# Patient Record
Sex: FEMALE | Race: BLACK OR AFRICAN AMERICAN | NOT HISPANIC OR LATINO | Employment: OTHER | URBAN - METROPOLITAN AREA
[De-identification: names, ages, dates, MRNs, and addresses within clinical notes are randomized per-mention and may not be internally consistent; named-entity substitution may affect disease eponyms.]

---

## 2017-05-18 ENCOUNTER — HOSPITAL ENCOUNTER (OUTPATIENT)
Dept: RADIOLOGY | Facility: HOSPITAL | Age: 67
Discharge: HOME | End: 2017-05-18
Attending: INTERNAL MEDICINE
Payer: MEDICARE

## 2017-05-18 DIAGNOSIS — M25.551 RIGHT HIP PAIN: Primary | ICD-10-CM

## 2017-05-18 DIAGNOSIS — M54.16 LUMBAR RADICULOPATHY: ICD-10-CM

## 2017-05-18 DIAGNOSIS — M25.551 RIGHT HIP PAIN: ICD-10-CM

## 2017-05-18 PROCEDURE — 73521 X-RAY EXAM HIPS BI 2 VIEWS: CPT | Mod: TC,PO

## 2017-05-18 PROCEDURE — 72100 X-RAY EXAM L-S SPINE 2/3 VWS: CPT | Mod: TC,PO

## 2017-05-18 PROCEDURE — 72220 X-RAY EXAM SACRUM TAILBONE: CPT | Mod: TC,PO

## 2017-06-28 ENCOUNTER — HOSPITAL ENCOUNTER (EMERGENCY)
Facility: HOSPITAL | Age: 67
Discharge: HOME | End: 2017-06-28
Attending: FAMILY MEDICINE
Payer: MEDICARE

## 2017-06-28 VITALS
WEIGHT: 240 LBS | BODY MASS INDEX: 37.67 KG/M2 | SYSTOLIC BLOOD PRESSURE: 172 MMHG | OXYGEN SATURATION: 96 % | HEIGHT: 67 IN | TEMPERATURE: 99 F | RESPIRATION RATE: 20 BRPM | HEART RATE: 86 BPM | DIASTOLIC BLOOD PRESSURE: 71 MMHG

## 2017-06-28 DIAGNOSIS — M79.606 LEG PAIN: ICD-10-CM

## 2017-06-28 DIAGNOSIS — M79.605 PAIN OF LEFT LOWER EXTREMITY: Primary | ICD-10-CM

## 2017-06-28 PROCEDURE — 99283 EMERGENCY DEPT VISIT LOW MDM: CPT

## 2017-06-28 PROCEDURE — 25000003 PHARM REV CODE 250: Performed by: FAMILY MEDICINE

## 2017-06-28 RX ORDER — HYDROCODONE BITARTRATE AND ACETAMINOPHEN 5; 325 MG/1; MG/1
1 TABLET ORAL
Status: COMPLETED | OUTPATIENT
Start: 2017-06-28 | End: 2017-06-28

## 2017-06-28 RX ORDER — HYDROCODONE BITARTRATE AND ACETAMINOPHEN 5; 325 MG/1; MG/1
1 TABLET ORAL EVERY 4 HOURS PRN
Qty: 18 TABLET | Refills: 0 | Status: ON HOLD | OUTPATIENT
Start: 2017-06-28 | End: 2018-03-03 | Stop reason: HOSPADM

## 2017-06-28 RX ORDER — CALCIUM ACETATE 667 MG/1
667 CAPSULE ORAL
COMMUNITY
End: 2019-11-05 | Stop reason: SDUPTHER

## 2017-06-28 RX ADMIN — HYDROCODONE BITARTRATE AND ACETAMINOPHEN 1 TABLET: 5; 325 TABLET ORAL at 04:06

## 2017-07-12 NOTE — ED PROVIDER NOTES
Encounter Date: 6/28/2017       History     Chief Complaint   Patient presents with    Leg Pain     left leg pain and low back pain x 2 days      66-year-old female presents with chief complaint of left leg and lower back pain for last 2 days.  Patient denies any urinary frequency denies any numbness or tingling.  Patient denies any constipation.  Denies any weakness.  Patient does have a history of peripheral vascular disease and ESRD.          Review of patient's allergies indicates:  No Known Allergies  Past Medical History:   Diagnosis Date    Arthritis     Cancer     breast left    CHF (congestive heart failure)     Coronary artery disease     Diabetes mellitus     DMII    End stage kidney disease     Hemodialysis access site with arteriovenous graft     mon-wed -[id number]    Hyperlipidemia     Hypertension     Renal disorder     CKD; on diaylsis    Sarcoidosis      Past Surgical History:   Procedure Laterality Date    av graft      left arm    BRACHIAL ARTERY GRAFT Left 05/04/2016    brachiocephalic, Dr. Anson Crocker    btl      ELBOW SURGERY      left    LIPOMA RESECTION      back of neck    MASTECTOMY Left     left with lymph node dissection    pilondial cyst      TOTAL KNEE ARTHROPLASTY Right 02/23/2016     Family History   Problem Relation Age of Onset    Diabetes Mother     Kidney disease Mother     Hypertension Mother     Diabetes Sister     Heart disease Sister      Social History   Substance Use Topics    Smoking status: Never Smoker    Smokeless tobacco: Not on file    Alcohol use No     Review of Systems   Constitutional: Negative for chills and fever.   Musculoskeletal: Positive for back pain. Negative for gait problem and neck pain.   All other systems reviewed and are negative.      Physical Exam     Initial Vitals [06/28/17 1537]   BP Pulse Resp Temp SpO2   (!) 160/54 76 20 98.1 °F (36.7 °C) (!) 94 %      MAP       89.33         Physical Exam    Nursing note and vitals  reviewed.  Constitutional: She appears well-developed and well-nourished.   HENT:   Head: Normocephalic and atraumatic.   Eyes: Conjunctivae and EOM are normal. Pupils are equal, round, and reactive to light.   Neck: Normal range of motion. Neck supple.   Cardiovascular: Normal rate, regular rhythm, normal heart sounds and intact distal pulses.   Pulmonary/Chest: Breath sounds normal.   Abdominal: Soft. Bowel sounds are normal.   Musculoskeletal: Normal range of motion. She exhibits tenderness.   Neurological: She is alert and oriented to person, place, and time. She has normal strength.   Skin: Skin is warm. Capillary refill takes less than 2 seconds.   Psychiatric: She has a normal mood and affect. Her behavior is normal.         ED Course   Procedures  Labs Reviewed - No data to display                            ED Course     Clinical Impression:   The primary encounter diagnosis was Pain of left lower extremity. A diagnosis of Leg pain was also pertinent to this visit.                           Rajesh Sung MD  07/12/17 0640       Rajesh Sung MD  07/12/17 0647

## 2017-08-01 ENCOUNTER — CLINICAL SUPPORT (OUTPATIENT)
Dept: REHABILITATION | Facility: HOSPITAL | Age: 67
End: 2017-08-01
Attending: INTERNAL MEDICINE
Payer: MEDICARE

## 2017-08-01 DIAGNOSIS — R29.898 WEAKNESS OF BOTH LEGS: Primary | ICD-10-CM

## 2017-08-01 DIAGNOSIS — R26.2 DIFFICULTY WALKING: ICD-10-CM

## 2017-08-01 DIAGNOSIS — G89.29 CHRONIC LOW BACK PAIN WITH SCIATICA, SCIATICA LATERALITY UNSPECIFIED, UNSPECIFIED BACK PAIN LATERALITY: ICD-10-CM

## 2017-08-01 DIAGNOSIS — M54.40 CHRONIC LOW BACK PAIN WITH SCIATICA, SCIATICA LATERALITY UNSPECIFIED, UNSPECIFIED BACK PAIN LATERALITY: ICD-10-CM

## 2017-08-01 DIAGNOSIS — R26.81 GAIT INSTABILITY: ICD-10-CM

## 2017-08-01 PROCEDURE — G8978 MOBILITY CURRENT STATUS: HCPCS | Mod: CL | Performed by: PHYSICAL MEDICINE & REHABILITATION

## 2017-08-01 PROCEDURE — 97163 PT EVAL HIGH COMPLEX 45 MIN: CPT | Performed by: PHYSICAL MEDICINE & REHABILITATION

## 2017-08-01 PROCEDURE — G8979 MOBILITY GOAL STATUS: HCPCS | Mod: CK | Performed by: PHYSICAL MEDICINE & REHABILITATION

## 2017-08-01 PROCEDURE — 97110 THERAPEUTIC EXERCISES: CPT | Performed by: PHYSICAL MEDICINE & REHABILITATION

## 2017-08-01 NOTE — PROGRESS NOTES
Name:Angelina Beard  Physician:Eva Jara MD  Date of eval:8/1/2017  Orders:  Physical Therapy evaluate and treat  Clinic: [id number]  Diagnosis:  1. Weakness of both legs     2. Difficulty walking     3. Gait instability     4. Chronic low back pain with sciatica, sciatica laterality unspecified, unspecified back pain laterality         Precautions: Grade I anteriolisthesis L4 on L5  Evaluation date: 8/1/2017  Visit # authorized: 1/12  Authorization period: 12/31/17  Plan of care expiration: 9/12/17      Start time: 11:05 AM  Stop Time: 12:00 PM    Timed     Procedure  Min     Units   TE x 2 25 2                    Untimed     Procedure  Min  Units   IE 30 1                 Subjective     Chief complaint: Patient states has had pain in low back for several years. Patient states about 2 monhts ago, began having radiating  pain in left posterior thigh to her left knee. Patient states her low back and left LE pain is constant.  Onset: several years   Mechanism of onset :  gradual    Aggravating factors: walking, sitting, standing  Easing factors: Pain medication--Hydrocodone, Gabapentin  Sleep is   disturbed. Sleeping position: sides  Loss of Bowel/Bladder Function: (-) --is on dialysis  PLOF  Includes:Performs ADL's with severe pain  Prior Physical Therapy: For right TKA  Current functional limitations: walking, sitting, standing, assistance with bathing, house work    Home/Living Environment: Lives alone and has aide comes daily with 12 stairs inside house with rails.    Pertinent PMH/Comorbidities:  Right TKA 2 years ago, HTN, Chest pains, Diabetes and has neuropathies bilateral hands and feet; Renal Failure--on dialysis 3 x /week; cold feet and swollen ankles    Patients structured exercise routine: None  Exercise routine prior to onset: none    Occupation: Pt is retired            Xray:    Pain level with 0 being the lowest and 10 being the highest presently: 6/10  Pain level with 0 being the lowest and 10  "being the highest at worst: 10/10  Pain level with 0 being the lowest and 10 being the highest at best: 3/10     Patient Goals: "I want to reduce the pain and walk better with walker"    Objective     Postural examination in standing and sitting:  -(+)  forward head  - (+)forward shoulders  - (-)  hip high  -(-) shoulder high  - (+) decreased lumbar lordosis  - (-)Thoracic kyphosis  - trunk flexed forward at hips        Functional assessment:   - walking/gait:Ambulates using RW and with patient's trunk flexed forward and limping on LLE  - sit to stand: Independent  - sit to supine: Independent       - supine to sit: Independent  - supine to prone: Did not get into this position     Flexibility testing:  - hamstrings:     90/90 test R -30 L -40           - gastrocnemius:   DF ankle R 10 degrees L 10 degrees  - piriformis: (+) (B)                 - quadriceps: (+) (B)                - hip flexors: (+) (B)  - hip adductors: (-)  - IT Bands: (-)    Lumbar ROM: (measured in degrees)    Degrees Quality   Flexion 30    With c/o pain thru rOM   Extension 40 degrees back flexion to 20 degrees With c/o pain thru ROM   Left Side Bending 10  With c/o pain thru ROM   Right Side Bending 10  With c/o pain thru ROM     AROM of bilateral hips, knees and ankles WFL    Dermatomes: (impaired/normal)     RLE LLE   L2 Intact Intact   L3 Intact Intact   L4 Intact Intact   L5 Intact Intact   S1 Intact Intact     Muscle Strength  MMT R L   Hip flexion 4/5 4/5   Hip abduction 4/5 4/5   Hip extension 4/5 4/5   Hip ER 4/5 4/5   Hip IR 4/5 4/5   Knee extension 5/5 5/5   Knee flexion 5/5 5/5   Ankle dorsiflexion 5/5 5/5   Ankle plantar flexion 5/5 5/5   Ankle inversion 5/5 5/5   Ankle eversion 5/5 5/5     Reflexes: 1+ BLE DTR    Special Tests: ((+): pos.; (-): neg.)   · SLR Test: (+) LLE for low back pain        SKC Test: (-) BLE for low back pain  · Hip Screen:(+) NELSON BLE low back pain  · Repetitive Motion: (-) for pain wit flexion   · Prone " "Instability: NT  · Saddle Sensation: (-)    Palpation for condition: Patient c/o pain over all lumbar vertebra and bilateral lumbar paraspinal muscles      Joint Mobility:  Hypomobility     Endurance is Good    PT Evaluation Completed? Yes  Discussed Plan of Care with patient: Yes    Outcome measures:    Impairment function:  Mobility  FOTO score: 37/100  G-code current: CL at least 60%, but < 80% impaired, limited or restricted  G-code goal: CK at least 40% but < 60% impaired, limited or restricted  Severity modifier selections based on the FOTO scoring, objective findings, and co-morbidity assessment    TREATMENT:  Therapeutic exercise: Angelina received therapeutic exercises to develop strength, stabilization and endurance; flexibility and range of motion for 25 minutes including:see HEP sheet.       Date 8/1/17   Visit 1/12   Gcode Visits  1/10   POC Exp Date 9/12/17   FOTO 1/5   Medicare Charge $140.00   Medicare Total $140.00   Face to Face    LTR 1 x 10   SKTC 1 x10   Supine HSS (B) 5 x 10"   Supine Piriformis (S) (B) 5 x 10"   Supine Hip Flexion (S) (B) 5 x 10"                       Initials VK         Pt. Education: Instructed pt. regarding:proper technique with all exercises. Pt. to demonstrate good understanding of the education provided. Angelina demonstrated good return demonstration of activities. No cultural, environmental, or spiritual barriers identified to treatment or learning.    Assessment   This is a 66 y.o. female referred to outpatient physical therapy and presents with a medical diagnosis of low back and LLE pain and PT diagnosis/findings of decreased AROM of lumbar spine, decreased strength bilateral hips, decreased flexibility, poor posture, antalgic gait demonstrating limitations as described in the problem list. Patient was in agreement with set goals and plan of care. Pt was given a written HEP along with posture education, instruction on body mechanics, activity modification/avoidance, " and low back/LE stretching regimen. Pt. verbally understood instructions and demonstrated proper form/technique. Pt was advised to perform these exercises free of pain, and discontinue use if symptoms persist/worsen. Pt will benefit from physical therapy services in order to maximize pain free functional independence. Rehab potential is good    History  Co-morbidities and personal factors that may impact the plan of care Examination  Body Structures and Functions, activity limitations and participation restrictions that may impact the plan of care Clinical Presentation   Decision Making/ Complexity Score   Co-morbidities:   Diabetes with neuropathies, Renal failure, chest pains      Personal Factors:   none Body Regions: lumbar spine, LLE    Body Systems: Musculoskeletal: decreased AROM lumbar spine; decreased strength bilateral hips and core; poor posture, decreased flexibility; Neuromuscular: antalgic gait          Activity limitations: Mobility      Participation Restrictions: General tasks and demands         Evolving clinical presentation with unstable and unpredictable characteristics   High      FOTO Score: 37/100         Medical necessity is demonstrated by the following IMPAIRMENTS/PROBLEM LIST:  Decreased range of motion lumbar spine  Decreased strength bilateral hips and core  Increased pain with walking  Antalgic gait  Increased pain with prolonged standing  Increased pain with sit to stand transfer  Increased pain and limited with climbing stairs  Disturbed sleep  ADL and household activities lead to increased pain and are limited  Functional impairment rating of: 60%    GOALS:   Short Term Goals:  3 weeks  Increase range of motion 25%  Increase strength 1/2 muscle grade  Patient will demonstrate improved posture standing and ambulation  Be able to perform HEP with minimal cueing required  Improve functional impairment of mobility to 50%    Long Term Goals: 6 weeks  Increase range of motion to 75% to  100% full   Improve muscle strength 1 muscle grade  Improve muscle strength with MMT to 4+/5 to 5/5  Restore ability to ambulate with normal pain free gait  Walking for ADL  will be restored with decreased pain  Restore ability to stand for ADL without increased pain  Restore ability to perform sit to stand transfer without increased pain  Restore ability to climb stairs in a reciprocal manner with min to 0 increased pain and/or difficulty  Restore normal sleep habits without disturbances due to pain  Restore ability to perform ADL's and household activities independently and without increased pain  Improve functional impairment of mobility to 40 %    Plan     Pt will be treated by physical therapy 2 times a week for 6 weeks to include: Therapeutic exercises to increase ROM, strength and stabilization; joint and soft tissue mobilization with manual therapy techniques to decrease muscle tightness, pain and improve joint mobility; neuromuscular re-education to improve balance, coordination, kinesthetic sense and proprioception, therapeutic activities to improve coordination, strength and function, therapeutic taping to decrease pain, provide support and improve function of the LE(s); modalities such as moist heat, ice, ultrasound and electrical stimulation to increase circulation, decrease pain and inflammation; dry needling with manual therapy techniques to decrease pain, inflammation and swelling, increase circulation and promote healing process will be considered and utilized as needed; aquatic physical therapy will be utilized as needed.  Pt may be seen by PTA to carry out plan of care as part of the Rehab team.    I certify the need for these services furnished under this plan of treatment and while under my care.    ____________________________________ Physician/Referring Practitioner                                  Date of Signature          Crystal Crouch PT

## 2017-08-01 NOTE — PLAN OF CARE
Create Note        My Note 11:10 AM   Edit              Name:Angelina Beard  Physician:Eva Jara MD  Date of eval:8/1/2017  Orders:  Physical Therapy evaluate and treat  Clinic: [id number]  Diagnosis:  1. Weakness of both legs      2. Difficulty walking      3. Gait instability      4. Chronic low back pain with sciatica, sciatica laterality unspecified, unspecified back pain laterality            Precautions: Grade I anteriolisthesis L4 on L5  Evaluation date: 8/1/2017  Visit # authorized: 1/12  Authorization period: 12/31/17  Plan of care expiration: 9/12/17        Start time: 11:05 AM  Stop Time: 12:00 PM     Timed       Procedure  Min     Units   TE x 2 25 2                              Untimed       Procedure  Min  Units   IE 30 1                         Subjective      Chief complaint: Patient states has had pain in low back for several years. Patient states about 2 monhts ago, began having radiating  pain in left posterior thigh to her left knee. Patient states her low back and left LE pain is constant.  Onset: several years   Mechanism of onset :  gradual     Aggravating factors: walking, sitting, standing  Easing factors: Pain medication--Hydrocodone, Gabapentin  Sleep is   disturbed. Sleeping position: sides  Loss of Bowel/Bladder Function: (-) --is on dialysis  PLOF  Includes:Performs ADL's with severe pain  Prior Physical Therapy: For right TKA  Current functional limitations: walking, sitting, standing, assistance with bathing, house work     Home/Living Environment: Lives alone and has aide comes daily with 12 stairs inside house with rails.     Pertinent PMH/Comorbidities:  Right TKA 2 years ago, HTN, Chest pains, Diabetes and has neuropathies bilateral hands and feet; Renal Failure--on dialysis 3 x /week; cold feet and swollen ankles     Patients structured exercise routine: None  Exercise routine prior to onset: none     Occupation: Pt is retired             Xray:     Pain level  "with 0 being the lowest and 10 being the highest presently: 6/10  Pain level with 0 being the lowest and 10 being the highest at worst: 10/10  Pain level with 0 being the lowest and 10 being the highest at best: 3/10      Patient Goals: "I want to reduce the pain and walk better with walker"     Objective      Postural examination in standing and sitting:  -(+)  forward head  - (+)forward shoulders  - (-)  hip high  -(-) shoulder high  - (+) decreased lumbar lordosis  - (-)Thoracic kyphosis  - trunk flexed forward at hips         Functional assessment:   - walking/gait:Ambulates using RW and with patient's trunk flexed forward and limping on LLE  - sit to stand: Independent  - sit to supine: Independent       - supine to sit: Independent  - supine to prone: Did not get into this position      Flexibility testing:  - hamstrings:     90/90 test R -30 L -40           - gastrocnemius:   DF ankle R 10 degrees L 10 degrees  - piriformis: (+) (B)                 - quadriceps: (+) (B)                - hip flexors: (+) (B)  - hip adductors: (-)  - IT Bands: (-)     Lumbar ROM: (measured in degrees)     Degrees Quality   Flexion 30    With c/o pain thru rOM   Extension 40 degrees back flexion to 20 degrees With c/o pain thru ROM   Left Side Bending 10  With c/o pain thru ROM   Right Side Bending 10  With c/o pain thru ROM      AROM of bilateral hips, knees and ankles WFL     Dermatomes: (impaired/normal)      RLE LLE   L2 Intact Intact   L3 Intact Intact   L4 Intact Intact   L5 Intact Intact   S1 Intact Intact      Muscle Strength  MMT R L   Hip flexion 4/5 4/5   Hip abduction 4/5 4/5   Hip extension 4/5 4/5   Hip ER 4/5 4/5   Hip IR 4/5 4/5   Knee extension 5/5 5/5   Knee flexion 5/5 5/5   Ankle dorsiflexion 5/5 5/5   Ankle plantar flexion 5/5 5/5   Ankle inversion 5/5 5/5   Ankle eversion 5/5 5/5      Reflexes: 1+ BLE DTR     Special Tests: ((+): pos.; (-): neg.)   · SLR Test: (+) LLE for low back pain        SKC Test: (-) " "BLE for low back pain  · Hip Screen:(+) NELSON BLE low back pain  · Repetitive Motion: (-) for pain wit flexion   · Prone Instability: NT  · Saddle Sensation: (-)     Palpation for condition: Patient c/o pain over all lumbar vertebra and bilateral lumbar paraspinal muscles        Joint Mobility:  Hypomobility      Endurance is Good     PT Evaluation Completed? Yes  Discussed Plan of Care with patient: Yes     Outcome measures:    Impairment function:  Mobility  FOTO score: 37/100  · G-code current: CL at least 60%, but < 80% impaired, limited or restricted  · G-code goal: CK at least 40% but < 60% impaired, limited or restricted  · Severity modifier selections based on the FOTO scoring, objective findings, and co-morbidity assessment     TREATMENT:  Therapeutic exercise: Angelina received therapeutic exercises to develop strength, stabilization and endurance; flexibility and range of motion for 25 minutes including:see HEP sheet.         Date 8/1/17   Visit 1/12   Gcode Visits  1/10   POC Exp Date 9/12/17   FOTO 1/5   Medicare Charge $140.00   Medicare Total $140.00   Face to Face     LTR 1 x 10   SKTC 1 x10   Supine HSS (B) 5 x 10"   Supine Piriformis (S) (B) 5 x 10"   Supine Hip Flexion (S) (B) 5 x 10"                                 Initials VK            Pt. Education: Instructed pt. regarding:proper technique with all exercises. Pt. to demonstrate good understanding of the education provided. Angelina demonstrated good return demonstration of activities. No cultural, environmental, or spiritual barriers identified to treatment or learning.     Assessment   This is a 66 y.o. female referred to outpatient physical therapy and presents with a medical diagnosis of low back and LLE pain and PT diagnosis/findings of decreased AROM of lumbar spine, decreased strength bilateral hips, decreased flexibility, poor posture, antalgic gait demonstrating limitations as described in the problem list. Patient was in agreement with " set goals and plan of care. Pt was given a written HEP along with posture education, instruction on body mechanics, activity modification/avoidance, and low back/LE stretching regimen. Pt. verbally understood instructions and demonstrated proper form/technique. Pt was advised to perform these exercises free of pain, and discontinue use if symptoms persist/worsen. Pt will benefit from physical therapy services in order to maximize pain free functional independence. Rehab potential is good     History  Co-morbidities and personal factors that may impact the plan of care Examination  Body Structures and Functions, activity limitations and participation restrictions that may impact the plan of care Clinical Presentation   Decision Making/ Complexity Score   Co-morbidities:   Diabetes with neuropathies, Renal failure, chest pains        Personal Factors:   none Body Regions: lumbar spine, LLE     Body Systems: Musculoskeletal: decreased AROM lumbar spine; decreased strength bilateral hips and core; poor posture, decreased flexibility; Neuromuscular: antalgic gait              Activity limitations: Mobility        Participation Restrictions: General tasks and demands            Evolving clinical presentation with unstable and unpredictable characteristics    High        FOTO Score: 37/100            Medical necessity is demonstrated by the following IMPAIRMENTS/PROBLEM LIST:  Decreased range of motion lumbar spine  Decreased strength bilateral hips and core  Increased pain with walking  Antalgic gait  Increased pain with prolonged standing  Increased pain with sit to stand transfer  Increased pain and limited with climbing stairs  Disturbed sleep  ADL and household activities lead to increased pain and are limited  Functional impairment rating of: 60%     GOALS:   Short Term Goals:  3 weeks  Increase range of motion 25%  Increase strength 1/2 muscle grade  Patient will demonstrate improved posture standing and  ambulation  Be able to perform HEP with minimal cueing required  Improve functional impairment of mobility to 50%     Long Term Goals: 6 weeks  Increase range of motion to 75% to 100% full   Improve muscle strength 1 muscle grade  Improve muscle strength with MMT to 4+/5 to 5/5  Restore ability to ambulate with normal pain free gait  Walking for ADL  will be restored with decreased pain  Restore ability to stand for ADL without increased pain  Restore ability to perform sit to stand transfer without increased pain  Restore ability to climb stairs in a reciprocal manner with min to 0 increased pain and/or difficulty  Restore normal sleep habits without disturbances due to pain  Restore ability to perform ADL's and household activities independently and without increased pain  Improve functional impairment of mobility to 40 %     Plan      Pt will be treated by physical therapy 2 times a week for 6 weeks to include: Therapeutic exercises to increase ROM, strength and stabilization; joint and soft tissue mobilization with manual therapy techniques to decrease muscle tightness, pain and improve joint mobility; neuromuscular re-education to improve balance, coordination, kinesthetic sense and proprioception, therapeutic activities to improve coordination, strength and function, therapeutic taping to decrease pain, provide support and improve function of the LE(s); modalities such as moist heat, ice, ultrasound and electrical stimulation to increase circulation, decrease pain and inflammation; dry needling with manual therapy techniques to decrease pain, inflammation and swelling, increase circulation and promote healing process will be considered and utilized as needed; aquatic physical therapy will be utilized as needed.  Pt may be seen by PTA to carry out plan of care as part of the Rehab team.     I certify the need for these services furnished under this plan of treatment and while under my  care.     ____________________________________ Physician/Referring Practitioner                                       Date of Carla Crouch PT

## 2017-08-03 ENCOUNTER — CLINICAL SUPPORT (OUTPATIENT)
Dept: REHABILITATION | Facility: HOSPITAL | Age: 67
End: 2017-08-03
Attending: INTERNAL MEDICINE
Payer: MEDICARE

## 2017-08-03 DIAGNOSIS — R26.2 DIFFICULTY WALKING: ICD-10-CM

## 2017-08-03 DIAGNOSIS — M54.42 CHRONIC RIGHT-SIDED LOW BACK PAIN WITH BILATERAL SCIATICA: ICD-10-CM

## 2017-08-03 DIAGNOSIS — R26.81 GAIT INSTABILITY: ICD-10-CM

## 2017-08-03 DIAGNOSIS — M54.41 CHRONIC RIGHT-SIDED LOW BACK PAIN WITH BILATERAL SCIATICA: ICD-10-CM

## 2017-08-03 DIAGNOSIS — R29.898 WEAKNESS OF BOTH LEGS: ICD-10-CM

## 2017-08-03 DIAGNOSIS — R53.1 GENERAL WEAKNESS: ICD-10-CM

## 2017-08-03 DIAGNOSIS — G89.29 CHRONIC RIGHT-SIDED LOW BACK PAIN WITH BILATERAL SCIATICA: ICD-10-CM

## 2017-08-03 PROCEDURE — 97110 THERAPEUTIC EXERCISES: CPT | Performed by: PHYSICAL MEDICINE & REHABILITATION

## 2017-08-03 NOTE — PROGRESS NOTES
"Name: Angelina eBard  Clinic Number: [id number]  Diagnosis:   Encounter Diagnoses   Name Primary?    Weakness of both legs     Gait instability     Difficulty walking     Chronic right-sided low back pain with bilateral sciatica     General weakness      Physician: Eva Jara MD  Treatment Orders: PT Eval and Treat  Past Medical History:   Diagnosis Date    Arthritis     Cancer     breast left    CHF (congestive heart failure)     Coronary artery disease     Diabetes mellitus     DMII    End stage kidney disease     Hemodialysis access site with arteriovenous graft     mon-    Hyperlipidemia     Hypertension     Renal disorder     CKD; on diaylsis    Sarcoidosis        Precautions: Grade I anteriolisthesis L4 on L5  Visit # authorized:  on 8/3/2017  Authorization period: 17  Plan of care expiration: 17    Start time: 10:30 AM  Stop Time: 11:20    Timed     Procedure  Min     Units   TE x 2  35 2   TE x 1 (sup) 15 0               Untimed     Procedure  Min  Units                      Subjective     Pt reports: having pain across her low back and into both LE's L>R    Pain Scale: before treatment: 10 currently; after treatment: 7/10    Objective       TREATMENT  Therapeutic exercise: Angelina received therapeutic exercises to develop ROM, strength and flexibility for 45 minutes includin:1 with PT  x 35 min/15 mins supervised    Date 8/3/17 8/1/17   Visit    Gcode Visits  2/10 1/10   POC Exp Date 17   FOTO 2   Medicare Charge $64.00 $140.00   Medicare Total $204.00 $140.00   Face to Face     LTR 1 x 10 1 x 10   SKTC 1 x 10 1 x10   Supine HSS (B) 5 x 10" 5 x 10"   Supine Piriformis (S) (B) 5 x 10" 5 x 10"   Supine Hip Flexion (S) (B) 5 x 10" 5 x 10"   TA contractions 10 x 5"    Glut Sets 10 x 5"    SLR 2 x 5    SEATED     Hip Abd  1 x 10 RTB    Hip Add 10 x 3" pillow    LAQ 1 x 10         Initials VK VK         Written Home Exercises Provided: " core and hip strengthening exercises  Pt demo good understanding of the education provided. Angelina demonstrated good return demonstration of activities.     Pt. education:  · Posture reeducation, body mechanics, HEP,   · No spiritual or educational barriers to learning provided  · Pt has no cultural, educational or language barriers to learning provided.    Assessment     Patient performed above exercise program with verbal cueing for proper technique and tolerated addition of core and hip strengthening exercises. Patient's pain decreased post treatment. Patient instructed to continue with HEP and to work on standing up straight when walking.  Pt will continue to benefit from skilled outpatient physical therapy to address the remaining functional deficits, provide pt/family education, and to maximize pt's level of independence in the home and community environment. .     Goals:   Short Term Goals:  3 weeks  Increase range of motion 25%  Increase strength 1/2 muscle grade  Patient will demonstrate improved posture standing and ambulation  Be able to perform HEP with minimal cueing required  Improve functional impairment of mobility to 50%    Long Term Goals: 6 weeks  Increase range of motion to 75% to 100% full   Improve muscle strength 1 muscle grade  Improve muscle strength with MMT to 4+/5 to 5/5  Restore ability to ambulate with normal pain free gait  Walking for ADL  will be restored with decreased pain  Restore ability to stand for ADL without increased pain  Restore ability to perform sit to stand transfer without increased pain  Restore ability to climb stairs in a reciprocal manner with min to 0 increased pain and/or difficulty  Restore normal sleep habits without disturbances due to pain  Restore ability to perform ADL's and household activities independently and without increased pain  Improve functional impairment of mobility to 40 %      Anticipated barriers to physical therapy: none  Pt's spiritual,  cultural and educational needs considered and pt agreeable to plan of care and goals        Plan   Continue with established Plan of Care towards PT goals.              Crystal Crouch, PT

## 2017-09-01 DIAGNOSIS — M47.26 OSTEOARTHRITIS OF SPINE WITH RADICULOPATHY, LUMBAR REGION: Primary | ICD-10-CM

## 2017-09-07 ENCOUNTER — HOSPITAL ENCOUNTER (OUTPATIENT)
Dept: RADIOLOGY | Facility: HOSPITAL | Age: 67
Discharge: HOME | End: 2017-09-07
Attending: ANESTHESIOLOGY
Payer: MEDICARE

## 2017-09-07 DIAGNOSIS — M47.26 OSTEOARTHRITIS OF SPINE WITH RADICULOPATHY, LUMBAR REGION: ICD-10-CM

## 2017-09-07 PROCEDURE — 72148 MRI LUMBAR SPINE W/O DYE: CPT | Mod: TC,PO

## 2017-09-12 ENCOUNTER — DOCUMENTATION ONLY (OUTPATIENT)
Dept: REHABILITATION | Facility: HOSPITAL | Age: 67
End: 2017-09-12

## 2017-09-12 DIAGNOSIS — R26.2 DIFFICULTY WALKING: ICD-10-CM

## 2017-09-12 DIAGNOSIS — R29.898 WEAKNESS OF BOTH LEGS: ICD-10-CM

## 2017-09-12 DIAGNOSIS — R53.1 GENERAL WEAKNESS: ICD-10-CM

## 2017-09-12 DIAGNOSIS — M54.40 CHRONIC LEFT-SIDED LOW BACK PAIN WITH SCIATICA, SCIATICA LATERALITY UNSPECIFIED: ICD-10-CM

## 2017-09-12 DIAGNOSIS — R26.81 GAIT INSTABILITY: ICD-10-CM

## 2017-09-12 DIAGNOSIS — G89.29 CHRONIC LEFT-SIDED LOW BACK PAIN WITH SCIATICA, SCIATICA LATERALITY UNSPECIFIED: ICD-10-CM

## 2017-09-12 NOTE — PROGRESS NOTES
Ms. Beard was seen in outpatient physical therapy for an initial evaluation on 8/1/17 for weakness in both legs. Ms. Beard attended one follow up physical therapy session and has self discharged as she has not returned for further physical therapy. POC has ended and patient is discharged from physical therapy .          · G-code current: CL at least 60%, but < 80% impaired, limited or restricted    · G-code goal: CK at least 40% but < 60% impaired, limited or restricted          G-code discharge: CL at least 60%, but < 80% impaired, limited or restricted        Crystal Crouch. PT

## 2017-10-31 ENCOUNTER — HOSPITAL ENCOUNTER (EMERGENCY)
Facility: HOSPITAL | Age: 67
Discharge: HOME | End: 2017-10-31
Attending: EMERGENCY MEDICINE
Payer: MEDICARE

## 2017-10-31 VITALS
HEIGHT: 67 IN | TEMPERATURE: 98 F | BODY MASS INDEX: 36.1 KG/M2 | SYSTOLIC BLOOD PRESSURE: 189 MMHG | OXYGEN SATURATION: 98 % | RESPIRATION RATE: 18 BRPM | WEIGHT: 230 LBS | DIASTOLIC BLOOD PRESSURE: 76 MMHG | HEART RATE: 74 BPM

## 2017-10-31 DIAGNOSIS — M54.41 CHRONIC BILATERAL LOW BACK PAIN WITH BILATERAL SCIATICA: ICD-10-CM

## 2017-10-31 DIAGNOSIS — M19.90 CHRONIC ARTHRITIS: Primary | ICD-10-CM

## 2017-10-31 DIAGNOSIS — G89.29 CHRONIC BILATERAL LOW BACK PAIN WITH BILATERAL SCIATICA: ICD-10-CM

## 2017-10-31 DIAGNOSIS — M54.42 CHRONIC BILATERAL LOW BACK PAIN WITH BILATERAL SCIATICA: ICD-10-CM

## 2017-10-31 LAB
ALBUMIN SERPL BCP-MCNC: 3.4 G/DL
ALP SERPL-CCNC: 86 U/L
ALT SERPL W/O P-5'-P-CCNC: 10 U/L
ANION GAP SERPL CALC-SCNC: 10 MMOL/L
AST SERPL-CCNC: 11 U/L
BASOPHILS # BLD AUTO: 0.01 K/UL
BASOPHILS NFR BLD: 0.2 %
BILIRUB SERPL-MCNC: 0.4 MG/DL
BUN SERPL-MCNC: 37 MG/DL
CALCIUM SERPL-MCNC: 9.3 MG/DL
CHLORIDE SERPL-SCNC: 100 MMOL/L
CO2 SERPL-SCNC: 27 MMOL/L
CREAT SERPL-MCNC: 7.1 MG/DL
DIFFERENTIAL METHOD: ABNORMAL
EOSINOPHIL # BLD AUTO: 0.1 K/UL
EOSINOPHIL NFR BLD: 1.5 %
ERYTHROCYTE [DISTWIDTH] IN BLOOD BY AUTOMATED COUNT: 13.9 %
EST. GFR  (AFRICAN AMERICAN): 6 ML/MIN/1.73 M^2
EST. GFR  (NON AFRICAN AMERICAN): 6 ML/MIN/1.73 M^2
GLUCOSE SERPL-MCNC: 172 MG/DL
HCT VFR BLD AUTO: 33.8 %
HGB BLD-MCNC: 10.8 G/DL
LIPASE SERPL-CCNC: 70 U/L
LYMPHOCYTES # BLD AUTO: 1.1 K/UL
LYMPHOCYTES NFR BLD: 18.4 %
MCH RBC QN AUTO: 29.3 PG
MCHC RBC AUTO-ENTMCNC: 32 G/DL
MCV RBC AUTO: 92 FL
MONOCYTES # BLD AUTO: 0.3 K/UL
MONOCYTES NFR BLD: 5.5 %
NEUTROPHILS # BLD AUTO: 4.5 K/UL
NEUTROPHILS NFR BLD: 74.1 %
PLATELET # BLD AUTO: 220 K/UL
PMV BLD AUTO: 10 FL
POTASSIUM SERPL-SCNC: 5.3 MMOL/L
PROT SERPL-MCNC: 7.5 G/DL
RBC # BLD AUTO: 3.69 M/UL
SODIUM SERPL-SCNC: 137 MMOL/L
WBC # BLD AUTO: 6.03 K/UL

## 2017-10-31 PROCEDURE — 80053 COMPREHEN METABOLIC PANEL: CPT

## 2017-10-31 PROCEDURE — 25000003 PHARM REV CODE 250: Performed by: EMERGENCY MEDICINE

## 2017-10-31 PROCEDURE — 63600175 PHARM REV CODE 636 W HCPCS: Performed by: EMERGENCY MEDICINE

## 2017-10-31 PROCEDURE — 96375 TX/PRO/DX INJ NEW DRUG ADDON: CPT

## 2017-10-31 PROCEDURE — 83690 ASSAY OF LIPASE: CPT

## 2017-10-31 PROCEDURE — 96374 THER/PROPH/DIAG INJ IV PUSH: CPT

## 2017-10-31 PROCEDURE — 85025 COMPLETE CBC W/AUTO DIFF WBC: CPT

## 2017-10-31 PROCEDURE — 99284 EMERGENCY DEPT VISIT MOD MDM: CPT | Mod: 25

## 2017-10-31 RX ORDER — OXYCODONE AND ACETAMINOPHEN 5; 325 MG/1; MG/1
1 TABLET ORAL
Status: COMPLETED | OUTPATIENT
Start: 2017-10-31 | End: 2017-10-31

## 2017-10-31 RX ORDER — KETOROLAC TROMETHAMINE 30 MG/ML
10 INJECTION, SOLUTION INTRAMUSCULAR; INTRAVENOUS
Status: COMPLETED | OUTPATIENT
Start: 2017-10-31 | End: 2017-10-31

## 2017-10-31 RX ORDER — ONDANSETRON 2 MG/ML
4 INJECTION INTRAMUSCULAR; INTRAVENOUS
Status: COMPLETED | OUTPATIENT
Start: 2017-10-31 | End: 2017-10-31

## 2017-10-31 RX ADMIN — ONDANSETRON 4 MG: 2 INJECTION INTRAMUSCULAR; INTRAVENOUS at 03:10

## 2017-10-31 RX ADMIN — OXYCODONE AND ACETAMINOPHEN 1 TABLET: 5; 325 TABLET ORAL at 04:10

## 2017-10-31 RX ADMIN — KETOROLAC TROMETHAMINE 10 MG: 30 INJECTION, SOLUTION INTRAMUSCULAR at 04:10

## 2017-10-31 NOTE — ED NOTES
Pt presents to ED c/o generalized body pain. Pt reports pain has been present X 3 months, but feels worse tonight and was keeping her awake. Pt states that had dialysis Monday, states she was able to complete dialysis. Pt states that she took norco at home for the pain, but it was not relieving her pain. Pt also requesting evaluation of blister to right foot. Pt states that she had it evaluated by PCP already.

## 2017-10-31 NOTE — ED PROVIDER NOTES
Encounter Date: 10/31/2017    SCRIBE #1 NOTE: I, Jayson Castillo, am scribing for, and in the presence of,  Jayson Landa MD. I have scribed the entire note.       History     Chief Complaint   Patient presents with    Generalized Body Aches     reports pain X 3 months that was keeping her awake tonight. Reports going to dialysis Monday and completing dialysis. Pt reports hx of arthritis and took Norco at home with no relief.      Time patient was seen by the provider: 3:18 AM      The patient is a 67 y.o. female with hx of: ESRD -HD MWF that presents to the ED with a complaint of pain in her back and joints chronic but worse just PTA. The patient reports she had dialysis early this morning and then came home and took her daily norco. This time it did not help and she was unable to sleep. She presents seeking help with pain control so she can rest. The patient explains this pain is the same pain that she feels with her arthritis and with the same quality, but worse.    Pt also with burst blister over dosrum right foot that occurred 4 days ago nad has had persistent drainage of serosanguinous fluid.             Review of patient's allergies indicates:  No Known Allergies  Past Medical History:   Diagnosis Date    Arthritis     Cancer     breast left    CHF (congestive heart failure)     Coronary artery disease     Diabetes mellitus     DMII    End stage kidney disease     Hemodialysis access site with arteriovenous graft     mon-wed -fri    Hyperlipidemia     Hypertension     Renal disorder     CKD; on diaylsis    Sarcoidosis      Past Surgical History:   Procedure Laterality Date    av graft      left arm    BRACHIAL ARTERY GRAFT Left 05/04/2016    brachiocephalic, Dr. Anson Crocker    btl      ELBOW SURGERY      left    LIPOMA RESECTION      back of neck    MASTECTOMY Left     left with lymph node dissection    pilondial cyst      TOTAL KNEE ARTHROPLASTY Right 02/23/2016     Family History    Problem Relation Age of Onset    Diabetes Mother     Kidney disease Mother     Hypertension Mother     Diabetes Sister     Heart disease Sister      Social History   Substance Use Topics    Smoking status: Never Smoker    Smokeless tobacco: Not on file    Alcohol use No     Review of Systems   Constitutional: Negative for fever.   HENT: Negative for sore throat.    Respiratory: Negative for shortness of breath.    Cardiovascular: Negative for chest pain.   Gastrointestinal: Negative for nausea.   Genitourinary: Negative for dysuria.   Musculoskeletal: Positive for arthralgias, back pain and myalgias.   Skin: Positive for wound. Negative for rash.   Neurological: Negative for weakness.   Hematological: Does not bruise/bleed easily.       Physical Exam     Initial Vitals [10/31/17 0253]   BP Pulse Resp Temp SpO2   (!) 193/94 78 14 97.8 °F (36.6 °C) 97 %      MAP       127         Physical Exam    Nursing note and vitals reviewed.  Constitutional: She appears well-developed.   HENT:   Head: Normocephalic and atraumatic.   Eyes: EOM are normal. Pupils are equal, round, and reactive to light.   Neck: Neck supple.   Cardiovascular: Normal rate, regular rhythm, normal heart sounds and intact distal pulses.   HD fistula in bilateral arms with bandage on the right side, good thrill   Pulmonary/Chest: Breath sounds normal. No respiratory distress. She has no decreased breath sounds.   Abdominal: Soft. Bowel sounds are normal. She exhibits no distension. There is no tenderness.   Musculoskeletal: Normal range of motion.   Lumbosacral paraspinal tenderness into the buttocks, old scar over right anterior knee    Tenderness over left hip as well         Neurological: She is alert and oriented to person, place, and time.   Skin: Skin is warm and dry.   Blister over the dorsum right foot that is unroofed and leaking serous fluid   Psychiatric: She has a normal mood and affect.         ED Course   Procedures  Labs Reviewed    CBC W/ AUTO DIFFERENTIAL - Abnormal; Notable for the following:        Result Value    RBC 3.69 (*)     Hemoglobin 10.8 (*)     Hematocrit 33.8 (*)     Gran% 74.1 (*)     All other components within normal limits   COMPREHENSIVE METABOLIC PANEL - Abnormal; Notable for the following:     Potassium 5.3 (*)     Glucose 172 (*)     BUN, Bld 37 (*)     Creatinine 7.1 (*)     Albumin 3.4 (*)     eGFR if  6 (*)     eGFR if non  6 (*)     All other components within normal limits   LIPASE - Abnormal; Notable for the following:     Lipase 70 (*)     All other components within normal limits             Medical Decision Making:   Initial Assessment:   66 Y/O female presents with acute exacerbation of chronic back pain. She also has a blister over the dorsum of her right foot which has burst, is draining serosanguinous fluid. She has no wound care planned for this. Will order labs and give wound care  Clinical Tests:   Lab Tests: Ordered and Reviewed       <> Summary of Lab: Lipase, mildly elevated at 70  ED Management:  Lipase came back slightly elevated but her symptoms are not consistent with pancreatitis. They are more consistent with musculoskeletal lumbosacral tenderness.  She has no real tenderness in the epigastrium.  Her lipase is 70, but I doubt this is pancreatitis.  She also has constipation which may be the cause of her bilateral lower abdominal discomfort.  She is nontender on exam.  I gave her Percocet here and Toradol and she needs to follow-up with her primary care physician for further workup.  She doesn't need emergent dialysis right now and can get later today.                   ED Course      Clinical Impression:     1. Chronic arthritis    2. Chronic bilateral low back pain with bilateral sciatica                                 Jayson Landa MD  10/31/17 2516

## 2017-11-02 ENCOUNTER — HOSPITAL ENCOUNTER (EMERGENCY)
Facility: HOSPITAL | Age: 67
Discharge: HOME | End: 2017-11-02
Attending: FAMILY MEDICINE
Payer: MEDICARE

## 2017-11-02 VITALS
RESPIRATION RATE: 20 BRPM | TEMPERATURE: 98 F | WEIGHT: 244.69 LBS | BODY MASS INDEX: 38.33 KG/M2 | DIASTOLIC BLOOD PRESSURE: 69 MMHG | OXYGEN SATURATION: 98 % | HEART RATE: 67 BPM | SYSTOLIC BLOOD PRESSURE: 162 MMHG

## 2017-11-02 DIAGNOSIS — R10.9 ABDOMINAL CRAMPING: Primary | ICD-10-CM

## 2017-11-02 LAB
ALBUMIN SERPL BCP-MCNC: 4 G/DL
ALP SERPL-CCNC: 74 U/L
ALT SERPL W/O P-5'-P-CCNC: 28 U/L
ANION GAP SERPL CALC-SCNC: 14 MMOL/L
AST SERPL-CCNC: 28 U/L
BACTERIA #/AREA URNS AUTO: ABNORMAL /HPF
BASOPHILS # BLD AUTO: 0.02 K/UL
BASOPHILS NFR BLD: 0.4 %
BILIRUB SERPL-MCNC: 0.6 MG/DL
BILIRUB UR QL STRIP: NEGATIVE
BUN SERPL-MCNC: 32 MG/DL
CALCIUM SERPL-MCNC: 8.7 MG/DL
CHLORIDE SERPL-SCNC: 96 MMOL/L
CLARITY UR REFRACT.AUTO: ABNORMAL
CO2 SERPL-SCNC: 27 MMOL/L
COLOR UR AUTO: ABNORMAL
CREAT SERPL-MCNC: 6.28 MG/DL
DIFFERENTIAL METHOD: ABNORMAL
EOSINOPHIL # BLD AUTO: 0.1 K/UL
EOSINOPHIL NFR BLD: 1.5 %
ERYTHROCYTE [DISTWIDTH] IN BLOOD BY AUTOMATED COUNT: 14 %
EST. GFR  (AFRICAN AMERICAN): 7.4 ML/MIN/1.73 M^2
EST. GFR  (NON AFRICAN AMERICAN): 6.4 ML/MIN/1.73 M^2
GLUCOSE SERPL-MCNC: 124 MG/DL
GLUCOSE UR QL STRIP: ABNORMAL
HCT VFR BLD AUTO: 33.9 %
HGB BLD-MCNC: 10.7 G/DL
HGB UR QL STRIP: ABNORMAL
HYALINE CASTS UR QL AUTO: 0 /LPF
KETONES UR QL STRIP: NEGATIVE
LEUKOCYTE ESTERASE UR QL STRIP: ABNORMAL
LIPASE SERPL-CCNC: 98 U/L
LYMPHOCYTES # BLD AUTO: 1.1 K/UL
LYMPHOCYTES NFR BLD: 20.7 %
MCH RBC QN AUTO: 29 PG
MCHC RBC AUTO-ENTMCNC: 31.6 G/DL
MCV RBC AUTO: 92 FL
MICROSCOPIC COMMENT: ABNORMAL
MONOCYTES # BLD AUTO: 0.5 K/UL
MONOCYTES NFR BLD: 9.9 %
NEUTROPHILS # BLD AUTO: 3.7 K/UL
NEUTROPHILS NFR BLD: 67.3 %
NITRITE UR QL STRIP: NEGATIVE
PH UR STRIP: 8 [PH] (ref 5–8)
PLATELET # BLD AUTO: 228 K/UL
PMV BLD AUTO: 10 FL
POTASSIUM SERPL-SCNC: 5 MMOL/L
PROT SERPL-MCNC: 7.4 G/DL
PROT UR QL STRIP: ABNORMAL
RBC # BLD AUTO: 3.69 M/UL
RBC #/AREA URNS AUTO: 2 /HPF (ref 0–4)
SODIUM SERPL-SCNC: 137 MMOL/L
SP GR UR STRIP: 1.01 (ref 1–1.03)
URN SPEC COLLECT METH UR: ABNORMAL
UROBILINOGEN UR STRIP-ACNC: NEGATIVE EU/DL
WBC # BLD AUTO: 5.47 K/UL
WBC #/AREA URNS AUTO: 10 /HPF (ref 0–5)

## 2017-11-02 PROCEDURE — 81000 URINALYSIS NONAUTO W/SCOPE: CPT

## 2017-11-02 PROCEDURE — 83690 ASSAY OF LIPASE: CPT

## 2017-11-02 PROCEDURE — 99284 EMERGENCY DEPT VISIT MOD MDM: CPT | Mod: 25

## 2017-11-02 PROCEDURE — 80053 COMPREHEN METABOLIC PANEL: CPT

## 2017-11-02 PROCEDURE — 63600175 PHARM REV CODE 636 W HCPCS: Performed by: FAMILY MEDICINE

## 2017-11-02 PROCEDURE — 96375 TX/PRO/DX INJ NEW DRUG ADDON: CPT

## 2017-11-02 PROCEDURE — 85025 COMPLETE CBC W/AUTO DIFF WBC: CPT

## 2017-11-02 PROCEDURE — 96374 THER/PROPH/DIAG INJ IV PUSH: CPT

## 2017-11-02 RX ORDER — KETOROLAC TROMETHAMINE 30 MG/ML
15 INJECTION, SOLUTION INTRAMUSCULAR; INTRAVENOUS
Status: COMPLETED | OUTPATIENT
Start: 2017-11-02 | End: 2017-11-02

## 2017-11-02 RX ORDER — MORPHINE SULFATE 2 MG/ML
2 INJECTION, SOLUTION INTRAMUSCULAR; INTRAVENOUS
Status: COMPLETED | OUTPATIENT
Start: 2017-11-02 | End: 2017-11-02

## 2017-11-02 RX ADMIN — KETOROLAC TROMETHAMINE 15 MG: 30 INJECTION, SOLUTION INTRAMUSCULAR at 08:11

## 2017-11-02 RX ADMIN — MORPHINE SULFATE 2 MG: 2 INJECTION, SOLUTION INTRAMUSCULAR; INTRAVENOUS at 09:11

## 2017-11-02 NOTE — ED PROVIDER NOTES
Encounter Date: 11/2/2017       History     Chief Complaint   Patient presents with    Abdominal Pain     Lower abdominal pain on the left side and goes to the right. I always have stomach cramps but the last 3 days have been worse. Denies vomiting/diarrhea. Reports nausea.      66 yo female with chronic abdominal cramping experiencing cramping today. No n/v or diarrhea. ESRD        Abdominal Cramping   The primary symptoms of the illness include abdominal pain. The primary symptoms of the illness do not include fever, shortness of breath, nausea, vomiting, diarrhea or dysuria. The current episode started several hours ago.   The abdominal pain began 3 to 5 hours ago. The pain came on suddenly. The abdominal pain is located in the LLQ and RLQ. The abdominal pain does not radiate. The abdominal pain is relieved by nothing. The abdominal pain is exacerbated by vomiting.   The patient states that she believes she is currently not pregnant. The patient has not had a change in bowel habit. Risk factors for an acute abdominal problem include being elderly. Symptoms associated with the illness do not include chills, constipation or back pain.     Review of patient's allergies indicates:  No Known Allergies  Past Medical History:   Diagnosis Date    Arthritis     Cancer     breast left    CHF (congestive heart failure)     Coronary artery disease     Diabetes mellitus     DMII    End stage kidney disease     Hemodialysis access site with arteriovenous graft     mon-wed -fri    Hyperlipidemia     Hypertension     Renal disorder     CKD; on diaylsis    Sarcoidosis      Past Surgical History:   Procedure Laterality Date    av graft      left arm    BRACHIAL ARTERY GRAFT Left 05/04/2016    brachiocephalic, Dr. Anson Crocker    btl      ELBOW SURGERY      left    LIPOMA RESECTION      back of neck    MASTECTOMY Left     left with lymph node dissection    pilondial cyst      TOTAL KNEE ARTHROPLASTY Right  02/23/2016     Family History   Problem Relation Age of Onset    Diabetes Mother     Kidney disease Mother     Hypertension Mother     Diabetes Sister     Heart disease Sister      Social History   Substance Use Topics    Smoking status: Never Smoker    Smokeless tobacco: Never Used    Alcohol use No     Review of Systems   Constitutional: Negative for chills and fever.   HENT: Negative for sore throat.    Respiratory: Negative for shortness of breath.    Cardiovascular: Negative for chest pain.   Gastrointestinal: Positive for abdominal pain. Negative for constipation, diarrhea, nausea and vomiting.   Genitourinary: Negative for dysuria.   Musculoskeletal: Negative for back pain.   Skin: Negative for rash.   Neurological: Negative for weakness.   Hematological: Does not bruise/bleed easily.   All other systems reviewed and are negative.      Physical Exam     Initial Vitals [11/02/17 0736]   BP Pulse Resp Temp SpO2   (!) 178/77 76 18 98.2 °F (36.8 °C) (!) 93 %      MAP       110.67         Physical Exam    Nursing note and vitals reviewed.  Constitutional: She appears well-developed.   HENT:   Head: Normocephalic and atraumatic.   Right Ear: External ear normal.   Left Ear: External ear normal.   Nose: Nose normal.   Mouth/Throat: Oropharynx is clear and moist.   Eyes: Conjunctivae and EOM are normal. Pupils are equal, round, and reactive to light. Right eye exhibits no discharge. Left eye exhibits no discharge.   Neck: Normal range of motion. Neck supple. No tracheal deviation present.   Cardiovascular: Normal rate, regular rhythm and normal heart sounds.   No murmur heard.  Pulmonary/Chest: Breath sounds normal. No respiratory distress. She has no wheezes.   Abdominal: Soft. Bowel sounds are normal. She exhibits no distension and no mass. There is tenderness. There is no rebound and no guarding.   Neurological: She is alert and oriented to person, place, and time.   Skin: Skin is warm and dry.         ED  Course   Procedures  Labs Reviewed   CBC W/ AUTO DIFFERENTIAL - Abnormal; Notable for the following:        Result Value    RBC 3.69 (*)     Hemoglobin 10.7 (*)     Hematocrit 33.9 (*)     MCHC 31.6 (*)     All other components within normal limits   COMPREHENSIVE METABOLIC PANEL - Abnormal; Notable for the following:     Glucose 124 (*)     BUN, Bld 32 (*)     Creatinine 6.28 (*)     eGFR if  7.4 (*)     eGFR if non  6.4 (*)     All other components within normal limits   LIPASE   URINALYSIS             Medical Decision Making:   Initial Assessment:   Patient in mild distress with pain at site and no other complains    Differential Diagnosis:   Appendicitis , constipation, diverticulitis, colitis, gastroenteritis                     ED Course      Clinical Impression:   The encounter diagnosis was Abdominal cramping.                           Hubert Kuo MD  11/02/17 0977

## 2017-12-11 DIAGNOSIS — Z12.31 SCREENING MAMMOGRAM, ENCOUNTER FOR: Primary | ICD-10-CM

## 2017-12-11 DIAGNOSIS — M81.0 OSTEOPOROSIS: Primary | ICD-10-CM

## 2017-12-11 DIAGNOSIS — R10.9 UNSPECIFIED ABDOMINAL PAIN: Primary | ICD-10-CM

## 2018-01-09 ENCOUNTER — HOSPITAL ENCOUNTER (OUTPATIENT)
Dept: RADIOLOGY | Facility: HOSPITAL | Age: 68
Discharge: HOME | End: 2018-01-09
Attending: INTERNAL MEDICINE
Payer: MEDICARE

## 2018-01-09 DIAGNOSIS — R10.9 UNSPECIFIED ABDOMINAL PAIN: ICD-10-CM

## 2018-01-09 DIAGNOSIS — Z12.31 SCREENING MAMMOGRAM, ENCOUNTER FOR: ICD-10-CM

## 2018-01-09 DIAGNOSIS — M81.0 OSTEOPOROSIS: ICD-10-CM

## 2018-01-09 PROCEDURE — 77080 DXA BONE DENSITY AXIAL: CPT | Mod: TC,PO

## 2018-01-09 PROCEDURE — 74018 RADEX ABDOMEN 1 VIEW: CPT | Mod: TC,FY,PO

## 2018-01-09 PROCEDURE — 77067 SCR MAMMO BI INCL CAD: CPT | Mod: TC,PO

## 2018-01-20 ENCOUNTER — HOSPITAL ENCOUNTER (EMERGENCY)
Facility: HOSPITAL | Age: 68
Discharge: HOME | End: 2018-01-20
Attending: FAMILY MEDICINE
Payer: MEDICARE

## 2018-01-20 VITALS
OXYGEN SATURATION: 97 % | BODY MASS INDEX: 37.67 KG/M2 | SYSTOLIC BLOOD PRESSURE: 176 MMHG | DIASTOLIC BLOOD PRESSURE: 81 MMHG | TEMPERATURE: 98 F | WEIGHT: 240 LBS | HEART RATE: 69 BPM | RESPIRATION RATE: 20 BRPM | HEIGHT: 67 IN

## 2018-01-20 DIAGNOSIS — K59.00 CONSTIPATION, UNSPECIFIED CONSTIPATION TYPE: Primary | ICD-10-CM

## 2018-01-20 DIAGNOSIS — M79.651 RIGHT THIGH PAIN: ICD-10-CM

## 2018-01-20 DIAGNOSIS — R10.9 ABDOMINAL PAIN: ICD-10-CM

## 2018-01-20 DIAGNOSIS — I15.1 HYPERTENSION SECONDARY TO OTHER RENAL DISORDERS: ICD-10-CM

## 2018-01-20 LAB — POCT GLUCOSE: 148 MG/DL (ref 70–110)

## 2018-01-20 PROCEDURE — 93005 ELECTROCARDIOGRAM TRACING: CPT

## 2018-01-20 PROCEDURE — 93010 ELECTROCARDIOGRAM REPORT: CPT | Mod: ,,, | Performed by: INTERNAL MEDICINE

## 2018-01-20 PROCEDURE — 96374 THER/PROPH/DIAG INJ IV PUSH: CPT

## 2018-01-20 PROCEDURE — 99284 EMERGENCY DEPT VISIT MOD MDM: CPT | Mod: 25

## 2018-01-20 PROCEDURE — 63600175 PHARM REV CODE 636 W HCPCS: Performed by: FAMILY MEDICINE

## 2018-01-20 PROCEDURE — 25000003 PHARM REV CODE 250: Performed by: FAMILY MEDICINE

## 2018-01-20 PROCEDURE — 27000221 HC OXYGEN, UP TO 24 HOURS

## 2018-01-20 PROCEDURE — 94760 N-INVAS EAR/PLS OXIMETRY 1: CPT

## 2018-01-20 PROCEDURE — 82962 GLUCOSE BLOOD TEST: CPT

## 2018-01-20 RX ORDER — DICYCLOMINE HYDROCHLORIDE 20 MG/1
20 TABLET ORAL 2 TIMES DAILY
Qty: 20 TABLET | Refills: 0 | Status: SHIPPED | OUTPATIENT
Start: 2018-01-20 | End: 2018-02-19

## 2018-01-20 RX ORDER — MORPHINE SULFATE 10 MG/ML
4 INJECTION INTRAMUSCULAR; INTRAVENOUS; SUBCUTANEOUS
Status: DISCONTINUED | OUTPATIENT
Start: 2018-01-20 | End: 2018-01-20 | Stop reason: HOSPADM

## 2018-01-20 RX ORDER — LACTULOSE 10 G/15ML
20 SOLUTION ORAL DAILY
Qty: 300 ML | Refills: 0 | Status: SHIPPED | OUTPATIENT
Start: 2018-01-20 | End: 2018-01-30

## 2018-01-20 RX ORDER — LACTULOSE 10 G/15ML
10 SOLUTION ORAL
Status: COMPLETED | OUTPATIENT
Start: 2018-01-20 | End: 2018-01-20

## 2018-01-20 RX ORDER — HYDROMORPHONE HYDROCHLORIDE 2 MG/ML
1 INJECTION, SOLUTION INTRAMUSCULAR; INTRAVENOUS; SUBCUTANEOUS
Status: COMPLETED | OUTPATIENT
Start: 2018-01-20 | End: 2018-01-20

## 2018-01-20 RX ADMIN — CLONIDINE HYDROCHLORIDE 0.3 MG: 0.2 TABLET ORAL at 06:01

## 2018-01-20 RX ADMIN — LACTULOSE 10 G: 20 SOLUTION ORAL at 06:01

## 2018-01-20 RX ADMIN — HYDROMORPHONE HYDROCHLORIDE 1 MG: 2 INJECTION, SOLUTION INTRAMUSCULAR; INTRAVENOUS; SUBCUTANEOUS at 04:01

## 2018-01-20 NOTE — ED PROVIDER NOTES
"Encounter Date: 1/20/2018       History     Chief Complaint   Patient presents with    Fatigue     feeling weak after HD today - pain in "backside" Pt reports pain is not new      Patient is an Adrianna renal disease and they just completed her dialysis.  Patient complains of right abdominal pain which is diffuse from upper to lower since yesterday.  No nausea or vomiting.  Mild shortness of breath.  No cough or cold.  Denies any fever.  Patient also complains of generalized weakness after dialysis.  Patient has not taken anything for pain at home.  Patient also complains of right lower leg pain since last few days.  Patient has been getting physical therapy at home.      The history is provided by the patient.     Review of patient's allergies indicates:  No Known Allergies  Past Medical History:   Diagnosis Date    Arthritis     Breast cancer     Cancer     breast left    CHF (congestive heart failure)     Coronary artery disease     Diabetes mellitus     DMII    End stage kidney disease     Hemodialysis access site with arteriovenous graft     mon-wed -fri    Hyperlipidemia     Hypertension     Renal disorder     CKD; on diaylsis    Sarcoidosis      Past Surgical History:   Procedure Laterality Date    av graft      left arm    BRACHIAL ARTERY GRAFT Left 05/04/2016    brachiocephalic, Dr. Anson Crocker    BREAST BIOPSY Left     breast ca    BREAST LUMPECTOMY Left     radiation only    btl      ELBOW SURGERY      left    LIPOMA RESECTION      back of neck    pilondial cyst      TOTAL KNEE ARTHROPLASTY Right 02/23/2016     Family History   Problem Relation Age of Onset    Diabetes Mother     Kidney disease Mother     Hypertension Mother     Diabetes Sister     Heart disease Sister      Social History   Substance Use Topics    Smoking status: Never Smoker    Smokeless tobacco: Never Used    Alcohol use No     Review of Systems   Constitutional: Negative for activity change, appetite " change and fever.   HENT: Negative for congestion, ear discharge, ear pain, rhinorrhea, sinus pain, sinus pressure and sore throat.    Eyes: Negative for pain, discharge, redness and itching.   Respiratory: Negative for cough, choking, shortness of breath and wheezing.    Cardiovascular: Negative for chest pain and palpitations.   Gastrointestinal: Positive for abdominal pain. Negative for abdominal distention, diarrhea, nausea and vomiting.   Genitourinary: Positive for dysuria. Negative for flank pain.   Musculoskeletal: Negative for back pain, gait problem, neck pain and neck stiffness.   Skin: Negative for rash.   Neurological: Negative for dizziness, light-headedness, numbness and headaches.   Psychiatric/Behavioral: Negative for behavioral problems, confusion and hallucinations. The patient is not nervous/anxious.        Physical Exam     Initial Vitals   BP Pulse Resp Temp SpO2   01/20/18 1456 01/20/18 1458 01/20/18 1456 01/20/18 1456 01/20/18 1458   (!) 214/97 82 16 98.2 °F (36.8 °C) (!) 92 %      MAP       01/20/18 1456       136         Physical Exam    Nursing note and vitals reviewed.  Constitutional: She appears well-developed and well-nourished.   HENT:   Head: Normocephalic.   Right Ear: External ear normal.   Left Ear: External ear normal.   Nose: Nose normal.   Mouth/Throat: Oropharynx is clear and moist.   Eyes: Conjunctivae and EOM are normal. Pupils are equal, round, and reactive to light.   Neck: Normal range of motion. Neck supple.   Cardiovascular: Normal rate, regular rhythm, normal heart sounds and intact distal pulses.   Pulmonary/Chest: Breath sounds normal. No respiratory distress. She has no wheezes. She has no rhonchi. She has no rales. She exhibits no tenderness.   Abdominal: Soft. Bowel sounds are normal. She exhibits no distension. There is tenderness. There is guarding. There is no rebound.       Musculoskeletal: Normal range of motion.        Right hip: She exhibits tenderness.         Legs:  Normal range of movements in right thigh with mild tenderness in the anterior muscles.   Neurological: She is alert and oriented to person, place, and time. She has normal strength and normal reflexes.   Skin: Skin is warm. Capillary refill takes less than 2 seconds.         ED Course   Procedures  Labs Reviewed   POCT GLUCOSE - Abnormal; Notable for the following:        Result Value    POCT Glucose 148 (*)     All other components within normal limits   CBC W/ AUTO DIFFERENTIAL   COMPREHENSIVE METABOLIC PANEL             Medical Decision Making:   Initial Assessment:   Pt presents with right abdominal wall and right thigh pain since 2 days. Pt completed her dialysis and sent to ER for generalised weakness. Pt was shaking her right foot and family was concerned, Pt AAO x 3 - no slurring of speech or focal weakness.  Differential Diagnosis:   Weakness, muscle strain. Abdominal wall pain. Constipation.  Clinical Tests:   Lab Tests: Ordered and Reviewed  Radiological Study: Ordered and Reviewed  Medical Tests: Ordered and Reviewed  ED Management:  Pt pain is in abdominal wall and right anterior thigh- looks like muscle strain , Also has constipation. Pt has PT at home. Advised to continue. F/U ED or PCP if pain persists or worsens.                   ED Course      Clinical Impression:   The primary encounter diagnosis was Constipation, unspecified constipation type. Diagnoses of Abdominal pain, Right thigh pain, and Hypertension secondary to other renal disorders were also pertinent to this visit.    Disposition:   Disposition: Discharged  Condition: Ijeoma Prater MD  01/25/18 8006

## 2018-02-06 ENCOUNTER — HOSPITAL ENCOUNTER (OUTPATIENT)
Dept: RADIOLOGY | Facility: HOSPITAL | Age: 68
Discharge: HOME | End: 2018-02-06
Attending: INTERNAL MEDICINE
Payer: MEDICARE

## 2018-02-06 DIAGNOSIS — R10.9 UNSPECIFIED ABDOMINAL PAIN: ICD-10-CM

## 2018-02-06 PROCEDURE — 76700 US EXAM ABDOM COMPLETE: CPT | Mod: TC,PO

## 2018-02-06 PROCEDURE — 76856 US EXAM PELVIC COMPLETE: CPT | Mod: TC,PO

## 2018-03-03 ENCOUNTER — HOSPITAL ENCOUNTER (OUTPATIENT)
Facility: HOSPITAL | Age: 68
Discharge: HOME | End: 2018-03-03
Attending: EMERGENCY MEDICINE | Admitting: INTERNAL MEDICINE
Payer: MEDICARE

## 2018-03-03 VITALS
HEIGHT: 64 IN | SYSTOLIC BLOOD PRESSURE: 159 MMHG | TEMPERATURE: 98 F | OXYGEN SATURATION: 93 % | WEIGHT: 225.06 LBS | RESPIRATION RATE: 19 BRPM | HEART RATE: 74 BPM | BODY MASS INDEX: 38.42 KG/M2 | DIASTOLIC BLOOD PRESSURE: 80 MMHG

## 2018-03-03 DIAGNOSIS — R07.9 CHEST PAIN, UNSPECIFIED TYPE: ICD-10-CM

## 2018-03-03 DIAGNOSIS — Z99.2 TYPE 2 DIABETES MELLITUS WITH HYPERTENSION AND END STAGE RENAL DISEASE ON DIALYSIS: Chronic | ICD-10-CM

## 2018-03-03 DIAGNOSIS — I25.10 CORONARY ARTERY DISEASE INVOLVING NATIVE CORONARY ARTERY OF NATIVE HEART WITHOUT ANGINA PECTORIS: Chronic | ICD-10-CM

## 2018-03-03 DIAGNOSIS — E11.42 DIABETIC POLYNEUROPATHY ASSOCIATED WITH TYPE 2 DIABETES MELLITUS: Chronic | ICD-10-CM

## 2018-03-03 DIAGNOSIS — Z85.3 HISTORY OF LEFT BREAST CANCER: Chronic | ICD-10-CM

## 2018-03-03 DIAGNOSIS — K21.9 GASTROESOPHAGEAL REFLUX DISEASE WITHOUT ESOPHAGITIS: Chronic | ICD-10-CM

## 2018-03-03 DIAGNOSIS — N18.6 ANEMIA IN END-STAGE RENAL DISEASE: ICD-10-CM

## 2018-03-03 DIAGNOSIS — D63.1 ANEMIA IN END-STAGE RENAL DISEASE: ICD-10-CM

## 2018-03-03 DIAGNOSIS — N18.6 END-STAGE RENAL DISEASE ON HEMODIALYSIS: Chronic | ICD-10-CM

## 2018-03-03 DIAGNOSIS — E78.5 HYPERLIPIDEMIA, UNSPECIFIED HYPERLIPIDEMIA TYPE: ICD-10-CM

## 2018-03-03 DIAGNOSIS — R07.9 CHEST PAIN: ICD-10-CM

## 2018-03-03 DIAGNOSIS — I12.0 TYPE 2 DIABETES MELLITUS WITH HYPERTENSION AND END STAGE RENAL DISEASE ON DIALYSIS: Chronic | ICD-10-CM

## 2018-03-03 DIAGNOSIS — R79.89 ELEVATED TROPONIN: ICD-10-CM

## 2018-03-03 DIAGNOSIS — E66.01 MORBID OBESITY: ICD-10-CM

## 2018-03-03 DIAGNOSIS — I50.32 CHRONIC DIASTOLIC (CONGESTIVE) HEART FAILURE: ICD-10-CM

## 2018-03-03 DIAGNOSIS — N18.6 TYPE 2 DIABETES MELLITUS WITH HYPERTENSION AND END STAGE RENAL DISEASE ON DIALYSIS: Chronic | ICD-10-CM

## 2018-03-03 DIAGNOSIS — E11.22 TYPE 2 DIABETES MELLITUS WITH HYPERTENSION AND END STAGE RENAL DISEASE ON DIALYSIS: Chronic | ICD-10-CM

## 2018-03-03 DIAGNOSIS — Z99.2 END-STAGE RENAL DISEASE ON HEMODIALYSIS: Chronic | ICD-10-CM

## 2018-03-03 LAB
ALBUMIN SERPL BCP-MCNC: 3.6 G/DL
ALP SERPL-CCNC: 55 U/L
ALT SERPL W/O P-5'-P-CCNC: 10 U/L
ANION GAP SERPL CALC-SCNC: 14 MMOL/L
APTT BLDCRRT: 26.7 SEC
AST SERPL-CCNC: 18 U/L
BASOPHILS # BLD AUTO: 0.01 K/UL
BASOPHILS NFR BLD: 0.2 %
BILIRUB SERPL-MCNC: 0.4 MG/DL
BNP SERPL-MCNC: 554 PG/ML
BUN SERPL-MCNC: 23 MG/DL
CALCIUM SERPL-MCNC: 10.1 MG/DL
CHLORIDE SERPL-SCNC: 99 MMOL/L
CO2 SERPL-SCNC: 25 MMOL/L
CREAT SERPL-MCNC: 5.7 MG/DL
DIASTOLIC DYSFUNCTION: YES
DIFFERENTIAL METHOD: ABNORMAL
EOSINOPHIL # BLD AUTO: 0.1 K/UL
EOSINOPHIL NFR BLD: 1.5 %
ERYTHROCYTE [DISTWIDTH] IN BLOOD BY AUTOMATED COUNT: 15 %
EST. GFR  (AFRICAN AMERICAN): 8 ML/MIN/1.73 M^2
EST. GFR  (NON AFRICAN AMERICAN): 7 ML/MIN/1.73 M^2
ESTIMATED AVG GLUCOSE: 137 MG/DL
ESTIMATED PA SYSTOLIC PRESSURE: 30.69
FERRITIN SERPL-MCNC: 1196 NG/ML
FOLATE SERPL-MCNC: 4.5 NG/ML
GLUCOSE SERPL-MCNC: 164 MG/DL
HBA1C MFR BLD HPLC: 6.4 %
HCT VFR BLD AUTO: 37.6 %
HGB BLD-MCNC: 11.7 G/DL
INR PPP: 1
IRON SERPL-MCNC: 74 UG/DL
LYMPHOCYTES # BLD AUTO: 1.1 K/UL
LYMPHOCYTES NFR BLD: 25.2 %
MAGNESIUM SERPL-MCNC: 2 MG/DL
MCH RBC QN AUTO: 28.5 PG
MCHC RBC AUTO-ENTMCNC: 31.1 G/DL
MCV RBC AUTO: 92 FL
MONOCYTES # BLD AUTO: 0.4 K/UL
MONOCYTES NFR BLD: 8.2 %
NEUTROPHILS # BLD AUTO: 2.9 K/UL
NEUTROPHILS NFR BLD: 64.7 %
PHOSPHATE SERPL-MCNC: 4.1 MG/DL
PLATELET # BLD AUTO: 188 K/UL
PMV BLD AUTO: 9.8 FL
POCT GLUCOSE: 158 MG/DL (ref 70–110)
POCT GLUCOSE: 165 MG/DL (ref 70–110)
POTASSIUM SERPL-SCNC: 4.7 MMOL/L
PROT SERPL-MCNC: 7.1 G/DL
PROTHROMBIN TIME: 10.5 SEC
RBC # BLD AUTO: 4.1 M/UL
RETIRED EF AND QEF - SEE NOTES: 75 (ref 55–65)
SATURATED IRON: 30 %
SODIUM SERPL-SCNC: 138 MMOL/L
T4 FREE SERPL-MCNC: 1.01 NG/DL
TOTAL IRON BINDING CAPACITY: 247 UG/DL
TRANSFERRIN SERPL-MCNC: 167 MG/DL
TRICUSPID VALVE REGURGITATION: ABNORMAL
TROPONIN I SERPL DL<=0.01 NG/ML-MCNC: 0.04 NG/ML
TROPONIN I SERPL DL<=0.01 NG/ML-MCNC: 0.04 NG/ML
TROPONIN I SERPL DL<=0.01 NG/ML-MCNC: 0.08 NG/ML
TROPONIN I SERPL DL<=0.01 NG/ML-MCNC: 0.09 NG/ML
TSH SERPL DL<=0.005 MIU/L-ACNC: 0.18 UIU/ML
VIT B12 SERPL-MCNC: 389 PG/ML
WBC # BLD AUTO: 4.52 K/UL

## 2018-03-03 PROCEDURE — 93005 ELECTROCARDIOGRAM TRACING: CPT

## 2018-03-03 PROCEDURE — 63600175 PHARM REV CODE 636 W HCPCS: Performed by: STUDENT IN AN ORGANIZED HEALTH CARE EDUCATION/TRAINING PROGRAM

## 2018-03-03 PROCEDURE — 83540 ASSAY OF IRON: CPT

## 2018-03-03 PROCEDURE — 82746 ASSAY OF FOLIC ACID SERUM: CPT

## 2018-03-03 PROCEDURE — 84443 ASSAY THYROID STIM HORMONE: CPT

## 2018-03-03 PROCEDURE — 93306 TTE W/DOPPLER COMPLETE: CPT

## 2018-03-03 PROCEDURE — 85610 PROTHROMBIN TIME: CPT

## 2018-03-03 PROCEDURE — 84484 ASSAY OF TROPONIN QUANT: CPT | Mod: 91

## 2018-03-03 PROCEDURE — 83735 ASSAY OF MAGNESIUM: CPT

## 2018-03-03 PROCEDURE — 83036 HEMOGLOBIN GLYCOSYLATED A1C: CPT

## 2018-03-03 PROCEDURE — 84439 ASSAY OF FREE THYROXINE: CPT

## 2018-03-03 PROCEDURE — 85025 COMPLETE CBC W/AUTO DIFF WBC: CPT

## 2018-03-03 PROCEDURE — 84484 ASSAY OF TROPONIN QUANT: CPT

## 2018-03-03 PROCEDURE — 25000003 PHARM REV CODE 250: Performed by: STUDENT IN AN ORGANIZED HEALTH CARE EDUCATION/TRAINING PROGRAM

## 2018-03-03 PROCEDURE — 82728 ASSAY OF FERRITIN: CPT

## 2018-03-03 PROCEDURE — 82607 VITAMIN B-12: CPT

## 2018-03-03 PROCEDURE — 93010 ELECTROCARDIOGRAM REPORT: CPT | Mod: 76,,, | Performed by: INTERNAL MEDICINE

## 2018-03-03 PROCEDURE — 80053 COMPREHEN METABOLIC PANEL: CPT

## 2018-03-03 PROCEDURE — 27000221 HC OXYGEN, UP TO 24 HOURS

## 2018-03-03 PROCEDURE — 99285 EMERGENCY DEPT VISIT HI MDM: CPT | Mod: 25

## 2018-03-03 PROCEDURE — G0378 HOSPITAL OBSERVATION PER HR: HCPCS

## 2018-03-03 PROCEDURE — 94761 N-INVAS EAR/PLS OXIMETRY MLT: CPT

## 2018-03-03 PROCEDURE — 36415 COLL VENOUS BLD VENIPUNCTURE: CPT

## 2018-03-03 PROCEDURE — 84100 ASSAY OF PHOSPHORUS: CPT

## 2018-03-03 PROCEDURE — 83880 ASSAY OF NATRIURETIC PEPTIDE: CPT

## 2018-03-03 PROCEDURE — 85730 THROMBOPLASTIN TIME PARTIAL: CPT

## 2018-03-03 PROCEDURE — 93010 ELECTROCARDIOGRAM REPORT: CPT | Mod: ,,, | Performed by: INTERNAL MEDICINE

## 2018-03-03 RX ORDER — LACTULOSE 10 G/15ML
20 SOLUTION ORAL EVERY 6 HOURS PRN
Status: DISCONTINUED | OUTPATIENT
Start: 2018-03-03 | End: 2018-03-03 | Stop reason: HOSPADM

## 2018-03-03 RX ORDER — HEPARIN SODIUM 5000 [USP'U]/ML
5000 INJECTION, SOLUTION INTRAVENOUS; SUBCUTANEOUS EVERY 8 HOURS
Status: DISCONTINUED | OUTPATIENT
Start: 2018-03-03 | End: 2018-03-03 | Stop reason: HOSPADM

## 2018-03-03 RX ORDER — IBUPROFEN 200 MG
24 TABLET ORAL
Status: DISCONTINUED | OUTPATIENT
Start: 2018-03-03 | End: 2018-03-03 | Stop reason: HOSPADM

## 2018-03-03 RX ORDER — INSULIN ASPART 100 [IU]/ML
1-10 INJECTION, SOLUTION INTRAVENOUS; SUBCUTANEOUS
Status: DISCONTINUED | OUTPATIENT
Start: 2018-03-03 | End: 2018-03-03 | Stop reason: HOSPADM

## 2018-03-03 RX ORDER — SEVELAMER CARBONATE 800 MG/1
800 TABLET, FILM COATED ORAL
Status: DISCONTINUED | OUTPATIENT
Start: 2018-03-03 | End: 2018-03-03 | Stop reason: HOSPADM

## 2018-03-03 RX ORDER — DOCUSATE SODIUM 100 MG/1
100 CAPSULE, LIQUID FILLED ORAL 2 TIMES DAILY
Status: DISCONTINUED | OUTPATIENT
Start: 2018-03-03 | End: 2018-03-03 | Stop reason: HOSPADM

## 2018-03-03 RX ORDER — PRAVASTATIN SODIUM 40 MG/1
40 TABLET ORAL NIGHTLY
Status: DISCONTINUED | OUTPATIENT
Start: 2018-03-03 | End: 2018-03-03 | Stop reason: HOSPADM

## 2018-03-03 RX ORDER — ACETAMINOPHEN 325 MG/1
650 TABLET ORAL ONCE
Status: COMPLETED | OUTPATIENT
Start: 2018-03-03 | End: 2018-03-03

## 2018-03-03 RX ORDER — HYDRALAZINE HYDROCHLORIDE 25 MG/1
25 TABLET, FILM COATED ORAL EVERY 8 HOURS
Status: DISCONTINUED | OUTPATIENT
Start: 2018-03-03 | End: 2018-03-03 | Stop reason: HOSPADM

## 2018-03-03 RX ORDER — GABAPENTIN 100 MG/1
100 CAPSULE ORAL NIGHTLY
Status: DISCONTINUED | OUTPATIENT
Start: 2018-03-03 | End: 2018-03-03 | Stop reason: HOSPADM

## 2018-03-03 RX ORDER — GLUCAGON 1 MG
1 KIT INJECTION
Status: DISCONTINUED | OUTPATIENT
Start: 2018-03-03 | End: 2018-03-03 | Stop reason: HOSPADM

## 2018-03-03 RX ORDER — IBUPROFEN 200 MG
16 TABLET ORAL
Status: DISCONTINUED | OUTPATIENT
Start: 2018-03-03 | End: 2018-03-03 | Stop reason: HOSPADM

## 2018-03-03 RX ORDER — FUROSEMIDE 10 MG/ML
200 INJECTION INTRAMUSCULAR; INTRAVENOUS ONCE
Status: COMPLETED | OUTPATIENT
Start: 2018-03-03 | End: 2018-03-03

## 2018-03-03 RX ORDER — PANTOPRAZOLE SODIUM 40 MG/1
40 TABLET, DELAYED RELEASE ORAL DAILY
Status: DISCONTINUED | OUTPATIENT
Start: 2018-03-03 | End: 2018-03-03 | Stop reason: HOSPADM

## 2018-03-03 RX ORDER — SODIUM CHLORIDE 0.9 % (FLUSH) 0.9 %
5 SYRINGE (ML) INJECTION
Status: DISCONTINUED | OUTPATIENT
Start: 2018-03-03 | End: 2018-03-03 | Stop reason: HOSPADM

## 2018-03-03 RX ORDER — CALCIUM ACETATE 667 MG/1
667 CAPSULE ORAL
Status: DISCONTINUED | OUTPATIENT
Start: 2018-03-03 | End: 2018-03-03

## 2018-03-03 RX ADMIN — ACETAMINOPHEN 650 MG: 325 TABLET ORAL at 01:03

## 2018-03-03 RX ADMIN — SEVELAMER CARBONATE 800 MG: 800 TABLET, FILM COATED ORAL at 08:03

## 2018-03-03 RX ADMIN — FUROSEMIDE 200 MG: 10 INJECTION, SOLUTION INTRAMUSCULAR; INTRAVENOUS at 06:03

## 2018-03-03 RX ADMIN — HYDRALAZINE HYDROCHLORIDE 25 MG: 25 TABLET, FILM COATED ORAL at 01:03

## 2018-03-03 RX ADMIN — PANTOPRAZOLE SODIUM 40 MG: 40 TABLET, DELAYED RELEASE ORAL at 08:03

## 2018-03-03 RX ADMIN — HYDRALAZINE HYDROCHLORIDE 25 MG: 25 TABLET, FILM COATED ORAL at 06:03

## 2018-03-03 RX ADMIN — INSULIN ASPART 2 UNITS: 100 INJECTION, SOLUTION INTRAVENOUS; SUBCUTANEOUS at 08:03

## 2018-03-03 RX ADMIN — HEPARIN SODIUM 5000 UNITS: 5000 INJECTION, SOLUTION INTRAVENOUS; SUBCUTANEOUS at 01:03

## 2018-03-03 RX ADMIN — DOCUSATE SODIUM 100 MG: 100 CAPSULE, LIQUID FILLED ORAL at 08:03

## 2018-03-03 RX ADMIN — SEVELAMER CARBONATE 800 MG: 800 TABLET, FILM COATED ORAL at 11:03

## 2018-03-03 RX ADMIN — HEPARIN SODIUM 5000 UNITS: 5000 INJECTION, SOLUTION INTRAVENOUS; SUBCUTANEOUS at 06:03

## 2018-03-03 RX ADMIN — INSULIN ASPART 2 UNITS: 100 INJECTION, SOLUTION INTRAVENOUS; SUBCUTANEOUS at 11:03

## 2018-03-03 NOTE — PLAN OF CARE
Discharge orders noted, no HH or HME ordered.    Pt's nurse will go over medications/signs and symptoms prior to discharge       03/03/18 1205   Final Note   Assessment Type Final Discharge Note   Discharge Disposition Home   What phone number can be called within the next 1-3 days to see how you are doing after discharge? [phone]   Hospital Follow Up  Appt(s) scheduled? No  (offices closed on weekend, patient to make own follow up appointmnet)   Right Care Referral Info   Post Acute Recommendation No Care     Allyn Moore, RN Transitional Navigator  [phone]

## 2018-03-03 NOTE — H&P
\[facility] [place] [facility]\"" Internal Medicine History and Physical - Resident Note    Admitting Team: Medicine Team A  Attending Physician: Sascha  Resident: Kirk   Interns: Nicho    Date of Admit: 3/3/2018    Chief Complaint     Chest pain for one day    Subjective:      History of Present Illness:  Angelina Beard is a 67 y.o. female who  has a past medical history of Arthritis; Breast cancer; Cancer; CHF (congestive heart failure); Coronary artery disease; Diabetes mellitus; End stage kidney disease; Hemodialysis access site with arteriovenous graft; Hyperlipidemia; Hypertension; Renal disorder; and Sarcoidosis.. The patient presented to [facility] on 3/3/2018 with a primary complaint of Chest Pain (awoke with c/o chest pain/sob. last dialysis yesterday. pt. was given [id number] and ntg. sl x2 with decrease in pain from 8 to 3 after ntg.. also reports groin pain with dysuria for approx. 1 month and hematuria tdoay. accucheck-147 per ems. )    Patient presents via EMS after being woken up with acute onset of pressure like chest pain that is substernal, non-radiating and persistent.  She notes many episodes in the past with similar symptoms for which she has received numerous angiograms, but has never underwent PCI.  She asserts that she completed her last HD session, which was the day PTA.  She denies recent fevers or chills, cough, nausea or emesis, but does endorse some BREWSTER.    Past Medical History:  Past Medical History:   Diagnosis Date    Arthritis     Breast cancer     Cancer     breast left    CHF (congestive heart failure)     Coronary artery disease     Diabetes mellitus     DMII    End stage kidney disease     Hemodialysis access site with arteriovenous graft     mon-wed -fri    Hyperlipidemia     Hypertension     Renal disorder     CKD; on diaylsis    Sarcoidosis        Past Surgical History:  Past Surgical History:   Procedure Laterality Date    av graft      left arm    BRACHIAL ARTERY  GRAFT Left 05/04/2016    brachiocephalic, Dr. Asnon Crocker    BREAST BIOPSY Left     breast ca    BREAST LUMPECTOMY Left     radiation only    btl      ELBOW SURGERY      left    LIPOMA RESECTION      back of neck    pilondial cyst      TOTAL KNEE ARTHROPLASTY Right 02/23/2016       Allergies:  Review of patient's allergies indicates:  No Known Allergies    Home Medications:  Prior to Admission medications    Medication Sig Start Date End Date Taking? Authorizing Provider   calcium acetate (PHOSLO) 667 mg capsule Take 667 mg by mouth 3 (three) times daily with meals.    Historical Provider, MD   docusate sodium (COLACE) 100 MG capsule Take 1 capsule (100 mg total) by mouth 2 (two) times daily as needed for Constipation. 2/23/16   Enrrique Beal MD   gabapentin (NEURONTIN) 100 MG capsule Take 1 capsule (100 mg total) by mouth every evening. 8/10/16   Nikolai Humphreys MD   hydrALAZINE (APRESOLINE) 25 MG tablet Take 1 tablet (25 mg total) by mouth every 8 (eight) hours. 8/10/16 11/2/17  Nikolai Humphreys MD   hydrocodone-acetaminophen 5-325mg (NORCO) 5-325 mg per tablet Take 1 tablet by mouth every 8 (eight) hours. 8/20/16   Edna Granado PA-C   hydrocodone-acetaminophen 5-325mg (NORCO) 5-325 mg per tablet Take 1 tablet by mouth every 4 (four) hours as needed for Pain. 6/28/17   Rajesh Sung MD   insulin NPH-insulin regular, 70/30, (NOVOLIN 70/30) 100 unit/mL (70-30) injection Inject 17 Units into the skin 2 (two) times daily before meals. 3/17/16   Anson Camacho NP   lactulose (CHRONULAC) 20 gram/30 mL Soln Take 20 g by mouth every 6 (six) hours as needed (for constipation).    Historical Provider, MD   nitroGLYCERIN (NITROSTAT) 0.4 MG SL tablet Place 0.4 mg under the tongue every 5 (five) minutes as needed for Chest pain.    Historical Provider, MD   ondansetron (ZOFRAN-ODT) 4 MG TbDL Take 1 tablet (4 mg total) by mouth every 6 (six) hours as needed (nausea or vomiting). 4/23/16    "Yan Corbin MD   pantoprazole (PROTONIX) 40 MG tablet Take 1 tablet (40 mg total) by mouth once daily. 3/17/16 11/2/17  Anson Camacho NP   pravastatin (PRAVACHOL) 40 MG tablet Take 40 mg by mouth every evening.  16   Historical Provider, MD   sevelamer carbonate (RENVELA) 2.4 gram PwPk Take 2.4 g by mouth 3 (three) times daily with meals. 3/17/16 11/2/17  Anson Camacho NP       Family History:  Family History   Problem Relation Age of Onset    Diabetes Mother     Kidney disease Mother     Hypertension Mother     Diabetes Sister     Heart disease Sister        Social History:  Social History   Substance Use Topics    Smoking status: Never Smoker    Smokeless tobacco: Never Used    Alcohol use No       Review of Systems:  Pertinent positives and negatives are listed in HPI.  All other systems are reviewed and are negative.    Health Maintaince :   Primary Care Physician: Marek  Immunizations:   TDap is up to date.  Influenza is up to date.  Pneumovax is up to date.  Cancer Screening:  Mammogram: is up to date. 2017  Colonoscopy: is not up to date.     Objective:   Last 24 Hour Vital Signs:  BP  Min: 91/55  Max: 161/90  Temp  Av.5 °F (36.4 °C)  Min: 97.5 °F (36.4 °C)  Max: 97.5 °F (36.4 °C)  Pulse  Av.5  Min: 88  Max: 111  Resp  Av  Min: 18  Max: 20  SpO2  Av.5 %  Min: 95 %  Max: 100 %  Height  Av' 4" (162.6 cm)  Min: 5' 4" (162.6 cm)  Max: 5' 4" (162.6 cm)  Weight  Av.2 kg (104 lb)  Min: 47.2 kg (104 lb)  Max: 47.2 kg (104 lb)  Body mass index is 17.85 kg/m².  No intake/output data recorded.    Physical Examination:  General: Alert and awake in NAD.   obese  HENT:  NCAT; anicteric sclera; OP clear with MMM  Cardio:  Regular rate and rhythm with normal S1 and S2; no murmurs or rubs  Resp:  respirations unlabored; bibasilar crackles. no wheezes  Abdom: Soft, NTND with normoactive bowel sounds  Extrem: WWP with trace edema.  RUQ AVF with palpable thrill " and audible bruit  Pulses: 2+ and symmetric distally  Neuro:  AAOx3; cooperative and pleasant with no focal deficits      Laboratory:  Most Recent Data:  CBC: Lab Results   Component Value Date    WBC 4.52 03/03/2018    HGB 11.7 (L) 03/03/2018    HCT 37.6 03/03/2018     03/03/2018    MCV 92 03/03/2018    RDW 15.0 (H) 03/03/2018     BMP: Lab Results   Component Value Date     03/03/2018    K 4.7 03/03/2018    CL 99 03/03/2018    CO2 25 03/03/2018    BUN 23 03/03/2018    CREATININE 5.7 (H) 03/03/2018     (H) 03/03/2018    CALCIUM 10.1 03/03/2018    MG 2.0 03/03/2018    PHOS 4.1 03/03/2018     LFTs: Lab Results   Component Value Date    PROT 7.1 03/03/2018    ALBUMIN 3.6 03/03/2018    BILITOT 0.4 03/03/2018    AST 18 03/03/2018    ALKPHOS 55 03/03/2018    ALT 10 03/03/2018       Trended Cardiac Data:    Recent Labs  Lab 03/03/18  0210   TROPONINI 0.040*   *       Microbiology Data:  - NONE    Other Results:  EKG (my interpretation):   NSR with no acute ST changes    Radiology:  Imaging Results          X-Ray Chest AP Portable (Final result)  Result time 03/03/18 02:52:47    Final result by Melanie Butts MD (03/03/18 02:52:47)                 Impression:      Cardiomegaly with suspected mild pulmonary edema.      Electronically signed by: MELANIE BUTTS MD  Date:     03/03/18  Time:    02:52              Narrative:    Chest AP single view.  Comparison: 8/2016.    Heart is enlarged.  There is mild perihilar opacity with increased bilateral interstitial attenuation which may reflect mild pulmonary edema.  No evidence of focal consolidation, pneumothorax, or large effusion.  Left subclavian and axillary vascular stent is seen.  Left axillary clips are also noted.                                 Assessment:     Angelina Beard is a 67 y.o. female with:     Plan:     Chest Pain:  - reports ~4 angiograms in past 10 yrs without PCI.    - initial Trop 0.04 -- trending.  ECG NSR with no acute ST  changes  - continue home statin    ESRD on HD (MWF):  - last HD day PTA.  BNP elevated.  CXR with mild pulm edema  - [facility] consulted for ongoing HD.  Dosing with Lasix IV    Chronic Diastolic Heart Failure:  - 2016 ECHO with norm LVEF, +DD.  BNP and CXR as above  - repeat ECHO pending    Essential HTN;  -  in ED.  Continue home Hydralazine.  Add and titrate as needed    Type 2 Diabetes:  - a1c pending.  Continue SSI.  Add and titrate as needed    Normocytic Anemia:  - Hgb at baseline.  Iron studies pending    GERD:  - continue home PPI    VTE PPx:  Heparin  Diet:  Diabetic Renal Cardiac    Disposition:  Pending cardiac workup    Huy Bradley  [facility] Internal Medicine HO-III  [facility] Medicine Service    [facility] Medicine Hospitalist Pager numbers:   [facility] Hospitalist Medicine Team A (Sascha/Javier): [phone]  [facility] Hospitalist Medicine Team B (Jose/Angela):  [phone]

## 2018-03-03 NOTE — ED PROVIDER NOTES
"Encounter Date: 3/3/2018    SCRIBE #1 NOTE: I, Adina Jason, am scribing for, and in the presence of, Dr. Ingram.       History     Chief Complaint   Patient presents with    Chest Pain     awoke with c/o chest pain/sob. last dialysis yesterday. pt. was given [id number] and ntg. sl x2 with decrease in pain from 8 to 3 after ntg.. also reports groin pain with dysuria for approx. 1 month and hematuria tdoay. accucheck-147 per ems.      67 y.o. female with PMHx of CHF, CAD, diabetes, HTN, HLD, UTI, and ESRD on dialysis mon-wed-fri who presents to the ED with a complaint of CP.  Per EMS, pt was given 2 NTG and aspirin PTA.  Pt is now cp free. Pt states she awoke tonight with chest pain and states it felt like "someone was sitting on my chest".   Symptom is associated with SOB and nausea but no vomiting.  At this time, pt is asymptomatic.  She also endorses bilateral feet swelling yesterday, groin pain and dysuria for the past month.  She denies any fever or hematuria.  She does not have any coronary stents.      The history is provided by the EMS personnel and the patient.     Review of patient's allergies indicates:  No Known Allergies  Past Medical History:   Diagnosis Date    Arthritis     Breast cancer     Cancer     breast left    CHF (congestive heart failure)     Coronary artery disease     Diabetes mellitus     DMII    End stage kidney disease     Hemodialysis access site with arteriovenous graft     mon-wed -fri    Hyperlipidemia     Hypertension     Renal disorder     CKD; on diaylsis    Sarcoidosis      Past Surgical History:   Procedure Laterality Date    av graft      left arm    BRACHIAL ARTERY GRAFT Left 05/04/2016    brachiocephalic, Dr. Anson Crocker    BREAST BIOPSY Left     breast ca    BREAST LUMPECTOMY Left     radiation only    btl      ELBOW SURGERY      left    LIPOMA RESECTION      back of neck    pilondial cyst      TOTAL KNEE ARTHROPLASTY Right 02/23/2016     Family " History   Problem Relation Age of Onset    Diabetes Mother     Kidney disease Mother     Hypertension Mother     Diabetes Sister     Heart disease Sister      Social History   Substance Use Topics    Smoking status: Never Smoker    Smokeless tobacco: Never Used    Alcohol use No     Review of Systems   Constitutional: Negative for fever.   Respiratory: Positive for chest tightness and shortness of breath. Negative for cough.    Cardiovascular: Positive for chest pain and leg swelling (resolved). Negative for palpitations.   Gastrointestinal: Positive for nausea. Negative for abdominal pain, diarrhea and vomiting.   Endocrine: Negative for polydipsia and polyphagia.   Genitourinary: Positive for dysuria and vaginal pain. Negative for flank pain, frequency and hematuria.   Musculoskeletal: Negative for back pain.   Skin: Negative for pallor and rash.   Neurological: Negative for dizziness and headaches.   Psychiatric/Behavioral: Negative for confusion.   All other systems reviewed and are negative.      Physical Exam     Initial Vitals [03/03/18 0110]   BP Pulse Resp Temp SpO2   (!) 91/55 (!) 111 20 97.5 °F (36.4 °C) 95 %      MAP       67         Physical Exam    Nursing note and vitals reviewed.  Constitutional: She appears well-developed and well-nourished. She is not diaphoretic. No distress.   HENT:   Head: Normocephalic and atraumatic.   Mouth/Throat: Oropharynx is clear and moist.   Eyes: Conjunctivae and EOM are normal.   Neck: Normal range of motion. Neck supple.   Cardiovascular: Regular rhythm and normal heart sounds.   Mild tachycardia   Pulmonary/Chest: Breath sounds normal. No respiratory distress. She has no wheezes. She exhibits no tenderness.   Diminished breath sounds bilateral lung bases.     Abdominal: Soft. Bowel sounds are normal. She exhibits no distension. There is no tenderness.   Musculoskeletal: Normal range of motion. She exhibits edema.   No edema in legs but she does have trace  edema in feet.     Neurological: She is alert and oriented to person, place, and time.   Skin: Skin is warm and dry. No pallor.   Psychiatric: She has a normal mood and affect. Her behavior is normal. Judgment normal.         ED Course   Procedures  Labs Reviewed   CBC W/ AUTO DIFFERENTIAL - Abnormal; Notable for the following:        Result Value    Hemoglobin 11.7 (*)     MCHC 31.1 (*)     RDW 15.0 (*)     All other components within normal limits   COMPREHENSIVE METABOLIC PANEL - Abnormal; Notable for the following:     Glucose 164 (*)     Creatinine 5.7 (*)     eGFR if  8 (*)     eGFR if non  7 (*)     All other components within normal limits   TROPONIN I - Abnormal; Notable for the following:     Troponin I 0.040 (*)     All other components within normal limits   B-TYPE NATRIURETIC PEPTIDE - Abnormal; Notable for the following:      (*)     All other components within normal limits   TROPONIN I   URINALYSIS     EKG Readings: (Independently Interpreted)   Rhythm: Sinus Arrhythmia. Heart Rate: 78. Conduction: Normal. Clinical Impression: Atrial Fibrillation with RVR and Sinus Arrhythmia   Other EKG Interpretations: Initial EKG:  Atrial fib with rvr at 119 bpm.  No stemi.  Repeat EKG done:  NSR at 78 bpm with sinus arrhythmia     Imaging Results          X-Ray Chest AP Portable (Final result)  Result time 03/03/18 02:52:47    Final result by Melanie Butts MD (03/03/18 02:52:47)                 Impression:      Cardiomegaly with suspected mild pulmonary edema.      Electronically signed by: MELANIE BUTTS MD  Date:     03/03/18  Time:    02:52              Narrative:    Chest AP single view.  Comparison: 8/2016.    Heart is enlarged.  There is mild perihilar opacity with increased bilateral interstitial attenuation which may reflect mild pulmonary edema.  No evidence of focal consolidation, pneumothorax, or large effusion.  Left subclavian and axillary vascular stent is  seen.  Left axillary clips are also noted.                              X-Rays:   Independently Interpreted Readings:   Chest X-Ray: No acute abnormalities.  No infiltrates. Cardiomegaly present. Pulmonary edema     Medical Decision Making:   History:   Old Medical Records: I decided to obtain old medical records.  Initial Assessment:   67 y.o. female with PMHx of ESRD on dialysis, HLD, and HTN and diabetes who presents to the ED with acute onset chest pressure TPA.  Pain was relieved with 2 NTG and aspirin given by EMS.  Differential Diagnosis:   Pericarditis, ACS, arrhythmia, aortic abnormality, pneumothorax, pneumonia, pulmonary embolus, myocarditis, costochondritis, muscle strain, gastritis    Independently Interpreted Test(s):   I have ordered and independently interpreted X-rays - see prior notes.  I have ordered and independently interpreted EKG Reading(s) - see prior notes  Clinical Tests:   Lab Tests: Ordered and Reviewed  Radiological Study: Ordered and Reviewed  Medical Tests: Ordered and Reviewed  ED Management:   Pt presents for evaluation of chest pressure, sob, and nausea that began pta.  Pt pain is resolved after asa and nitro x 2.  Her initial trop is stable.  She continues to have zero pain.  Vitals stable. Repeat EKG: NSR with sinus arrhythmia at 78 bpm    I have consulted [facility] hospitalist for admission.    Other:   I have discussed this case with another health care provider.                      Clinical Impression:   Diagnoses of Chest pain, Chronic diastolic (congestive) heart failure, and Elevated troponin were pertinent to this visit.    Disposition:   Disposition: Admitted  Condition: Stable                        Theresa Ingram MD  03/03/18 0518       Theresa Ingram MD  03/03/18 0519

## 2018-03-03 NOTE — PLAN OF CARE
Problem: Patient Care Overview  Goal: Plan of Care Review  Outcome: Ongoing (interventions implemented as appropriate)  Patient on oxygen with documented flow of 2 lpm.  Will attempt to wean per O2 order protocol. Will continue to monitor.

## 2018-03-03 NOTE — ED NOTES
Patient identifiers for Angelina Beard checked and correct.  LOC: The patient is awake, alert and aware of environment with an appropriate affect, the patient is oriented x 3 and speaking appropriately.  APPEARANCE: Patient resting comfortably and in no acute distress, patient is clean and well groomed, patient's clothing are properly fastened.  SKIN: The skin is warm and dry, patient has normal skin turgor and moist mucus membranes. Dialysis shunt in right arm, + bruit, + thrill.  MUSKULOSKELETAL: Patient moving all extremities well, no obvious swelling or deformities noted.  RESPIRATORY: Airway is open and patent, respirations are spontaneous, patient has a normal effort and rate.  CARDIAC: Tachycardic.

## 2018-03-03 NOTE — NURSING
Pt arrived to unit. Plan of care reviewed with patient. Patient verbalized complete understanding. Fall/safety precautions maintained. Fall risk contract signed, fall& limb alert wrist band on, slip resistant socks on. Bed in lowest position, locked, call light within reach. Bed alarm on and side rails up x's 2. Nurse instructed patient to notify staff for any assistance and pt verbalized complete understanding. Pt on telemetry & cardiac monitor is on.

## 2018-03-07 NOTE — DISCHARGE SUMMARY
[facility] Discharge Summary    Primary Team: [facility] Team A  Attending Physician: Sascha  Resident: Kirk  Intern: Nicho    Date of Admit: 3/3/2018  Date of Discharge: 3/6/2018    Discharge to: HOME  Condition: STABLE    Discharge Diagnoses     Patient Active Problem List   Diagnosis    Cerebral microvascular disease    Type 2 diabetes mellitus with hypertension and end stage renal disease on dialysis    Hyperlipidemia    Chronic diastolic congestive heart failure    Morbid obesity    Osteoarthritis    GERD (gastroesophageal reflux disease)    DJD (degenerative joint disease) of lumbar spine    History of sarcoidosis    Coronary artery disease involving native coronary artery of native heart without angina pectoris    Diabetic neuropathy associated with type 2 diabetes mellitus    History of left breast cancer s/p mastectomy    Debility    Status post total right knee replacement 2/23/16    Renovascular hypertension    Anemia in end-stage renal disease    Chronic constipation    End-stage renal disease on hemodialysis    Acute encephalopathy    Lactic acidosis    Altered mental status    Drug-induced encephalopathy    Chest pain       Consultants and Procedures     Consultants:  Nephrology    Procedures:   3/3 ECHO:  See below for summary and procedure note for full desciption    Brief History of Present Illness      Angelina Beard is a 67 y.o. female who  has a past medical history of Arthritis; Breast cancer; Cancer; CHF (congestive heart failure); Coronary artery disease; Diabetes mellitus; End stage kidney disease; Hemodialysis access site with arteriovenous graft; Hyperlipidemia; Hypertension; Renal disorder; and Sarcoidosis.  The patient presented to [facility] on 3/3/2018 with a primary complaint of Chest Pain (awoke with c/o chest pain/sob. last dialysis yesterday. pt. was given [id number] and ntg. sl x2 with decrease in pain from 8 to 3 after  ntg.. also reports groin pain with dysuria for approx. 1 month and hematuria tdoay. accucheck-147 per ems. )    Patient presents via EMS after being woken up with acute onset of pressure like chest pain that is substernal, non-radiating and persistent.  She notes many episodes in the past with similar symptoms for which she has received numerous angiograms, but has never underwent PCI.  She asserts that she completed her last HD session, which was the day PTA.  She denies recent fevers or chills, cough, nausea or emesis, but does endorse some BREWSTER.    For the full HPI please refer to the History & Physical from this admission.    Hospital Course By Problem with Pertinent Findings     Chest Pain:  - reports ~4 angiograms in past 10 yrs without PCI.    - initial Trop 0.04 >> 0.088.  ECG NSR with no acute ST changes  - continued on home statin.  Elevated trop 2/2 decreased clearance in the setting of volume overload     ESRD on HD (MWF):  - last HD day PTA.  BNP elevated.  CXR with mild pulm edema  - Renal consulted for ongoing HD. S/p Lasix 200mg IV x 1     Chronic Diastolic Heart Failure:  - 2016 ECHO with norm LVEF, +DD.  BNP and CXR as above  - repeat ECHO essentially unchanged, hyperdynamic LVEF poss 2/2 AVF     Essential HTN;  -  in ED.  Continued on home Hydralazine.  Resume home regimen on discharge     Type 2 Diabetes:  - a1c 6.4.  Placed on SSI.  Resume home regimen on discharge     Normocytic Anemia:  - Hgb at baseline.  Iron studies c/w AOCI    Subclinical Hypothyroidism:  - TSH mildly elevated with norm fT4.  Currently asymptomatic.  - needs repeat as outpatient     GERD:  - continued on home PPI    Discharge Medications        Medication List      CHANGE how you take these medications    hydrocodone-acetaminophen 5-325mg 5-325 mg per tablet  Commonly known as:  NORCO  Take 1 tablet by mouth every 8 (eight) hours.  What changed:  Another medication with the same name was removed. Continue taking this  medication, and follow the directions you see here.        CONTINUE taking these medications    calcium acetate 667 mg capsule  Commonly known as:  PHOSLO     docusate sodium 100 MG capsule  Commonly known as:  COLACE  Take 1 capsule (100 mg total) by mouth 2 (two) times daily as needed for Constipation.     gabapentin 100 MG capsule  Commonly known as:  NEURONTIN  Take 1 capsule (100 mg total) by mouth every evening.     hydrALAZINE 25 MG tablet  Commonly known as:  APRESOLINE  Take 1 tablet (25 mg total) by mouth every 8 (eight) hours.     insulin NPH-insulin regular (70/30) 100 unit/mL (70-30) injection  Commonly known as:  NOVOLIN 70/30  Inject 17 Units into the skin 2 (two) times daily before meals.     lactulose 20 gram/30 mL Soln  Commonly known as:  CHRONULAC     nitroGLYCERIN 0.4 MG SL tablet  Commonly known as:  NITROSTAT     ondansetron 4 MG Tbdl  Commonly known as:  ZOFRAN-ODT  Take 1 tablet (4 mg total) by mouth every 6 (six) hours as needed (nausea or vomiting).     pantoprazole 40 MG tablet  Commonly known as:  PROTONIX  Take 1 tablet (40 mg total) by mouth once daily.     pravastatin 40 MG tablet  Commonly known as:  PRAVACHOL     sevelamer carbonate 2.4 gram Pwpk  Commonly known as:  RENVELA  Take 2.4 g by mouth 3 (three) times daily with meals.            Discharge Information:   Diet:  Diabetic Renal Cardiac    Physical Activity:  AS TOLERATED    Instructions:  1. Take all medications as prescribed  2. Keep all follow-up appointments  3. Return to the hospital or call your primary care physicians if you experience any worsening symptoms such as Temp >101F; persistent nausea and vomiting or diarrhea ; severe uncontrolled pain; redness, tenderness, or signs of infection (pain, swelling, redness, odor or green/yellow discharge around incision site); difficulty breathing or increased cough; severe persistent headache; worsening rash; persistent dizziness, light-headedness, or visual disturbances;  increased confusion or weakness        Follow-Up Appointments:  Follow-up Information     Eva Jara MD.    Specialty:  Internal Medicine  Why:  offices closed on weekend, patient to make own follow up appointmnet  Contact information:  [facility]  [address]  [facility] [place] [address] [phone]             Please follow up.    Why:  Ambulatory Referral to Cardiology was placed, their offices will contact the patient with an appointment                 Huy Bradley  [facility] Internal [facility], [facility]

## 2018-05-04 DIAGNOSIS — K82.9 GALL BLADDER DISEASE: ICD-10-CM

## 2018-05-04 DIAGNOSIS — R10.9 PAIN IN THE ABDOMEN: ICD-10-CM

## 2018-05-04 DIAGNOSIS — K80.20 CHOLECYSTOLITHIASIS: Primary | ICD-10-CM

## 2018-05-10 ENCOUNTER — HOSPITAL ENCOUNTER (OUTPATIENT)
Dept: RADIOLOGY | Facility: HOSPITAL | Age: 68
Discharge: HOME | End: 2018-05-10
Attending: INTERNAL MEDICINE
Payer: MEDICARE

## 2018-05-10 DIAGNOSIS — R10.9 PAIN IN THE ABDOMEN: ICD-10-CM

## 2018-05-10 DIAGNOSIS — K80.20 CHOLECYSTOLITHIASIS: ICD-10-CM

## 2018-05-10 DIAGNOSIS — K82.9 GALL BLADDER DISEASE: ICD-10-CM

## 2018-05-10 PROCEDURE — 76700 US EXAM ABDOM COMPLETE: CPT | Mod: TC,PO

## 2018-05-10 PROCEDURE — 76856 US EXAM PELVIC COMPLETE: CPT | Mod: TC,PO

## 2018-05-17 ENCOUNTER — HOSPITAL ENCOUNTER (OUTPATIENT)
Dept: RADIOLOGY | Facility: HOSPITAL | Age: 68
Discharge: HOME | End: 2018-05-17
Attending: INTERNAL MEDICINE
Payer: MEDICARE

## 2018-05-17 DIAGNOSIS — K82.9 GALL BLADDER DISEASE: ICD-10-CM

## 2018-05-17 DIAGNOSIS — R10.9 PAIN IN THE ABDOMEN: ICD-10-CM

## 2018-05-17 DIAGNOSIS — K80.20 CHOLECYSTOLITHIASIS: ICD-10-CM

## 2018-05-17 PROCEDURE — A9537 TC99M MEBROFENIN: HCPCS | Mod: PO

## 2018-06-13 ENCOUNTER — HOSPITAL ENCOUNTER (EMERGENCY)
Facility: HOSPITAL | Age: 68
Discharge: HOME | End: 2018-06-13
Attending: FAMILY MEDICINE
Payer: MEDICARE

## 2018-06-13 VITALS
OXYGEN SATURATION: 99 % | DIASTOLIC BLOOD PRESSURE: 84 MMHG | SYSTOLIC BLOOD PRESSURE: 180 MMHG | BODY MASS INDEX: 39.48 KG/M2 | WEIGHT: 230 LBS | TEMPERATURE: 98 F | RESPIRATION RATE: 16 BRPM | HEART RATE: 70 BPM

## 2018-06-13 DIAGNOSIS — N76.0 BACTERIAL VAGINOSIS: Primary | ICD-10-CM

## 2018-06-13 DIAGNOSIS — B96.89 BACTERIAL VAGINOSIS: Primary | ICD-10-CM

## 2018-06-13 DIAGNOSIS — N89.8 VAGINAL DISCHARGE: ICD-10-CM

## 2018-06-13 LAB
BACTERIA #/AREA URNS AUTO: ABNORMAL /HPF
BACTERIA GENITAL QL WET PREP: ABNORMAL
BILIRUB UR QL STRIP: NEGATIVE
CLARITY UR REFRACT.AUTO: ABNORMAL
CLUE CELLS VAG QL WET PREP: ABNORMAL
COLOR UR AUTO: YELLOW
FILAMENT FUNGI VAG WET PREP-#/AREA: ABNORMAL
GLUCOSE UR QL STRIP: ABNORMAL
HGB UR QL STRIP: ABNORMAL
HYALINE CASTS UR QL AUTO: 0 /LPF
KETONES UR QL STRIP: NEGATIVE
LEUKOCYTE ESTERASE UR QL STRIP: ABNORMAL
MICROSCOPIC COMMENT: ABNORMAL
NITRITE UR QL STRIP: NEGATIVE
PH UR STRIP: 7 [PH] (ref 5–8)
PROT UR QL STRIP: ABNORMAL
RBC #/AREA URNS AUTO: 6 /HPF (ref 0–4)
SP GR UR STRIP: 1 (ref 1–1.03)
SPECIMEN SOURCE: ABNORMAL
T VAGINALIS GENITAL QL WET PREP: ABNORMAL
URN SPEC COLLECT METH UR: ABNORMAL
UROBILINOGEN UR STRIP-ACNC: NEGATIVE EU/DL
WBC #/AREA URNS AUTO: 8 /HPF (ref 0–5)
WBC #/AREA VAG WET PREP: ABNORMAL
YEAST GENITAL QL WET PREP: ABNORMAL

## 2018-06-13 PROCEDURE — 25000003 PHARM REV CODE 250: Performed by: FAMILY MEDICINE

## 2018-06-13 PROCEDURE — 99284 EMERGENCY DEPT VISIT MOD MDM: CPT | Mod: 25

## 2018-06-13 PROCEDURE — 63600175 PHARM REV CODE 636 W HCPCS: Performed by: FAMILY MEDICINE

## 2018-06-13 PROCEDURE — 87210 SMEAR WET MOUNT SALINE/INK: CPT

## 2018-06-13 PROCEDURE — 96372 THER/PROPH/DIAG INJ SC/IM: CPT

## 2018-06-13 PROCEDURE — 87491 CHLMYD TRACH DNA AMP PROBE: CPT

## 2018-06-13 PROCEDURE — 81000 URINALYSIS NONAUTO W/SCOPE: CPT

## 2018-06-13 RX ORDER — METRONIDAZOLE 500 MG/1
500 TABLET ORAL
Status: COMPLETED | OUTPATIENT
Start: 2018-06-13 | End: 2018-06-13

## 2018-06-13 RX ORDER — METRONIDAZOLE 500 MG/1
500 TABLET ORAL 3 TIMES DAILY
Qty: 21 TABLET | Refills: 0 | Status: SHIPPED | OUTPATIENT
Start: 2018-06-13 | End: 2018-06-20

## 2018-06-13 RX ORDER — AZITHROMYCIN 250 MG/1
1000 TABLET, FILM COATED ORAL
Status: COMPLETED | OUTPATIENT
Start: 2018-06-13 | End: 2018-06-13

## 2018-06-13 RX ORDER — CEFTRIAXONE 250 MG/1
250 INJECTION, POWDER, FOR SOLUTION INTRAMUSCULAR; INTRAVENOUS
Status: COMPLETED | OUTPATIENT
Start: 2018-06-13 | End: 2018-06-13

## 2018-06-13 RX ORDER — FLUCONAZOLE 150 MG/1
150 TABLET ORAL
Status: COMPLETED | OUTPATIENT
Start: 2018-06-13 | End: 2018-06-13

## 2018-06-13 RX ADMIN — METRONIDAZOLE 500 MG: 500 TABLET ORAL at 10:06

## 2018-06-13 RX ADMIN — AZITHROMYCIN MONOHYDRATE 1000 MG: 250 TABLET ORAL at 10:06

## 2018-06-13 RX ADMIN — FLUCONAZOLE 150 MG: 150 TABLET ORAL at 10:06

## 2018-06-13 RX ADMIN — CEFTRIAXONE SODIUM 250 MG: 250 INJECTION, POWDER, FOR SOLUTION INTRAMUSCULAR; INTRAVENOUS at 10:06

## 2018-06-13 NOTE — ED PROVIDER NOTES
Encounter Date: 6/13/2018       History     Chief Complaint   Patient presents with    Vaginal Discharge     vaginal discharge grey color with pain started about 6 weeks ago.     Patient complains of cardia by discharge for last few months.  Patient has been sexually active.  No vaginal bleeding.  Mild to moderate vaginal pain. No nausea or vomiting. Patient has not used any medication.      The history is provided by the patient.     Review of patient's allergies indicates:  No Known Allergies  Past Medical History:   Diagnosis Date    Arthritis     Breast cancer     Cancer     breast left    CHF (congestive heart failure)     Coronary artery disease     Diabetes mellitus     DMII    End stage kidney disease     Hemodialysis access site with arteriovenous graft     mon-wed -fri    Hyperlipidemia     Hypertension     Renal disorder     CKD; on diaylsis    Sarcoidosis      Past Surgical History:   Procedure Laterality Date    av graft      left arm    BRACHIAL ARTERY GRAFT Left 05/04/2016    brachiocephalic, Dr. Anson Crocker    BREAST BIOPSY Left     breast ca    BREAST LUMPECTOMY Left     radiation only    btl      ELBOW SURGERY      left    LIPOMA RESECTION      back of neck    pilondial cyst      TOTAL KNEE ARTHROPLASTY Right 02/23/2016     Family History   Problem Relation Age of Onset    Diabetes Mother     Kidney disease Mother     Hypertension Mother     Diabetes Sister     Heart disease Sister      Social History   Substance Use Topics    Smoking status: Never Smoker    Smokeless tobacco: Never Used    Alcohol use No     Review of Systems   Constitutional: Negative for activity change and fever.   HENT: Negative for congestion, ear pain, sinus pain and sore throat.    Eyes: Negative for pain, discharge and visual disturbance.   Respiratory: Negative for cough, chest tightness, shortness of breath and wheezing.    Cardiovascular: Negative for chest pain and palpitations.    Gastrointestinal: Negative for abdominal distention, abdominal pain and nausea.   Genitourinary: Positive for vaginal discharge and vaginal pain. Negative for dysuria, enuresis, flank pain, frequency, pelvic pain and vaginal bleeding.   Musculoskeletal: Negative for back pain, gait problem, neck pain and neck stiffness.   Skin: Negative for rash.   Neurological: Negative for dizziness and numbness.   All other systems reviewed and are negative.      Physical Exam     Initial Vitals [06/13/18 0915]   BP Pulse Resp Temp SpO2   (!) 184/81 73 18 97.7 °F (36.5 °C) 99 %      MAP       --         Physical Exam    Nursing note and vitals reviewed.  Constitutional: She appears well-developed and well-nourished.   HENT:   Head: Normocephalic.   Nose: Nose normal.   Eyes: EOM are normal. Pupils are equal, round, and reactive to light.   Neck: Normal range of motion. Neck supple.   Cardiovascular: Normal rate, regular rhythm, normal heart sounds and intact distal pulses.   Pulmonary/Chest: Breath sounds normal. No respiratory distress. She has no wheezes. She exhibits no tenderness.   Abdominal: Soft. Bowel sounds are normal. She exhibits no distension. There is no guarding.   Genitourinary: Cervix exhibits no motion tenderness and no discharge. Right adnexum displays no tenderness. Left adnexum displays no tenderness. Vaginal discharge found.   Genitourinary Comments: Thick white discharge noted in vaginal vault.   Musculoskeletal: Normal range of motion.   Neurological: She is alert and oriented to person, place, and time. She displays normal reflexes. No cranial nerve deficit.   Skin: Skin is warm. Capillary refill takes less than 2 seconds. No rash noted. No erythema.         ED Course   Procedures  Labs Reviewed   URINALYSIS - Abnormal; Notable for the following:        Result Value    Appearance, UA Hazy (*)     Protein, UA 2+ (*)     Glucose, UA 1+ (*)     Occult Blood UA 1+ (*)     Leukocytes, UA 1+ (*)     All other  components within normal limits   VAGINAL SCREEN - Abnormal; Notable for the following:     Clue Cells, Wet Prep Rare (*)     WBC - Vaginal Screen Few (*)     Bacteria - Vaginal Screen Few (*)     All other components within normal limits   URINALYSIS MICROSCOPIC - Abnormal; Notable for the following:     RBC, UA 6 (*)     WBC, UA 8 (*)     Bacteria, UA Moderate (*)     All other components within normal limits   C. TRACHOMATIS/N. GONORRHOEAE BY AMP DNA   C. TRACHOMATIS/N. GONORRHOEAE BY AMP DNA          No orders to display        Medical Decision Making:   Initial Assessment:   Patient complains of vaginal discharge for last few days which is white and thick.  Along with irritation and pain. Patient is currently active.  Has not used any medication.  Differential Diagnosis:   STD, bacterial vaginosis, yeast infection, vaginitis,  Clinical Tests:   Lab Tests: Ordered and Reviewed  ED Management:  Patient had few clue cells no trich no yeast she is a chlamydia have been sent out.  Patient is treated prophylactically with STD coverage.  And a prescription is given for Flagyl for next 7 days.  Patient is advised to follow up with Dr. Jara to review further evaluation and treatment.  Follow up ER with any severe symptoms uncontrolled at home.      Results discussed with patient.  Patient verbalizes understanding results,Diagnosis, treatment, management and prognosis.  Patient feels safe to go home.                 Clinical Impression:   The primary encounter diagnosis was Bacterial vaginosis. A diagnosis of Vaginal discharge was also pertinent to this visit.      Disposition:   Disposition: Discharged  Condition: Ijeoma Prater MD  06/13/18 2784

## 2018-06-14 LAB
C TRACH DNA SPEC QL NAA+PROBE: NOT DETECTED
N GONORRHOEA DNA SPEC QL NAA+PROBE: NOT DETECTED

## 2018-06-21 ENCOUNTER — OFFICE VISIT (OUTPATIENT)
Dept: CARDIOLOGY | Facility: CLINIC | Age: 68
End: 2018-06-21
Payer: MEDICARE

## 2018-06-21 VITALS
HEART RATE: 72 BPM | WEIGHT: 236 LBS | HEIGHT: 65 IN | SYSTOLIC BLOOD PRESSURE: 120 MMHG | BODY MASS INDEX: 39.32 KG/M2 | DIASTOLIC BLOOD PRESSURE: 80 MMHG

## 2018-06-21 DIAGNOSIS — E78.5 HYPERLIPIDEMIA, UNSPECIFIED HYPERLIPIDEMIA TYPE: ICD-10-CM

## 2018-06-21 DIAGNOSIS — N18.6 ANEMIA IN END-STAGE RENAL DISEASE: ICD-10-CM

## 2018-06-21 DIAGNOSIS — R07.9 CHEST PAIN, UNSPECIFIED TYPE: ICD-10-CM

## 2018-06-21 DIAGNOSIS — I67.89 CEREBRAL MICROVASCULAR DISEASE: Chronic | ICD-10-CM

## 2018-06-21 DIAGNOSIS — N18.6 TYPE 2 DIABETES MELLITUS WITH HYPERTENSION AND END STAGE RENAL DISEASE ON DIALYSIS: Chronic | ICD-10-CM

## 2018-06-21 DIAGNOSIS — Z86.2 HISTORY OF SARCOIDOSIS: Chronic | ICD-10-CM

## 2018-06-21 DIAGNOSIS — Z99.2 TYPE 2 DIABETES MELLITUS WITH HYPERTENSION AND END STAGE RENAL DISEASE ON DIALYSIS: Chronic | ICD-10-CM

## 2018-06-21 DIAGNOSIS — I48.0 PAROXYSMAL ATRIAL FIBRILLATION: ICD-10-CM

## 2018-06-21 DIAGNOSIS — D63.1 ANEMIA IN END-STAGE RENAL DISEASE: ICD-10-CM

## 2018-06-21 DIAGNOSIS — N18.6 END-STAGE RENAL DISEASE ON HEMODIALYSIS: Chronic | ICD-10-CM

## 2018-06-21 DIAGNOSIS — E66.01 MORBID OBESITY: ICD-10-CM

## 2018-06-21 DIAGNOSIS — K21.9 GASTROESOPHAGEAL REFLUX DISEASE WITHOUT ESOPHAGITIS: Chronic | ICD-10-CM

## 2018-06-21 DIAGNOSIS — Z99.2 END-STAGE RENAL DISEASE ON HEMODIALYSIS: Chronic | ICD-10-CM

## 2018-06-21 DIAGNOSIS — Z85.3 HISTORY OF LEFT BREAST CANCER: Chronic | ICD-10-CM

## 2018-06-21 DIAGNOSIS — G92.8 DRUG-INDUCED ENCEPHALOPATHY: ICD-10-CM

## 2018-06-21 DIAGNOSIS — G93.40 ACUTE ENCEPHALOPATHY: ICD-10-CM

## 2018-06-21 DIAGNOSIS — I15.0 RENOVASCULAR HYPERTENSION: Chronic | ICD-10-CM

## 2018-06-21 DIAGNOSIS — I25.10 CORONARY ARTERY DISEASE INVOLVING NATIVE CORONARY ARTERY OF NATIVE HEART WITHOUT ANGINA PECTORIS: Primary | Chronic | ICD-10-CM

## 2018-06-21 DIAGNOSIS — E11.22 TYPE 2 DIABETES MELLITUS WITH HYPERTENSION AND END STAGE RENAL DISEASE ON DIALYSIS: Chronic | ICD-10-CM

## 2018-06-21 DIAGNOSIS — I50.32 CHRONIC DIASTOLIC CONGESTIVE HEART FAILURE: Chronic | ICD-10-CM

## 2018-06-21 DIAGNOSIS — I12.0 TYPE 2 DIABETES MELLITUS WITH HYPERTENSION AND END STAGE RENAL DISEASE ON DIALYSIS: Chronic | ICD-10-CM

## 2018-06-21 PROBLEM — I48.91 A-FIB: Status: ACTIVE | Noted: 2018-06-21

## 2018-06-21 PROCEDURE — 99213 OFFICE O/P EST LOW 20 MIN: CPT | Mod: PBBFAC,PO | Performed by: INTERNAL MEDICINE

## 2018-06-21 PROCEDURE — 99999 PR PBB SHADOW E&M-EST. PATIENT-LVL III: CPT | Mod: PBBFAC,,, | Performed by: INTERNAL MEDICINE

## 2018-06-21 PROCEDURE — 99204 OFFICE O/P NEW MOD 45 MIN: CPT | Mod: S$PBB,,, | Performed by: INTERNAL MEDICINE

## 2018-06-21 RX ORDER — ASPIRIN 81 MG/1
81 TABLET ORAL DAILY
COMMUNITY
End: 2019-01-10

## 2018-06-21 NOTE — PROGRESS NOTES
Subjective:    Patient ID:  Angelina Beard is a 67 y.o. female who presents for evaluation of Chest Pain and Atrial Fibrillation      HPI     68 y/o female former patient of Dr Murphy (has not seen him in at least 2 years). She has a hx of CHF (HFpEF), Pafib previously on anticoagulation with coumadin but not currently and she does not know why (CHADSVasc 5 - CHF, HTN, Age, DM, female), CAD with multiple LHC's in the past with no PCI, HTN, HLD, anemia, ESRD on HD, anemia, hx of sarcoid. She was hospitalized 3/2018 with presenting symptom of CP, elevated trop thought due to demand, had Afib with RVR with spontaneous conversion to SR, and discharged home. Since discharge has had intermittent, nonexertional, non radiating, substernal chest pressure that lasts seconds and resolves spontaneously. Intermittently compliant with meds and states has not been brought down to normal dry weight bc BP too low. Only current anti-hypertensive in hydralazine which is prescribed as TID, however, she states she was told to take once daily on non HD days. She has chronic, unchanged BREWSTER. She denies orthopnea, PND, palps, syncope, LE edema. Does not smoke, no EtOH.     Review of Systems   Constitution: Positive for weakness and malaise/fatigue.   HENT: Negative for congestion.    Eyes: Negative for blurred vision.   Cardiovascular: Positive for chest pain, dyspnea on exertion and palpitations. Negative for claudication, cyanosis, irregular heartbeat, leg swelling, near-syncope, orthopnea, paroxysmal nocturnal dyspnea and syncope.   Respiratory: Positive for shortness of breath.    Endocrine: Negative for polyuria.   Hematologic/Lymphatic: Negative for bleeding problem.   Skin: Negative for itching and rash.   Musculoskeletal: Negative for joint swelling, muscle cramps and muscle weakness.   Gastrointestinal: Negative for abdominal pain, hematemesis, hematochezia, melena, nausea and vomiting.   Genitourinary: Negative for dysuria and  hematuria.   Neurological: Negative for dizziness, focal weakness, headaches, light-headedness and loss of balance.   Psychiatric/Behavioral: Negative for depression. The patient is not nervous/anxious.         Objective:    Physical Exam   Constitutional: She is oriented to person, place, and time. She appears well-developed and well-nourished.   HENT:   Head: Normocephalic and atraumatic.   Neck: Neck supple. No JVD present.   Cardiovascular: Normal rate and regular rhythm.    Murmur heard.   Systolic murmur is present with a grade of 2/6   Pulses:       Carotid pulses are 2+ on the right side, and 2+ on the left side.       Radial pulses are 2+ on the right side, and 1+ on the left side.        Femoral pulses are 2+ on the right side, and 2+ on the left side.       Posterior tibial pulses are 1+ on the right side, and 1+ on the left side.   Pulmonary/Chest: Effort normal and breath sounds normal.   Abdominal: Soft. Bowel sounds are normal.   Musculoskeletal: She exhibits no edema.   Neurological: She is alert and oriented to person, place, and time.   Skin: Skin is warm and dry.   Psychiatric: She has a normal mood and affect. Her behavior is normal. Thought content normal.         Assessment:       1. Coronary artery disease involving native coronary artery of native heart without angina pectoris    2. Chronic diastolic congestive heart failure    3. Paroxysmal atrial fibrillation    4. Renovascular hypertension    5. Hyperlipidemia, unspecified hyperlipidemia type    6. End-stage renal disease on hemodialysis    7. History of sarcoidosis    8. Anemia in end-stage renal disease    9. History of left breast cancer s/p mastectomy    10. Morbid obesity    11. Type 2 diabetes mellitus with hypertension and end stage renal disease on dialysis    12. Gastroesophageal reflux disease without esophagitis    13. Chest pain, unspecified type    14. Drug-induced encephalopathy    15. Cerebral microvascular disease    16.  Acute encephalopathy      66 y/o female with hx and presentation as above. Will evaluate symptoms with non invasive cardiac stress imaging. Will evaluate Afib burden with 30 day event monitor to determine need for anti-coagulation.       Plan:       -Obtain records from Dr Murphy  -30 day event monitor  -Nuclear SPECT  -f/u in 2 months

## 2018-06-22 ENCOUNTER — CLINICAL SUPPORT (OUTPATIENT)
Dept: ELECTROPHYSIOLOGY | Facility: CLINIC | Age: 68
End: 2018-06-22
Attending: INTERNAL MEDICINE
Payer: MEDICARE

## 2018-06-22 DIAGNOSIS — I48.0 PAROXYSMAL ATRIAL FIBRILLATION: ICD-10-CM

## 2018-06-22 PROCEDURE — 93270 REMOTE 30 DAY ECG REV/REPORT: CPT | Mod: PBBFAC | Performed by: INTERNAL MEDICINE

## 2018-06-29 ENCOUNTER — TELEPHONE (OUTPATIENT)
Dept: CARDIOLOGY | Facility: CLINIC | Age: 68
End: 2018-06-29

## 2018-06-29 NOTE — TELEPHONE ENCOUNTER
----- Message from Imelda Stiles sent at 6/29/2018  8:49 AM CDT -----  Contact: Imelda Romo Pt saw Dr. Flores on 6-21-18. He requested records from Dr Jara's office, those records were faxed and they are scanned in her media

## 2018-07-11 ENCOUNTER — HOSPITAL ENCOUNTER (INPATIENT)
Facility: HOSPITAL | Age: 68
LOS: 2 days | Discharge: HOME HEALTH | DRG: 377 | End: 2018-07-13
Attending: EMERGENCY MEDICINE | Admitting: HOSPITALIST
Payer: MEDICARE

## 2018-07-11 DIAGNOSIS — R07.9 CHEST PAIN: ICD-10-CM

## 2018-07-11 DIAGNOSIS — D64.9 SYMPTOMATIC ANEMIA: Primary | ICD-10-CM

## 2018-07-11 DIAGNOSIS — D50.0 ANEMIA DUE TO GASTROINTESTINAL BLOOD LOSS: ICD-10-CM

## 2018-07-11 DIAGNOSIS — Z99.2 END-STAGE RENAL DISEASE ON HEMODIALYSIS: Chronic | ICD-10-CM

## 2018-07-11 DIAGNOSIS — N18.6 END-STAGE RENAL DISEASE ON HEMODIALYSIS: Chronic | ICD-10-CM

## 2018-07-11 DIAGNOSIS — E87.5 HYPERKALEMIA: ICD-10-CM

## 2018-07-11 DIAGNOSIS — D62 ACUTE BLOOD LOSS ANEMIA: ICD-10-CM

## 2018-07-11 PROBLEM — I48.91 A-FIB: Chronic | Status: ACTIVE | Noted: 2018-06-21

## 2018-07-11 PROBLEM — I48.0 PAROXYSMAL ATRIAL FIBRILLATION: Chronic | Status: ACTIVE | Noted: 2018-06-21

## 2018-07-11 LAB
ABO + RH BLD: NORMAL
ALBUMIN SERPL BCP-MCNC: 3.3 G/DL
ALP SERPL-CCNC: 32 U/L
ALT SERPL W/O P-5'-P-CCNC: 12 U/L
ANION GAP SERPL CALC-SCNC: 16 MMOL/L
AST SERPL-CCNC: 10 U/L
BASOPHILS # BLD AUTO: 0.04 K/UL
BASOPHILS NFR BLD: 0.4 %
BILIRUB SERPL-MCNC: 0.4 MG/DL
BLD GP AB SCN CELLS X3 SERPL QL: NORMAL
BLD PROD TYP BPU: NORMAL
BLD PROD TYP BPU: NORMAL
BLOOD GROUP ANTIBODIES SERPL: NORMAL
BLOOD UNIT EXPIRATION DATE: NORMAL
BLOOD UNIT EXPIRATION DATE: NORMAL
BLOOD UNIT TYPE CODE: 1700
BLOOD UNIT TYPE CODE: 9500
BLOOD UNIT TYPE: NORMAL
BLOOD UNIT TYPE: NORMAL
BNP SERPL-MCNC: 319 PG/ML
BUN SERPL-MCNC: 121 MG/DL
CALCIUM SERPL-MCNC: 8.7 MG/DL
CHLORIDE SERPL-SCNC: 101 MMOL/L
CO2 SERPL-SCNC: 20 MMOL/L
CODING SYSTEM: NORMAL
CODING SYSTEM: NORMAL
CREAT SERPL-MCNC: 8.2 MG/DL
DAT IGG-SP REAG RBC-IMP: NORMAL
DIFFERENTIAL METHOD: ABNORMAL
DISPENSE STATUS: NORMAL
DISPENSE STATUS: NORMAL
EOSINOPHIL # BLD AUTO: 0.1 K/UL
EOSINOPHIL NFR BLD: 0.4 %
ERYTHROCYTE [DISTWIDTH] IN BLOOD BY AUTOMATED COUNT: 17.5 %
EST. GFR  (AFRICAN AMERICAN): 5 ML/MIN/1.73 M^2
EST. GFR  (NON AFRICAN AMERICAN): 5 ML/MIN/1.73 M^2
GLUCOSE SERPL-MCNC: 219 MG/DL
HCT VFR BLD AUTO: 16.5 %
HCT VFR BLD AUTO: 19 %
HCT VFR BLD AUTO: 23.8 %
HGB BLD-MCNC: 5.1 G/DL
HGB BLD-MCNC: 6.1 G/DL
HGB BLD-MCNC: 7.8 G/DL
LYMPHOCYTES # BLD AUTO: 2.1 K/UL
LYMPHOCYTES NFR BLD: 18.6 %
MCH RBC QN AUTO: 29.1 PG
MCHC RBC AUTO-ENTMCNC: 30.9 G/DL
MCV RBC AUTO: 94 FL
MONOCYTES # BLD AUTO: 0.4 K/UL
MONOCYTES NFR BLD: 3.7 %
NEUTROPHILS # BLD AUTO: 8.5 K/UL
NEUTROPHILS NFR BLD: 75.7 %
NUM UNITS TRANS PACKED RBC: NORMAL
OB PNL STL: POSITIVE
PLATELET # BLD AUTO: 227 K/UL
PMV BLD AUTO: 9.7 FL
POCT GLUCOSE: 184 MG/DL (ref 70–110)
POCT GLUCOSE: 190 MG/DL (ref 70–110)
POCT GLUCOSE: 218 MG/DL (ref 70–110)
POTASSIUM SERPL-SCNC: 4.3 MMOL/L
POTASSIUM SERPL-SCNC: 6.4 MMOL/L
PROT SERPL-MCNC: 6 G/DL
RBC # BLD AUTO: 1.75 M/UL
SODIUM SERPL-SCNC: 137 MMOL/L
TRANS ERYTHROCYTES VOL PATIENT: NORMAL ML
TROPONIN I SERPL DL<=0.01 NG/ML-MCNC: 0.02 NG/ML
TROPONIN I SERPL DL<=0.01 NG/ML-MCNC: 0.02 NG/ML
WBC # BLD AUTO: 11.18 K/UL

## 2018-07-11 PROCEDURE — P9016 RBC LEUKOCYTES REDUCED: HCPCS

## 2018-07-11 PROCEDURE — 84132 ASSAY OF SERUM POTASSIUM: CPT

## 2018-07-11 PROCEDURE — 85025 COMPLETE CBC W/AUTO DIFF WBC: CPT

## 2018-07-11 PROCEDURE — 80100016 HC MAINTENANCE HEMODIALYSIS

## 2018-07-11 PROCEDURE — 80053 COMPREHEN METABOLIC PANEL: CPT

## 2018-07-11 PROCEDURE — 25000003 PHARM REV CODE 250: Performed by: HOSPITALIST

## 2018-07-11 PROCEDURE — 83880 ASSAY OF NATRIURETIC PEPTIDE: CPT

## 2018-07-11 PROCEDURE — 94761 N-INVAS EAR/PLS OXIMETRY MLT: CPT

## 2018-07-11 PROCEDURE — 86870 RBC ANTIBODY IDENTIFICATION: CPT

## 2018-07-11 PROCEDURE — 96375 TX/PRO/DX INJ NEW DRUG ADDON: CPT

## 2018-07-11 PROCEDURE — 86880 COOMBS TEST DIRECT: CPT

## 2018-07-11 PROCEDURE — 93005 ELECTROCARDIOGRAM TRACING: CPT

## 2018-07-11 PROCEDURE — 11000001 HC ACUTE MED/SURG PRIVATE ROOM

## 2018-07-11 PROCEDURE — 86922 COMPATIBILITY TEST ANTIGLOB: CPT

## 2018-07-11 PROCEDURE — P9021 RED BLOOD CELLS UNIT: HCPCS

## 2018-07-11 PROCEDURE — 86901 BLOOD TYPING SEROLOGIC RH(D): CPT

## 2018-07-11 PROCEDURE — 85018 HEMOGLOBIN: CPT | Mod: 91

## 2018-07-11 PROCEDURE — 96365 THER/PROPH/DIAG IV INF INIT: CPT

## 2018-07-11 PROCEDURE — 36415 COLL VENOUS BLD VENIPUNCTURE: CPT

## 2018-07-11 PROCEDURE — G0257 UNSCHED DIALYSIS ESRD PT HOS: HCPCS

## 2018-07-11 PROCEDURE — 99285 EMERGENCY DEPT VISIT HI MDM: CPT | Mod: 25

## 2018-07-11 PROCEDURE — 25000003 PHARM REV CODE 250: Performed by: EMERGENCY MEDICINE

## 2018-07-11 PROCEDURE — 84484 ASSAY OF TROPONIN QUANT: CPT | Mod: 91

## 2018-07-11 PROCEDURE — 85014 HEMATOCRIT: CPT | Mod: 91

## 2018-07-11 PROCEDURE — 82272 OCCULT BLD FECES 1-3 TESTS: CPT

## 2018-07-11 PROCEDURE — 63600175 PHARM REV CODE 636 W HCPCS: Performed by: EMERGENCY MEDICINE

## 2018-07-11 RX ORDER — ASPIRIN 325 MG
325 TABLET ORAL
Status: COMPLETED | OUTPATIENT
Start: 2018-07-11 | End: 2018-07-11

## 2018-07-11 RX ORDER — HYDRALAZINE HYDROCHLORIDE 25 MG/1
25 TABLET, FILM COATED ORAL EVERY 8 HOURS
Status: DISCONTINUED | OUTPATIENT
Start: 2018-07-11 | End: 2018-07-13 | Stop reason: HOSPADM

## 2018-07-11 RX ORDER — NITROGLYCERIN 0.4 MG/1
0.4 TABLET SUBLINGUAL EVERY 5 MIN PRN
Status: DISCONTINUED | OUTPATIENT
Start: 2018-07-11 | End: 2018-07-13 | Stop reason: HOSPADM

## 2018-07-11 RX ORDER — MAG HYDROX/ALUMINUM HYD/SIMETH 200-200-20
30 SUSPENSION, ORAL (FINAL DOSE FORM) ORAL EVERY 6 HOURS PRN
Status: DISCONTINUED | OUTPATIENT
Start: 2018-07-11 | End: 2018-07-13 | Stop reason: HOSPADM

## 2018-07-11 RX ORDER — ASPIRIN 81 MG/1
81 TABLET ORAL DAILY
Status: DISCONTINUED | OUTPATIENT
Start: 2018-07-11 | End: 2018-07-13 | Stop reason: HOSPADM

## 2018-07-11 RX ORDER — PANTOPRAZOLE SODIUM 40 MG/1
40 TABLET, DELAYED RELEASE ORAL DAILY
Status: DISCONTINUED | OUTPATIENT
Start: 2018-07-11 | End: 2018-07-13 | Stop reason: HOSPADM

## 2018-07-11 RX ORDER — LACTULOSE 10 G/15ML
20 SOLUTION ORAL EVERY 6 HOURS PRN
Status: DISCONTINUED | OUTPATIENT
Start: 2018-07-11 | End: 2018-07-13 | Stop reason: HOSPADM

## 2018-07-11 RX ORDER — SODIUM CHLORIDE 9 MG/ML
INJECTION, SOLUTION INTRAVENOUS
Status: DISCONTINUED | OUTPATIENT
Start: 2018-07-11 | End: 2018-07-13 | Stop reason: HOSPADM

## 2018-07-11 RX ORDER — SODIUM CHLORIDE 9 MG/ML
INJECTION, SOLUTION INTRAVENOUS ONCE
Status: DISCONTINUED | OUTPATIENT
Start: 2018-07-11 | End: 2018-07-13 | Stop reason: HOSPADM

## 2018-07-11 RX ORDER — SODIUM BICARBONATE 1 MEQ/ML
50 SYRINGE (ML) INTRAVENOUS
Status: COMPLETED | OUTPATIENT
Start: 2018-07-11 | End: 2018-07-11

## 2018-07-11 RX ORDER — PRAVASTATIN SODIUM 40 MG/1
40 TABLET ORAL NIGHTLY
Status: DISCONTINUED | OUTPATIENT
Start: 2018-07-11 | End: 2018-07-13 | Stop reason: HOSPADM

## 2018-07-11 RX ORDER — GABAPENTIN 100 MG/1
100 CAPSULE ORAL NIGHTLY
Status: DISCONTINUED | OUTPATIENT
Start: 2018-07-11 | End: 2018-07-13 | Stop reason: HOSPADM

## 2018-07-11 RX ORDER — DOCUSATE SODIUM 100 MG/1
100 CAPSULE, LIQUID FILLED ORAL 2 TIMES DAILY
Status: DISCONTINUED | OUTPATIENT
Start: 2018-07-11 | End: 2018-07-13 | Stop reason: HOSPADM

## 2018-07-11 RX ADMIN — PANTOPRAZOLE SODIUM 40 MG: 40 TABLET, DELAYED RELEASE ORAL at 03:07

## 2018-07-11 RX ADMIN — ALUMINUM HYDROXIDE, MAGNESIUM HYDROXIDE, AND SIMETHICONE 30 ML: 200; 200; 20 SUSPENSION ORAL at 12:07

## 2018-07-11 RX ADMIN — NITROGLYCERIN 0.4 MG: 0.4 TABLET SUBLINGUAL at 01:07

## 2018-07-11 RX ADMIN — DEXTROSE MONOHYDRATE 25 G: 25 INJECTION, SOLUTION INTRAVENOUS at 04:07

## 2018-07-11 RX ADMIN — NITROGLYCERIN 0.4 MG: 0.4 TABLET SUBLINGUAL at 02:07

## 2018-07-11 RX ADMIN — SODIUM POLYSTYRENE SULFONATE 30 G: 15 SUSPENSION ORAL; RECTAL at 03:07

## 2018-07-11 RX ADMIN — INSULIN HUMAN 10 UNITS: 100 INJECTION, SOLUTION PARENTERAL at 04:07

## 2018-07-11 RX ADMIN — ASPIRIN 81 MG: 81 TABLET, COATED ORAL at 03:07

## 2018-07-11 RX ADMIN — PRAVASTATIN SODIUM 40 MG: 40 TABLET ORAL at 08:07

## 2018-07-11 RX ADMIN — GABAPENTIN 100 MG: 100 CAPSULE ORAL at 08:07

## 2018-07-11 RX ADMIN — ASPIRIN 325 MG ORAL TABLET 325 MG: 325 PILL ORAL at 01:07

## 2018-07-11 RX ADMIN — HYDRALAZINE HYDROCHLORIDE 25 MG: 25 TABLET, FILM COATED ORAL at 09:07

## 2018-07-11 RX ADMIN — DOCUSATE SODIUM 100 MG: 100 CAPSULE, LIQUID FILLED ORAL at 08:07

## 2018-07-11 RX ADMIN — SODIUM BICARBONATE 50 MEQ: 84 INJECTION, SOLUTION INTRAVENOUS at 03:07

## 2018-07-11 RX ADMIN — CALCIUM GLUCONATE 1 G: 94 INJECTION, SOLUTION INTRAVENOUS at 04:07

## 2018-07-11 NOTE — ASSESSMENT & PLAN NOTE
Stool positive for occult blood and reports seeing blood on her clothes.  Transfuse pRBCs.  Consult Gastroenterology.

## 2018-07-11 NOTE — ED NOTES
To ER with continued complaints of burning to chest.  Pt states that she has not felt better since she left here yesterday.  Awake and alert, oriented x 4.  resp even and unlabored with clear lungs.  abd soft, + BS noted.  Denies nausea or SOB at this time.  IV access attempted x 2 at this time with no success.  EKG completed.  Pt placed on bedside monitor.

## 2018-07-11 NOTE — ASSESSMENT & PLAN NOTE
Hemoglobin A1c 6.4% on 3/3/18.  Takes insulin 70/30 17 units twice daily.  Insulin aspart sliding scale.  Monitor serum glucose.

## 2018-07-11 NOTE — NURSING
"Pt in dialysis. Nurse received call from Jessica in dialysis stating pt is complaining of her "chest burning"; states it is not CP, but just burning. Nurse notified MELISSA Ferguson. New orders pending.   "

## 2018-07-11 NOTE — H&P
[facility]  History & Physical    Patient Name: Angelina Beard  MRN: [id number]  Admission Date: 7/11/2018  Attending Physician: No att. providers found   Primary Care Provider: Eva Jara MD         Patient information was obtained from patient, past medical records and ER records.     Subjective:     Principal Problem:Symptomatic anemia    Chief Complaint:   Chief Complaint   Patient presents with    Chest Pain     states was seen at this facility for same c/o. Reports that chest pain did not resolve, so came back for re-evaluation. Pt is dialysis pt, goes M,W,F.         HPI: Angelina Beard is a 67 y.o. black woman with hypertension, chronic diastolic congestive heart failure, paroxysmal atrial fibrillation, diabetes mellitus type 2 (hemoglobin A1c 6.2% 4/17/16) with neuropathy and nephropathy, hyperlipidemia, nonobstructive coronary artery disease, cerebral microvascular disease, end stage renal disease on hemodialysis (Monday Wednesday Friday at [facility] in [facility]), anemia, osteoarthritis status post right total knee arthroplasty 2/23/16, chronic left leg pain, chronic constipation, cholelithiasis, history of right lung sarcoidosis, and history of left breast cancer s/p mastectomy.  Her primary care physician is Dr. Eva Jara.  Her nephrologist is Dr. Rc Santamaria.  Her cardiologist is Dr. Faraz Flores.  She lives alone in [place], [place].  She is mostly wheelchair bound but can use a walker with assistance.   She was seen at [facility] on 7/9/18 for acute nausea and vomiting.  She was found to have hyperkalemia with potassium of 6.4.  She underwent emergent dialysis without needing admission.     She returned to [facility] on 7/11/18 reporting that she did not feel better since she left the ED.  She complained of heartburn.  Her left groin pain is chronic  and worse when she goes to urinate in the morning.  Potassium was unchanged at 6.4.  Hemoglobin and hematocrit had decreased from 9.2 and 29.8 to 5.1 and 16.5.  Stool was positive for occult blood.  She reported seeing blood on her clothes intermittently.  Blood transfusion was started in the ED, and she was admitted to [facility].  She went for emergent dialysis again for hyperkalemia.    Past Medical History:   Diagnosis Date    Arthritis     Breast cancer, left     CHF (congestive heart failure)     Cholelithiasis     Coronary artery disease     Diabetes mellitus, type 2     End-stage renal disease on hemodialysis     Hemodialysis access site with arteriovenous graft     mon-wed -fri    Hyperlipidemia     Hypertension     Sarcoidosis        Past Surgical History:   Procedure Laterality Date    av graft      left arm    BRACHIAL ARTERY GRAFT Left 05/04/2016    brachiocephalic, Dr. Anson Crocker    BREAST BIOPSY Left     breast ca    BREAST LUMPECTOMY Left     radiation only    btl      ELBOW SURGERY      left    LIPOMA RESECTION      back of neck    pilondial cyst      TOTAL KNEE ARTHROPLASTY Right 02/23/2016       Review of patient's allergies indicates:  No Known Allergies    No current facility-administered medications on file prior to encounter.      Current Outpatient Prescriptions on File Prior to Encounter   Medication Sig    aspirin (ECOTRIN) 81 MG EC tablet Take 81 mg by mouth once daily.    calcium acetate (PHOSLO) 667 mg capsule Take 667 mg by mouth 3 (three) times daily with meals.    docusate sodium (COLACE) 100 MG capsule Take 1 capsule (100 mg total) by mouth 2 (two) times daily as needed for Constipation.    gabapentin (NEURONTIN) 100 MG capsule Take 1 capsule (100 mg total) by mouth every evening.    hydrALAZINE (APRESOLINE) 25 MG tablet Take 1 tablet (25 mg total) by mouth every 8 (eight) hours.    hydrocodone-acetaminophen 5-325mg (NORCO) 5-325 mg per  tablet Take 1 tablet by mouth every 8 (eight) hours.    insulin NPH-insulin regular, 70/30, (NOVOLIN 70/30) 100 unit/mL (70-30) injection Inject 17 Units into the skin 2 (two) times daily before meals.    lactulose (CHRONULAC) 20 gram/30 mL Soln Take 20 g by mouth every 6 (six) hours as needed (for constipation).    nitroGLYCERIN (NITROSTAT) 0.4 MG SL tablet Place 0.4 mg under the tongue every 5 (five) minutes as needed for Chest pain.    ondansetron (ZOFRAN-ODT) 4 MG TbDL Take 1 tablet (4 mg total) by mouth every 6 (six) hours as needed (nausea or vomiting).    pantoprazole (PROTONIX) 40 MG tablet Take 1 tablet (40 mg total) by mouth once daily.    pravastatin (PRAVACHOL) 40 MG tablet Take 40 mg by mouth every evening.     sevelamer carbonate (RENVELA) 2.4 gram PwPk Take 2.4 g by mouth 3 (three) times daily with meals.     Family History     Problem Relation (Age of Onset)    Diabetes Mother, Sister    Heart disease Sister    Hypertension Mother    Kidney disease Mother        Social History Main Topics    Smoking status: Never Smoker    Smokeless tobacco: Never Used    Alcohol use No    Drug use: No    Sexual activity: Not on file     Review of Systems   Constitutional: Negative for chills and fever.   HENT: Negative for trouble swallowing and voice change.    Eyes: Negative for pain and redness.   Respiratory: Negative for cough and shortness of breath.    Cardiovascular: Negative for chest pain and palpitations.   Gastrointestinal: Negative for abdominal pain and vomiting.        Heartburn   Genitourinary: Negative for difficulty urinating and dysuria.   Musculoskeletal: Negative for neck pain and neck stiffness.   Skin: Negative for rash and wound.   Neurological: Negative for seizures and syncope.     Objective:     Vital Signs (Most Recent):  Temp: 97.6 °F (36.4 °C) (07/11/18 1200)  Pulse: 80 (07/11/18 1230)  Resp: 20 (07/11/18 1230)  BP: 137/65 (07/11/18 1230)  SpO2: 100 % (07/11/18 0930) Vital  Signs (24h Range):  Temp:  [97.3 °F (36.3 °C)-97.9 °F (36.6 °C)] 97.6 °F (36.4 °C)  Pulse:  [76-97] 80  Resp:  [16-20] 20  SpO2:  [95 %-100 %] 100 %  BP: ()/(30-70) 137/65     Weight: 107 kg (236 lb)  Body mass index is 36.96 kg/m².    Physical Exam   Constitutional: She is oriented to person, place, and time. She appears well-developed and well-nourished. No distress.   HENT:   Head: Normocephalic and atraumatic.   Eyes: Conjunctivae are normal. No scleral icterus.   Neck: Neck supple. No tracheal deviation present.   Cardiovascular: Normal rate and regular rhythm.    Pulmonary/Chest: Effort normal and breath sounds normal. No respiratory distress.   Abdominal: Soft. She exhibits no distension. There is no tenderness. There is no guarding.   Musculoskeletal: She exhibits no edema or tenderness.   Neurological: She is alert and oriented to person, place, and time.   Skin: Skin is warm and dry.   Psychiatric: She has a normal mood and affect.   Nursing note and vitals reviewed.          Significant Labs: All pertinent labs within the past 24 hours have been reviewed.    Significant Imaging: I have reviewed all pertinent imaging results/findings within the past 24 hours.   X-Ray Chest 1 View 7/11/18: FINDINGS:  Mediastinal structures are midline.  There is stable prominence of the hilar contours.  Cardiac silhouette remains enlarged.  Lung volumes are symmetric.  There is slight redistribution of the pulmonary vessels.  Increased interstitial markings are overall unchanged.  Vascular pedicle is overall stable in size. Constellation of findings suggest sequela of pulmonary edema.  No focal airspace consolidation.  No pneumothorax or pleural effusions.  Vascular stent projects over the axillary region.  No free air beneath the diaphragm.  No acute osseous abnormalities.    Assessment/Plan:     * Symptomatic anemia    Stool positive for occult blood and reports seeing blood on her clothes.  Transfuse pRBCs.   Consult Gastroenterology.          Paroxysmal atrial fibrillation    Not on anticoagulation.          End-stage renal disease on hemodialysis    Appreciate Nephrology.          Hyperkalemia, diminished renal excretion    Dialysis.          Chronic constipation    Continue home docusate.          Renovascular hypertension    Chronic diastolic congestive heart failure  Continue home hydralazine 25 mg TID.          Type 2 diabetes mellitus with hypertension and end stage renal disease on dialysis    Hemoglobin A1c 6.4% on 3/3/18.  Takes insulin 70/30 17 units twice daily.  Insulin aspart sliding scale.  Monitor serum glucose.          Cerebral microvascular disease    Continue home aspirin 81 mg daily, pravastatin 40 mg HS.            VTE Risk Mitigation         Ordered     Place sequential compression device  Until discontinued      07/11/18 0323     IP VTE HIGH RISK PATIENT  Once      07/11/18 0321             Jeb Cobb MD  Department of Hospital Medicine   [facility]

## 2018-07-11 NOTE — CONSULTS
[facility] Consult    Consulting Physician: No att. providers found  Date of Admission: 7/11/2018  Date of Consult: 7/11/2018    Reason for Consult:     ESRD on HD. Hyperkalemia    Subjective:     History of Present Illness:  Angelina Beard is a 67 y.o. female with ESRD. Dialyzes MWF via RUE AVF at [facility] with Dr. Madden. Last dialysis was here yesterday. Has been above target weights lately. Goal weight of 107 kg pt states that recently she has been around 114 kg. Drinking too much water between sessions. Here with CP and left sided numbness. Denies SOB, F/C.    Past Medical History:  Past Medical History:   Diagnosis Date    Arthritis     Breast cancer     Cancer     breast left    CHF (congestive heart failure)     Coronary artery disease     Diabetes mellitus     DMII    End stage kidney disease     Hemodialysis access site with arteriovenous graft     mon-wed -fri    Hyperlipidemia     Hypertension     Renal disorder     CKD; on diaylsis    Sarcoidosis        Past Surgical History:  Past Surgical History:   Procedure Laterality Date    av graft      left arm    BRACHIAL ARTERY GRAFT Left 05/04/2016    brachiocephalic, Dr. Anson Crocker    BREAST BIOPSY Left     breast ca    BREAST LUMPECTOMY Left     radiation only    btl      ELBOW SURGERY      left    LIPOMA RESECTION      back of neck    pilondial cyst      TOTAL KNEE ARTHROPLASTY Right 02/23/2016       Allergies:  Review of patient's allergies indicates:  No Known Allergies    Medications:    In-Hospital Scheduled Medications:   sodium chloride 0.9%   Intravenous Once       In-Hospital PRN Medications:  sodium chloride 0.9%, nitroGLYCERIN    In-Hospital IV Infusion Medications:      Home Medications:  Prior to Admission medications    Medication Sig Start Date End Date Taking? Authorizing Provider   aspirin (ECOTRIN) 81 MG EC tablet Take 81 mg by mouth once daily.    Historical Provider, MD   calcium acetate  (PHOSLO) 667 mg capsule Take 667 mg by mouth 3 (three) times daily with meals.    Historical Provider, MD   docusate sodium (COLACE) 100 MG capsule Take 1 capsule (100 mg total) by mouth 2 (two) times daily as needed for Constipation. 2/23/16   Enrrique Beal MD   gabapentin (NEURONTIN) 100 MG capsule Take 1 capsule (100 mg total) by mouth every evening. 8/10/16   Nikolai Humphreys MD   hydrALAZINE (APRESOLINE) 25 MG tablet Take 1 tablet (25 mg total) by mouth every 8 (eight) hours. 8/10/16 11/2/17  Nikolai Humphreys MD   hydrocodone-acetaminophen 5-325mg (NORCO) 5-325 mg per tablet Take 1 tablet by mouth every 8 (eight) hours. 8/20/16   Edna Granado PA-C   insulin NPH-insulin regular, 70/30, (NOVOLIN 70/30) 100 unit/mL (70-30) injection Inject 17 Units into the skin 2 (two) times daily before meals. 3/17/16   Anson Camacho NP   lactulose (CHRONULAC) 20 gram/30 mL Soln Take 20 g by mouth every 6 (six) hours as needed (for constipation).    Historical Provider, MD   nitroGLYCERIN (NITROSTAT) 0.4 MG SL tablet Place 0.4 mg under the tongue every 5 (five) minutes as needed for Chest pain.    Historical Provider, MD   ondansetron (ZOFRAN-ODT) 4 MG TbDL Take 1 tablet (4 mg total) by mouth every 6 (six) hours as needed (nausea or vomiting). 4/23/16   Yan Corbin MD   pantoprazole (PROTONIX) 40 MG tablet Take 1 tablet (40 mg total) by mouth once daily. 3/17/16 11/2/17  Anson Camacho NP   pravastatin (PRAVACHOL) 40 MG tablet Take 40 mg by mouth every evening.  1/8/16   Historical Provider, MD   sevelamer carbonate (RENVELA) 2.4 gram PwPk Take 2.4 g by mouth 3 (three) times daily with meals. 3/17/16 11/2/17  Anson Camacho NP       Family History:  Family History   Problem Relation Age of Onset    Diabetes Mother     Kidney disease Mother     Hypertension Mother     Diabetes Sister     Heart disease Sister        Social History:  Social History   Substance Use Topics    Smoking  "status: Never Smoker    Smokeless tobacco: Never Used    Alcohol use No       Review of Systems:  General - Subjective fevers at night. No chills  Head - Denies headache. Dizziness with urination  Eyes- Denies new loss of vision. Chronic blurry vision  Ears - Denies decreased hearing and ringing in the ears  Throat - Denies dysphagia and sore throat  Pulmonary - Shortness of breath and cough productive of brown sputum  Cardiovascular- chest pain and palpitations as above  Gastrointestonal - nausea, vomiting today. Diarrhea last week  Genitourinary- Denies changes in urination. Dysuria  Skin - Denies rashes, skin changes, and itching  Muskuloskeletal - Denies myalgias and arthralgias  Neurological - Denies gait disturbance and focal weakness. Ambulates with walker  Psychiatric - Denies depression and anxiety    Health Maintaince :   Immunizations:   Currently on File with [facility]:   Most Recent Immunizations   Administered Date(s) Administered    Pneumococcal Conjugate - 13 Valent 08/10/2016       Objective:   Last 24 Hour Vital Signs:  BP  Min: 92/55  Max: 134/44  Temp  Av.8 °F (36.6 °C)  Min: 97.6 °F (36.4 °C)  Max: 97.9 °F (36.6 °C)  Pulse  Av.7  Min: 76  Max: 82  Resp  Av  Min: 16  Max: 20  SpO2  Av %  Min: 95 %  Max: 100 %  Height  Av' 7" (170.2 cm)  Min: 5' 7" (170.2 cm)  Max: 5' 7" (170.2 cm)  Weight  Av kg (236 lb)  Min: 107 kg (236 lb)  Max: 107 kg (236 lb)  No intake/output data recorded.    Physical Examination:  General - alert and cooperative  Head - Normocephalic and atraumatic  Eyes - Normal lids and lashes, anicteric sclera  Neck - No JVD, supple  Lungs - Bibasilar crackles  Heart - Regular rate and rhythm  Abd - Normoactive bowel sounds, nontender, non distended.  Ext - No clubbing, cyanosis. Trace BLE edema  Pulse - 2+ and symmetric  Skin - Warm and dry  Psych - Normal mood and affect  Access - right lower arm AVF with good thrill and bruit    Laboratory " Results:  Most Recent Data:    CBC:   Lab Results   Component Value Date    WBC 11.18 07/11/2018    HGB 6.1 (L) 07/11/2018    HCT 19.0 (LL) 07/11/2018     07/11/2018    MCV 94 07/11/2018    RDW 17.5 (H) 07/11/2018       BMP:   Lab Results   Component Value Date     07/11/2018    K 6.4 (HH) 07/11/2018     07/11/2018    CO2 20 (L) 07/11/2018     (H) 07/11/2018     (H) 07/11/2018    CALCIUM 8.7 07/11/2018    MG 2.0 03/03/2018    PHOS 4.1 03/03/2018       LFTs:   Lab Results   Component Value Date    PROT 6.0 07/11/2018    ALBUMIN 3.3 (L) 07/11/2018    BILITOT 0.4 07/11/2018    AST 10 07/11/2018    ALKPHOS 32 (L) 07/11/2018    ALT 12 07/11/2018       Coags:   Lab Results   Component Value Date    INR 1.0 03/03/2018       FLP:   Lab Results   Component Value Date    CHOL 177 08/19/2016    HDL 60 08/19/2016    LDLCALC 93.8 08/19/2016    TRIG 116 08/19/2016    CHOLHDL 33.9 08/19/2016       DM:   Lab Results   Component Value Date    HGBA1C 6.4 (H) 03/03/2018    HGBA1C 6.2 04/17/2016    HGBA1C 6.2 02/16/2016    LDLCALC 93.8 08/19/2016    CREATININE 8.2 (H) 07/11/2018       Thyroid:   Lab Results   Component Value Date    TSH 0.176 (L) 03/03/2018    FREET4 1.01 03/03/2018       Anemia:   Lab Results   Component Value Date    IRON 74 03/03/2018    TIBC 247 (L) 03/03/2018    FERRITIN 1,196 (H) 03/03/2018    [id number] 389 03/03/2018    FOLATE 4.5 03/03/2018       Cardiac:   Lab Results   Component Value Date    TROPONINI 0.017 07/11/2018     (H) 07/11/2018       Urinalysis:   Lab Results   Component Value Date    LABURIN  11/08/2016     Multiple organisms isolated. None in predominance.  Repeat if    LABURIN clinically necessary. 11/08/2016    COLORU Yellow 06/13/2018    SPECGRAV 1.005 06/13/2018    NITRITE Negative 06/13/2018    KETONESU Negative 06/13/2018    UROBILINOGEN Negative 06/13/2018       Trended Lab Data:    Recent Labs  Lab 07/09/18  0522 07/09/18  0622 07/11/18  0152  07/11/18  0828   WBC 7.58  --  11.18  --    HGB 9.2*  --  5.1* 6.1*   HCT 29.8*  --  16.5* 19.0*     --  227  --    MCV 95  --  94  --    RDW 17.3*  --  17.5*  --    NA  --  140 137  --    K  --  6.4* 6.4*  --    CL  --  96 101  --    CO2  --  30* 20*  --    BUN  --  99* 121*  --    GLU  --  224* 219*  --    PROT  --  6.8 6.0  --    ALBUMIN  --  3.1* 3.3*  --    BILITOT  --  0.3 0.4  --    AST  --  13 10  --    ALKPHOS  --  44* 32*  --    ALT  --  14 12  --        Trended Cardiac Data:    Recent Labs  Lab 07/09/18  0622 07/11/18  0152 07/11/18  0508   TROPONINI 0.018 0.021 0.017   BNP  --  319*  --        Other Results:  Radiology:  X-ray Abdomen Portable    Result Date: 7/9/2018  EXAMINATION: XR ABDOMEN PORTABLE CLINICAL HISTORY: abd pain;  Unspecified abdominal pain TECHNIQUE: Single portable AP view of the abdomen COMPARISON: Abdominal radiograph series 01/20/2018 FINDINGS: Scattered air is seen in nondilated loops of small and large bowel. No central small bowel air fluid levels are appreciated. Degenerative changes are noted of the lumbar spine.     Nonspecific bowel gas pattern. Electronically signed by: Edil Orlando MD Date:    07/09/2018 Time:    06:55    X-ray Chest Ap Portable    Result Date: 7/9/2018  EXAMINATION: XR CHEST AP PORTABLE CLINICAL HISTORY: epigastric pain; TECHNIQUE: Single frontal view of the chest was performed. COMPARISON: 03/03/2018 FINDINGS: Cardiac monitoring leads overlie the chest.  Cardiomediastinal silhouette is mildly prominent, similar to prior examination.  There is atherosclerotic calcification of the thoracic aorta.  The lungs demonstrate mild bilateral interstitial prominence, similar to prior examination.  No new large confluent airspace consolidation identified.  No significant pleural effusion or pneumothorax appreciated.  There is a left subclavian/axillary stent demonstrated.  There are surgical clips within the left axilla.     No significant detrimental  interval change compared to 03/03/2018. Electronically signed by: Edil Orlando MD Date:    07/09/2018 Time:    07:52      Assessment:   Angelina Beard is a 67 y.o. female with:   Patient Active Problem List    Diagnosis Date Noted    Symptomatic anemia 07/11/2018    Paroxysmal atrial fibrillation 06/21/2018    End-stage renal disease on hemodialysis 05/03/2016    Hyperkalemia, diminished renal excretion 04/17/2016    Anemia in end-stage renal disease 03/06/2016    Chronic constipation 03/06/2016    Renovascular hypertension 03/04/2016    Debility 02/25/2016    Status post total right knee replacement 2/23/16 02/23/2016    Diabetic neuropathy associated with type 2 diabetes mellitus 02/19/2016    History of left breast cancer s/p mastectomy 02/19/2016    History of sarcoidosis 02/18/2016    Coronary artery disease involving native coronary artery of native heart without angina pectoris 02/18/2016    DJD (degenerative joint disease) of lumbar spine 07/03/2013    Chronic diastolic congestive heart failure 07/02/2013    Morbid obesity 07/02/2013    Osteoarthritis 07/02/2013    GERD (gastroesophageal reflux disease) 07/02/2013    Cerebral microvascular disease 07/01/2013    Type 2 diabetes mellitus with hypertension and end stage renal disease on dialysis 07/01/2013    Hyperlipidemia 07/01/2013       Plan:     1. ESRD: dialysis [place] dialysis with Dr. Maryanne PACE. Last dialysis yesterday. HD today for 3.5 hours, via RUE AVF, 3.5 L removal, K+ 2, Ca++ 2.5, Bicarb 35, , , Na+ 135    2. HTN: continue home meds    3. ESRD with AOCD:   Lab Results   Component Value Date    HGB 6.1 (L) 07/11/2018    MCV 94 07/11/2018    RDW 17.5 (H) 07/11/2018    IRON 74 03/03/2018    TIBC 247 (L) 03/03/2018    FERRITIN 1,196 (H) 03/03/2018    [id number] 389 03/03/2018    FOLATE 4.5 03/03/2018   Epo with HD    4. ESRD with MBD:   Lab Results   Component Value Date    PHOS 4.1 03/03/2018   Continue  home binders.    5. Access: AVERY MOCTEZUMA    6. Nutrition: Nepro and pephrocaps    Thank you for allowing us to participate in the care of this patient. Please contact me if you have any questions regarding this consult.    Aroldo Pak MD 7/11/2018 8:33 AM  [facility] PGY V  Pager: [phone]  Cell: [phone]

## 2018-07-11 NOTE — ED PROVIDER NOTES
Encounter Date: 7/11/2018    SCRIBE #1 NOTE: I, Jimenez Tejada, am scribing for, and in the presence of,  Dr. Crespo. I have scribed the entire note.       History     Chief Complaint   Patient presents with    Chest Pain     states was seen at this facility for same c/o. Reports that chest pain did not resolve, so came back for re-evaluation. Pt is dialysis pt, goes M,W,F.      Time seen by provider: 2:00 AM    This is a 68 y/o female with a PMHx of CHF and ESRD on HD who presents with complaint of chest pain that occurred around 9 PM last night. She describes it as a constant burning pain to the left side of chest that is rated 8/10 in severity. Pt admits to SOB and diaphoresis but denies any nausea or vomiting. She was just seen here 2 days ago for same symptoms.  States she has a stress test appointment at the end of this month. She has chronic left leg pain. No history of MI in the past but notes history of chest pain. No history of blood clots. She did not take any medication for the pain today. Pt dialyzes on MWF, last dialyzed on Monday and has a dialysis appointment at 9 AM today.      The history is provided by the patient.     Review of patient's allergies indicates:  No Known Allergies  Past Medical History:   Diagnosis Date    Arthritis     Breast cancer     Cancer     breast left    CHF (congestive heart failure)     Coronary artery disease     Diabetes mellitus     DMII    End stage kidney disease     Hemodialysis access site with arteriovenous graft     mon-wed -fri    Hyperlipidemia     Hypertension     Renal disorder     CKD; on diaylsis    Sarcoidosis      Past Surgical History:   Procedure Laterality Date    av graft      left arm    BRACHIAL ARTERY GRAFT Left 05/04/2016    brachiocephalic, Dr. Anson Crocker    BREAST BIOPSY Left     breast ca    BREAST LUMPECTOMY Left     radiation only    btl      ELBOW SURGERY      left    LIPOMA RESECTION      back of neck    pilondial  cyst      TOTAL KNEE ARTHROPLASTY Right 02/23/2016     Family History   Problem Relation Age of Onset    Diabetes Mother     Kidney disease Mother     Hypertension Mother     Diabetes Sister     Heart disease Sister      Social History   Substance Use Topics    Smoking status: Never Smoker    Smokeless tobacco: Never Used    Alcohol use No     Review of Systems   Constitutional: Positive for diaphoresis. Negative for chills and fever.   HENT: Negative for congestion, rhinorrhea and sore throat.    Eyes: Negative for redness and visual disturbance.   Respiratory: Positive for shortness of breath. Negative for cough and wheezing.    Cardiovascular: Positive for chest pain. Negative for palpitations.   Gastrointestinal: Negative for abdominal pain, diarrhea, nausea and vomiting.   Genitourinary: Negative for dysuria and hematuria.   Musculoskeletal: Negative for back pain, myalgias and neck pain.        Chronic left leg pain.   Skin: Negative for rash.   Neurological: Negative for dizziness, weakness and light-headedness.   Psychiatric/Behavioral: Negative for confusion.   All other systems reviewed and are negative.      Physical Exam     Initial Vitals [07/11/18 0110]   BP Pulse Resp Temp SpO2   128/64 79 20 97.6 °F (36.4 °C) 95 %      MAP       --         Physical Exam    Nursing note and vitals reviewed.  Constitutional: She appears well-developed and well-nourished. She is not diaphoretic. No distress.   HENT:   Head: Normocephalic and atraumatic.   Right Ear: External ear normal.   Left Ear: External ear normal.   Nose: Nose normal.   Eyes: Conjunctivae and EOM are normal. Right eye exhibits no discharge. Left eye exhibits no discharge. No scleral icterus.   Cardiovascular: Normal rate, regular rhythm, normal heart sounds and intact distal pulses. Exam reveals no gallop and no friction rub.    No murmur heard.  Pulmonary/Chest: Breath sounds normal. No respiratory distress. She has no wheezes. She has no  rhonchi. She has no rales.   Mild anterior chest wall TTP.   Abdominal: Soft. Bowel sounds are normal. She exhibits no distension. There is no tenderness. There is no rebound and no guarding.   obese   Genitourinary:   Genitourinary Comments: Rectal exam: normal rectal tone, brown stool   Musculoskeletal: Normal range of motion. She exhibits no edema or tenderness.   Neurological: She is alert and oriented to person, place, and time. No cranial nerve deficit.   Skin: Skin is warm and dry. Capillary refill takes less than 2 seconds.   Psychiatric: She has a normal mood and affect.         ED Course   Critical Care  Date/Time: 7/11/2018 4:09 AM  Performed by: RAYO PALACIOS  Authorized by: RAYO PALACIOS   Total critical care time (exclusive of procedural time) : 0 minutes  Critical care was necessary to treat or prevent imminent or life-threatening deterioration of the following conditions: circulatory failure (hyperkalemia).  Critical care was time spent personally by me on the following activities: blood draw for specimens, development of treatment plan with patient or surrogate, discussions with consultants, examination of patient, obtaining history from patient or surrogate, ordering and performing treatments and interventions, ordering and review of laboratory studies, ordering and review of radiographic studies, pulse oximetry, re-evaluation of patient's condition and review of old charts.        Labs Reviewed   CBC W/ AUTO DIFFERENTIAL - Abnormal; Notable for the following:        Result Value    RBC 1.75 (*)     Hemoglobin 5.1 (*)     Hematocrit 16.5 (*)     MCHC 30.9 (*)     RDW 17.5 (*)     Gran # (ANC) 8.5 (*)     Gran% 75.7 (*)     Mono% 3.7 (*)     All other components within normal limits    Narrative:       HGB and HCT critical result(s) called and verbal readback obtained   from Manisha Roy RN, 07/11/2018 02:11   COMPREHENSIVE METABOLIC PANEL - Abnormal; Notable for the following:      Potassium 6.4 (*)     CO2 20 (*)     Glucose 219 (*)     BUN, Bld 121 (*)     Creatinine 8.2 (*)     Albumin 3.3 (*)     Alkaline Phosphatase 32 (*)     eGFR if  5 (*)     eGFR if non  5 (*)     All other components within normal limits    Narrative:      K critical result(s) called and verbal readback obtained from Darien Vera RN, 07/11/2018 02:35   B-TYPE NATRIURETIC PEPTIDE - Abnormal; Notable for the following:      (*)     All other components within normal limits   OCCULT BLOOD X 1, STOOL - Abnormal; Notable for the following:     Occult Blood Positive (*)     All other components within normal limits   TROPONIN I   TROPONIN I   TYPE & SCREEN   DIRECT ANTIGLOBULIN TEST   ANTIBODY IDENTIFICATION   PREPARE RBC SOFT     EKG Readings: (Independently Interpreted)   Initial Reading: No STEMI.   EKG: Rate: 79 bpm. Normal sinus rhythm. Normal ST segments. No STEMI. Normal T waves. Normal conduction. Normal intervals.       Imaging Results          X-Ray Chest 1 View (Final result)  Result time 07/11/18 02:51:03    Final result by Art Gaviria MD (07/11/18 02:51:03)                 Impression:      1. No interval detrimental change.      Electronically signed by: Art Gaviria MD  Date:    07/11/2018  Time:    02:51             Narrative:    EXAMINATION:  XR CHEST 1 VIEW    CLINICAL HISTORY:  Chest pain, unspecified    TECHNIQUE:  Single frontal view of the chest was performed.    COMPARISON:  Radiograph 07/09/2018.    FINDINGS:  Mediastinal structures are midline.  There is stable prominence of the hilar contours.  Cardiac silhouette remains enlarged.  Lung volumes are symmetric.  There is slight redistribution of the pulmonary vessels.  Increased interstitial markings are overall unchanged.  Vascular pedicle is overall stable in size. Constellation of findings suggest sequela of pulmonary edema.  No focal airspace consolidation.  No pneumothorax or pleural effusions.   Vascular stent projects over the axillary region.  No free air beneath the diaphragm.  No acute osseous abnormalities.                                 Medical Decision Making:   Initial Assessment:   This is a 68 y/o female with a PMHx of CHF and ESRD on HD who presents with complaint of chest pain that occurred around 9 PM last night. Plan to CXR, obtain labs including troponin, bnp, and reassess.  Differential Diagnosis:   Myocardial ischemia, pericarditis, pulmonary embolus, chest wall pain, pleural inflammation and pulmonary infectious causes, anemia    Independently Interpreted Test(s):   I have ordered and independently interpreted EKG Reading(s) - see prior notes  Clinical Tests:   Lab Tests: Ordered and Reviewed  Radiological Study: Ordered and Reviewed  Medical Tests: Ordered and Reviewed  ED Management:  Patient with significant drop in H&H.  Stool heme + but no gross blood.  Will admit patient for symptomatic anemia and tranfusion  3:07 AM Discussed case with [facility], Dr. Humphreys who will admit patient.                      Clinical Impression:     1. Symptomatic anemia    2. Chest pain    3. Acute blood loss anemia    4. Hyperkalemia            Disposition:   Disposition: Admitted  Condition: Stable       I, Edna Crespo,  personally performed the services described in this documentation. All medical record entries made by the scribe were at my direction and in my presence.  I have reviewed the chart and agree that the record reflects my personal performance and is accurate and complete. Edna Crespo M.D. 4:10 AM07/11/2018                   Edna Crespo MD  07/11/18 0411

## 2018-07-11 NOTE — PLAN OF CARE
Problem: Hemodialysis (Adult)  Goal: Signs and Symptoms of Listed Potential Problems Will be Absent, Minimized or Managed (Hemodialysis)  Signs and symptoms of listed potential problems will be absent, minimized or managed by discharge/transition of care (reference Hemodialysis (Adult) C[facility]).    07/11/18 1224   Hemodialysis   Problems Assessed (Hemodialysis) electrolyte imbalance;fluid imbalance   Problems Present (Hemodialysis) electrolyte imbalance;fluid imbalance     HD with UF. Transfusing 1 unit of PRBC's during dailysis.

## 2018-07-11 NOTE — ED NOTES
Blood transfusion is now complete. Pt is awake, alert. Denies pain or any complaints. NAD noted. Cup of decaf coffee given, at pt's request. Awaiting breakfast and dialysis.

## 2018-07-11 NOTE — NURSING
"Pt arrived to unit from dialysis via stretcher. AAOx3. VSS. Plan of care reviewed with patient. Call light within reach, fall precautions initiated, bed alarm set, telemetry monitor applied. Pt refuses mobility activity due to fatigue after dialysis and feeling "very cold". R AV fistula dressing CDI. Pt received 1 unit of blood in ED and 2nd unit in dialysis. Pt aware. Nurse instructed patient to call if needs assistance. Patient verbalized complete understanding. NAD noted. Will continue to monitor and continue plan of care.     "

## 2018-07-11 NOTE — HPI
Angelina Beard is a 67 y.o. black woman with hypertension, chronic diastolic congestive heart failure, paroxysmal atrial fibrillation, diabetes mellitus type 2 (hemoglobin A1c 6.2% 4/17/16) with neuropathy and nephropathy, hyperlipidemia, nonobstructive coronary artery disease, cerebral microvascular disease, end stage renal disease on hemodialysis (Monday Wednesday Friday at [facility] in [facility]), anemia, osteoarthritis status post right total knee arthroplasty 2/23/16, chronic left leg pain, chronic constipation, cholelithiasis, history of right lung sarcoidosis, and history of left breast cancer s/p mastectomy.  Her primary care physician is Dr. Eva Jara.  Her nephrologist is Dr. Rc Santamaria.  Her cardiologist is Dr. Faraz Flores.  She lives alone in [place], [place].  She is mostly wheelchair bound but can use a walker with assistance.   She was seen at [facility] on 7/9/18 for acute nausea and vomiting.  She was found to have hyperkalemia with potassium of 6.4.  She underwent emergent dialysis without needing admission.     She returned to [facility] on 7/11/18 reporting that she did not feel better since she left the ED.  She complained of heartburn.  Her left groin pain is chronic and worse when she goes to urinate in the morning.  Potassium was unchanged at 6.4.  Hemoglobin and hematocrit had decreased from 9.2 and 29.8 to 5.1 and 16.5.  Stool was positive for occult blood.  She reported seeing blood on her clothes intermittently.  Blood transfusion was started in the ED, and she was admitted to [facility].  She went for emergent dialysis again for hyperkalemia.

## 2018-07-11 NOTE — ED NOTES
Report received from CHRISTINA Dyer. Pt is resting comfortably. Respirations even, unlabored. Blood is transfusing via infusion pump. VSS. NAD noted. Will continue to monitor closely

## 2018-07-11 NOTE — SUBJECTIVE & OBJECTIVE
Past Medical History:   Diagnosis Date    Arthritis     Breast cancer, left     CHF (congestive heart failure)     Cholelithiasis     Coronary artery disease     Diabetes mellitus, type 2     End-stage renal disease on hemodialysis     Hemodialysis access site with arteriovenous graft     mon-wed -fri    Hyperlipidemia     Hypertension     Sarcoidosis        Past Surgical History:   Procedure Laterality Date    av graft      left arm    BRACHIAL ARTERY GRAFT Left 05/04/2016    brachiocephalic, Dr. Anson Crocker    BREAST BIOPSY Left     breast ca    BREAST LUMPECTOMY Left     radiation only    btl      ELBOW SURGERY      left    LIPOMA RESECTION      back of neck    pilondial cyst      TOTAL KNEE ARTHROPLASTY Right 02/23/2016       Review of patient's allergies indicates:  No Known Allergies    No current facility-administered medications on file prior to encounter.      Current Outpatient Prescriptions on File Prior to Encounter   Medication Sig    aspirin (ECOTRIN) 81 MG EC tablet Take 81 mg by mouth once daily.    calcium acetate (PHOSLO) 667 mg capsule Take 667 mg by mouth 3 (three) times daily with meals.    docusate sodium (COLACE) 100 MG capsule Take 1 capsule (100 mg total) by mouth 2 (two) times daily as needed for Constipation.    gabapentin (NEURONTIN) 100 MG capsule Take 1 capsule (100 mg total) by mouth every evening.    hydrALAZINE (APRESOLINE) 25 MG tablet Take 1 tablet (25 mg total) by mouth every 8 (eight) hours.    hydrocodone-acetaminophen 5-325mg (NORCO) 5-325 mg per tablet Take 1 tablet by mouth every 8 (eight) hours.    insulin NPH-insulin regular, 70/30, (NOVOLIN 70/30) 100 unit/mL (70-30) injection Inject 17 Units into the skin 2 (two) times daily before meals.    lactulose (CHRONULAC) 20 gram/30 mL Soln Take 20 g by mouth every 6 (six) hours as needed (for constipation).    nitroGLYCERIN (NITROSTAT) 0.4 MG SL tablet Place 0.4 mg under the tongue every 5  (five) minutes as needed for Chest pain.    ondansetron (ZOFRAN-ODT) 4 MG TbDL Take 1 tablet (4 mg total) by mouth every 6 (six) hours as needed (nausea or vomiting).    pantoprazole (PROTONIX) 40 MG tablet Take 1 tablet (40 mg total) by mouth once daily.    pravastatin (PRAVACHOL) 40 MG tablet Take 40 mg by mouth every evening.     sevelamer carbonate (RENVELA) 2.4 gram PwPk Take 2.4 g by mouth 3 (three) times daily with meals.     Family History     Problem Relation (Age of Onset)    Diabetes Mother, Sister    Heart disease Sister    Hypertension Mother    Kidney disease Mother        Social History Main Topics    Smoking status: Never Smoker    Smokeless tobacco: Never Used    Alcohol use No    Drug use: No    Sexual activity: Not on file     Review of Systems   Constitutional: Negative for chills and fever.   HENT: Negative for trouble swallowing and voice change.    Eyes: Negative for pain and redness.   Respiratory: Negative for cough and shortness of breath.    Cardiovascular: Negative for chest pain and palpitations.   Gastrointestinal: Negative for abdominal pain and vomiting.        Heartburn   Genitourinary: Negative for difficulty urinating and dysuria.   Musculoskeletal: Negative for neck pain and neck stiffness.   Skin: Negative for rash and wound.   Neurological: Negative for seizures and syncope.     Objective:     Vital Signs (Most Recent):  Temp: 97.6 °F (36.4 °C) (07/11/18 1200)  Pulse: 80 (07/11/18 1230)  Resp: 20 (07/11/18 1230)  BP: 137/65 (07/11/18 1230)  SpO2: 100 % (07/11/18 0930) Vital Signs (24h Range):  Temp:  [97.3 °F (36.3 °C)-97.9 °F (36.6 °C)] 97.6 °F (36.4 °C)  Pulse:  [76-97] 80  Resp:  [16-20] 20  SpO2:  [95 %-100 %] 100 %  BP: ()/(30-70) 137/65     Weight: 107 kg (236 lb)  Body mass index is 36.96 kg/m².    Physical Exam   Constitutional: She is oriented to person, place, and time. She appears well-developed and well-nourished. No distress.   HENT:   Head:  Normocephalic and atraumatic.   Eyes: Conjunctivae are normal. No scleral icterus.   Neck: Neck supple. No tracheal deviation present.   Cardiovascular: Normal rate and regular rhythm.    Pulmonary/Chest: Effort normal and breath sounds normal. No respiratory distress.   Abdominal: Soft. She exhibits no distension. There is no tenderness. There is no guarding.   Musculoskeletal: She exhibits no edema or tenderness.   Neurological: She is alert and oriented to person, place, and time.   Skin: Skin is warm and dry.   Psychiatric: She has a normal mood and affect.   Nursing note and vitals reviewed.          Significant Labs: All pertinent labs within the past 24 hours have been reviewed.    Significant Imaging: I have reviewed all pertinent imaging results/findings within the past 24 hours.   X-Ray Chest 1 View 7/11/18: FINDINGS:  Mediastinal structures are midline.  There is stable prominence of the hilar contours.  Cardiac silhouette remains enlarged.  Lung volumes are symmetric.  There is slight redistribution of the pulmonary vessels.  Increased interstitial markings are overall unchanged.  Vascular pedicle is overall stable in size. Constellation of findings suggest sequela of pulmonary edema.  No focal airspace consolidation.  No pneumothorax or pleural effusions.  Vascular stent projects over the axillary region.  No free air beneath the diaphragm.  No acute osseous abnormalities.

## 2018-07-12 LAB
ALBUMIN SERPL BCP-MCNC: 3.2 G/DL
ANION GAP SERPL CALC-SCNC: 14 MMOL/L
BUN SERPL-MCNC: 103 MG/DL
CALCIUM SERPL-MCNC: 8.7 MG/DL
CHLORIDE SERPL-SCNC: 96 MMOL/L
CO2 SERPL-SCNC: 26 MMOL/L
CREAT SERPL-MCNC: 7.8 MG/DL
EST. GFR  (AFRICAN AMERICAN): 6 ML/MIN/1.73 M^2
EST. GFR  (NON AFRICAN AMERICAN): 5 ML/MIN/1.73 M^2
ESTIMATED AVG GLUCOSE: 120 MG/DL
GLUCOSE SERPL-MCNC: 157 MG/DL
HBA1C MFR BLD HPLC: 5.8 %
HGB BLD-MCNC: 7 G/DL
HGB BLD-MCNC: 7.1 G/DL
PHOSPHATE SERPL-MCNC: 4.3 MG/DL
POCT GLUCOSE: 128 MG/DL (ref 70–110)
POCT GLUCOSE: 134 MG/DL (ref 70–110)
POCT GLUCOSE: 166 MG/DL (ref 70–110)
POCT GLUCOSE: 202 MG/DL (ref 70–110)
POTASSIUM SERPL-SCNC: 4.5 MMOL/L
SODIUM SERPL-SCNC: 136 MMOL/L

## 2018-07-12 PROCEDURE — 85018 HEMOGLOBIN: CPT

## 2018-07-12 PROCEDURE — 80069 RENAL FUNCTION PANEL: CPT

## 2018-07-12 PROCEDURE — 63600175 PHARM REV CODE 636 W HCPCS: Performed by: HOSPITALIST

## 2018-07-12 PROCEDURE — 25000003 PHARM REV CODE 250: Performed by: HOSPITALIST

## 2018-07-12 PROCEDURE — 94761 N-INVAS EAR/PLS OXIMETRY MLT: CPT

## 2018-07-12 PROCEDURE — 36415 COLL VENOUS BLD VENIPUNCTURE: CPT

## 2018-07-12 PROCEDURE — 25000003 PHARM REV CODE 250: Performed by: INTERNAL MEDICINE

## 2018-07-12 PROCEDURE — 11000001 HC ACUTE MED/SURG PRIVATE ROOM

## 2018-07-12 PROCEDURE — 83036 HEMOGLOBIN GLYCOSYLATED A1C: CPT

## 2018-07-12 RX ORDER — IBUPROFEN 200 MG
24 TABLET ORAL
Status: DISCONTINUED | OUTPATIENT
Start: 2018-07-12 | End: 2018-07-13 | Stop reason: HOSPADM

## 2018-07-12 RX ORDER — GLUCAGON 1 MG
1 KIT INJECTION
Status: DISCONTINUED | OUTPATIENT
Start: 2018-07-12 | End: 2018-07-13 | Stop reason: HOSPADM

## 2018-07-12 RX ORDER — IBUPROFEN 200 MG
16 TABLET ORAL
Status: DISCONTINUED | OUTPATIENT
Start: 2018-07-12 | End: 2018-07-13 | Stop reason: HOSPADM

## 2018-07-12 RX ORDER — POLYETHYLENE GLYCOL 3350, SODIUM SULFATE ANHYDROUS, SODIUM BICARBONATE, SODIUM CHLORIDE, POTASSIUM CHLORIDE 236; 22.74; 6.74; 5.86; 2.97 G/4L; G/4L; G/4L; G/4L; G/4L
4000 POWDER, FOR SOLUTION ORAL ONCE
Status: COMPLETED | OUTPATIENT
Start: 2018-07-12 | End: 2018-07-12

## 2018-07-12 RX ORDER — INSULIN ASPART 100 [IU]/ML
0-5 INJECTION, SOLUTION INTRAVENOUS; SUBCUTANEOUS
Status: DISCONTINUED | OUTPATIENT
Start: 2018-07-12 | End: 2018-07-13 | Stop reason: HOSPADM

## 2018-07-12 RX ADMIN — ASPIRIN 81 MG: 81 TABLET, COATED ORAL at 09:07

## 2018-07-12 RX ADMIN — INSULIN ASPART 2 UNITS: 100 INJECTION, SOLUTION INTRAVENOUS; SUBCUTANEOUS at 12:07

## 2018-07-12 RX ADMIN — GABAPENTIN 100 MG: 100 CAPSULE ORAL at 09:07

## 2018-07-12 RX ADMIN — DOCUSATE SODIUM 100 MG: 100 CAPSULE, LIQUID FILLED ORAL at 09:07

## 2018-07-12 RX ADMIN — POLYETHYLENE GLYCOL 3350, SODIUM SULFATE ANHYDROUS, SODIUM BICARBONATE, SODIUM CHLORIDE, POTASSIUM CHLORIDE 4000 ML: 236; 22.74; 6.74; 5.86; 2.97 POWDER, FOR SOLUTION ORAL at 04:07

## 2018-07-12 RX ADMIN — HYDRALAZINE HYDROCHLORIDE 25 MG: 25 TABLET, FILM COATED ORAL at 05:07

## 2018-07-12 RX ADMIN — PRAVASTATIN SODIUM 40 MG: 40 TABLET ORAL at 09:07

## 2018-07-12 RX ADMIN — PANTOPRAZOLE SODIUM 40 MG: 40 TABLET, DELAYED RELEASE ORAL at 09:07

## 2018-07-12 NOTE — ASSESSMENT & PLAN NOTE
Stool positive for occult blood and reports seeing blood on her clothes.  Transfused 2 units pRBCs.  Hemoglobin borderline for needing another unit of pRBCs.  Consulted Gastroenterology to see if inpatient endoscopy would be appropriate.  Monitor hemoglobin.

## 2018-07-12 NOTE — HOSPITAL COURSE
She was transfused a total of 2 units of pRBCs.  After dialysis, hyperkalemia resolved.  Gastroenterology was consulted and performed esophagogastroduodenoscopy and colonoscopy.  She was found to have esophagitis, gastritis, and gastric ulcers.  Gastroenterology recommended increasing her pantoprazole to twice daily, and following up for repeat endoscopy in 6 weeks.  She was advised to avoid NSAIDs.  Colonoscopy only showed stool due to unsatisfactory prep.  Hemoglobin and hematocrit were borderline and she needed another unit of pRBCs on 7/13/18.

## 2018-07-12 NOTE — PLAN OF CARE
Problem: Patient Care Overview  Goal: Plan of Care Review  Outcome: Ongoing (interventions implemented as appropriate)  Patient on RA with sats as documented.

## 2018-07-12 NOTE — PROGRESS NOTES
"[facility] Consult    Consulting Physician: Jeb Cobb MD  Date of Admission: 2018  Date of Consult: 2018    Reason for Consult:     ESRD on HD. Hyperkalemia    Subjective:     History of Present Illness:  Angelina Beard is a 67 y.o. female with ESRD. Dialyzes MWF via RUE AVF at [facility] dialysis with Dr. Madden. Last dialysis was here yesterday. Has been above target weights lately. Goal weight of 107 kg pt states that recently she has been around 114 kg. Drinking too much water between sessions. Here with CP and left sided numbness. Denies SOB, F/C.    Interim History:  Pt still complaining of left sided burning. Denies SOB, N/V, CP.    Review of Systems:  General - Subjective fevers at night. No chills  Head - Denies headache. Dizziness with urination  Eyes- Denies new loss of vision. Chronic blurry vision  Ears - Denies decreased hearing and ringing in the ears  Throat - Denies dysphagia and sore throat  Pulmonary - Shortness of breath and cough productive of brown sputum  Cardiovascular- chest pain and palpitations as above  Gastrointestonal - nausea, vomiting today. Diarrhea last week  Genitourinary- Denies changes in urination. Dysuria  Skin - Denies rashes, skin changes, and itching  Muskuloskeletal - Denies myalgias and arthralgias  Neurological - Denies gait disturbance and focal weakness. Ambulates with walker  Psychiatric - Denies depression and anxiety    Health Maintaince :   Immunizations:   Currently on File with [facility]:   Most Recent Immunizations   Administered Date(s) Administered    Pneumococcal Conjugate - 13 Valent 08/10/2016       Objective:   Last 24 Hour Vital Signs:  BP  Min: 101/50  Max: 162/70  Temp  Av.4 °F (36.3 °C)  Min: 96.7 °F (35.9 °C)  Max: 98.4 °F (36.9 °C)  Pulse  Av  Min: 72  Max: 90  Resp  Av.7  Min: 16  Max: 20  SpO2  Av.5 %  Min: 91 %  Max: 97 %  Height  Av' 7" (170.2 cm)  Min: 5' 7" (170.2 cm)  Max: 5' 7" (170.2 " cm)  Weight  Av.6 kg (230 lb 9.6 oz)  Min: 104.6 kg (230 lb 9.6 oz)  Max: 104.6 kg (230 lb 9.6 oz)  I/O last 3 completed shifts:  In: 1245 [P.O.:275; Blood:570; Other:400]  Out: 3100 [Urine:200; Other:2900]    Physical Examination:  General - alert and cooperative  Head - Normocephalic and atraumatic  Eyes - Normal lids and lashes, anicteric sclera  Neck - No JVD, supple  Lungs - Bibasilar crackles  Heart - Regular rate and rhythm  Abd - Normoactive bowel sounds, nontender, non distended.  Ext - No clubbing, cyanosis. Trace BLE edema  Pulse - 2+ and symmetric  Skin - Warm and dry  Psych - Normal mood and affect  Access - right lower arm AVF with good thrill and bruit    Laboratory Results:  Most Recent Data:    CBC:   Lab Results   Component Value Date    WBC 11.18 2018    HGB 7.0 (L) 2018    HCT 23.8 (L) 2018     2018    MCV 94 2018    RDW 17.5 (H) 2018       BMP:   Lab Results   Component Value Date     2018    K 4.5 2018    CL 96 2018    CO2 26 2018     (H) 2018     (H) 2018    CALCIUM 8.7 2018    MG 2.0 2018    PHOS 4.3 2018       LFTs:   Lab Results   Component Value Date    PROT 6.0 2018    ALBUMIN 3.2 (L) 2018    BILITOT 0.4 2018    AST 10 2018    ALKPHOS 32 (L) 2018    ALT 12 2018       Coags:   Lab Results   Component Value Date    INR 1.0 2018       FLP:   Lab Results   Component Value Date    CHOL 177 2016    HDL 60 2016    LDLCALC 93.8 2016    TRIG 116 2016    CHOLHDL 33.9 2016       DM:   Lab Results   Component Value Date    HGBA1C 5.8 (H) 2018    HGBA1C 6.4 (H) 2018    HGBA1C 6.2 2016    LDLCALC 93.8 2016    CREATININE 7.8 (H) 2018       Thyroid:   Lab Results   Component Value Date    TSH 0.176 (L) 2018    FREET4 1.01 2018       Anemia:   Lab Results   Component Value  Date    IRON 74 03/03/2018    TIBC 247 (L) 03/03/2018    FERRITIN 1,196 (H) 03/03/2018    PKTONFEY41 389 03/03/2018    FOLATE 4.5 03/03/2018       Cardiac:   Lab Results   Component Value Date    TROPONINI 0.017 07/11/2018     (H) 07/11/2018       Urinalysis:   Lab Results   Component Value Date    LABURIN  11/08/2016     Multiple organisms isolated. None in predominance.  Repeat if    LABURIN clinically necessary. 11/08/2016    COLORU Yellow 06/13/2018    SPECGRAV 1.005 06/13/2018    NITRITE Negative 06/13/2018    KETONESU Negative 06/13/2018    UROBILINOGEN Negative 06/13/2018       Trended Lab Data:    Recent Labs  Lab 07/09/18  0522  07/09/18  0622 07/11/18  0152 07/11/18  0828 07/11/18  1617 07/12/18  0422 07/12/18  1226   WBC 7.58  --   --  11.18  --   --   --   --    HGB 9.2*  --   --  5.1* 6.1* 7.8* 7.1* 7.0*   HCT 29.8*  --   --  16.5* 19.0* 23.8*  --   --      --   --  227  --   --   --   --    MCV 95  --   --  94  --   --   --   --    RDW 17.3*  --   --  17.5*  --   --   --   --    NA  --   --  140 137  --   --  136  --    K  --   < > 6.4* 6.4*  --  4.3 4.5  --    CL  --   --  96 101  --   --  96  --    CO2  --   --  30* 20*  --   --  26  --    BUN  --   --  99* 121*  --   --  103*  --    GLU  --   --  224* 219*  --   --  157*  --    PROT  --   --  6.8 6.0  --   --   --   --    ALBUMIN  --   --  3.1* 3.3*  --   --  3.2*  --    BILITOT  --   --  0.3 0.4  --   --   --   --    AST  --   --  13 10  --   --   --   --    ALKPHOS  --   --  44* 32*  --   --   --   --    ALT  --   --  14 12  --   --   --   --    < > = values in this interval not displayed.    Trended Cardiac Data:    Recent Labs  Lab 07/09/18  0622 07/11/18  0152 07/11/18  0508   TROPONINI 0.018 0.021 0.017   BNP  --  319*  --        Other Results:  Radiology:  X-ray Abdomen Portable    Result Date: 7/9/2018  EXAMINATION: XR ABDOMEN PORTABLE CLINICAL HISTORY: abd pain;  Unspecified abdominal pain TECHNIQUE: Single portable AP view of  the abdomen COMPARISON: Abdominal radiograph series 01/20/2018 FINDINGS: Scattered air is seen in nondilated loops of small and large bowel. No central small bowel air fluid levels are appreciated. Degenerative changes are noted of the lumbar spine.     Nonspecific bowel gas pattern. Electronically signed by: Edil Orlando MD Date:    07/09/2018 Time:    06:55    X-ray Chest Ap Portable    Result Date: 7/9/2018  EXAMINATION: XR CHEST AP PORTABLE CLINICAL HISTORY: epigastric pain; TECHNIQUE: Single frontal view of the chest was performed. COMPARISON: 03/03/2018 FINDINGS: Cardiac monitoring leads overlie the chest.  Cardiomediastinal silhouette is mildly prominent, similar to prior examination.  There is atherosclerotic calcification of the thoracic aorta.  The lungs demonstrate mild bilateral interstitial prominence, similar to prior examination.  No new large confluent airspace consolidation identified.  No significant pleural effusion or pneumothorax appreciated.  There is a left subclavian/axillary stent demonstrated.  There are surgical clips within the left axilla.     No significant detrimental interval change compared to 03/03/2018. Electronically signed by: Edil Orlando MD Date:    07/09/2018 Time:    07:52      Assessment:   Angelina Beard is a 67 y.o. female with:   Patient Active Problem List    Diagnosis Date Noted    Symptomatic anemia 07/11/2018    Paroxysmal atrial fibrillation 06/21/2018    End-stage renal disease on hemodialysis 05/03/2016    Anemia in end-stage renal disease 03/06/2016    Chronic constipation 03/06/2016    Renovascular hypertension 03/04/2016    Debility 02/25/2016    Status post total right knee replacement 2/23/16 02/23/2016    Diabetic neuropathy associated with type 2 diabetes mellitus 02/19/2016    History of left breast cancer s/p mastectomy 02/19/2016    History of sarcoidosis 02/18/2016    Coronary artery disease involving native coronary artery of native  heart without angina pectoris 02/18/2016    DJD (degenerative joint disease) of lumbar spine 07/03/2013    Chronic diastolic congestive heart failure 07/02/2013    Morbid obesity 07/02/2013    Osteoarthritis 07/02/2013    GERD (gastroesophageal reflux disease) 07/02/2013    Cerebral microvascular disease 07/01/2013    Type 2 diabetes mellitus with hypertension and end stage renal disease on dialysis 07/01/2013    Hyperlipidemia 07/01/2013       Plan:     1. ESRD: dialysis [place] dialysis with Dr. Madden MWF. Last dialysis yesterday.    2. HTN: continue home meds    3. ESRD with AOCD:   Lab Results   Component Value Date    HGB 7.0 (L) 07/12/2018    MCV 94 07/11/2018    RDW 17.5 (H) 07/11/2018    IRON 74 03/03/2018    TIBC 247 (L) 03/03/2018    FERRITIN 1,196 (H) 03/03/2018    [id number] 389 03/03/2018    FOLATE 4.5 03/03/2018   Epo with HD    4. ESRD with MBD:   Lab Results   Component Value Date    PHOS 4.3 07/12/2018   Continue home binders.    5. Access: RUE AVF    6. Nutrition: Nepro and pephrocaps    Thank you for allowing us to participate in the care of this patient. Please contact me if you have any questions regarding this consult.    Aroldo Pak MD 7/12/2018 8:33 AM  [facility] PGY V  Pager: [phone]  Cell: [phone]

## 2018-07-12 NOTE — SUBJECTIVE & OBJECTIVE
Interval History: Said that blood transfusion was previously discussed in dialysis but she cannot remember when.  Cannot remember if she had a bowel movement, but said that her nurse documented one.  Her memory seems poor.    Review of Systems   Constitutional: Negative for chills and fever.   Gastrointestinal: Negative for abdominal pain and blood in stool.     Objective:     Vital Signs (Most Recent):  Temp: 97 °F (36.1 °C) (07/12/18 0708)  Pulse: 72 (07/12/18 0708)  Resp: 16 (07/12/18 0708)  BP: (!) 101/50 (07/12/18 0708)  SpO2: 96 % (07/12/18 0756) Vital Signs (24h Range):  Temp:  [97 °F (36.1 °C)-98.4 °F (36.9 °C)] 97 °F (36.1 °C)  Pulse:  [72-97] 72  Resp:  [16-20] 16  SpO2:  [93 %-97 %] 96 %  BP: (101-162)/(50-72) 101/50     Weight: 104.6 kg (230 lb 9.6 oz)  Body mass index is 36.12 kg/m².    Intake/Output Summary (Last 24 hours) at 07/12/18 1134  Last data filed at 07/12/18 0830   Gross per 24 hour   Intake             1200 ml   Output             3200 ml   Net            -2000 ml      Physical Exam   Constitutional: She is oriented to person, place, and time. She appears well-developed. No distress.   Cardiovascular: Normal rate and regular rhythm.    Pulmonary/Chest: Effort normal. No respiratory distress.   Neurological: She is alert and oriented to person, place, and time.   Psychiatric: Cognition and memory are impaired.   Nursing note and vitals reviewed.      Significant Labs:   CBC:   Recent Labs  Lab 07/11/18  0152 07/11/18  0828 07/11/18  1617 07/12/18  0422   WBC 11.18  --   --   --    HGB 5.1* 6.1* 7.8* 7.1*   HCT 16.5* 19.0* 23.8*  --      --   --   --      All pertinent labs within the past 24 hours have been reviewed.    Significant Imaging: I have reviewed all pertinent imaging results/findings within the past 24 hours.

## 2018-07-12 NOTE — PROGRESS NOTES
[facility] Hospital Medicine  Progress Note    Patient Name: Angelina Beard  MRN: [id number]  Patient Class: IP- Inpatient   Admission Date: 7/11/2018  Length of Stay: 1 days  Attending Physician: Jeb Cobb MD  Primary Care Provider: Eva Jara MD        Subjective:     Principal Problem:Symptomatic anemia    HPI:  Angelina Beard is a 67 y.o. black woman with hypertension, chronic diastolic congestive heart failure, paroxysmal atrial fibrillation, diabetes mellitus type 2 (hemoglobin A1c 6.2% 4/17/16) with neuropathy and nephropathy, hyperlipidemia, nonobstructive coronary artery disease, cerebral microvascular disease, end stage renal disease on hemodialysis (Monday Wednesday Friday at [facility]), anemia, osteoarthritis status post right total knee arthroplasty 2/23/16, chronic left leg pain, chronic constipation, cholelithiasis, history of right lung sarcoidosis, and history of left breast cancer s/p mastectomy.  Her primary care physician is Dr. Eva Jara.  Her nephrologist is Dr. Rc Santamaria.  Her cardiologist is Dr. Faraz Flores.  She lives alone in [place], [place].  She is mostly wheelchair bound but can use a walker with assistance.   She was seen at [facility] on 7/9/18 for acute nausea and vomiting.  She was found to have hyperkalemia with potassium of 6.4.  She underwent emergent dialysis without needing admission.     She returned to [facility] on 7/11/18 reporting that she did not feel better since she left the ED.  She complained of heartburn.  Her left groin pain is chronic and worse when she goes to urinate in the morning.  Potassium was unchanged at 6.4.  Hemoglobin and hematocrit had decreased from 9.2 and 29.8 to 5.1 and 16.5.  Stool was positive for occult blood.  She reported seeing blood on her clothes intermittently.  Blood transfusion was  started in the ED, and she was admitted to [facility].  She went for emergent dialysis again for hyperkalemia.    Hospital Course:  She was transfused a total of 2 units of pRBCs.  After dialysis, hyperkalemia resolved.      Interval History: Said that blood transfusion was previously discussed in dialysis but she cannot remember when.  Cannot remember if she had a bowel movement, but said that her nurse documented one.  Her memory seems poor.    Review of Systems   Constitutional: Negative for chills and fever.   Gastrointestinal: Negative for abdominal pain and blood in stool.     Objective:     Vital Signs (Most Recent):  Temp: 97 °F (36.1 °C) (07/12/18 0708)  Pulse: 72 (07/12/18 0708)  Resp: 16 (07/12/18 0708)  BP: (!) 101/50 (07/12/18 0708)  SpO2: 96 % (07/12/18 0756) Vital Signs (24h Range):  Temp:  [97 °F (36.1 °C)-98.4 °F (36.9 °C)] 97 °F (36.1 °C)  Pulse:  [72-97] 72  Resp:  [16-20] 16  SpO2:  [93 %-97 %] 96 %  BP: (101-162)/(50-72) 101/50     Weight: 104.6 kg (230 lb 9.6 oz)  Body mass index is 36.12 kg/m².    Intake/Output Summary (Last 24 hours) at 07/12/18 1134  Last data filed at 07/12/18 0830   Gross per 24 hour   Intake             1200 ml   Output             3200 ml   Net            -2000 ml      Physical Exam   Constitutional: She is oriented to person, place, and time. She appears well-developed. No distress.   Cardiovascular: Normal rate and regular rhythm.    Pulmonary/Chest: Effort normal. No respiratory distress.   Neurological: She is alert and oriented to person, place, and time.   Psychiatric: Cognition and memory are impaired.   Nursing note and vitals reviewed.      Significant Labs:   CBC:   Recent Labs  Lab 07/11/18  0152 07/11/18  0828 07/11/18  1617 07/12/18  0422   WBC 11.18  --   --   --    HGB 5.1* 6.1* 7.8* 7.1*   HCT 16.5* 19.0* 23.8*  --      --   --   --      All pertinent labs within the past 24 hours have been reviewed.    Significant Imaging: I have  reviewed all pertinent imaging results/findings within the past 24 hours.    Assessment/Plan:      * Symptomatic anemia    Stool positive for occult blood and reports seeing blood on her clothes.  Transfused 2 units pRBCs.  Hemoglobin borderline for needing another unit of pRBCs.  Consulted Gastroenterology to see if inpatient endoscopy would be appropriate.  Monitor hemoglobin.          Paroxysmal atrial fibrillation    Not on anticoagulation.          End-stage renal disease on hemodialysis    Appreciate Nephrology.          Chronic constipation    Continue home docusate.          Renovascular hypertension    Chronic diastolic congestive heart failure  Continue home hydralazine 25 mg TID.          Type 2 diabetes mellitus with hypertension and end stage renal disease on dialysis    Hemoglobin A1c 6.4% on 3/3/18.  Takes insulin 70/30 17 units twice daily.  Insulin aspart sliding scale.  Monitor serum glucose.          Cerebral microvascular disease    Continue home aspirin 81 mg daily, pravastatin 40 mg HS.            VTE Risk Mitigation         Ordered     Place sequential compression device  Until discontinued      07/11/18 0323     IP VTE HIGH RISK PATIENT  Once      07/11/18 0321              Jeb Cobb MD  Department of Hospital Medicine   [facility]

## 2018-07-12 NOTE — PLAN OF CARE
Future Appointments  Date Time Provider Department Center   8/16/2018 10:00 AM Faraz Flores MD [facility]        07/12/18 1010   Discharge Assessment   Assessment Type Discharge Planning Assessment   Confirmed/corrected address and phone number on facesheet? Yes   Assessment information obtained from? Patient   Prior to hospitilization cognitive status: Alert/Oriented   Prior to hospitalization functional status: Assistive Equipment   Current cognitive status: Alert/Oriented   Current Functional Status: Assistive Equipment   Lives With alone   Able to Return to Prior Arrangements yes   Is patient able to care for self after discharge? Yes   Patient's perception of discharge disposition home or selfcare;home health   Readmission Within The Last 30 Days no previous admission in last 30 days   Patient currently being followed by outpatient case management? Yes   Patient currently receives any other outside agency services? Yes   Name and contact number of agency or person providing outside services has medicaid PCA 42 hours/week- uses [facility]- has nurse weekly   Equipment Currently Used at Home wheelchair;walker, rolling   Do you have any problems affording any of your prescribed medications? No   Is the patient taking medications as prescribed? yes   Does the patient have transportation home? Yes   Transportation Available family or friend will provide   Dialysis Name and Scheduled days [facility] 9am   Does the patient receive services at the [facility]? No   Discharge Plan A Home Health;Home with family;Home   Discharge Plan B Home;Home with family;Home Health   Patient/Family In Agreement With Plan yes     Lotus Wharton RN, CCM, CMSRN  RN Transition Navigator  [phone]

## 2018-07-13 ENCOUNTER — ANESTHESIA EVENT (OUTPATIENT)
Dept: ENDOSCOPY | Facility: HOSPITAL | Age: 68
DRG: 377 | End: 2018-07-13
Payer: MEDICARE

## 2018-07-13 ENCOUNTER — ANESTHESIA (OUTPATIENT)
Dept: ENDOSCOPY | Facility: HOSPITAL | Age: 68
DRG: 377 | End: 2018-07-13
Payer: MEDICARE

## 2018-07-13 VITALS
HEIGHT: 67 IN | OXYGEN SATURATION: 98 % | WEIGHT: 230.63 LBS | HEART RATE: 85 BPM | RESPIRATION RATE: 16 BRPM | BODY MASS INDEX: 36.2 KG/M2 | TEMPERATURE: 98 F | SYSTOLIC BLOOD PRESSURE: 135 MMHG | DIASTOLIC BLOOD PRESSURE: 63 MMHG

## 2018-07-13 PROBLEM — K29.70 GASTROESOPHAGITIS: Status: ACTIVE | Noted: 2018-07-13

## 2018-07-13 PROBLEM — K25.9 GASTRIC ULCER: Status: ACTIVE | Noted: 2018-07-13

## 2018-07-13 PROBLEM — D50.0 ANEMIA DUE TO GASTROINTESTINAL BLOOD LOSS: Status: ACTIVE | Noted: 2018-07-11

## 2018-07-13 PROBLEM — K20.90 GASTROESOPHAGITIS: Status: ACTIVE | Noted: 2018-07-13

## 2018-07-13 LAB
ALBUMIN SERPL BCP-MCNC: 3.3 G/DL
ANION GAP SERPL CALC-SCNC: 16 MMOL/L
BLD PROD TYP BPU: NORMAL
BLOOD UNIT EXPIRATION DATE: NORMAL
BLOOD UNIT TYPE CODE: 9500
BLOOD UNIT TYPE: NORMAL
BUN SERPL-MCNC: 104 MG/DL
CALCIUM SERPL-MCNC: 8.6 MG/DL
CHLORIDE SERPL-SCNC: 94 MMOL/L
CO2 SERPL-SCNC: 26 MMOL/L
CODING SYSTEM: NORMAL
CREAT SERPL-MCNC: 9.3 MG/DL
DISPENSE STATUS: NORMAL
EST. GFR  (AFRICAN AMERICAN): 5 ML/MIN/1.73 M^2
EST. GFR  (NON AFRICAN AMERICAN): 4 ML/MIN/1.73 M^2
GLUCOSE SERPL-MCNC: 105 MG/DL
HGB BLD-MCNC: 6.9 G/DL
PHOSPHATE SERPL-MCNC: 4.7 MG/DL
POCT GLUCOSE: 103 MG/DL (ref 70–110)
POCT GLUCOSE: 109 MG/DL (ref 70–110)
POCT GLUCOSE: 113 MG/DL (ref 70–110)
POCT GLUCOSE: 114 MG/DL (ref 70–110)
POTASSIUM SERPL-SCNC: 4.6 MMOL/L
SODIUM SERPL-SCNC: 136 MMOL/L
TRANS ERYTHROCYTES VOL PATIENT: NORMAL ML

## 2018-07-13 PROCEDURE — 36430 TRANSFUSION BLD/BLD COMPNT: CPT

## 2018-07-13 PROCEDURE — 45378 DIAGNOSTIC COLONOSCOPY: CPT | Mod: 53,,, | Performed by: INTERNAL MEDICINE

## 2018-07-13 PROCEDURE — 27201012 HC FORCEPS, HOT/COLD, DISP: Performed by: INTERNAL MEDICINE

## 2018-07-13 PROCEDURE — 63600175 PHARM REV CODE 636 W HCPCS: Mod: JG | Performed by: STUDENT IN AN ORGANIZED HEALTH CARE EDUCATION/TRAINING PROGRAM

## 2018-07-13 PROCEDURE — 94761 N-INVAS EAR/PLS OXIMETRY MLT: CPT

## 2018-07-13 PROCEDURE — 63600175 PHARM REV CODE 636 W HCPCS: Performed by: NURSE ANESTHETIST, CERTIFIED REGISTERED

## 2018-07-13 PROCEDURE — 80100016 HC MAINTENANCE HEMODIALYSIS

## 2018-07-13 PROCEDURE — 88305 TISSUE EXAM BY PATHOLOGIST: CPT | Performed by: PATHOLOGY

## 2018-07-13 PROCEDURE — 45378 DIAGNOSTIC COLONOSCOPY: CPT | Performed by: INTERNAL MEDICINE

## 2018-07-13 PROCEDURE — P9021 RED BLOOD CELLS UNIT: HCPCS

## 2018-07-13 PROCEDURE — 43239 EGD BIOPSY SINGLE/MULTIPLE: CPT | Performed by: INTERNAL MEDICINE

## 2018-07-13 PROCEDURE — 88305 TISSUE EXAM BY PATHOLOGIST: CPT | Mod: 26,,, | Performed by: PATHOLOGY

## 2018-07-13 PROCEDURE — 37000009 HC ANESTHESIA EA ADD 15 MINS: Performed by: INTERNAL MEDICINE

## 2018-07-13 PROCEDURE — 80069 RENAL FUNCTION PANEL: CPT

## 2018-07-13 PROCEDURE — 25000003 PHARM REV CODE 250: Performed by: HOSPITALIST

## 2018-07-13 PROCEDURE — 25000003 PHARM REV CODE 250: Performed by: NURSE ANESTHETIST, CERTIFIED REGISTERED

## 2018-07-13 PROCEDURE — 0DB68ZX EXCISION OF STOMACH, VIA NATURAL OR ARTIFICIAL OPENING ENDOSCOPIC, DIAGNOSTIC: ICD-10-PCS | Performed by: INTERNAL MEDICINE

## 2018-07-13 PROCEDURE — 85018 HEMOGLOBIN: CPT

## 2018-07-13 PROCEDURE — 43239 EGD BIOPSY SINGLE/MULTIPLE: CPT | Mod: 51,,, | Performed by: INTERNAL MEDICINE

## 2018-07-13 PROCEDURE — 80100014 HC HEMODIALYSIS 1:1

## 2018-07-13 PROCEDURE — 36415 COLL VENOUS BLD VENIPUNCTURE: CPT

## 2018-07-13 PROCEDURE — 99223 1ST HOSP IP/OBS HIGH 75: CPT | Mod: ,,, | Performed by: INTERNAL MEDICINE

## 2018-07-13 PROCEDURE — 25000003 PHARM REV CODE 250: Performed by: STUDENT IN AN ORGANIZED HEALTH CARE EDUCATION/TRAINING PROGRAM

## 2018-07-13 PROCEDURE — 0DJD8ZZ INSPECTION OF LOWER INTESTINAL TRACT, VIA NATURAL OR ARTIFICIAL OPENING ENDOSCOPIC: ICD-10-PCS | Performed by: INTERNAL MEDICINE

## 2018-07-13 PROCEDURE — 37000008 HC ANESTHESIA 1ST 15 MINUTES: Performed by: INTERNAL MEDICINE

## 2018-07-13 RX ORDER — SODIUM CHLORIDE 9 MG/ML
INJECTION, SOLUTION INTRAVENOUS ONCE
Status: DISCONTINUED | OUTPATIENT
Start: 2018-07-13 | End: 2018-07-13 | Stop reason: HOSPADM

## 2018-07-13 RX ORDER — PROPOFOL 10 MG/ML
VIAL (ML) INTRAVENOUS
Status: DISCONTINUED | OUTPATIENT
Start: 2018-07-13 | End: 2018-07-13

## 2018-07-13 RX ORDER — PROPOFOL 10 MG/ML
VIAL (ML) INTRAVENOUS CONTINUOUS PRN
Status: DISCONTINUED | OUTPATIENT
Start: 2018-07-13 | End: 2018-07-13

## 2018-07-13 RX ORDER — PANTOPRAZOLE SODIUM 40 MG/1
40 TABLET, DELAYED RELEASE ORAL 2 TIMES DAILY
Qty: 60 TABLET | Refills: 1 | Status: SHIPPED | OUTPATIENT
Start: 2018-07-13 | End: 2018-11-08 | Stop reason: SDUPTHER

## 2018-07-13 RX ORDER — KETAMINE HYDROCHLORIDE 50 MG/ML
INJECTION, SOLUTION INTRAMUSCULAR; INTRAVENOUS
Status: DISCONTINUED | OUTPATIENT
Start: 2018-07-13 | End: 2018-07-13

## 2018-07-13 RX ORDER — LIDOCAINE HCL/PF 100 MG/5ML
SYRINGE (ML) INTRAVENOUS
Status: DISCONTINUED | OUTPATIENT
Start: 2018-07-13 | End: 2018-07-13

## 2018-07-13 RX ORDER — SODIUM CHLORIDE 9 MG/ML
INJECTION, SOLUTION INTRAVENOUS
Status: DISCONTINUED | OUTPATIENT
Start: 2018-07-13 | End: 2018-07-13 | Stop reason: HOSPADM

## 2018-07-13 RX ADMIN — KETAMINE HYDROCHLORIDE 25 MG: 50 INJECTION, SOLUTION, CONCENTRATE INTRAMUSCULAR; INTRAVENOUS at 09:07

## 2018-07-13 RX ADMIN — SODIUM CHLORIDE: 0.9 INJECTION, SOLUTION INTRAVENOUS at 09:07

## 2018-07-13 RX ADMIN — ASPIRIN 81 MG: 81 TABLET, COATED ORAL at 04:07

## 2018-07-13 RX ADMIN — ERYTHROPOIETIN 10000 UNITS: 10000 INJECTION, SOLUTION INTRAVENOUS; SUBCUTANEOUS at 01:07

## 2018-07-13 RX ADMIN — PANTOPRAZOLE SODIUM 40 MG: 40 TABLET, DELAYED RELEASE ORAL at 04:07

## 2018-07-13 RX ADMIN — PROPOFOL 80 MCG/KG/MIN: 10 INJECTION, EMULSION INTRAVENOUS at 09:07

## 2018-07-13 RX ADMIN — LIDOCAINE HYDROCHLORIDE 80 MG: 20 INJECTION, SOLUTION INTRAVENOUS at 09:07

## 2018-07-13 RX ADMIN — HYDRALAZINE HYDROCHLORIDE 25 MG: 25 TABLET, FILM COATED ORAL at 05:07

## 2018-07-13 RX ADMIN — PROPOFOL 30 MG: 10 INJECTION, EMULSION INTRAVENOUS at 09:07

## 2018-07-13 NOTE — DISCHARGE INSTRUCTIONS
You were found to have esophagitis (inflammation of the esophagus lining), gastritis (inflammation of the stomach lining), and gastric ulcers (stomach ulcers).      Increase your pantoprazole from once a day to twice a day.    Avoid NSAIDs except for the aspirin that you take.    Non-steroidal anti-inflammatory drugs (NSAIDs) are a group of drugs that are prescribed to reduce the pain and inflammation of arthritis. Some of these drugs require a prescription, while others are available without one (over-the-counter or OTC). They include such drugs such as (generic names first, brand names in parentheses):     aspirin  diclofenac (Voltaren)   etodolac (Lodine)  fenoprofen (Nalfon)  flurbiprofen (Ansaid)  ibuprofen (Motrin, Advil, Rufen)   meclofenamate (Meclomen)  naproxen (Aleve, Naprosyn)  indomethacin (Indocin)  ketoprofen (Orudis, Oruvail)  oxaprozin (Daypro)  piroxicam (Feldene)   salsalate (Disalcid, Trilisate)  sulindac (Clinoril)  tolmetin (Tolectin)    NSAIDs do not include drugs that are purely pain relievers, such as acetaminophen (Tylenol) or codeine.    Follow up with Gastroenterology in 6 weeks to repeat endoscopy.

## 2018-07-13 NOTE — PLAN OF CARE
Problem: Patient Care Overview  Goal: Plan of Care Review  Outcome: Outcome(s) achieved Date Met: 07/13/18  .21 sat 98%

## 2018-07-13 NOTE — SUBJECTIVE & OBJECTIVE
Past Medical History:   Diagnosis Date    Arthritis     Breast cancer, left     CHF (congestive heart failure)     Cholelithiasis     Coronary artery disease     Diabetes mellitus, type 2     End-stage renal disease on hemodialysis     Hemodialysis access site with arteriovenous graft     mon-wed -fri    Hyperlipidemia     Hypertension     Sarcoidosis        Past Surgical History:   Procedure Laterality Date    av graft      left arm    BRACHIAL ARTERY GRAFT Left 05/04/2016    brachiocephalic, Dr. Anson Crocker    BREAST BIOPSY Left     breast ca    BREAST LUMPECTOMY Left     radiation only    btl      ELBOW SURGERY      left    LIPOMA RESECTION      back of neck    pilondial cyst      TOTAL KNEE ARTHROPLASTY Right 02/23/2016       Review of patient's allergies indicates:  No Known Allergies  Family History     Problem Relation (Age of Onset)    Diabetes Mother, Sister    Heart disease Sister    Hypertension Mother    Kidney disease Mother        Social History Main Topics    Smoking status: Never Smoker    Smokeless tobacco: Never Used    Alcohol use No    Drug use: No    Sexual activity: Not on file     Review of Systems   Constitutional: Positive for appetite change and fatigue. Negative for chills and fever.   HENT: Negative for postnasal drip and trouble swallowing.    Eyes: Negative for pain and redness.   Respiratory: Negative for chest tightness and shortness of breath.    Cardiovascular: Negative for chest pain and leg swelling.   Gastrointestinal: Positive for blood in stool. Negative for abdominal distention, abdominal pain, anal bleeding, constipation, diarrhea, nausea, rectal pain and vomiting.   Endocrine: Negative for cold intolerance and heat intolerance.   Genitourinary: Negative for difficulty urinating and hematuria.   Musculoskeletal: Negative for arthralgias and back pain.   Skin: Negative for color change and pallor.   Allergic/Immunologic: Negative for  environmental allergies and food allergies.   Neurological: Negative for dizziness and light-headedness.   Hematological: Negative for adenopathy. Does not bruise/bleed easily.   Psychiatric/Behavioral: Negative for agitation and behavioral problems.     Objective:     Vital Signs (Most Recent):  Temp: 97.5 °F (36.4 °C) (07/13/18 0947)  Pulse: 69 (07/13/18 0947)  Resp: 13 (07/13/18 0947)  BP: (!) 94/50 (07/13/18 0947)  SpO2: (!) 94 % (07/13/18 0947) Vital Signs (24h Range):  Temp:  [96.6 °F (35.9 °C)-97.8 °F (36.6 °C)] 97.5 °F (36.4 °C)  Pulse:  [67-84] 69  Resp:  [13-18] 13  SpO2:  [91 %-97 %] 94 %  BP: ()/(50-70) 94/50     Weight: 104.6 kg (230 lb 9.6 oz) (07/11/18 1527)  Body mass index is 36.12 kg/m².      Intake/Output Summary (Last 24 hours) at 07/13/18 0954  Last data filed at 07/13/18 0945   Gross per 24 hour   Intake               50 ml   Output              200 ml   Net             -150 ml       Lines/Drains/Airways     Central Venous Catheter Line                 Hemodialysis Catheter  Left Forearm -- days         Hemodialysis Catheter left subclavian -- days          Drain                 Hemodialysis AV Fistula Right forearm -- days         Hemodialysis AV Graft Right forearm -- days         Hemodialysis AV Graft Right forearm -- days         Hemodialysis AV Graft 05/04/16 0845 Right forearm 800 days          Airway                 Airway - Non-Surgical 07/13/18 0917 Nasal Cannula less than 1 day          Epidural Line                 Perineural Analgesia/Anesthesia Assessment (using dermatomes) Epidural 05/04/16 0735 800 days          Peripheral Intravenous Line                 Peripheral IV - Single Lumen 07/11/18 0151 Left Hand 2 days         Peripheral IV - Single Lumen 07/11/18 0403 Left Upper Arm 2 days                Physical Exam   Constitutional: She is oriented to person, place, and time. She appears well-developed and well-nourished. No distress.   HENT:   Head: Normocephalic and  atraumatic.   Eyes: Conjunctivae are normal. No scleral icterus.   Neck: Normal range of motion. Neck supple. No tracheal deviation present. No thyromegaly present.   Cardiovascular: Normal rate and regular rhythm.  Exam reveals no gallop and no friction rub.    Pulmonary/Chest: Effort normal and breath sounds normal. No respiratory distress. She has no wheezes.   Abdominal: Soft. Bowel sounds are normal. She exhibits no distension. There is no tenderness.   Musculoskeletal: Normal range of motion. She exhibits edema.        Right wrist: She exhibits normal range of motion and no tenderness.        Left wrist: She exhibits normal range of motion and no tenderness.   Lymphadenopathy:        Head (right side): No submental and no submandibular adenopathy present.        Head (left side): No submental and no submandibular adenopathy present.   Neurological: She is alert and oriented to person, place, and time.   Skin: Skin is warm and dry. No rash noted. She is not diaphoretic. No erythema.   Psychiatric: She has a normal mood and affect. Her behavior is normal.   Nursing note and vitals reviewed.      Significant Labs:  CBC:   Recent Labs  Lab 07/11/18  1617 07/12/18  0422 07/12/18  1226 07/13/18  0423   HGB 7.8* 7.1* 7.0* 6.9*   HCT 23.8*  --   --   --        Significant Imaging:  Imaging results within the past 24 hours have been reviewed.

## 2018-07-13 NOTE — ASSESSMENT & PLAN NOTE
- EGD/Colonoscopy  - good response to 2u  - high risk, numerous comorbidities  - PPI  - monitor hct

## 2018-07-13 NOTE — TRANSFER OF CARE
"Anesthesia Transfer of Care Note    Patient: Angelina Beard    Procedure(s) Performed: Procedure(s) (LRB):  ESOPHAGOGASTRODUODENOSCOPY (EGD) (N/A)  COLONOSCOPY (N/A)    Patient location: GI    Anesthesia Type: MAC    Transport from OR: Transported from OR on room air with adequate spontaneous ventilation    Post pain: adequate analgesia    Post assessment: no apparent anesthetic complications    Post vital signs: stable    Level of consciousness: sedated    Nausea/Vomiting: no nausea/vomiting    Complications: none    Transfer of care protocol was followed      Last vitals:   Visit Vitals  BP (!) 155/57 (BP Location: Left arm, Patient Position: Lying)   Pulse 84   Temp 36.5 °C (97.7 °F) (Temporal)   Resp 16   Ht 5' 7" (1.702 m)   Wt 104.6 kg (230 lb 9.6 oz)   LMP  (LMP Unknown)   SpO2 96%   Breastfeeding? No   BMI 36.12 kg/m²     "

## 2018-07-13 NOTE — PLAN OF CARE
Problem: Patient Care Overview  Goal: Plan of Care Review  Plan of care reviewed with patient. Patient verbalized understanding. Fall precautions maintained. Blood glucose monitored. Colonoscopy Go-lightly prep at bedside, pt NPO except for medicines. Bed in lowest position, call light within reach, 2x bed rails, pt wearing slip resistant socks. Patient notified to ask staff for assistance and pt verbalized complete understanding. Pt on telemetry with no ectopy noted. Will continue to monitor.

## 2018-07-13 NOTE — PLAN OF CARE
Problem: Patient Care Overview  Goal: Plan of Care Review  Outcome: Ongoing (interventions implemented as appropriate)  Plan of care reviewed with patient. Patient verbalized understanding. Blood glucose monitored. Pt transferred to endoscopy for EGD and hemodialysis. 1 unit of RBC transfused. Fall precautions maintained. Bed in lowest position, call light within reach, 2x bed rails, pt wearing slip resistant socks. Patient notified to ask staff for assistance and pt verbalized complete understanding. Pt on telemetry with no ectopy noted. Will continue to monitor. Pt anticipates discharge.

## 2018-07-13 NOTE — ANESTHESIA PREPROCEDURE EVALUATION
07/13/2018  Angelina Beard is a 67 y.o., female with GIB/anemia  for EGD and colonoscopy under MAC    Past Medical History:   Diagnosis Date    Arthritis     Breast cancer, left     CHF (congestive heart failure)     Cholelithiasis     Coronary artery disease     Diabetes mellitus, type 2     End-stage renal disease on hemodialysis     Hemodialysis access site with arteriovenous graft     mon-wed -fri    Hyperlipidemia     Hypertension     Sarcoidosis          Anesthesia Evaluation    I have reviewed the Patient Summary Reports.     I have reviewed the Medications.     Review of Systems  Anesthesia Hx:   Denies Personal Hx of Anesthesia complications.   Social:  Non-Smoker, No Alcohol Use    Hematology/Oncology:         -- Anemia:   Cardiovascular:   Hypertension CAD   CHF    Renal/:   Chronic Renal Disease, ESRD, Dialysis    Hepatic/GI:   GERD    Musculoskeletal:   Arthritis     Endocrine:   Diabetes        Physical Exam  General:  Obesity       Chest/Lungs:  Chest/Lungs Clear    Heart/Vascular:  Heart Findings: Normal       Mental Status:  Mental Status Findings:  Alert and Oriented       Lab Results   Component Value Date    WBC 11.18 07/11/2018    HGB 6.9 (L) 07/13/2018    HCT 23.8 (L) 07/11/2018     07/11/2018    CHOL 177 08/19/2016    TRIG 116 08/19/2016    HDL 60 08/19/2016    ALT 12 07/11/2018    AST 10 07/11/2018     07/13/2018    K 4.6 07/13/2018    CL 94 (L) 07/13/2018    CREATININE 9.3 (H) 07/13/2018     (H) 07/13/2018    CO2 26 07/13/2018    TSH 0.176 (L) 03/03/2018    INR 1.0 03/03/2018    HGBA1C 5.8 (H) 07/12/2018     CONCLUSIONS     1 - Hyperdynamic left ventricular systolic function (EF >70%).     2 - Impaired LV relaxation, normal LAP (grade 1 diastolic dysfunction).     3 - Concentric hypertrophy.     4 - No wall motion abnormalities.     5 - Biatrial  enlargement.     6 - The estimated PA systolic pressure is greater than 31 mmHg.     7 - Normal right ventricular systolic function .     8 - Mild tricuspid regurgitation.     9 - Increased cardiac output at rest (perhaps due to known AV fistula).             This document has been electronically    SIGNED BY: Orion Hutchinson MD On: 03/03/2018 14:21            Anesthesia Plan  Type of Anesthesia, risks & benefits discussed:  Anesthesia Type:  MAC  Patient's Preference:   Intra-op Monitoring Plan:   Intra-op Monitoring Plan Comments:   Post Op Pain Control Plan:   Post Op Pain Control Plan Comments:   Induction:    Beta Blocker:  Patient is not currently on a Beta-Blocker (No further documentation required).       Informed Consent: Patient understands risks and agrees with Anesthesia plan.  Questions answered. Anesthesia consent signed with patient.  ASA Score: 4     Day of Surgery Review of History & Physical:            Ready For Surgery From Anesthesia Perspective.

## 2018-07-13 NOTE — PLAN OF CARE
Spoke to kiran in law (Cali who works for [employer])- patient doesn't have home health and requesting home health. MD orders home health.    Future Appointments  Date Time Provider Department Center   8/16/2018 10:00 AM Faraz Flores MD [facility] CARDIO [facility]        07/13/18 1152   Final Note   Assessment Type Final Discharge Note   Discharge Disposition Home-Health   Hospital Follow Up  Appt(s) scheduled? Yes   Discharge plans and expectations educations in teach back method with documentation complete? Yes   Right Care Referral Info   Post Acute Recommendation Home-care   Referral Type home health   Facility Name [facility]     Lotus Wharton, RN, CCM, CMSRN  RN Transition Navigator  [phone]

## 2018-07-13 NOTE — PROGRESS NOTES
[facility] Consult    Consulting Physician: Jeb Cobb MD  Date of Admission: 2018  Date of Consult: 2018    Reason for Consult:     ESRD on HD. Hyperkalemia    Subjective:     History of Present Illness:  Angelina Beard is a 67 y.o. female with ESRD. Dialyzes MWF via RUE AVF at [facility] dialysis with Dr. Madden. Last dialysis was here yesterday. Has been above target weights lately. Goal weight of 107 kg pt states that recently she has been around 114 kg. Drinking too much water between sessions. Here with CP and left sided numbness. Denies SOB, F/C.    Interim History:  Left sided burning currently resolved. Denies SOB, N/V, CP.    Review of Systems:  General - Subjective fevers at night. No chills  Head - Denies headache. Dizziness with urination  Eyes- Denies new loss of vision. Chronic blurry vision  Ears - Denies decreased hearing and ringing in the ears  Throat - Denies dysphagia and sore throat  Pulmonary - Shortness of breath and cough productive of brown sputum  Cardiovascular- chest pain and palpitations as above  Gastrointestonal - nausea, vomiting today. Diarrhea last week  Genitourinary- Denies changes in urination. Dysuria  Skin - Denies rashes, skin changes, and itching  Muskuloskeletal - Denies myalgias and arthralgias  Neurological - Denies gait disturbance and focal weakness. Ambulates with walker  Psychiatric - Denies depression and anxiety    Health Maintaince :   Immunizations:   Currently on File with [facility]:   Most Recent Immunizations   Administered Date(s) Administered    Pneumococcal Conjugate - 13 Valent 08/10/2016       Objective:   Last 24 Hour Vital Signs:  BP  Min: 115/53  Max: 149/70  Temp  Av °F (36.1 °C)  Min: 96.6 °F (35.9 °C)  Max: 97.8 °F (36.6 °C)  Pulse  Av.6  Min: 67  Max: 80  Resp  Avg: 15.8  Min: 13  Max: 18  SpO2  Av.9 %  Min: 91 %  Max: 97 %  I/O last 3 completed shifts:  In: 580 [P.O.:580]  Out: 500 [Urine:500]    Physical  Examination:  General - alert and cooperative  Head - Normocephalic and atraumatic  Eyes - Normal lids and lashes, anicteric sclera  Neck - No JVD, supple  Lungs - Bibasilar crackles  Heart - Regular rate and rhythm  Abd - Normoactive bowel sounds, nontender, non distended.  Ext - No clubbing, cyanosis. Trace BLE edema  Pulse - 2+ and symmetric  Skin - Warm and dry  Psych - Normal mood and affect  Access - right lower arm AVF with good thrill and bruit    Laboratory Results:  Most Recent Data:    CBC:   Lab Results   Component Value Date    WBC 11.18 07/11/2018    HGB 6.9 (L) 07/13/2018    HCT 23.8 (L) 07/11/2018     07/11/2018    MCV 94 07/11/2018    RDW 17.5 (H) 07/11/2018       BMP:   Lab Results   Component Value Date     07/13/2018    K 4.6 07/13/2018    CL 94 (L) 07/13/2018    CO2 26 07/13/2018     (H) 07/13/2018     07/13/2018    CALCIUM 8.6 (L) 07/13/2018    MG 2.0 03/03/2018    PHOS 4.7 (H) 07/13/2018       LFTs:   Lab Results   Component Value Date    PROT 6.0 07/11/2018    ALBUMIN 3.3 (L) 07/13/2018    BILITOT 0.4 07/11/2018    AST 10 07/11/2018    ALKPHOS 32 (L) 07/11/2018    ALT 12 07/11/2018       Coags:   Lab Results   Component Value Date    INR 1.0 03/03/2018       FLP:   Lab Results   Component Value Date    CHOL 177 08/19/2016    HDL 60 08/19/2016    LDLCALC 93.8 08/19/2016    TRIG 116 08/19/2016    CHOLHDL 33.9 08/19/2016       DM:   Lab Results   Component Value Date    HGBA1C 5.8 (H) 07/12/2018    HGBA1C 6.4 (H) 03/03/2018    HGBA1C 6.2 04/17/2016    LDLCALC 93.8 08/19/2016    CREATININE 9.3 (H) 07/13/2018       Thyroid:   Lab Results   Component Value Date    TSH 0.176 (L) 03/03/2018    FREET4 1.01 03/03/2018       Anemia:   Lab Results   Component Value Date    IRON 74 03/03/2018    TIBC 247 (L) 03/03/2018    FERRITIN 1,196 (H) 03/03/2018    CAGTESZT20 389 03/03/2018    FOLATE 4.5 03/03/2018       Cardiac:   Lab Results   Component Value Date    TROPONINI 0.017  07/11/2018     (H) 07/11/2018       Urinalysis:   Lab Results   Component Value Date    LABURIN  11/08/2016     Multiple organisms isolated. None in predominance.  Repeat if    LABURIN clinically necessary. 11/08/2016    COLORU Yellow 06/13/2018    SPECGRAV 1.005 06/13/2018    NITRITE Negative 06/13/2018    KETONESU Negative 06/13/2018    UROBILINOGEN Negative 06/13/2018       Trended Lab Data:    Recent Labs  Lab 07/09/18  0522  07/09/18  0622 07/11/18  0152 07/11/18  0828 07/11/18  1617 07/12/18  0422 07/12/18  1226 07/13/18  0423   WBC 7.58  --   --  11.18  --   --   --   --   --    HGB 9.2*  --   --  5.1* 6.1* 7.8* 7.1* 7.0* 6.9*   HCT 29.8*  --   --  16.5* 19.0* 23.8*  --   --   --      --   --  227  --   --   --   --   --    MCV 95  --   --  94  --   --   --   --   --    RDW 17.3*  --   --  17.5*  --   --   --   --   --    NA  --   < > 140 137  --   --  136  --  136   K  --   < > 6.4* 6.4*  --  4.3 4.5  --  4.6   CL  --   < > 96 101  --   --  96  --  94*   CO2  --   < > 30* 20*  --   --  26  --  26   BUN  --   < > 99* 121*  --   --  103*  --  104*   GLU  --   < > 224* 219*  --   --  157*  --  105   PROT  --   --  6.8 6.0  --   --   --   --   --    ALBUMIN  --   < > 3.1* 3.3*  --   --  3.2*  --  3.3*   BILITOT  --   --  0.3 0.4  --   --   --   --   --    AST  --   --  13 10  --   --   --   --   --    ALKPHOS  --   --  44* 32*  --   --   --   --   --    ALT  --   --  14 12  --   --   --   --   --    < > = values in this interval not displayed.    Trended Cardiac Data:    Recent Labs  Lab 07/09/18  0622 07/11/18  0152 07/11/18  0508   TROPONINI 0.018 0.021 0.017   BNP  --  319*  --        Other Results:  Radiology:  X-ray Abdomen Portable    Result Date: 7/9/2018  EXAMINATION: XR ABDOMEN PORTABLE CLINICAL HISTORY: abd pain;  Unspecified abdominal pain TECHNIQUE: Single portable AP view of the abdomen COMPARISON: Abdominal radiograph series 01/20/2018 FINDINGS: Scattered air is seen in nondilated  loops of small and large bowel. No central small bowel air fluid levels are appreciated. Degenerative changes are noted of the lumbar spine.     Nonspecific bowel gas pattern. Electronically signed by: Edil Orlando MD Date:    07/09/2018 Time:    06:55    X-ray Chest Ap Portable    Result Date: 7/9/2018  EXAMINATION: XR CHEST AP PORTABLE CLINICAL HISTORY: epigastric pain; TECHNIQUE: Single frontal view of the chest was performed. COMPARISON: 03/03/2018 FINDINGS: Cardiac monitoring leads overlie the chest.  Cardiomediastinal silhouette is mildly prominent, similar to prior examination.  There is atherosclerotic calcification of the thoracic aorta.  The lungs demonstrate mild bilateral interstitial prominence, similar to prior examination.  No new large confluent airspace consolidation identified.  No significant pleural effusion or pneumothorax appreciated.  There is a left subclavian/axillary stent demonstrated.  There are surgical clips within the left axilla.     No significant detrimental interval change compared to 03/03/2018. Electronically signed by: Edil Orlando MD Date:    07/09/2018 Time:    07:52      Assessment:   Angelina Beard is a 67 y.o. female with:   Patient Active Problem List    Diagnosis Date Noted    Symptomatic anemia 07/11/2018    Paroxysmal atrial fibrillation 06/21/2018    End-stage renal disease on hemodialysis 05/03/2016    Anemia in end-stage renal disease 03/06/2016    Chronic constipation 03/06/2016    Renovascular hypertension 03/04/2016    Debility 02/25/2016    Status post total right knee replacement 2/23/16 02/23/2016    Diabetic neuropathy associated with type 2 diabetes mellitus 02/19/2016    History of left breast cancer s/p mastectomy 02/19/2016    History of sarcoidosis 02/18/2016    Coronary artery disease involving native coronary artery of native heart without angina pectoris 02/18/2016    DJD (degenerative joint disease) of lumbar spine 07/03/2013     Chronic diastolic congestive heart failure 07/02/2013    Morbid obesity 07/02/2013    Osteoarthritis 07/02/2013    GERD (gastroesophageal reflux disease) 07/02/2013    Cerebral microvascular disease 07/01/2013    Type 2 diabetes mellitus with hypertension and end stage renal disease on dialysis 07/01/2013    Hyperlipidemia 07/01/2013       Plan:     1. ESRD: dialysis [place] dialysis with Dr. Madden MWF. Last dialysis yesterday.    2. HTN: continue home meds    3. ESRD with AOCD:   Lab Results   Component Value Date    HGB 6.9 (L) 07/13/2018    MCV 94 07/11/2018    RDW 17.5 (H) 07/11/2018    IRON 74 03/03/2018    TIBC 247 (L) 03/03/2018    FERRITIN 1,196 (H) 03/03/2018    LUJRCBCY19 389 03/03/2018    FOLATE 4.5 03/03/2018   Epo with HD    4. ESRD with MBD:   Lab Results   Component Value Date    PHOS 4.7 (H) 07/13/2018   Continue home binders.    5. Access: RUE AVF    6. Nutrition: Nepro and nephrocaps    Thank you for allowing us to participate in the care of this patient. Please contact me if you have any questions regarding this consult.    Aroldo Pak MD 7/13/2018 8:33 AM  [employer] Nephrology PGY V  Pager: [phone]  Cell: [phone]

## 2018-07-13 NOTE — DISCHARGE SUMMARY
[facility]  Discharge Summary      Patient Name: Angelina Beard  MRN: [id number]  Admission Date: 7/11/2018  Hospital Length of Stay: 2 days  Discharge Date and Time: 7/13/2018  7:58 PM  Attending Physician: Jeb Cobb MD   Discharging Provider: Jeb Cobb MD  Primary Care Provider: Eva Jara MD      HPI:   Angelina Beard is a 67 y.o. black woman with hypertension, chronic diastolic congestive heart failure, paroxysmal atrial fibrillation, diabetes mellitus type 2 (hemoglobin A1c 6.2% 4/17/16) with neuropathy and nephropathy, hyperlipidemia, nonobstructive coronary artery disease, cerebral microvascular disease, end stage renal disease on hemodialysis (Monday Wednesday Friday at [facility] in [facility]), anemia, osteoarthritis status post right total knee arthroplasty 2/23/16, chronic left leg pain, chronic constipation, cholelithiasis, history of right lung sarcoidosis, and history of left breast cancer s/p mastectomy.  Her primary care physician is Dr. Eva Jara.  Her nephrologist is Dr. Rc Santamaria.  Her cardiologist is Dr. Faraz Flores.  She lives alone in [place], [place].  She is mostly wheelchair bound but can use a walker with assistance.   She was seen at [facility] on 7/9/18 for acute nausea and vomiting.  She was found to have hyperkalemia with potassium of 6.4.  She underwent emergent dialysis without needing admission.     She returned to [facility] on 7/11/18 reporting that she did not feel better since she left the ED.  She complained of heartburn.  Her left groin pain is chronic and worse when she goes to urinate in the morning.  Potassium was unchanged at 6.4.  Hemoglobin and hematocrit had decreased from 9.2 and 29.8 to 5.1 and 16.5.  Stool was positive for occult blood.  She reported seeing blood on her clothes intermittently.  Blood  transfusion was started in the ED, and she was admitted to [facility].  She went for emergent dialysis again for hyperkalemia.    Procedures:  ESOPHAGOGASTRODUODENOSCOPY (EGD) 7/13/18:  Findings:     LA Grade D (one or more mucosal breaks involving at least 75% of esophageal circumference) esophagitis with no bleeding was found in the distal esophagus. Biopsies were taken with a cold forceps for histology. Verification of patient identification for the specimen was done by the physician using the patient's name and birth date.        Estimated blood loss: none.       A small hiatal hernia was present.       Few non-bleeding cratered gastric ulcers with no stigmata of bleeding were found in the gastric antrum. The largest lesion was 10 mm in largest dimension. Biopsies were taken with a cold forceps for histology. Verification of patient identification for the specimen was done by the physician using the patient's name and birth date.        Estimated blood loss: none.       Localized mild inflammation characterized by erosions and erythema was found in the gastric body and in the gastric antrum.       The examined duodenum was normal.  Impression:           - LA Grade D reflux esophagitis. Rule out Bacon's esophagus. Biopsied.                        - Small hiatal hernia.                        - Non-bleeding gastric ulcers with no stigmata of bleeding. Biopsied.                        - Gastritis.                        - Normal examined duodenum.  Recommendation:       - Return patient to hospital [facility] for ongoing care.                        - Advance diet as tolerated.                        - Continue present medications.                        - Await pathology results.                        - PPI BID                        - Repeat EGD in 6 weeks                        - Treat h.pylori if positive                        - Avoid NSAIDs  COLONOSCOPY 7/13/18:  Findings:     Copious quantities  of stool was found in the rectum, in the recto-sigmoid colon and in the sigmoid colon, precluding visualization. Lavage of the area was performed using a large amount of sterile water, resulting in incomplete clearance with continued poor visualization.  Impression:           - Preparation of the colon was unsatisfactory.                        - Stool in the rectum, in the recto-sigmoid colon and in the sigmoid colon.                        - No specimens collected.    Hospital Course:   She was transfused a total of 2 units of pRBCs.  After dialysis, hyperkalemia resolved.  Gastroenterology was consulted and performed esophagogastroduodenoscopy and colonoscopy.  She was found to have esophagitis, gastritis, and gastric ulcers.  Gastroenterology recommended increasing her pantoprazole to twice daily, and following up for repeat endoscopy in 6 weeks.  She was advised to avoid NSAIDs.  Colonoscopy only showed stool due to unsatisfactory prep.  Hemoglobin and hematocrit were borderline and she needed another unit of pRBCs on 7/13/18.     Consults:   Consults         Status Ordering Provider     Inpatient consult to [facility]ology[facility]  Once     Provider:  (Not yet assigned)    Completed ANAMARIA KELLER     Inpatient consult to Nephrology-[facility]  Once     Provider:  (Not yet assigned)    Completed RAYO PALACIOS     Inpatient consult to Social Work/Case Management  Once     Provider:  (Not yet assigned)    Acknowledged ANAMARIA KELLER        Final Active Diagnoses:    Diagnosis Date Noted POA    PRINCIPAL PROBLEM:  Anemia due to gastrointestinal blood loss [D50.0] 07/11/2018 Yes    Gastric ulcer [K25.9] 07/13/2018 Yes    Gastroesophagitis [K20.8] 07/13/2018 Yes    Paroxysmal atrial fibrillation [I48.0] 06/21/2018 Yes     Chronic    End-stage renal disease on hemodialysis [N18.6, Z99.2] 05/03/2016 Not Applicable     Chronic    Chronic constipation [K59.09] 03/06/2016 Yes     Chronic    Renovascular  hypertension [I15.0] 03/04/2016 Yes     Chronic    Diabetic neuropathy associated with type 2 diabetes mellitus [E11.40] 02/19/2016 Yes     Chronic    Coronary artery disease involving native coronary artery of native heart without angina pectoris [I25.10] 02/18/2016 Yes     Chronic    Chronic diastolic congestive heart failure [I50.32] 07/02/2013 Yes     Chronic    Type 2 diabetes mellitus with hypertension and end stage renal disease on dialysis [E11.22, I12.0, Z99.2, N18.6] 07/01/2013 Not Applicable     Chronic    Cerebral microvascular disease [I67.9] 07/01/2013 Yes     Chronic      Problems Resolved During this Admission:    Diagnosis Date Noted Date Resolved POA    Hyperkalemia, diminished renal excretion [E87.5] 04/17/2016 07/11/2018 Yes       Discharged Condition: good    Disposition[place] [facility]    Follow Up:  Follow-up Information     Almita Bee MD In 6 weeks.    Specialty:  Gastroenterology  Why:  repeat endoscopy to look at ulcers  Contact information:  [address]  [address]  [facility] [id number] [phone]                 Patient Instructions:     Diet renal     Notify your health care provider if you experience any of the following:  persistent dizziness, light-headedness, or visual disturbances     Notify your health care provider if you experience any of the following:  persistent nausea and vomiting or diarrhea     Activity as tolerated         Significant Diagnostic Studies:   X-Ray Chest 1 View 7/11/18: FINDINGS:  Mediastinal structures are midline.  There is stable prominence of the hilar contours.  Cardiac silhouette remains enlarged.  Lung volumes are symmetric.  There is slight redistribution of the pulmonary vessels.  Increased interstitial markings are overall unchanged.  Vascular pedicle is overall stable in size. Constellation of findings suggest sequela of pulmonary edema.  No focal airspace consolidation.  No pneumothorax or pleural effusions.  Vascular stent  projects over the axillary region.  No free air beneath the diaphragm.  No acute osseous abnormalities.     Medications:  Reconciled Home Medications:      Medication List      CHANGE how you take these medications    pantoprazole 40 MG tablet  Commonly known as:  PROTONIX  Take 1 tablet (40 mg total) by mouth 2 (two) times daily.  What changed:  when to take this        CONTINUE taking these medications    aspirin 81 MG EC tablet  Commonly known as:  ECOTRIN  Take 81 mg by mouth once daily.     calcium acetate 667 mg capsule  Commonly known as:  PHOSLO  Take 667 mg by mouth 3 (three) times daily with meals.     docusate sodium 100 MG capsule  Commonly known as:  COLACE  Take 1 capsule (100 mg total) by mouth 2 (two) times daily as needed for Constipation.     gabapentin 100 MG capsule  Commonly known as:  NEURONTIN  Take 1 capsule (100 mg total) by mouth every evening.     hydrALAZINE 25 MG tablet  Commonly known as:  APRESOLINE  Take 1 tablet (25 mg total) by mouth every 8 (eight) hours.     HYDROcodone-acetaminophen 5-325 mg per tablet  Commonly known as:  NORCO  Take 1 tablet by mouth every 8 (eight) hours.     insulin NPH-insulin regular (70/30) 100 unit/mL (70-30) injection  Commonly known as:  NOVOLIN 70/30  Inject 17 Units into the skin 2 (two) times daily before meals.     lactulose 20 gram/30 mL Soln  Commonly known as:  CHRONULAC  Take 20 g by mouth every 6 (six) hours as needed (for constipation).     nitroGLYCERIN 0.4 MG SL tablet  Commonly known as:  NITROSTAT  Place 0.4 mg under the tongue every 5 (five) minutes as needed for Chest pain.     ondansetron 4 MG Tbdl  Commonly known as:  ZOFRAN-ODT  Take 1 tablet (4 mg total) by mouth every 6 (six) hours as needed (nausea or vomiting).     pravastatin 40 MG tablet  Commonly known as:  PRAVACHOL  Take 40 mg by mouth every evening.     sevelamer carbonate 2.4 gram Pwpk  Commonly known as:  RENVELA  Take 2.4 g by mouth 3 (three) times daily with meals.             Indwelling Lines/Drains at time of discharge:   Lines/Drains/Airways     Central Venous Catheter Line                 Hemodialysis Catheter  Left Forearm -- days         Hemodialysis Catheter left subclavian -- days          Drain                 Hemodialysis AV Fistula Right forearm -- days         Hemodialysis AV Graft Right forearm -- days         Hemodialysis AV Graft Right forearm -- days         Hemodialysis AV Graft 05/04/16 0845 Right forearm 800 days          Airway                 Airway - Non-Surgical 07/13/18 0917 Nasal Cannula less than 1 day          Epidural Line                 Perineural Analgesia/Anesthesia Assessment (using dermatomes) Epidural 05/04/16 0735 800 days                Time spent on the discharge of patient: 35 minutes  Patient was seen and examined on the date of discharge and determined to be suitable for discharge.         Jeb Cobb MD  Department of Hospital Medicine  [facility]

## 2018-07-13 NOTE — PLAN OF CARE
[facility] HEALTH ORDERS  FACE TO FACE ENCOUNTER    Patient Name: Angelina Beard  YOB: 1950    PCP: Eva Jara MD   PCP Address: [address] [place] [address] / [id number]  PCP Phone Number: [phone]  PCP Fax: [phone]    Encounter Date: 07/13/2018    Admit to Home Health    Diagnoses:  Active Hospital Problems    Diagnosis  POA    *Anemia due to gastrointestinal blood loss [D50.0]  Yes    Gastric ulcer [K25.9]  Yes    Gastroesophagitis [K20.8]  Yes    Paroxysmal atrial fibrillation [I48.0]  Yes     Chronic    End-stage renal disease on hemodialysis [N18.6, Z99.2]  Not Applicable     Chronic     Monday Wednesday Friday      Chronic constipation [K59.09]  Yes     Chronic    Renovascular hypertension [I15.0]  Yes     Chronic    Diabetic neuropathy associated with type 2 diabetes mellitus [E11.40]  Yes     Chronic    Coronary artery disease involving native coronary artery of native heart without angina pectoris [I25.10]  Yes     Chronic    Chronic diastolic congestive heart failure [I50.32]  Yes     Chronic    Type 2 diabetes mellitus with hypertension and end stage renal disease on dialysis [E11.22, I12.0, Z99.2, N18.6]  Not Applicable     Chronic    Cerebral microvascular disease [I67.9]  Yes     Chronic      Resolved Hospital Problems    Diagnosis Date Resolved POA    Hyperkalemia, diminished renal excretion [E87.5] 07/11/2018 Yes       Future Appointments  Date Time Provider Department Center   8/16/2018 10:00 AM Faraz Flores MD [facility] [place]     Follow-up Information     Almita Bee MD In 6 weeks.    Specialty:  Gastroenterology  Why:  repeat endoscopy to look at ulcers  Contact information:  [address]  [address]  [place] [id number] [phone]                     I have seen and examined this patient face to face today. My clinical findings that support the need for the home health skilled services and home bound status are  the following:  Weakness/numbness causing balance and gait disturbance due to Weakness/Debility and end stage kidney disease making it taxing to leave home.  Mental confusion making it unsafe for patient to leave home alone due to  memory problems.    Allergies:Review of patient's allergies indicates:  No Known Allergies    Diet: renal diet    Activities: activity as tolerated    Nursing:   SN to complete comprehensive assessment including routine vital signs. Instruct on disease process and s/s of complications to report to MD. Review/verify medication list sent home with the patient at time of discharge  and instruct patient/caregiver as needed. Frequency may be adjusted depending on start of care date.    Notify MD if SBP > 160 or < 90; DBP > 90 or < 50; HR > 120 or < 50; Temp > 101      CONSULTS:    Physical Therapy to evaluate and treat. Evaluate for home safety and equipment needs; Establish/upgrade home exercise program. Perform / instruct on therapeutic exercises, gait training, transfer training, and Range of Motion.  Occupational Therapy to evaluate and treat. Evaluate home environment for safety and equipment needs. Perform/Instruct on transfers, ADL training, ROM, and therapeutic exercises.  Aide to provide assistance with personal care, ADLs, and vital signs.    MISCELLANEOUS CARE:  N/A    WOUND CARE ORDERS  n/a      Medications: Review discharge medications with patient and family and provide education.      Current Discharge Medication List      CONTINUE these medications which have CHANGED    Details   pantoprazole (PROTONIX) 40 MG tablet Take 1 tablet (40 mg total) by mouth 2 (two) times daily.  Qty: 60 tablet, Refills: 1         CONTINUE these medications which have NOT CHANGED    Details   aspirin (ECOTRIN) 81 MG EC tablet Take 81 mg by mouth once daily.      calcium acetate (PHOSLO) 667 mg capsule Take 667 mg by mouth 3 (three) times daily with meals.      docusate sodium (COLACE) 100 MG capsule  Take 1 capsule (100 mg total) by mouth 2 (two) times daily as needed for Constipation.  Qty: 60 capsule, Refills: 0      gabapentin (NEURONTIN) 100 MG capsule Take 1 capsule (100 mg total) by mouth every evening.  Qty: 30 capsule, Refills: 1      hydrALAZINE (APRESOLINE) 25 MG tablet Take 1 tablet (25 mg total) by mouth every 8 (eight) hours.  Qty: 90 tablet, Refills: 1      hydrocodone-acetaminophen 5-325mg (NORCO) 5-325 mg per tablet Take 1 tablet by mouth every 8 (eight) hours.  Qty: 25 tablet, Refills: 0      insulin NPH-insulin regular, 70/30, (NOVOLIN 70/30) 100 unit/mL (70-30) injection Inject 17 Units into the skin 2 (two) times daily before meals.  Qty: 10 mL, Refills: 3      lactulose (CHRONULAC) 20 gram/30 mL Soln Take 20 g by mouth every 6 (six) hours as needed (for constipation).      nitroGLYCERIN (NITROSTAT) 0.4 MG SL tablet Place 0.4 mg under the tongue every 5 (five) minutes as needed for Chest pain.      ondansetron (ZOFRAN-ODT) 4 MG TbDL Take 1 tablet (4 mg total) by mouth every 6 (six) hours as needed (nausea or vomiting).  Qty: 15 tablet, Refills: 0      pravastatin (PRAVACHOL) 40 MG tablet Take 40 mg by mouth every evening.       sevelamer carbonate (RENVELA) 2.4 gram PwPk Take 2.4 g by mouth 3 (three) times daily with meals.  Qty: 216 g, Refills: 3             I certify that this patient is confined to her home and needs intermittent skilled nursing care, physical therapy and occupational therapy.      Jeb Cobb MD  [facility]  07/13/2018

## 2018-07-13 NOTE — HPI
This is a 68yo female with esrd, cad here with symptomatic anemia. It is a severe anemia, normocytic dropping from baseline hb of 10-11 4 months ago to <6. No overt bleeding but occult +. Associated fatigue. She is unaware of prior endoscopic history. No additional NSAIDs.    The following portions of the patient's history were reviewed and updated as appropriate: allergies, current medications, past family history, past medical history, past social history, past surgical history and problem list.

## 2018-07-13 NOTE — ANESTHESIA POSTPROCEDURE EVALUATION
"Anesthesia Post Evaluation    Patient: Angelina Beard    Procedure(s) Performed: Procedure(s) (LRB):  ESOPHAGOGASTRODUODENOSCOPY (EGD) (N/A)  COLONOSCOPY (N/A)    Final Anesthesia Type: MAC  Patient location during evaluation: GI PACU  Patient participation: Yes- Able to Participate  Level of consciousness: awake and alert and oriented  Post-procedure vital signs: reviewed and stable  Pain management: adequate  Airway patency: patent  PONV status at discharge: No PONV  Anesthetic complications: no      Cardiovascular status: stable, hemodynamically stable and blood pressure returned to baseline  Respiratory status: unassisted, room air and spontaneous ventilation  Hydration status: euvolemic  Follow-up not needed.        Visit Vitals  BP (!) 155/57 (BP Location: Left arm, Patient Position: Lying)   Pulse 84   Temp 36.5 °C (97.7 °F) (Temporal)   Resp 16   Ht 5' 7" (1.702 m)   Wt 104.6 kg (230 lb 9.6 oz)   LMP  (LMP Unknown)   SpO2 96%   Breastfeeding? No   BMI 36.12 kg/m²       Pain/Kristofer Score: Pain Assessment Performed: Yes (7/13/2018  5:10 AM)  Presence of Pain: denies (7/13/2018  5:10 AM)      "

## 2018-07-13 NOTE — PLAN OF CARE
Problem: Hemodialysis (Adult)  Goal: Signs and Symptoms of Listed Potential Problems Will be Absent, Minimized or Managed (Hemodialysis)  Signs and symptoms of listed potential problems will be absent, minimized or managed by discharge/transition of care (reference Hemodialysis (Adult) CPG).   Outcome: Ongoing (interventions implemented as appropriate)   07/13/18 1054   Hemodialysis   Problems Assessed (Hemodialysis) all   Problems Present (Hemodialysis) electrolyte imbalance;fluid imbalance   POC discussed   HD with UF today  Blood to be given with HD

## 2018-07-13 NOTE — [employer] PATIENT INSTRUCTIONS
Discharge Summary/Instructions after an Endoscopic Procedure  Patient Name: Angelina Beard  Patient MRN: [id number]  Patient YOB: 1950 Friday, July 13, 2018  Almita Bee MD  RESTRICTIONS:  During your procedure today, you received medications for sedation.  These   medications may affect your judgment, balance and coordination.  Therefore,   for 24 hours, you have the following restrictions:   - DO NOT drive a car, operate machinery, make legal/financial decisions,   sign important papers or drink alcohol.    ACTIVITY:  Today: no heavy lifting, straining or running due to procedural   sedation/anesthesia.  The following day: return to full activity including work.  DIET:  Eat and drink normally unless instructed otherwise.     TREATMENT FOR COMMON SIDE EFFECTS:  - Mild abdominal pain, nausea, belching, bloating or excessive gas:  rest,   eat lightly and use a heating pad.  - Sore Throat: treat with throat lozenges and/or gargle with warm salt   water.  - Because air was used during the procedure, expelling large amounts of air   from your rectum or belching is normal.  - If a bowel prep was taken, you may not have a bowel movement for 1-3 days.    This is normal.  SYMPTOMS TO WATCH FOR AND REPORT TO YOUR PHYSICIAN:  1. Abdominal pain or bloating, other than gas cramps.  2. Chest pain.  3. Back pain.  4. Signs of infection such as: chills or fever occurring within 24 hours   after the procedure.  5. Rectal bleeding, which would show as bright red, maroon, or black stools.   (A tablespoon of blood from the rectum is not serious, especially if   hemorrhoids are present.)  6. Vomiting.  7. Weakness or dizziness.  GO DIRECTLY TO THE NEAREST EMERGENCY ROOM IF YOU HAVE ANY OF THE FOLLOWING:      Difficulty breathing              Chills and/or fever over 101 F   Persistent vomiting and/or vomiting blood   Severe abdominal pain   Severe chest pain   Black, tarry stools   Bleeding- more than one  tablespoon   Any other symptom or condition that you feel may need urgent attention  Your doctor recommends these additional instructions:  If any biopsies were taken, your doctors clinic will contact you in 1 to 2   weeks with any results.  - Return patient to hospital [facility] for ongoing care.   - Advance diet as tolerated.   - Continue present medications.   - Needs repeat EGD in 6 weeks; would consider repeating colonoscopy with   extended prep at that time as well  - Repeat colonoscopy within 3 months because the bowel preparation was   suboptimal.  For questions, problems or results please call your physician - Almita Bee MD at Work:  [phone].  EMERGENCY PHONE NUMBER: [phone],  LAB RESULTS: [phone]  IF A COMPLICATION OR EMERGENCY SITUATION ARISES AND YOU ARE UNABLE TO REACH   YOUR PHYSICIAN - GO DIRECTLY TO THE EMERGENCY ROOM.  Almita Bee MD  7/13/2018 10:08:52 AM  This report has been verified and signed electronically.  [employer]

## 2018-07-13 NOTE — [employer]OVATION PATIENT INSTRUCTIONS
Discharge Summary/Instructions after an Endoscopic Procedure  Patient Name: Angelina Beard  Patient MRN: [id number]  Patient YOB: 1950 Friday, July 13, 2018  Almita Bee MD  RESTRICTIONS:  During your procedure today, you received medications for sedation.  These   medications may affect your judgment, balance and coordination.  Therefore,   for 24 hours, you have the following restrictions:   - DO NOT drive a car, operate machinery, make legal/financial decisions,   sign important papers or drink alcohol.    ACTIVITY:  Today: no heavy lifting, straining or running due to procedural   sedation/anesthesia.  The following day: return to full activity including work.  DIET:  Eat and drink normally unless instructed otherwise.     TREATMENT FOR COMMON SIDE EFFECTS:  - Mild abdominal pain, nausea, belching, bloating or excessive gas:  rest,   eat lightly and use a heating pad.  - Sore Throat: treat with throat lozenges and/or gargle with warm salt   water.  - Because air was used during the procedure, expelling large amounts of air   from your rectum or belching is normal.  - If a bowel prep was taken, you may not have a bowel movement for 1-3 days.    This is normal.  SYMPTOMS TO WATCH FOR AND REPORT TO YOUR PHYSICIAN:  1. Abdominal pain or bloating, other than gas cramps.  2. Chest pain.  3. Back pain.  4. Signs of infection such as: chills or fever occurring within 24 hours   after the procedure.  5. Rectal bleeding, which would show as bright red, maroon, or black stools.   (A tablespoon of blood from the rectum is not serious, especially if   hemorrhoids are present.)  6. Vomiting.  7. Weakness or dizziness.  GO DIRECTLY TO THE NEAREST EMERGENCY ROOM IF YOU HAVE ANY OF THE FOLLOWING:      Difficulty breathing              Chills and/or fever over 101 F   Persistent vomiting and/or vomiting blood   Severe abdominal pain   Severe chest pain   Black, tarry stools   Bleeding- more than one  tablespoon   Any other symptom or condition that you feel may need urgent attention  Your doctor recommends these additional instructions:  If any biopsies were taken, your doctors clinic will contact you in 1 to 2   weeks with any results.  - Return patient to hospital [facility] for ongoing care.   - Advance diet as tolerated.   - Continue present medications.   - Await pathology results.   - PPI BID  - Repeat EGD in 6 weeks  - Treat h.pylori if positive  - Avoid NSAIDs  For questions, problems or results please call your physician - Almita Bee MD at Work:  [phone].  EMERGENCY PHONE NUMBER: [phone],  LAB RESULTS: [phone]  IF A COMPLICATION OR EMERGENCY SITUATION ARISES AND YOU ARE UNABLE TO REACH   YOUR PHYSICIAN - GO DIRECTLY TO THE EMERGENCY ROOM.  Almita Bee MD  7/13/2018 9:33:44 AM  This report has been verified and signed electronically.  [employer]

## 2018-07-13 NOTE — NURSING
Plan of care reviewed with patient, understanding verbalized. Pt remains NSR on tele. Pt scheduled for colonoscopy and EGD this morning. Bed alarm on, call light in reach, fall precautions in place. No distress noted. Will continue to monitor

## 2018-07-13 NOTE — CONSULTS
[facility]  Gastroenterology  Consult Note    Patient Name: Angelina Beard  MRN: [id number]  Admission Date: 7/11/2018  Hospital Length of Stay: 2 days  Code Status: Full Code   Attending Provider: Anamaria Cobb MD   Consulting Provider: Almita Bee MD  Primary Care Physician: Eva Jara MD  Principal Problem:Symptomatic anemia    Inpatient consult to [facility]  Consult performed by: ALMITA BEE  Consult ordered by: ANAMARIA COBB  Reason for consult: GI bleed        Subjective:     HPI:  This is a 68yo female with esrd, cad here with symptomatic anemia. It is a severe anemia, normocytic dropping from baseline hb of 10-11 4 months ago to <6. No overt bleeding but occult +. Associated fatigue. She is unaware of prior endoscopic history. No additional NSAIDs.    The following portions of the patient's history were reviewed and updated as appropriate: allergies, current medications, past family history, past medical history, past social history, past surgical history and problem list.      Past Medical History:   Diagnosis Date    Arthritis     Breast cancer, left     CHF (congestive heart failure)     Cholelithiasis     Coronary artery disease     Diabetes mellitus, type 2     End-stage renal disease on hemodialysis     Hemodialysis access site with arteriovenous graft     mon-wed -fri    Hyperlipidemia     Hypertension     Sarcoidosis        Past Surgical History:   Procedure Laterality Date    av graft      left arm    BRACHIAL ARTERY GRAFT Left 05/04/2016    brachiocephalic, Dr. Anson Crocker    BREAST BIOPSY Left     breast ca    BREAST LUMPECTOMY Left     radiation only    btl      ELBOW SURGERY      left    LIPOMA RESECTION      back of neck    pilondial cyst      TOTAL KNEE ARTHROPLASTY Right 02/23/2016       Review of patient's allergies indicates:  No Known Allergies  Family History     Problem Relation (Age of Onset)    Diabetes  Mother, Sister    Heart disease Sister    Hypertension Mother    Kidney disease Mother        Social History Main Topics    Smoking status: Never Smoker    Smokeless tobacco: Never Used    Alcohol use No    Drug use: No    Sexual activity: Not on file     Review of Systems   Constitutional: Positive for appetite change and fatigue. Negative for chills and fever.   HENT: Negative for postnasal drip and trouble swallowing.    Eyes: Negative for pain and redness.   Respiratory: Negative for chest tightness and shortness of breath.    Cardiovascular: Negative for chest pain and leg swelling.   Gastrointestinal: Positive for blood in stool. Negative for abdominal distention, abdominal pain, anal bleeding, constipation, diarrhea, nausea, rectal pain and vomiting.   Endocrine: Negative for cold intolerance and heat intolerance.   Genitourinary: Negative for difficulty urinating and hematuria.   Musculoskeletal: Negative for arthralgias and back pain.   Skin: Negative for color change and pallor.   Allergic/Immunologic: Negative for environmental allergies and food allergies.   Neurological: Negative for dizziness and light-headedness.   Hematological: Negative for adenopathy. Does not bruise/bleed easily.   Psychiatric/Behavioral: Negative for agitation and behavioral problems.     Objective:     Vital Signs (Most Recent):  Temp: 97.5 °F (36.4 °C) (07/13/18 0947)  Pulse: 69 (07/13/18 0947)  Resp: 13 (07/13/18 0947)  BP: (!) 94/50 (07/13/18 0947)  SpO2: (!) 94 % (07/13/18 0947) Vital Signs (24h Range):  Temp:  [96.6 °F (35.9 °C)-97.8 °F (36.6 °C)] 97.5 °F (36.4 °C)  Pulse:  [67-84] 69  Resp:  [13-18] 13  SpO2:  [91 %-97 %] 94 %  BP: ()/(50-70) 94/50     Weight: 104.6 kg (230 lb 9.6 oz) (07/11/18 1527)  Body mass index is 36.12 kg/m².      Intake/Output Summary (Last 24 hours) at 07/13/18 0954  Last data filed at 07/13/18 0945   Gross per 24 hour   Intake               50 ml   Output              200 ml   Net              -150 ml       Lines/Drains/Airways     Central Venous Catheter Line                 Hemodialysis Catheter  Left Forearm -- days         Hemodialysis Catheter left subclavian -- days          Drain                 Hemodialysis AV Fistula Right forearm -- days         Hemodialysis AV Graft Right forearm -- days         Hemodialysis AV Graft Right forearm -- days         Hemodialysis AV Graft 05/04/16 0845 Right forearm 800 days          Airway                 Airway - Non-Surgical 07/13/18 0917 Nasal Cannula less than 1 day          Epidural Line                 Perineural Analgesia/Anesthesia Assessment (using dermatomes) Epidural 05/04/16 0735 800 days          Peripheral Intravenous Line                 Peripheral IV - Single Lumen 07/11/18 0151 Left Hand 2 days         Peripheral IV - Single Lumen 07/11/18 0403 Left Upper Arm 2 days                Physical Exam   Constitutional: She is oriented to person, place, and time. She appears well-developed and well-nourished. No distress.   HENT:   Head: Normocephalic and atraumatic.   Eyes: Conjunctivae are normal. No scleral icterus.   Neck: Normal range of motion. Neck supple. No tracheal deviation present. No thyromegaly present.   Cardiovascular: Normal rate and regular rhythm.  Exam reveals no gallop and no friction rub.    Pulmonary/Chest: Effort normal and breath sounds normal. No respiratory distress. She has no wheezes.   Abdominal: Soft. Bowel sounds are normal. She exhibits no distension. There is no tenderness.   Musculoskeletal: Normal range of motion. She exhibits edema.        Right wrist: She exhibits normal range of motion and no tenderness.        Left wrist: She exhibits normal range of motion and no tenderness.   Lymphadenopathy:        Head (right side): No submental and no submandibular adenopathy present.        Head (left side): No submental and no submandibular adenopathy present.   Neurological: She is alert and oriented to person, place,  and time.   Skin: Skin is warm and dry. No rash noted. She is not diaphoretic. No erythema.   Psychiatric: She has a normal mood and affect. Her behavior is normal.   Nursing note and vitals reviewed.      Significant Labs:  CBC:   Recent Labs  Lab 07/11/18  1617 07/12/18  0422 07/12/18  1226 07/13/18  0423   HGB 7.8* 7.1* 7.0* 6.9*   HCT 23.8*  --   --   --        Significant Imaging:  Imaging results within the past 24 hours have been reviewed.    Assessment/Plan:     * Symptomatic anemia    - EGD/Colonoscopy  - good response to 2u  - high risk, numerous comorbidities  - PPI  - monitor hct            Thank you for your consult. I will follow-up with patient. Please contact us if you have any additional questions.    Almita Bee MD  Gastroenterology  [facility]

## 2018-07-14 NOTE — NURSING
Discharge information and education provided, patient voices understanding. Cardiac monitoring and IV catheter discontinued. Discharged via wheelchair. No acute distress noted.

## 2018-07-21 NOTE — PHYSICIAN QUERY
PT Name: Angelina Beard  MR #: [id number]    Physician Query Form - Pathology Findings Clarification     CDS/: Cecile Boyer RN               Contact information:[email or web address]  This form is a permanent document in the medical record.     Query Date: July 21, 2018      By submitting this query, we are merely seeking further clarification of documentation.  Please utilize your independent clinical judgment when addressing the question(s) below.      The medical record contains the following:     Findings Supporting Clinical Information Location in Medical Record   Chronic gastritis FINAL PATHOLOGIC DIAGNOSIS  1. Gastric biopsy:  Mild chronic inactive gastritis.  No evidence of active acute inflammation.  Negative for Helicobacter like organisms on H&E.  No evidence of intestinal metaplasia, atypia, or malignancy.  2. Distal esophagus:  Minute fragments of cellular debris and tissue suggestive of skeletal muscle fibers which may represent food  particles.  No squamous mucosa or epithelium identified.  Correlate clinically. Pathology report collected 7/13     Please document the clinical significance of the Pathologists findings of _Chronic Gastritis____.          [x] I agree with the Pathology Findings        [  ] I do not agree with the Pathology Findings        [  ] Clinically Insignificant        [  ] Clinically Undetermined        [  ] Other/Clarification of Findings: ______________________________________________    Please document in your progress notes daily for the duration of treatment until resolved and include in your discharge summary.

## 2018-07-24 ENCOUNTER — TELEPHONE (OUTPATIENT)
Dept: GASTROENTEROLOGY | Facility: CLINIC | Age: 68
End: 2018-07-24

## 2018-07-24 NOTE — TELEPHONE ENCOUNTER
----- Message from Almita Bee MD sent at 7/23/2018  9:40 PM CDT -----  Gastric bx negative. Repeat EGD and colonoscopy with extended prep in 4-5 weeks. Continue PPI therapy

## 2018-08-06 PROCEDURE — 93272 ECG/REVIEW INTERPRET ONLY: CPT | Mod: ,,, | Performed by: INTERNAL MEDICINE

## 2018-08-08 ENCOUNTER — TELEPHONE (OUTPATIENT)
Dept: CARDIOLOGY | Facility: CLINIC | Age: 68
End: 2018-08-08

## 2018-08-08 NOTE — TELEPHONE ENCOUNTER
----- Message from Faraz Flores MD sent at 8/7/2018  9:17 AM CDT -----  Your heart monitor did not show any significant heart rhythm abnormalities

## 2018-08-08 NOTE — TELEPHONE ENCOUNTER
Left pt message in regards to event moniter results. Pt was advised to give a call back if she had any questions.

## 2018-09-21 ENCOUNTER — HOSPITAL ENCOUNTER (INPATIENT)
Facility: HOSPITAL | Age: 68
LOS: 2 days | Discharge: HOME HEALTH | DRG: 291 | End: 2018-09-25
Attending: EMERGENCY MEDICINE | Admitting: FAMILY MEDICINE
Payer: MEDICARE

## 2018-09-21 DIAGNOSIS — E11.22 TYPE 2 DIABETES MELLITUS WITH HYPERTENSION AND END STAGE RENAL DISEASE ON DIALYSIS: Chronic | ICD-10-CM

## 2018-09-21 DIAGNOSIS — E78.5 HYPERLIPIDEMIA ASSOCIATED WITH TYPE 2 DIABETES MELLITUS: ICD-10-CM

## 2018-09-21 DIAGNOSIS — Z99.2 ESRD ON DIALYSIS: ICD-10-CM

## 2018-09-21 DIAGNOSIS — I50.31 ACUTE DIASTOLIC CONGESTIVE HEART FAILURE: Primary | ICD-10-CM

## 2018-09-21 DIAGNOSIS — I48.0 PAROXYSMAL ATRIAL FIBRILLATION: Chronic | ICD-10-CM

## 2018-09-21 DIAGNOSIS — N18.6 ESRD ON DIALYSIS: ICD-10-CM

## 2018-09-21 DIAGNOSIS — Z86.2 HISTORY OF SARCOIDOSIS: Chronic | ICD-10-CM

## 2018-09-21 DIAGNOSIS — I10 ESSENTIAL HYPERTENSION: ICD-10-CM

## 2018-09-21 DIAGNOSIS — N18.6 TYPE 2 DIABETES MELLITUS WITH HYPERTENSION AND END STAGE RENAL DISEASE ON DIALYSIS: Chronic | ICD-10-CM

## 2018-09-21 DIAGNOSIS — N03.9 ANEMIA DUE TO PRE-ESRD TREATED WITH ERYTHROPOIETIN: ICD-10-CM

## 2018-09-21 DIAGNOSIS — R53.83 FATIGUE: ICD-10-CM

## 2018-09-21 DIAGNOSIS — I50.33 ACUTE ON CHRONIC DIASTOLIC CONGESTIVE HEART FAILURE: ICD-10-CM

## 2018-09-21 DIAGNOSIS — I67.89 CEREBRAL MICROVASCULAR DISEASE: Chronic | ICD-10-CM

## 2018-09-21 DIAGNOSIS — E11.59 HYPERTENSION ASSOCIATED WITH DIABETES: ICD-10-CM

## 2018-09-21 DIAGNOSIS — Z99.2 TYPE 2 DIABETES MELLITUS WITH HYPERTENSION AND END STAGE RENAL DISEASE ON DIALYSIS: Chronic | ICD-10-CM

## 2018-09-21 DIAGNOSIS — I12.0 TYPE 2 DIABETES MELLITUS WITH HYPERTENSION AND END STAGE RENAL DISEASE ON DIALYSIS: Chronic | ICD-10-CM

## 2018-09-21 DIAGNOSIS — Z86.73 H/O: CVA (CEREBROVASCULAR ACCIDENT): ICD-10-CM

## 2018-09-21 DIAGNOSIS — R53.1 GENERALIZED WEAKNESS: ICD-10-CM

## 2018-09-21 DIAGNOSIS — R53.81 PHYSICAL DECONDITIONING: ICD-10-CM

## 2018-09-21 DIAGNOSIS — I15.2 HYPERTENSION ASSOCIATED WITH DIABETES: ICD-10-CM

## 2018-09-21 DIAGNOSIS — E11.69 HYPERLIPIDEMIA ASSOCIATED WITH TYPE 2 DIABETES MELLITUS: ICD-10-CM

## 2018-09-21 DIAGNOSIS — D63.1 ANEMIA DUE TO PRE-ESRD TREATED WITH ERYTHROPOIETIN: ICD-10-CM

## 2018-09-21 LAB
ALBUMIN SERPL BCP-MCNC: 2.7 G/DL
ALP SERPL-CCNC: 61 U/L
ALT SERPL W/O P-5'-P-CCNC: 11 U/L
ANION GAP SERPL CALC-SCNC: 13 MMOL/L
AST SERPL-CCNC: 10 U/L
BACTERIA #/AREA URNS HPF: ABNORMAL /HPF
BASOPHILS # BLD AUTO: 0.01 K/UL
BASOPHILS NFR BLD: 0.1 %
BILIRUB SERPL-MCNC: 0.3 MG/DL
BILIRUB UR QL STRIP: NEGATIVE
BUN SERPL-MCNC: 15 MG/DL
CALCIUM SERPL-MCNC: 10.1 MG/DL
CHLORIDE SERPL-SCNC: 95 MMOL/L
CLARITY UR: CLEAR
CO2 SERPL-SCNC: 28 MMOL/L
COLOR UR: YELLOW
CREAT SERPL-MCNC: 5.2 MG/DL
DIFFERENTIAL METHOD: ABNORMAL
EOSINOPHIL # BLD AUTO: 0.1 K/UL
EOSINOPHIL NFR BLD: 0.8 %
ERYTHROCYTE [DISTWIDTH] IN BLOOD BY AUTOMATED COUNT: 15 %
EST. GFR  (AFRICAN AMERICAN): 9 ML/MIN/1.73 M^2
EST. GFR  (NON AFRICAN AMERICAN): 8 ML/MIN/1.73 M^2
GLUCOSE SERPL-MCNC: 238 MG/DL
GLUCOSE UR QL STRIP: ABNORMAL
GRAN CASTS #/AREA URNS LPF: 2 /LPF
HCT VFR BLD AUTO: 28.3 %
HGB BLD-MCNC: 8.6 G/DL
HGB UR QL STRIP: ABNORMAL
HYALINE CASTS #/AREA URNS LPF: 1 /LPF
KETONES UR QL STRIP: NEGATIVE
LACTATE SERPL-SCNC: 1 MMOL/L
LEUKOCYTE ESTERASE UR QL STRIP: NEGATIVE
LYMPHOCYTES # BLD AUTO: 0.9 K/UL
LYMPHOCYTES NFR BLD: 10.3 %
MCH RBC QN AUTO: 28.3 PG
MCHC RBC AUTO-ENTMCNC: 30.4 G/DL
MCV RBC AUTO: 93 FL
MICROSCOPIC COMMENT: ABNORMAL
MONOCYTES # BLD AUTO: 0.8 K/UL
MONOCYTES NFR BLD: 9.3 %
NEUTROPHILS # BLD AUTO: 7.1 K/UL
NEUTROPHILS NFR BLD: 79.2 %
NITRITE UR QL STRIP: NEGATIVE
PH UR STRIP: 8 [PH] (ref 5–8)
PLATELET # BLD AUTO: 300 K/UL
PMV BLD AUTO: 9.4 FL
POCT GLUCOSE: 204 MG/DL (ref 70–110)
POTASSIUM SERPL-SCNC: 3.9 MMOL/L
PROT SERPL-MCNC: 7.8 G/DL
PROT UR QL STRIP: ABNORMAL
RBC # BLD AUTO: 3.04 M/UL
RBC #/AREA URNS HPF: 4 /HPF (ref 0–4)
SODIUM SERPL-SCNC: 136 MMOL/L
SP GR UR STRIP: 1.02 (ref 1–1.03)
TROPONIN I SERPL DL<=0.01 NG/ML-MCNC: 0.03 NG/ML
TSH SERPL DL<=0.005 MIU/L-ACNC: 0.99 UIU/ML
URN SPEC COLLECT METH UR: ABNORMAL
UROBILINOGEN UR STRIP-ACNC: NEGATIVE EU/DL
WBC # BLD AUTO: 8.94 K/UL
WBC #/AREA URNS HPF: 2 /HPF (ref 0–5)

## 2018-09-21 PROCEDURE — 99285 EMERGENCY DEPT VISIT HI MDM: CPT | Mod: 25,NTX

## 2018-09-21 PROCEDURE — 80053 COMPREHEN METABOLIC PANEL: CPT | Mod: NTX

## 2018-09-21 PROCEDURE — 83605 ASSAY OF LACTIC ACID: CPT | Mod: NTX

## 2018-09-21 PROCEDURE — 81000 URINALYSIS NONAUTO W/SCOPE: CPT | Mod: NTX

## 2018-09-21 PROCEDURE — 96361 HYDRATE IV INFUSION ADD-ON: CPT | Mod: NTX

## 2018-09-21 PROCEDURE — 82962 GLUCOSE BLOOD TEST: CPT | Mod: NTX

## 2018-09-21 PROCEDURE — 84443 ASSAY THYROID STIM HORMONE: CPT | Mod: NTX

## 2018-09-21 PROCEDURE — 93005 ELECTROCARDIOGRAM TRACING: CPT | Mod: NTX

## 2018-09-21 PROCEDURE — 25000003 PHARM REV CODE 250: Mod: NTX | Performed by: EMERGENCY MEDICINE

## 2018-09-21 PROCEDURE — 85025 COMPLETE CBC W/AUTO DIFF WBC: CPT | Mod: NTX

## 2018-09-21 PROCEDURE — 84484 ASSAY OF TROPONIN QUANT: CPT | Mod: NTX

## 2018-09-21 PROCEDURE — 96374 THER/PROPH/DIAG INJ IV PUSH: CPT | Mod: NTX

## 2018-09-21 RX ORDER — AMLODIPINE BESYLATE 10 MG/1
10 TABLET ORAL
COMMUNITY
End: 2019-04-16 | Stop reason: SDUPTHER

## 2018-09-21 RX ORDER — INSULIN GLARGINE 100 [IU]/ML
20 INJECTION, SOLUTION SUBCUTANEOUS NIGHTLY
COMMUNITY
End: 2019-04-16 | Stop reason: SDUPTHER

## 2018-09-21 RX ORDER — ISOSORBIDE MONONITRATE 30 MG/1
30 TABLET, EXTENDED RELEASE ORAL DAILY
COMMUNITY
End: 2019-01-10 | Stop reason: SDUPTHER

## 2018-09-21 RX ORDER — FUROSEMIDE 40 MG/1
40 TABLET ORAL DAILY
COMMUNITY
End: 2018-12-13 | Stop reason: SDUPTHER

## 2018-09-21 RX ADMIN — SODIUM CHLORIDE 500 ML: 0.9 INJECTION, SOLUTION INTRAVENOUS at 09:09

## 2018-09-22 PROBLEM — R53.81 PHYSICAL DECONDITIONING: Status: ACTIVE | Noted: 2018-09-22

## 2018-09-22 PROBLEM — I50.31 ACUTE DIASTOLIC CONGESTIVE HEART FAILURE: Status: ACTIVE | Noted: 2018-09-22

## 2018-09-22 PROBLEM — I50.33 ACUTE ON CHRONIC DIASTOLIC CONGESTIVE HEART FAILURE: Status: ACTIVE | Noted: 2018-09-22

## 2018-09-22 LAB
ALBUMIN SERPL BCP-MCNC: 2.6 G/DL
ALP SERPL-CCNC: 67 U/L
ALT SERPL W/O P-5'-P-CCNC: 11 U/L
ANION GAP SERPL CALC-SCNC: 13 MMOL/L
AST SERPL-CCNC: 10 U/L
BASOPHILS # BLD AUTO: 0.01 K/UL
BASOPHILS NFR BLD: 0.1 %
BILIRUB SERPL-MCNC: 0.4 MG/DL
BNP SERPL-MCNC: 778 PG/ML
BUN SERPL-MCNC: 18 MG/DL
CALCIUM SERPL-MCNC: 9.9 MG/DL
CHLORIDE SERPL-SCNC: 96 MMOL/L
CO2 SERPL-SCNC: 28 MMOL/L
CORTIS SERPL-MCNC: 21.5 UG/DL
CREAT SERPL-MCNC: 6 MG/DL
DIFFERENTIAL METHOD: ABNORMAL
EOSINOPHIL # BLD AUTO: 0.1 K/UL
EOSINOPHIL NFR BLD: 0.8 %
ERYTHROCYTE [DISTWIDTH] IN BLOOD BY AUTOMATED COUNT: 15.2 %
EST. GFR  (AFRICAN AMERICAN): 8 ML/MIN/1.73 M^2
EST. GFR  (NON AFRICAN AMERICAN): 7 ML/MIN/1.73 M^2
FLUAV AG SPEC QL IA: NEGATIVE
FLUBV AG SPEC QL IA: NEGATIVE
GLUCOSE SERPL-MCNC: 230 MG/DL
HCT VFR BLD AUTO: 29.3 %
HGB BLD-MCNC: 9 G/DL
LYMPHOCYTES # BLD AUTO: 0.9 K/UL
LYMPHOCYTES NFR BLD: 9 %
MAGNESIUM SERPL-MCNC: 1.5 MG/DL
MCH RBC QN AUTO: 28.8 PG
MCHC RBC AUTO-ENTMCNC: 30.7 G/DL
MCV RBC AUTO: 94 FL
MONOCYTES # BLD AUTO: 0.9 K/UL
MONOCYTES NFR BLD: 9 %
NEUTROPHILS # BLD AUTO: 7.9 K/UL
NEUTROPHILS NFR BLD: 80.9 %
PHOSPHATE SERPL-MCNC: 2.9 MG/DL
PLATELET # BLD AUTO: 337 K/UL
PMV BLD AUTO: 9.7 FL
POCT GLUCOSE: 153 MG/DL (ref 70–110)
POCT GLUCOSE: 212 MG/DL (ref 70–110)
POCT GLUCOSE: 219 MG/DL (ref 70–110)
POCT GLUCOSE: 225 MG/DL (ref 70–110)
POCT GLUCOSE: 237 MG/DL (ref 70–110)
POTASSIUM SERPL-SCNC: 3.9 MMOL/L
PROCALCITONIN SERPL IA-MCNC: 1.14 NG/ML
PROT SERPL-MCNC: 7.5 G/DL
RBC # BLD AUTO: 3.13 M/UL
SODIUM SERPL-SCNC: 137 MMOL/L
SPECIMEN SOURCE: NORMAL
TROPONIN I SERPL DL<=0.01 NG/ML-MCNC: 0.03 NG/ML
WBC # BLD AUTO: 9.76 K/UL

## 2018-09-22 PROCEDURE — 96372 THER/PROPH/DIAG INJ SC/IM: CPT

## 2018-09-22 PROCEDURE — 96376 TX/PRO/DX INJ SAME DRUG ADON: CPT

## 2018-09-22 PROCEDURE — 83735 ASSAY OF MAGNESIUM: CPT | Mod: NTX

## 2018-09-22 PROCEDURE — 85025 COMPLETE CBC W/AUTO DIFF WBC: CPT | Mod: NTX

## 2018-09-22 PROCEDURE — 63600175 PHARM REV CODE 636 W HCPCS: Mod: NTX | Performed by: EMERGENCY MEDICINE

## 2018-09-22 PROCEDURE — G0378 HOSPITAL OBSERVATION PER HR: HCPCS | Mod: NTX

## 2018-09-22 PROCEDURE — 97161 PT EVAL LOW COMPLEX 20 MIN: CPT | Mod: NTX | Performed by: PHYSICAL THERAPIST

## 2018-09-22 PROCEDURE — G8978 MOBILITY CURRENT STATUS: HCPCS | Mod: CK,NTX | Performed by: PHYSICAL THERAPIST

## 2018-09-22 PROCEDURE — G8979 MOBILITY GOAL STATUS: HCPCS | Mod: CJ,NTX | Performed by: PHYSICAL THERAPIST

## 2018-09-22 PROCEDURE — 63600175 PHARM REV CODE 636 W HCPCS: Mod: NTX | Performed by: STUDENT IN AN ORGANIZED HEALTH CARE EDUCATION/TRAINING PROGRAM

## 2018-09-22 PROCEDURE — G8988 SELF CARE GOAL STATUS: HCPCS | Mod: CI,NTX

## 2018-09-22 PROCEDURE — G8987 SELF CARE CURRENT STATUS: HCPCS | Mod: CJ,NTX

## 2018-09-22 PROCEDURE — 80053 COMPREHEN METABOLIC PANEL: CPT | Mod: NTX

## 2018-09-22 PROCEDURE — 84484 ASSAY OF TROPONIN QUANT: CPT | Mod: NTX

## 2018-09-22 PROCEDURE — 36415 COLL VENOUS BLD VENIPUNCTURE: CPT | Mod: NTX

## 2018-09-22 PROCEDURE — 87400 INFLUENZA A/B EACH AG IA: CPT | Mod: NTX

## 2018-09-22 PROCEDURE — 25000003 PHARM REV CODE 250: Mod: NTX | Performed by: STUDENT IN AN ORGANIZED HEALTH CARE EDUCATION/TRAINING PROGRAM

## 2018-09-22 PROCEDURE — 84100 ASSAY OF PHOSPHORUS: CPT | Mod: NTX

## 2018-09-22 PROCEDURE — 82533 TOTAL CORTISOL: CPT | Mod: NTX

## 2018-09-22 PROCEDURE — 97165 OT EVAL LOW COMPLEX 30 MIN: CPT | Mod: NTX

## 2018-09-22 PROCEDURE — 27000221 HC OXYGEN, UP TO 24 HOURS: Mod: NTX

## 2018-09-22 PROCEDURE — 84145 PROCALCITONIN (PCT): CPT | Mod: NTX

## 2018-09-22 PROCEDURE — 94761 N-INVAS EAR/PLS OXIMETRY MLT: CPT | Mod: NTX

## 2018-09-22 PROCEDURE — 83880 ASSAY OF NATRIURETIC PEPTIDE: CPT | Mod: NTX

## 2018-09-22 RX ORDER — AMLODIPINE BESYLATE 5 MG/1
10 TABLET ORAL DAILY
Status: DISCONTINUED | OUTPATIENT
Start: 2018-09-22 | End: 2018-09-25 | Stop reason: HOSPADM

## 2018-09-22 RX ORDER — ACETAMINOPHEN 325 MG/1
650 TABLET ORAL EVERY 6 HOURS PRN
Status: DISCONTINUED | OUTPATIENT
Start: 2018-09-22 | End: 2018-09-25 | Stop reason: HOSPADM

## 2018-09-22 RX ORDER — ASPIRIN 81 MG/1
81 TABLET ORAL DAILY
Status: DISCONTINUED | OUTPATIENT
Start: 2018-09-22 | End: 2018-09-25 | Stop reason: HOSPADM

## 2018-09-22 RX ORDER — GLUCAGON 1 MG
1 KIT INJECTION
Status: DISCONTINUED | OUTPATIENT
Start: 2018-09-22 | End: 2018-09-25 | Stop reason: HOSPADM

## 2018-09-22 RX ORDER — INSULIN ASPART 100 [IU]/ML
1-10 INJECTION, SOLUTION INTRAVENOUS; SUBCUTANEOUS
Status: DISCONTINUED | OUTPATIENT
Start: 2018-09-22 | End: 2018-09-25 | Stop reason: HOSPADM

## 2018-09-22 RX ORDER — SODIUM CHLORIDE 0.9 % (FLUSH) 0.9 %
5 SYRINGE (ML) INJECTION
Status: DISCONTINUED | OUTPATIENT
Start: 2018-09-22 | End: 2018-09-25 | Stop reason: HOSPADM

## 2018-09-22 RX ORDER — GABAPENTIN 100 MG/1
100 CAPSULE ORAL NIGHTLY
Status: DISCONTINUED | OUTPATIENT
Start: 2018-09-22 | End: 2018-09-25 | Stop reason: HOSPADM

## 2018-09-22 RX ORDER — IBUPROFEN 200 MG
16 TABLET ORAL
Status: DISCONTINUED | OUTPATIENT
Start: 2018-09-22 | End: 2018-09-25 | Stop reason: HOSPADM

## 2018-09-22 RX ORDER — FUROSEMIDE 10 MG/ML
40 INJECTION INTRAMUSCULAR; INTRAVENOUS DAILY
Status: DISCONTINUED | OUTPATIENT
Start: 2018-09-22 | End: 2018-09-22

## 2018-09-22 RX ORDER — PRAVASTATIN SODIUM 40 MG/1
40 TABLET ORAL NIGHTLY
Status: DISCONTINUED | OUTPATIENT
Start: 2018-09-22 | End: 2018-09-25 | Stop reason: HOSPADM

## 2018-09-22 RX ORDER — ISOSORBIDE MONONITRATE 30 MG/1
30 TABLET, EXTENDED RELEASE ORAL DAILY
Status: DISCONTINUED | OUTPATIENT
Start: 2018-09-22 | End: 2018-09-25 | Stop reason: HOSPADM

## 2018-09-22 RX ORDER — CALCIUM ACETATE 667 MG/1
667 CAPSULE ORAL
Status: DISCONTINUED | OUTPATIENT
Start: 2018-09-22 | End: 2018-09-25 | Stop reason: HOSPADM

## 2018-09-22 RX ORDER — IBUPROFEN 200 MG
24 TABLET ORAL
Status: DISCONTINUED | OUTPATIENT
Start: 2018-09-22 | End: 2018-09-25 | Stop reason: HOSPADM

## 2018-09-22 RX ORDER — FUROSEMIDE 10 MG/ML
40 INJECTION INTRAMUSCULAR; INTRAVENOUS
Status: COMPLETED | OUTPATIENT
Start: 2018-09-22 | End: 2018-09-22

## 2018-09-22 RX ORDER — PANTOPRAZOLE SODIUM 40 MG/1
40 TABLET, DELAYED RELEASE ORAL DAILY
Status: DISCONTINUED | OUTPATIENT
Start: 2018-09-22 | End: 2018-09-25 | Stop reason: HOSPADM

## 2018-09-22 RX ORDER — FUROSEMIDE 10 MG/ML
40 INJECTION INTRAMUSCULAR; INTRAVENOUS DAILY
Status: DISCONTINUED | OUTPATIENT
Start: 2018-09-22 | End: 2018-09-25 | Stop reason: HOSPADM

## 2018-09-22 RX ADMIN — ASPIRIN 81 MG: 81 TABLET, COATED ORAL at 09:09

## 2018-09-22 RX ADMIN — CALCIUM ACETATE 667 MG: 667 CAPSULE ORAL at 09:09

## 2018-09-22 RX ADMIN — PRAVASTATIN SODIUM 40 MG: 40 TABLET ORAL at 04:09

## 2018-09-22 RX ADMIN — INSULIN DETEMIR 20 UNITS: 100 INJECTION, SOLUTION SUBCUTANEOUS at 04:09

## 2018-09-22 RX ADMIN — INSULIN ASPART 2 UNITS: 100 INJECTION, SOLUTION INTRAVENOUS; SUBCUTANEOUS at 06:09

## 2018-09-22 RX ADMIN — FUROSEMIDE 40 MG: 10 INJECTION, SOLUTION INTRAMUSCULAR; INTRAVENOUS at 10:09

## 2018-09-22 RX ADMIN — ISOSORBIDE MONONITRATE 30 MG: 30 TABLET, EXTENDED RELEASE ORAL at 09:09

## 2018-09-22 RX ADMIN — FUROSEMIDE 40 MG: 10 INJECTION, SOLUTION INTRAMUSCULAR; INTRAVENOUS at 01:09

## 2018-09-22 RX ADMIN — INSULIN ASPART 4 UNITS: 100 INJECTION, SOLUTION INTRAVENOUS; SUBCUTANEOUS at 01:09

## 2018-09-22 RX ADMIN — CALCIUM ACETATE 667 MG: 667 CAPSULE ORAL at 01:09

## 2018-09-22 RX ADMIN — AMLODIPINE BESYLATE 10 MG: 5 TABLET ORAL at 09:09

## 2018-09-22 RX ADMIN — CALCIUM ACETATE 667 MG: 667 CAPSULE ORAL at 06:09

## 2018-09-22 RX ADMIN — GABAPENTIN 100 MG: 100 CAPSULE ORAL at 04:09

## 2018-09-22 RX ADMIN — PANTOPRAZOLE SODIUM 40 MG: 40 TABLET, DELAYED RELEASE ORAL at 09:09

## 2018-09-22 RX ADMIN — INSULIN DETEMIR 20 UNITS: 100 INJECTION, SOLUTION SUBCUTANEOUS at 08:09

## 2018-09-22 RX ADMIN — PRAVASTATIN SODIUM 40 MG: 40 TABLET ORAL at 08:09

## 2018-09-22 RX ADMIN — GABAPENTIN 100 MG: 100 CAPSULE ORAL at 08:09

## 2018-09-22 RX ADMIN — ACETAMINOPHEN 650 MG: 325 TABLET ORAL at 06:09

## 2018-09-22 NOTE — PLAN OF CARE
09/22/18 1525   Discharge Assessment   Assessment Type Discharge Planning Assessment   Confirmed/corrected address and phone number on facesheet? Yes   Assessment information obtained from? Patient   Prior to hospitilization cognitive status: Alert/Oriented   Prior to hospitalization functional status: Assistive Equipment;Needs Assistance   Current cognitive status: Alert/Oriented   Current Functional Status: Assistive Equipment;Needs Assistance   Lives With alone   Able to Return to Prior Arrangements yes   Is patient able to care for self after discharge? Yes   Who are your caregiver(s) and their phone number(s)? Katelynn Guerrero(sister)[phone]   Readmission Within The Last 30 Days no previous admission in last 30 days   Patient currently being followed by outpatient case management? No   Patient currently receives any other outside agency services? Yes   How many hours a day does the patient receive services? 7   Name and contact number of agency or person providing outside services [facility]    Is it the patient/care giver preference to resume care with the current outside agency? Yes   Equipment Currently Used at Home cane, straight;walker, rolling;raised toilet;shower chair   Do you have any problems affording any of your prescribed medications? No   Is the patient taking medications as prescribed? yes   Does the patient have transportation home? Yes   Transportation Available family or friend will provide   Dialysis Name and Scheduled days [facility] MWF Dr. Madden   Does the patient receive services at the [facility]mad[facility] Clinic? No   Discharge Plan A Home with family   Discharge Plan B Home Health   Patient/Family In Agreement With Plan yes   The Sw met with the pt to complete the d/c assessment. The pt lives alone but receives assistance from her PCA([facility])paid for by Medicaid 7 days a week at 7 hours a day. The pt also receives a lot of support from family and friends  as needed. The pt states she's never left alone at home someone is always there. The pt has transportation home at d/c.The pt goes to [facility] under the care of Dr. Madden.The pt states she has a current Receptor company out of [place] but states she can't remember the name of the agency.

## 2018-09-22 NOTE — CONSULTS
[facility] Consult Note    Consult Requested by:  Dr. Sotomayor  Reason for Consult:  ESRD evaluation and management    SUBJECTIVE:     History of Present Illness:  Patient is a 68 y.o. female with a history of ESRD (MWF at [facility] with Dr. Madden), HTN, CHF, DM, CVA who presents overnight with weakness and fatigue, which had been ongoing for approximately one week, she is unsure what precipitated this event.  She had some mild associated shortness of breath.  She was noted to have sats in the high 80's on RA upon presentation.  She otherwise denies recent CP, heart palpitations, abd pain, n/v, developed diarrhea (loose stool) today.  Otherwise no recent LE swelling.  No recent fevers or chills.  She has been going to dialysis without missing lately, last went yesterday, during which they were able to remove volume, she does not feel like she has excess volume today.  Appetite has been good as of late.  She is oliguric.    Review of patient's allergies indicates:   Allergen Reactions    Lasix [furosemide]     Morphine     Zoloft [sertraline] Rash       Past Medical History:   Diagnosis Date    Anemia     Aspiration pneumonia     Bacterial endocarditis     Bloating     Cardiomyopathy     Constipation     Diabetes mellitus, type 2     DM (diabetes mellitus)     DVT (deep venous thrombosis)     Encounter for blood transfusion     Flat affect     Gastroparesis     Glaucoma     Hx: UTI (urinary tract infection) frequent    Hypertension     Hypoalbuminemia     Hypotension (arterial) 2/6/07    Hypothyroidism     Metabolic encephalopathy     Metabolic encephalopathy     Pancreas transplanted 6/10/2000    PONV (postoperative nausea and vomiting)     PVD (peripheral vascular disease)     Renal disorder     Retinopathy     Status post kidney transplant 6/10/2000    Stroke     TIA    Thyroid disease      Past Surgical History:   Procedure Laterality Date    COMBINED KIDNEY-PANCREAS  TRANSPLANT      DIALYSIS FISTULA CREATION      right groin access    EYE SURGERY      HERNIA REPAIR  4/09    HYSTERECTOMY      LEFT OOPHORECTOMY      REVISION OF SCAR      right ovary biopsy       Family History   Problem Relation Age of Onset    Diabetes Mother     Hypertension Mother     Hypertension Father     Hypertension Sister     Hypertension Brother     Aortic aneurysm Brother      Social History     Tobacco Use    Smoking status: Never Smoker    Smokeless tobacco: Never Used   Substance Use Topics    Alcohol use: No     Comment: occasional    Drug use: No       Review of Systems:  10 point ROS done and is neg besides what is listed above in HPI    OBJECTIVE:     Vital Signs:  Temp:  [94.8 °F (34.9 °C)]   Pulse:  [67-76]   Resp:  [2-29]   BP: (149-188)/()   SpO2:  [97 %-100 %]     BP Readings from Last 3 Encounters:   09/22/18 (!) 143/69   07/13/18 135/63   07/09/18 (!) 155/65       No intake/output data recorded.    Physical Exam:  GEN:  appears well, comfortably lying in bed, in no acute distress, nasal cannula in place  HEENT:  anicteric sclera, MMM  NECK:  no apparent JVD  CVS:  irregular rhythm with no murmurs or rubs  PULM:  CTAB  ABD:  soft, nontender, nondistended, normal bowel sounds  EXTR:  no edema  SKIN:  no rashes or lesions, warm and not diaphoretic  ACCESS:  RUE forearm loop AVG with good thrill, mild pulsatility    Laboratory:  No results found for this or any previous visit (from the past 336 hour(s)).  Lab Results   Component Value Date    LABPROT 10.5 03/03/2018    ALBUMIN 2.6 (L) 09/22/2018     Recent Labs   Lab  09/22/18   0725   BNP  778*       Recent Labs   Lab  09/22/18   0150   TROPONINI  0.026       Lab Results   Component Value Date    WBC 9.76 09/22/2018    RBC 3.13 (L) 09/22/2018    HGB 9.0 (L) 09/22/2018    HCT 29.3 (L) 09/22/2018    MCV 94 09/22/2018    MCH 28.8 09/22/2018    MCHC 30.7 (L) 09/22/2018    RDW 15.2 (H) 09/22/2018     09/22/2018    MPV  9.7 09/22/2018    GRAN 7.9 (H) 09/22/2018    GRAN 80.9 (H) 09/22/2018    LYMPH 0.9 (L) 09/22/2018    LYMPH 9.0 (L) 09/22/2018    MONO 0.9 09/22/2018    MONO 9.0 09/22/2018    EOS 0.1 09/22/2018    BASO 0.01 09/22/2018    EOSINOPHIL 0.8 09/22/2018    BASOPHIL 0.1 09/22/2018       Lab Results   Component Value Date    HGBA1C 5.8 (H) 07/12/2018     Results for FREDY VARGAS (MRN [id number]) as of 9/22/2018 15:25   Ref. Range 9/22/2018 06:11   Sodium Latest Ref Range: 136 - 145 mmol/L 137   Potassium Latest Ref Range: 3.5 - 5.1 mmol/L 3.9   Chloride Latest Ref Range: 95 - 110 mmol/L 96   CO2 Latest Ref Range: 23 - 29 mmol/L 28   Anion Gap Latest Ref Range: 8 - 16 mmol/L 13   BUN, Bld Latest Ref Range: 8 - 23 mg/dL 18   Creatinine Latest Ref Range: 0.5 - 1.4 mg/dL 6.0 (H)   eGFR if non African American Latest Ref Range: >60 mL/min/1.73 m^2 7 (A)   eGFR if African American Latest Ref Range: >60 mL/min/1.73 m^2 8 (A)   Glucose Latest Ref Range: 70 - 110 mg/dL 230 (H)   Calcium Latest Ref Range: 8.7 - 10.5 mg/dL 9.9   Phosphorus Latest Ref Range: 2.7 - 4.5 mg/dL 2.9   Magnesium Latest Ref Range: 1.6 - 2.6 mg/dL 1.5 (L)   Alkaline Phosphatase Latest Ref Range: 55 - 135 U/L 67   Total Protein Latest Ref Range: 6.0 - 8.4 g/dL 7.5   Albumin Latest Ref Range: 3.5 - 5.2 g/dL 2.6 (L)   Total Bilirubin Latest Ref Range: 0.1 - 1.0 mg/dL 0.4   AST Latest Ref Range: 10 - 40 U/L 10   ALT Latest Ref Range: 10 - 44 U/L 11         Diagnostic Results:  Labs: Reviewed  X-Ray: Reviewed    ASSESSMENT/PLAN:      68 y.o. female with a history of ESRD (MWF at [facility] with Dr. Madden), HTN, CHF, DM, CVA who presents overnight with weakness and fatigue, which had been ongoing for approximately one week, she is unsure what precipitated this event, but this could be due to excess volume, although she does not believe she has hypervolemia.    1. ESRD - as above  - dialysis thrice weekly unless more urgent indications arise, will dialyze next  on Monday, although we can urgently do it prior to that if needed  - strict I/O, daily weights  - please avoid gadolinium, fleets, phos-based laxatives, NSAIDs    2. HTN/hypervolemia  - has had some significant elevations in her BP  - can continue home meds  - low Na diet essential  - would fluid restrict    3. Anemia ESRD  - will need to contact dialysis unit to see if/what dose of EPO she is on    4. MBD of ESRD  - can continue binders with meals        Pepito Randle MD

## 2018-09-22 NOTE — PT/OT/SLP EVAL
Physical Therapy Evaluation    Patient Name:  Angelina Beard   MRN:  [id number]    Recommendations:     Discharge Recommendations:  home with home health, home health OT, home health PT   Discharge Equipment Recommendations: none   Barriers to discharge: None    Assessment:     Angelina Beard is a 68 y.o. female admitted with a medical diagnosis of Acute on chronic diastolic congestive heart failure.  She presents with the following impairments/functional limitations:  weakness, impaired functional mobilty, impaired balance, decreased upper extremity function, impaired cardiopulmonary response to activity, impaired endurance, impaired self care skills, gait instability, decreased lower extremity function.    Rehab Prognosis:  Good; patient would benefit from acute skilled PT services to address these deficits and reach maximum level of function.      Recent Surgery: * No surgery found *      Plan:     During this hospitalization, patient to be seen 5 x/week to address the above listed problems via gait training, therapeutic activities, therapeutic exercises, neuromuscular re-education  · Plan of Care Expires:      Plan of Care Reviewed with: patient    Subjective     Communicated with nurse prior to session.  Patient found supine upon PT entry to room, agreeable to evaluation.      Chief Complaint: none  Patient comments/goals: get clean  Pain/Comfort:  · Pain Rating 1: 0/10    Patients cultural, spiritual, Baptism conflicts given the current situation:      Living Environment:  Pt lives in apt with son (there sometimes) and sitter 7 hours/day.   Prior to admission, patients level of function was limited ambulation to/from car for dialysis only.  Patient has the following equipment: wheelchair, walker, rolling, hospital bed, 3-in-1 commode, shower chair.  DME owned (not currently used): none.  Upon discharge, patient will have assistance from sitter primarily.    Objective:     Patient found with: oxygen      General Precautions: Standard, fall   Orthopedic Precautions:    Braces:       Exams:  · Pt is oriented x 4.    · RLE ROM: WFL  · RLE Strength: 3 to 5/5  · LLE ROM: WFL  · LLE Strength: 3 to 5/5    Functional Mobility:  · Bed Mobility:     · Supine to Sit: contact guard assistance  · Sit to Supine: contact guard assistance  · Transfers:     · Sit to Stand:  contact guard assistance with rolling walker  · Gait: Pt declined/unable to ambulate with RW with assist of 2 post standing for cleaning  · Balance: F/F- dynamic standing balance    AM-PAC 6 CLICK MOBILITY  Total Score:14       Therapeutic Activities and Exercises:   Pt sit to stand x 4 for cleaning/changing sheets with RW with CG with patient flexed at waist 20-30 degrees, improved somewhat with verbal cueing.  Pt on 2 Lit O2 throughout eval.    Patient left HOB elevated with all lines intact, call button in reach, bed alarm on and nurse notified.    GOALS:   Multidisciplinary Problems     Physical Therapy Goals        Problem: Physical Therapy Goal    Goal Priority Disciplines Outcome Goal Variances Interventions   Physical Therapy Goal     PT, PT/OT Ongoing (interventions implemented as appropriate)     Description:  1.  Supervision bed mobility  2.  Ambulate 25' with RW with CG  3.  Sit in chair 1 1/2 hours                    History:     Past Medical History:   Diagnosis Date    Arthritis     Breast cancer, left     CHF (congestive heart failure)     Cholelithiasis     Coronary artery disease     Diabetes mellitus, type 2     Encounter for blood transfusion     End-stage renal disease on hemodialysis     Hemodialysis access site with arteriovenous graft     mon-wed -fri    Hyperlipidemia     Hypertension     Sarcoidosis        Past Surgical History:   Procedure Laterality Date    av graft      left arm    BRACHIAL ARTERY GRAFT Left 05/04/2016    brachiocephalic, Dr. Anson Crocker    BREAST BIOPSY Left     breast ca    BREAST  LUMPECTOMY Left     radiation only    btl      COLONOSCOPY N/A 7/13/2018    Procedure: COLONOSCOPY;  Surgeon: Almita Bee MD;  Location: [facility] ENDO;  Service: Endoscopy;  Laterality: N/A;    COLONOSCOPY N/A 7/13/2018    Performed by [facility]a Bee MD at [facility] ENDO    ELBOW SURGERY      left    ESOPHAGOGASTRODUODENOSCOPY N/A 7/13/2018    Procedure: ESOPHAGOGASTRODUODENOSCOPY (EGD);  Surgeon: Almita Bee MD;  Location: [facility] ENDO;  Service: Endoscopy;  Laterality: N/A;    ESOPHAGOGASTRODUODENOSCOPY (EGD) N/A 7/13/2018    Performed by [facility]a Bee MD at [facility] ENDO    [id number] Right 5/4/2016    Performed by Anson Crocker MD at [facility] OR    LIPOMA RESECTION      back of neck    pilondial cyst      REPLACEMENT-KNEE-TOTAL Right 2/23/2016    [facility] 2ND FLR    TOTAL KNEE ARTHROPLASTY Right 02/23/2016       Clinical Decision Making:     History  Co-morbidities and personal factors that may impact the plan of care Examination  Body Structures and Functions, activity limitations and participation restrictions that may impact the plan of care Clinical Presentation   Decision Making/ Complexity Score   Co-morbidities:   [] Time since onset of injury / illness / exacerbation  [] Status of current condition  []Patient's cognitive status and safety concerns    [] Multiple Medical Problems (see med hx)  Personal Factors:   [] Patient's age  [] Prior Level of function   [] Patient's home situation (environment and family support)  [] Patient's level of motivation  [] Expected progression of patient      HISTORY:(criteria)    [] 46035 - no personal factors/history    [] 74820 - has 1-2 personal factor/comorbidity     [] 04984 - has >3 personal factor/comorbidity     Body Regions:  [] Objective examination findings  [] Head     []  Neck  [] Trunk   [] Upper Extremity  [] Lower Extremity    Body Systems:  [] For communication ability, affect, cognition,  language, and learning style: the assessment of the ability to make needs known, consciousness, orientation (person, place, and time), expected emotional /behavioral responses, and learning preferences (eg, learning barriers, education  needs)  [] For the neuromuscular system: a general assessment of gross coordinated movement (eg, balance, gait, locomotion, transfers, and transitions) and motor function  (motor control and motor learning)  [] For the musculoskeletal system: the assessment of gross symmetry, gross range of motion, gross strength, height, and weight  [] For the integumentary system: the assessment of pliability(texture), presence of scar formation, skin color, and skin integrity  [] For cardiovascular/pulmonary system: the assessment of heart rate, respiratory rate, blood pressure, and edema     Activity limitations:    [] Patient's cognitive status and saf ety concerns          [] Status of current condition      [] Weight bearing restriction  [] Cardiopulmunary Restriction    Participation Restrictions:   [] Goals and goal agreement with the patient     [] Rehab potential (prognosis) and probable outcome      Examination of Body System: (criteria)    [] 98197 - addressing 1-2 elements    [] 24725 - addressing a total of 3 or more elements     [] 50030 -  Addressing a total of 4 or more elements         Clinical Presentation: (criteria)  Choose one     On examination of body system using standardized tests and measures patient presents with (CHOOSE ONE) elements from any of the following: body structures and functions, activity limitations, and/or participation restrictions.  Leading to a clinical presentation that is considered (CHOOSE ONE)                              Clinical Decision Making  (Eval Complexity):  Choose One     Time Tracking:     PT Received On: 09/22/18  PT Start Time: 0946     PT Stop Time: 1006  PT Total Time (min): 20 min     Billable Minutes: Evaluation 20      Nora E  Samina, PT  09/22/2018

## 2018-09-22 NOTE — PLAN OF CARE
Problem: Physical Therapy Goal  Goal: Physical Therapy Goal  1.  Supervision bed mobility  2.  Ambulate 25' with RW with CG  3.  Sit in chair 1 1/2 hours  Outcome: Ongoing (interventions implemented as appropriate)  Pt would benefit from PT to progress functional mobility

## 2018-09-22 NOTE — ASSESSMENT & PLAN NOTE
Per chart review, patient is mostly wheelchair bound  Ambulates at times with walker  Will consult PT/OT for further assistance

## 2018-09-22 NOTE — PLAN OF CARE
09/22/18 1640   CLARK Message   Medicare Outpatient and Observation Notification regarding financial responsibility Given to patient/caregiver;Explained to patient/caregiver;Signed/date by patient/caregiver   Date CLARK was signed 09/22/18   Time CLARK was signed [id number]

## 2018-09-22 NOTE — ED NOTES
[facility] ED TECH AT BEDSIDE PREPARING TO TRANSPORT PT TO FLOOR. RESPIRATIONS EVEN AND UNLABORED. PT TOLERATING ICE CHIPS WELL AT THIS TIME. NADN. PT UPDATED WITH POC. V/U.

## 2018-09-22 NOTE — ED NOTES
"PT REPORTS "YOU WON'T GET URINE RIGHT NOW. I CAN JUST TELL." WILL ALLOW MORE TIME WITH IVFs INFUSING.  PORES AWARE. NO NEW ORDERS NOTED AT THIS TIME.  "

## 2018-09-22 NOTE — PLAN OF CARE
Problem: Patient Care Overview  Goal: Plan of Care Review  Outcome: Ongoing (interventions implemented as appropriate)  Plan of care reviewed with patient. Voices understanding. NSR on monitor with no red alarms noted. BNP level checked. IV lasix administered today per orders. No acute distress noted at this time. Side rails x3, bed low, call bell within reach. Maintain bed alarm for patient safety. Patient will be monitored overnight.

## 2018-09-22 NOTE — SUBJECTIVE & OBJECTIVE
Past Medical History:   Diagnosis Date    Arthritis     Breast cancer, left     CHF (congestive heart failure)     Cholelithiasis     Coronary artery disease     Diabetes mellitus, type 2     End-stage renal disease on hemodialysis     Hemodialysis access site with arteriovenous graft     mon-wed -fri    Hyperlipidemia     Hypertension     Sarcoidosis        Past Surgical History:   Procedure Laterality Date    av graft      left arm    BRACHIAL ARTERY GRAFT Left 05/04/2016    brachiocephalic, Dr. Anson Crocker    BREAST BIOPSY Left     breast ca    BREAST LUMPECTOMY Left     radiation only    btl      COLONOSCOPY N/A 7/13/2018    Procedure: COLONOSCOPY;  Surgeon: Almita Bee MD;  Location: [facility] ENDO;  Service: Endoscopy;  Laterality: N/A;    COLONOSCOPY N/A 7/13/2018    Performed by Almita Bee MD at [facility] ENDO    ELBOW SURGERY      left    ESOPHAGOGASTRODUODENOSCOPY N/A 7/13/2018    Procedure: ESOPHAGOGASTRODUODENOSCOPY (EGD);  Surgeon: Almita Bee MD;  Location: [facility] ENDO;  Service: Endoscopy;  Laterality: N/A;    ESOPHAGOGASTRODUODENOSCOPY (EGD) N/A 7/13/2018    Performed by Almita Bee MD at [facility] ENDO    [id number] Right 5/4/2016    Performed by Anson Crocker MD at [facility] OR    LIPOMA RESECTION      back of neck    pilondial cyst      REPLACEMENT-KNEE-TOTAL Right 2/23/2016    [facility] 2ND FLR    TOTAL KNEE ARTHROPLASTY Right 02/23/2016       Review of patient's allergies indicates:  No Known Allergies    No current facility-administered medications on file prior to encounter.      Current Outpatient Medications on File Prior to Encounter   Medication Sig    amLODIPine (NORVASC) 10 MG tablet Take 10 mg by mouth. TAKE 1 TABLET BY MOUTH ON OFF DIALYSIS DAYS.    aspirin (ECOTRIN) 81 MG EC tablet Take 81 mg by mouth once daily.    calcium acetate (PHOSLO) 667 mg capsule Take 667 mg by mouth 3 (three) times  daily with meals.    furosemide (LASIX) 40 MG tablet Take 40 mg by mouth once daily.    gabapentin (NEURONTIN) 100 MG capsule Take 1 capsule (100 mg total) by mouth every evening. (Patient taking differently: Take 300 mg by mouth every 8 (eight) hours as needed. )    insulin glargine (LANTUS) 100 unit/mL injection Inject 20 Units into the skin every evening.    isosorbide mononitrate (IMDUR) 30 MG 24 hr tablet Take 30 mg by mouth once daily.    pantoprazole (PROTONIX) 40 MG tablet Take 1 tablet (40 mg total) by mouth 2 (two) times daily. (Patient taking differently: Take 40 mg by mouth once daily. )    pravastatin (PRAVACHOL) 40 MG tablet Take 40 mg by mouth every evening.     nitroGLYCERIN (NITROSTAT) 0.4 MG SL tablet Place 0.4 mg under the tongue every 5 (five) minutes as needed for Chest pain.     Family History     Problem Relation (Age of Onset)    Diabetes Mother, Sister    Heart disease Sister    Hypertension Mother    Kidney disease Mother        Tobacco Use    Smoking status: Never Smoker    Smokeless tobacco: Never Used   Substance and Sexual Activity    Alcohol use: No    Drug use: No    Sexual activity: Not on file     Review of Systems   Constitutional: Positive for activity change, chills, fatigue and fever. Negative for appetite change.   HENT: Negative for congestion and sore throat.    Respiratory: Positive for cough and shortness of breath. Negative for chest tightness.    Cardiovascular: Negative for chest pain and palpitations.   Gastrointestinal: Positive for constipation and nausea. Negative for abdominal pain and vomiting.   Genitourinary: Negative for dysuria and hematuria.   Musculoskeletal: Positive for myalgias. Negative for neck pain and neck stiffness.   Neurological: Positive for weakness. Negative for dizziness and headaches.   Psychiatric/Behavioral: Negative for agitation and behavioral problems.     Objective:     Vital Signs (Most Recent):  Temp: 99.2 °F (37.3 °C)  (09/21/18 2053)  Pulse: 91 (09/22/18 0233)  Resp: 20 (09/22/18 0233)  BP: (!) 158/64 (09/22/18 0233)  SpO2: 98 % (09/22/18 0233) Vital Signs (24h Range):  Temp:  [99.2 °F (37.3 °C)] 99.2 °F (37.3 °C)  Pulse:  [85-99] 91  Resp:  [18-25] 20  SpO2:  [87 %-99 %] 98 %  BP: (137-193)/(64-86) 158/64     Weight: 103.4 kg (228 lb)  Body mass index is 35.71 kg/m².    Physical Exam   Constitutional: She is oriented to person, place, and time. She appears well-developed and well-nourished. No distress.   HENT:   Head: Normocephalic and atraumatic.   Mouth/Throat: No oropharyngeal exudate.   Eyes: EOM are normal. Pupils are equal, round, and reactive to light. No scleral icterus.   Neck: Normal range of motion. Neck supple.   Cardiovascular: Normal rate and regular rhythm.   Murmur (systolic ) heard.  Pulmonary/Chest: Effort normal.   Decreased air movement to bilateral lung bases   Abdominal: Soft. She exhibits no distension.   Musculoskeletal: Normal range of motion. She exhibits no edema.   Fistula of right upper extremity, good thrill    Neurological: She is alert and oriented to person, place, and time. No cranial nerve deficit. She exhibits normal muscle tone. Coordination normal.   Skin: Skin is warm and dry. Capillary refill takes less than 2 seconds. She is not diaphoretic.   Psychiatric: She has a normal mood and affect. Her behavior is normal.   Nursing note and vitals reviewed.        CRANIAL NERVES     CN III, IV, VI   Pupils are equal, round, and reactive to light.  Extraocular motions are normal.        Significant Labs:   CBC:   Recent Labs   Lab  09/21/18 2122   WBC  8.94   HGB  8.6*   HCT  28.3*   PLT  300     CMP:   Recent Labs   Lab  09/21/18 2122   NA  136   K  3.9   CL  95   CO2  28   GLU  238*   BUN  15   CREATININE  5.2*   CALCIUM  10.1   PROT  7.8   ALBUMIN  2.7*   BILITOT  0.3   ALKPHOS  61   AST  10   ALT  11   ANIONGAP  13   EGFRNONAA  8*     Lactic Acid:   Recent Labs   Lab  09/21/18   5644    LACTATE  1.0     Pathology Results  (Last 10 years)    None        Troponin:   Recent Labs   Lab  09/21/18 2122 09/22/18   0150   TROPONINI  0.027*  0.026     TSH:   Recent Labs   Lab  09/21/18 2122   TSH  0.993     Significant Imaging: I have reviewed all pertinent imaging results/findings within the past 24 hours.

## 2018-09-22 NOTE — PT/OT/SLP EVAL
Occupational Therapy   Evaluation    Name: Angelina Beard  MRN: [id number]  Admitting Diagnosis:  Acute on chronic diastolic congestive heart failure      Recommendations:     Discharge Recommendations: home health PT, home health OT(medical fitness program)  Discharge Equipment Recommendations:  none  Barriers to discharge:  Decreased caregiver support    History:     Occupational Profile:  Living Environment: alone but occasionally son lives in the home (per pt report), in 1st floor apt, 0STE, tub/shower combo.   Previous level of function: Pt received aid with ADLs, IADLs, w/c bound  Roles and Routines: Pt able to t/f sit-stand with RW and ambulated from home to car with RW (dialysis appointments only per pt report); used w/c in home and community  Equipment Used at Home:  3-in-1 commode, bath bench, hospital bed, walker, rolling, wheelchair  Assistance upon Discharge: Sitter/limited family    Past Medical History:   Diagnosis Date    Arthritis     Breast cancer, left     CHF (congestive heart failure)     Cholelithiasis     Coronary artery disease     Diabetes mellitus, type 2     Encounter for blood transfusion     End-stage renal disease on hemodialysis     Hemodialysis access site with arteriovenous graft     mon-wed -fri    Hyperlipidemia     Hypertension     Sarcoidosis        Past Surgical History:   Procedure Laterality Date    av graft      left arm    BRACHIAL ARTERY GRAFT Left 05/04/2016    brachiocephalic, Dr. Anson Crocker    BREAST BIOPSY Left     breast ca    BREAST LUMPECTOMY Left     radiation only    btl      COLONOSCOPY N/A 7/13/2018    Procedure: COLONOSCOPY;  Surgeon: Almita Bee MD;  [facility];  Service: Endoscopy;  Laterality: N/A;    COLONOSCOPY N/A 7/13/2018    [facility] at [facility]    ELBOW SURGERY      left    ESOPHAGOGASTRODUODENOSCOPY N/A 7/13/2018    Procedure: ESOPHAGOGASTRODUODENOSCOPY (EGD);  Surgeon: Almita Bee,  MD;  Location: [facility] ENDO;  Service: Endoscopy;  Laterality: N/A;    ESOPHAGOGASTRODUODENOSCOPY (EGD) N/A 7/13/2018    Performed by [facility]a Bee MD at [facility] ENDO    [id number] Right 5/4/2016    Performed by Anson Crocker MD at [facility] OR    LIPOMA RESECTION      back of neck    pilondial cyst      REPLACEMENT-KNEE-TOTAL Right 2/23/2016    [facility] 2ND FLR    TOTAL KNEE ARTHROPLASTY Right 02/23/2016       Subjective     Chief Complaint: Pt with no complaints this date.   Patient/Family Comments/goals: To return to PLOF    Pain/Comfort:  · Pain Rating 1: 0/10    Patients cultural, spiritual, Mu-ism conflicts given the current situation:      Objective:     Communicated with: nsg prior to session.  Patient found all lines intact, call button in reach, bed alarm on and nsg notified upon OT entry to room.    General Precautions: Standard, fall   Orthopedic Precautions:N/A   Braces: N/A     Occupational Performance:    Bed Mobility:    · Patient completed Rolling/Turning to Left with  contact guard assistance  · Patient completed Scooting/Bridging with contact guard assistance  · Patient completed Supine to Sit with contact guard assistance  · Patient completed Sit to Supine with contact guard assistance    Functional Mobility/Transfers:  · Patient completed Sit <> Stand Transfer with contact guard assistance  with  rolling walker   · Functional Mobility: Pt with fair+ to good dynamic seated balance and fair/fair- dynamic standing balance    Activities of Daily Living:  · Upper Body Dressing: stand by assistance to don gown as robe seated EOB  · Lower Body Dressing: stand by assistance to don/doff B socks  · Toileting: total assistance for perineal hygeine; no undergarments worn this date. Pt states she is unable to control bowel and bladder continence.    Cognitive/Visual Perceptual:  Cognitive/Psychosocial Skills:     -       Oriented to: Person,  "Place, Time and Situation   -       Follows Commands/attention:Follows multistep  commands  -       Communication: clear/fluent  -       Memory: No Deficits noted  -       Safety awareness/insight to disability: intact   -       Mood/Affect/Coping skills/emotional control: Appropriate to situation    Physical Exam:  Postural examination/scapula alignment:    -       Rounded shoulders  -       Forward head  Sensation:    -       Impaired  per pt report; states B digits/thumbs are "numb" and B feet are "heavy"   Motor Planning:    -       WFL  Upper Extremity Range of Motion:    WFL except B shoulder flexion limited ~0-115*  Upper Extremity Strength:   Grossly 3+ to 4-/5   Strength:  Grossly 3+ to 4-/5  Fine Motor Coordination:    -       Intact  Gross motor coordination:   WFL    AMPAC 6 Click ADL:  AMPAC Total Score: 20    Treatment & Education:  Pt educated on role of OT and POC.   Pt performing skills as listed above.  Pt states she has difficulty after showering 2/2 increased fatigue at home. Would likely benefit on continued education to prevent exacerbations of CHF.  Pt educated on pursed lip breathing and verbalized understanding but did not demonstrate throughout session.  Pt required increased v/c to breath during physical exertion, including MMT. Pt on 2L O2 throughout session.      Education:    Patient left HOB elevated with all lines intact, call button in reach, nsg notified and PCT present    Assessment:     Angelina Beard is a 68 y.o. female with a medical diagnosis of Acute on chronic diastolic congestive heart failure.  She presents with the following performance deficits affecting function: weakness, impaired functional mobilty, impaired endurance, gait instability, impaired sensation, impaired balance, decreased upper extremity function, decreased ROM, decreased lower extremity function, impaired self care skills.      Rehab Prognosis: Good; patient would benefit from acute skilled OT " "services to address these deficits and reach maximum level of function.         Clinical Decision Makin.  OT Low:  "Pt evaluation falls under low complexity for evaluation coding due to performance deficits noted in 1-3 areas as stated above and 0 co-morbities affecting current functional status. Data obtained from problem focused assessments. No modifications or assistance was required for completion of evaluation. Only brief occupational profile and history review completed."     Plan:     Patient to be seen 5 x/week to address the above listed problems via self-care/home management, therapeutic activities, therapeutic exercises  · Plan of Care Expires: 10/22/18  · Plan of Care Reviewed with: patient    This Plan of care has been discussed with the patient who was involved in its development and understands and is in agreement with the identified goals and treatment plan    GOALS:   Multidisciplinary Problems     Occupational Therapy Goals        Problem: Occupational Therapy Goal    Goal Priority Disciplines Outcome Interventions   Occupational Therapy Goal     OT, PT/OT Ongoing (interventions implemented as appropriate)    Description:  Goals to be met by: 10/22/2018       Patient will increase functional independence with ADLs by performing:    UE Dressing with [place].  LE Dressing with Modified [place].  Grooming while standing with Set-up Assistance.  Toileting from bedside commode with Stand-by Assistance for hygiene and clothing management.   Sitting at edge of bed x15 minutes with Modified [place].  Rolling to Bilateral with Modified [place].   Supine to sit with [place].  Toilet transfer to bedside commode with Contact Guard Assistance.  Increased functional strength to WFL for self care skills.  Upper extremity exercise program x8 reps per handout, with supervision.                      Time Tracking:     OT Date of Treatment: 18  OT Start Time: 946  OT Stop Time: " 1006  OT Total Time (min): 20 min    Billable Minutes:Evaluation 10    Chio Godfrey OT  9/22/2018

## 2018-09-22 NOTE — PLAN OF CARE
Problem: Occupational Therapy Goal  Goal: Occupational Therapy Goal  Goals to be met by: 10/22/2018       Patient will increase functional independence with ADLs by performing:    UE Dressing with Prentiss.  LE Dressing with Modified Prentiss.  Grooming while standing with Set-up Assistance.  Toileting from bedside commode with Stand-by Assistance for hygiene and clothing management.   Sitting at edge of bed x15 minutes with Modified Prentiss.  Rolling to Bilateral with Modified Prentiss.   Supine to sit with Prentiss.  Toilet transfer to bedside commode with Contact Guard Assistance.  Increased functional strength to WFL for self care skills.  Upper extremity exercise program x8 reps per handout, with supervision.    Outcome: Ongoing (interventions implemented as appropriate)  Pt would benefit from continued OT to address deficits in self care and functional mobility. Recommending HHOT/PT and medical fitness program; DME needs none

## 2018-09-22 NOTE — HPI
Patient is a 69 yo female pmhx of T2DM, HTN, ESRD on HD MWF, pulmonary sarcoid, and HFpEF.  Patient presents to the ED with complaints of generalized weakness x 7 days.  She reports she has been feeling tired, weak, with generalized body aches.  She also endorses feeling nauseated and non-productive cough during this time.  She reports experiencing fevers (has not taken temp) and chills at home.  She reports she last attended dialysis Friday and completed session.  She denies recent travel, sick contacts, or changes to diet.      Angelina Beard is a 67 y.o. black woman with hypertension, chronic diastolic congestive heart failure, paroxysmal atrial fibrillation, diabetes mellitus type 2 (hemoglobin A1c 6.2% 4/17/16) with neuropathy and nephropathy, hyperlipidemia, nonobstructive coronary artery disease, cerebral microvascular disease, end stage renal disease on hemodialysis (Monday Wednesday Friday at [facility] in [facility]), anemia, osteoarthritis status post right total knee arthroplasty 2/23/16, chronic left leg pain, chronic constipation, cholelithiasis, history of right lung sarcoidosis, and history of left breast cancer s/p mastectomy.  Her primary care physician is Dr. Eva Jara.  Her nephrologist is Dr. Rc Santamaria.  Her cardiologist is Dr. Faraz Flores.  She lives alone in [place], [place].  She is mostly wheelchair bound but can use a walker with assistance.

## 2018-09-22 NOTE — ASSESSMENT & PLAN NOTE
CXR revealing cardiomegaly with new increased attenuation of the pulmonary parenchyma.  Findings represent decompensated CHF with evolving pulmonary edema.    Given 500cc IVF bolus in ED  Given IV lasix 40mg in ED  Patient reports minimal urine production   Have consulted nephro for evaluation of further HD

## 2018-09-22 NOTE — ED PROVIDER NOTES
Encounter Date: 9/21/2018    SCRIBE #1 NOTE: I, Wally Magaña, am scribing for, and in the presence of,  Dr. Smith. I have scribed the entire note.       History     Chief Complaint   Patient presents with    Fatigue     C/O GENERALIZED WEAKNESS AND GENERALIZED BODY ACHES X1 WEEK.     Angelina Beard is a 68 y.o. female who  has a past medical history of Arthritis, Breast cancer, left, CHF (congestive heart failure), Cholelithiasis, Coronary artery disease, Diabetes mellitus, type 2, End-stage renal disease on hemodialysis, Hemodialysis access site with arteriovenous graft, Hyperlipidemia, Hypertension, and Sarcoidosis.        Patient presents to the ER with generalized myalgia and fatigue  When the patient stands, she becomes lightheaded  Patient says she is dizzy and has a headache across her forehead and eyes  Patient has taken Tylenol with no relief  Patient has had similar headaches in the past  Patient reports SOB  Patient started vomiting last night. She said it was a dark color  Patient denies fever, congestion, runny nose, and diarrhea  Patient has also coughed constantly since the onset   Patient ate crackers and drank water today  Patient denies hx of COPD, pneumonia, and asthma.  Patient was dialyzed today and did not feel good afterwards.       The history is provided by the patient.     Review of patient's allergies indicates:  No Known Allergies  Past Medical History:   Diagnosis Date    Arthritis     Breast cancer, left     CHF (congestive heart failure)     Cholelithiasis     Coronary artery disease     Diabetes mellitus, type 2     Encounter for blood transfusion     End-stage renal disease on hemodialysis     Hemodialysis access site with arteriovenous graft     mon-wed -fri    Hyperlipidemia     Hypertension     Sarcoidosis      Past Surgical History:   Procedure Laterality Date    av graft      left arm    BRACHIAL ARTERY GRAFT Left 05/04/2016    Dr. Anson fraire  Obi    BREAST BIOPSY Left     breast ca    BREAST LUMPECTOMY Left     radiation only    btl      COLONOSCOPY N/A 7/13/2018    Procedure: COLONOSCOPY;  Surgeon: Almita Bee MD;  Location: [facility] ENDO;  Service: Endoscopy;  Laterality: N/A;    COLONOSCOPY N/A 7/13/2018    Performed by Almita Bee MD at [facility] ENDO    ELBOW SURGERY      left    ESOPHAGOGASTRODUODENOSCOPY N/A 7/13/2018    Procedure: ESOPHAGOGASTRODUODENOSCOPY (EGD);  Surgeon: Almita Bee MD;  Location: [facility] ENDO;  Service: Endoscopy;  Laterality: N/A;    ESOPHAGOGASTRODUODENOSCOPY (EGD) N/A 7/13/2018    Performed by Almita Bee MD at [facility] ENDO    KKJNHSIIW-ALIDE-NHDQMOASNUXAL Right 5/4/2016    Performed by Anson Crocker MD at [facility] OR    LIPOMA RESECTION      back of neck    pilondial cyst      REPLACEMENT-KNEE-TOTAL Right 2/23/2016    [facility] 2ND FLR    TOTAL KNEE ARTHROPLASTY Right 02/23/2016     Family History   Problem Relation Age of Onset    Diabetes Mother     Kidney disease Mother     Hypertension Mother     Diabetes Sister     Heart disease Sister      Social History     Tobacco Use    Smoking status: Never Smoker    Smokeless tobacco: Never Used   Substance Use Topics    Alcohol use: No    Drug use: No     Review of Systems   Constitutional: Positive for fatigue. Negative for diaphoresis and fever.   HENT: Negative for congestion and rhinorrhea.    Eyes: Negative for visual disturbance.   Respiratory: Positive for cough and shortness of breath.    Cardiovascular: Negative for chest pain.   Gastrointestinal: Positive for nausea and vomiting. Negative for diarrhea.   Genitourinary: Positive for decreased urine volume.   Musculoskeletal: Positive for myalgias.   Skin: Negative for color change.   Neurological: Positive for dizziness and headaches.   Psychiatric/Behavioral: Positive for sleep disturbance. The patient is nervous/anxious.        Physical Exam      Initial Vitals [09/21/18 2053]   BP Pulse Resp Temp SpO2   (!) 160/73 96 18 99.2 °F (37.3 °C) (!) 87 %      MAP       --         Physical Exam    Constitutional: She appears well-developed. She appears distressed.   Right arm fistula, positive thrill    HENT:   Head: Normocephalic and atraumatic.   Eyes: EOM are normal. Pupils are equal, round, and reactive to light. Right eye exhibits no discharge. Left eye exhibits no discharge.   Proptosis both eyes   Neck: Normal range of motion. Neck supple.   Cardiovascular: An irregular rhythm present.    Murmur (systolic Left) heard.  Pulmonary/Chest: She has no wheezes. She has no rhonchi. She has no rales.   Poor air flow   Abdominal: Bowel sounds are normal.   Musculoskeletal: Normal range of motion.   Neurological: She is alert. She has normal strength.         ED Course   Procedures  Labs Reviewed   CBC W/ AUTO DIFFERENTIAL - Abnormal; Notable for the following components:       Result Value    RBC 3.04 (*)     Hemoglobin 8.6 (*)     Hematocrit 28.3 (*)     MCHC 30.4 (*)     RDW 15.0 (*)     Lymph # 0.9 (*)     Gran% 79.2 (*)     Lymph% 10.3 (*)     All other components within normal limits   COMPREHENSIVE METABOLIC PANEL - Abnormal; Notable for the following components:    Glucose 238 (*)     Creatinine 5.2 (*)     Albumin 2.7 (*)     eGFR if  9 (*)     eGFR if non  8 (*)     All other components within normal limits   TROPONIN I - Abnormal; Notable for the following components:    Troponin I 0.027 (*)     All other components within normal limits   URINALYSIS, REFLEX TO URINE CULTURE - Abnormal; Notable for the following components:    Protein, UA 3+ (*)     Glucose, UA 1+ (*)     Occult Blood UA 1+ (*)     All other components within normal limits    Narrative:     Preferred Collection Type->Urine, Clean Catch   URINALYSIS MICROSCOPIC - Abnormal; Notable for the following components:    Granular Casts, UA 2 (*)     All other  components within normal limits    Narrative:     Preferred Collection Type->Urine, Clean Catch   POCT GLUCOSE - Abnormal; Notable for the following components:    POCT Glucose 204 (*)     All other components within normal limits   LACTIC ACID, PLASMA   TSH   TROPONIN I   INFLUENZA A AND B ANTIGEN        ECG Results          EKG 12-lead (Final result)  Result time 09/23/18 18:15:40    Final result by Interface, Lab In [facility] (09/23/18 18:15:40)                 Narrative:    Test Reason : R53.1,  Blood Pressure : / mmHG  Vent. Rate : 097 BPM     Atrial Rate : 086 BPM     P-R Int : 142 ms          QRS Dur : 084 ms      QT Int : 352 ms       P-R-T Axes : 049 005 026 degrees     QTc Int : 447 ms    Sinus rhythm Premature atrial complexes with normal and aberrant conduction    Nonspecific T wave abnormality  Abnormal ECG  When compared with ECG of 11-JUL-2018 01:27,  Premature atrial complexes are now Present  Nonspecific T wave abnormality, improved in Lateral leads  Confirmed by Parag Mariano MD ([id number]) on 9/23/2018 6:15:27 PM    Referred By: [email or web address]   SELF           Confirmed By:Parag Mariano MD                            Imaging Results          X-Ray Chest PA And Lateral (Final result)  Result time 09/21/18 22:34:11    Final result by Willie Singh MD (09/21/18 22:34:11)                 Impression:      Cardiomegaly with new increased attenuation of the pulmonary parenchyma.  The findings represent decompensated CHF with evolving pulmonary edema.      Electronically signed by: Willie Singh MD  Date:    09/21/2018  Time:    22:34             Narrative:    EXAMINATION:  XR CHEST PA AND LATERAL    CLINICAL HISTORY:  Cough.    TECHNIQUE:  PA and lateral views of the chest were performed.    COMPARISON:  07/11/2018    FINDINGS:  Monitoring EKG leads are present.  There are postoperative changes in the left axillary region.  There is a vascular stent within the left subclavian region.    The trachea is  unremarkable.  There is enlargement of the cardiomediastinal silhouette.  The hemidiaphragms are unremarkable.  There is no evidence of free air beneath the hemidiaphragms.  There are no pleural effusions.  There is no evidence of a pneumothorax.  There is no evidence of pneumomediastinum.  There has been interval development of increased attenuation of the pulmonary parenchyma.  The osseous structures are unremarkable.  The subcutaneous tissues are unremarkable.                                 Medical Decision Making:   Clinical Tests:   Lab Tests: Ordered and Reviewed  Radiological Study: Ordered and Reviewed  Medical Tests: Ordered and Reviewed                   ED Course as of Oct 02 0600   Fri Sep 21, 2018   2120 I, Dr. Gerry Smith, personally performed the services described in this documentation.   All medical record entries made by the scribe were at my direction and in my presence.   I have reviewed the chart and agree that the record is accurate and complete.   Gerry Smith MD.    [NP]   2120 This is an emergent evaluation of a 68 y.o.female patient with presentation of weakness, cough.   Initial differentials include but are not limited to:  Pneumonia, bronchitis, pleural effusion, pulmonary edema , CHF, UTI, electrolyte abnormality, anemia, viral illness  Plan:  Basic labs, TSH, troponin, lactate, UA    [NP]      ED Course User Index  [NP] Gerry Smith MD     Patient has pulmonary edema on chest x-ray.    1:53 AM  Patient still has mild tachypnea, she is on supplemental oxygen with sats > 95%.  Considering patient was hypoxic on arrival, she will likely need to continue oxygen.   Overall impression is acute diastolic heart failure.  Consulted with [facility]. Will admit patient to observation    Clinical Impression:     1. Acute diastolic congestive heart failure    2. Generalized weakness    3. Fatigue                 Gerry Smith MD  10/02/18 0604       Gerry Smith MD  10/15/18 [id number]

## 2018-09-22 NOTE — H&P
[facility] Hospital Medicine  History & Physical    Patient Name: Angelina Beard  MRN: [id number]  Admission Date: 9/21/2018  Attending Physician: Fuentes Garcia MD  Primary Care Provider: Eva Jara MD         Patient information was obtained from patient and ER records.     Subjective:     Principal Problem:Acute diastolic congestive heart failure    Chief Complaint:   Chief Complaint   Patient presents with    Fatigue     C/O GENERALIZED WEAKNESS AND GENERALIZED BODY ACHES X1 WEEK.        HPI: Patient is a 67 yo female pmhx of T2DM, HTN, ESRD on HD MWF, pulmonary sarcoid, and HFpEF.  Patient presents to the ED with complaints of generalized weakness x 7 days.  She reports she has been feeling tired, weak, with generalized body aches.  She also endorses feeling nauseated and non-productive cough during this time.  She reports experiencing fevers (has not taken temp) and chills at home.  She reports she last attended dialysis Friday and completed session.  She denies recent travel, sick contacts, or changes to diet.  Patient denies use of home oxygen.  Found to be saturating at 87% on RA during initial ED evaluation.      Past Medical History:   Diagnosis Date    Arthritis     Breast cancer, left     CHF (congestive heart failure)     Cholelithiasis     Coronary artery disease     Diabetes mellitus, type 2     End-stage renal disease on hemodialysis     Hemodialysis access site with arteriovenous graft     mon-wed -fri    Hyperlipidemia     Hypertension     Sarcoidosis        Past Surgical History:   Procedure Laterality Date    av graft      left arm    BRACHIAL ARTERY GRAFT Left 05/04/2016    brachiocephalic, Dr. Anson Crocker    BREAST BIOPSY Left     breast ca    BREAST LUMPECTOMY Left     radiation only    btl      COLONOSCOPY N/A 7/13/2018    Procedure: COLONOSCOPY;  Surgeon: Almita Bee MD;  Location: [facility];  Service: Endoscopy;  Laterality: N/A;     COLONOSCOPY N/A 7/13/2018    Performed by Amlita Bee MD at [facility] ENDO    ELBOW SURGERY      left    ESOPHAGOGASTRODUODENOSCOPY N/A 7/13/2018    Procedure: ESOPHAGOGASTRODUODENOSCOPY (EGD);  Surgeon: Almita Bee MD;  Location: [facility] ENDO;  Service: Endoscopy;  Laterality: N/A;    ESOPHAGOGASTRODUODENOSCOPY (EGD) N/A 7/13/2018    Performed by [facility]a Bee MD at [facility] ENDO    KNJHMINAX-OGOWR-ZLAQJZQLANZOY Right 5/4/2016    Performed by Anson Crocker MD at [facility] OR    LIPOMA RESECTION      back of neck    pilondial cyst      REPLACEMENT-KNEE-TOTAL Right 2/23/2016    [facility] at [facility] OR 2ND FLR    TOTAL KNEE ARTHROPLASTY Right 02/23/2016       Review of patient's allergies indicates:  No Known Allergies    No current facility-administered medications on file prior to encounter.      Current Outpatient Medications on File Prior to Encounter   Medication Sig    amLODIPine (NORVASC) 10 MG tablet Take 10 mg by mouth. TAKE 1 TABLET BY MOUTH ON OFF DIALYSIS DAYS.    aspirin (ECOTRIN) 81 MG EC tablet Take 81 mg by mouth once daily.    calcium acetate (PHOSLO) 667 mg capsule Take 667 mg by mouth 3 (three) times daily with meals.    furosemide (LASIX) 40 MG tablet Take 40 mg by mouth once daily.    gabapentin (NEURONTIN) 100 MG capsule Take 1 capsule (100 mg total) by mouth every evening. (Patient taking differently: Take 300 mg by mouth every 8 (eight) hours as needed. )    insulin glargine (LANTUS) 100 unit/mL injection Inject 20 Units into the skin every evening.    isosorbide mononitrate (IMDUR) 30 MG 24 hr tablet Take 30 mg by mouth once daily.    pantoprazole (PROTONIX) 40 MG tablet Take 1 tablet (40 mg total) by mouth 2 (two) times daily. (Patient taking differently: Take 40 mg by mouth once daily. )    pravastatin (PRAVACHOL) 40 MG tablet Take 40 mg by mouth every evening.     nitroGLYCERIN (NITROSTAT) 0.4 MG SL tablet Place 0.4 mg under the tongue every 5 (five)  minutes as needed for Chest pain.     Family History     Problem Relation (Age of Onset)    Diabetes Mother, Sister    Heart disease Sister    Hypertension Mother    Kidney disease Mother        Tobacco Use    Smoking status: Never Smoker    Smokeless tobacco: Never Used   Substance and Sexual Activity    Alcohol use: No    Drug use: No    Sexual activity: Not on file     Review of Systems   Constitutional: Positive for activity change, chills, fatigue and fever. Negative for appetite change.   HENT: Negative for congestion and sore throat.    Respiratory: Positive for cough and shortness of breath. Negative for chest tightness.    Cardiovascular: Negative for chest pain and palpitations.   Gastrointestinal: Positive for constipation and nausea. Negative for abdominal pain and vomiting.   Genitourinary: Negative for dysuria and hematuria.   Musculoskeletal: Positive for myalgias. Negative for neck pain and neck stiffness.   Neurological: Positive for weakness. Negative for dizziness and headaches.   Psychiatric/Behavioral: Negative for agitation and behavioral problems.     Objective:     Vital Signs (Most Recent):  Temp: 99.2 °F (37.3 °C) (09/21/18 2053)  Pulse: 91 (09/22/18 0233)  Resp: 20 (09/22/18 0233)  BP: (!) 158/64 (09/22/18 0233)  SpO2: 98 % (09/22/18 0233) Vital Signs (24h Range):  Temp:  [99.2 °F (37.3 °C)] 99.2 °F (37.3 °C)  Pulse:  [85-99] 91  Resp:  [18-25] 20  SpO2:  [87 %-99 %] 98 %  BP: (137-193)/(64-86) 158/64     Weight: 103.4 kg (228 lb)  Body mass index is 35.71 kg/m².    Physical Exam   Constitutional: She is oriented to person, place, and time. She appears well-developed and well-nourished. No distress.   HENT:   Head: Normocephalic and atraumatic.   Mouth/Throat: No oropharyngeal exudate.   Eyes: EOM are normal. Pupils are equal, round, and reactive to light. No scleral icterus.   Neck: Normal range of motion. Neck supple.   Cardiovascular: Normal rate and regular rhythm.   Murmur  (systolic ) heard.  Pulmonary/Chest: Effort normal.   Decreased air movement to bilateral lung bases   Abdominal: Soft. She exhibits no distension.   Musculoskeletal: Normal range of motion. She exhibits no edema.   Fistula of right upper extremity, good thrill    Neurological: She is alert and oriented to person, place, and time. No cranial nerve deficit. She exhibits normal muscle tone. Coordination normal.   Skin: Skin is warm and dry. Capillary refill takes less than 2 seconds. She is not diaphoretic.   Psychiatric: She has a normal mood and affect. Her behavior is normal.   Nursing note and vitals reviewed.        CRANIAL NERVES     CN III, IV, VI   Pupils are equal, round, and reactive to light.  Extraocular motions are normal.        Significant Labs:   CBC:   Recent Labs   Lab  09/21/18 2122   WBC  8.94   HGB  8.6*   HCT  28.3*   PLT  300     CMP:   Recent Labs   Lab  09/21/18 2122   NA  136   K  3.9   CL  95   CO2  28   GLU  238*   BUN  15   CREATININE  5.2*   CALCIUM  10.1   PROT  7.8   ALBUMIN  2.7*   BILITOT  0.3   ALKPHOS  61   AST  10   ALT  11   ANIONGAP  13   EGFRNONAA  8*     Lactic Acid:   Recent Labs   Lab  09/21/18 2122   LACTATE  1.0     Pathology Results  (Last 10 years)    None        Troponin:   Recent Labs   Lab  09/21/18 2122 09/22/18   0150   TROPONINI  0.027*  0.026     TSH:   Recent Labs   Lab  09/21/18 2122   TSH  0.993     Significant Imaging: I have reviewed all pertinent imaging results/findings within the past 24 hours.    Assessment/Plan:     * Acute diastolic congestive heart failure    CXR revealing cardiomegaly with new increased attenuation of the pulmonary parenchyma.  Findings represent decompensated CHF with evolving pulmonary edema.    Given 500cc IVF bolus in ED  Given IV lasix 40mg in ED  Patient reports minimal urine production   Have consulted nephro for evaluation of further HD           Physical deconditioning    Per chart review, patient is mostly  wheelchair bound  Ambulates at times with walker  Will consult PT/OT for further assistance           Paroxysmal atrial fibrillation    Not on anticoagulation or rate control medication           History of sarcoidosis    Patient reports pulmonary involvement  Denies evaluation by pulmonologist           GERD (gastroesophageal reflux disease)    Continue protonix 40mg daily           Hyperlipidemia    Pravastatin 40mg daily           Type 2 diabetes mellitus with hypertension and end stage renal disease on dialysis    Home regimen of 20 units lantus qhs.and novolin 17 units BID  Have continued 20 units of lantus and covered with SSI           Cerebral microvascular disease    Continue asa 81mg and prvastatin 40mg             VTE Risk Mitigation (From admission, onward)        Ordered     IP VTE HIGH RISK PATIENT  Once      09/22/18 0351     Place CAMELIA waltone  Until discontinued      09/22/18 0351             Silvana Sotomayor MD  Department of Hospital Medicine   [facility]

## 2018-09-22 NOTE — ASSESSMENT & PLAN NOTE
Home regimen of 20 units lantus qhs.and novolin 17 units BID  Have continued 20 units of lantus and covered with SSI

## 2018-09-22 NOTE — PLAN OF CARE
Problem: Patient Care Overview  Goal: Plan of Care Review  Outcome: Ongoing (interventions implemented as appropriate)  Patient came from ER due to weakness via stretcher.  AAOx4, VSS.  No complaint of pain or any other distress noted.  On continuous telemetry cardiac monitor, afib HR in 80s.  On 2L oxygen via NC.  Glucose monitoring.  Fall precaution initiated, signage received.  Bed locked, alarm set on, in the lowest position, non-skid socks applied, and call light within reach.  Instructed to call for any assistance.  Will continue to monitor.

## 2018-09-23 LAB
ALBUMIN SERPL BCP-MCNC: 2.4 G/DL
ALP SERPL-CCNC: 56 U/L
ALT SERPL W/O P-5'-P-CCNC: 11 U/L
ANION GAP SERPL CALC-SCNC: 13 MMOL/L
ANISOCYTOSIS BLD QL SMEAR: SLIGHT
AST SERPL-CCNC: 10 U/L
BASOPHILS # BLD AUTO: ABNORMAL K/UL
BASOPHILS NFR BLD: 0 %
BILIRUB SERPL-MCNC: 0.3 MG/DL
BUN SERPL-MCNC: 30 MG/DL
CALCIUM SERPL-MCNC: 9.7 MG/DL
CHLORIDE SERPL-SCNC: 95 MMOL/L
CO2 SERPL-SCNC: 26 MMOL/L
CREAT SERPL-MCNC: 7.8 MG/DL
DIFFERENTIAL METHOD: ABNORMAL
EOSINOPHIL # BLD AUTO: ABNORMAL K/UL
EOSINOPHIL NFR BLD: 0 %
ERYTHROCYTE [DISTWIDTH] IN BLOOD BY AUTOMATED COUNT: 15.1 %
EST. GFR  (AFRICAN AMERICAN): 6 ML/MIN/1.73 M^2
EST. GFR  (NON AFRICAN AMERICAN): 5 ML/MIN/1.73 M^2
GLUCOSE SERPL-MCNC: 178 MG/DL
HCT VFR BLD AUTO: 27.7 %
HGB BLD-MCNC: 8.5 G/DL
HYPOCHROMIA BLD QL SMEAR: ABNORMAL
LYMPHOCYTES # BLD AUTO: ABNORMAL K/UL
LYMPHOCYTES NFR BLD: 17 %
MAGNESIUM SERPL-MCNC: 1.6 MG/DL
MCH RBC QN AUTO: 28.5 PG
MCHC RBC AUTO-ENTMCNC: 30.7 G/DL
MCV RBC AUTO: 93 FL
MONOCYTES # BLD AUTO: ABNORMAL K/UL
MONOCYTES NFR BLD: 7 %
NEUTROPHILS NFR BLD: 74 %
NEUTS BAND NFR BLD MANUAL: 2 %
OVALOCYTES BLD QL SMEAR: ABNORMAL
PHOSPHATE SERPL-MCNC: 3.4 MG/DL
PLATELET # BLD AUTO: 310 K/UL
PLATELET BLD QL SMEAR: ABNORMAL
PMV BLD AUTO: 9.9 FL
POCT GLUCOSE: 161 MG/DL (ref 70–110)
POCT GLUCOSE: 190 MG/DL (ref 70–110)
POCT GLUCOSE: 212 MG/DL (ref 70–110)
POCT GLUCOSE: 242 MG/DL (ref 70–110)
POIKILOCYTOSIS BLD QL SMEAR: SLIGHT
POLYCHROMASIA BLD QL SMEAR: ABNORMAL
POTASSIUM SERPL-SCNC: 3.8 MMOL/L
PROT SERPL-MCNC: 6.9 G/DL
RBC # BLD AUTO: 2.98 M/UL
SODIUM SERPL-SCNC: 134 MMOL/L
WBC # BLD AUTO: 8.82 K/UL

## 2018-09-23 PROCEDURE — 83735 ASSAY OF MAGNESIUM: CPT | Mod: NTX

## 2018-09-23 PROCEDURE — 94761 N-INVAS EAR/PLS OXIMETRY MLT: CPT | Mod: NTX

## 2018-09-23 PROCEDURE — 84100 ASSAY OF PHOSPHORUS: CPT | Mod: NTX

## 2018-09-23 PROCEDURE — 96372 THER/PROPH/DIAG INJ SC/IM: CPT

## 2018-09-23 PROCEDURE — 25000003 PHARM REV CODE 250: Mod: NTX | Performed by: STUDENT IN AN ORGANIZED HEALTH CARE EDUCATION/TRAINING PROGRAM

## 2018-09-23 PROCEDURE — 85027 COMPLETE CBC AUTOMATED: CPT | Mod: NTX

## 2018-09-23 PROCEDURE — 11000001 HC ACUTE MED/SURG PRIVATE ROOM: Mod: NTX

## 2018-09-23 PROCEDURE — 85007 BL SMEAR W/DIFF WBC COUNT: CPT | Mod: NCS,NTX

## 2018-09-23 PROCEDURE — 96376 TX/PRO/DX INJ SAME DRUG ADON: CPT

## 2018-09-23 PROCEDURE — 27000221 HC OXYGEN, UP TO 24 HOURS: Mod: NTX

## 2018-09-23 PROCEDURE — 63600175 PHARM REV CODE 636 W HCPCS: Mod: NTX | Performed by: STUDENT IN AN ORGANIZED HEALTH CARE EDUCATION/TRAINING PROGRAM

## 2018-09-23 PROCEDURE — 80053 COMPREHEN METABOLIC PANEL: CPT | Mod: NTX

## 2018-09-23 PROCEDURE — 36415 COLL VENOUS BLD VENIPUNCTURE: CPT | Mod: NTX

## 2018-09-23 RX ADMIN — ACETAMINOPHEN 650 MG: 325 TABLET ORAL at 08:09

## 2018-09-23 RX ADMIN — INSULIN ASPART 4 UNITS: 100 INJECTION, SOLUTION INTRAVENOUS; SUBCUTANEOUS at 01:09

## 2018-09-23 RX ADMIN — AMLODIPINE BESYLATE 10 MG: 5 TABLET ORAL at 08:09

## 2018-09-23 RX ADMIN — ASPIRIN 81 MG: 81 TABLET, COATED ORAL at 08:09

## 2018-09-23 RX ADMIN — GABAPENTIN 100 MG: 100 CAPSULE ORAL at 08:09

## 2018-09-23 RX ADMIN — FUROSEMIDE 40 MG: 10 INJECTION, SOLUTION INTRAMUSCULAR; INTRAVENOUS at 08:09

## 2018-09-23 RX ADMIN — CALCIUM ACETATE 667 MG: 667 CAPSULE ORAL at 05:09

## 2018-09-23 RX ADMIN — CALCIUM ACETATE 667 MG: 667 CAPSULE ORAL at 11:09

## 2018-09-23 RX ADMIN — ISOSORBIDE MONONITRATE 30 MG: 30 TABLET, EXTENDED RELEASE ORAL at 08:09

## 2018-09-23 RX ADMIN — PRAVASTATIN SODIUM 40 MG: 40 TABLET ORAL at 08:09

## 2018-09-23 RX ADMIN — CALCIUM ACETATE 667 MG: 667 CAPSULE ORAL at 08:09

## 2018-09-23 RX ADMIN — INSULIN ASPART 4 UNITS: 100 INJECTION, SOLUTION INTRAVENOUS; SUBCUTANEOUS at 05:09

## 2018-09-23 RX ADMIN — INSULIN DETEMIR 20 UNITS: 100 INJECTION, SOLUTION SUBCUTANEOUS at 08:09

## 2018-09-23 RX ADMIN — PANTOPRAZOLE SODIUM 40 MG: 40 TABLET, DELAYED RELEASE ORAL at 08:09

## 2018-09-23 NOTE — PLAN OF CARE
Problem: Patient Care Overview  Goal: Plan of Care Review  Outcome: Revised  Pt stable. NO distress noted. POC reviewed with pt. Pt verbalized understanding. Pt remains SR on the monitor. NO true red alarms noted. Pt denied pain.Blood glucose monitored. SSI administered. Lasix given on previous shift. Fall precautions maintained. Bed in lowest position, call light in reach and bed alarm on.

## 2018-09-23 NOTE — PROGRESS NOTES
Progress Note  [facility]  Admit Date: 9/21/2018   LOS: 0 days   SUBJECTIVE:     Follow-up For: Management for CHF exacerbation     Interval History:  Patient has had only 150cc of UOP. Patient on HD MWF.  CXR to be completed in AM. Procalcitonin elevated.     Subjective:  No acute overnight events. Patient seen and examined this AM. Patient states that her breathing has improved since last night. Patient is tolerating PO intake, passing flatus and able to ambulate. Denies any: Fevers, chills, HA, n/v chest pain, palpitations, SOB, abdominal pain, changes in bowel movements and urinary habits, weakness, numbness and tingling.       Current Facility-Administered Medications:     acetaminophen tablet 650 mg, 650 mg, Oral, Q6H PRN, Obed Figueroa MD, 650 mg at 09/22/18 1829    amLODIPine tablet 10 mg, 10 mg, Oral, Daily, Silvana Sotomayor MD, 10 mg at 09/23/18 0845    aspirin EC tablet 81 mg, 81 mg, Oral, Daily, Silvana Sotomayor MD, 81 mg at 09/23/18 0845    calcium acetate capsule 667 mg, 667 mg, Oral, TID WM, Silvana Sotomayor MD, 667 mg at 09/23/18 0845    dextrose 50% injection 12.5 g, 12.5 g, Intravenous, PRN, Silvana Sotomayor MD    dextrose 50% injection 25 g, 25 g, Intravenous, PRN, Silvana Sotomayor MD    furosemide injection 40 mg, 40 mg, Intravenous, Daily, Silvana Sotomayor MD, 40 mg at 09/23/18 0845    gabapentin capsule 100 mg, 100 mg, Oral, QHS, Silvana Sotomayor MD, 100 mg at 09/22/18 2044    glucagon (human recombinant) injection 1 mg, 1 mg, Intramuscular, PRN, Silvana Sotomayor MD    glucose chewable tablet 16 g, 16 g, Oral, PRN, Silvana Sotomayor MD    glucose chewable tablet 24 g, 24 g, Oral, PRN, Silvana Sotomayor MD    influenza (FLUZONE HIGH-DOSE) vaccine 0.5 mL, 0.5 mL, Intramuscular, vaccine x 1 dose, Kameron Guerrero MD    insulin aspart U-100 pen 1-10 Units, 1-10 Units, Subcutaneous, QID (AC + HS) PRN, Silvana  Emily Sotomayor MD, 2 Units at 09/22/18 1808    insulin detemir U-100 pen 20 Units, 20 Units, Subcutaneous, QHS, Silvana Sotomayor MD, 20 Units at 09/22/18 2045    isosorbide mononitrate 24 hr tablet 30 mg, 30 mg, Oral, Daily, Silvana Sotomayor MD, 30 mg at 09/23/18 0845    pantoprazole EC tablet 40 mg, 40 mg, Oral, Daily, Silvana Sotomayor MD, 40 mg at 09/23/18 0845    pravastatin tablet 40 mg, 40 mg, Oral, QHS, Silvana Sotomayor MD, 40 mg at 09/22/18 2044    sodium chloride 0.9% flush 5 mL, 5 mL, Intravenous, PRN, Silvana Sotomayor MD      OBJECTIVE:   Vital Signs (Most Recent)  Temp: 99 °F (37.2 °C) (09/23/18 0713)  Pulse: 90 (09/23/18 0713)  Resp: 18 (09/23/18 0713)  BP: (!) 160/74 (09/23/18 0713)  SpO2: (!) 94 % (09/23/18 0713)    I & O (Last 24H):    Intake/Output Summary (Last 24 hours) at 9/23/2018 0739  Last data filed at 9/22/2018 1230  Gross per 24 hour   Intake 440 ml   Output 150 ml   Net 290 ml     Wt Readings from Last 3 Encounters:   09/23/18 102.1 kg (225 lb 1.4 oz)   07/11/18 104.6 kg (230 lb 9.6 oz)   06/21/18 107 kg (236 lb)       Current Diet Order   Procedures    Diet diabetic [facility]; 2000 Calorie; Renal, Cardiac (Low Na/Chol)     Order Specific Question:   Indicate patient location for additional diet options:     Answer:   [facility]     Order Specific Question:   Total calories:     Answer:   2000 Calorie     Order Specific Question:   Additional Diet Options:     Answer:   Renal     Order Specific Question:   Additional Diet Options:     Answer:   Cardiac (Low Na/Chol)        Physical Exam  Constitutional: She is oriented to person, place, and time. She appears well-developed and well-nourished. No distress.   HENT:   Head: Normocephalic and atraumatic.   Mouth/Throat: No oropharyngeal exudate.   Eyes: EOM are normal. Pupils are equal, round, and reactive to light. No scleral icterus.   Neck: Normal range of motion. Neck supple.    Cardiovascular s1 and s2  Normal rate and regular rhythm. NO JVD.   Pulmonary/Chest: Effort normal.   Decreased air movement to bilateral lung bases   Abdominal: Soft. She exhibits no distension.   Musculoskeletal: Normal range of motion. She exhibits no edema.   Neurological: She is alert and oriented to person, place, and time. No cranial nerve deficit. She exhibits normal muscle tone. Coordination normal.   Skin: Skin is warm and dry. Capillary refill takes less than 2 seconds. She is not diaphoretic.   Psychiatric: She has a normal mood and affect. Her behavior is normal.   Nursing note and vitals reviewed.       Laboratory Data:  CBC  Recent Labs   Lab  09/21/18 2122 09/22/18 0611  09/23/18   0429   WBC  8.94  9.76  8.82   RBC  3.04*  3.13*  2.98*   HGB  8.6*  9.0*  8.5*   HCT  28.3*  29.3*  27.7*   PLT  300  337  310   MCV  93  94  93   MCH  28.3  28.8  28.5   MCHC  30.4*  30.7*  30.7*     CMP  Recent Labs   Lab  09/21/18 2122 09/22/18 0611 09/23/18   0429   CALCIUM  10.1  9.9  9.7   PROT  7.8  7.5  6.9   NA  136  137  134*   K  3.9  3.9  3.8   CO2  28  28  26   CL  95  96  95   BUN  15  18  30*   CREATININE  5.2*  6.0*  7.8*   ALKPHOS  61  67  56   ALT  11  11  11   AST  10  10  10   BILITOT  0.3  0.4  0.3     POCT-Glucose  POCT Glucose   Date Value Ref Range Status   09/23/2018 161 (H) 70 - 110 mg/dL Final   09/22/2018 212 (H) 70 - 110 mg/dL Final   09/22/2018 153 (H) 70 - 110 mg/dL Final   09/22/2018 219 (H) 70 - 110 mg/dL Final   09/22/2018 225 (H) 70 - 110 mg/dL Final   09/22/2018 237 (H) 70 - 110 mg/dL Final   09/21/2018 204 (H) 70 - 110 mg/dL Final     MICRO  Microbiology Results (last 7 days)     ** No results found for the last 168 hours. **        LIPID PANEL  Lab Results   Component Value Date    CHOL 177 08/19/2016     Lab Results   Component Value Date    HDL 60 08/19/2016     Lab Results   Component Value Date    LDLCALC 93.8 08/19/2016     Lab Results   Component Value Date    TRIG  116 08/19/2016     Lab Results   Component Value Date    CHOLHDL 33.9 08/19/2016       Diagnostic Results:  Imaging Results          X-Ray Chest PA And Lateral (Final result)  Result time 09/21/18 22:34:11    Final result by Willie Singh MD (09/21/18 22:34:11)                 Impression:      Cardiomegaly with new increased attenuation of the pulmonary parenchyma.  The findings represent decompensated CHF with evolving pulmonary edema.      Electronically signed by: Willie Singh MD  Date:    09/21/2018  Time:    22:34             Narrative:    EXAMINATION:  XR CHEST PA AND LATERAL    CLINICAL HISTORY:  Cough.    TECHNIQUE:  PA and lateral views of the chest were performed.    COMPARISON:  07/11/2018    FINDINGS:  Monitoring EKG leads are present.  There are postoperative changes in the left axillary region.  There is a vascular stent within the left subclavian region.    The trachea is unremarkable.  There is enlargement of the cardiomediastinal silhouette.  The hemidiaphragms are unremarkable.  There is no evidence of free air beneath the hemidiaphragms.  There are no pleural effusions.  There is no evidence of a pneumothorax.  There is no evidence of pneumomediastinum.  There has been interval development of increased attenuation of the pulmonary parenchyma.  The osseous structures are unremarkable.  The subcutaneous tissues are unremarkable.                                ASSESSMENT/PLAN:   Angelina Beard is a 68 y.o. female    * Acute diastolic congestive heart failure     CXR revealing cardiomegaly with new increased attenuation of the pulmonary parenchyma.  Findings represent decompensated CHF with evolving pulmonary edema.    Given 500cc IVF bolus in ED  Given IV lasix 40mg in ED  Uop: 150CC  HD MWF  F/u CXR in AM          Physical deconditioning     Per chart review, patient is mostly wheelchair bound  Ambulates at times with walker  Will consult PT/OT for further assistance              Paroxysmal atrial  fibrillation     Not on anticoagulation or rate control medication              History of sarcoidosis     Patient reports pulmonary involvement  Denies evaluation by pulmonologist              GERD (gastroesophageal reflux disease)     Continue protonix 40mg daily              Hyperlipidemia     Pravastatin 40mg daily              Type 2 diabetes mellitus with hypertension and end stage renal disease on dialysis     Home regimen of 20 units lantus qhs.and novolin 17 units BID  Continue 20 units of lantus and cover with SSI              Cerebral microvascular disease        History of CVA with no residuals.      Continue asa 81mg and pravastatin 40mg for secondary stroke prevention .       Code Status: Full code   PPX: Pantoprazole 40mg IV  DVT: SCDs  Diet: cardiac     Dispo: ESRD requiring Hemodialysis and H/H monitoring     9/23/2018 Reagan Forte MD  Family Medicine, PGY-1

## 2018-09-23 NOTE — NURSING
PCT called me to room. Pt is complaining of pain around rectum. Visualized bumps around rectum. Barrier cream applied. Will continue to monitor

## 2018-09-23 NOTE — NURSING
Home Oxygen Evaluation    Date Performed: 2018    1) Patient's Home O2 Sat on room air, while at rest: 97        If O2 sats on room air at rest are 88% or below, patient qualifies. No additional testing needed. Document N/A in steps 2 and 3. If 89% or above, complete steps 2.      2) Patient's O2 Sat on room air while exercisin        If O2 sats on room air while exercising remain 89% or above patient does not qualify, no further testing needed Document N/A in step 3. If O2 sats on room air while exercising are 88% or below, continue to step 3.      3) Patient's O2 Sat while exercising on O2: 0 at 0 LPM         (Must show improvement from #2 for patients to qualify)    If O2 sats improve on oxygen, patient qualifies for portable oxygen. If not, the patient does not qualify.      Please note patient cannot walk. Could only stand on the side of the bed for 45 seconds.

## 2018-09-23 NOTE — PROGRESS NOTES
[facility] Progress Note    Admit Date: 9/21/2018   LOS: 0 days     SUBJECTIVE:     Follow-up For:  ESRD evaluation and management    Overnight events:  Doing well today, no new issues or complaints.  She states her breathing has improved when compared to yesterday, she only has pain in the hips but no other pain, no chest pain, palpitations, abd pain, n/v, LE swelling.  Diarrhea has resolved and her appetite is fine.  Denies fatigue.  She thinks she is ok to wait for dialysis until tomorrow.    Scheduled Meds:   amLODIPine  10 mg Oral Daily    aspirin  81 mg Oral Daily    calcium acetate  667 mg Oral TID WM    furosemide  40 mg Intravenous Daily    gabapentin  100 mg Oral QHS    insulin detemir U-100  20 Units Subcutaneous QHS    isosorbide mononitrate  30 mg Oral Daily    pantoprazole  40 mg Oral Daily    pravastatin  40 mg Oral QHS     Continuous Infusions:  PRN Meds:acetaminophen, dextrose 50%, dextrose 50%, glucagon (human recombinant), glucose, glucose, influenza, insulin aspart U-100, sodium chloride 0.9%    Review of patient's allergies indicates:  No Known Allergies    Review of Systems  10 point ROS done and is neg besides what is listed above in HPI    OBJECTIVE:     Vital Signs (Most Recent)  Temp: 98.1 °F (36.7 °C) (09/23/18 1154)  Pulse: 96 (09/23/18 1157)  Resp: 18 (09/23/18 1154)  BP: (!) 130/90 (09/23/18 1154)  SpO2: 97 % (09/23/18 1157)    Vital Signs Range (Last 24H):  Temp:  [98.1 °F (36.7 °C)-100.2 °F (37.9 °C)]   Pulse:  [73-96]   Resp:  [16-18]   BP: (106-160)/(50-90)   SpO2:  [94 %-99 %]     I & O (Last 24H):    Intake/Output Summary (Last 24 hours) at 9/23/2018 1340  Last data filed at 9/23/2018 0830  Gross per 24 hour   Intake 250 ml   Output --   Net 250 ml     Physical Exam:  GEN:  appears well, comfortably lying in bed, in no acute distress, no nasal cannula in place  HEENT:  anicteric sclera, MMM  NECK:  no apparent JVD  CVS:  irregular rhythm with no murmurs or rubs  PULM:   CTAB  ABD:  soft, nontender, nondistended, normal bowel sounds  EXTR:  no edema  SKIN:  no rashes or lesions, warm and not diaphoretic  ACCESS:  RUE forearm loop AVG with good thrill, mild pulsatility    Laboratory:  CBC:   Recent Labs   Lab  09/23/18 0429   WBC  8.82   RBC  2.98*   HGB  8.5*   HCT  27.7*   PLT  310   MCV  93   MCH  28.5   MCHC  30.7*     CMP:   Recent Labs   Lab  09/23/18 0429   GLU  178*   CALCIUM  9.7   ALBUMIN  2.4*   PROT  6.9   NA  134*   K  3.8   CO2  26   CL  95   BUN  30*   CREATININE  7.8*   ALKPHOS  56   ALT  11   AST  10   BILITOT  0.3     Cardiac markers:   Recent Labs   Lab  09/22/18   0150   TROPONINI  0.026     ABGs: No results for input(s): PH, PCO2, PO2, HCO3, POCSATURATED, BE in the last 168 hours.  Microbiology Results (last 7 days)     ** No results found for the last 168 hours. **          Diagnostic Results:  Labs: Reviewed    ASSESSMENT/PLAN:     68 y.o. female with a history of ESRD (MWF at [facility] with Dr. Madden), HTN, CHF, DM, CVA who presents with weakness and fatigue, which had been ongoing for approximately one week, she is unsure what precipitated this event, but this could be due to excess volume, although she does not believe she has hypervolemia.     1. ESRD - as above  - dialysis thrice weekly unless more urgent indications arise, will dialyze next on Monday, although we can urgently do it prior to that if needed  - strict I/O, daily weights  - please avoid gadolinium, fleets, phos-based laxatives, NSAIDs     2. HTN/hypervolemia  - has had some significant elevations in her BP  - can continue home meds  - low Na diet essential  - would fluid restrict     3. Anemia ESRD  - will need to contact dialysis unit on Monday to see if/what dose of EPO she is on     4. MBD of ESRD  - can continue binders with meals           Pepito Randle MD

## 2018-09-23 NOTE — PLAN OF CARE
Problem: Patient Care Overview  Goal: Plan of Care Review  Patient lying in bed in NAD. Bed in low and locked position.Telemetry monitor in place. IV saline locked. Patient gets up to bedside commode. Will continue to monitor.

## 2018-09-24 VITALS
RESPIRATION RATE: 14 BRPM | SYSTOLIC BLOOD PRESSURE: 145 MMHG | BODY MASS INDEX: 36.29 KG/M2 | HEIGHT: 67 IN | OXYGEN SATURATION: 95 % | WEIGHT: 231.25 LBS | DIASTOLIC BLOOD PRESSURE: 67 MMHG | TEMPERATURE: 100 F | HEART RATE: 83 BPM

## 2018-09-24 LAB
ALBUMIN SERPL BCP-MCNC: 2.3 G/DL
ALP SERPL-CCNC: 57 U/L
ALT SERPL W/O P-5'-P-CCNC: 8 U/L
ANION GAP SERPL CALC-SCNC: 15 MMOL/L
AST SERPL-CCNC: 10 U/L
BASOPHILS # BLD AUTO: 0.01 K/UL
BASOPHILS NFR BLD: 0.1 %
BILIRUB SERPL-MCNC: 0.3 MG/DL
BUN SERPL-MCNC: 42 MG/DL
CALCIUM SERPL-MCNC: 9.7 MG/DL
CHLORIDE SERPL-SCNC: 94 MMOL/L
CO2 SERPL-SCNC: 26 MMOL/L
CREAT SERPL-MCNC: 9.4 MG/DL
DIASTOLIC DYSFUNCTION: YES
DIFFERENTIAL METHOD: ABNORMAL
EOSINOPHIL # BLD AUTO: 0.1 K/UL
EOSINOPHIL NFR BLD: 1.5 %
ERYTHROCYTE [DISTWIDTH] IN BLOOD BY AUTOMATED COUNT: 15.1 %
EST. GFR  (AFRICAN AMERICAN): 4 ML/MIN/1.73 M^2
EST. GFR  (NON AFRICAN AMERICAN): 4 ML/MIN/1.73 M^2
ESTIMATED PA SYSTOLIC PRESSURE: 33.69
GLUCOSE SERPL-MCNC: 132 MG/DL
HCT VFR BLD AUTO: 27.5 %
HGB BLD-MCNC: 8.4 G/DL
LYMPHOCYTES # BLD AUTO: 0.9 K/UL
LYMPHOCYTES NFR BLD: 9.9 %
MAGNESIUM SERPL-MCNC: 1.6 MG/DL
MCH RBC QN AUTO: 28 PG
MCHC RBC AUTO-ENTMCNC: 30.5 G/DL
MCV RBC AUTO: 92 FL
MONOCYTES # BLD AUTO: 0.9 K/UL
MONOCYTES NFR BLD: 10.6 %
NEUTROPHILS # BLD AUTO: 6.7 K/UL
NEUTROPHILS NFR BLD: 77.4 %
PHOSPHATE SERPL-MCNC: 3.6 MG/DL
PLATELET # BLD AUTO: 341 K/UL
PMV BLD AUTO: 9.7 FL
POCT GLUCOSE: 131 MG/DL (ref 70–110)
POCT GLUCOSE: 171 MG/DL (ref 70–110)
POTASSIUM SERPL-SCNC: 3.9 MMOL/L
PROT SERPL-MCNC: 6.8 G/DL
RBC # BLD AUTO: 3 M/UL
RETIRED EF AND QEF - SEE NOTES: 65 (ref 55–65)
SODIUM SERPL-SCNC: 135 MMOL/L
WBC # BLD AUTO: 8.68 K/UL

## 2018-09-24 PROCEDURE — 63600175 PHARM REV CODE 636 W HCPCS: Mod: NTX | Performed by: STUDENT IN AN ORGANIZED HEALTH CARE EDUCATION/TRAINING PROGRAM

## 2018-09-24 PROCEDURE — 36415 COLL VENOUS BLD VENIPUNCTURE: CPT | Mod: NTX

## 2018-09-24 PROCEDURE — G0008 ADMIN INFLUENZA VIRUS VAC: HCPCS | Mod: NTX | Performed by: FAMILY MEDICINE

## 2018-09-24 PROCEDURE — 93306 TTE W/DOPPLER COMPLETE: CPT | Mod: 26,NTX,, | Performed by: INTERNAL MEDICINE

## 2018-09-24 PROCEDURE — 83735 ASSAY OF MAGNESIUM: CPT | Mod: NTX

## 2018-09-24 PROCEDURE — 85025 COMPLETE CBC W/AUTO DIFF WBC: CPT | Mod: NTX

## 2018-09-24 PROCEDURE — 90471 IMMUNIZATION ADMIN: CPT | Mod: NTX | Performed by: FAMILY MEDICINE

## 2018-09-24 PROCEDURE — 27000221 HC OXYGEN, UP TO 24 HOURS: Mod: NTX

## 2018-09-24 PROCEDURE — 94761 N-INVAS EAR/PLS OXIMETRY MLT: CPT | Mod: NTX

## 2018-09-24 PROCEDURE — 80053 COMPREHEN METABOLIC PANEL: CPT | Mod: NTX

## 2018-09-24 PROCEDURE — 90662 IIV NO PRSV INCREASED AG IM: CPT | Mod: NTX | Performed by: FAMILY MEDICINE

## 2018-09-24 PROCEDURE — 84100 ASSAY OF PHOSPHORUS: CPT | Mod: NTX

## 2018-09-24 PROCEDURE — 3E0234Z INTRODUCTION OF SERUM, TOXOID AND VACCINE INTO MUSCLE, PERCUTANEOUS APPROACH: ICD-10-PCS | Performed by: FAMILY MEDICINE

## 2018-09-24 PROCEDURE — 93306 TTE W/DOPPLER COMPLETE: CPT | Mod: NTX

## 2018-09-24 PROCEDURE — 63600175 PHARM REV CODE 636 W HCPCS: Mod: NTX | Performed by: FAMILY MEDICINE

## 2018-09-24 PROCEDURE — 94799 UNLISTED PULMONARY SVC/PX: CPT | Mod: NTX

## 2018-09-24 PROCEDURE — 97530 THERAPEUTIC ACTIVITIES: CPT | Mod: NTX

## 2018-09-24 PROCEDURE — 80100016 HC MAINTENANCE HEMODIALYSIS: Mod: NTX

## 2018-09-24 PROCEDURE — 25000003 PHARM REV CODE 250: Mod: NTX | Performed by: STUDENT IN AN ORGANIZED HEALTH CARE EDUCATION/TRAINING PROGRAM

## 2018-09-24 PROCEDURE — 11000001 HC ACUTE MED/SURG PRIVATE ROOM: Mod: NTX

## 2018-09-24 RX ORDER — SODIUM CHLORIDE 9 MG/ML
INJECTION, SOLUTION INTRAVENOUS ONCE
Status: DISCONTINUED | OUTPATIENT
Start: 2018-09-24 | End: 2018-09-25 | Stop reason: HOSPADM

## 2018-09-24 RX ORDER — SODIUM CHLORIDE 9 MG/ML
INJECTION, SOLUTION INTRAVENOUS
Status: DISCONTINUED | OUTPATIENT
Start: 2018-09-24 | End: 2018-09-25 | Stop reason: HOSPADM

## 2018-09-24 RX ADMIN — ACETAMINOPHEN 650 MG: 325 TABLET ORAL at 05:09

## 2018-09-24 RX ADMIN — CALCIUM ACETATE 667 MG: 667 CAPSULE ORAL at 12:09

## 2018-09-24 RX ADMIN — CALCIUM ACETATE 667 MG: 667 CAPSULE ORAL at 08:09

## 2018-09-24 RX ADMIN — AMLODIPINE BESYLATE 10 MG: 5 TABLET ORAL at 12:09

## 2018-09-24 RX ADMIN — ISOSORBIDE MONONITRATE 30 MG: 30 TABLET, EXTENDED RELEASE ORAL at 12:09

## 2018-09-24 RX ADMIN — INFLUENZA A VIRUS A/MICHIGAN/45/2015 X-275 (H1N1) ANTIGEN (FORMALDEHYDE INACTIVATED), INFLUENZA A VIRUS A/SINGAPORE/INFIMH-16-0019/2016 IVR-186 (H3N2) ANTIGEN (FORMALDEHYDE INACTIVATED), AND INFLUENZA B VIRUS B/MARYLAND/15/2016 BX-69A (A B/COLORADO/6/2017-LIKE VIRUS) ANTIGEN (FORMALDEHYDE INACTIVATED) 0.5 ML: 60; 60; 60 INJECTION, SUSPENSION INTRAMUSCULAR at 05:09

## 2018-09-24 RX ADMIN — PANTOPRAZOLE SODIUM 40 MG: 40 TABLET, DELAYED RELEASE ORAL at 08:09

## 2018-09-24 RX ADMIN — ASPIRIN 81 MG: 81 TABLET, COATED ORAL at 08:09

## 2018-09-24 RX ADMIN — CALCIUM ACETATE 667 MG: 667 CAPSULE ORAL at 05:09

## 2018-09-24 RX ADMIN — FUROSEMIDE 40 MG: 10 INJECTION, SOLUTION INTRAMUSCULAR; INTRAVENOUS at 12:09

## 2018-09-24 NOTE — PT/OT/SLP PROGRESS
Physical Therapy Treatment    Patient Name:  Angelina Beard   MRN:  [id number]    Recommendations:     Discharge Recommendations:  home with home health, home health PT, home health OT   Discharge Equipment Recommendations: none   Barriers to discharge: None    Assessment:     Angelina Beard is a 68 y.o. female admitted with a medical diagnosis of Acute on chronic diastolic congestive heart failure.  She presents with the following impairments/functional limitations:  weakness, pain, impaired self care skills, impaired functional mobilty, gait instability, decreased ROM, impaired balance, impaired endurance . Patient unable to tolerate gait for O2 assessment 2/2 L knee pain.    Rehab Prognosis:  good; patient would benefit from acute skilled PT services to address these deficits and reach maximum level of function.      Recent Surgery: * No surgery found *      Plan:     During this hospitalization, patient to be seen 5 x/week to address the above listed problems via gait training, therapeutic activities, therapeutic exercises  · Plan of Care Expires:      Plan of Care Reviewed with: patient    Subjective     Communicated with primary nurse prior to session.  Patient found supine  upon PT entry to room, agreeable to treatment.      Chief Complaint: pain and fatigue  Patient comments/goals: go home  Pain/Comfort:  · Pain Rating 1: other (see comments)(did not rate on scale)  · Location - Side 1: Left  · Location - Orientation 1: generalized  · Location 1: knee    Patients cultural, spiritual, Restorationism conflicts given the current situation:      Objective:     Patient found with: bed alarm     General Precautions: Standard, fall   Orthopedic Precautions:N/A   Braces: N/A     Functional Mobility:  · Bed Mobility:     · Supine to Sit: supervision  · Sit to Supine: supervision  · Transfers:     · Sit to Stand:  minimum assistance and moderate assistance with rolling walker      AM-PAC 6 CLICK MOBILITY  Turning over in  bed (including adjusting bedclothes, sheets and blankets)?: 4  Sitting down on and standing up from a chair with arms (e.g., wheelchair, bedside commode, etc.): 3  Moving from lying on back to sitting on the side of the bed?: 4  Moving to and from a bed to a chair (including a wheelchair)?: 2  Need to walk in hospital room?: 1  Climbing 3-5 steps with a railing?: 1  Basic Mobility Total Score: 15       Therapeutic Activities and Exercises:   na    Patient left HOB elevated with call button in reach and bed alarm on..    GOALS:   Multidisciplinary Problems     Physical Therapy Goals        Problem: Physical Therapy Goal    Goal Priority Disciplines Outcome Goal Variances Interventions   Physical Therapy Goal     PT, PT/OT Ongoing (interventions implemented as appropriate)     Description:  1.  Supervision bed mobility  2.  Ambulate 25' with RW with CG  3.  Sit in chair 1 1/2 hours                    Time Tracking:     PT Received On: 09/24/18  PT Start Time: 1515     PT Stop Time: 1525  PT Total Time (min): 10 min     Billable Minutes: Therapeutic Activity 10    Treatment Type: Treatment  PT/PTA: PT           Aroldo Garcia, PT  09/24/2018

## 2018-09-24 NOTE — PLAN OF CARE
Problem: Physical Therapy Goal  Goal: Physical Therapy Goal  1.  Supervision bed mobility  2.  Ambulate 25' with RW with CG  3.  Sit in chair 1 1/2 hours   Outcome: Ongoing (interventions implemented as appropriate)  Recommend Home with Home Health  Unable to participate with ambulatory O2 test 2/2 knee pain

## 2018-09-24 NOTE — PROGRESS NOTES
Progress Note  [facility]  Admit Date: 9/21/2018   LOS: 1 day   SUBJECTIVE:     Follow-up For:  CHF exacerbation     Subjective:  NAEON.  PT held due to low sats at rest, never has used home O2 before.  PT/OT recs home with home health but pt doesn't walk.  Tolerating PO.  States she feels better.      Current Facility-Administered Medications:     acetaminophen tablet 650 mg, 650 mg, Oral, Q6H PRN, Obed Figueroa MD, 650 mg at 09/24/18 0538    amLODIPine tablet 10 mg, 10 mg, Oral, Daily, Silvana Sotomayor MD, 10 mg at 09/23/18 0845    aspirin EC tablet 81 mg, 81 mg, Oral, Daily, Silvana Sotomayor MD, 81 mg at 09/24/18 0831    calcium acetate capsule 667 mg, 667 mg, Oral, TID WM, Silvana Sotomayor MD, 667 mg at 09/24/18 0831    dextrose 50% injection 12.5 g, 12.5 g, Intravenous, PRN, Silvana Sotomayor MD    dextrose 50% injection 25 g, 25 g, Intravenous, PRN, Silvana Sotomayor MD    furosemide injection 40 mg, 40 mg, Intravenous, Daily, Silvana Sotomayor MD, 40 mg at 09/23/18 0845    gabapentin capsule 100 mg, 100 mg, Oral, QHS, Silvana Sotomayor MD, 100 mg at 09/23/18 2006    glucagon (human recombinant) injection 1 mg, 1 mg, Intramuscular, PRN, Silvana Sotomayor MD    glucose chewable tablet 16 g, 16 g, Oral, PRN, Silvana Sotomayor MD    glucose chewable tablet 24 g, 24 g, Oral, PRN, iSlvana Sotomayor MD    influenza (FLUZONE HIGH-DOSE) vaccine 0.5 mL, 0.5 mL, Intramuscular, vaccine x 1 dose, Kameron Guerrero MD    insulin aspart U-100 pen 1-10 Units, 1-10 Units, Subcutaneous, QID (AC + HS) PRN, Silvana Sotomayor MD, 4 Units at 09/23/18 1748    insulin detemir U-100 pen 20 Units, 20 Units, Subcutaneous, QHS, Silvana Sotomayor MD, 20 Units at 09/23/18 2006    isosorbide mononitrate 24 hr tablet 30 mg, 30 mg, Oral, Daily, Silvana Sotomayor MD, 30 mg at 09/23/18 0845    pantoprazole EC tablet 40 mg, 40 mg, Oral, Daily,  Silvana Sotomayor MD, 40 mg at 09/24/18 0831    pravastatin tablet 40 mg, 40 mg, Oral, QHS, Silvana Sotomayor MD, 40 mg at 09/23/18 2006    sodium chloride 0.9% flush 5 mL, 5 mL, Intravenous, PRN, Silvana Sotomayor MD      OBJECTIVE:   Vital Signs (Most Recent)  Temp: 98 °F (36.7 °C) (09/24/18 0705)  Pulse: 76 (09/24/18 0705)  Resp: 18 (09/24/18 0705)  BP: 135/60 (09/24/18 0705)  SpO2: (!) 92 % (09/24/18 0743)    I & O (Last 24H):    Intake/Output Summary (Last 24 hours) at 9/24/2018 0849  Last data filed at 9/24/2018 0837  Gross per 24 hour   Intake 485 ml   Output 200 ml   Net 285 ml     Wt Readings from Last 3 Encounters:   09/24/18 104.9 kg (231 lb 4.2 oz)   07/11/18 104.6 kg (230 lb 9.6 oz)   06/21/18 107 kg (236 lb)       Current Diet Order   Procedures    Diet diabetic [facility]; 2000 Calorie; Renal, Cardiac (Low Na/Chol)     Order Specific Question:   Indicate patient location for additional diet options:     Answer:   [facility]     Order Specific Question:   Total calories:     Answer:   2000 Calorie     Order Specific Question:   Additional Diet Options:     Answer:   Renal     Order Specific Question:   Additional Diet Options:     Answer:   Cardiac (Low Na/Chol)        Physical Exam  Constitutional: She is oriented to person, place, and time. She appears well-developed and well-nourished. No distress.   HENT:   Head: Normocephalic and atraumatic.   Mouth/Throat: No oropharyngeal exudate.   Eyes: EOM are normal. Pupils are equal, round, and reactive to light. No scleral icterus.   Neck: Normal range of motion. Neck supple.   Cardiovascular: s1 and s2  Normal rate and regular rhythm. NO JVD. Fistula on RUE with pulsatile flow.  Pulmonary/Chest: Effort normal. Decreased air movement to bilateral lung bases  +crackles  Abdominal: Soft. She exhibits no distension.   Musculoskeletal: Normal range of motion. She exhibits no edema.   Neurological: She is alert and oriented to  person, place, and time. No cranial nerve deficit. She exhibits normal muscle tone. Coordination normal.   Skin: Skin is warm and dry. Capillary refill takes less than 2 seconds. She is not diaphoretic.   Psychiatric: She has a normal mood and affect. Her behavior is normal.   Nursing note and vitals reviewed.       Laboratory Data:  CBC  Recent Labs   Lab  09/22/18 0611 09/23/18 0429 09/24/18   0423   WBC  9.76  8.82  8.68   RBC  3.13*  2.98*  3.00*   HGB  9.0*  8.5*  8.4*   HCT  29.3*  27.7*  27.5*   PLT  337  310  341   MCV  94  93  92   MCH  28.8  28.5  28.0   MCHC  30.7*  30.7*  30.5*     CMP  Recent Labs   Lab  09/22/18 0611 09/23/18 0429 09/24/18   0424   CALCIUM  9.9  9.7  9.7   PROT  7.5  6.9  6.8   NA  137  134*  135*   K  3.9  3.8  3.9   CO2  28  26  26   CL  96  95  94*   BUN  18  30*  42*   CREATININE  6.0*  7.8*  9.4*   ALKPHOS  67  56  57   ALT  11  11  8*   AST  10  10  10   BILITOT  0.4  0.3  0.3     POCT-Glucose  POCT Glucose   Date Value Ref Range Status   09/24/2018 131 (H) 70 - 110 mg/dL Final   09/23/2018 190 (H) 70 - 110 mg/dL Final   09/23/2018 212 (H) 70 - 110 mg/dL Final   09/23/2018 242 (H) 70 - 110 mg/dL Final   09/23/2018 161 (H) 70 - 110 mg/dL Final   09/22/2018 212 (H) 70 - 110 mg/dL Final   09/22/2018 153 (H) 70 - 110 mg/dL Final   09/22/2018 219 (H) 70 - 110 mg/dL Final   09/22/2018 225 (H) 70 - 110 mg/dL Final   09/22/2018 237 (H) 70 - 110 mg/dL Final   09/21/2018 204 (H) 70 - 110 mg/dL Final     MICRO  Microbiology Results (last 7 days)     ** No results found for the last 168 hours. **        LIPID PANEL  Lab Results   Component Value Date    CHOL 177 08/19/2016     Lab Results   Component Value Date    HDL 60 08/19/2016     Lab Results   Component Value Date    LDLCALC 93.8 08/19/2016     Lab Results   Component Value Date    TRIG 116 08/19/2016     Lab Results   Component Value Date    CHOLHDL 33.9 08/19/2016       Diagnostic Results:  Imaging Results          X-Ray  Chest PA And Lateral (Final result)  Result time 09/21/18 22:34:11    Final result by Willie Singh MD (09/21/18 22:34:11)                 Impression:      Cardiomegaly with new increased attenuation of the pulmonary parenchyma.  The findings represent decompensated CHF with evolving pulmonary edema.      Electronically signed by: Willie Singh MD  Date:    09/21/2018  Time:    22:34             Narrative:    EXAMINATION:  XR CHEST PA AND LATERAL    CLINICAL HISTORY:  Cough.    TECHNIQUE:  PA and lateral views of the chest were performed.    COMPARISON:  07/11/2018    FINDINGS:  Monitoring EKG leads are present.  There are postoperative changes in the left axillary region.  There is a vascular stent within the left subclavian region.    The trachea is unremarkable.  There is enlargement of the cardiomediastinal silhouette.  The hemidiaphragms are unremarkable.  There is no evidence of free air beneath the hemidiaphragms.  There are no pleural effusions.  There is no evidence of a pneumothorax.  There is no evidence of pneumomediastinum.  There has been interval development of increased attenuation of the pulmonary parenchyma.  The osseous structures are unremarkable.  The subcutaneous tissues are unremarkable.                                ASSESSMENT/PLAN:   Angelina Beard is a 68 y.o. female    * Acute diastolic congestive heart failure     CXR revealing cardiomegaly with new increased attenuation of the pulmonary parenchyma. Findings represent decompensated CHF with evolving pulmonary edema.    Last echo with diastolic dysfunction, repeat echo ordered, will f/u  Uop minimal to none  Order new ECHO  HD MWF       Physical deconditioning     Per chart review, patient is mostly wheelchair bound  Ambulates at times with walker but has severe chronic knee pain  PT/OT recommended home with home health/PT/OT, will discuss with them as she seems quite deconditioned       Paroxysmal atrial fibrillation     Not on  anticoagulation or rate control medication        History of sarcoidosis     Patient reports pulmonary involvement  Denies evaluation by pulmonologist, will refer for f/u       GERD (gastroesophageal reflux disease)     Continue protonix 40mg daily        Hyperlipidemia     Pravastatin 40mg daily        Type 2 diabetes mellitus with hypertension and end stage renal disease on dialysis     Home regimen of 20 units lantus qhs.and novolin 17 units BID  Continue 20 units of lantus and cover with SSI        Cerebral microvascular disease        History of CVA with no residuals.      Continue asa 81mg and pravastatin 40mg for secondary stroke prevention .       Code Status: Full code   PPX: Pantoprazole 40mg IV  DVT: SCDs  Diet: cardiac, renal     Dispo: Dialysis today and repeat echo, will need better home O2 eval (complicated by knee pain)    9/24/2018 Obed Figueroa MD  Family Medicine, PGY-2

## 2018-09-24 NOTE — PLAN OF CARE
Problem: Patient Care Overview  Goal: Plan of Care Review  Pt lying in bed in NAD. Bed in low and locked position. Bed alarm on. Pt had CXR, echo and dialysis today. Attempted an 02 evaluation. Pt unable to ambulate even with PT by her side with walker. IV saline locked. Telemetry monitor on. Now on a 1500ml fluid restriction. Will continue to monitor.

## 2018-09-24 NOTE — NURSING
Pt attempted to ambulate with PT for an 02 with walking eval. Pt unable to perform. Stood for only 45 seconds or so. Called family practice and notified them

## 2018-09-24 NOTE — PLAN OF CARE
Requested ambulatory O2 sats to determine if patient qualified for home oxygen. Patient refused to ambulate with PT/OT 2/2 knee pain. I spoke to patient at bedside. She stated that she walks to bathroom and kitchen at home. Explained to her purpose of ambulatory oxygen sat. I offered pain meds to help with ambulation. She stated she did not feel up to ambulating and did not need home oxygen. Respiratory was at bedside when I entered room. Patient's oxygen sats were 96% on RA. Patient would like d/c home.    Khalida Taylor MD  9/24/2018  [facility] HO-3

## 2018-09-24 NOTE — PT/OT/SLP PROGRESS
Occupational Therapy  Visit Attempt    Patient Name:  Angelina Beard   MRN:  [id number]    Patient not seen today secondary to NAVI in HD. Will follow-up later, when patient returns to floor.     14:10 - patient NAVI in cardiology for 2D echo. Will follow-up next avail date.    MARTHA Beckman  9/24/2018

## 2018-09-24 NOTE — DISCHARGE INSTRUCTIONS
Heart Failure, What is (English) View Edit Remove   Heart Failure, Discharge Instructions for (English) View Edit Remove   Heart Failure: Making Changes to Your Diet (English) View Edit Remove   Heart Failure: Tracking Your Weight (English) View Edit Remove   High Blood Pressure (Hypertension), Discharge Instructions (English) View Edit Remove

## 2018-09-24 NOTE — PT/OT/SLP PROGRESS
Physical Therapy      Patient Name:  Angelina Beard   MRN:  [id number]    Patient NAVI in dialysis. Will follow as able    Aroldo Garcia, PT

## 2018-09-24 NOTE — NURSING
[facility] RN clinician visited patient at bedside to discuss [facility].  Patient given and agreed on scheduled appointment time and date.  Patient given business card containing all clinic and appointment information.  Attempted to provide patient with heart failure education, however, she was in pain and preferred me just leave the education at the bedside.  8 Step Plan for Heart Failure Patients booklet left at bedside.  All questions and concerns addressed at this time, patient verbalized understanding of all above information.

## 2018-09-24 NOTE — PLAN OF CARE
Problem: Patient Care Overview  Goal: Plan of Care Review  Outcome: Ongoing (interventions implemented as appropriate)  Patient remained in bed with bed alarm on, yellow safety socks and fall risk band and limb alert. Dialysis access with bruit and thrill. Patient also with limb alert and ID band. Bed in lowest position and call bell in reach. Patient complained of headache at 2000 and hip pain at 0540. Received tylenol for each and with full relief. Lung sounds clear and diminished. Patient positioned self in bed easily. NSR on telemetry for the shift. Vital signs stable. MG monitored. 190 at 2200. Iv remained intact. Patient stayed using 2L NC for her own comfort. Plan of care reviewed with patient. Stated understanding. Will continue to monitor.

## 2018-09-24 NOTE — PLAN OF CARE
Pt being discharged home and prefers to resume care with [facility]. Tn sent orders and spoke to Dannielle at [facility] who stated she received orders and will send nurse to home tomorrow. Pt will follow up in [facility] as scheduled by Khalida Farrell . TN spoke to pt's sister Katelynn and informed pt ready for d/c but cannot get in touch with gerald Mccall and Ms. Pace stated she will text him and he or someone else will come  pt. Tn informed floor nurse Hailey pt ready form CM standpoint.  Pt has D/C folder, brochure, and business card and instructed to call for additional needs.     09/24/18 1639   Final Note   Assessment Type Final Discharge Note   Discharge Disposition Home-Health   What phone number can be called within the next 1-3 days to see how you are doing after discharge? [phone]   Hospital Follow Up  Appt(s) scheduled? Yes   Discharge plans and expectations educations in teach back method with documentation complete? Yes   Right Care Referral Info   Post Acute Recommendation Home-care   Referral Type    Facility Name [facility]

## 2018-09-24 NOTE — PROGRESS NOTES
Follow-up For:  CHF exacerbation      Subjective:  NAEON.  PT held due to low sats at rest, never has used home O2 before.  PT/OT recs home with home health but pt doesn't walk.  Tolerating PO.  States she feels better.        Current Facility-Administered Medications:     acetaminophen tablet 650 mg, 650 mg, Oral, Q6H PRN, Obed Figueroa MD, 650 mg at 09/24/18 0538    amLODIPine tablet 10 mg, 10 mg, Oral, Daily, Silvana Sotomayor MD, 10 mg at 09/23/18 0845    aspirin EC tablet 81 mg, 81 mg, Oral, Daily, Silvana Sotomayor MD, 81 mg at 09/24/18 0831    calcium acetate capsule 667 mg, 667 mg, Oral, TID WM, Silvana Sotomayor MD, 667 mg at 09/24/18 0831    dextrose 50% injection 12.5 g, 12.5 g, Intravenous, PRN, Silvana Sotomayor MD    dextrose 50% injection 25 g, 25 g, Intravenous, PRN, Silvana Sotomayor MD    furosemide injection 40 mg, 40 mg, Intravenous, Daily, Silvana Sotomayor MD, 40 mg at 09/23/18 0845    gabapentin capsule 100 mg, 100 mg, Oral, QHS, Silvana Sotomayor MD, 100 mg at 09/23/18 2006    glucagon (human recombinant) injection 1 mg, 1 mg, Intramuscular, PRN, Silvana Sotomayor MD    glucose chewable tablet 16 g, 16 g, Oral, PRN, Silvana Sotomayor MD    glucose chewable tablet 24 g, 24 g, Oral, PRN, Silvana Sotomayor MD    influenza (FLUZONE HIGH-DOSE) vaccine 0.5 mL, 0.5 mL, Intramuscular, vaccine x 1 dose, Kameron Guerrero MD    insulin aspart U-100 pen 1-10 Units, 1-10 Units, Subcutaneous, QID (AC + HS) PRN, Silvana Sotomayor MD, 4 Units at 09/23/18 1748    insulin detemir U-100 pen 20 Units, 20 Units, Subcutaneous, QHS, Silvana Sotomayor MD, 20 Units at 09/23/18 2006    isosorbide mononitrate 24 hr tablet 30 mg, 30 mg, Oral, Daily, Silvana Sotomayor MD, 30 mg at 09/23/18 0845    pantoprazole EC tablet 40 mg, 40 mg, Oral, Daily, Silvana Sotomayor MD, 40 mg at 09/24/18 0831    pravastatin tablet 40 mg, 40 mg,  Oral, QHS, Silvana Sotomayor MD, 40 mg at 09/23/18 2006    sodium chloride 0.9% flush 5 mL, 5 mL, Intravenous, PRN, Silvana Sotomayor MD        OBJECTIVE:   Vital Signs (Most Recent)  Temp: 98 °F (36.7 °C) (09/24/18 0705)  Pulse: 76 (09/24/18 0705)  Resp: 18 (09/24/18 0705)  BP: 135/60 (09/24/18 0705)  SpO2: (!) 92 % (09/24/18 0743)     I & O (Last 24H):     Intake/Output Summary (Last 24 hours) at 9/24/2018 0849  Last data filed at 9/24/2018 0837      Gross per 24 hour   Intake 485 ml   Output 200 ml   Net 285 ml          Wt Readings from Last 3 Encounters:   09/24/18 104.9 kg (231 lb 4.2 oz)   07/11/18 104.6 kg (230 lb 9.6 oz)   06/21/18 107 kg (236 lb)               Current Diet Order   Procedures    Diet diabetic [facility]; 2000 Calorie; Renal, Cardiac (Low Na/Chol)       Order Specific Question:   Indicate patient location for additional diet options:       Answer:   [facility]       Order Specific Question:   Total calories:       Answer:   2000 Calorie       Order Specific Question:   Additional Diet Options:       Answer:   Renal       Order Specific Question:   Additional Diet Options:       Answer:   Cardiac (Low Na/Chol)         Physical Exam  Constitutional: She is oriented to person, place, and time. She appears well-developed and well-nourished. No distress.   HENT:   Head: Normocephalic and atraumatic.   Mouth/Throat: No oropharyngeal exudate.   Eyes: EOM are normal. Pupils are equal, round, and reactive to light. No scleral icterus.   Neck: Normal range of motion. Neck supple.   Cardiovascular: s1 and s2  Normal rate and regular rhythm. NO JVD. Fistula on RUE with pulsatile flow.  Pulmonary/Chest: Effort normal. Decreased air movement to bilateral lung bases  +crackles  Abdominal: Soft. She exhibits no distension.   Musculoskeletal: Normal range of motion. She exhibits no edema.   Neurological: She is alert and oriented to person, place, and time. No cranial nerve deficit.  She exhibits normal muscle tone. Coordination normal.   Skin: Skin is warm and dry. Capillary refill takes less than 2 seconds. She is not diaphoretic.   Psychiatric: She has a normal mood and affect. Her behavior is normal.   Nursing note and vitals reviewed.        Laboratory Data:  CBC        Recent Labs   Lab  09/22/18   0611 09/23/18 0429 09/24/18   0423   WBC  9.76  8.82  8.68   RBC  3.13*  2.98*  3.00*   HGB  9.0*  8.5*  8.4*   HCT  29.3*  27.7*  27.5*   PLT  337  310  341   MCV  94  93  92   MCH  28.8  28.5  28.0   MCHC  30.7*  30.7*  30.5*      CMP        Recent Labs   Lab  09/22/18 0611 09/23/18 0429 09/24/18 0424   CALCIUM  9.9  9.7  9.7   PROT  7.5  6.9  6.8   NA  137  134*  135*   K  3.9  3.8  3.9   CO2  28  26  26   CL  96  95  94*   BUN  18  30*  42*   CREATININE  6.0*  7.8*  9.4*   ALKPHOS  67  56  57   ALT  11  11  8*   AST  10  10  10   BILITOT  0.4  0.3  0.3      POCT-Glucose        POCT Glucose   Date Value Ref Range Status   09/24/2018 131 (H) 70 - 110 mg/dL Final   09/23/2018 190 (H) 70 - 110 mg/dL Final   09/23/2018 212 (H) 70 - 110 mg/dL Final   09/23/2018 242 (H) 70 - 110 mg/dL Final   09/23/2018 161 (H) 70 - 110 mg/dL Final   09/22/2018 212 (H) 70 - 110 mg/dL Final   09/22/2018 153 (H) 70 - 110 mg/dL Final   09/22/2018 219 (H) 70 - 110 mg/dL Final   09/22/2018 225 (H) 70 - 110 mg/dL Final   09/22/2018 237 (H) 70 - 110 mg/dL Final   09/21/2018 204 (H) 70 - 110 mg/dL Final      MICRO      Microbiology Results (last 7 days)      ** No results found for the last 168 hours. **          LIPID PANEL        Lab Results   Component Value Date     CHOL 177 08/19/2016            Lab Results   Component Value Date     HDL 60 08/19/2016            Lab Results   Component Value Date     LDLCALC 93.8 08/19/2016            Lab Results   Component Value Date     TRIG 116 08/19/2016            Lab Results   Component Value Date     CHOLHDL 33.9 08/19/2016         Diagnostic Results:      Imaging  Results                   X-Ray Chest PA And Lateral (Final result)  Result time 09/21/18 22:34:11                Final result by Willie Singh MD (09/21/18 22:34:11)                               Impression:        Cardiomegaly with new increased attenuation of the pulmonary parenchyma.  The findings represent decompensated CHF with evolving pulmonary edema.        Electronically signed by:     Willie Singh MD  Date:                                            09/21/2018  Time:                                            22:34                         Narrative:     EXAMINATION:  XR CHEST PA AND LATERAL     CLINICAL HISTORY:  Cough.     TECHNIQUE:  PA and lateral views of the chest were performed.     COMPARISON:  07/11/2018     FINDINGS:  Monitoring EKG leads are present.  There are postoperative changes in the left axillary region.  There is a vascular stent within the left subclavian region.     The trachea is unremarkable.  There is enlargement of the cardiomediastinal silhouette.  The hemidiaphragms are unremarkable.  There is no evidence of free air beneath the hemidiaphragms.  There are no pleural effusions.  There is no evidence of a pneumothorax.  There is no evidence of pneumomediastinum.  There has been interval development of increased attenuation of the pulmonary parenchyma.  The osseous structures are unremarkable.  The subcutaneous tissues are unremarkable.                                             ASSESSMENT/PLAN:   Angelina Beard is a 68 y.o. female         * Acute diastolic congestive heart failure     CXR revealing cardiomegaly with new increased attenuation of the pulmonary parenchyma. Findings represent decompensated CHF with evolving pulmonary edema.    Last echo with diastolic dysfunction, repeat echo ordered, will f/u  Uop minimal to none  Order new ECHO  HD MWF       Physical deconditioning     Per chart review, patient is mostly wheelchair bound  Ambulates at times with walker but has  severe chronic knee pain  PT/OT recommended home with home health/PT/OT, will discuss with them as she seems quite deconditioned       Paroxysmal atrial fibrillation     Not on anticoagulation or rate control medication        History of sarcoidosis     Patient reports pulmonary involvement  Denies evaluation by pulmonologist, will refer for f/u       GERD (gastroesophageal reflux disease)     Continue protonix 40mg daily        Hyperlipidemia     Pravastatin 40mg daily        Type 2 diabetes mellitus with hypertension and end stage renal disease on dialysis     Home regimen of 20 units lantus qhs.and novolin 17 units BID  Continue 20 units of lantus and cover with SSI        Cerebral microvascular disease        History of CVA with no residuals.      Continue asa 81mg and pravastatin 40mg for secondary stroke prevention .        Code Status: Full code   PPX: Pantoprazole 40mg IV  DVT: SCDs  Diet: cardiac, renal      Dispo: Dialysis today and repeat echo, will need better home O2 eval (complicated by knee pain)     9/24/2018 Obed Figueroa MD  Family Medicine, PGY-2

## 2018-09-24 NOTE — DISCHARGE SUMMARY
[facility]  Discharge Summary     Patient ID:  Fredy Beard  [id number]  68 y.o.  1950    Admit date: 9/21/2018    Discharge Date and Time: 9/25/2018  7:50 AM    Admitting Physician: Kameron Guerrero MD     Discharge Provider: Obed Figueroa    Reason for Admission: Fatigue [R53.83]  Acute diastolic congestive heart failure [I50.31]  Generalized weakness [R53.1]  Acute diastolic congestive heart failure [I50.31]    Procedures Performed: * No surgery found *    HPI/Hospital Course   Fredy Beard is a 68 y.o. female with history of T2DM, HTN, ESRD on HD MWF, pulmonary sarcoid, and HFpEF ( EF=30 w/ DD) presented with CHF exacerbation. Patient presented to ED with fever, chills, and generalized fatigue. Concern for CHF exacerbation prompted CXR showing findings consistent with decompensated CHF with pulmonary edema. BNP was 778. Patient was saturating 87% on RA and given IV lasix in ED. Nephrology was consulted and recommended dialysis as scheduled. After admission to the floor, follow up CXR was done the next day. O2 sats remain at 89% on room air resting, but still with bibasilar crackles on exam. Patient was held overnight, Oxygen was lowered and pt tolerated room air at rest, and dialyzed Monday AM as regularly scheduled. Attempted to get ambulatory O2, but patient said she could not walk because of her osteoarthritis. PT/OT consulted- recommend home health. Echo done to show updated EF of 60% with diastolic dysfunction. Patient is stable and ready for discharge.     Consults: Nephrology    Significant Diagnostic Studies:   labs:  Results for FREDY BEARD (MRN [id number]) as of 9/24/2018 13:10   Ref. Range 9/24/2018 04:23   WBC Latest Ref Range: 3.90 - 12.70 K/uL 8.68   RBC Latest Ref Range: 4.00 - 5.40 M/uL 3.00 (L)   Hemoglobin Latest Ref Range: 12.0 - 16.0 g/dL 8.4 (L)   Hematocrit Latest Ref Range: 37.0 - 48.5 % 27.5 (L)   MCV Latest Ref Range: 82 - 98 fL 92   MCH Latest Ref Range:  27.0 - 31.0 pg 28.0   MCHC Latest Ref Range: 32.0 - 36.0 g/dL 30.5 (L)   RDW Latest Ref Range: 11.5 - 14.5 % 15.1 (H)   Platelets Latest Ref Range: 150 - 350 K/uL 341   MPV Latest Ref Range: 9.2 - 12.9 fL 9.7   Gran% Latest Ref Range: 38.0 - 73.0 % 77.4 (H)   Gran # (ANC) Latest Ref Range: 1.8 - 7.7 K/uL 6.7   Lymph% Latest Ref Range: 18.0 - 48.0 % 9.9 (L)   Lymph # Latest Ref Range: 1.0 - 4.8 K/uL 0.9 (L)   Mono% Latest Ref Range: 4.0 - 15.0 % 10.6   Mono # Latest Ref Range: 0.3 - 1.0 K/uL 0.9   Eosinophil% Latest Ref Range: 0.0 - 8.0 % 1.5   Eos # Latest Ref Range: 0.0 - 0.5 K/uL 0.1   Basophil% Latest Ref Range: 0.0 - 1.9 % 0.1   Baso # Latest Ref Range: 0.00 - 0.20 K/uL 0.01     Results for FREDY VARGAS (MRN [id number]) as of 9/24/2018 13:10   Ref. Range 9/24/2018 04:24   Sodium Latest Ref Range: 136 - 145 mmol/L 135 (L)   Potassium Latest Ref Range: 3.5 - 5.1 mmol/L 3.9   Chloride Latest Ref Range: 95 - 110 mmol/L 94 (L)   CO2 Latest Ref Range: 23 - 29 mmol/L 26   Anion Gap Latest Ref Range: 8 - 16 mmol/L 15   BUN, Bld Latest Ref Range: 8 - 23 mg/dL 42 (H)   Creatinine Latest Ref Range: 0.5 - 1.4 mg/dL 9.4 (H)   eGFR if non African American Latest Ref Range: >60 mL/min/1.73 m^2 4 (A)   eGFR if African American Latest Ref Range: >60 mL/min/1.73 m^2 4 (A)   Glucose Latest Ref Range: 70 - 110 mg/dL 132 (H)   Calcium Latest Ref Range: 8.7 - 10.5 mg/dL 9.7   Phosphorus Latest Ref Range: 2.7 - 4.5 mg/dL 3.6   Magnesium Latest Ref Range: 1.6 - 2.6 mg/dL 1.6   Alkaline Phosphatase Latest Ref Range: 55 - 135 U/L 57   Total Protein Latest Ref Range: 6.0 - 8.4 g/dL 6.8   Albumin Latest Ref Range: 3.5 - 5.2 g/dL 2.3 (L)   Total Bilirubin Latest Ref Range: 0.1 - 1.0 mg/dL 0.3   AST Latest Ref Range: 10 - 40 U/L 10   ALT Latest Ref Range: 10 - 44 U/L 8 (L)      radiology: X-Ray: CXR: X-Ray Chest 1 View (CXR):   Results for orders placed or performed during the hospital encounter of 09/21/18   X-Ray Chest 1 View    Narrative     EXAMINATION:  XR CHEST 1 VIEW    CLINICAL HISTORY:  f/u fluid status;    TECHNIQUE:  Single frontal view of the chest was performed.    COMPARISON:  09/21/2018    FINDINGS:  The lungs are symmetrically hypoinflated.  There is indistinctness of the pulmonary vasculature.  A more alveolar component is present in the left perihilar region.  No pleural effusion or pneumothorax.  No mass or nodule.  Stable cardiomegaly.  The mediastinum, osseous and soft tissue structures are stable for this patient including surgical clips in the left axilla and a left subclavian vascular stent.      Impression    The predominant finding is interstitial pulmonary edema however there may be an alveolar component in the left perihilar region.  Overall this exam is improved compared to the previous exam.  The left perihilar region may also represent a pneumonia although this is less likely.      Electronically signed by: Ena St MD  Date:    09/23/2018  Time:    08:42    and X-Ray Chest PA and Lateral (CXR):   Results for orders placed or performed during the hospital encounter of 09/21/18   X-Ray Chest PA And Lateral    Narrative    EXAMINATION:  XR CHEST PA AND LATERAL    CLINICAL HISTORY:  Cough.    TECHNIQUE:  PA and lateral views of the chest were performed.    COMPARISON:  07/11/2018    FINDINGS:  Monitoring EKG leads are present.  There are postoperative changes in the left axillary region.  There is a vascular stent within the left subclavian region.    The trachea is unremarkable.  There is enlargement of the cardiomediastinal silhouette.  The hemidiaphragms are unremarkable.  There is no evidence of free air beneath the hemidiaphragms.  There are no pleural effusions.  There is no evidence of a pneumothorax.  There is no evidence of pneumomediastinum.  There has been interval development of increased attenuation of the pulmonary parenchyma.  The osseous structures are unremarkable.  The subcutaneous tissues are  "unremarkable.      Impression    Cardiomegaly with new increased attenuation of the pulmonary parenchyma.  The findings represent decompensated CHF with evolving pulmonary edema.      Electronically signed by: Willie Singh MD  Date:    09/21/2018  Time:    22:34       Final Diagnoses:    Principal Problem: Acute on chronic diastolic congestive heart failure   Secondary Diagnoses:   Active Hospital Problems    Diagnosis  POA    *Acute on chronic diastolic congestive heart failure [I50.33]  Yes    Physical deconditioning [R53.81]  Yes    ESRD on dialysis [N18.6, Z99.2]  Not Applicable    Essential hypertension [I10]  Yes    Anemia due to pre-ESRD treated with erythropoietin [N03.9, D63.1]  Yes    Paroxysmal atrial fibrillation [I48.0]  Yes     Chronic    History of sarcoidosis [Z86.2]  Yes     Chronic    GERD (gastroesophageal reflux disease) [K21.9]  Yes     Chronic    Type 2 diabetes mellitus with hypertension and end stage renal disease on dialysis [E11.22, I12.0, Z99.2, N18.6]  Not Applicable     Chronic    Hyperlipidemia [E78.5]  Yes    Cerebral microvascular disease [I67.9]  Yes     Chronic      Resolved Hospital Problems   No resolved problems to display.       Discharged Condition: stable    Discharge Exam:    /76   Pulse 85   Temp 98.1 °F (36.7 °C)   Resp 18   Ht 5' 7" (1.702 m)   Wt 104.9 kg (231 lb 4.2 oz)   LMP  (LMP Unknown)   SpO2 (!) 94%   Breastfeeding? No   BMI 36.22 kg/m²     General Appearance:    Alert, cooperative, no distress, appears stated age   Head:    Normocephalic, without obvious abnormality, atraumatic   Eyes:    PERRL, conjunctiva/corneas clear, EOM's intact, fundi     benign, both eyes   Ears:    Normal TM's and external ear canals, both ears   Nose:   Nares normal, septum midline, mucosa normal, no drainage    or sinus tenderness   Throat:   Lips, mucosa, and tongue normal; teeth and gums normal   Neck:   Supple, symmetrical, trachea midline, no adenopathy;    "  thyroid:  no enlargement/tenderness/nodules; no carotid    bruit or JVD   Back:     Symmetric, no curvature, ROM normal, no CVA tenderness   Lungs:     Clear to auscultation bilaterally, respirations unlabored   Chest Wall:    No tenderness or deformity    Heart:    Regular rate and rhythm, S1 and S2 normal, no murmur, rub   or gallop   Breast Exam:    No tenderness, masses, or nipple abnormality   Abdomen:     Soft, non-tender, bowel sounds active all four quadrants,     no masses, no organomegaly           Extremities:   Extremities normal, atraumatic, no cyanosis or edema   Pulses:   2+ and symmetric all extremities   Skin:   Skin color, texture, turgor normal, no rashes or lesions   Lymph nodes:   Cervical, supraclavicular, and axillary nodes normal   Neurologic:   CNII-XII intact, normal strength, sensation and reflexes     throughout       Disposition: Home-Health Care [facility]    Follow Up/Patient Instructions:     Medications:  Reconciled Home Medications:      Medication List      STOP taking these medications    docusate sodium 100 MG capsule  Commonly known as:  COLACE     hydrALAZINE 25 MG tablet  Commonly known as:  APRESOLINE     HYDROcodone-acetaminophen 5-325 mg per tablet  Commonly known as:  NORCO     insulin NPH-insulin regular (70/30) 100 unit/mL (70-30) injection  Commonly known as:  NOVOLIN 70/30     lactulose 20 gram/30 mL Soln  Commonly known as:  CHRONULAC     ondansetron 4 MG Tbdl  Commonly known as:  ZOFRAN-ODT     sevelamer carbonate 2.4 gram Pwpk  Commonly known as:  RENVELA        ASK your doctor about these medications    amLODIPine 10 MG tablet  Commonly known as:  NORVASC  Take 10 mg by mouth. TAKE 1 TABLET BY MOUTH ON OFF DIALYSIS DAYS.     aspirin 81 MG EC tablet  Commonly known as:  ECOTRIN  Take 81 mg by mouth once daily.     calcium acetate 667 mg capsule  Commonly known as:  PHOSLO  Take 667 mg by mouth 3 (three) times daily with meals.     furosemide 40 MG tablet  Commonly known  as:  LASIX  Take 40 mg by mouth once daily.     gabapentin 100 MG capsule  Commonly known as:  NEURONTIN  Take 1 capsule (100 mg total) by mouth every evening.     insulin glargine 100 unit/mL injection  Commonly known as:  LANTUS  Inject 20 Units into the skin every evening.     isosorbide mononitrate 30 MG 24 hr tablet  Commonly known as:  IMDUR  Take 30 mg by mouth once daily.     nitroGLYCERIN 0.4 MG SL tablet  Commonly known as:  NITROSTAT  Place 0.4 mg under the tongue every 5 (five) minutes as needed for Chest pain.     pantoprazole 40 MG tablet  Commonly known as:  PROTONIX  Take 1 tablet (40 mg total) by mouth 2 (two) times daily.     pravastatin 40 MG tablet  Commonly known as:  PRAVACHOL  Take 40 mg by mouth every evening.          Discharge Procedure Orders   Ambulatory referral to Home Health   Referral Priority: Routine Referral Type: Home Health   Referral Reason: Specialty Services Required   Requested Specialty: Home Health Services   Number of Visits Requested: 1       Activity: activity as tolerated  Diet: renal diet    Follow-up with PCP in 2 weeks.    >30 minutes spent on discharging this patient.    Signed:  Obed Figueroa  9/24/2018  12:48 PM

## 2018-09-24 NOTE — PLAN OF CARE
Problem: Hemodialysis (Adult)  Goal: Signs and Symptoms of Listed Potential Problems Will be Absent, Minimized or Managed (Hemodialysis)  Signs and symptoms of listed potential problems will be absent, minimized or managed by discharge/transition of care (reference Hemodialysis (Adult) CPG).  Outcome: Ongoing (interventions implemented as appropriate)   09/24/18 0935   Hemodialysis   Problems Assessed (Hemodialysis) all   Problems Present (Hemodialysis) electrolyte imbalance;fluid imbalance   HD with Uf x 3h today. POC reviewed with pt.

## 2018-09-25 NOTE — PT/OT/SLP DISCHARGE
Physical Therapy Discharge Summary    Name: Angelina Beard  MRN: [id number]   Principal Problem: Acute on chronic diastolic congestive heart failure     Patient Discharged from acute Physical Therapy on 9/24/2018.  Please refer to prior PT noted date on 9/24/2018 for functional status.     Assessment:     Patient appropriate for care in another setting.    Objective:     GOALS:   Multidisciplinary Problems     Physical Therapy Goals        Problem: Physical Therapy Goal    Goal Priority Disciplines Outcome Goal Variances Interventions   Physical Therapy Goal     PT, PT/OT Ongoing (interventions implemented as appropriate)     Description:  1.  Supervision bed mobility  2.  Ambulate 25' with RW with CG  3.  Sit in chair 1 1/2 hours                    Reasons for Discontinuation of Therapy Services  Transfer to alternate level of care.      Plan:     Patient Discharged to: Home with Home Health Service.    Aroldo Garcia, PT  9/25/2018

## 2018-09-26 ENCOUNTER — HOSPITAL ENCOUNTER (EMERGENCY)
Facility: HOSPITAL | Age: 68
Discharge: HOME | End: 2018-09-26
Attending: EMERGENCY MEDICINE
Payer: MEDICARE

## 2018-09-26 VITALS
DIASTOLIC BLOOD PRESSURE: 67 MMHG | HEART RATE: 81 BPM | OXYGEN SATURATION: 95 % | SYSTOLIC BLOOD PRESSURE: 157 MMHG | TEMPERATURE: 99 F | RESPIRATION RATE: 21 BRPM

## 2018-09-26 DIAGNOSIS — R07.89 CHEST WALL PAIN: Primary | ICD-10-CM

## 2018-09-26 DIAGNOSIS — R07.9 CHEST PAIN: ICD-10-CM

## 2018-09-26 LAB
ALBUMIN SERPL BCP-MCNC: 2.6 G/DL
ALP SERPL-CCNC: 61 U/L
ALT SERPL W/O P-5'-P-CCNC: 9 U/L
ANION GAP SERPL CALC-SCNC: 14 MMOL/L
ANISOCYTOSIS BLD QL SMEAR: SLIGHT
AST SERPL-CCNC: 11 U/L
BASOPHILS # BLD AUTO: ABNORMAL K/UL
BASOPHILS NFR BLD: 0 %
BILIRUB SERPL-MCNC: 0.3 MG/DL
BNP SERPL-MCNC: 452 PG/ML
BUN SERPL-MCNC: 19 MG/DL
CALCIUM SERPL-MCNC: 8.9 MG/DL
CHLORIDE SERPL-SCNC: 95 MMOL/L
CO2 SERPL-SCNC: 30 MMOL/L
CREAT SERPL-MCNC: 5 MG/DL
DIFFERENTIAL METHOD: ABNORMAL
EOSINOPHIL # BLD AUTO: ABNORMAL K/UL
EOSINOPHIL NFR BLD: 1 %
ERYTHROCYTE [DISTWIDTH] IN BLOOD BY AUTOMATED COUNT: 14.7 %
EST. GFR  (AFRICAN AMERICAN): 10 ML/MIN/1.73 M^2
EST. GFR  (NON AFRICAN AMERICAN): 8 ML/MIN/1.73 M^2
GLUCOSE SERPL-MCNC: 143 MG/DL
HCT VFR BLD AUTO: 27.2 %
HGB BLD-MCNC: 8.3 G/DL
LYMPHOCYTES # BLD AUTO: ABNORMAL K/UL
LYMPHOCYTES NFR BLD: 10 %
MCH RBC QN AUTO: 28.1 PG
MCHC RBC AUTO-ENTMCNC: 30.5 G/DL
MCV RBC AUTO: 92 FL
MONOCYTES # BLD AUTO: ABNORMAL K/UL
MONOCYTES NFR BLD: 4 %
NEUTROPHILS NFR BLD: 84 %
NEUTS BAND NFR BLD MANUAL: 1 %
PLATELET # BLD AUTO: 390 K/UL
PLATELET BLD QL SMEAR: ABNORMAL
PMV BLD AUTO: 9.7 FL
POTASSIUM SERPL-SCNC: 3.9 MMOL/L
PROT SERPL-MCNC: 6.4 G/DL
RBC # BLD AUTO: 2.95 M/UL
SODIUM SERPL-SCNC: 139 MMOL/L
TROPONIN I SERPL DL<=0.01 NG/ML-MCNC: 0.02 NG/ML
WBC # BLD AUTO: 9.57 K/UL

## 2018-09-26 PROCEDURE — 99284 EMERGENCY DEPT VISIT MOD MDM: CPT | Mod: 25,NTX

## 2018-09-26 PROCEDURE — 93005 ELECTROCARDIOGRAM TRACING: CPT | Mod: NTX

## 2018-09-26 PROCEDURE — 93010 ELECTROCARDIOGRAM REPORT: CPT | Mod: NTX,,, | Performed by: INTERNAL MEDICINE

## 2018-09-26 PROCEDURE — 83880 ASSAY OF NATRIURETIC PEPTIDE: CPT | Mod: NTX

## 2018-09-26 PROCEDURE — 85027 COMPLETE CBC AUTOMATED: CPT | Mod: NTX

## 2018-09-26 PROCEDURE — 80053 COMPREHEN METABOLIC PANEL: CPT | Mod: NTX

## 2018-09-26 PROCEDURE — 85007 BL SMEAR W/DIFF WBC COUNT: CPT | Mod: NTX

## 2018-09-26 PROCEDURE — 84484 ASSAY OF TROPONIN QUANT: CPT | Mod: NTX

## 2018-09-26 NOTE — ED PROVIDER NOTES
Encounter Date: 9/26/2018    SCRIBE #1 NOTE: I, Deepali Hicks, am scribing for, and in the presence of,  Dr. Foote. I have scribed the entire note.       History     Chief Complaint   Patient presents with    Chest Pain     chest pain with sob     Angelina Beard is a 68 y.o. female who  has a past medical history of Arthritis, Breast cancer, left, CHF (congestive heart failure), Cholelithiasis, Coronary artery disease, Diabetes mellitus, type 2, Encounter for blood transfusion, End-stage renal disease on hemodialysis, Hemodialysis access site with arteriovenous graft, Hyperlipidemia, Hypertension, and Sarcoidosis.    The patient presents to the ED due to chest pain with SOB that occurred today. She reports of intermittent sharp rib cage pain. She also complains of a productive cough. She states her SOB improved after dialysis today. She denies taking oxygen at home. She was recently discharged from hospital 2 days ago.           Review of patient's allergies indicates:  No Known Allergies  Past Medical History:   Diagnosis Date    Arthritis     Breast cancer, left     CHF (congestive heart failure)     Cholelithiasis     Coronary artery disease     Diabetes mellitus, type 2     Encounter for blood transfusion     End-stage renal disease on hemodialysis     Hemodialysis access site with arteriovenous graft     mon-wed -fri    Hyperlipidemia     Hypertension     Sarcoidosis      Past Surgical History:   Procedure Laterality Date    av graft      left arm    BRACHIAL ARTERY GRAFT Left 05/04/2016    brachiocephalic, Dr. Anson Crocker    BREAST BIOPSY Left     breast ca    BREAST LUMPECTOMY Left     radiation only    btl      COLONOSCOPY N/A 7/13/2018    Procedure: COLONOSCOPY;  Surgeon: Almita Bee MD;  Location: [facility] ENDO;  Service: Endoscopy;  Laterality: N/A;    COLONOSCOPY N/A 7/13/2018    Performed by Almita Bee MD at [facility] ENDO    ELBOW SURGERY      left     ESOPHAGOGASTRODUODENOSCOPY N/A 7/13/2018    Procedure: ESOPHAGOGASTRODUODENOSCOPY (EGD);  Surgeon: Almita Bee MD;  Location: [facility] ENDO;  Service: Endoscopy;  Laterality: N/A;    ESOPHAGOGASTRODUODENOSCOPY (EGD) N/A 7/13/2018    Performed by [facility]mita Bee MD at [facility] ENDO    [id number] Right 5/4/2016    Performed by Anson Crocker MD at [facility] OR    LIPOMA RESECTION      back of neck    pilondial cyst      REPLACEMENT-KNEE-TOTAL Right 2/23/2016    Performed by Alfredo [facility]garry [facility] at [facility] OR 2ND FLR    TOTAL KNEE ARTHROPLASTY Right 02/23/2016     Family History   Problem Relation Age of Onset    Diabetes Mother     Kidney disease Mother     Hypertension Mother     Diabetes Sister     Heart disease Sister      Social History     Tobacco Use    Smoking status: Never Smoker    Smokeless tobacco: Never Used   Substance Use Topics    Alcohol use: No    Drug use: No     Review of Systems   Constitutional: Negative for chills and fever.   HENT: Negative for congestion, rhinorrhea and sore throat.    Eyes: Negative for redness and visual disturbance.   Respiratory: Positive for cough and shortness of breath. Negative for wheezing.    Cardiovascular: Positive for chest pain. Negative for palpitations.   Gastrointestinal: Negative for abdominal pain, diarrhea, nausea and vomiting.   Genitourinary: Negative for dysuria and hematuria.   Musculoskeletal: Negative for back pain, myalgias and neck pain.   Skin: Negative for rash.   Neurological: Negative for dizziness, weakness and light-headedness.   Psychiatric/Behavioral: Negative for confusion.       Physical Exam     Initial Vitals [09/26/18 1646]   BP Pulse Resp Temp SpO2   (!) 163/58 105 20 98.7 °F (37.1 °C) (!) 93 %      MAP       --         Physical Exam    Nursing note and vitals reviewed.  Constitutional: She appears well-developed and well-nourished.   HENT:   Head: Normocephalic and atraumatic.   Eyes: EOM are normal. Pupils  are equal, round, and reactive to light.   Neck: Normal range of motion. Neck supple.   Cardiovascular: Normal rate, regular rhythm and normal heart sounds.   Pulmonary/Chest: Breath sounds normal. No respiratory distress.   Reproductive sharp rib cage pain   Abdominal: Soft. There is no tenderness.   Musculoskeletal: Normal range of motion. She exhibits no tenderness.   Neurological: She is alert and oriented to person, place, and time. She has normal strength.   Skin: Skin is warm and dry.         ED Course   Procedures  Labs Reviewed   CBC W/ AUTO DIFFERENTIAL - Abnormal; Notable for the following components:       Result Value    RBC 2.95 (*)     Hemoglobin 8.3 (*)     Hematocrit 27.2 (*)     MCHC 30.5 (*)     RDW 14.7 (*)     Platelets 390 (*)     Gran% 84.0 (*)     Lymph% 10.0 (*)     All other components within normal limits   COMPREHENSIVE METABOLIC PANEL - Abnormal; Notable for the following components:    CO2 30 (*)     Glucose 143 (*)     Creatinine 5.0 (*)     Albumin 2.6 (*)     ALT 9 (*)     eGFR if  10 (*)     eGFR if non  8 (*)     All other components within normal limits   B-TYPE NATRIURETIC PEPTIDE - Abnormal; Notable for the following components:     (*)     All other components within normal limits   TROPONIN I          Imaging Results          X-Ray Chest 1 View (Final result)  Result time 09/26/18 18:17:10    Final result by Hillary Bowman MD (09/26/18 18:17:10)                 Impression:      No acute abnormality.      Electronically signed by: Hillary Bowman MD  Date:    09/26/2018  Time:    18:17             Narrative:    EXAMINATION:  XR CHEST 1 VIEW    CLINICAL HISTORY:  Chest pain, unspecified    TECHNIQUE:  Single frontal view of the chest was performed.    COMPARISON:  None    FINDINGS:  The lungs are clear, with normal appearance of pulmonary vasculature and no pleural effusion or pneumothorax.    The cardiac silhouette is normal in size.  The hilar and mediastinal contours are unremarkable.    Bones are intact.                                 Medical Decision Making:   Clinical Tests:   Lab Tests: Reviewed and Ordered  Radiological Study: Ordered and Reviewed  Medical Tests: Ordered and Reviewed                      Clinical Impression:     Musculoskeletal chest wall pain    Disposition:   Disposition: Discharged  68-year-old black female with history of diabetes hypertension end-stage renal disease has dialysis Monday Wednesday Friday just had dialysis today pulmonary sarcoidosis heart failure with preserved ejection fraction osteoarthritis admitted September 21st and discharged yesterday for CHF exacerbation woke up this morning had pinpoint fleeting sharp right and left lateral ribcage pain and some shortnessOf breath went to dialysis felt better decided to come to the emergency room for further evaluation.  Here in the emergency department she has no shortness of breath and no chest pain. Temperature 98.7° pulse 83 respiratory rate of 18 blood pressure 160/6095% sat on room air.  EKG reveals a sinus rhythm with no acute ischemic changes.  Troponin is negative. CBC reveals hemoglobin 8.3 hematocrit 27.2 which is baseline for BUN of 19 creatinine of 5.0 potassium 3.9  chest x-ray no acute process per radiologist.  Lungs clear auscultation bilaterally heart regular rate rhythm no S3-S4 murmurs.  The patient be sent home this time diagnosis musculoskeletal chest wall pain asked her follow up with her primary care physician tomorrow return to the ER for increasing chest pain shortness of breath fever cough    I, Dr. Manuel Foote, personally performed the services described in this documentation. All medical record entries made by the scribe were at my direction and in my presence. I have reviewed the chart and agree that the record is accurate and complete. Manuel Foote MD.                      Manuel Foote MD  09/26/18  1920

## 2018-09-26 NOTE — ED NOTES
Neuro: AAOx3  HEENT: WDL  Resp: Airway patent, Pt states SOB after dialysis began earlier today. Pt complaining of cough X 1 week. 2L NC applied to patient.  Cardiac: Skin pink/warm/dry, pulses intact. Pt complaining of chest pain and SOB after dialysis began today.  Abdomen: Soft, non-tender to palpation, denies N/V/D  : No signs or symptoms of urinary disturbances  Musculoskeletal: Moves all extremities equally, ROM intact  Skin: Skin is dry and intact.

## 2018-10-02 ENCOUNTER — OFFICE VISIT (OUTPATIENT)
Dept: FAMILY MEDICINE | Facility: HOSPITAL | Age: 68
End: 2018-10-02
Attending: SPECIALIST
Payer: MEDICARE

## 2018-10-02 VITALS
HEART RATE: 129 BPM | SYSTOLIC BLOOD PRESSURE: 97 MMHG | HEIGHT: 67 IN | DIASTOLIC BLOOD PRESSURE: 43 MMHG | WEIGHT: 234.81 LBS | BODY MASS INDEX: 36.85 KG/M2

## 2018-10-02 DIAGNOSIS — M17.0 OSTEOARTHRITIS OF BOTH KNEES, UNSPECIFIED OSTEOARTHRITIS TYPE: Chronic | ICD-10-CM

## 2018-10-02 DIAGNOSIS — N18.6 ESRD ON DIALYSIS: ICD-10-CM

## 2018-10-02 DIAGNOSIS — I50.32 CHRONIC DIASTOLIC CONGESTIVE HEART FAILURE: Primary | Chronic | ICD-10-CM

## 2018-10-02 DIAGNOSIS — I50.33 ACUTE ON CHRONIC DIASTOLIC CONGESTIVE HEART FAILURE: ICD-10-CM

## 2018-10-02 DIAGNOSIS — Z99.2 TYPE 2 DIABETES MELLITUS WITH HYPERTENSION AND END STAGE RENAL DISEASE ON DIALYSIS: Chronic | ICD-10-CM

## 2018-10-02 DIAGNOSIS — E11.22 TYPE 2 DIABETES MELLITUS WITH HYPERTENSION AND END STAGE RENAL DISEASE ON DIALYSIS: Chronic | ICD-10-CM

## 2018-10-02 DIAGNOSIS — R53.81 PHYSICAL DECONDITIONING: ICD-10-CM

## 2018-10-02 DIAGNOSIS — Z99.2 ESRD ON DIALYSIS: ICD-10-CM

## 2018-10-02 DIAGNOSIS — I12.0 TYPE 2 DIABETES MELLITUS WITH HYPERTENSION AND END STAGE RENAL DISEASE ON DIALYSIS: Chronic | ICD-10-CM

## 2018-10-02 DIAGNOSIS — N18.6 TYPE 2 DIABETES MELLITUS WITH HYPERTENSION AND END STAGE RENAL DISEASE ON DIALYSIS: Chronic | ICD-10-CM

## 2018-10-02 PROCEDURE — 99214 OFFICE O/P EST MOD 30 MIN: CPT | Performed by: PHYSICIAN ASSISTANT

## 2018-10-02 RX ORDER — NAPROXEN 500 MG/1
TABLET ORAL
COMMUNITY
Start: 2018-08-01 | End: 2018-10-02 | Stop reason: SDUPTHER

## 2018-10-02 RX ORDER — POLYETHYLENE GLYCOL 3350 17 G/17G
POWDER, FOR SOLUTION ORAL
COMMUNITY
Start: 2018-09-12

## 2018-10-02 RX ORDER — GABAPENTIN 300 MG/1
300 CAPSULE ORAL EVERY 8 HOURS PRN
Qty: 90 CAPSULE | Refills: 3 | Status: SHIPPED | OUTPATIENT
Start: 2018-10-02 | End: 2019-01-07 | Stop reason: SDUPTHER

## 2018-10-02 RX ORDER — GABAPENTIN 300 MG/1
CAPSULE ORAL
COMMUNITY
Start: 2018-09-11 | End: 2018-11-08 | Stop reason: SDUPTHER

## 2018-10-02 RX ORDER — AMLODIPINE BESYLATE 5 MG/1
TABLET ORAL
COMMUNITY
Start: 2018-08-14 | End: 2018-11-08

## 2018-10-02 RX ORDER — NITROGLYCERIN 0.4 MG/1
0.4 TABLET SUBLINGUAL EVERY 5 MIN PRN
Qty: 30 TABLET | Refills: 1 | Status: SHIPPED | OUTPATIENT
Start: 2018-10-02 | End: 2018-12-13 | Stop reason: SDUPTHER

## 2018-10-02 RX ORDER — NAPROXEN 500 MG/1
500 TABLET ORAL DAILY PRN
Qty: 60 TABLET | Refills: 1 | Status: SHIPPED | OUTPATIENT
Start: 2018-10-02 | End: 2018-10-09 | Stop reason: ALTCHOICE

## 2018-10-02 NOTE — PROGRESS NOTES
FAMILY MEDICINE  New Visit Progress Note   Recent Hospital Discharge     PRESENTING HISTORY     Chief Complaint/Reason for Admission: Heart failure; Follow up Hospital Discharge   Chief Complaint   Patient presents with    Follow-up     hospital     PCP: Eva Jara MD    History of Present Illness:  Ms. Angelina Beard is a 68 y.o. female who was recently admitted to the hospital.    Admit date: 9/21/2018     Discharge Date and Time: 9/25/2018  7:50 AM     Admitting Physician: Kameron Guerrero MD      Discharge Provider: Obed Figueroa     Reason for Admission: Fatigue [R53.83]  Acute diastolic congestive heart failure [I50.31]  Generalized weakness [R53.1]  Acute diastolic congestive heart failure [I50.31]  _______________________________________________________________    Today:  Patient was seen in [facility] today for hospital follow up.  Recently hospitalized for CHF exacerbation after presenting to the ED with fever, chills, and fatigue.  PMH DM2, HTN, ESRD on HD, sarcoid, and HFpEF.   CXR showing findings consistent with decompensated CHF with pulmonary edema.   ; given IV Lasix in ED.   Nephrology recommended dialysis as scheduled.   O2 sats remain at 89% on room air resting, but still with bibasilar crackles on exam.   Held overnight, Oxygen was lowered and pt tolerated room air at rest.   Dialyzed Monday AM as regularly scheduled.   Attempted ambulatory O2, but patient said she could not walk due to OA.   PT/OT recommend home health.   Echo done EF of 60% with diastolic dysfunction.     Today the patient is accompanied by her son during the visit.   She has been doing well since her discharge; feeling much better today.   They are here today to inquire about a new PCP in this clinic.   She also needs follow up with Cardiology, Dr. Flores, as she wore a monitor recently and would like to follow up with the results/complete test if necessary.   Needs assistance with most  ADLs; her son is her primary care giver.  Physical Therapy is currently coming to her home twice per week.     Has dialysis at [facility] in [facility] (Dr. Madden).      Rudolph Index:  (1)- Independent, (0) Dependent    Bathin  Dressin  Toiletin  Transferrin  Continence: 1  Feedin    TOTAL: 3  6 =  High (patient independent) 0 = Low (patient very dependent)    Review of Systems  General ROS: negative for chills, fever or weight loss  Psychological ROS: negative for hallucination, +depression or NEG for suicidal ideation  Ophthalmic ROS: negative for blurry vision, photophobia or eye pain  ENT ROS: negative for epistaxis, sore throat or rhinorrhea  Respiratory ROS: no cough, shortness of breath, or wheezing  Cardiovascular ROS: no chest pain or dyspnea on exertion  Gastrointestinal ROS: no abdominal pain, change in bowel habits, or black/ bloody stools  Genito-Urinary ROS: no dysuria, trouble voiding, or hematuria  Musculoskeletal ROS: +gait disturbance and knee/leg pain  Neurological ROS: no syncope or seizures; no ataxia  Dermatological ROS: negative for pruritis, rash and jaundice    PAST HISTORY:     Past Medical History:   Diagnosis Date    Arthritis     Breast cancer, left     CHF (congestive heart failure)     Cholelithiasis     Coronary artery disease     Diabetes mellitus, type 2     Encounter for blood transfusion     End-stage renal disease on hemodialysis     Hemodialysis access site with arteriovenous graft     mon-wed -fri    Hyperlipidemia     Hypertension     Sarcoidosis        Past Surgical History:   Procedure Laterality Date    av graft      left arm    BRACHIAL ARTERY GRAFT Left 2016    brachiocephalic, Dr. Anson Crocker    BREAST BIOPSY Left     breast ca    BREAST LUMPECTOMY Left     radiation only    btl      COLONOSCOPY N/A 2018    Procedure: COLONOSCOPY;  Surgeon: Almita Bee MD;  Location: [facility] ENDO;  Service: Endoscopy;   Laterality: N/A;    COLONOSCOPY N/A 7/13/2018    Performed by Almita Bee MD at [facility] ENDO    ELBOW SURGERY      left    ESOPHAGOGASTRODUODENOSCOPY N/A 7/13/2018    Procedure: ESOPHAGOGASTRODUODENOSCOPY (EGD);  Surgeon: Almita Bee MD;  Location: [facility] ENDO;  Service: Endoscopy;  Laterality: N/A;    ESOPHAGOGASTRODUODENOSCOPY (EGD) N/A 7/13/2018    Performed by Almita Bee MD at [facility] ENDO    SDXHEOMQA-TBUHU-KPWSHXGQNPUTS Right 5/4/2016    Performed by Anson Crocker MD at [facility] OR    LIPOMA RESECTION      back of neck    pilondial cyst      REPLACEMENT-KNEE-TOTAL Right 2/23/2016    [facility] 2ND FLR    TOTAL KNEE ARTHROPLASTY Right 02/23/2016       Family History   Problem Relation Age of Onset    Diabetes Mother     Kidney disease Mother     Hypertension Mother     Diabetes Sister     Heart disease Sister        Social History     Socioeconomic History    Marital status:      Spouse name: None    Number of children: None    Years of education: None    Highest education level: None   Social Needs    Financial resource strain: None    Food insecurity - worry: None    Food insecurity - inability: None    Transportation needs - medical: None    Transportation needs - non-medical: None   Occupational History    None   Tobacco Use    Smoking status: Never Smoker    Smokeless tobacco: Never Used   Substance and Sexual Activity    Alcohol use: No    Drug use: No    Sexual activity: None   Other Topics Concern    None   Social History Narrative    None       MEDICATIONS & ALLERGIES:     Current Outpatient Medications on File Prior to Visit   Medication Sig Dispense Refill    aspirin (ECOTRIN) 81 MG EC tablet Take 81 mg by mouth once daily.      calcium acetate (PHOSLO) 667 mg capsule Take 667 mg by mouth 3 (three) times daily with meals.      furosemide (LASIX) 40 MG tablet Take 40 mg by mouth once daily.      insulin glargine  "(LANTUS) 100 unit/mL injection Inject 20 Units into the skin every evening.      isosorbide mononitrate (IMDUR) 30 MG 24 hr tablet Take 30 mg by mouth once daily.      pantoprazole (PROTONIX) 40 MG tablet Take 1 tablet (40 mg total) by mouth 2 (two) times daily. (Patient taking differently: Take 40 mg by mouth once daily. ) 60 tablet 1    polyethylene glycol (GLYCOLAX) 17 gram/dose powder       pravastatin (PRAVACHOL) 40 MG tablet Take 40 mg by mouth every evening.       [DISCONTINUED] naproxen (NAPROSYN) 500 MG tablet       [DISCONTINUED] nitroGLYCERIN (NITROSTAT) 0.4 MG SL tablet Place 0.4 mg under the tongue every 5 (five) minutes as needed for Chest pain.      amLODIPine (NORVASC) 10 MG tablet Take 10 mg by mouth. TAKE 1 TABLET BY MOUTH ON OFF DIALYSIS DAYS.      amLODIPine (NORVASC) 5 MG tablet       gabapentin (NEURONTIN) 300 MG capsule       [DISCONTINUED] gabapentin (NEURONTIN) 100 MG capsule Take 1 capsule (100 mg total) by mouth every evening. (Patient taking differently: Take 300 mg by mouth every 8 (eight) hours as needed. ) 30 capsule 1     No current facility-administered medications on file prior to visit.         Review of patient's allergies indicates:  No Known Allergies    OBJECTIVE:     Vital Signs:  Vitals:    10/02/18 1051   BP: (!) 97/43   Pulse: (!) 129     Wt Readings from Last 1 Encounters:   10/02/18 1051 106.5 kg (234 lb 12.6 oz)     Body mass index is 36.77 kg/m².        Physical Exam:  BP (!) 97/43   Pulse (!) 129   Ht 5' 7" (1.702 m)   Wt 106.5 kg (234 lb 12.6 oz)   LMP  (LMP Unknown)   BMI 36.77 kg/m²   General appearance: Alert, cooperative, no distress; +sitting in wheelchair  Constitutional: Oriented to person, place, and time. Appears well-developed and well-nourished.  HEENT: Normocephalic, atraumatic, neck symmetrical, no nasal discharge   Eyes: conjunctivae/corneas clear, EOM's intact  Lungs: clear to auscultation bilaterally; good air movement  Heart: " +Tachycardic (low 100's) with regular rhythm  Abdomen: soft, non-tender; bowel sounds normoactive  Extremities: extremities symmetric; +minimal LE edema (non-pitting, +sock indentations); R A/V fistula with auscultated bruits   Integument: Skin color, texture, turgor normal  Neurologic: Alert and oriented X 3, normal strength, +gait disturbance  Psychiatric: no pressured speech; normal affect; no evidence of impaired cognition     Laboratory  Lab Results   Component Value Date    WBC 9.57 09/26/2018    HGB 8.3 (L) 09/26/2018    HCT 27.2 (L) 09/26/2018    MCV 92 09/26/2018     (H) 09/26/2018     BMP  Lab Results   Component Value Date     09/26/2018    K 3.9 09/26/2018    CL 95 09/26/2018    CO2 30 (H) 09/26/2018    BUN 19 09/26/2018    CREATININE 5.0 (H) 09/26/2018    CALCIUM 8.9 09/26/2018    ANIONGAP 14 09/26/2018    ESTGFRAFRICA 10 (A) 09/26/2018    EGFRNONAA 8 (A) 09/26/2018     Lab Results   Component Value Date    ALT 9 (L) 09/26/2018    AST 11 09/26/2018    ALKPHOS 61 09/26/2018    BILITOT 0.3 09/26/2018     Lab Results   Component Value Date    INR 1.0 03/03/2018    INR 1.0 04/17/2016    INR 1.4 03/22/2016     Lab Results   Component Value Date    HGBA1C 5.8 (H) 07/12/2018     No results for input(s): POCTGLUCOSE in the last 72 hours.    Diagnostic Results:    CXR   (09/21/18)  Impression       Cardiomegaly with new increased attenuation of the pulmonary parenchyma.  The findings represent decompensated CHF with evolving pulmonary edema.   09/22/2018  Impression       The predominant finding is interstitial pulmonary edema however there may be an alveolar component in the left perihilar region.  Overall this exam is improved compared to the previous exam.  The left perihilar region may also represent a pneumonia although this is less likely.   09/24/2018  Impression       Persistent perihilar pulmonary parenchymal disease not improved compared with yesterday's study of 09/23/2018.  Please see  description and discussion above.     ECHO  CONCLUSIONS     1 - Concentric hypertrophy.     2 - Normal left ventricular systolic function (EF 60-65%).     3 - Impaired LV relaxation, normal LAP (grade 1 diastolic dysfunction).     4 - Normal right ventricular systolic function .     5 - The estimated PA systolic pressure is 34 mmHg.         TRANSITION OF CARE:     [facility] On Call Contact Note: 09/26/2018    Family and/or Caretaker present at visit?  Yes.  Diagnostic tests reviewed/disposition: I have reviewed all completed as well as pending diagnostic tests at the time of discharge.  Disease/illness education: Yes  Home health/community services discussion/referrals: Patient has home health established at [facility]??.   Establishment or re-establishment of referral orders for community resources: Will need new PCP, and cardiology follow up with Dr. Flores.   Discussion with other health care providers: Discussed with Dr. Melo.     ASSESSMENT & PLAN:       Chronic diastolic congestive heart failure  Acute on chronic diastolic congestive heart failure   -Most recent ECHO showing concentric hypertrophy with grade 1 DD, but normal LV systolic function, but CXR representing decompensated CHF with pulmonary edema   -Follow up appt scheduled with Dr. Flores  -     nitroGLYCERIN (NITROSTAT) 0.4 MG SL tablet; Place 1 tablet (0.4 mg total) under the tongue every 5 (five) minutes as needed for Chest pain.  Dispense: 30 tablet; Refill: 1    ESRD on dialysis   -Dialysis MWF with Dr. Madden   -Daily weights with strict I/O's and fluid restriction   -Low sodium diet   -Continue home medications and monitor BP    -BP low today 97/43; denies symptoms   -Patient currently taking Naproxen BID for her OA and requests refill; will refill to use SPAIRINGLY   -Possibly on transplant list? [facility] something from her nephrologist at dialysis    Type 2 diabetes mellitus with hypertension and end stage renal disease on dialysis   -Well  controlled on 17 units Lantus AM   -Last A1c 5.8%    Osteoarthritis of both knees, unspecified osteoarthritis type  -     gabapentin (NEURONTIN) 300 MG capsule; Take 1 capsule (300 mg total) by mouth every 8 (eight) hours as needed.  Dispense: 90 capsule; Refill: 3  -     naproxen (NAPROSYN) 500 MG tablet; Take 1 tablet (500 mg total) by mouth daily as needed.  Dispense: 60 tablet; Refill: 1    Physical deconditioning   -Mostly wheelchair bound due to severe chronic knee pain from OA   -Walker recommended ambulation with walker at home    -PT/OT coming twice weekly    Hyperlipidemia   -Pravastatin 40mg    Instructions for the patient:      Scheduled Follow-up :  Future Appointments   Date Time Provider Department Center   10/9/2018 10:40 AM Antoni Cortes DO [facility]   10/23/2018 10:00 AM Faraz Flores MD [facility] CARDIO [facility]       Post Visit Medication List:     Medication List           Accurate as of 10/2/18  3:11 PM. If you have any questions, ask your nurse or doctor.               CHANGE how you take these medications    * gabapentin 300 MG capsule  Commonly known as:  NEURONTIN  What changed:  Another medication with the same name was changed. Make sure you understand how and when to take each.  Changed by:  Mello Diaz PA-C     * gabapentin 300 MG capsule  Commonly known as:  NEURONTIN  Take 1 capsule (300 mg total) by mouth every 8 (eight) hours as needed.  What changed:    · medication strength  · how much to take  · when to take this  · reasons to take this  Changed by:  Mello Diaz PA-C     naproxen 500 MG tablet  Commonly known as:  NAPROSYN  Take 1 tablet (500 mg total) by mouth daily as needed.  What changed:    · how much to take  · how to take this  · when to take this  · reasons to take this  Changed by:  Mello Diaz PA-C     pantoprazole 40 MG tablet  Commonly known as:  PROTONIX  Take 1 tablet (40 mg total) by mouth 2 (two) times daily.  What changed:  when to  take this         * This list has 2 medication(s) that are the same as other medications prescribed for you. Read the directions carefully, and ask your doctor or other care provider to review them with you.            CONTINUE taking these medications    * amLODIPine 10 MG tablet  Commonly known as:  NORVASC     * amLODIPine 5 MG tablet  Commonly known as:  NORVASC     aspirin 81 MG EC tablet  Commonly known as:  ECOTRIN     calcium acetate 667 mg capsule  Commonly known as:  PHOSLO     furosemide 40 MG tablet  Commonly known as:  LASIX     insulin glargine 100 unit/mL injection  Commonly known as:  LANTUS     isosorbide mononitrate 30 MG 24 hr tablet  Commonly known as:  IMDUR     nitroGLYCERIN 0.4 MG SL tablet  Commonly known as:  NITROSTAT  Place 1 tablet (0.4 mg total) under the tongue every 5 (five) minutes as needed for Chest pain.     polyethylene glycol 17 gram/dose powder  Commonly known as:  GLYCOLAX     pravastatin 40 MG tablet  Commonly known as:  PRAVACHOL         * This list has 2 medication(s) that are the same as other medications prescribed for you. Read the directions carefully, and ask your doctor or other care provider to review them with you.               Where to Get Your Medications      [id number] [place] [facility] [address] [address]    Phone:  [phone]   · gabapentin 300 MG capsule  · naproxen 500 MG tablet  · nitroGLYCERIN 0.4 MG SL tablet         Signing Physician:  Mello Diaz PA-C

## 2018-10-03 ENCOUNTER — HOSPITAL ENCOUNTER (OUTPATIENT)
Dept: CARDIOLOGY | Facility: HOSPITAL | Age: 68
Discharge: HOME | End: 2018-10-03
Attending: INTERNAL MEDICINE
Payer: MEDICARE

## 2018-10-03 DIAGNOSIS — R06.02 SHORTNESS OF BREATH: ICD-10-CM

## 2018-10-03 DIAGNOSIS — R00.0 RAPID HEART BEAT: Primary | ICD-10-CM

## 2018-10-03 DIAGNOSIS — R00.0 RAPID HEART BEAT: ICD-10-CM

## 2018-10-03 PROCEDURE — 93010 ELECTROCARDIOGRAM REPORT: CPT | Mod: NTX,,, | Performed by: INTERNAL MEDICINE

## 2018-10-03 PROCEDURE — 93005 ELECTROCARDIOGRAM TRACING: CPT | Mod: PO,TXP

## 2018-10-09 ENCOUNTER — OFFICE VISIT (OUTPATIENT)
Dept: FAMILY MEDICINE | Facility: HOSPITAL | Age: 68
End: 2018-10-09
Attending: SPECIALIST
Payer: MEDICARE

## 2018-10-09 VITALS
WEIGHT: 234 LBS | HEART RATE: 74 BPM | HEIGHT: 67 IN | BODY MASS INDEX: 36.73 KG/M2 | DIASTOLIC BLOOD PRESSURE: 67 MMHG | SYSTOLIC BLOOD PRESSURE: 136 MMHG

## 2018-10-09 DIAGNOSIS — R53.81 PHYSICAL DECONDITIONING: ICD-10-CM

## 2018-10-09 DIAGNOSIS — N18.6 TYPE 2 DIABETES MELLITUS WITH HYPERTENSION AND END STAGE RENAL DISEASE ON DIALYSIS: Primary | Chronic | ICD-10-CM

## 2018-10-09 DIAGNOSIS — Z99.2 TYPE 2 DIABETES MELLITUS WITH HYPERTENSION AND END STAGE RENAL DISEASE ON DIALYSIS: Primary | Chronic | ICD-10-CM

## 2018-10-09 DIAGNOSIS — I12.0 TYPE 2 DIABETES MELLITUS WITH HYPERTENSION AND END STAGE RENAL DISEASE ON DIALYSIS: Primary | Chronic | ICD-10-CM

## 2018-10-09 DIAGNOSIS — N18.6 ESRD (END STAGE RENAL DISEASE) ON DIALYSIS: ICD-10-CM

## 2018-10-09 DIAGNOSIS — E66.01 MORBID OBESITY: ICD-10-CM

## 2018-10-09 DIAGNOSIS — I50.30 DIASTOLIC HEART FAILURE, UNSPECIFIED HF CHRONICITY: ICD-10-CM

## 2018-10-09 DIAGNOSIS — M17.12 PRIMARY OSTEOARTHRITIS OF LEFT KNEE: Chronic | ICD-10-CM

## 2018-10-09 DIAGNOSIS — F32.A DEPRESSION, UNSPECIFIED DEPRESSION TYPE: ICD-10-CM

## 2018-10-09 DIAGNOSIS — R53.81 DEBILITY: ICD-10-CM

## 2018-10-09 DIAGNOSIS — K21.9 GASTROESOPHAGEAL REFLUX DISEASE WITHOUT ESOPHAGITIS: Chronic | ICD-10-CM

## 2018-10-09 DIAGNOSIS — E11.22 TYPE 2 DIABETES MELLITUS WITH HYPERTENSION AND END STAGE RENAL DISEASE ON DIALYSIS: Primary | Chronic | ICD-10-CM

## 2018-10-09 DIAGNOSIS — Z96.651 STATUS POST TOTAL RIGHT KNEE REPLACEMENT: Chronic | ICD-10-CM

## 2018-10-09 DIAGNOSIS — Z99.2 ESRD (END STAGE RENAL DISEASE) ON DIALYSIS: ICD-10-CM

## 2018-10-09 PROCEDURE — 99215 OFFICE O/P EST HI 40 MIN: CPT | Performed by: STUDENT IN AN ORGANIZED HEALTH CARE EDUCATION/TRAINING PROGRAM

## 2018-10-09 RX ORDER — SERTRALINE HYDROCHLORIDE 50 MG/1
50 TABLET, FILM COATED ORAL DAILY
Qty: 30 TABLET | Refills: 11 | Status: SHIPPED | OUTPATIENT
Start: 2018-10-09 | End: 2019-01-10

## 2018-10-09 NOTE — PATIENT INSTRUCTIONS
What is Arthritis?  Arthritis is a disease that affects the joints (the parts where bones meet and move). It can affect any joint in your body. There are many types of arthritis, including osteoarthritis and rheumatoid arthrtitis. If your symptoms are mild, medications may be enough to reduce pain and swelling. For more severe arthritis, surgery may be needed to improve the condition of the joint or replace the joint entirely.                  What causes arthritis?  Cartilage is a smooth substance that protects the ends of your bones and provides cushioning. When you have arthritis, this cartilage breaks down and can no longer protect your bones. The bones rub against each other, causing pain and swelling. Over time, bone spurs (small pieces of rough or splintered bone) may develop, and the joint's range of motion can become limited.  Symptoms  Some of the more common symptoms of arthritis include:  · Joint pain and stiffness. Pain and stiffness get worse with long periods of rest or using a joint too long or too hard.  · Joints that have lost normal shape and motion.  · Tender, inflamed joints. They may look red and feel warm.  · Grinding or popping noise with joint movement.   · Feeling tired all the time.  Reducing symptoms  Following a healthy lifestyle by losing weight and exercising can help reduce symptoms of osteoarthritis. Medicines can be very helpful for arthritis.      Depression Affects Your Mind and Body  Everyone feels sad or blue from time to time for a few days or weeks. Depression is when these feelings don't go away and they interfere with daily life.  Depression is a real illness that can develop at any age. It is one of the most common mental health problems in the [place] Depression makes you feel sad, helpless, and hopeless. It gets in the way of your life and relationships. It inhibits your ability to think and act. But, with help, you can feel better again.     When I was depressed, I felt  awful. I was so tired all the time I could hardly think, but at night I couldnt fall asleep. My head hurt. My stomach hurt. I didnt know what was wrong with me.   Depression affects your whole body  Brain chemicals affect your body as well as your mood. So depression may do more than just make you feel low. You may also feel bad physically. Depression can:  · Cause trouble with mental tasks such as remembering, concentrating, or making decisions  · Make you feel nervous and jumpy  · Cause trouble sleeping. Or you may sleep too much  · Change your appetite  · Cause headaches, stomachaches, or other aches and pains  · Drain your body of energy  Depression and other illness  It is common for people who have chronic health problems to also have depression. It can often be hard to tell which one caused the other. A person might become depressed after finding out they have a health problem. But some studies suggest being depressed may make certain health problems more likely. And some depressed people stop taking care of themselves. This may make them more likely to get sick.  Coping with Kidney Failure  Having kidney failure means many changes in your health and life. It may feel like too much to cope with at times, but you can learn how to deal with these emotions and feel better about your treatment and yourself. Learning as much as you can about kidney failure is a good place to start. Kidney failure is also called chronic kidney disease. It has 5 stages, from mild to severe, based on whether your kidneys are leaking protein and how well they are filtering your blood.      Talking to someone you feel close to may help when you're feeling down.   Understanding your emotions  Living with a medical condition like kidney failure can be very stressful. It is common at times to feel:  · Angry and frustrated over having to depend on others.  · Confused about all the instructions you've been given.  · Worried about things  going wrong with your treatment.  · Upset with side effects of kidney failure or the treatment for it.   · Hopeless and depressed about your future.  · Unhappy with your body. Don't keep these feelings to yourself. Talk with your healthcare team and your loved ones. They may know ways to help.  Accepting your body's changes  Kidney failure and its treatment cause changes in your body. These changes can affect the way you feel about your sexuality. Your desire for and feelings about sex may change. Be open with your partner about your feelings and talk with your healthcare providers. They can help you understand your body's changes.  Finding support  People sometimes find it hard to ask others for help. But it's also hard to face a chronic illness alone. When you need some help or just want to talk, turn to friends, family, and members of your healthcare team. Also consider joining a support group. In a support group, people meet to talk about common problems.  Ask your healthcare provider if there is a kidney failure support group nearby.  Resources  These organizations can give you more information on kidney failure. They may also guide you to local resources, such as support groups.  · American Association of Kidney Patients  769.992.8023  www.aakp.org  · [employer]  661.745.6664  www.akfinc.org  · National Kidney Foundation  932.463.6027  www.kidney.org  · National Kidney Disease Education Program 223-024-6649 www.nkdep.nih.gov      Coping with Heart Failure  Its normal to feel sad or down at times when youre living with heart failure. Some medicines can also affect your mood. Following your treatment plan may seem difficult at times. If you feel overwhelmed, just focus on one day at a time. Dont be afraid to ask others for help when you need it.    Ways to feel better  Try not to withdraw from family and friends, even if you are finding it hard to talk to them. They can still be a good source of  support. To feel better, you can also:  · Spend time doing things you enjoy. This may include participating in a favorite hobby, meditating, praying, or spending time with people you care about. Find activities that make you happy and make those a priority.  · Share what you learn about heart failure with the people in your life. Invite family members along when you visit your healthcare provider. This will help you feel supported as well as help you discuss the care plan you've agreed upon with your doctor.  · Think about joining a support group for people with heart failure. It may be easier to talk to people who know firsthand what youre going through. They can offer advice and share stories. You may want to ask loved ones to join you for a meeting.  Asking for help  Having heart failure doesnt mean that you have to feel bad all the time. Consider talking to your healthcare provider or a therapist if:  · You feel worthless or helpless, or are thinking about suicide. These are warning signs of depression. Treatment can help you feel better. When depression is under control, your overall health may also improve.  · You feel anxious about what will happen to your loved ones if your health gets worse. Taking care of legal arrangements, such as a living will and durable power of , can help you feel more secure about the future.  · Social support helps alleviate stress and helps your stick with your healthy lifestyle changes. Without social support, you may end up back in the hospital.  What is Heart Failure?  The heart is a muscle that pumps oxygen-rich blood to all parts of the body. When you have heart failure, the heart is not able to pump as well as it should. Blood and fluid may back up into the lungs, and some parts of the body dont get enough oxygen-rich blood to work normally. These problems lead to the symptoms you feel.  When you have heart failure  With heart failure, not enough oxygen-rich blood  leaves the heart with each beat. There are 2 types of heart failure. Both affect the ventricles ability to pump blood. You may have 1 or both types.       Systolic heart failure. The heart muscle becomes weak and enlarged. It cant pump enough oxygen-rich blood forward to the rest of the body when the ventricles contract. The measurement of how much blood your heart pushes out when it beats is called ejection fraction. In systolic heart failure, the ejection fraction is lower than normal. This can cause blood to back up into the lungs and cause shortness of breath and eventually ankle swelling (edema).  This is also called heart failure with reduced ejection fraction, or  HFrEF.    Diastolic heart failure. The heart muscle becomes stiff. It doesnt relax normally between contractions, which keeps the ventricles from filling with blood. Ejection fraction is often in the normal range. This can still lead to the backup of blood into the body and affect the organs such as the liver. This is also called heart failure with preserved ejection fraction or HFpEF.     Recognizing heart failure symptoms  When you have heart failure, you need to pay close attention to your body and how you feel, every single day. That way, if a problem occurs, you can get help before it becomes too severe. You'll need to watch for changes in your symptoms. As long as symptoms stay about the same from one day to the next, your heart failure is stable. But if symptoms start to get worse, it's time to take action.  Signs and symptoms of worsening heart failure include:  · Rapid weight gain  · Shortness of breath  · Swelling (edema)  · Fatigue  How heart failure affects your body  When the heart doesn't pump enough blood, hormones (body chemicals) are sent to increase the amount of work the heart does. Some hormones make the heart grow larger. Others tell the heart to pump faster. As a result, the heart may pump more blood at first, but it can't  keep up with the ongoing demands. So, the heart muscle becomes even more weak. Over time, even less blood is pumped through the heart. This leads to problems throughout the body as organs began to feel the effects of a long-term lack of oxygen. Eventually, if untreated, this can cause problems with your lungs, liver, kidneys, and loss of elasticity in the skin, and skin changes in the lower legs. A weak heart itself can eventually cause a severe decline in health and possible death if left untreated.  What is ejection fraction?  Ejection fraction (EF) measures how much blood the heart pumps out (ejects). This is measured to help diagnose heart failure. A healthy heart pumps at least half of the blood from the ventricles with each beat. This means a normal ejection fraction is around 50% to 70%. Your doctor will calculate ejection fraction from an echocardiogram.

## 2018-10-09 NOTE — PROGRESS NOTES
Subjective:       Patient ID: Angelina Beard is a 68 y.o. female.    Chief Complaint: Left knee pain    HPI   Angelina Beard is a 67 yo AA F w/ pmhx of DM II (last HgbA1C: 5.8 on 7/12/18), HTN, ESRD on HD on M/W/F, and HFpEF (Last Echo 9/24/18 EF 60-65% w/ PA34 mm Hg) who presents to clinic to establish as a new patient. She is scheduled to f/u w/ cardiology on 10/23. Today she complains of left knee pain. No radiation. The pain is 8/10. She has been taking Naprosyn. Instructed her to STOP taking this medication and change to APAP. The left knee pain is chronic; however, currently she is having an OA flare-up. She endorses that she is unable to fully participate w/ home PT because the left knee pain is limiting her ability to participate. She endorses that her dyspnea has improved. She denies any fevers, chills, nausea, emesis, HA, or rash. She endorses that she is depressed and does not enjoy her usually activities. She is not currently on an antidepressant.         Review of Systems   Constitutional: Negative for chills, diaphoresis and fever.   HENT: Negative for congestion, ear pain, mouth sores, nosebleeds, postnasal drip, rhinorrhea, sinus pressure, sinus pain, sneezing, sore throat and trouble swallowing.    Eyes: Negative for photophobia, pain and redness.   Respiratory: Negative for cough, chest tightness, shortness of breath, wheezing and stridor.    Cardiovascular: Negative for chest pain and palpitations.   Gastrointestinal: Negative for abdominal pain, constipation, diarrhea, nausea and vomiting.   Endocrine: Negative for heat intolerance, polyphagia and polyuria.   Genitourinary: Negative for dysuria, flank pain, frequency, hematuria and urgency.   Musculoskeletal: Positive for arthralgias, gait problem, joint swelling and myalgias.        L knee pain. The patient is deconditioned/debilitated.    Skin: Negative for color change, pallor and rash.   Allergic/Immunologic: Negative for environmental  allergies and food allergies.   Neurological: Positive for weakness. Negative for dizziness, tremors, seizures, syncope, light-headedness and headaches.   Hematological: Negative for adenopathy. Does not bruise/bleed easily.   Psychiatric/Behavioral: Negative for agitation, behavioral problems and suicidal ideas. The patient is not nervous/anxious.         Positive depressed mood.        Objective:      Vitals:    10/09/18 1127   BP: 136/67   Pulse: 74     Physical Exam   Constitutional: She is oriented to person, place, and time. No distress.    +sitting in wheelchair  extremities symmetric; +minimal LE edema non-pitting, +sock indentations; R A/V fistula with auscultated bruits    +gait disturbance w/ overall deconditioning.          HENT:   Head: Normocephalic and atraumatic.   Mouth/Throat: Oropharynx is clear and moist. No oropharyngeal exudate.   Eyes: EOM are normal. Pupils are equal, round, and reactive to light. No scleral icterus.   Neck: Normal range of motion. Neck supple.   Cardiovascular: Normal rate and intact distal pulses.   Pulmonary/Chest: Effort normal. No respiratory distress. She has no wheezes. She has no rales.   Abdominal: Soft. Bowel sounds are normal. She exhibits no distension. There is no tenderness. There is no rebound and no guarding.   Musculoskeletal:   TTP along the medial joint line L knee. + baker's cyst. No overlying cellulitis and w/o warmth or erythema. RTKA scar well approximated and healed. Needs ambulatory assistance device such as walker to assistive w/ reconditioning process and for patient safety.    Lymphadenopathy:     She has no cervical adenopathy.   Neurological: She is alert and oriented to person, place, and time. No cranial nerve deficit or sensory deficit.   Skin: Skin is warm. Capillary refill takes less than 2 seconds. No rash noted. She is not diaphoretic. No erythema.   Psychiatric: Thought content normal.   Depressed mood. NO SI/HI.        Assessment:        1. Type 2 diabetes mellitus with hypertension and end stage renal disease on dialysis    2. Morbid obesity    3. Gastroesophageal reflux disease without esophagitis    4. Primary osteoarthritis of left knee    5. Status post total right knee replacement 2/23/16    6. Debility    7. Physical deconditioning    8. Depression, unspecified depression type    9. ESRD (end stage renal disease) on dialysis    10. Diastolic heart failure, unspecified HF chronicity        Plan:       #Type 2 diabetes mellitus with hypertension and end stage renal disease on dialysis  -Continue home medical regimen w/ insulin  - Continue to check BS and log them  - Please bring log to your next appointment in 4 weeks  - HgbA1C 5.8 on 7/12/18  - Continue to monitor     #Gastroesophageal reflux disease without esophagitis  -Continue medical management w/ Protonix  -Continue to monitor    #Primary osteoarthritis of left knee  - KNEE BRACE FOR HOME USE  - WALKER FOR HOME USE  - X-ray Knee Ortho Left; Future; Expected date: 10/09/2018  - Injection given today in clinic in order to assist w/ rehab program and home PT. Pt is deconditioned and needs continued home health for assistance to regain strength in order top perform her ADL's.  -Continue to monitor  - Will recheck at her next visit    #Debility/Physical deconditioning  - KNEE BRACE FOR HOME USE  - WALKER FOR HOME USE  - Continue home PT and aid assistive for ADL's  - Continue to monitor    #Depression, unspecified depression type  - Ambulatory referral to Psychology  - sertraline (ZOLOFT) 50 MG tablet; Take 1 tablet (50 mg total) by mouth once daily.  Dispense: 30 tablet; Refill: 11  - No SI/HI. Instructed pt to go straight to ED if she develops SI or HI. Pt voiced understanding.  -Will recheck the depression at her next visit to determine if Zoloft is working, if not may change to another agent    #ESRD (end stage renal disease) on dialysis  -Continue HD M/W/F  -Continue to monitor  -  "Continue fluid restriction and low Na diet    #Diastolic heart failure, unspecified HF chronicity  - Cardiology f/u appt on 10/23 and appreciate recommendations  - Last ECHO 9/24/18 w/ concentric hypertrophy, EF 60-65%, and PA 34mmHG  - Continue to monitor      Follow-up in about 4 weeks (around 11/6/2018). Will address Zoloft function and f/u on cardiology recommendation along w/ progression w/ home PT. Encouraged to call the clinic for any questions or concerns.       Procedure note:  After obtaining both written and verbal consent, and per orders of Dr. Kohli. 1ml kenalog (40mg) and 4 cc of 1% lidocaine was drawn into a 1 1/2" 25 G syringe. The patient was positioned correctly on the procedure table. Afterwards, a skin marker was used to wolfgang the injection site of the left knee. Betadine was applied to the injection site and allowed time to dry completely. Next, ethyl chloride spray was used as a local anesthetic and the injection was injected into the left knee w/o complications. The patient tolerated the procedure well w/o complications. The injection was given by Antoni Cortes. Patient instructed to remain in clinic for 20 minutes afterwards, and to report any adverse reaction to me immediately. The patient demonstrated immediately relief of pain and was able to tolerate both active and passive ROM. she was able to move her knee w/o difficulty. The patient's pain was well controlled prior to her leaving the clinic. All questions and concerns were addressed. The patient was encouraged to call the clinic for any additional questions/concerns.   "

## 2018-10-18 ENCOUNTER — TELEPHONE (OUTPATIENT)
Dept: TRANSPLANT | Facility: CLINIC | Age: 68
End: 2018-10-18

## 2018-10-30 ENCOUNTER — OFFICE VISIT (OUTPATIENT)
Dept: CARDIOLOGY | Facility: CLINIC | Age: 68
End: 2018-10-30
Payer: MEDICARE

## 2018-10-30 VITALS
HEIGHT: 67 IN | SYSTOLIC BLOOD PRESSURE: 143 MMHG | WEIGHT: 239 LBS | DIASTOLIC BLOOD PRESSURE: 78 MMHG | BODY MASS INDEX: 37.51 KG/M2 | HEART RATE: 76 BPM

## 2018-10-30 DIAGNOSIS — I25.10 CORONARY ARTERY DISEASE INVOLVING NATIVE CORONARY ARTERY OF NATIVE HEART WITHOUT ANGINA PECTORIS: Chronic | ICD-10-CM

## 2018-10-30 DIAGNOSIS — I50.33 ACUTE ON CHRONIC DIASTOLIC CONGESTIVE HEART FAILURE: Primary | ICD-10-CM

## 2018-10-30 DIAGNOSIS — E11.22 TYPE 2 DIABETES MELLITUS WITH HYPERTENSION AND END STAGE RENAL DISEASE ON DIALYSIS: Chronic | ICD-10-CM

## 2018-10-30 DIAGNOSIS — I15.2 HYPERTENSION ASSOCIATED WITH DIABETES: ICD-10-CM

## 2018-10-30 DIAGNOSIS — Z99.2 END-STAGE RENAL DISEASE ON HEMODIALYSIS: Chronic | ICD-10-CM

## 2018-10-30 DIAGNOSIS — I12.0 TYPE 2 DIABETES MELLITUS WITH HYPERTENSION AND END STAGE RENAL DISEASE ON DIALYSIS: Chronic | ICD-10-CM

## 2018-10-30 DIAGNOSIS — Z99.2 TYPE 2 DIABETES MELLITUS WITH HYPERTENSION AND END STAGE RENAL DISEASE ON DIALYSIS: Chronic | ICD-10-CM

## 2018-10-30 DIAGNOSIS — Z86.2 HISTORY OF SARCOIDOSIS: Chronic | ICD-10-CM

## 2018-10-30 DIAGNOSIS — E11.59 HYPERTENSION ASSOCIATED WITH DIABETES: ICD-10-CM

## 2018-10-30 DIAGNOSIS — I48.0 PAROXYSMAL ATRIAL FIBRILLATION: Chronic | ICD-10-CM

## 2018-10-30 DIAGNOSIS — N18.6 END-STAGE RENAL DISEASE ON HEMODIALYSIS: Chronic | ICD-10-CM

## 2018-10-30 DIAGNOSIS — I67.89 CEREBRAL MICROVASCULAR DISEASE: Chronic | ICD-10-CM

## 2018-10-30 DIAGNOSIS — Z85.3 HISTORY OF LEFT BREAST CANCER: Chronic | ICD-10-CM

## 2018-10-30 DIAGNOSIS — K21.9 GASTROESOPHAGEAL REFLUX DISEASE WITHOUT ESOPHAGITIS: Chronic | ICD-10-CM

## 2018-10-30 DIAGNOSIS — Z96.651 STATUS POST TOTAL RIGHT KNEE REPLACEMENT: Chronic | ICD-10-CM

## 2018-10-30 DIAGNOSIS — I15.0 RENOVASCULAR HYPERTENSION: Chronic | ICD-10-CM

## 2018-10-30 DIAGNOSIS — E66.01 MORBID OBESITY: ICD-10-CM

## 2018-10-30 DIAGNOSIS — E78.5 HYPERLIPIDEMIA, UNSPECIFIED HYPERLIPIDEMIA TYPE: ICD-10-CM

## 2018-10-30 DIAGNOSIS — Z86.73 H/O: CVA (CEREBROVASCULAR ACCIDENT): ICD-10-CM

## 2018-10-30 DIAGNOSIS — D50.0 ANEMIA DUE TO GASTROINTESTINAL BLOOD LOSS: ICD-10-CM

## 2018-10-30 DIAGNOSIS — E78.5 HYPERLIPIDEMIA ASSOCIATED WITH TYPE 2 DIABETES MELLITUS: ICD-10-CM

## 2018-10-30 DIAGNOSIS — E11.69 HYPERLIPIDEMIA ASSOCIATED WITH TYPE 2 DIABETES MELLITUS: ICD-10-CM

## 2018-10-30 DIAGNOSIS — R53.81 PHYSICAL DECONDITIONING: ICD-10-CM

## 2018-10-30 DIAGNOSIS — N18.6 TYPE 2 DIABETES MELLITUS WITH HYPERTENSION AND END STAGE RENAL DISEASE ON DIALYSIS: Chronic | ICD-10-CM

## 2018-10-30 PROCEDURE — 99214 OFFICE O/P EST MOD 30 MIN: CPT | Mod: S$PBB,TXP,, | Performed by: INTERNAL MEDICINE

## 2018-10-30 PROCEDURE — 99999 PR PBB SHADOW E&M-EST. PATIENT-LVL III: CPT | Mod: PBBFAC,TXP,, | Performed by: INTERNAL MEDICINE

## 2018-10-30 PROCEDURE — 99213 OFFICE O/P EST LOW 20 MIN: CPT | Mod: PBBFAC,PO,TXP | Performed by: INTERNAL MEDICINE

## 2018-10-30 NOTE — PROGRESS NOTES
Subjective:    Patient ID:  Angelina Beard is a 68 y.o. female who presents for follow-up of Congestive Heart Failure and Atrial Fibrillation      HPI   69 y/o female former patient of Dr Murphy. She has a hx of CHF (HFpEF), Pafib previously on anticoagulation with coumadin but not currently (recent GIB requiring PRBC transfusion), nonob CAD with multiple LHC's in the past with no PCI, HTN, HLD, anemia, ESRD on HD, hx of sarcoid. She was hospitalized 3/2018 with presenting symptom of CP, elevated trop thought due to demand, had Afib with RVR with spontaneous conversion to SR, and discharged home. Difficult historian.  Last clinic visit was haivng CP, BREWSTER, and hypotension during HD. Was on hydralazine with erratic regimen. Currently on amlodipine and Imdur. No more episodes of CP.   She has chronic, unchanged BREWSTER and intermittent bilateral LE edema. Has had multiple hospitalizations and has required HD for volume overload. There appears to be an issue with dietary indiscretion. Still becomes hypotensive with HD and does not take BP meds on HD days. She denies orthopnea, PND, palps, syncope. Does not smoke, no EtOH.     Review of Systems   Constitution: Positive for weakness and malaise/fatigue.   HENT: Negative for congestion.    Eyes: Negative for blurred vision.   Cardiovascular: Positive for dyspnea on exertion and leg swelling. Negative for chest pain, claudication, cyanosis, irregular heartbeat, near-syncope, orthopnea, palpitations, paroxysmal nocturnal dyspnea and syncope.   Respiratory: Positive for shortness of breath.    Endocrine: Negative for polyuria.   Hematologic/Lymphatic: Negative for bleeding problem.   Skin: Negative for itching and rash.   Musculoskeletal: Positive for back pain. Negative for joint swelling, muscle cramps and muscle weakness.   Gastrointestinal: Negative for abdominal pain, hematemesis, hematochezia, melena, nausea and vomiting.   Genitourinary: Negative for dysuria and  hematuria.   Neurological: Negative for dizziness, focal weakness, headaches, light-headedness and loss of balance.   Psychiatric/Behavioral: Negative for depression. The patient is not nervous/anxious.         Objective:    Physical Exam   Constitutional: She is oriented to person, place, and time. She appears well-developed and well-nourished.   HENT:   Head: Normocephalic and atraumatic.   Neck: Neck supple. No JVD present.   Cardiovascular: Normal rate and regular rhythm.   Murmur heard.   Systolic murmur is present with a grade of 2/6.  Pulses:       Carotid pulses are 2+ on the right side, and 2+ on the left side.       Radial pulses are 2+ on the right side, and 2+ on the left side.        Femoral pulses are 2+ on the right side, and 2+ on the left side.       Posterior tibial pulses are 1+ on the right side, and 1+ on the left side.   Pulmonary/Chest: Effort normal and breath sounds normal.   Abdominal: Soft. Bowel sounds are normal.   Musculoskeletal: She exhibits edema.   Neurological: She is alert and oriented to person, place, and time.   Skin: Skin is warm and dry.   Psychiatric: She has a normal mood and affect. Her behavior is normal. Thought content normal.         Assessment:       1. Acute on chronic diastolic congestive heart failure    2. Coronary artery disease involving native coronary artery of native heart without angina pectoris    3. Hyperlipidemia, unspecified hyperlipidemia type    4. Paroxysmal atrial fibrillation    5. Hypertension associated with diabetes    6. Renovascular hypertension    7. Hyperlipidemia associated with type 2 diabetes mellitus    8. H/O: CVA (cerebrovascular accident)    9. Cerebral microvascular disease    10. End-stage renal disease on hemodialysis    11. History of sarcoidosis    12. Anemia due to gastrointestinal blood loss    13. History of left breast cancer s/p mastectomy    14. Morbid obesity    15. Type 2 diabetes mellitus with hypertension and end stage  renal disease on dialysis    16. Gastroesophageal reflux disease without esophagitis    17. Status post total right knee replacement 2/23/16    18. Physical deconditioning      67 y/o pt with hx and presentation as above. Doing well from a cardiac perspective and compensated from a HF perspective. Makes some urine and on lasix. Currently euvolemic. Discussed the importance of med compliance, low salt diet, for CHF.          Plan:       -Continue current medical management  -F/u in 1 month

## 2018-11-07 ENCOUNTER — TELEPHONE (OUTPATIENT)
Dept: TRANSPLANT | Facility: CLINIC | Age: 68
End: 2018-11-07

## 2018-11-08 ENCOUNTER — OFFICE VISIT (OUTPATIENT)
Dept: FAMILY MEDICINE | Facility: HOSPITAL | Age: 68
End: 2018-11-08
Attending: FAMILY MEDICINE
Payer: MEDICARE

## 2018-11-08 VITALS
SYSTOLIC BLOOD PRESSURE: 113 MMHG | HEIGHT: 67 IN | WEIGHT: 239.44 LBS | DIASTOLIC BLOOD PRESSURE: 62 MMHG | BODY MASS INDEX: 37.58 KG/M2 | HEART RATE: 79 BPM

## 2018-11-08 DIAGNOSIS — Z11.59 NEED FOR HEPATITIS C SCREENING TEST: ICD-10-CM

## 2018-11-08 DIAGNOSIS — Z23 NEED FOR ZOSTER VACCINE: ICD-10-CM

## 2018-11-08 DIAGNOSIS — Z23 NEED FOR PNEUMOCOCCAL VACCINATION: ICD-10-CM

## 2018-11-08 DIAGNOSIS — K21.9 GASTROESOPHAGEAL REFLUX DISEASE, ESOPHAGITIS PRESENCE NOT SPECIFIED: ICD-10-CM

## 2018-11-08 DIAGNOSIS — Z99.2 TYPE 2 DIABETES MELLITUS WITH HYPERTENSION AND END STAGE RENAL DISEASE ON DIALYSIS: Primary | Chronic | ICD-10-CM

## 2018-11-08 DIAGNOSIS — R60.9 DEPENDENT EDEMA: ICD-10-CM

## 2018-11-08 DIAGNOSIS — E11.22 TYPE 2 DIABETES MELLITUS WITH HYPERTENSION AND END STAGE RENAL DISEASE ON DIALYSIS: Primary | Chronic | ICD-10-CM

## 2018-11-08 DIAGNOSIS — I12.0 TYPE 2 DIABETES MELLITUS WITH HYPERTENSION AND END STAGE RENAL DISEASE ON DIALYSIS: Primary | Chronic | ICD-10-CM

## 2018-11-08 DIAGNOSIS — R53.81 PHYSICAL DECONDITIONING: ICD-10-CM

## 2018-11-08 DIAGNOSIS — R53.81 DEBILITY: ICD-10-CM

## 2018-11-08 DIAGNOSIS — M17.10 PRIMARY OSTEOARTHRITIS OF KNEE, UNSPECIFIED LATERALITY: Chronic | ICD-10-CM

## 2018-11-08 DIAGNOSIS — N18.6 TYPE 2 DIABETES MELLITUS WITH HYPERTENSION AND END STAGE RENAL DISEASE ON DIALYSIS: Primary | Chronic | ICD-10-CM

## 2018-11-08 PROCEDURE — 99215 OFFICE O/P EST HI 40 MIN: CPT | Performed by: STUDENT IN AN ORGANIZED HEALTH CARE EDUCATION/TRAINING PROGRAM

## 2018-11-08 RX ORDER — PANTOPRAZOLE SODIUM 40 MG/1
40 TABLET, DELAYED RELEASE ORAL DAILY
Qty: 30 TABLET | Refills: 2 | Status: SHIPPED | OUTPATIENT
Start: 2018-11-08 | End: 2018-12-13

## 2018-11-08 NOTE — PROGRESS NOTES
"Subjective:       Patient ID: Angelina Beard is a 68 y.o. female.    Chief Complaint: "I need a referral to an eye doctor to get my eyes checked"    HPI   Angelina Beard is a 67 yo AA F w/ pmhx of DM II (last HgbA1C: 5.8 on 7/12/18), HTN, ESRD on HD on M/W/F,  Pafib previously on anticoagulation with coumadin but not currently (recent GIB requiring PRBC transfusion), nonob CAD with multiple LHC's in the past with no PCI and HFpEF (Last Echo 9/24/18 EF 60-65% w/ PA34 mm Hg) who presents to clinic for follow-up visit. No new aliments to discuss today. She endorsed that the left knee injection improved her pain and she responded well to the injection.  She endorses that she has not seen either podiatry or ophthalmology. She endorses that PT visits her home 2/week and she has notice an improvement. She notes that she is still deconditioned and has and need further therapy to improve her strength and to get back to baseline. Per cardiology, continue medical management and no changes to her medical regimen were made.  She denies any fevers, chills, nausea, emesis, HA, or rash.    Review of Systems   Constitutional: Negative for chills, diaphoresis, fatigue, fever and unexpected weight change.   HENT: Negative for congestion, ear pain, mouth sores, nosebleeds, postnasal drip, rhinorrhea, sinus pressure, sinus pain, sneezing and trouble swallowing.    Eyes: Negative for photophobia, pain and redness.   Respiratory: Negative for cough, chest tightness, shortness of breath, wheezing and stridor.    Cardiovascular: Negative for chest pain and palpitations.   Gastrointestinal: Negative for abdominal pain, constipation, diarrhea, nausea and vomiting.   Endocrine: Negative for heat intolerance, polyphagia and polyuria.   Genitourinary: Negative for dysuria, flank pain, frequency, hematuria and urgency.   Musculoskeletal: Positive for arthralgias and myalgias. Negative for joint swelling.        The patient is " deconditioned/debilitated   Skin: Negative for color change, pallor and rash.   Allergic/Immunologic: Negative for environmental allergies and food allergies.   Neurological: Negative for dizziness, tremors, seizures, syncope, weakness, light-headedness and headaches.   Hematological: Negative for adenopathy. Does not bruise/bleed easily.   Psychiatric/Behavioral: Negative for agitation and behavioral problems. The patient is not nervous/anxious.        Objective:      Vitals:    11/08/18 1032   BP: 113/62   Pulse: 79     Physical Exam   Constitutional: She is oriented to person, place, and time. No distress.   +sitting in wheelchair  extremities symmetric; +minimal LE edema non-pitting, +sock indentations; R A/V fistula with auscultated bruits    +gait disturbance w/ overall deconditioning.       HENT:   Head: Normocephalic and atraumatic.   Mouth/Throat: Oropharynx is clear and moist. No oropharyngeal exudate.   Eyes: EOM are normal. Pupils are equal, round, and reactive to light. No scleral icterus.   Neck: Normal range of motion. Neck supple.   Cardiovascular: Normal rate, regular rhythm, normal heart sounds and intact distal pulses. Exam reveals no gallop and no friction rub.   No murmur heard.  Pulmonary/Chest: Effort normal and breath sounds normal. She has no wheezes. She has no rales.   Abdominal: Soft. Bowel sounds are normal. She exhibits no distension. There is no tenderness. There is no rebound and no guarding.   Musculoskeletal:   TTP along the medial joint line L knee. + baker's cyst. No overlying cellulitis and w/o warmth or erythema.    Lymphadenopathy:     She has no cervical adenopathy.   Neurological: She is alert and oriented to person, place, and time. No cranial nerve deficit or sensory deficit.   Skin: Skin is warm. Capillary refill takes less than 2 seconds. No rash noted. She is not diaphoretic. No erythema.   Psychiatric: She has a normal mood and affect. Thought content normal.        Assessment:       1. Type 2 diabetes mellitus with hypertension and end stage renal disease on dialysis    2. Primary osteoarthritis of knee, unspecified laterality    3. Dependent edema    4. Debility    5. Physical deconditioning    6. Need for zoster vaccine    7. Need for pneumococcal vaccination    8. Need for hepatitis C screening test    9. Gastroesophageal reflux disease, esophagitis presence not specified        Plan:           #Type 2 diabetes mellitus with hypertension and end stage renal disease on dialysis  -Continue home medical regimen w/ insulin  - Continue to check BS and log them  - HgbA1C 5.8 on 7/12/18  -     Ambulatory referral to Ophthalmology  -     Ambulatory referral to Podiatry  - Continue to monitor and will recheck another HgbA1C at her next clinic visit     #Gastroesophageal reflux disease without esophagitis  -Continue medical management w/ Protonix  -     pantoprazole (PROTONIX) 40 MG tablet; Take 1 tablet (40 mg total) by mouth once daily.  Dispense: 30 tablet; Refill: 2  -Continue to monitor     #Primary osteoarthritis of left knee  - KNEE BRACE FOR HOME USE  - WALKER FOR HOME USE  - X-ray Knee Ortho Left; Future; Expected date: 10/09/2018  - Pt is deconditioned and needs continued home health for assistance to regain strength in order top perform her ADL's.  -Continue to monitor  - Will recheck at her next visit     #Debility/Physical deconditioning  - KNEE BRACE FOR HOME USE  - WALKER FOR HOME USE  - Continue home PT and aid assistive for ADL's  - Continue to monitor     #Depression, unspecified depression type  - Ambulatory referral to Psychology  - sertraline (ZOLOFT) 50 MG tablet; Take 1 tablet (50 mg total) by mouth once daily.  Dispense: 30 tablet; Refill: 11  - No SI/HI. Instructed pt to go straight to ED if she develops SI or HI. Pt voiced understanding.  -Will recheck the depression at her next visit to determine if Zoloft is working, if not may change to another  agent     #ESRD (end stage renal disease) on dialysis  -Continue HD M/W/F  -Continue to monitor  - Continue fluid restriction and low Na diet     #Diastolic heart failure, unspecified HF chronicity  - Cardiology f/u appt on 10/23 and appreciate recommendations  - Last ECHO 9/24/18 w/ concentric hypertrophy, EF 60-65%, and PA 34mmHG  - Continue to monitor    #Dependent edema  -     COMPRESSION STOCKINGS  - Encouraged her to elevate B/L LE's  - Continue to monitor    #Health Maintenance  -     Zoster Recombinant Vaccine ordered  -      Pneumococcal Polysaccharide Vaccine (23 Valent) (SQ/IM) ordered  -     Hepatitis C antibody; Future; Expected date: 11/08/2018        Follow-up in about 3 months (around 2/8/2019).

## 2018-11-08 NOTE — PATIENT INSTRUCTIONS
Osteoarthritis  Osteoarthritis (also called degenerative joint disease) happens when the cartilage in a joint becomes damaged and worn. This may be due to age, wear and tear, overuse of the joint, or other problems. Osteoarthritis can affect any joint. But it is most common in hands, knees, spine, hips, and feet. Symptoms include joint stiffness, pain, and swelling.  Home care  · When a joint is more sore than usual, rest it for a day or two.  · Heat can help relieve stiffness. Take a hot bath or apply a heating pad for up to 30 minutes at a time. If symptoms are worse in the morning, using heat just after awakening can help relax the muscle and soothe the joints.   · Ice helps relieve pain and swelling. It is often used after activity. Use a cold pack wrapped in a thin cloth on the joint for 10 to 15 minutes at a time.   · Alternating hot and cold can also help relieve pain. Try this for 20 minutes at a time, several times per day.  · Exercise helps prevent the muscles and ligaments around the joint from becoming weak. It also helps maintain function in the joint.  Be as active as you can. Talk to your healthcare provider about what activity program is best for you.  · Excess weight puts a lot of extra strain on weight-bearing joints of the lower back, hips, knees, feet and ankles. If you are overweight, talk to your healthcare provider about a safe and effective weight loss program.  · Use anti-inflammatory medicines as prescribed for pain. This includes acetaminophen or NSAIDs such as ibuprofen or naproxen. If needed, topical or injected medicines may be recommended. Talk to your healthcare provider if these options are not enough to manage your pain.  · Talk with your healthcare provider about devices that might help improve your function and reduce pain.  Follow-up care  Follow up with your healthcare provider as advised by our staff.  When to seek medical advice  Call your healthcare provider right away if any  of these occur:  · Redness or swelling of a painful joint  · Discharge or pus from a painful joint  · Fever of 100.4ºF (38ºC) or higher, or as directed by your healthcare provider  · Worsening joint pain  · Decreased ability to move the joint or bear weight on the joint

## 2018-11-27 ENCOUNTER — TELEPHONE (OUTPATIENT)
Dept: FAMILY MEDICINE | Facility: HOSPITAL | Age: 68
End: 2018-11-27

## 2018-11-27 PROBLEM — L97.512 SKIN ULCER OF TOE OF RIGHT FOOT WITH FAT LAYER EXPOSED: Status: ACTIVE | Noted: 2018-11-27

## 2018-11-27 NOTE — TELEPHONE ENCOUNTER
Patient's order for walker has to be revised. Previous order was for heavy duty walker but patient's weight is below weight for heavy duty criteria.   Please enter a new order for a walker.

## 2018-11-29 NOTE — PROGRESS NOTES
I was present in clinic when the patient was seen and I discussed the case with Dr. Cortes.  I examined the patient while they were in clinic, and I have reviewed and agree with the assessment and plan.

## 2018-11-30 ENCOUNTER — HOSPITAL ENCOUNTER (OUTPATIENT)
Dept: RADIOLOGY | Facility: HOSPITAL | Age: 68
Discharge: HOME | End: 2018-11-30
Attending: SURGERY
Payer: MEDICARE

## 2018-11-30 DIAGNOSIS — I15.0 RENOVASCULAR HYPERTENSION: Chronic | ICD-10-CM

## 2018-11-30 DIAGNOSIS — I48.0 PAROXYSMAL ATRIAL FIBRILLATION: Chronic | ICD-10-CM

## 2018-11-30 DIAGNOSIS — Z86.73 H/O: CVA (CEREBROVASCULAR ACCIDENT): ICD-10-CM

## 2018-11-30 DIAGNOSIS — E66.01 MORBID OBESITY: ICD-10-CM

## 2018-11-30 DIAGNOSIS — E11.42 DIABETIC POLYNEUROPATHY ASSOCIATED WITH TYPE 2 DIABETES MELLITUS: Chronic | ICD-10-CM

## 2018-11-30 DIAGNOSIS — E78.5 HYPERLIPIDEMIA, UNSPECIFIED HYPERLIPIDEMIA TYPE: ICD-10-CM

## 2018-11-30 DIAGNOSIS — L97.512 SKIN ULCER OF TOE OF RIGHT FOOT WITH FAT LAYER EXPOSED: ICD-10-CM

## 2018-11-30 PROCEDURE — 73630 X-RAY EXAM OF FOOT: CPT | Mod: TC,FY,PO,RT,NTX

## 2018-12-02 DIAGNOSIS — R53.81 DEBILITATED PATIENT: Primary | ICD-10-CM

## 2018-12-02 DIAGNOSIS — E11.8 TYPE 2 DIABETES MELLITUS WITH COMPLICATION, WITH LONG-TERM CURRENT USE OF INSULIN: ICD-10-CM

## 2018-12-02 DIAGNOSIS — Z79.4 TYPE 2 DIABETES MELLITUS WITH COMPLICATION, WITH LONG-TERM CURRENT USE OF INSULIN: ICD-10-CM

## 2018-12-02 DIAGNOSIS — E66.01 MORBIDLY OBESE: ICD-10-CM

## 2018-12-06 ENCOUNTER — HOSPITAL ENCOUNTER (OUTPATIENT)
Dept: RADIOLOGY | Facility: HOSPITAL | Age: 68
Discharge: HOME | End: 2018-12-06
Attending: SURGERY
Payer: MEDICARE

## 2018-12-06 DIAGNOSIS — I15.0 RENOVASCULAR HYPERTENSION: Chronic | ICD-10-CM

## 2018-12-06 DIAGNOSIS — E78.5 HYPERLIPIDEMIA, UNSPECIFIED HYPERLIPIDEMIA TYPE: ICD-10-CM

## 2018-12-06 DIAGNOSIS — E11.42 DIABETIC POLYNEUROPATHY ASSOCIATED WITH TYPE 2 DIABETES MELLITUS: Chronic | ICD-10-CM

## 2018-12-06 DIAGNOSIS — E66.01 MORBID OBESITY: ICD-10-CM

## 2018-12-06 DIAGNOSIS — Z86.73 H/O: CVA (CEREBROVASCULAR ACCIDENT): ICD-10-CM

## 2018-12-06 DIAGNOSIS — L97.512 SKIN ULCER OF TOE OF RIGHT FOOT WITH FAT LAYER EXPOSED: ICD-10-CM

## 2018-12-06 DIAGNOSIS — I48.0 PAROXYSMAL ATRIAL FIBRILLATION: Chronic | ICD-10-CM

## 2018-12-06 PROCEDURE — 93925 LOWER EXTREMITY STUDY: CPT | Mod: TC,PO,NTX

## 2018-12-13 ENCOUNTER — OFFICE VISIT (OUTPATIENT)
Dept: PODIATRY | Facility: CLINIC | Age: 68
End: 2018-12-13
Payer: MEDICARE

## 2018-12-13 ENCOUNTER — OFFICE VISIT (OUTPATIENT)
Dept: CARDIOLOGY | Facility: CLINIC | Age: 68
End: 2018-12-13
Payer: MEDICARE

## 2018-12-13 VITALS
HEIGHT: 65 IN | WEIGHT: 231 LBS | DIASTOLIC BLOOD PRESSURE: 80 MMHG | BODY MASS INDEX: 38.49 KG/M2 | HEART RATE: 76 BPM | SYSTOLIC BLOOD PRESSURE: 160 MMHG

## 2018-12-13 VITALS
WEIGHT: 231 LBS | HEIGHT: 65 IN | BODY MASS INDEX: 38.49 KG/M2 | HEART RATE: 72 BPM | DIASTOLIC BLOOD PRESSURE: 53 MMHG | SYSTOLIC BLOOD PRESSURE: 93 MMHG

## 2018-12-13 DIAGNOSIS — E11.22 TYPE 2 DIABETES MELLITUS WITH HYPERTENSION AND END STAGE RENAL DISEASE ON DIALYSIS: Primary | Chronic | ICD-10-CM

## 2018-12-13 DIAGNOSIS — Z86.73 H/O: CVA (CEREBROVASCULAR ACCIDENT): ICD-10-CM

## 2018-12-13 DIAGNOSIS — E11.621 DIABETIC ULCER OF TOE OF RIGHT FOOT ASSOCIATED WITH TYPE 2 DIABETES MELLITUS, LIMITED TO BREAKDOWN OF SKIN: ICD-10-CM

## 2018-12-13 DIAGNOSIS — Z99.2 TYPE 2 DIABETES MELLITUS WITH HYPERTENSION AND END STAGE RENAL DISEASE ON DIALYSIS: Chronic | ICD-10-CM

## 2018-12-13 DIAGNOSIS — I48.0 PAROXYSMAL ATRIAL FIBRILLATION: Chronic | ICD-10-CM

## 2018-12-13 DIAGNOSIS — I15.2 HYPERTENSION ASSOCIATED WITH DIABETES: ICD-10-CM

## 2018-12-13 DIAGNOSIS — L97.512 SKIN ULCER OF TOE OF RIGHT FOOT WITH FAT LAYER EXPOSED: ICD-10-CM

## 2018-12-13 DIAGNOSIS — E11.22 TYPE 2 DIABETES MELLITUS WITH HYPERTENSION AND END STAGE RENAL DISEASE ON DIALYSIS: Chronic | ICD-10-CM

## 2018-12-13 DIAGNOSIS — N18.6 TYPE 2 DIABETES MELLITUS WITH HYPERTENSION AND END STAGE RENAL DISEASE ON DIALYSIS: Chronic | ICD-10-CM

## 2018-12-13 DIAGNOSIS — M17.10 PRIMARY OSTEOARTHRITIS OF KNEE, UNSPECIFIED LATERALITY: Chronic | ICD-10-CM

## 2018-12-13 DIAGNOSIS — I67.89 CEREBRAL MICROVASCULAR DISEASE: Chronic | ICD-10-CM

## 2018-12-13 DIAGNOSIS — I15.0 RENOVASCULAR HYPERTENSION: Chronic | ICD-10-CM

## 2018-12-13 DIAGNOSIS — E11.51 TYPE 2 DIABETES MELLITUS WITH PERIPHERAL VASCULAR DISEASE: ICD-10-CM

## 2018-12-13 DIAGNOSIS — I12.0 TYPE 2 DIABETES MELLITUS WITH HYPERTENSION AND END STAGE RENAL DISEASE ON DIALYSIS: Chronic | ICD-10-CM

## 2018-12-13 DIAGNOSIS — D63.1 ANEMIA IN END-STAGE RENAL DISEASE: ICD-10-CM

## 2018-12-13 DIAGNOSIS — R53.81 DEBILITY: ICD-10-CM

## 2018-12-13 DIAGNOSIS — Z99.2 TYPE 2 DIABETES MELLITUS WITH HYPERTENSION AND END STAGE RENAL DISEASE ON DIALYSIS: Primary | Chronic | ICD-10-CM

## 2018-12-13 DIAGNOSIS — D50.0 ANEMIA DUE TO GASTROINTESTINAL BLOOD LOSS: ICD-10-CM

## 2018-12-13 DIAGNOSIS — E78.5 HYPERLIPIDEMIA, UNSPECIFIED HYPERLIPIDEMIA TYPE: ICD-10-CM

## 2018-12-13 DIAGNOSIS — B35.1 ONYCHOMYCOSIS: ICD-10-CM

## 2018-12-13 DIAGNOSIS — N18.6 TYPE 2 DIABETES MELLITUS WITH HYPERTENSION AND END STAGE RENAL DISEASE ON DIALYSIS: Primary | Chronic | ICD-10-CM

## 2018-12-13 DIAGNOSIS — L97.511 DIABETIC ULCER OF TOE OF RIGHT FOOT ASSOCIATED WITH TYPE 2 DIABETES MELLITUS, LIMITED TO BREAKDOWN OF SKIN: ICD-10-CM

## 2018-12-13 DIAGNOSIS — I50.32 CHRONIC DIASTOLIC CONGESTIVE HEART FAILURE: Primary | Chronic | ICD-10-CM

## 2018-12-13 DIAGNOSIS — I25.10 CORONARY ARTERY DISEASE INVOLVING NATIVE CORONARY ARTERY OF NATIVE HEART WITHOUT ANGINA PECTORIS: Chronic | ICD-10-CM

## 2018-12-13 DIAGNOSIS — K21.9 GASTROESOPHAGEAL REFLUX DISEASE WITHOUT ESOPHAGITIS: Chronic | ICD-10-CM

## 2018-12-13 DIAGNOSIS — R53.81 PHYSICAL DECONDITIONING: ICD-10-CM

## 2018-12-13 DIAGNOSIS — E66.01 MORBID OBESITY: ICD-10-CM

## 2018-12-13 DIAGNOSIS — I12.0 TYPE 2 DIABETES MELLITUS WITH HYPERTENSION AND END STAGE RENAL DISEASE ON DIALYSIS: Primary | Chronic | ICD-10-CM

## 2018-12-13 DIAGNOSIS — N18.6 ANEMIA IN END-STAGE RENAL DISEASE: ICD-10-CM

## 2018-12-13 DIAGNOSIS — E11.59 HYPERTENSION ASSOCIATED WITH DIABETES: ICD-10-CM

## 2018-12-13 PROCEDURE — 99213 OFFICE O/P EST LOW 20 MIN: CPT | Mod: PBBFAC,PO,TXP,25 | Performed by: INTERNAL MEDICINE

## 2018-12-13 PROCEDURE — 99203 OFFICE O/P NEW LOW 30 MIN: CPT | Mod: 25,S$PBB,, | Performed by: PODIATRIST

## 2018-12-13 PROCEDURE — 99214 OFFICE O/P EST MOD 30 MIN: CPT | Mod: S$PBB,NTX,, | Performed by: INTERNAL MEDICINE

## 2018-12-13 PROCEDURE — 99999 PR PBB SHADOW E&M-EST. PATIENT-LVL IV: CPT | Mod: PBBFAC,,, | Performed by: PODIATRIST

## 2018-12-13 PROCEDURE — 99999 PR PBB SHADOW E&M-EST. PATIENT-LVL III: CPT | Mod: PBBFAC,TXP,, | Performed by: INTERNAL MEDICINE

## 2018-12-13 PROCEDURE — 99214 OFFICE O/P EST MOD 30 MIN: CPT | Mod: PBBFAC,27,PN,25 | Performed by: PODIATRIST

## 2018-12-13 PROCEDURE — 97597 DBRDMT OPN WND 1ST 20 CM/<: CPT | Mod: S$PBB,,, | Performed by: PODIATRIST

## 2018-12-13 PROCEDURE — 11721 DEBRIDE NAIL 6 OR MORE: CPT | Mod: PBBFAC,PN,NTX | Performed by: PODIATRIST

## 2018-12-13 PROCEDURE — 97597 DBRDMT OPN WND 1ST 20 CM/<: CPT | Mod: PBBFAC,PN,NTX | Performed by: PODIATRIST

## 2018-12-13 RX ORDER — NITROGLYCERIN 0.4 MG/1
0.4 TABLET SUBLINGUAL EVERY 5 MIN PRN
Qty: 30 TABLET | Refills: 11 | Status: ON HOLD | OUTPATIENT
Start: 2018-12-13 | End: 2019-07-30 | Stop reason: HOSPADM

## 2018-12-13 RX ORDER — METOPROLOL SUCCINATE 25 MG/1
12.5 TABLET, EXTENDED RELEASE ORAL DAILY
Qty: 45 TABLET | Refills: 3 | Status: SHIPPED | OUTPATIENT
Start: 2018-12-13 | End: 2019-02-14

## 2018-12-13 NOTE — PROCEDURES
"Routine Foot Care  Date/Time: 12/13/2018 3:58 PM  Performed by: Kwame Dacosta DPM  Authorized by: Kwame Dacosta DPM     Time out: Immediately prior to procedure a "time out" was called to verify the correct patient, procedure, equipment, support staff and site/side marked as required.    Consent Done?:  Yes (Verbal)  Hyperkeratotic Skin Lesions?: No      Nail Care Type:  Debride  Location(s): All  (Left 1st Toe, Left 3rd Toe, Left 2nd Toe, Left 4th Toe, Left 5th Toe, Right 1st Toe, Right 2nd Toe, Right 3rd Toe, Right 4th Toe and Right 5th Toe)  Patient tolerance:  Patient tolerated the procedure well with no immediate complications      "

## 2018-12-13 NOTE — LETTER
December 14, 2018      Antoni Cortes, DO  [address]  [address]  [facility] [address]           [facility]  [address]  [facility] [address]  Phone: [phone]  Fax: [phone]          Patient: Angelina Beard   MR Number: [id number]   YOB: 1950   Date of Visit: 12/13/2018       Dear Dr. Antoni Cortes:    Thank you for referring Angelina Beard to me for evaluation. Attached you will find relevant portions of my assessment and plan of care.    If you have questions, please do not hesitate to call me. I look forward to following Angelina Beard along with you.    Sincerely,    Kwame Dacosta, HANNAH    Enclosure  CC:  No Recipients    If you would like to receive this communication electronically, please contact [email or web address] or [phone] to request more information on [facility] access.    For providers and/or their staff who would like to refer a patient to [facility], please contact us through our one-stop-shop provider referral line, [facility], at [phone].    If you feel you have received this communication in error or would no longer like to receive these types of communications, please e-mail [email or web address]

## 2018-12-13 NOTE — PROGRESS NOTES
Subjective:    Patient ID:  Angelina Beard is a 68 y.o. female who presents for follow-up of Congestive Heart Failure      HPI  69 y/o female former patient of Dr Murphy. She has a hx of CHF (HFpEF), Pafib previously on anticoagulation with coumadin but not currently (recent GIB requiring PRBC transfusion), nonob CAD with multiple LHC's in the past with no PCI, HTN, HLD, anemia, ESRD on HD, hx of sarcoid. She was hospitalized 3/2018 with presenting symptom of CP, elevated trop thought due to demand, had Afib with RVR with spontaneous conversion to SR, and discharged home. Difficult historian.  Last clinic visit was haivng CP, BREWSTER, and hypotension during HD. Was on hydralazine with erratic regimen. Currently on amlodipine and Imdur. No more episodes of CP.   She has chronic, unchanged BREWSTER and intermittent bilateral LE edema. Has had multiple hospitalizations and has required HD for volume overload. There appears to be an issue with dietary indiscretion. Still becomes hypotensive with HD and does not take BP meds on HD days. She denies orthopnea, PND, palps, syncope. Does not smoke, no EtOH.   Since last clinic visit has been seen for left foot ulcers and follows with Dr Borja. Has appt with Podiatry today per caretaker who is present. Had recent arterial doppler with the following results:  1. Severe peripheral arterial disease in both lower extremities.  2. The following pertains to the right lower extremity. A portion of the anterior tibial artery is occluded.  The dorsalis pedis artery is occluded.  3. The following pertains to the left lower extremity.  A portion of the anterior tibial artery is occluded.  1. Severe peripheral arterial disease in both lower extremities.  2. The following pertains to the right lower extremity. A portion of the anterior tibial artery is occluded.  The dorsalis pedis artery is occluded.  3. The following pertains to the left lower extremity.  A portion of the anterior tibial  artery is occluded.  Was started on Plavix and already on ASA/statin. Mostly [facility] bound.    Review of Systems   Constitution: Positive for weakness and malaise/fatigue.   HENT: Negative for congestion.    Eyes: Negative for blurred vision.   Cardiovascular: Positive for dyspnea on exertion and leg swelling. Negative for chest pain, claudication, cyanosis, irregular heartbeat, near-syncope, orthopnea, palpitations, paroxysmal nocturnal dyspnea and syncope.   Respiratory: Positive for shortness of breath.    Endocrine: Negative for polyuria.   Hematologic/Lymphatic: Negative for bleeding problem.   Skin: Positive for poor wound healing. Negative for itching and rash.   Musculoskeletal: Positive for back pain, joint pain and joint swelling. Negative for muscle cramps and muscle weakness.   Gastrointestinal: Negative for abdominal pain, hematemesis, hematochezia, melena, nausea and vomiting.   Genitourinary: Negative for dysuria and hematuria.   Neurological: Negative for dizziness, focal weakness, headaches, light-headedness and loss of balance.   Psychiatric/Behavioral: Negative for depression. The patient is not nervous/anxious.         Objective:    Physical Exam   Constitutional: She is oriented to person, place, and time. She appears well-developed and well-nourished.   HENT:   Head: Normocephalic and atraumatic.   Neck: Neck supple. No JVD present.   Cardiovascular: Normal rate and regular rhythm.   Murmur heard.   Systolic murmur is present with a grade of 2/6.  Pulses:       Carotid pulses are 2+ on the right side, and 2+ on the left side.       Radial pulses are 2+ on the right side, and 2+ on the left side.        Femoral pulses are 2+ on the right side, and 2+ on the left side.  Pulmonary/Chest: Effort normal and breath sounds normal.   Abdominal: Soft. Bowel sounds are normal.   Musculoskeletal: She exhibits no edema.   Neurological: She is alert and oriented to person, place, and time.   Skin: Skin is warm  and dry.   Psychiatric: She has a normal mood and affect. Her behavior is normal. Thought content normal.         Assessment:       1. Chronic diastolic congestive heart failure    2. Coronary artery disease involving native coronary artery of native heart without angina pectoris    3. Hyperlipidemia, unspecified hyperlipidemia type    4. Hypertension associated with diabetes    5. Paroxysmal atrial fibrillation    6. Renovascular hypertension    7. Skin ulcer of toe of right foot with fat layer exposed    8. H/O: CVA (cerebrovascular accident)    9. Cerebral microvascular disease    10. Anemia due to gastrointestinal blood loss    11. Anemia in end-stage renal disease    12. Morbid obesity    13. Type 2 diabetes mellitus with hypertension and end stage renal disease on dialysis    14. Primary osteoarthritis of knee, unspecified laterality    15. Gastroesophageal reflux disease without esophagitis    16. Debility    17. Physical deconditioning      67 y/o pt with hx and presentation as above. Doing well from a cardiac perspective and compensated from a HF perspective. BP needs better control (at home SBP averages in the 160's) and will add metoprolol to regimen. Regarding PAD and foot ulcers, has appt with Podiatry today and will await recommendations. If endovascular evaluation recommended, can offer angiogram plus intervention.        Plan:       -Continue current medical management  -Start Toprol 12.5 mg daily  -f/u in 1 month

## 2018-12-13 NOTE — PROGRESS NOTES
Subjective:      Patient ID: Angelina Beard is a 68 y.o. female.    Chief Complaint: Diabetic Foot Exam (Dr. Cortes 11/8/18) and Foot Ulcer (Right)    Angelina is a 68 y.o. female who presents to the clinic upon referral from Dr. Cortes  for evaluation and treatment of diabetic feet. Angelina has a past medical history of Arthritis, Breast cancer, left, CHF (congestive heart failure), Cholelithiasis, Coronary artery disease, Diabetes mellitus, type 2, Encounter for blood transfusion, End-stage renal disease on hemodialysis, Hemodialysis access site with arteriovenous graft, Hyperlipidemia, Hypertension, and Sarcoidosis.     Patient here for wound on right sub 5th emt head wound.  Stated she first noticed wound a couple weeks ago.  Denies F/c/N/V.  Being followed by Cardiology.    PCP: Antoni Cortes, DO    Date Last Seen by PCP: 11/8/18    Current shoe gear: Rx diabetic extra depth shoes and custom accommodative insoles    Hemoglobin A1C   Date Value Ref Range Status   07/12/2018 5.8 (H) 4.0 - 5.6 % Final     Comment:     ADA Screening Guidelines:  5.7-6.4%  Consistent with prediabetes  >or=6.5%  Consistent with diabetes  High levels of fetal hemoglobin interfere with the HbA1C  assay. Heterozygous hemoglobin variants (HbS, HgC, etc)do  not significantly interfere with this assay.   However, presence of multiple variants may affect accuracy.     03/03/2018 6.4 (H) 4.0 - 5.6 % Final     Comment:     According to ADA guidelines, hemoglobin A1c <7.0% represents  optimal control in non-pregnant diabetic patients. Different  metrics may apply to specific patient populations.   Standards of Medical Care in Diabetes-2016.  For the purpose of screening for the presence of diabetes:  <5.7%     Consistent with the absence of diabetes  5.7-6.4%  Consistent with increasing risk for diabetes   (prediabetes)  >or=6.5%  Consistent with diabetes  Currently, no consensus exists for use of hemoglobin A1c  for diagnosis of diabetes  "for children.  This Hemoglobin A1c assay has significant interference with fetal   hemoglobin   (HbF). The results are invalid for patients with abnormal amounts of   HbF,   including those with known Hereditary Persistence   of Fetal Hemoglobin. Heterozygous hemoglobin variants (HbAS, HbAC,   HbAD, HbAE, HbA2) do not significantly interfere with this assay;   however, presence of multiple variants in a sample may impact the %   interference.     04/17/2016 6.2 4.5 - 6.2 % Final     Vitals:    12/13/18 1416   BP: (!) 93/53   Pulse: 72   Weight: 104.8 kg (231 lb)   Height: 5' 5" (1.651 m)   PainSc: 0-No pain      Past Medical History:   Diagnosis Date    Arthritis     Breast cancer, left     CHF (congestive heart failure)     Cholelithiasis     Coronary artery disease     Diabetes mellitus, type 2     Encounter for blood transfusion     End-stage renal disease on hemodialysis     Hemodialysis access site with arteriovenous graft     mon-wed -fri    Hyperlipidemia     Hypertension     Sarcoidosis        Past Surgical History:   Procedure Laterality Date    av graft      left arm    BRACHIAL ARTERY GRAFT Left 05/04/2016    brachiocephalic, Dr. Anson Crocker    BREAST BIOPSY Left     breast ca    BREAST LUMPECTOMY Left     radiation only    btl      COLONOSCOPY N/A 7/13/2018    Procedure: COLONOSCOPY;  Surgeon: Almita Bee MD;  Location: [facility] ENDO;  Service: Endoscopy;  Laterality: N/A;    COLONOSCOPY N/A 7/13/2018    Performed by [facility]a Bee MD at [facility] ENDO    ELBOW SURGERY      left    ESOPHAGOGASTRODUODENOSCOPY N/A 7/13/2018    Procedure: ESOPHAGOGASTRODUODENOSCOPY (EGD);  Surgeon: Almita Bee MD;  Location: [facility] ENDO;  Service: Endoscopy;  Laterality: N/A;    ESOPHAGOGASTRODUODENOSCOPY (EGD) N/A 7/13/2018    Performed by [facility] Bee [facility] at [facility] ENDO    [id number] Right 5/4/2016    Performed by [facility]son [facility]cker MD at [facility] OR    LIPOMA RESECTION   " "   back of neck    pilondial cyst      REPLACEMENT-KNEE-TOTAL Right 2/23/2016    [facility] 2ND FLR    TOTAL KNEE ARTHROPLASTY Right 02/23/2016       Family History   Problem Relation Age of Onset    Diabetes Mother     Kidney disease Mother     Hypertension Mother     Diabetes Sister     Heart disease Sister        Social History     Socioeconomic History    Marital status:      Spouse name: None    Number of children: None    Years of education: None    Highest education level: None   Social Needs    Financial resource strain: None    Food insecurity - worry: None    Food insecurity - inability: None    Transportation needs - medical: None    Transportation needs - non-medical: None   Occupational History    None   Tobacco Use    Smoking status: Never Smoker    Smokeless tobacco: Never Used   Substance and Sexual Activity    Alcohol use: No    Drug use: No    Sexual activity: None   Other Topics Concern    None   Social History Narrative    None       Current Outpatient Medications   Medication Sig Dispense Refill    amLODIPine (NORVASC) 10 MG tablet Take 10 mg by mouth. TAKE 1 TABLET BY MOUTH ON OFF DIALYSIS DAYS.      aspirin (ECOTRIN) 81 MG EC tablet Take 81 mg by mouth once daily.      bismuth tribrom-petrolatum,wh (XEROFORM PETROLATUM DRESSING) 1 X 8 " Bndg Apply locally every other day 50 each 1    calcium acetate (PHOSLO) 667 mg capsule Take 667 mg by mouth 3 (three) times daily with meals.      clopidogrel (PLAVIX) 75 mg tablet Take 1 tablet (75 mg total) by mouth once daily. 30 tablet 11    furosemide (LASIX) 40 MG tablet Take 40 mg by mouth once daily.      gabapentin (NEURONTIN) 300 MG capsule Take 1 capsule (300 mg total) by mouth every 8 (eight) hours as needed. 90 capsule 3    insulin glargine (LANTUS) 100 unit/mL injection Inject 20 Units into the skin every evening.      isosorbide mononitrate (IMDUR) 30 MG 24 hr tablet Take " 30 mg by mouth once daily.      metoprolol succinate (TOPROL-XL) 25 MG 24 hr tablet Take 0.5 tablets (12.5 mg total) by mouth once daily. 45 tablet 3    nitroGLYCERIN (NITROSTAT) 0.4 MG SL tablet Place 1 tablet (0.4 mg total) under the tongue every 5 (five) minutes as needed for Chest pain. 30 tablet 11    polyethylene glycol (GLYCOLAX) 17 gram/dose powder       pravastatin (PRAVACHOL) 40 MG tablet Take 40 mg by mouth every evening.       sertraline (ZOLOFT) 50 MG tablet Take 1 tablet (50 mg total) by mouth once daily. 30 tablet 11     No current facility-administered medications for this visit.        Review of patient's allergies indicates:  No Known Allergies        Review of Systems   Constitution: Negative for chills and fever.   HENT: Negative for congestion.    Cardiovascular: Negative for chest pain.   Skin: Positive for color change, dry skin, nail changes and poor wound healing. Negative for suspicious lesions and unusual hair distribution.   Musculoskeletal: Negative for falls, joint pain, joint swelling, muscle weakness and myalgias.   Gastrointestinal: Negative for nausea and vomiting.   Neurological: Negative for loss of balance, numbness, paresthesias, sensory change and tremors.   Psychiatric/Behavioral: Negative for altered mental status. The patient is not nervous/anxious.            Objective:      Physical Exam   Constitutional: She is oriented to person, place, and time. She appears well-developed and well-nourished. No distress.   HENT:   Head: Normocephalic and atraumatic.   Cardiovascular:   Pulses:       Dorsalis pedis pulses are 1+ on the right side, and 1+ on the left side.        Posterior tibial pulses are 1+ on the right side, and 1+ on the left side.   CFT< 3 secs all toes bilateral foot, skin temp warm bilateral foot, no hair growth bilateral lower extremity, no lower extremity edema bilateral.     Musculoskeletal: Normal range of motion. She exhibits no edema, tenderness or  deformity.   Feet:   Right Foot:   Protective Sensation: 10 sites tested. 8 sites sensed.   Skin Integrity: Positive for dry skin.   Left Foot:   Protective Sensation: 10 sites tested. 8 sites sensed.   Skin Integrity: Positive for ulcer, skin breakdown, callus and dry skin.   Neurological: She is alert and oriented to person, place, and time. She has normal strength. She displays no atrophy and no tremor.   Reflex Scores:       Patellar reflexes are 2+ on the right side and 2+ on the left side.       Achilles reflexes are 2+ on the right side and 2+ on the left side.  Vibratory sensation diminished  5/5 muscle strength b/l LE   Skin: Skin is dry. Capillary refill takes less than 2 seconds. No rash noted. She is not diaphoretic. No cyanosis or erythema. No pallor. Nails show no clubbing.   Wound on right sub 5th met head distal measures 0.6x0.5 x 0.1 post debridement. Pre-debridement contained hyperkeratotic cap.  Post debridement with granular base and hyperkeratotic margins.  Negative ptb, No purulence, erythema, edema, or malodor    Wound on right sub 5th met head proximal  measures 0.3 x 0.3 x 0.1 post debridement. With granular base and hyperkeratotic margins.  Negative ptb, No purulence, erythema, edema, or malodor    Wound on right plantar 5th digit measures 0.5 x 0.3 x 0.1  With granular base and hyperkeratotic margins.  Negative No purulence, erythema, edema, or malodor    Elongated, thickened, discolored nails b/l      Otherwise no calluses, interdigital mascerations, rashes, wounds.   Psychiatric: She has a normal mood and affect. Her behavior is normal.                 Assessment:       Encounter Diagnoses   Name Primary?    Type 2 diabetes mellitus with hypertension and end stage renal disease on dialysis Yes    Type 2 diabetes mellitus with peripheral vascular disease     Diabetic ulcer of toe of right foot associated with type 2 diabetes mellitus, limited to breakdown of skin     Onychomycosis           Plan:       Angelina was seen today for diabetic foot exam and foot ulcer.    Diagnoses and all orders for this visit:    Type 2 diabetes mellitus with hypertension and end stage renal disease on dialysis  -     Routine Foot Care  -     Wound Debridement    Type 2 diabetes mellitus with peripheral vascular disease  -     Routine Foot Care  -     Wound Debridement    Diabetic ulcer of toe of right foot associated with type 2 diabetes mellitus, limited to breakdown of skin  -     Wound Debridement    Onychomycosis  -     Routine Foot Care      I counseled the patient on her conditions, their implications and medical management.    Wound debridement and Routine care note attached. Darco shoe dispensed.    Patient evaluated on Diabetic foot risk factors.  Patient counseled to do daily foot checks.  Counseled to wear accommodative shoe gear.  Educated on importance of glycemic control.    Keep cardiology follow up    Home health orders updated.    -rtc as specified    Assisted by Saurabh Bal, PGY2    I have personally taken the history and examined this patient and agree with the resident's note as stated as above.   Kwame Dacosta DPM, FACFAS    Routine foot care per attached note.

## 2018-12-17 RX ORDER — FUROSEMIDE 40 MG/1
40 TABLET ORAL DAILY
Qty: 30 TABLET | Refills: 0 | Status: SHIPPED | OUTPATIENT
Start: 2018-12-17 | End: 2019-01-22 | Stop reason: SDUPTHER

## 2018-12-18 ENCOUNTER — TELEPHONE (OUTPATIENT)
Dept: FAMILY MEDICINE | Facility: HOSPITAL | Age: 68
End: 2018-12-18

## 2018-12-18 NOTE — TELEPHONE ENCOUNTER
----- Message from Kath Santiago MA sent at 12/6/2018  9:52 AM CST -----  Contact: Shamika; [facility]  [phone]  Shamika with [facility] states that patient needs refill on her Furosemide; was written by her prior pcp and she is out.  Please advise Shamika. Thanks.

## 2018-12-27 ENCOUNTER — OFFICE VISIT (OUTPATIENT)
Dept: PODIATRY | Facility: CLINIC | Age: 68
End: 2018-12-27
Payer: MEDICARE

## 2018-12-27 VITALS
SYSTOLIC BLOOD PRESSURE: 169 MMHG | HEIGHT: 65 IN | HEART RATE: 71 BPM | DIASTOLIC BLOOD PRESSURE: 80 MMHG | WEIGHT: 231 LBS | BODY MASS INDEX: 38.49 KG/M2

## 2018-12-27 DIAGNOSIS — E11.51 TYPE 2 DIABETES MELLITUS WITH PERIPHERAL VASCULAR DISEASE: ICD-10-CM

## 2018-12-27 DIAGNOSIS — E11.621 DIABETIC ULCER OF TOE OF RIGHT FOOT ASSOCIATED WITH TYPE 2 DIABETES MELLITUS, LIMITED TO BREAKDOWN OF SKIN: Primary | ICD-10-CM

## 2018-12-27 DIAGNOSIS — I12.0 TYPE 2 DIABETES MELLITUS WITH HYPERTENSION AND END STAGE RENAL DISEASE ON DIALYSIS: ICD-10-CM

## 2018-12-27 DIAGNOSIS — Z99.2 TYPE 2 DIABETES MELLITUS WITH HYPERTENSION AND END STAGE RENAL DISEASE ON DIALYSIS: ICD-10-CM

## 2018-12-27 DIAGNOSIS — E11.22 TYPE 2 DIABETES MELLITUS WITH HYPERTENSION AND END STAGE RENAL DISEASE ON DIALYSIS: ICD-10-CM

## 2018-12-27 DIAGNOSIS — L97.511 DIABETIC ULCER OF TOE OF RIGHT FOOT ASSOCIATED WITH TYPE 2 DIABETES MELLITUS, LIMITED TO BREAKDOWN OF SKIN: Primary | ICD-10-CM

## 2018-12-27 DIAGNOSIS — N18.6 TYPE 2 DIABETES MELLITUS WITH HYPERTENSION AND END STAGE RENAL DISEASE ON DIALYSIS: ICD-10-CM

## 2018-12-27 PROCEDURE — 99213 OFFICE O/P EST LOW 20 MIN: CPT | Mod: S$PBB,,, | Performed by: PODIATRIST

## 2018-12-27 PROCEDURE — 99214 OFFICE O/P EST MOD 30 MIN: CPT | Mod: PBBFAC,PN | Performed by: PODIATRIST

## 2018-12-27 PROCEDURE — 99999 PR PBB SHADOW E&M-EST. PATIENT-LVL IV: CPT | Mod: PBBFAC,,, | Performed by: PODIATRIST

## 2018-12-27 RX ORDER — NAPROXEN 500 MG/1
TABLET ORAL
COMMUNITY
Start: 2018-12-12 | End: 2019-01-22 | Stop reason: SDUPTHER

## 2018-12-28 NOTE — PROGRESS NOTES
Subjective:      Patient ID: Angelina Beard is a 68 y.o. female.    Chief Complaint: Follow-up and Foot Ulcer    Angelina is a 68 y.o. female who presents to the clinic upon referral from Dr. Karin araya. provider found  for evaluation and treatment of diabetic feet. Angelina has a past medical history of Arthritis, Breast cancer, left, CHF (congestive heart failure), Cholelithiasis, Coronary artery disease, Diabetes mellitus, type 2, Encounter for blood transfusion, End-stage renal disease on hemodialysis, Hemodialysis access site with arteriovenous graft, Hyperlipidemia, Hypertension, and Sarcoidosis.     Patient here for wound on right sub 5th emt head wound.  Stated she first noticed wound a couple weeks ago.  Denies F/c/N/V.  Being followed by Cardiology.    12/27/18: Returns for wound check right foot. Dressing remained C/D/I. No new complaints.    PCP: Antoni Cortes, DO    Date Last Seen by PCP: 11/8/18    Current shoe gear: Rx diabetic extra depth shoes and custom accommodative insoles    Hemoglobin A1C   Date Value Ref Range Status   07/12/2018 5.8 (H) 4.0 - 5.6 % Final     Comment:     ADA Screening Guidelines:  5.7-6.4%  Consistent with prediabetes  >or=6.5%  Consistent with diabetes  High levels of fetal hemoglobin interfere with the HbA1C  assay. Heterozygous hemoglobin variants (HbS, HgC, etc)do  not significantly interfere with this assay.   However, presence of multiple variants may affect accuracy.     03/03/2018 6.4 (H) 4.0 - 5.6 % Final     Comment:     According to ADA guidelines, hemoglobin A1c <7.0% represents  optimal control in non-pregnant diabetic patients. Different  metrics may apply to specific patient populations.   Standards of Medical Care in Diabetes-2016.  For the purpose of screening for the presence of diabetes:  <5.7%     Consistent with the absence of diabetes  5.7-6.4%  Consistent with increasing risk for diabetes   (prediabetes)  >or=6.5%  Consistent with diabetes  Currently, no  "consensus exists for use of hemoglobin A1c  for diagnosis of diabetes for children.  This Hemoglobin A1c assay has significant interference with fetal   hemoglobin   (HbF). The results are invalid for patients with abnormal amounts of   HbF,   including those with known Hereditary Persistence   of Fetal Hemoglobin. Heterozygous hemoglobin variants (HbAS, HbAC,   HbAD, HbAE, HbA2) do not significantly interfere with this assay;   however, presence of multiple variants in a sample may impact the %   interference.     04/17/2016 6.2 4.5 - 6.2 % Final     Vitals:    12/27/18 1119   BP: (!) 169/80   Pulse: 71   Weight: 104.8 kg (231 lb)   Height: 5' 5" (1.651 m)   PainSc: 0-No pain      Past Medical History:   Diagnosis Date    Arthritis     Breast cancer, left     CHF (congestive heart failure)     Cholelithiasis     Coronary artery disease     Diabetes mellitus, type 2     Encounter for blood transfusion     End-stage renal disease on hemodialysis     Hemodialysis access site with arteriovenous graft     mon-wed -fri    Hyperlipidemia     Hypertension     Sarcoidosis        Past Surgical History:   Procedure Laterality Date    av graft      left arm    BRACHIAL ARTERY GRAFT Left 05/04/2016    brachiocephalic, Dr. Anson Crocker    BREAST BIOPSY Left     breast ca    BREAST LUMPECTOMY Left     radiation only    btl      COLONOSCOPY N/A 7/13/2018    [facility]    ELBOW SURGERY      left    ESOPHAGOGASTRODUODENOSCOPY (EGD) N/A 7/13/2018    [facility]    [id number] Right 5/4/2016    Performed [facility] at [facility] OR    LIPOMA RESECTION      back of neck    pilondial cyst      REPLACEMENT-KNEE-TOTAL Right 2/23/2016    Performed by [facility] at [facility] OR 2ND FLR    TOTAL KNEE ARTHROPLASTY Right 02/23/2016       Family History   Problem Relation Age of Onset    Diabetes Mother     Kidney " "disease Mother     Hypertension Mother     Diabetes Sister     Heart disease Sister        Social History     Socioeconomic History    Marital status:      Spouse name: None    Number of children: None    Years of education: None    Highest education level: None   Social Needs    Financial resource strain: None    Food insecurity - worry: None    Food insecurity - inability: None    Transportation needs - medical: None    Transportation needs - non-medical: None   Occupational History    None   Tobacco Use    Smoking status: Never Smoker    Smokeless tobacco: Never Used   Substance and Sexual Activity    Alcohol use: No    Drug use: No    Sexual activity: None   Other Topics Concern    None   Social History Narrative    None       Current Outpatient Medications   Medication Sig Dispense Refill    amLODIPine (NORVASC) 10 MG tablet Take 10 mg by mouth. TAKE 1 TABLET BY MOUTH ON OFF DIALYSIS DAYS.      aspirin (ECOTRIN) 81 MG EC tablet Take 81 mg by mouth once daily.      bismuth tribrom-petrolatum,wh (XEROFORM PETROLATUM DRESSING) 1 X 8 " Bndg Apply locally every other day 50 each 1    calcium acetate (PHOSLO) 667 mg capsule Take 667 mg by mouth 3 (three) times daily with meals.      clopidogrel (PLAVIX) 75 mg tablet Take 1 tablet (75 mg total) by mouth once daily. 30 tablet 11    furosemide (LASIX) 40 MG tablet Take 1 tablet (40 mg total) by mouth once daily. 30 tablet 0    gabapentin (NEURONTIN) 300 MG capsule Take 1 capsule (300 mg total) by mouth every 8 (eight) hours as needed. 90 capsule 3    insulin glargine (LANTUS) 100 unit/mL injection Inject 20 Units into the skin every evening.      isosorbide mononitrate (IMDUR) 30 MG 24 hr tablet Take 30 mg by mouth once daily.      metoprolol succinate (TOPROL-XL) 25 MG 24 hr tablet Take 0.5 tablets (12.5 mg total) by mouth once daily. 45 tablet 3    naproxen (NAPROSYN) 500 MG tablet       nitroGLYCERIN (NITROSTAT) 0.4 MG SL " tablet Place 1 tablet (0.4 mg total) under the tongue every 5 (five) minutes as needed for Chest pain. 30 tablet 11    polyethylene glycol (GLYCOLAX) 17 gram/dose powder       pravastatin (PRAVACHOL) 40 MG tablet Take 40 mg by mouth every evening.       sertraline (ZOLOFT) 50 MG tablet Take 1 tablet (50 mg total) by mouth once daily. 30 tablet 11     No current facility-administered medications for this visit.        Review of patient's allergies indicates:  No Known Allergies        Review of Systems   Constitution: Negative for chills and fever.   HENT: Negative for congestion.    Cardiovascular: Negative for chest pain.   Skin: Positive for dry skin, nail changes and poor wound healing. Negative for color change, suspicious lesions and unusual hair distribution.   Musculoskeletal: Negative for falls, joint pain, joint swelling, muscle weakness and myalgias.   Gastrointestinal: Negative for nausea and vomiting.   Neurological: Negative for loss of balance, numbness, paresthesias, sensory change and tremors.   Psychiatric/Behavioral: Negative for altered mental status. The patient is not nervous/anxious.            Objective:      Physical Exam   Constitutional: She is oriented to person, place, and time. She appears well-developed and well-nourished. No distress.   HENT:   Head: Normocephalic and atraumatic.   Cardiovascular:   Pulses:       Dorsalis pedis pulses are 1+ on the right side, and 1+ on the left side.        Posterior tibial pulses are 1+ on the right side, and 1+ on the left side.   CFT< 3 secs all toes bilateral foot, skin temp warm bilateral foot, no hair growth bilateral lower extremity, no lower extremity edema bilateral.     Musculoskeletal: Normal range of motion. She exhibits no edema, tenderness or deformity.   Feet:   Right Foot:   Protective Sensation: 10 sites tested. 8 sites sensed.   Skin Integrity: Positive for dry skin.   Left Foot:   Protective Sensation: 10 sites tested. 8 sites  sensed.   Skin Integrity: Positive for ulcer, skin breakdown, callus and dry skin.   Neurological: She is alert and oriented to person, place, and time. She has normal strength. She displays no atrophy and no tremor.   Reflex Scores:       Patellar reflexes are 2+ on the right side and 2+ on the left side.       Achilles reflexes are 2+ on the right side and 2+ on the left side.  Vibratory sensation diminished  5/5 muscle strength b/l LE   Skin: Skin is dry. Capillary refill takes less than 2 seconds. No rash noted. She is not diaphoretic. No cyanosis or erythema. No pallor. Nails show no clubbing.   Wound on right sub 5th met head distal measures 0.1x0.1x 0.1cm with overlying fragile epithelium.    Previous wound sites right foot healed with normal appearing skin.    Nails 1-5 bilateral are trimmed, thickened, discolored brown.       Psychiatric: She has a normal mood and affect. Her behavior is normal.                 Assessment:       Encounter Diagnoses   Name Primary?    Diabetic ulcer of toe of right foot associated with type 2 diabetes mellitus, limited to breakdown of skin Yes    Type 2 diabetes mellitus with hypertension and end stage renal disease on dialysis     Type 2 diabetes mellitus with peripheral vascular disease          Plan:       Angelina was seen today for follow-up and foot ulcer.    Diagnoses and all orders for this visit:    Diabetic ulcer of toe of right foot associated with type 2 diabetes mellitus, limited to breakdown of skin    Type 2 diabetes mellitus with hypertension and end stage renal disease on dialysis    Type 2 diabetes mellitus with peripheral vascular disease      I counseled the patient on her conditions, their implications and medical management.    Discontinue HH.    Applied betadine to wound site right foot and aquacel foam with 2 layers cast padding secured with coban. Keep C/D/I.     RTC 1-2 weeks to remove. Will Rx diabetic shoes at that time.

## 2019-01-07 DIAGNOSIS — M17.0 OSTEOARTHRITIS OF BOTH KNEES, UNSPECIFIED OSTEOARTHRITIS TYPE: Chronic | ICD-10-CM

## 2019-01-07 NOTE — TELEPHONE ENCOUNTER
----- Message from Kiera Yang sent at 1/7/2019  8:45 AM CST -----  Patient needs a refill for gabapentin (NEURONTIN) 300 MG capsule. Has been out for a week. Patient would like the refill today

## 2019-01-08 NOTE — TELEPHONE ENCOUNTER
----- Message from Aram Almanza sent at 1/8/2019 11:35 AM CST -----  PT NEED REFILL ON gabapentin (NEURONTIN) 300 MG capsule SHE IS COMPLETELY OUT SHE NEED REALLY BACK PLEASE

## 2019-01-09 RX ORDER — GABAPENTIN 300 MG/1
300 CAPSULE ORAL EVERY 8 HOURS PRN
Qty: 90 CAPSULE | Refills: 3 | Status: SHIPPED | OUTPATIENT
Start: 2019-01-09 | End: 2019-01-10 | Stop reason: SDUPTHER

## 2019-01-10 ENCOUNTER — OFFICE VISIT (OUTPATIENT)
Dept: INTERNAL MEDICINE | Facility: CLINIC | Age: 69
End: 2019-01-10
Payer: MEDICARE

## 2019-01-10 ENCOUNTER — OFFICE VISIT (OUTPATIENT)
Dept: PODIATRY | Facility: CLINIC | Age: 69
End: 2019-01-10
Payer: MEDICARE

## 2019-01-10 VITALS
HEIGHT: 65 IN | WEIGHT: 231 LBS | DIASTOLIC BLOOD PRESSURE: 88 MMHG | BODY MASS INDEX: 38.49 KG/M2 | HEART RATE: 79 BPM | SYSTOLIC BLOOD PRESSURE: 180 MMHG

## 2019-01-10 VITALS
TEMPERATURE: 98 F | WEIGHT: 237.75 LBS | HEART RATE: 77 BPM | BODY MASS INDEX: 39.61 KG/M2 | HEIGHT: 65 IN | DIASTOLIC BLOOD PRESSURE: 82 MMHG | SYSTOLIC BLOOD PRESSURE: 130 MMHG | OXYGEN SATURATION: 85 %

## 2019-01-10 DIAGNOSIS — E11.22 TYPE 2 DIABETES MELLITUS WITH HYPERTENSION AND END STAGE RENAL DISEASE ON DIALYSIS: ICD-10-CM

## 2019-01-10 DIAGNOSIS — Z99.2 TYPE 2 DIABETES MELLITUS WITH HYPERTENSION AND END STAGE RENAL DISEASE ON DIALYSIS: ICD-10-CM

## 2019-01-10 DIAGNOSIS — I25.10 CORONARY ARTERY DISEASE, ANGINA PRESENCE UNSPECIFIED, UNSPECIFIED VESSEL OR LESION TYPE, UNSPECIFIED WHETHER NATIVE OR TRANSPLANTED HEART: ICD-10-CM

## 2019-01-10 DIAGNOSIS — I15.2 HYPERTENSION ASSOCIATED WITH DIABETES: ICD-10-CM

## 2019-01-10 DIAGNOSIS — N03.9 ANEMIA DUE TO PRE-ESRD TREATED WITH ERYTHROPOIETIN: ICD-10-CM

## 2019-01-10 DIAGNOSIS — I50.32 CHRONIC DIASTOLIC CONGESTIVE HEART FAILURE: ICD-10-CM

## 2019-01-10 DIAGNOSIS — M17.0 OSTEOARTHRITIS OF BOTH KNEES, UNSPECIFIED OSTEOARTHRITIS TYPE: Chronic | ICD-10-CM

## 2019-01-10 DIAGNOSIS — E11.59 HYPERTENSION ASSOCIATED WITH DIABETES: ICD-10-CM

## 2019-01-10 DIAGNOSIS — N18.6 END-STAGE RENAL DISEASE ON HEMODIALYSIS: ICD-10-CM

## 2019-01-10 DIAGNOSIS — N18.6 TYPE 2 DIABETES MELLITUS WITH HYPERTENSION AND END STAGE RENAL DISEASE ON DIALYSIS: ICD-10-CM

## 2019-01-10 DIAGNOSIS — R09.02 HYPOXIA: ICD-10-CM

## 2019-01-10 DIAGNOSIS — F33.41 RECURRENT MAJOR DEPRESSIVE DISORDER, IN PARTIAL REMISSION: ICD-10-CM

## 2019-01-10 DIAGNOSIS — R06.00 DYSPNEA, UNSPECIFIED TYPE: ICD-10-CM

## 2019-01-10 DIAGNOSIS — Z00.00 ROUTINE GENERAL MEDICAL EXAMINATION AT A HEALTH CARE FACILITY: Primary | ICD-10-CM

## 2019-01-10 DIAGNOSIS — M20.41 HAMMER TOES OF BOTH FEET: ICD-10-CM

## 2019-01-10 DIAGNOSIS — I12.0 TYPE 2 DIABETES MELLITUS WITH HYPERTENSION AND END STAGE RENAL DISEASE ON DIALYSIS: ICD-10-CM

## 2019-01-10 DIAGNOSIS — Z87.2 HEALED ULCER OF RIGHT FOOT ON EXAMINATION: Primary | ICD-10-CM

## 2019-01-10 DIAGNOSIS — I48.20 CHRONIC ATRIAL FIBRILLATION: ICD-10-CM

## 2019-01-10 DIAGNOSIS — E11.69 HYPERLIPIDEMIA ASSOCIATED WITH TYPE 2 DIABETES MELLITUS: ICD-10-CM

## 2019-01-10 DIAGNOSIS — M20.42 HAMMER TOES OF BOTH FEET: ICD-10-CM

## 2019-01-10 DIAGNOSIS — Z99.2 END-STAGE RENAL DISEASE ON HEMODIALYSIS: ICD-10-CM

## 2019-01-10 DIAGNOSIS — D63.1 ANEMIA DUE TO PRE-ESRD TREATED WITH ERYTHROPOIETIN: ICD-10-CM

## 2019-01-10 DIAGNOSIS — E11.51 TYPE 2 DIABETES MELLITUS WITH PERIPHERAL VASCULAR DISEASE: ICD-10-CM

## 2019-01-10 DIAGNOSIS — E78.5 HYPERLIPIDEMIA ASSOCIATED WITH TYPE 2 DIABETES MELLITUS: ICD-10-CM

## 2019-01-10 PROBLEM — K29.70 GASTROESOPHAGITIS: Status: RESOLVED | Noted: 2018-07-13 | Resolved: 2019-01-10

## 2019-01-10 PROBLEM — K20.90 GASTROESOPHAGITIS: Status: RESOLVED | Noted: 2018-07-13 | Resolved: 2019-01-10

## 2019-01-10 PROBLEM — I50.33 ACUTE ON CHRONIC DIASTOLIC CONGESTIVE HEART FAILURE: Status: RESOLVED | Noted: 2018-09-22 | Resolved: 2019-01-10

## 2019-01-10 PROBLEM — K25.9 GASTRIC ULCER: Status: RESOLVED | Noted: 2018-07-13 | Resolved: 2019-01-10

## 2019-01-10 PROCEDURE — G0009 ADMIN PNEUMOCOCCAL VACCINE: HCPCS | Mod: PBBFAC,PN

## 2019-01-10 PROCEDURE — 99999 PR PBB SHADOW E&M-EST. PATIENT-LVL III: CPT | Mod: PBBFAC,,, | Performed by: INTERNAL MEDICINE

## 2019-01-10 PROCEDURE — 99999 PR PBB SHADOW E&M-EST. PATIENT-LVL III: ICD-10-PCS | Mod: PBBFAC,,, | Performed by: INTERNAL MEDICINE

## 2019-01-10 PROCEDURE — 99213 OFFICE O/P EST LOW 20 MIN: CPT | Mod: S$PBB,,, | Performed by: PODIATRIST

## 2019-01-10 PROCEDURE — 99999 PR PBB SHADOW E&M-EST. PATIENT-LVL IV: CPT | Mod: PBBFAC,,, | Performed by: PODIATRIST

## 2019-01-10 PROCEDURE — 99213 OFFICE O/P EST LOW 20 MIN: CPT | Mod: PBBFAC,PN | Performed by: INTERNAL MEDICINE

## 2019-01-10 PROCEDURE — 99999 PR PBB SHADOW E&M-EST. PATIENT-LVL IV: ICD-10-PCS | Mod: PBBFAC,,, | Performed by: PODIATRIST

## 2019-01-10 PROCEDURE — 99397 PR PREVENTIVE VISIT,EST,65 & OVER: ICD-10-PCS | Mod: S$PBB,,, | Performed by: INTERNAL MEDICINE

## 2019-01-10 PROCEDURE — 99214 OFFICE O/P EST MOD 30 MIN: CPT | Mod: PBBFAC,27,PN,25 | Performed by: PODIATRIST

## 2019-01-10 PROCEDURE — 99213 PR OFFICE/OUTPT VISIT, EST, LEVL III, 20-29 MIN: ICD-10-PCS | Mod: S$PBB,,, | Performed by: PODIATRIST

## 2019-01-10 PROCEDURE — 99397 PER PM REEVAL EST PAT 65+ YR: CPT | Mod: S$PBB,,, | Performed by: INTERNAL MEDICINE

## 2019-01-10 RX ORDER — GABAPENTIN 300 MG/1
300 CAPSULE ORAL EVERY 8 HOURS PRN
Qty: 90 CAPSULE | Refills: 3 | Status: SHIPPED | OUTPATIENT
Start: 2019-01-10 | End: 2019-06-18 | Stop reason: SDUPTHER

## 2019-01-10 RX ORDER — SERTRALINE HYDROCHLORIDE 100 MG/1
100 TABLET, FILM COATED ORAL DAILY
Qty: 90 TABLET | Refills: 3 | Status: SHIPPED | OUTPATIENT
Start: 2019-01-10 | End: 2019-12-02 | Stop reason: SDUPTHER

## 2019-01-10 RX ORDER — ISOSORBIDE MONONITRATE 30 MG/1
30 TABLET, EXTENDED RELEASE ORAL DAILY
Qty: 90 TABLET | Refills: 3 | Status: SHIPPED | OUTPATIENT
Start: 2019-01-10 | End: 2019-01-19 | Stop reason: SDUPTHER

## 2019-01-10 RX ORDER — ISOSORBIDE MONONITRATE 30 MG/1
30 TABLET, EXTENDED RELEASE ORAL DAILY
Status: CANCELLED | OUTPATIENT
Start: 2019-01-10

## 2019-01-10 NOTE — TELEPHONE ENCOUNTER
----- Message from Aram Almanza sent at 1/10/2019 11:53 AM CST -----  PT NEED REFILL ON THIS isosorbide mononitrate (IMDUR) 30 MG 24 hr tablet PLEASE

## 2019-01-10 NOTE — PATIENT INSTRUCTIONS
Diabetic Foot Care  Diabetes can lead to a number of foot complications. Fortunately, you can prevent most of these with a little extra foot care. If diabetes is not well controlled, it can cause damage to blood vessels and result in poor circulation to the foot. When the skin does not get enough blood flow, it becomes prone to pressure sores and ulcers, which heal slowly.  Diabetes can also damage nerves, interfering with the ability to feel pain and pressure. When you cant feel your foot normally, it is easy to injure your skin, bones, and joints without knowing it. For these reasons diabetes increases the risk of fungal infections, bunions, and ulcers. An ulcer is a sore or break in the skin. With ulcers, often the skin seems to have worn away. Deep ulcers can lead to bone infection.  Gangrene is the most serious foot complication of diabetes. It usually occurs on the tips of the toes as blackened areas of skin. The black area is dead tissue. In severe cases, gangrene spreads to involve the entire toe, other toes, and the entire foot. Foot or toe amputation may be required. Good foot care and blood sugar control can prevent this.  Home care  · Wear comfortable, well-fitting shoes.  · Wash your feet daily with warm water and mild soap.  · After drying, apply a moisturizing cream or lotion to the top and bottom of your feet. Don't put lotion between toes.  · Check your feet daily for skin breaks, blisters, swelling, or redness. Look between your toes as well. If you cannot see the bottoms of your feet, ask someone to look or use a mirror.  · Wear cotton socks and change them every day.  · Trim toenails carefully, and do not cut your cuticles.  · Strive to keep your blood sugar under control with a combination of medicines, diet, and activity.  · If you smoke and have diabetes, it is very important that you stop. Smoking reduces blood flow to your foot.  · Schedule foot exams at least every year, or more often if  you have foot problems.  · Put your feet up when sitting, wiggle toes, and move ankles to help improve blood flow.  Avoid activities that increase your risk of foot injury:  · Do not walk barefoot.  · Do not use heating pads or hot water bottles on your feet.  · Do not put your foot in a hot tub without first checking the temperature with your hand.  Follow-up care  Follow up with your healthcare provider, or as advised. Be sure to take off your shoes and socks before your appointment starts so your healthcare provider will be sure to check your feet. Report any cut, puncture, scrape, blister, or other injury to your foot. Also report if you have a bunion, hammertoes, ingrown toenail, or ulcer on your foot.  When to seek medical advice  Call your healthcare provider right away if any of these occur:  · Black skin color anywhere on the foot  · Open ulcer with pus draining from the wound  · Increasing foot or leg pain  · New areas of redness or swelling or tender areas of the foot  · Fever of 100.4°F (38°C) or greater  Date Last Reviewed: 5/25/2016  © 5419-3257 [employer]. [address] [place] [address], [place] [address]. All rights reserved. This information is not intended as a substitute for professional medical care. Always follow your healthcare professional's instructions.

## 2019-01-10 NOTE — PROGRESS NOTES
Subjective:       Patient ID: Angelina Beard is a 68 y.o. female.    Chief Complaint: Annual Exam (establish care); Diarrhea; Chest Pain; and Fever    HPI  Pt establishing care.  Chart reviewed.  Pt c/o BREWSTER, orthopnea.  Also had SSCP rad to back last PM associated with SOB, nausea.  Recovering for a GI bug with diarrhea.  Pt has been globally weak being unable to use her walker, falling.  Review of Systems   All other systems reviewed and are negative.      Objective:      Physical Exam   Constitutional: She appears well-developed. No distress.   HENT:   Head: Normocephalic.   Eyes: EOM are normal.   Neck: Normal range of motion. No tracheal deviation present.   Cardiovascular: Normal rate, normal heart sounds and intact distal pulses.   irreg irreg   Pulmonary/Chest: Effort normal and breath sounds normal. No respiratory distress.   Abdominal: Soft. Bowel sounds are normal. She exhibits no distension.   Musculoskeletal: Normal range of motion. She exhibits no edema.   Neurological: She is alert. No cranial nerve deficit. She exhibits normal muscle tone. Coordination normal.   Skin: Skin is warm and dry. No rash noted. She is not diaphoretic. No erythema.   Psychiatric: She has a normal mood and affect. Her behavior is normal.   Vitals reviewed.      Assessment:       1. Routine general medical examination at a health care facility    2. Chronic atrial fibrillation    3. Coronary artery disease, angina presence unspecified, unspecified vessel or lesion type, unspecified whether native or transplanted heart    4. Recurrent major depressive disorder, in partial remission    5. Hyperlipidemia associated with type 2 diabetes mellitus    6. Hypertension associated with diabetes    7. Chronic diastolic congestive heart failure    8. Anemia due to pre-ESRD treated with erythropoietin    9. End-stage renal disease on hemodialysis    10. Type 2 diabetes mellitus with hypertension and end stage renal disease on dialysis         Plan:       Angelina was seen today for annual exam, diarrhea, chest pain and fever.    Diagnoses and all orders for this visit:    Routine general medical examination at a health care facility    Chronic atrial fibrillation  -     apixaban (ELIQUIS) 2.5 mg Tab; Take 1 tablet (2.5 mg total) by mouth 2 (two) times daily.    Coronary artery disease, angina presence unspecified, unspecified vessel or lesion type, unspecified whether native or transplanted heart  -     EKG 12-lead    Recurrent major depressive disorder, in partial remission  -     TSH; Future  -     sertraline (ZOLOFT) 100 MG tablet; Take 1 tablet (100 mg total) by mouth once daily.    Hyperlipidemia associated with type 2 diabetes mellitus  -     Lipid panel; Future    Hypertension associated with diabetes   Cont rx    Chronic diastolic congestive heart failure   Cont rx    Anemia due to pre-ESRD treated with erythropoietin  -     CBC auto differential; Future    End-stage renal disease on hemodialysis  -     Comprehensive metabolic panel; Future  -     Pneumococcal Polysaccharide Vaccine (23 Valent) (SQ/IM)    Type 2 diabetes mellitus with hypertension and end stage renal disease on dialysis  -     Hemoglobin A1c; Future      No Follow-up on file.

## 2019-01-11 NOTE — PROGRESS NOTES
Subjective:      Patient ID: Angelina Beard is a 68 y.o. female.    Chief Complaint: Follow-up (right foot wound )    Angelina is a 68 y.o. female who presents to the clinic upon referral from Dr. Krain araya. provider found  for evaluation and treatment of diabetic feet. Angelina has a past medical history of Arthritis, Breast cancer, left, CHF (congestive heart failure), Cholelithiasis, Coronary artery disease, Diabetes mellitus, type 2, Encounter for blood transfusion, End-stage renal disease on hemodialysis, Hemodialysis access site with arteriovenous graft, Hyperlipidemia, Hypertension, and Sarcoidosis.     Patient here for wound on right sub 5th emt head wound.  Stated she first noticed wound a couple weeks ago.  Denies F/c/N/V.  Being followed by Cardiology.    12/27/18: Returns for wound check right foot. Dressing remained C/D/I. No new complaints.    1/10/19: Wound check. Says her dressing got wet.    PCP: Ben Mena MD    Date Last Seen by PCP: 11/8/18    Current shoe gear: Rx diabetic extra depth shoes and custom accommodative insoles    Hemoglobin A1C   Date Value Ref Range Status   07/12/2018 5.8 (H) 4.0 - 5.6 % Final     Comment:     ADA Screening Guidelines:  5.7-6.4%  Consistent with prediabetes  >or=6.5%  Consistent with diabetes  High levels of fetal hemoglobin interfere with the HbA1C  assay. Heterozygous hemoglobin variants (HbS, HgC, etc)do  not significantly interfere with this assay.   However, presence of multiple variants may affect accuracy.     03/03/2018 6.4 (H) 4.0 - 5.6 % Final     Comment:     According to ADA guidelines, hemoglobin A1c <7.0% represents  optimal control in non-pregnant diabetic patients. Different  metrics may apply to specific patient populations.   Standards of Medical Care in Diabetes-2016.  For the purpose of screening for the presence of diabetes:  <5.7%     Consistent with the absence of diabetes  5.7-6.4%  Consistent with increasing risk for diabetes  "  (prediabetes)  >or=6.5%  Consistent with diabetes  Currently, no consensus exists for use of hemoglobin A1c  for diagnosis of diabetes for children.  This Hemoglobin A1c assay has significant interference with fetal   hemoglobin   (HbF). The results are invalid for patients with abnormal amounts of   HbF,   including those with known Hereditary Persistence   of Fetal Hemoglobin. Heterozygous hemoglobin variants (HbAS, HbAC,   HbAD, HbAE, HbA2) do not significantly interfere with this assay;   however, presence of multiple variants in a sample may impact the %   interference.     04/17/2016 6.2 4.5 - 6.2 % Final     Vitals:    01/10/19 1129   BP: (!) 180/88   Pulse: 79   Weight: 104.8 kg (231 lb)   Height: 5' 5" (1.651 m)   PainSc: 0-No pain      Past Medical History:   Diagnosis Date    Arthritis     Breast cancer, left     CHF (congestive heart failure)     Cholelithiasis     Coronary artery disease     Diabetes mellitus, type 2     Encounter for blood transfusion     End-stage renal disease on hemodialysis     Hemodialysis access site with arteriovenous graft     mon-wed -fri    Hyperlipidemia     Hypertension     Sarcoidosis        Past Surgical History:   Procedure Laterality Date    av graft      left arm    BRACHIAL ARTERY GRAFT Left 05/04/2016    brachiocephalic, Dr. Anson Crocker    BREAST BIOPSY Left     breast ca    BREAST LUMPECTOMY Left     radiation only    btl      COLONOSCOPY N/A 7/13/2018    [facility]    ELBOW SURGERY      left    ESOPHAGOGASTRODUODENOSCOPY (EGD) N/A 7/13/2018    Per[facility]mita Bee [facility] at [facility]    UKDACPUTV-ZPTHR-RMAOVOYTAETBX Right 5/4/2016    [facility]cker [facility] at [facility] OR    LIPOMA RESECTION      back of neck    pilondial cyst      REPLACEMENT-KNEE-TOTAL Right 2/23/2016    [facility] OR 2ND FLR    TOTAL KNEE ARTHROPLASTY Right 02/23/2016       Family " "History   Problem Relation Age of Onset    Diabetes Mother     Kidney disease Mother     Hypertension Mother     Diabetes Sister     Heart disease Sister        Social History     Socioeconomic History    Marital status:      Spouse name: Not on file    Number of children: Not on file    Years of education: Not on file    Highest education level: Not on file   Social Needs    Financial resource strain: Not on file    Food insecurity - worry: Not on file    Food insecurity - inability: Not on file    Transportation needs - medical: Not on file    Transportation needs - non-medical: Not on file   Occupational History    Not on file   Tobacco Use    Smoking status: Never Smoker    Smokeless tobacco: Never Used   Substance and Sexual Activity    Alcohol use: No    Drug use: No    Sexual activity: Not on file   Other Topics Concern    Not on file   Social History Narrative    Not on file       Current Outpatient Medications   Medication Sig Dispense Refill    amLODIPine (NORVASC) 10 MG tablet Take 10 mg by mouth. TAKE 1 TABLET BY MOUTH ON OFF DIALYSIS DAYS.      bismuth tribrom-petrolatum,wh (XEROFORM PETROLATUM DRESSING) 1 X 8 " Bndg Apply locally every other day 50 each 1    calcium acetate (PHOSLO) 667 mg capsule Take 667 mg by mouth 3 (three) times daily with meals.      clopidogrel (PLAVIX) 75 mg tablet Take 1 tablet (75 mg total) by mouth once daily. 30 tablet 11    furosemide (LASIX) 40 MG tablet Take 1 tablet (40 mg total) by mouth once daily. 30 tablet 0    insulin glargine (LANTUS) 100 unit/mL injection Inject 20 Units into the skin every evening.      metoprolol succinate (TOPROL-XL) 25 MG 24 hr tablet Take 0.5 tablets (12.5 mg total) by mouth once daily. 45 tablet 3    naproxen (NAPROSYN) 500 MG tablet       nitroGLYCERIN (NITROSTAT) 0.4 MG SL tablet Place 1 tablet (0.4 mg total) under the tongue every 5 (five) minutes as needed for Chest pain. 30 tablet 11    " polyethylene glycol (GLYCOLAX) 17 gram/dose powder       pravastatin (PRAVACHOL) 40 MG tablet Take 40 mg by mouth every evening.       apixaban (ELIQUIS) 2.5 mg Tab Take 1 tablet (2.5 mg total) by mouth 2 (two) times daily. 180 tablet 3    gabapentin (NEURONTIN) 300 MG capsule Take 1 capsule (300 mg total) by mouth every 8 (eight) hours as needed. 90 capsule 3    isosorbide mononitrate (IMDUR) 30 MG 24 hr tablet Take 1 tablet (30 mg total) by mouth once daily. 90 tablet 3    sertraline (ZOLOFT) 100 MG tablet Take 1 tablet (100 mg total) by mouth once daily. 90 tablet 3     No current facility-administered medications for this visit.        Review of patient's allergies indicates:  No Known Allergies        Review of Systems   Constitution: Negative for chills and fever.   HENT: Negative for congestion.    Cardiovascular: Negative for chest pain.   Skin: Positive for dry skin, nail changes and poor wound healing. Negative for color change, suspicious lesions and unusual hair distribution.   Musculoskeletal: Negative for falls, joint pain, joint swelling, muscle weakness and myalgias.   Gastrointestinal: Negative for nausea and vomiting.   Neurological: Negative for loss of balance, numbness, paresthesias, sensory change and tremors.   Psychiatric/Behavioral: Negative for altered mental status. The patient is not nervous/anxious.            Objective:      Physical Exam   Constitutional: She is oriented to person, place, and time. She appears well-developed and well-nourished. No distress.   HENT:   Head: Normocephalic and atraumatic.   Cardiovascular:   Pulses:       Dorsalis pedis pulses are 1+ on the right side, and 1+ on the left side.        Posterior tibial pulses are 1+ on the right side, and 1+ on the left side.   CFT< 3 secs all toes bilateral foot, skin temp warm bilateral foot, no hair growth bilateral lower extremity, no lower extremity edema bilateral.     Musculoskeletal: Normal range of motion. She  exhibits no edema, tenderness or deformity.   Feet:   Right Foot:   Protective Sensation: 10 sites tested. 8 sites sensed.   Skin Integrity: Positive for dry skin.   Left Foot:   Protective Sensation: 10 sites tested. 8 sites sensed.   Skin Integrity: Positive for ulcer, skin breakdown, callus and dry skin.   Neurological: She is alert and oriented to person, place, and time. She has normal strength. She displays no atrophy and no tremor.   Reflex Scores:       Patellar reflexes are 2+ on the right side and 2+ on the left side.       Achilles reflexes are 2+ on the right side and 2+ on the left side.  Vibratory sensation diminished  5/5 muscle strength b/l LE   Skin: Skin is dry. Capillary refill takes less than 2 seconds. No rash noted. She is not diaphoretic. No cyanosis or erythema. No pallor. Nails show no clubbing.   Wounds right foot healed.    Previous wound sites right foot healed with normal appearing skin.    Skin is dry and flaky right foot.    Nails 1-5 bilateral are trimmed, thickened, discolored brown.       Psychiatric: She has a normal mood and affect. Her behavior is normal.                 Assessment:       Encounter Diagnoses   Name Primary?    Healed ulcer of right foot on examination Yes    Type 2 diabetes mellitus with hypertension and end stage renal disease on dialysis     Type 2 diabetes mellitus with peripheral vascular disease     Hammer toes of both feet          Plan:       Angelina was seen today for follow-up.    Diagnoses and all orders for this visit:    Healed ulcer of right foot on examination  -     DIABETIC SHOES FOR HOME USE    Type 2 diabetes mellitus with hypertension and end stage renal disease on dialysis  -     DIABETIC SHOES FOR HOME USE    Type 2 diabetes mellitus with peripheral vascular disease  -     DIABETIC SHOES FOR HOME USE    Hammer toes of both feet  -     DIABETIC SHOES FOR HOME USE      I counseled the patient on her conditions, their implications and  medical management.    Discontinue HH.    Discussed good foot hygiene, daily foot checks, routine emollient application.    May return into her diabetic shoes.    RTC 3 months or prn.

## 2019-01-19 DIAGNOSIS — I25.10 CORONARY ARTERY DISEASE, ANGINA PRESENCE UNSPECIFIED, UNSPECIFIED VESSEL OR LESION TYPE, UNSPECIFIED WHETHER NATIVE OR TRANSPLANTED HEART: ICD-10-CM

## 2019-01-19 RX ORDER — ISOSORBIDE MONONITRATE 30 MG/1
30 TABLET, EXTENDED RELEASE ORAL DAILY
Qty: 30 TABLET | Refills: 1 | Status: ON HOLD | OUTPATIENT
Start: 2019-01-19 | End: 2019-08-03 | Stop reason: HOSPADM

## 2019-01-22 RX ORDER — FUROSEMIDE 40 MG/1
40 TABLET ORAL DAILY
Qty: 30 TABLET | Refills: 3 | Status: ON HOLD | OUTPATIENT
Start: 2019-01-22 | End: 2019-11-27 | Stop reason: HOSPADM

## 2019-01-22 RX ORDER — PRAVASTATIN SODIUM 40 MG/1
40 TABLET ORAL NIGHTLY
Qty: 30 TABLET | Refills: 4 | Status: SHIPPED | OUTPATIENT
Start: 2019-01-22 | End: 2019-02-14

## 2019-01-22 RX ORDER — NAPROXEN 500 MG/1
500 TABLET ORAL 2 TIMES DAILY WITH MEALS
Qty: 60 TABLET | Refills: 3 | Status: SHIPPED | OUTPATIENT
Start: 2019-01-22 | End: 2019-05-14 | Stop reason: SDUPTHER

## 2019-01-24 ENCOUNTER — OFFICE VISIT (OUTPATIENT)
Dept: CARDIOLOGY | Facility: CLINIC | Age: 69
End: 2019-01-24
Payer: MEDICARE

## 2019-01-24 VITALS
SYSTOLIC BLOOD PRESSURE: 130 MMHG | HEIGHT: 65 IN | BODY MASS INDEX: 38.82 KG/M2 | HEART RATE: 70 BPM | WEIGHT: 233 LBS | DIASTOLIC BLOOD PRESSURE: 80 MMHG

## 2019-01-24 DIAGNOSIS — R53.81 DEBILITY: ICD-10-CM

## 2019-01-24 DIAGNOSIS — E11.22 TYPE 2 DIABETES MELLITUS WITH HYPERTENSION AND END STAGE RENAL DISEASE ON DIALYSIS: Chronic | ICD-10-CM

## 2019-01-24 DIAGNOSIS — N18.6 END-STAGE RENAL DISEASE ON HEMODIALYSIS: Chronic | ICD-10-CM

## 2019-01-24 DIAGNOSIS — Z86.73 H/O: CVA (CEREBROVASCULAR ACCIDENT): ICD-10-CM

## 2019-01-24 DIAGNOSIS — R53.81 PHYSICAL DECONDITIONING: ICD-10-CM

## 2019-01-24 DIAGNOSIS — K21.9 GASTROESOPHAGEAL REFLUX DISEASE WITHOUT ESOPHAGITIS: Chronic | ICD-10-CM

## 2019-01-24 DIAGNOSIS — I48.0 PAROXYSMAL ATRIAL FIBRILLATION: Chronic | ICD-10-CM

## 2019-01-24 DIAGNOSIS — D50.0 ANEMIA DUE TO GASTROINTESTINAL BLOOD LOSS: ICD-10-CM

## 2019-01-24 DIAGNOSIS — Z99.2 END-STAGE RENAL DISEASE ON HEMODIALYSIS: Chronic | ICD-10-CM

## 2019-01-24 DIAGNOSIS — D63.1 ANEMIA IN END-STAGE RENAL DISEASE: ICD-10-CM

## 2019-01-24 DIAGNOSIS — I15.2 HYPERTENSION ASSOCIATED WITH DIABETES: ICD-10-CM

## 2019-01-24 DIAGNOSIS — Z86.2 HISTORY OF SARCOIDOSIS: Chronic | ICD-10-CM

## 2019-01-24 DIAGNOSIS — I12.0 TYPE 2 DIABETES MELLITUS WITH HYPERTENSION AND END STAGE RENAL DISEASE ON DIALYSIS: Chronic | ICD-10-CM

## 2019-01-24 DIAGNOSIS — I73.9 PAD (PERIPHERAL ARTERY DISEASE): ICD-10-CM

## 2019-01-24 DIAGNOSIS — Z99.2 TYPE 2 DIABETES MELLITUS WITH HYPERTENSION AND END STAGE RENAL DISEASE ON DIALYSIS: Chronic | ICD-10-CM

## 2019-01-24 DIAGNOSIS — N18.6 TYPE 2 DIABETES MELLITUS WITH HYPERTENSION AND END STAGE RENAL DISEASE ON DIALYSIS: Chronic | ICD-10-CM

## 2019-01-24 DIAGNOSIS — E78.5 HYPERLIPIDEMIA, UNSPECIFIED HYPERLIPIDEMIA TYPE: ICD-10-CM

## 2019-01-24 DIAGNOSIS — I67.89 CEREBRAL MICROVASCULAR DISEASE: Chronic | ICD-10-CM

## 2019-01-24 DIAGNOSIS — N18.6 ANEMIA IN END-STAGE RENAL DISEASE: ICD-10-CM

## 2019-01-24 DIAGNOSIS — E11.59 HYPERTENSION ASSOCIATED WITH DIABETES: ICD-10-CM

## 2019-01-24 DIAGNOSIS — I25.10 CORONARY ARTERY DISEASE INVOLVING NATIVE CORONARY ARTERY OF NATIVE HEART WITHOUT ANGINA PECTORIS: Chronic | ICD-10-CM

## 2019-01-24 DIAGNOSIS — Z85.3 HISTORY OF LEFT BREAST CANCER: Chronic | ICD-10-CM

## 2019-01-24 DIAGNOSIS — I50.32 CHRONIC DIASTOLIC CONGESTIVE HEART FAILURE: Primary | Chronic | ICD-10-CM

## 2019-01-24 DIAGNOSIS — E66.01 MORBID OBESITY: ICD-10-CM

## 2019-01-24 PROCEDURE — 99214 PR OFFICE/OUTPT VISIT, EST, LEVL IV, 30-39 MIN: ICD-10-PCS | Mod: S$PBB,NTX,, | Performed by: INTERNAL MEDICINE

## 2019-01-24 PROCEDURE — 99213 OFFICE O/P EST LOW 20 MIN: CPT | Mod: PBBFAC,PO,TXP | Performed by: INTERNAL MEDICINE

## 2019-01-24 PROCEDURE — 99999 PR PBB SHADOW E&M-EST. PATIENT-LVL III: ICD-10-PCS | Mod: PBBFAC,TXP,, | Performed by: INTERNAL MEDICINE

## 2019-01-24 PROCEDURE — 99214 OFFICE O/P EST MOD 30 MIN: CPT | Mod: S$PBB,NTX,, | Performed by: INTERNAL MEDICINE

## 2019-01-24 PROCEDURE — 99999 PR PBB SHADOW E&M-EST. PATIENT-LVL III: CPT | Mod: PBBFAC,TXP,, | Performed by: INTERNAL MEDICINE

## 2019-01-24 NOTE — PROGRESS NOTES
Subjective:    Patient ID:  Angelina Beard is a 68 y.o. female who presents for follow-up of Congestive Heart Failure; Atrial Fibrillation; and Peripheral Arterial Disease      HPI     67 y/o female former patient of Dr Murphy. She has a hx of CHF (HFpEF), Pafib previously on anticoagulation with coumadin (taken off bc of GIB requiring PRBC transfusion) now on Eliquis, nonob CAD with multiple LHC's in the past with no PCI, HTN, HLD, anemia, ESRD on HD, hx of sarcoid. She was hospitalized 3/2018 with presenting symptom of CP, elevated trop thought due to demand, had Afib with RVR with spontaneous conversion to SR, and discharged home. Difficult historian. Here with caretaker.  She has chronic, unchanged BREWSTER and intermittent bilateral LE edema. Has had multiple hospitalizations and has required HD for volume overload. There appears to be an issue with dietary indiscretion. She denies orthopnea, PND, palps, syncope. Does not smoke, no EtOH.   Foot ulcers have been healing and has followed with Podiatry.  Started on metoprolol and recently increased.  Recently started on Eliquis, ASA D/C'd and plavix continued.    Review of Systems   Constitution: Negative for weakness and malaise/fatigue.   HENT: Negative for congestion.    Eyes: Negative for blurred vision.   Cardiovascular: Positive for dyspnea on exertion and leg swelling. Negative for chest pain, claudication, cyanosis, irregular heartbeat, near-syncope, orthopnea, palpitations, paroxysmal nocturnal dyspnea and syncope.   Respiratory: Negative for shortness of breath.    Endocrine: Negative for polyuria.   Hematologic/Lymphatic: Negative for bleeding problem.   Skin: Negative for itching and rash.   Musculoskeletal: Positive for back pain, joint pain and joint swelling. Negative for muscle cramps and muscle weakness.   Gastrointestinal: Negative for abdominal pain, hematemesis, hematochezia, melena, nausea and vomiting.   Genitourinary: Negative for dysuria and  hematuria.   Neurological: Negative for dizziness, focal weakness, headaches, light-headedness and loss of balance.   Psychiatric/Behavioral: Negative for depression. The patient is not nervous/anxious.         Objective:    Physical Exam   Constitutional: She is oriented to person, place, and time. She appears well-developed and well-nourished.   HENT:   Head: Normocephalic and atraumatic.   Neck: Neck supple. No JVD present.   Cardiovascular: Normal rate and regular rhythm.   Murmur heard.   Systolic murmur is present with a grade of 2/6.  Pulses:       Carotid pulses are 2+ on the right side, and 2+ on the left side.       Radial pulses are 2+ on the right side, and 2+ on the left side.        Femoral pulses are 2+ on the right side, and 2+ on the left side.       Posterior tibial pulses are 1+ on the right side, and 1+ on the left side.   Pulmonary/Chest: Effort normal and breath sounds normal.   Abdominal: Soft. Bowel sounds are normal.   Musculoskeletal: She exhibits no edema.   Neurological: She is alert and oriented to person, place, and time.   Skin: Skin is warm and dry.   Psychiatric: She has a normal mood and affect. Her behavior is normal. Thought content normal.         Assessment:       1. Chronic diastolic congestive heart failure    2. Coronary artery disease involving native coronary artery of native heart without angina pectoris    3. Paroxysmal atrial fibrillation    4. PAD (peripheral artery disease)    5. Hypertension associated with diabetes    6. Hyperlipidemia, unspecified hyperlipidemia type    7. H/O: CVA (cerebrovascular accident)    8. Cerebral microvascular disease    9. End-stage renal disease on hemodialysis    10. History of sarcoidosis    11. Anemia due to gastrointestinal blood loss    12. Anemia in end-stage renal disease    13. History of left breast cancer s/p mastectomy    14. Morbid obesity    15. Type 2 diabetes mellitus with hypertension and end stage renal disease on  dialysis    16. Gastroesophageal reflux disease without esophagitis    17. Debility    18. Physical deconditioning      69 y/o pt with hx and presentation as above. Doing well from a cardiac perspective and compensated from a HF perspective. Agree with current management.       Plan:       -Continue current medical management  -f/u in 6 months

## 2019-02-12 ENCOUNTER — HOSPITAL ENCOUNTER (OUTPATIENT)
Dept: RADIOLOGY | Facility: HOSPITAL | Age: 69
Discharge: HOME | End: 2019-02-12
Attending: INTERNAL MEDICINE
Payer: MEDICARE

## 2019-02-12 ENCOUNTER — HOSPITAL ENCOUNTER (OUTPATIENT)
Dept: CARDIOLOGY | Facility: HOSPITAL | Age: 69
Discharge: HOME | End: 2019-02-12
Attending: INTERNAL MEDICINE
Payer: MEDICARE

## 2019-02-12 DIAGNOSIS — I25.10 CORONARY ARTERY DISEASE, ANGINA PRESENCE UNSPECIFIED, UNSPECIFIED VESSEL OR LESION TYPE, UNSPECIFIED WHETHER NATIVE OR TRANSPLANTED HEART: ICD-10-CM

## 2019-02-12 DIAGNOSIS — R09.02 HYPOXIA: ICD-10-CM

## 2019-02-12 DIAGNOSIS — R06.00 DYSPNEA, UNSPECIFIED TYPE: ICD-10-CM

## 2019-02-12 LAB
CV STRESS BASE HR: 76 BPM
DIASTOLIC BLOOD PRESSURE: 102 MMHG
OHS CV CPX 85 PERCENT MAX PREDICTED HEART RATE MALE: 125
OHS CV CPX MAX PREDICTED HEART RATE: 147
OHS CV CPX PATIENT IS FEMALE: 1
OHS CV CPX PATIENT IS MALE: 0
OHS CV CPX PEAK DIASTOLIC BLOOD PRESSURE: 107 MMHG
OHS CV CPX PEAK HEAR RATE: 75 BPM
OHS CV CPX PEAK RATE PRESSURE PRODUCT: NORMAL
OHS CV CPX PEAK SYSTOLIC BLOOD PRESSURE: 160 MMHG
OHS CV CPX PERCENT MAX PREDICTED HEART RATE ACHIEVED: 51
OHS CV CPX RATE PRESSURE PRODUCT PRESENTING: NORMAL
SYSTOLIC BLOOD PRESSURE: 145 MMHG

## 2019-02-12 PROCEDURE — 93016 STRESS TEST ONLY (CUPID ONLY): ICD-10-PCS | Mod: NTX,,, | Performed by: INTERNAL MEDICINE

## 2019-02-12 PROCEDURE — 93017 CV STRESS TEST TRACING ONLY: CPT | Mod: PO,NTX

## 2019-02-12 PROCEDURE — A9502 TC99M TETROFOSMIN: HCPCS | Mod: PO,NTX

## 2019-02-12 PROCEDURE — 93018 CV STRESS TEST I&R ONLY: CPT | Mod: NTX,,, | Performed by: INTERNAL MEDICINE

## 2019-02-12 PROCEDURE — 93018 STRESS TEST ONLY (CUPID ONLY): ICD-10-PCS | Mod: NTX,,, | Performed by: INTERNAL MEDICINE

## 2019-02-12 PROCEDURE — 93016 CV STRESS TEST SUPVJ ONLY: CPT | Mod: NTX,,, | Performed by: INTERNAL MEDICINE

## 2019-02-12 PROCEDURE — 71046 X-RAY EXAM CHEST 2 VIEWS: CPT | Mod: TC,FY,PO,NTX

## 2019-02-12 PROCEDURE — 63600175 PHARM REV CODE 636 W HCPCS: Mod: PO,NTX | Performed by: INTERNAL MEDICINE

## 2019-02-12 RX ORDER — REGADENOSON 0.08 MG/ML
0.4 INJECTION, SOLUTION INTRAVENOUS ONCE
Status: COMPLETED | OUTPATIENT
Start: 2019-02-12 | End: 2019-02-12

## 2019-02-12 RX ADMIN — REGADENOSON 0.4 MG: 0.08 INJECTION, SOLUTION INTRAVENOUS at 12:02

## 2019-02-14 ENCOUNTER — CLINICAL SUPPORT (OUTPATIENT)
Dept: FAMILY MEDICINE | Facility: CLINIC | Age: 69
End: 2019-02-14
Attending: INTERNAL MEDICINE
Payer: MEDICARE

## 2019-02-14 ENCOUNTER — TELEPHONE (OUTPATIENT)
Dept: FAMILY MEDICINE | Facility: CLINIC | Age: 69
End: 2019-02-14

## 2019-02-14 ENCOUNTER — OFFICE VISIT (OUTPATIENT)
Dept: INTERNAL MEDICINE | Facility: CLINIC | Age: 69
End: 2019-02-14
Payer: MEDICARE

## 2019-02-14 VITALS
BODY MASS INDEX: 37.49 KG/M2 | SYSTOLIC BLOOD PRESSURE: 150 MMHG | DIASTOLIC BLOOD PRESSURE: 80 MMHG | OXYGEN SATURATION: 88 % | WEIGHT: 238.88 LBS | HEIGHT: 67 IN | HEART RATE: 76 BPM

## 2019-02-14 DIAGNOSIS — E11.22 TYPE 2 DIABETES MELLITUS WITH HYPERTENSION AND END STAGE RENAL DISEASE ON DIALYSIS: ICD-10-CM

## 2019-02-14 DIAGNOSIS — N18.6 TYPE 2 DIABETES MELLITUS WITH HYPERTENSION AND END STAGE RENAL DISEASE ON DIALYSIS: ICD-10-CM

## 2019-02-14 DIAGNOSIS — N03.9 ANEMIA DUE TO PRE-ESRD TREATED WITH ERYTHROPOIETIN: ICD-10-CM

## 2019-02-14 DIAGNOSIS — N18.6 END-STAGE RENAL DISEASE ON HEMODIALYSIS: ICD-10-CM

## 2019-02-14 DIAGNOSIS — I25.10 CORONARY ARTERY DISEASE, ANGINA PRESENCE UNSPECIFIED, UNSPECIFIED VESSEL OR LESION TYPE, UNSPECIFIED WHETHER NATIVE OR TRANSPLANTED HEART: ICD-10-CM

## 2019-02-14 DIAGNOSIS — Z99.2 TYPE 2 DIABETES MELLITUS WITH HYPERTENSION AND END STAGE RENAL DISEASE ON DIALYSIS: ICD-10-CM

## 2019-02-14 DIAGNOSIS — E78.5 HYPERLIPIDEMIA ASSOCIATED WITH TYPE 2 DIABETES MELLITUS: ICD-10-CM

## 2019-02-14 DIAGNOSIS — I15.2 HYPERTENSION ASSOCIATED WITH DIABETES: ICD-10-CM

## 2019-02-14 DIAGNOSIS — I50.32 CHRONIC DIASTOLIC CONGESTIVE HEART FAILURE: ICD-10-CM

## 2019-02-14 DIAGNOSIS — F33.41 RECURRENT MAJOR DEPRESSIVE DISORDER, IN PARTIAL REMISSION: ICD-10-CM

## 2019-02-14 DIAGNOSIS — I48.20 CHRONIC ATRIAL FIBRILLATION: Primary | ICD-10-CM

## 2019-02-14 DIAGNOSIS — I12.0 TYPE 2 DIABETES MELLITUS WITH HYPERTENSION AND END STAGE RENAL DISEASE ON DIALYSIS: ICD-10-CM

## 2019-02-14 DIAGNOSIS — E11.69 HYPERLIPIDEMIA ASSOCIATED WITH TYPE 2 DIABETES MELLITUS: ICD-10-CM

## 2019-02-14 DIAGNOSIS — D63.1 ANEMIA DUE TO PRE-ESRD TREATED WITH ERYTHROPOIETIN: ICD-10-CM

## 2019-02-14 DIAGNOSIS — E11.59 HYPERTENSION ASSOCIATED WITH DIABETES: ICD-10-CM

## 2019-02-14 DIAGNOSIS — Z99.2 END-STAGE RENAL DISEASE ON HEMODIALYSIS: ICD-10-CM

## 2019-02-14 PROCEDURE — 99213 OFFICE O/P EST LOW 20 MIN: CPT | Mod: PBBFAC,PN | Performed by: INTERNAL MEDICINE

## 2019-02-14 PROCEDURE — 99214 OFFICE O/P EST MOD 30 MIN: CPT | Mod: S$PBB,,, | Performed by: INTERNAL MEDICINE

## 2019-02-14 PROCEDURE — 99999 PR PBB SHADOW E&M-EST. PATIENT-LVL III: CPT | Mod: PBBFAC,,, | Performed by: INTERNAL MEDICINE

## 2019-02-14 PROCEDURE — 99999 PR PBB SHADOW E&M-EST. PATIENT-LVL III: ICD-10-PCS | Mod: PBBFAC,,, | Performed by: INTERNAL MEDICINE

## 2019-02-14 PROCEDURE — 99214 PR OFFICE/OUTPT VISIT, EST, LEVL IV, 30-39 MIN: ICD-10-PCS | Mod: S$PBB,,, | Performed by: INTERNAL MEDICINE

## 2019-02-14 RX ORDER — PRAVASTATIN SODIUM 80 MG/1
80 TABLET ORAL DAILY
Qty: 30 TABLET | Refills: 11 | Status: SHIPPED | OUTPATIENT
Start: 2019-02-14 | End: 2020-02-04 | Stop reason: SDUPTHER

## 2019-02-14 RX ORDER — METOPROLOL SUCCINATE 50 MG/1
50 TABLET, EXTENDED RELEASE ORAL DAILY
Qty: 90 TABLET | Refills: 3 | Status: SHIPPED | OUTPATIENT
Start: 2019-02-14 | End: 2020-02-04 | Stop reason: SDUPTHER

## 2019-02-14 NOTE — PROGRESS NOTES
Subjective:       Patient ID: Angelina Beard is a 68 y.o. female.    Chief Complaint: Follow-up    HPI  Recheck.  Chart reviewed.  Pt c/o fatigue, but denies CP, SOB.  No palpitations, no bleeding.  No depression.  Arthralgias at baseline.  Doing well in HD.  Review of Systems   All other systems reviewed and are negative.      Objective:      Physical Exam   Constitutional: She appears well-developed. No distress.   HENT:   Head: Normocephalic.   Eyes: EOM are normal.   Neck: Normal range of motion. No tracheal deviation present.   Cardiovascular: Normal rate, normal heart sounds and intact distal pulses.   irreg irreg   Pulmonary/Chest: Effort normal and breath sounds normal. No respiratory distress.   Abdominal: Soft. Bowel sounds are normal. She exhibits no distension.   Musculoskeletal: Normal range of motion. She exhibits edema (trace ankles).   Neurological: She is alert. No cranial nerve deficit. She exhibits normal muscle tone. Coordination normal.   Skin: Skin is warm and dry. No rash noted. She is not diaphoretic. No erythema.   Psychiatric: She has a normal mood and affect. Her behavior is normal.   Vitals reviewed.      Assessment:       1. Chronic atrial fibrillation    2. Coronary artery disease, angina presence unspecified, unspecified vessel or lesion type, unspecified whether native or transplanted heart    3. Recurrent major depressive disorder, in partial remission    4. Hyperlipidemia associated with type 2 diabetes mellitus    5. Hypertension associated with diabetes    6. Chronic diastolic congestive heart failure    7. Anemia due to pre-ESRD treated with erythropoietin    8. End-stage renal disease on hemodialysis    9. Type 2 diabetes mellitus with hypertension and end stage renal disease on dialysis        Plan:       Angelina was seen today for follow-up.    Diagnoses and all orders for this visit:    Chronic atrial fibrillation   Cont rx    Coronary artery disease, angina presence  unspecified, unspecified vessel or lesion type, unspecified whether native or transplanted heart   Quiescent    Recurrent major depressive disorder, in partial remission   Well-cont    Hyperlipidemia associated with type 2 diabetes mellitus  -     Lipid panel; Future  -     pravastatin (PRAVACHOL) 80 MG tablet; Take 1 tablet (80 mg total) by mouth once daily.    Hypertension associated with diabetes  -     metoprolol succinate (TOPROL-XL) 50 MG 24 hr tablet; Take 1 tablet (50 mg total) by mouth once daily.    Chronic diastolic congestive heart failure   Cont rx    Anemia due to pre-ESRD treated with erythropoietin   Monitor    End-stage renal disease on hemodialysis   Cont HD.  Type 2 diabetes mellitus with hypertension and end stage renal disease on dialysis  -     Hemoglobin A1c; Future  -     Diabetic Eye Screening Photo; Future      Follow-up in about 3 months (around 5/14/2019).

## 2019-02-14 NOTE — TELEPHONE ENCOUNTER
Unable to obtain images of the eye due to the pt having a left eye implant  The patient was provided Dr Vincent info and she said she will call to self schedule due to her Dialysis schedule  She said she has seen Dr Vincent and Dr Mc in the past.

## 2019-02-26 ENCOUNTER — TELEPHONE (OUTPATIENT)
Dept: INTERNAL MEDICINE | Facility: CLINIC | Age: 69
End: 2019-02-26

## 2019-02-26 DIAGNOSIS — K21.9 GASTROESOPHAGEAL REFLUX DISEASE, ESOPHAGITIS PRESENCE NOT SPECIFIED: Primary | ICD-10-CM

## 2019-02-26 RX ORDER — PANTOPRAZOLE SODIUM 40 MG/1
40 TABLET, DELAYED RELEASE ORAL DAILY
Qty: 90 TABLET | Refills: 3 | Status: ON HOLD | OUTPATIENT
Start: 2019-02-26 | End: 2019-08-03 | Stop reason: HOSPADM

## 2019-02-26 NOTE — TELEPHONE ENCOUNTER
----- Message from Meghana Dozier sent at 2/26/2019 12:34 PM CST -----  Contact: Sahmika ruiz/ [facility] [phone]  Refill request for:  PROTONIX 40Mg ONCE DAILY     Be sent to:  [id number] [address]    Shamika ruiz/ [facility] [phone]

## 2019-03-14 ENCOUNTER — OFFICE VISIT (OUTPATIENT)
Dept: PODIATRY | Facility: CLINIC | Age: 69
End: 2019-03-14
Payer: MEDICARE

## 2019-03-14 VITALS
HEART RATE: 63 BPM | SYSTOLIC BLOOD PRESSURE: 127 MMHG | DIASTOLIC BLOOD PRESSURE: 63 MMHG | HEIGHT: 67 IN | WEIGHT: 238 LBS | BODY MASS INDEX: 37.35 KG/M2

## 2019-03-14 DIAGNOSIS — E11.51 TYPE 2 DIABETES MELLITUS WITH PERIPHERAL VASCULAR DISEASE: ICD-10-CM

## 2019-03-14 DIAGNOSIS — M20.42 HAMMER TOES OF BOTH FEET: ICD-10-CM

## 2019-03-14 DIAGNOSIS — Z99.2 TYPE 2 DIABETES MELLITUS WITH HYPERTENSION AND END STAGE RENAL DISEASE ON DIALYSIS: Primary | ICD-10-CM

## 2019-03-14 DIAGNOSIS — B35.1 ONYCHOMYCOSIS: ICD-10-CM

## 2019-03-14 DIAGNOSIS — Z86.31 HISTORY OF DIABETIC ULCER OF FOOT: ICD-10-CM

## 2019-03-14 DIAGNOSIS — E11.22 TYPE 2 DIABETES MELLITUS WITH HYPERTENSION AND END STAGE RENAL DISEASE ON DIALYSIS: Primary | ICD-10-CM

## 2019-03-14 DIAGNOSIS — I12.0 TYPE 2 DIABETES MELLITUS WITH HYPERTENSION AND END STAGE RENAL DISEASE ON DIALYSIS: Primary | ICD-10-CM

## 2019-03-14 DIAGNOSIS — M20.41 HAMMER TOES OF BOTH FEET: ICD-10-CM

## 2019-03-14 DIAGNOSIS — N18.6 TYPE 2 DIABETES MELLITUS WITH HYPERTENSION AND END STAGE RENAL DISEASE ON DIALYSIS: Primary | ICD-10-CM

## 2019-03-14 PROCEDURE — 99499 UNLISTED E&M SERVICE: CPT | Mod: S$PBB,,, | Performed by: PODIATRIST

## 2019-03-14 PROCEDURE — 11721 ROUTINE FOOT CARE: ICD-10-PCS | Mod: S$PBB,Q9,, | Performed by: PODIATRIST

## 2019-03-14 PROCEDURE — 99213 OFFICE O/P EST LOW 20 MIN: CPT | Mod: PBBFAC,PN,25 | Performed by: PODIATRIST

## 2019-03-14 PROCEDURE — 99499 NO LOS: ICD-10-PCS | Mod: S$PBB,,, | Performed by: PODIATRIST

## 2019-03-14 PROCEDURE — 11721 DEBRIDE NAIL 6 OR MORE: CPT | Mod: PBBFAC,PN | Performed by: PODIATRIST

## 2019-03-14 PROCEDURE — 99999 PR PBB SHADOW E&M-EST. PATIENT-LVL III: ICD-10-PCS | Mod: PBBFAC,,, | Performed by: PODIATRIST

## 2019-03-14 PROCEDURE — 99999 PR PBB SHADOW E&M-EST. PATIENT-LVL III: CPT | Mod: PBBFAC,,, | Performed by: PODIATRIST

## 2019-03-14 NOTE — PROCEDURES
"Routine Foot Care  Date/Time: 3/14/2019 11:54 AM  Performed by: Kwame Dacosta DPM  Authorized by: Kwame Dacosta DPM     Time out: Immediately prior to procedure a "time out" was called to verify the correct patient, procedure, equipment, support staff and site/side marked as required.    Consent Done?:  Yes (Verbal)  Hyperkeratotic Skin Lesions?: No      Nail Care Type:  Debride  Location(s): All  (Left 1st Toe, Left 3rd Toe, Left 2nd Toe, Left 4th Toe, Left 5th Toe, Right 1st Toe, Right 2nd Toe, Right 3rd Toe, Right 4th Toe and Right 5th Toe)  Patient tolerance:  Patient tolerated the procedure well with no immediate complications      "

## 2019-03-14 NOTE — PROGRESS NOTES
Subjective:      Patient ID: Angelina Beard is a 68 y.o. female.    Chief Complaint: Diabetes Mellitus (Dr. Mena 2/14/19); Diabetic Foot Exam (skin dry ); and Routine Foot Care    Angelina is a 68 y.o. female who presents to the clinic for evaluation and treatment of diabetic feet. Angelina has a past medical history of Arthritis, Breast cancer, left, CHF (congestive heart failure), Cholelithiasis, Coronary artery disease, Diabetes mellitus, type 2, Encounter for blood transfusion, End-stage renal disease on hemodialysis, Hemodialysis access site with arteriovenous graft, Hyperlipidemia, Hypertension, and Sarcoidosis. History of healed ulceration right foot. Accompanied by family member.       PCP: Ben Mena MD    Date Last Seen by PCP: 2/4/19    Current shoe gear: Rx diabetic extra depth shoes and custom accommodative insoles    Hemoglobin A1C   Date Value Ref Range Status   02/12/2019 6.8 (H) 4.0 - 5.6 % Final     Comment:     ADA Screening Guidelines:  5.7-6.4%  Consistent with prediabetes  >or=6.5%  Consistent with diabetes  High levels of fetal hemoglobin interfere with the HbA1C  assay. Heterozygous hemoglobin variants (HbS, HgC, etc)do  not significantly interfere with this assay.   However, presence of multiple variants may affect accuracy.     07/12/2018 5.8 (H) 4.0 - 5.6 % Final     Comment:     ADA Screening Guidelines:  5.7-6.4%  Consistent with prediabetes  >or=6.5%  Consistent with diabetes  High levels of fetal hemoglobin interfere with the HbA1C  assay. Heterozygous hemoglobin variants (HbS, HgC, etc)do  not significantly interfere with this assay.   However, presence of multiple variants may affect accuracy.     03/03/2018 6.4 (H) 4.0 - 5.6 % Final     Comment:     According to ADA guidelines, hemoglobin A1c <7.0% represents  optimal control in non-pregnant diabetic patients. Different  metrics may apply to specific patient populations.   Standards of Medical Care in Diabetes-2016.  For the  "purpose of screening for the presence of diabetes:  <5.7%     Consistent with the absence of diabetes  5.7-6.4%  Consistent with increasing risk for diabetes   (prediabetes)  >or=6.5%  Consistent with diabetes  Currently, no consensus exists for use of hemoglobin A1c  for diagnosis of diabetes for children.  This Hemoglobin A1c assay has significant interference with fetal   hemoglobin   (HbF). The results are invalid for patients with abnormal amounts of   HbF,   including those with known Hereditary Persistence   of Fetal Hemoglobin. Heterozygous hemoglobin variants (HbAS, HbAC,   HbAD, HbAE, HbA2) do not significantly interfere with this assay;   however, presence of multiple variants in a sample may impact the %   interference.       Vitals:    03/14/19 1114   BP: 127/63   Pulse: 63   Weight: 108 kg (238 lb)   Height: 5' 7" (1.702 m)      Past Medical History:   Diagnosis Date    Arthritis     Breast cancer, left     CHF (congestive heart failure)     Cholelithiasis     Coronary artery disease     Diabetes mellitus, type 2     Encounter for blood transfusion     End-stage renal disease on hemodialysis     Hemodialysis access site with arteriovenous graft     mon-wed -fri    Hyperlipidemia     Hypertension     Sarcoidosis        Past Surgical History:   Procedure Laterality Date    av graft      left arm    BRACHIAL ARTERY GRAFT Left 05/04/2016    brachiocephalic, Dr. Anson Crocker    BREAST BIOPSY Left     breast ca    BREAST LUMPECTOMY Left     radiation only    btl      COLONOSCOPY N/A 7/13/2018    [facility] at [facility] ENDO    ELBOW SURGERY      left    ESOPHAGOGASTRODUODENOSCOPY (EGD) N/A 7/13/2018    [facility] at [facility] ENDO    ZZLKASRNS-ZATGE-ETDAKETVCESSP Right 5/4/2016    [facility] by [facility]cker [facility] at [facility] OR    LIPOMA RESECTION      back of neck    pilondial cyst      REPLACEMENT-KNEE-TOTAL Right 2/23/2016    Performed by " "[facility] at [facility] 2ND FLR    TOTAL KNEE ARTHROPLASTY Right 02/23/2016       Family History   Problem Relation Age of Onset    Diabetes Mother     Kidney disease Mother     Hypertension Mother     Diabetes Sister     Heart disease Sister        Social History     Socioeconomic History    Marital status:      Spouse name: None    Number of children: None    Years of education: None    Highest education level: None   Social Needs    Financial resource strain: None    Food insecurity - worry: None    Food insecurity - inability: None    Transportation needs - medical: None    Transportation needs - non-medical: None   Occupational History    None   Tobacco Use    Smoking status: Never Smoker    Smokeless tobacco: Never Used   Substance and Sexual Activity    Alcohol use: No    Drug use: No    Sexual activity: None   Other Topics Concern    None   Social History Narrative    None       Current Outpatient Medications   Medication Sig Dispense Refill    amLODIPine (NORVASC) 10 MG tablet Take 10 mg by mouth. TAKE 1 TABLET BY MOUTH ON OFF DIALYSIS DAYS.      apixaban (ELIQUIS) 2.5 mg Tab Take 1 tablet (2.5 mg total) by mouth 2 (two) times daily. 180 tablet 3    bismuth tribrom-petrolatum,wh (XEROFORM PETROLATUM DRESSING) 1 X 8 " Bndg Apply locally every other day 50 each 1    calcium acetate (PHOSLO) 667 mg capsule Take 667 mg by mouth 3 (three) times daily with meals.      clopidogrel (PLAVIX) 75 mg tablet Take 1 tablet (75 mg total) by mouth once daily. 30 tablet 11    gabapentin (NEURONTIN) 300 MG capsule Take 1 capsule (300 mg total) by mouth every 8 (eight) hours as needed. 90 capsule 3    insulin glargine (LANTUS) 100 unit/mL injection Inject 20 Units into the skin every evening.      metoprolol succinate (TOPROL-XL) 50 MG 24 hr tablet Take 1 tablet (50 mg total) by mouth once daily. 90 tablet 3    naproxen (NAPROSYN) 500 MG tablet Take 1 tablet (500 mg total) by mouth " 2 (two) times daily with meals. 60 tablet 3    nitroGLYCERIN (NITROSTAT) 0.4 MG SL tablet Place 1 tablet (0.4 mg total) under the tongue every 5 (five) minutes as needed for Chest pain. 30 tablet 11    pantoprazole (PROTONIX) 40 MG tablet Take 1 tablet (40 mg total) by mouth once daily. 90 tablet 3    polyethylene glycol (GLYCOLAX) 17 gram/dose powder       pravastatin (PRAVACHOL) 80 MG tablet Take 1 tablet (80 mg total) by mouth once daily. 30 tablet 11    sertraline (ZOLOFT) 100 MG tablet Take 1 tablet (100 mg total) by mouth once daily. 90 tablet 3    furosemide (LASIX) 40 MG tablet Take 1 tablet (40 mg total) by mouth once daily. 30 tablet 3    isosorbide mononitrate (IMDUR) 30 MG 24 hr tablet Take 1 tablet (30 mg total) by mouth once daily. 30 tablet 1     No current facility-administered medications for this visit.        Review of patient's allergies indicates:  No Known Allergies        Review of Systems   Constitution: Negative for chills and fever.   HENT: Negative for congestion.    Cardiovascular: Negative for chest pain.   Skin: Positive for dry skin, nail changes and poor wound healing. Negative for color change, suspicious lesions and unusual hair distribution.   Musculoskeletal: Negative for falls, joint pain, joint swelling, muscle weakness and myalgias.   Gastrointestinal: Negative for nausea and vomiting.   Neurological: Negative for loss of balance, numbness, paresthesias, sensory change and tremors.   Psychiatric/Behavioral: Negative for altered mental status. The patient is not nervous/anxious.            Objective:      Physical Exam   Constitutional: She is oriented to person, place, and time. She appears well-developed and well-nourished. No distress.   HENT:   Head: Normocephalic and atraumatic.   Cardiovascular:   Pulses:       Dorsalis pedis pulses are 1+ on the right side, and 1+ on the left side.        Posterior tibial pulses are 1+ on the right side, and 1+ on the left side.    CFT< 3 secs all toes bilateral foot, skin temp warm bilateral foot, no hair growth bilateral lower extremity, no lower extremity edema bilateral.     Musculoskeletal: Normal range of motion. She exhibits no edema, tenderness or deformity.   Feet:   Right Foot:   Protective Sensation: 10 sites tested. 8 sites sensed.   Skin Integrity: Positive for dry skin.   Left Foot:   Protective Sensation: 10 sites tested. 8 sites sensed.   Skin Integrity: Positive for ulcer, skin breakdown, callus and dry skin.   Neurological: She is alert and oriented to person, place, and time. She has normal strength. She displays no atrophy and no tremor.   Reflex Scores:       Patellar reflexes are 2+ on the right side and 2+ on the left side.       Achilles reflexes are 2+ on the right side and 2+ on the left side.  Vibratory sensation diminished  5/5 muscle strength b/l LE   Skin: Skin is dry. Capillary refill takes less than 2 seconds. No rash noted. She is not diaphoretic. No cyanosis or erythema. No pallor. Nails show no clubbing.   Skin is dry and flaky right foot.    Nails 1-5 bilateral are elongated 3-4 mm, thickened 2-3 mm discolored brown.    No open lesions or macerations bilateral lower extremity.         Psychiatric: She has a normal mood and affect. Her behavior is normal.                 Assessment:       Encounter Diagnoses   Name Primary?    Type 2 diabetes mellitus with hypertension and end stage renal disease on dialysis Yes    Type 2 diabetes mellitus with peripheral vascular disease     History of diabetic ulcer of foot     Hammer toes of both feet     Onychomycosis          Plan:       Angelina was seen today for diabetes mellitus, diabetic foot exam and routine foot care.    Diagnoses and all orders for this visit:    Type 2 diabetes mellitus with hypertension and end stage renal disease on dialysis  -     Routine Foot Care    Type 2 diabetes mellitus with peripheral vascular disease  -     Routine Foot  Care    History of diabetic ulcer of foot    Hammer toes of both feet    Onychomycosis  -     Routine Foot Care      I counseled the patient on her conditions, their implications and medical management.    Discussed good foot hygiene, daily foot checks, routine emollient application.    Routine foot care per attached note.    RTC 3 months or prn.

## 2019-04-16 RX ORDER — AMLODIPINE BESYLATE 10 MG/1
10 TABLET ORAL DAILY
Qty: 90 TABLET | Refills: 4 | Status: SHIPPED | OUTPATIENT
Start: 2019-04-16 | End: 2019-10-16 | Stop reason: DRUGHIGH

## 2019-04-16 RX ORDER — INSULIN GLARGINE 100 [IU]/ML
20 INJECTION, SOLUTION SUBCUTANEOUS NIGHTLY
Qty: 10 ML | Refills: 4 | Status: SHIPPED | OUTPATIENT
Start: 2019-04-16 | End: 2019-05-14

## 2019-05-07 ENCOUNTER — LAB VISIT (OUTPATIENT)
Dept: LAB | Facility: HOSPITAL | Age: 69
End: 2019-05-07
Attending: INTERNAL MEDICINE
Payer: MEDICARE

## 2019-05-07 DIAGNOSIS — E78.5 HYPERLIPIDEMIA ASSOCIATED WITH TYPE 2 DIABETES MELLITUS: ICD-10-CM

## 2019-05-07 DIAGNOSIS — N18.6 TYPE 2 DIABETES MELLITUS WITH HYPERTENSION AND END STAGE RENAL DISEASE ON DIALYSIS: ICD-10-CM

## 2019-05-07 DIAGNOSIS — Z99.2 TYPE 2 DIABETES MELLITUS WITH HYPERTENSION AND END STAGE RENAL DISEASE ON DIALYSIS: ICD-10-CM

## 2019-05-07 DIAGNOSIS — E11.22 TYPE 2 DIABETES MELLITUS WITH HYPERTENSION AND END STAGE RENAL DISEASE ON DIALYSIS: ICD-10-CM

## 2019-05-07 DIAGNOSIS — I12.0 TYPE 2 DIABETES MELLITUS WITH HYPERTENSION AND END STAGE RENAL DISEASE ON DIALYSIS: ICD-10-CM

## 2019-05-07 DIAGNOSIS — E11.69 HYPERLIPIDEMIA ASSOCIATED WITH TYPE 2 DIABETES MELLITUS: ICD-10-CM

## 2019-05-07 LAB
CHOLEST SERPL-MCNC: 150 MG/DL (ref 120–199)
CHOLEST/HDLC SERPL: 3 {RATIO} (ref 2–5)
ESTIMATED AVG GLUCOSE: 140 MG/DL (ref 68–131)
HBA1C MFR BLD HPLC: 6.5 % (ref 4–5.6)
HDLC SERPL-MCNC: 50 MG/DL (ref 40–75)
HDLC SERPL: 33.3 % (ref 20–50)
LDLC SERPL CALC-MCNC: 81 MG/DL (ref 63–159)
NONHDLC SERPL-MCNC: 100 MG/DL
TRIGL SERPL-MCNC: 95 MG/DL (ref 30–150)

## 2019-05-07 PROCEDURE — 36415 COLL VENOUS BLD VENIPUNCTURE: CPT | Mod: PO

## 2019-05-07 PROCEDURE — 80061 LIPID PANEL: CPT

## 2019-05-07 PROCEDURE — 83036 HEMOGLOBIN GLYCOSYLATED A1C: CPT

## 2019-05-14 ENCOUNTER — OFFICE VISIT (OUTPATIENT)
Dept: INTERNAL MEDICINE | Facility: CLINIC | Age: 69
End: 2019-05-14
Payer: MEDICARE

## 2019-05-14 VITALS
SYSTOLIC BLOOD PRESSURE: 136 MMHG | HEIGHT: 67 IN | DIASTOLIC BLOOD PRESSURE: 86 MMHG | OXYGEN SATURATION: 92 % | HEART RATE: 64 BPM | WEIGHT: 242.19 LBS | BODY MASS INDEX: 38.01 KG/M2

## 2019-05-14 DIAGNOSIS — E11.22 TYPE 2 DIABETES MELLITUS WITH HYPERTENSION AND END STAGE RENAL DISEASE ON DIALYSIS: ICD-10-CM

## 2019-05-14 DIAGNOSIS — I12.0 TYPE 2 DIABETES MELLITUS WITH HYPERTENSION AND END STAGE RENAL DISEASE ON DIALYSIS: ICD-10-CM

## 2019-05-14 DIAGNOSIS — E66.01 MORBID OBESITY: ICD-10-CM

## 2019-05-14 DIAGNOSIS — E11.59 HYPERTENSION ASSOCIATED WITH DIABETES: ICD-10-CM

## 2019-05-14 DIAGNOSIS — S46.811A TRAPEZIUS STRAIN, RIGHT, INITIAL ENCOUNTER: ICD-10-CM

## 2019-05-14 DIAGNOSIS — I15.2 HYPERTENSION ASSOCIATED WITH DIABETES: ICD-10-CM

## 2019-05-14 DIAGNOSIS — I50.32 CHRONIC DIASTOLIC CONGESTIVE HEART FAILURE: ICD-10-CM

## 2019-05-14 DIAGNOSIS — Z99.2 TYPE 2 DIABETES MELLITUS WITH HYPERTENSION AND END STAGE RENAL DISEASE ON DIALYSIS: ICD-10-CM

## 2019-05-14 DIAGNOSIS — I48.20 CHRONIC ATRIAL FIBRILLATION: Primary | ICD-10-CM

## 2019-05-14 DIAGNOSIS — N18.6 TYPE 2 DIABETES MELLITUS WITH HYPERTENSION AND END STAGE RENAL DISEASE ON DIALYSIS: ICD-10-CM

## 2019-05-14 DIAGNOSIS — I25.10 CORONARY ARTERY DISEASE, ANGINA PRESENCE UNSPECIFIED, UNSPECIFIED VESSEL OR LESION TYPE, UNSPECIFIED WHETHER NATIVE OR TRANSPLANTED HEART: ICD-10-CM

## 2019-05-14 PROCEDURE — 99999 PR PBB SHADOW E&M-EST. PATIENT-LVL III: ICD-10-PCS | Mod: PBBFAC,,, | Performed by: INTERNAL MEDICINE

## 2019-05-14 PROCEDURE — 99213 OFFICE O/P EST LOW 20 MIN: CPT | Mod: PBBFAC,PN | Performed by: INTERNAL MEDICINE

## 2019-05-14 PROCEDURE — 99999 PR PBB SHADOW E&M-EST. PATIENT-LVL III: CPT | Mod: PBBFAC,,, | Performed by: INTERNAL MEDICINE

## 2019-05-14 PROCEDURE — 99214 OFFICE O/P EST MOD 30 MIN: CPT | Mod: S$PBB,,, | Performed by: INTERNAL MEDICINE

## 2019-05-14 PROCEDURE — 99214 PR OFFICE/OUTPT VISIT, EST, LEVL IV, 30-39 MIN: ICD-10-PCS | Mod: S$PBB,,, | Performed by: INTERNAL MEDICINE

## 2019-05-14 RX ORDER — NAPROXEN 500 MG/1
TABLET ORAL
Qty: 60 TABLET | Refills: 4 | Status: ON HOLD | OUTPATIENT
Start: 2019-05-14 | End: 2019-07-30 | Stop reason: HOSPADM

## 2019-05-14 RX ORDER — INSULIN GLARGINE 100 [IU]/ML
20 INJECTION, SOLUTION SUBCUTANEOUS DAILY
Qty: 3 SYRINGE | Refills: 3 | Status: SHIPPED | OUTPATIENT
Start: 2019-05-14 | End: 2019-09-10 | Stop reason: SDUPTHER

## 2019-05-14 RX ORDER — TIZANIDINE 4 MG/1
4 TABLET ORAL EVERY 6 HOURS PRN
Qty: 60 TABLET | Refills: 3 | Status: SHIPPED | OUTPATIENT
Start: 2019-05-14 | End: 2019-05-24

## 2019-05-14 NOTE — PROGRESS NOTES
Subjective:       Patient ID: Angelina Beard is a 68 y.o. female.    Chief Complaint: Follow-up    HPI  Checkup.  Chart reviewed.  doing well with HD.  No CP, SOB, palpitations, bleeding.  C/O R neck pain rad to R arm.  C/O a pruritic rash on her face, seeing Dr Moreland.  Review of Systems   All other systems reviewed and are negative.      Objective:      Physical Exam   Constitutional: She appears well-developed. No distress.   HENT:   Head: Normocephalic.   Eyes: EOM are normal.   Neck: Normal range of motion. No tracheal deviation present.   Cardiovascular: Normal rate, normal heart sounds and intact distal pulses.   irreg irreg   Pulmonary/Chest: Effort normal and breath sounds normal. No respiratory distress.   Abdominal: She exhibits no distension.   Musculoskeletal: Normal range of motion. She exhibits edema (tr ankles).   Neurological: She is alert. No cranial nerve deficit. She exhibits normal muscle tone. Coordination normal.   Skin: Skin is warm and dry. No rash noted. She is not diaphoretic. No erythema.   Eczema L chin, cheek   Psychiatric: She has a normal mood and affect. Her behavior is normal.   Vitals reviewed.      Assessment:       1. Chronic atrial fibrillation    2. Coronary artery disease, angina presence unspecified, unspecified vessel or lesion type, unspecified whether native or transplanted heart    3. Type 2 diabetes mellitus with hypertension and end stage renal disease on dialysis    4. Hypertension associated with diabetes    5. Chronic diastolic congestive heart failure    6. Trapezius strain, right, initial encounter        Plan:       Angelina was seen today for follow-up.    Diagnoses and all orders for this visit:    Chronic atrial fibrillation   Cont rx    Coronary artery disease, angina presence unspecified, unspecified vessel or lesion type, unspecified whether native or transplanted heart   Quiescent    Type 2 diabetes mellitus with hypertension and end stage renal disease on  dialysis  -     Hemoglobin A1c; Future    Hypertension associated with diabetes   Well-cont    Chronic diastolic congestive heart failure   Compensated    Trapezius strain, right, initial encounter  -     tiZANidine (ZANAFLEX) 4 MG tablet; Take 1 tablet (4 mg total) by mouth every 6 (six) hours as needed.      Follow up in about 6 months (around 11/14/2019).

## 2019-05-16 ENCOUNTER — OFFICE VISIT (OUTPATIENT)
Dept: CARDIOLOGY | Facility: CLINIC | Age: 69
End: 2019-05-16
Payer: MEDICARE

## 2019-05-16 VITALS
HEART RATE: 73 BPM | DIASTOLIC BLOOD PRESSURE: 80 MMHG | WEIGHT: 241 LBS | BODY MASS INDEX: 37.75 KG/M2 | OXYGEN SATURATION: 81 % | SYSTOLIC BLOOD PRESSURE: 162 MMHG

## 2019-05-16 DIAGNOSIS — I50.32 CHRONIC DIASTOLIC CONGESTIVE HEART FAILURE: Primary | Chronic | ICD-10-CM

## 2019-05-16 DIAGNOSIS — E66.01 MORBID OBESITY: ICD-10-CM

## 2019-05-16 DIAGNOSIS — I48.20 CHRONIC ATRIAL FIBRILLATION: ICD-10-CM

## 2019-05-16 DIAGNOSIS — K21.9 GASTROESOPHAGEAL REFLUX DISEASE WITHOUT ESOPHAGITIS: Chronic | ICD-10-CM

## 2019-05-16 DIAGNOSIS — I73.9 PAD (PERIPHERAL ARTERY DISEASE): ICD-10-CM

## 2019-05-16 DIAGNOSIS — D63.1 ANEMIA DUE TO PRE-ESRD TREATED WITH ERYTHROPOIETIN: ICD-10-CM

## 2019-05-16 DIAGNOSIS — R53.81 DEBILITY: ICD-10-CM

## 2019-05-16 DIAGNOSIS — E78.5 HYPERLIPIDEMIA, UNSPECIFIED HYPERLIPIDEMIA TYPE: ICD-10-CM

## 2019-05-16 DIAGNOSIS — E11.22 TYPE 2 DIABETES MELLITUS WITH HYPERTENSION AND END STAGE RENAL DISEASE ON DIALYSIS: Chronic | ICD-10-CM

## 2019-05-16 DIAGNOSIS — E11.59 HYPERTENSION ASSOCIATED WITH DIABETES: ICD-10-CM

## 2019-05-16 DIAGNOSIS — E11.42 DIABETIC POLYNEUROPATHY ASSOCIATED WITH TYPE 2 DIABETES MELLITUS: Chronic | ICD-10-CM

## 2019-05-16 DIAGNOSIS — I12.0 TYPE 2 DIABETES MELLITUS WITH HYPERTENSION AND END STAGE RENAL DISEASE ON DIALYSIS: Chronic | ICD-10-CM

## 2019-05-16 DIAGNOSIS — Z99.2 END-STAGE RENAL DISEASE ON HEMODIALYSIS: Chronic | ICD-10-CM

## 2019-05-16 DIAGNOSIS — N18.6 TYPE 2 DIABETES MELLITUS WITH HYPERTENSION AND END STAGE RENAL DISEASE ON DIALYSIS: Chronic | ICD-10-CM

## 2019-05-16 DIAGNOSIS — Z86.2 HISTORY OF SARCOIDOSIS: Chronic | ICD-10-CM

## 2019-05-16 DIAGNOSIS — N03.9 ANEMIA DUE TO PRE-ESRD TREATED WITH ERYTHROPOIETIN: ICD-10-CM

## 2019-05-16 DIAGNOSIS — Z85.3 HISTORY OF LEFT BREAST CANCER: Chronic | ICD-10-CM

## 2019-05-16 DIAGNOSIS — N18.6 END-STAGE RENAL DISEASE ON HEMODIALYSIS: Chronic | ICD-10-CM

## 2019-05-16 DIAGNOSIS — R53.81 PHYSICAL DECONDITIONING: ICD-10-CM

## 2019-05-16 DIAGNOSIS — Z99.2 TYPE 2 DIABETES MELLITUS WITH HYPERTENSION AND END STAGE RENAL DISEASE ON DIALYSIS: Chronic | ICD-10-CM

## 2019-05-16 DIAGNOSIS — I25.10 CORONARY ARTERY DISEASE, ANGINA PRESENCE UNSPECIFIED, UNSPECIFIED VESSEL OR LESION TYPE, UNSPECIFIED WHETHER NATIVE OR TRANSPLANTED HEART: Chronic | ICD-10-CM

## 2019-05-16 DIAGNOSIS — Z86.73 H/O: CVA (CEREBROVASCULAR ACCIDENT): ICD-10-CM

## 2019-05-16 DIAGNOSIS — I15.2 HYPERTENSION ASSOCIATED WITH DIABETES: ICD-10-CM

## 2019-05-16 PROCEDURE — 99214 OFFICE O/P EST MOD 30 MIN: CPT | Mod: S$PBB,,, | Performed by: INTERNAL MEDICINE

## 2019-05-16 PROCEDURE — 99214 PR OFFICE/OUTPT VISIT, EST, LEVL IV, 30-39 MIN: ICD-10-PCS | Mod: S$PBB,,, | Performed by: INTERNAL MEDICINE

## 2019-05-16 PROCEDURE — 99999 PR PBB SHADOW E&M-EST. PATIENT-LVL III: CPT | Mod: PBBFAC,,, | Performed by: INTERNAL MEDICINE

## 2019-05-16 PROCEDURE — 99999 PR PBB SHADOW E&M-EST. PATIENT-LVL III: ICD-10-PCS | Mod: PBBFAC,,, | Performed by: INTERNAL MEDICINE

## 2019-05-16 PROCEDURE — 99213 OFFICE O/P EST LOW 20 MIN: CPT | Mod: PBBFAC,PO | Performed by: INTERNAL MEDICINE

## 2019-05-16 NOTE — PROGRESS NOTES
Subjective:    Patient ID:  Angelina Beard is a 68 y.o. female who presents for follow-up of Congestive Heart Failure      HPI     69 y/o female former patient of Dr Murphy. She has a hx of CHF (HFpEF), Pafib previously on anticoagulation with coumadin (taken off bc of GIB requiring PRBC transfusion) now on Eliquis, nonob CAD with multiple LHC's in the past with no PCI, HTN, HLD, anemia, ESRD on HD, hx of sarcoid. She was hospitalized 3/2018 with presenting symptom of CP, elevated trop thought due to demand, had Afib with RVR with spontaneous conversion to SR, and discharged home. Difficult historian. Here with caretaker.  She has chronic, unchanged BREWSTER and intermittent bilateral LE edema. Has had multiple hospitalizations and has required HD for volume overload. There appears to be an issue with dietary indiscretion. She denies orthopnea, PND, palps, syncope. Does not smoke, no EtOH.   Foot ulcers have healed and follows with Podiatry.  Started on Eliquis, ASA D/C'd and plavix continued. No bleeding issues and tolerating well.  Clinically improved and doing well.    Review of Systems   Constitution: Positive for malaise/fatigue.   HENT: Negative for congestion.    Eyes: Negative for blurred vision.   Cardiovascular: Positive for dyspnea on exertion and leg swelling. Negative for chest pain, claudication, cyanosis, irregular heartbeat, near-syncope, orthopnea, palpitations, paroxysmal nocturnal dyspnea and syncope.   Respiratory: Negative for shortness of breath.    Endocrine: Negative for polyuria.   Hematologic/Lymphatic: Negative for bleeding problem.   Skin: Negative for itching and rash.   Musculoskeletal: Positive for back pain, joint pain and joint swelling. Negative for muscle cramps and muscle weakness.   Gastrointestinal: Negative for abdominal pain, hematemesis, hematochezia, melena, nausea and vomiting.   Genitourinary: Negative for dysuria and hematuria.   Neurological: Negative for dizziness, focal  weakness, headaches, light-headedness, loss of balance and weakness.   Psychiatric/Behavioral: Negative for depression. The patient is not nervous/anxious.         Objective:    Physical Exam   Constitutional: She is oriented to person, place, and time. She appears well-developed and well-nourished.   HENT:   Head: Normocephalic and atraumatic.   Neck: Neck supple. No JVD present.   Cardiovascular: Normal rate and regular rhythm.   Murmur heard.   Systolic murmur is present with a grade of 2/6.  Pulses:       Carotid pulses are 2+ on the right side, and 2+ on the left side.       Radial pulses are 2+ on the right side, and 2+ on the left side.        Femoral pulses are 2+ on the right side, and 2+ on the left side.       Posterior tibial pulses are 1+ on the right side, and 1+ on the left side.   Pulmonary/Chest: Effort normal and breath sounds normal.   Abdominal: Soft. Bowel sounds are normal.   Musculoskeletal: She exhibits no edema.   Neurological: She is alert and oriented to person, place, and time.   Skin: Skin is warm and dry.   Psychiatric: She has a normal mood and affect. Her behavior is normal. Thought content normal.         Assessment:       1. Chronic diastolic congestive heart failure    2. Chronic atrial fibrillation    3. Coronary artery disease, angina presence unspecified, unspecified vessel or lesion type, unspecified whether native or transplanted heart    4. Hyperlipidemia, unspecified hyperlipidemia type    5. Hypertension associated with diabetes    6. PAD (peripheral artery disease)    7. H/O: CVA (cerebrovascular accident)    8. Diabetic polyneuropathy associated with type 2 diabetes mellitus    9. End-stage renal disease on hemodialysis    10. History of sarcoidosis    11. Anemia due to pre-ESRD treated with erythropoietin    12. History of left breast cancer s/p mastectomy    13. Morbid obesity    14. Type 2 diabetes mellitus with hypertension and end stage renal disease on dialysis     15. Gastroesophageal reflux disease without esophagitis    16. Debility    17. Physical deconditioning      69 y/o pt with hx and presentation as above. Doing well from a cardiac perspective and compensated from a HF perspective. Clinically improved and tolerating meds well. Discussed the etiology, evaluation, and management of CHF. Discussed the importance of med compliance, heart healthy diet, and regular exercise.        Plan:       -Continue current medical management  -f/u in 1 year

## 2019-06-18 DIAGNOSIS — M17.0 OSTEOARTHRITIS OF BOTH KNEES, UNSPECIFIED OSTEOARTHRITIS TYPE: Chronic | ICD-10-CM

## 2019-06-18 RX ORDER — GABAPENTIN 300 MG/1
300 CAPSULE ORAL EVERY 8 HOURS PRN
Qty: 90 CAPSULE | Refills: 3 | Status: SHIPPED | OUTPATIENT
Start: 2019-06-18 | End: 2019-10-01 | Stop reason: SDUPTHER

## 2019-06-18 NOTE — TELEPHONE ENCOUNTER
----- Message from Manisha Dougherty sent at 6/18/2019  1:32 PM CDT -----  [place] patient; request via fax  Patient is requesting a medication refill.     RX name: gabapentin (NEURONTIN) 300 MG capsule  Strength:   Quantity:   Directions:Take 1 capsule (300 mg total) by mouth every 8 (eight) hours as needed. - Oral     Pharmacy name: [facility]      Phone number where patient can be reached:

## 2019-07-29 PROBLEM — D50.0 ANEMIA DUE TO GASTROINTESTINAL BLOOD LOSS: Status: RESOLVED | Noted: 2018-07-11 | Resolved: 2019-07-29

## 2019-07-29 PROBLEM — I50.9 ACUTE ON CHRONIC CONGESTIVE HEART FAILURE: Status: ACTIVE | Noted: 2019-07-29

## 2019-07-29 PROBLEM — I48.20 CHRONIC ATRIAL FIBRILLATION: Chronic | Status: ACTIVE | Noted: 2019-05-14

## 2019-07-29 PROBLEM — I73.9 PAD (PERIPHERAL ARTERY DISEASE): Chronic | Status: ACTIVE | Noted: 2019-01-24

## 2019-08-01 ENCOUNTER — HOSPITAL ENCOUNTER (INPATIENT)
Facility: HOSPITAL | Age: 69
LOS: 2 days | Discharge: HOME HEALTH | DRG: 291 | End: 2019-08-03
Attending: EMERGENCY MEDICINE | Admitting: FAMILY MEDICINE
Payer: MEDICARE

## 2019-08-01 DIAGNOSIS — Z99.2 END-STAGE RENAL DISEASE ON HEMODIALYSIS: Primary | Chronic | ICD-10-CM

## 2019-08-01 DIAGNOSIS — J81.1 CHRONIC PULMONARY EDEMA: ICD-10-CM

## 2019-08-01 DIAGNOSIS — I50.32 CHRONIC DIASTOLIC CONGESTIVE HEART FAILURE: Chronic | ICD-10-CM

## 2019-08-01 DIAGNOSIS — N18.6 END-STAGE RENAL DISEASE ON HEMODIALYSIS: Primary | Chronic | ICD-10-CM

## 2019-08-01 DIAGNOSIS — N18.6 ESRD (END STAGE RENAL DISEASE): ICD-10-CM

## 2019-08-01 DIAGNOSIS — I50.9 ACUTE ON CHRONIC CONGESTIVE HEART FAILURE, UNSPECIFIED HEART FAILURE TYPE: ICD-10-CM

## 2019-08-01 DIAGNOSIS — R07.9 CHEST PAIN: ICD-10-CM

## 2019-08-01 DIAGNOSIS — I50.33 ACUTE ON CHRONIC DIASTOLIC CONGESTIVE HEART FAILURE: ICD-10-CM

## 2019-08-01 DIAGNOSIS — R07.9 CHEST PAIN, UNSPECIFIED TYPE: ICD-10-CM

## 2019-08-01 LAB
ALBUMIN SERPL BCP-MCNC: 3.1 G/DL (ref 3.5–5.2)
ALP SERPL-CCNC: 72 U/L (ref 55–135)
ALT SERPL W/O P-5'-P-CCNC: 23 U/L (ref 10–44)
ANION GAP SERPL CALC-SCNC: 19 MMOL/L (ref 8–16)
AST SERPL-CCNC: 18 U/L (ref 10–40)
BASOPHILS # BLD AUTO: 0.01 K/UL (ref 0–0.2)
BASOPHILS NFR BLD: 0.1 % (ref 0–1.9)
BILIRUB SERPL-MCNC: 0.4 MG/DL (ref 0.1–1)
BUN SERPL-MCNC: 39 MG/DL (ref 8–23)
CALCIUM SERPL-MCNC: 10.6 MG/DL (ref 8.7–10.5)
CHLORIDE SERPL-SCNC: 96 MMOL/L (ref 95–110)
CO2 SERPL-SCNC: 21 MMOL/L (ref 23–29)
CREAT SERPL-MCNC: 7.6 MG/DL (ref 0.5–1.4)
DIFFERENTIAL METHOD: ABNORMAL
EOSINOPHIL # BLD AUTO: 0.1 K/UL (ref 0–0.5)
EOSINOPHIL NFR BLD: 1.7 % (ref 0–8)
ERYTHROCYTE [DISTWIDTH] IN BLOOD BY AUTOMATED COUNT: 14.4 % (ref 11.5–14.5)
EST. GFR  (AFRICAN AMERICAN): 6 ML/MIN/1.73 M^2
EST. GFR  (NON AFRICAN AMERICAN): 5 ML/MIN/1.73 M^2
GLUCOSE SERPL-MCNC: 121 MG/DL (ref 70–110)
HCT VFR BLD AUTO: 37.7 % (ref 37–48.5)
HGB BLD-MCNC: 12 G/DL (ref 12–16)
LACTATE SERPL-SCNC: 1.3 MMOL/L (ref 0.5–2.2)
LYMPHOCYTES # BLD AUTO: 0.9 K/UL (ref 1–4.8)
LYMPHOCYTES NFR BLD: 12.3 % (ref 18–48)
MAGNESIUM SERPL-MCNC: 1.9 MG/DL (ref 1.6–2.6)
MCH RBC QN AUTO: 30.2 PG (ref 27–31)
MCHC RBC AUTO-ENTMCNC: 31.8 G/DL (ref 32–36)
MCV RBC AUTO: 95 FL (ref 82–98)
MONOCYTES # BLD AUTO: 0.7 K/UL (ref 0.3–1)
MONOCYTES NFR BLD: 9 % (ref 4–15)
NEUTROPHILS # BLD AUTO: 5.5 K/UL (ref 1.8–7.7)
NEUTROPHILS NFR BLD: 76.9 % (ref 38–73)
PHOSPHATE SERPL-MCNC: 4.5 MG/DL (ref 2.7–4.5)
PLATELET # BLD AUTO: 228 K/UL (ref 150–350)
PMV BLD AUTO: 10 FL (ref 9.2–12.9)
POCT GLUCOSE: 108 MG/DL (ref 70–110)
POTASSIUM SERPL-SCNC: 4.7 MMOL/L (ref 3.5–5.1)
PROT SERPL-MCNC: 8.2 G/DL (ref 6–8.4)
RBC # BLD AUTO: 3.97 M/UL (ref 4–5.4)
SODIUM SERPL-SCNC: 136 MMOL/L (ref 136–145)
TROPONIN I SERPL DL<=0.01 NG/ML-MCNC: 0.03 NG/ML (ref 0–0.03)
TROPONIN I SERPL DL<=0.01 NG/ML-MCNC: 0.03 NG/ML (ref 0–0.03)
WBC # BLD AUTO: 7.23 K/UL (ref 3.9–12.7)

## 2019-08-01 PROCEDURE — 83735 ASSAY OF MAGNESIUM: CPT

## 2019-08-01 PROCEDURE — 84484 ASSAY OF TROPONIN QUANT: CPT | Mod: 91

## 2019-08-01 PROCEDURE — 93010 ELECTROCARDIOGRAM REPORT: CPT | Mod: ,,, | Performed by: INTERNAL MEDICINE

## 2019-08-01 PROCEDURE — 36415 COLL VENOUS BLD VENIPUNCTURE: CPT

## 2019-08-01 PROCEDURE — 84100 ASSAY OF PHOSPHORUS: CPT

## 2019-08-01 PROCEDURE — 63600175 PHARM REV CODE 636 W HCPCS: Performed by: EMERGENCY MEDICINE

## 2019-08-01 PROCEDURE — 80053 COMPREHEN METABOLIC PANEL: CPT

## 2019-08-01 PROCEDURE — 93010 EKG 12-LEAD: ICD-10-PCS | Mod: ,,, | Performed by: INTERNAL MEDICINE

## 2019-08-01 PROCEDURE — 25000003 PHARM REV CODE 250: Performed by: EMERGENCY MEDICINE

## 2019-08-01 PROCEDURE — 99285 EMERGENCY DEPT VISIT HI MDM: CPT | Mod: 25

## 2019-08-01 PROCEDURE — 93005 ELECTROCARDIOGRAM TRACING: CPT

## 2019-08-01 PROCEDURE — 94761 N-INVAS EAR/PLS OXIMETRY MLT: CPT

## 2019-08-01 PROCEDURE — 85025 COMPLETE CBC W/AUTO DIFF WBC: CPT

## 2019-08-01 PROCEDURE — 84484 ASSAY OF TROPONIN QUANT: CPT

## 2019-08-01 PROCEDURE — 90935 HEMODIALYSIS ONE EVALUATION: CPT

## 2019-08-01 PROCEDURE — 83605 ASSAY OF LACTIC ACID: CPT

## 2019-08-01 PROCEDURE — 11000001 HC ACUTE MED/SURG PRIVATE ROOM

## 2019-08-01 PROCEDURE — 27000221 HC OXYGEN, UP TO 24 HOURS

## 2019-08-01 PROCEDURE — 96374 THER/PROPH/DIAG INJ IV PUSH: CPT

## 2019-08-01 RX ORDER — IBUPROFEN 200 MG
24 TABLET ORAL
Status: DISCONTINUED | OUTPATIENT
Start: 2019-08-01 | End: 2019-08-03 | Stop reason: HOSPADM

## 2019-08-01 RX ORDER — ONDANSETRON 2 MG/ML
4 INJECTION INTRAMUSCULAR; INTRAVENOUS EVERY 8 HOURS PRN
Status: DISCONTINUED | OUTPATIENT
Start: 2019-08-01 | End: 2019-08-03 | Stop reason: HOSPADM

## 2019-08-01 RX ORDER — FUROSEMIDE 10 MG/ML
40 INJECTION INTRAMUSCULAR; INTRAVENOUS 2 TIMES DAILY
Status: DISCONTINUED | OUTPATIENT
Start: 2019-08-02 | End: 2019-08-03 | Stop reason: HOSPADM

## 2019-08-01 RX ORDER — MUPIROCIN 20 MG/G
OINTMENT TOPICAL 2 TIMES DAILY
Status: DISCONTINUED | OUTPATIENT
Start: 2019-08-01 | End: 2019-08-03 | Stop reason: HOSPADM

## 2019-08-01 RX ORDER — SODIUM CHLORIDE 0.9 % (FLUSH) 0.9 %
10 SYRINGE (ML) INJECTION
Status: DISCONTINUED | OUTPATIENT
Start: 2019-08-01 | End: 2019-08-03 | Stop reason: HOSPADM

## 2019-08-01 RX ORDER — DOCUSATE SODIUM 100 MG/1
100 CAPSULE, LIQUID FILLED ORAL 2 TIMES DAILY
Status: DISCONTINUED | OUTPATIENT
Start: 2019-08-01 | End: 2019-08-03 | Stop reason: HOSPADM

## 2019-08-01 RX ORDER — SODIUM CHLORIDE 9 MG/ML
INJECTION, SOLUTION INTRAVENOUS
Status: DISCONTINUED | OUTPATIENT
Start: 2019-08-01 | End: 2019-08-03 | Stop reason: HOSPADM

## 2019-08-01 RX ORDER — ACETAMINOPHEN 325 MG/1
650 TABLET ORAL EVERY 6 HOURS PRN
Status: DISCONTINUED | OUTPATIENT
Start: 2019-08-01 | End: 2019-08-03 | Stop reason: HOSPADM

## 2019-08-01 RX ORDER — SODIUM CHLORIDE 9 MG/ML
INJECTION, SOLUTION INTRAVENOUS ONCE
Status: DISCONTINUED | OUTPATIENT
Start: 2019-08-01 | End: 2019-08-03 | Stop reason: HOSPADM

## 2019-08-01 RX ORDER — INSULIN ASPART 100 [IU]/ML
0-5 INJECTION, SOLUTION INTRAVENOUS; SUBCUTANEOUS
Status: DISCONTINUED | OUTPATIENT
Start: 2019-08-01 | End: 2019-08-03 | Stop reason: HOSPADM

## 2019-08-01 RX ORDER — PANTOPRAZOLE SODIUM 40 MG/1
40 TABLET, DELAYED RELEASE ORAL DAILY
Status: DISCONTINUED | OUTPATIENT
Start: 2019-08-02 | End: 2019-08-03 | Stop reason: HOSPADM

## 2019-08-01 RX ORDER — GABAPENTIN 300 MG/1
300 CAPSULE ORAL 2 TIMES DAILY PRN
Status: DISCONTINUED | OUTPATIENT
Start: 2019-08-01 | End: 2019-08-02

## 2019-08-01 RX ORDER — GLUCAGON 1 MG
1 KIT INJECTION
Status: DISCONTINUED | OUTPATIENT
Start: 2019-08-01 | End: 2019-08-03 | Stop reason: HOSPADM

## 2019-08-01 RX ORDER — CIPROFLOXACIN 500 MG/1
500 TABLET ORAL DAILY
Status: DISCONTINUED | OUTPATIENT
Start: 2019-08-02 | End: 2019-08-03 | Stop reason: HOSPADM

## 2019-08-01 RX ORDER — PRAVASTATIN SODIUM 40 MG/1
80 TABLET ORAL DAILY
Status: DISCONTINUED | OUTPATIENT
Start: 2019-08-02 | End: 2019-08-03 | Stop reason: HOSPADM

## 2019-08-01 RX ORDER — METOPROLOL SUCCINATE 50 MG/1
50 TABLET, EXTENDED RELEASE ORAL DAILY
Status: DISCONTINUED | OUTPATIENT
Start: 2019-08-02 | End: 2019-08-03 | Stop reason: HOSPADM

## 2019-08-01 RX ORDER — CALCIUM ACETATE 667 MG/1
667 CAPSULE ORAL
Status: DISCONTINUED | OUTPATIENT
Start: 2019-08-02 | End: 2019-08-03 | Stop reason: HOSPADM

## 2019-08-01 RX ORDER — NITROGLYCERIN 0.4 MG/1
0.4 TABLET SUBLINGUAL EVERY 5 MIN PRN
Status: DISCONTINUED | OUTPATIENT
Start: 2019-08-01 | End: 2019-08-03 | Stop reason: HOSPADM

## 2019-08-01 RX ORDER — SERTRALINE HYDROCHLORIDE 50 MG/1
100 TABLET, FILM COATED ORAL DAILY
Status: DISCONTINUED | OUTPATIENT
Start: 2019-08-02 | End: 2019-08-03 | Stop reason: HOSPADM

## 2019-08-01 RX ORDER — ACETAMINOPHEN 500 MG
1000 TABLET ORAL EVERY 8 HOURS PRN
Status: DISCONTINUED | OUTPATIENT
Start: 2019-08-01 | End: 2019-08-03 | Stop reason: HOSPADM

## 2019-08-01 RX ORDER — FUROSEMIDE 10 MG/ML
80 INJECTION INTRAMUSCULAR; INTRAVENOUS
Status: COMPLETED | OUTPATIENT
Start: 2019-08-01 | End: 2019-08-01

## 2019-08-01 RX ORDER — IBUPROFEN 200 MG
16 TABLET ORAL
Status: DISCONTINUED | OUTPATIENT
Start: 2019-08-01 | End: 2019-08-03 | Stop reason: HOSPADM

## 2019-08-01 RX ORDER — CLOPIDOGREL BISULFATE 75 MG/1
75 TABLET ORAL DAILY
Status: DISCONTINUED | OUTPATIENT
Start: 2019-08-02 | End: 2019-08-03 | Stop reason: HOSPADM

## 2019-08-01 RX ORDER — AMLODIPINE BESYLATE 5 MG/1
10 TABLET ORAL DAILY
Status: DISCONTINUED | OUTPATIENT
Start: 2019-08-02 | End: 2019-08-03 | Stop reason: HOSPADM

## 2019-08-01 RX ADMIN — GABAPENTIN 300 MG: 300 CAPSULE ORAL at 08:08

## 2019-08-01 RX ADMIN — DOCUSATE SODIUM 100 MG: 100 CAPSULE, LIQUID FILLED ORAL at 08:08

## 2019-08-01 RX ADMIN — FUROSEMIDE 80 MG: 10 INJECTION, SOLUTION INTRAVENOUS at 11:08

## 2019-08-01 RX ADMIN — APIXABAN 2.5 MG: 2.5 TABLET, FILM COATED ORAL at 08:08

## 2019-08-01 NOTE — ASSESSMENT & PLAN NOTE
History of stroke  Hyperlipidemia  Hypertension  Continue statin and Plavix   Amlodipine, Lasix and metoprolol

## 2019-08-01 NOTE — PROGRESS NOTES
Notified Ana Hoover of home O2 order. Was told to communicate with Eliane Lira.  She was notified of the O2 order and relevant documentation in Epic.    Either of the patient's sons will be available to give ride home.    Eliane Soraya gave ok to pull two tanks and that she will arrange for home base setup after DC from the ED.    lev caicedo Son     [phone]      Cristi Caicedo Son    [phone]         2 Portable tanks with regulator and paulo were pulled and taken to Abigail Ordonez RN with case management.  Please contact Abigail for final delivery of tanks at [phone].    Information regarding tanks given to Shelia and Ana who are caring for patient in ED.

## 2019-08-01 NOTE — HPI
"Chirag Munson, is a 69 y/o F with PMH of CHF, DM, HTN, and asthma discharge from our service on 7/31 presented via EMS today with complaint of one day history of shortness of breath with generalized weakness that started yesterday. There is associated productive cough with brownish colored mucus. Denies fever, chills, chest pain, nausea, vomiting,. Patient stated "I wasn't supposed to leave but my son was having a fit. Patient noted she does not have help with her ADLs. O2 sat in the ED stayed >93% on RA.   Patient reported chest pain during dialysis this afternoon. Will admit to trend troponin and possible placement.   "

## 2019-08-01 NOTE — SUBJECTIVE & OBJECTIVE
Past Medical History:   Diagnosis Date    Anemia due to gastrointestinal blood loss 7/11/2018    Arthritis     Asthma     Breast cancer, left     CHF (congestive heart failure)     Cholelithiasis     Coronary artery disease     Diabetes mellitus, type 2     Encounter for blood transfusion     End-stage renal disease on hemodialysis     Hemodialysis access site with arteriovenous graft     mon-wed -fri    Hyperlipidemia     Hypertension     Sarcoidosis        Past Surgical History:   Procedure Laterality Date    av graft      left arm    BRACHIAL ARTERY GRAFT Left 05/04/2016    brachiocephalic, Dr. Anson Crocker    BREAST BIOPSY Left     breast ca    BREAST LUMPECTOMY Left     radiation only    btl      COLONOSCOPY N/A 7/13/2018    [facility] ENDO    ELBOW SURGERY      left    ESOPHAGOGASTRODUODENOSCOPY (EGD) N/A 7/13/2018    [facility] ENDO    GYPSAJFBL-PZCRI-[id number] Right 5/4/2016    [facility] OR    LIPOMA RESECTION      back of neck    pilondial cyst      REPLACEMENT-KNEE-TOTAL Right 2/23/2016    [facility] 2ND FLR    TOTAL KNEE ARTHROPLASTY Right 02/23/2016       Review of patient's allergies indicates:  No Known Allergies    Current Facility-Administered Medications on File Prior to Encounter   Medication    [DISCONTINUED] 0.9%  NaCl infusion    [DISCONTINUED] 0.9%  NaCl infusion    [DISCONTINUED] 0.9%  NaCl infusion    [DISCONTINUED] acetaminophen tablet 1,000 mg    [DISCONTINUED] acetaminophen tablet 650 mg    [DISCONTINUED] amLODIPine tablet 10 mg    [DISCONTINUED] apixaban tablet 2.5 mg    [DISCONTINUED] calcium acetate capsule 667 mg    [DISCONTINUED] ciprofloxacin HCl tablet 500 mg    [DISCONTINUED] clopidogrel tablet 75 mg    [DISCONTINUED] dextrose 10% (D10W) Bolus    [DISCONTINUED] dextrose 10% (D10W) Bolus    [DISCONTINUED]  "docusate sodium capsule 100 mg    [DISCONTINUED] furosemide injection 40 mg    [DISCONTINUED] gabapentin capsule 300 mg    [DISCONTINUED] glucagon (human recombinant) injection 1 mg    [DISCONTINUED] glucose chewable tablet 16 g    [DISCONTINUED] glucose chewable tablet 24 g    [DISCONTINUED] insulin aspart U-100 pen 0-5 Units    [DISCONTINUED] insulin detemir U-100 pen 15 Units    [DISCONTINUED] metoprolol succinate (TOPROL-XL) 24 hr tablet 50 mg    [DISCONTINUED] mupirocin 2 % ointment    [DISCONTINUED] nitroGLYCERIN SL tablet 0.4 mg    [DISCONTINUED] ondansetron injection 4 mg    [DISCONTINUED] pantoprazole EC tablet 40 mg    [DISCONTINUED] pravastatin tablet 80 mg    [DISCONTINUED] sertraline tablet 100 mg    [DISCONTINUED] sodium chloride 0.9% flush 10 mL    [DISCONTINUED] vitamin renal formula (B-complex-vitamin c-folic acid) 1 mg per capsule 1 capsule     Current Outpatient Medications on File Prior to Encounter   Medication Sig    amLODIPine (NORVASC) 10 MG tablet Take 1 tablet (10 mg total) by mouth once daily.    apixaban (ELIQUIS) 2.5 mg Tab Take 1 tablet (2.5 mg total) by mouth 2 (two) times daily.    bismuth tribrom-petrolatum,wh (XEROFORM PETROLATUM DRESSING) 1 X 8 " Bndg Apply locally every other day    calcium acetate (PHOSLO) 667 mg capsule Take 667 mg by mouth 3 (three) times daily with meals.    ciprofloxacin HCl (CIPRO) 500 MG tablet Take 1 tablet (500 mg total) by mouth once daily.    clopidogrel (PLAVIX) 75 mg tablet Take 1 tablet (75 mg total) by mouth once daily.    furosemide (LASIX) 40 MG tablet Take 1 tablet (40 mg total) by mouth once daily.    gabapentin (NEURONTIN) 300 MG capsule Take 1 capsule (300 mg total) by mouth every 8 (eight) hours as needed.    insulin (LANTUS SOLOSTAR U-100 INSULIN) glargine 100 units/mL (3mL) SubQ pen Inject 20 Units into the skin once daily.    isosorbide mononitrate (IMDUR) 30 MG 24 hr tablet Take 1 tablet (30 mg total) by mouth " once daily.    metoprolol succinate (TOPROL-XL) 50 MG 24 hr tablet Take 1 tablet (50 mg total) by mouth once daily.    mupirocin (BACTROBAN) 2 % ointment apply by Nasal route 2 (two) times daily.    nitroGLYCERIN (NITROSTAT) 0.4 MG SL tablet Place 1 tablet (0.4 mg total) under the tongue every 5 (five) minutes as needed for Chest pain. if second dose is needed call 911.    pantoprazole (PROTONIX) 40 MG tablet Take 1 tablet (40 mg total) by mouth once daily.    polyethylene glycol (GLYCOLAX) 17 gram/dose powder     pravastatin (PRAVACHOL) 80 MG tablet Take 1 tablet (80 mg total) by mouth once daily.    sertraline (ZOLOFT) 100 MG tablet Take 1 tablet (100 mg total) by mouth once daily.     Family History     Problem Relation (Age of Onset)    Diabetes Mother, Sister    Heart disease Sister    Hypertension Mother    Kidney disease Mother        Tobacco Use    Smoking status: Never Smoker    Smokeless tobacco: Never Used   Substance and Sexual Activity    Alcohol use: No    Drug use: No    Sexual activity: Not on file     Review of Systems   Constitutional: Negative for fatigue and fever.   HENT: Negative for congestion and mouth sores.    Eyes: Negative for photophobia and visual disturbance.   Respiratory: Positive for cough and shortness of breath. Negative for chest tightness.    Cardiovascular: Negative for chest pain.   Gastrointestinal: Negative for abdominal pain, blood in stool, nausea and vomiting.   Endocrine: Negative for polydipsia and polyphagia.   Genitourinary: Negative for dysuria.   Musculoskeletal: Negative for arthralgias.   Skin: Negative for rash and wound.   Neurological: Positive for weakness. Negative for dizziness.   Psychiatric/Behavioral: Negative for agitation.     Objective:     Vital Signs (Most Recent):  Temp: 97.6 °F (36.4 °C) (08/01/19 1540)  Pulse: 75 (08/01/19 1608)  Resp: 18 (08/01/19 1608)  BP: 131/60 (08/01/19 1608)  SpO2: 98 % (08/01/19 1608) Vital Signs (24h  Range):  Temp:  [97.6 °F (36.4 °C)-98.4 °F (36.9 °C)] 97.6 °F (36.4 °C)  Pulse:  [60-75] 75  Resp:  [12-20] 18  SpO2:  [86 %-100 %] 98 %  BP: (120-213)/() 131/60     Weight: 104.3 kg (230 lb)  Body mass index is 36.02 kg/m².    Physical Exam   Constitutional: She is oriented to person, place, and time. She appears well-developed and well-nourished.   HENT:   Head: Normocephalic and atraumatic.   Mouth/Throat: Oropharynx is clear and moist.   Eyes: Pupils are equal, round, and reactive to light. EOM are normal.   Neck: Neck supple.   Cardiovascular: Normal rate, regular rhythm, normal heart sounds and intact distal pulses. Exam reveals no gallop and no friction rub.   No murmur heard.  Pulmonary/Chest: Breath sounds normal.   Abdominal: Soft. Bowel sounds are normal. She exhibits no distension and no mass. There is no tenderness. There is no rebound.   Musculoskeletal: She exhibits no edema or tenderness.   Neurological: She is alert and oriented to person, place, and time. She has normal reflexes.   Skin: Skin is warm.   Psychiatric: She has a normal mood and affect. Her behavior is normal.         CRANIAL NERVES     CN III, IV, VI   Pupils are equal, round, and reactive to light.  Extraocular motions are normal.        Significant Labs:   A1C:   Recent Labs   Lab 02/12/19  0958 05/07/19  0945   HGBA1C 6.8* 6.5*     BMP:   Recent Labs   Lab 08/01/19  1028   *      K 4.7   CL 96   CO2 21*   BUN 39*   CREATININE 7.6*   CALCIUM 10.6*   MG 1.9     CBC:   Recent Labs   Lab 07/31/19  0643 08/01/19  1028   WBC 7.87 7.23   HGB 10.1* 12.0   HCT 32.5* 37.7    228     CMP:   Recent Labs   Lab 07/31/19  0643 08/01/19  1028    136   K 4.9 4.7   CL 96 96   CO2 24 21*   * 121*   BUN 49* 39*   CREATININE 8.6* 7.6*   CALCIUM 9.3 10.6*   PROT 7.1 8.2   ALBUMIN 2.9* 3.1*   BILITOT 0.3 0.4   ALKPHOS 59 72   AST 19 18   ALT 22 23   ANIONGAP 16 19*   EGFRNONAA 4* 5*     Lactic Acid:   Recent Labs    Lab 08/01/19  1732   LACTATE 1.3     Magnesium:   Recent Labs   Lab 07/31/19  0643 08/01/19  1028   MG 1.8 1.9     POCT Glucose:   Recent Labs   Lab 07/31/19  0555 07/31/19  1658   POCTGLUCOSE 134* 186*     Troponin:   Recent Labs   Lab 08/01/19  1028   TROPONINI 0.034*     All pertinent labs within the past 24 hours have been reviewed.    Significant Imaging: I have reviewed all pertinent imaging results/findings within the past 24 hours.

## 2019-08-01 NOTE — ED NOTES
"Presents to ED via [facility] c/o SOB. Reports that she was feeling short of breath and that she has a sitter during the day but nobody to help her at night.  She was recently d/c from the hospital yesterday.  Reports she was suppose to wait for O2 to go home c but "my son told me it was time to go."    AAOx4.  Respirations even, unlabored.  86% on RA upon arrival. 97% on 4L O2 via NC.  NADN.  Diminished crackles noted to posterior RLL.  Hx of CHF.  No peripheral edema noted.  Dialysis access noted to RLE.  Thrill and pulsation palpable.   "

## 2019-08-01 NOTE — ED NOTES
Resting  o2 sat 86% to room air, pt nonambulatory, resting with oxygen at 4 liters oxygen o2 sat 97%. Pt sat 90% on 2 liters.

## 2019-08-01 NOTE — ED NOTES
Pt O2 sat 86% 2L via NC at rest.     Pt O2 sat 90% 3L via NC at rest.      Pt O2 sat 97% 4L via NC at rest.

## 2019-08-01 NOTE — ED NOTES
Pt states she does not want to eat. Resting on stretcher. Respirations even, unlabored. Bed rails are up and call light is within patient reach.

## 2019-08-01 NOTE — CONSULTS
[facility] Consult Note    Date of Admit: 8/1/2019    Chief Complaint/Reason for Consult       Known case of ESRD on HD, presenetd to the ED with SOB     Subjective:      History of Present Illness:  Angelina Beard is a 68 y.o. female  Who  Has a past medical history of ESRD on HD. The patient presented to [facility] on 8/1/2019 with a primary complaint of SOB. Ms. Beard was discharged yesterday from [facility]. She was admitted with acute on top of chronic heart failure with pulmonary edema. She was dialyzed on the day of admission for 2 hrs   (7/30) and yesterday for 3.5hrs with UF of #.5 liters.   After discharge home , she continued to experience SOB. She had mild chest pain that has resolved now.      Past Medical History:  Past Medical History:   Diagnosis Date    Anemia due to gastrointestinal blood loss 7/11/2018    Arthritis     Asthma     Breast cancer, left     CHF (congestive heart failure)     Cholelithiasis     Coronary artery disease     Diabetes mellitus, type 2     Encounter for blood transfusion     End-stage renal disease on hemodialysis     Hemodialysis access site with arteriovenous graft     mon-wed -fri    Hyperlipidemia     Hypertension     Sarcoidosis        Past Surgical History:  Past Surgical History:   Procedure Laterality Date    av graft      left arm    BRACHIAL ARTERY GRAFT Left 05/04/2016    brachiocephalic, Dr. Anson Crocker    BREAST BIOPSY Left     breast ca    BREAST LUMPECTOMY Left     radiation only    btl      COLONOSCOPY N/A 7/13/2018    [facility] ENDO    ELBOW SURGERY      left    ESOPHAGOGASTRODUODENOSCOPY (EGD) N/A 7/13/2018    [facility] ENDO    LTIEOIIOM-[id number] Right 5/4/2016    [facility] at [facility] OR    LIPOMA RESECTION      back of neck    pilondial cyst      REPLACEMENT-KNEE-TOTAL Right 2/23/2016     "[facility]    TOTAL KNEE ARTHROPLASTY Right 02/23/2016       Allergies:  Review of patient's allergies indicates:  No Known Allergies    Home Medications:  Prior to Admission medications    Medication Sig Start Date End Date Taking? Authorizing Provider   amLODIPine (NORVASC) 10 MG tablet Take 1 tablet (10 mg total) by mouth once daily. 4/16/19   Ben Mena MD   apixaban (ELIQUIS) 2.5 mg Tab Take 1 tablet (2.5 mg total) by mouth 2 (two) times daily. 1/10/19   Ben Mena MD   bismuth tribrom-petrolatum,wh (XEROFORM PETROLATUM DRESSING) 1 X 8 " Bndg Apply locally every other day 11/27/18   Phoenix Borja MD   calcium acetate (PHOSLO) 667 mg capsule Take 667 mg by mouth 3 (three) times daily with meals.    Historical Provider, MD   ciprofloxacin HCl (CIPRO) 500 MG tablet Take 1 tablet (500 mg total) by mouth once daily. 7/30/19   Herlinda Rivas MD   clopidogrel (PLAVIX) 75 mg tablet Take 1 tablet (75 mg total) by mouth once daily. 12/11/18 12/11/19  Phoenix Borja MD   furosemide (LASIX) 40 MG tablet Take 1 tablet (40 mg total) by mouth once daily. 1/22/19 7/29/19  Ben Mena MD   gabapentin (NEURONTIN) 300 MG capsule Take 1 capsule (300 mg total) by mouth every 8 (eight) hours as needed. 6/18/19   Ben Mena MD   insulin (LANTUS SOLOSTAR U-100 INSULIN) glargine 100 units/mL (3mL) SubQ pen Inject 20 Units into the skin once daily. 5/14/19 5/13/20  Ben Mena MD   isosorbide mononitrate (IMDUR) 30 MG 24 hr tablet Take 1 tablet (30 mg total) by mouth once daily. 1/19/19 7/29/19  Antoni Cortes,    metoprolol succinate (TOPROL-XL) 50 MG 24 hr tablet Take 1 tablet (50 mg total) by mouth once daily. 2/14/19 2/14/20  Ben Mena MD   mupirocin (BACTROBAN) 2 % ointment apply by Nasal route 2 (two) times daily. 7/30/19   Herlinda Rivas MD   nitroGLYCERIN (NITROSTAT) 0.4 MG SL tablet Place 1 tablet (0.4 mg total) under the " "tongue every 5 (five) minutes as needed for Chest pain. if second dose is needed call 911. 19  Herlinda Rivas MD   pantoprazole (PROTONIX) 40 MG tablet Take 1 tablet (40 mg total) by mouth once daily. 19  Ben Mena MD   polyethylene glycol (GLYCOLAX) 17 gram/dose powder  18   Historical Provider, MD   pravastatin (PRAVACHOL) 80 MG tablet Take 1 tablet (80 mg total) by mouth once daily. 19  Ben Mena MD   sertraline (ZOLOFT) 100 MG tablet Take 1 tablet (100 mg total) by mouth once daily. 1/10/19 1/10/20  Ben Mena MD       Family History:  Family History   Problem Relation Age of Onset    Diabetes Mother     Kidney disease Mother     Hypertension Mother     Diabetes Sister     Heart disease Sister        Social History:  Social History     Tobacco Use    Smoking status: Never Smoker    Smokeless tobacco: Never Used   Substance Use Topics    Alcohol use: No    Drug use: No       Review of Systems:  Pertinent positives and negatives are listed in HPI.  All other systems are reviewed and are negative.     Objective:   Last 24 Hour Vital Signs:  BP  Min: 131/58  Max: 207/91  Temp  Av.9 °F (36.6 °C)  Min: 97.3 °F (36.3 °C)  Max: 98.4 °F (36.9 °C)  Pulse  Av.5  Min: 66  Max: 95  Resp  Av.5  Min: 16  Max: 18  SpO2  Av %  Min: 86 %  Max: 98 %  Height  Av' 7" (170.2 cm)  Min: 5' 7" (170.2 cm)  Max: 5' 7" (170.2 cm)  Weight  Av.3 kg (230 lb)  Min: 104.3 kg (230 lb)  Max: 104.3 kg (230 lb)  Body mass index is 36.02 kg/m².  No intake/output data recorded.    Physical Examination:  General: No acute distress, on 4 liters NC  HEENT: EOMI, PERRL, MMM, OP clear and without exudate or erythema  Cardiovascular: normal S1, S2   Chest/Pulmonary:decreaseed air entry bilaterally , more on the right side , with bilateral crepitations , more pronounced at the bases   Abdomen:  soft, nontender, nondistended, bowel sounds heard in all " four quadrants and normo-active   MSKL: without gross deformity, active FROM  Lymphatic: no appreciated LAD  Skin: without cyanosis or clubbing, without  peripheral edema     Laboratory:  Most Recent Data:  CBC:   Lab Results   Component Value Date    WBC 7.23 08/01/2019    HGB 12.0 08/01/2019    HCT 37.7 08/01/2019     08/01/2019    MCV 95 08/01/2019    RDW 14.4 08/01/2019     BMP:   Lab Results   Component Value Date     08/01/2019    K 4.7 08/01/2019    CL 96 08/01/2019    CO2 21 (L) 08/01/2019    BUN 39 (H) 08/01/2019    CREATININE 7.6 (H) 08/01/2019     (H) 08/01/2019    CALCIUM 10.6 (H) 08/01/2019    MG 1.9 08/01/2019    PHOS 4.5 08/01/2019     LFTs:   Lab Results   Component Value Date    PROT 8.2 08/01/2019    ALBUMIN 3.1 (L) 08/01/2019    BILITOT 0.4 08/01/2019    AST 18 08/01/2019    ALKPHOS 72 08/01/2019    ALT 23 08/01/2019     Coags:   Lab Results   Component Value Date    INR 1.3 (H) 07/29/2019     Urinalysis:   Lab Results   Component Value Date    LABURIN  11/08/2016     Multiple organisms isolated. None in predominance.  Repeat if    LABURIN clinically necessary. 11/08/2016    COLORU Yellow 07/29/2019    SPECGRAV 1.015 07/29/2019    NITRITE Negative 07/29/2019    KETONESU Negative 07/29/2019    UROBILINOGEN Negative 07/29/2019    WBCUA 5 07/29/2019       Trended Lab Data:  Recent Labs   Lab 07/30/19  0538 07/31/19  0643 08/01/19  1028   WBC 6.79 7.87 7.23   HGB 9.4* 10.1* 12.0   HCT 30.0* 32.5* 37.7    181 228   MCV 95 96 95   RDW 14.7* 14.6* 14.4    136 136   K 4.4 4.9 4.7   CL 97 96 96   CO2 27 24 21*   BUN 35* 49* 39*   CREATININE 7.2* 8.6* 7.6*   * 131* 121*   PROT 6.6 7.1 8.2   ALBUMIN 2.9* 2.9* 3.1*   BILITOT 0.3 0.3 0.4   AST 17 19 18   ALKPHOS 57 59 72   ALT 25 22 23       Trended Cardiac Data:  Recent Labs   Lab 07/29/19  1517 07/30/19  0538 08/01/19  1028   TROPONINI 0.020  --  0.034*   BNP  --  1,244*  --        Microbiology Data:  none    Other  Laboratory Data:  none    Other Results:    Radiology:  Imaging Results          X-Ray Chest 1 View (Final result)  Result time 08/01/19 11:13:59    Final result by Alexa Banegas MD (08/01/19 11:13:59)                 Impression:      Stable cardiomegaly with pulmonary edema type pattern.      Electronically signed by: Alexa Banegas  Date:    08/01/2019  Time:    11:13             Narrative:    EXAMINATION:  XR CHEST 1 VIEW    CLINICAL HISTORY:  ESRD;    TECHNIQUE:  Single view of the chest was obtained.    COMPARISON:  Multiple priors, most recent 02/12/2019    FINDINGS:  Stable cardiomegaly..  Central pulmonary vascular congestion and diffuse interstitial opacities.  Low lung volumes.  .  No pneumothorax or large pleural effusion.  Surgical clips and a vascular stent project over the left axilla.                                   Assessment and Plan:      Angelina Beard is a 68 y.o.female with  Patient Active Problem List    Diagnosis Date Noted    PAD (peripheral artery disease) 01/24/2019    Skin ulcer of toe of right foot with fat layer exposed 11/27/2018    History of stroke     Acute on chronic diastolic congestive heart failure 09/22/2018    Physical deconditioning 09/22/2018    Paroxysmal atrial fibrillation 06/21/2018    End-stage renal disease on hemodialysis 05/03/2016    Anemia in end-stage renal disease 03/06/2016    Chronic constipation 03/06/2016    Renovascular hypertension 03/04/2016    Debility 02/25/2016    Status post total right knee replacement 2/23/16 02/23/2016    Diabetic neuropathy associated with type 2 diabetes mellitus 02/19/2016    History of left breast cancer s/p mastectomy 02/19/2016    History of sarcoidosis 02/18/2016    Coronary artery disease 02/18/2016    DJD (degenerative joint disease) of lumbar spine 07/03/2013    Chronic diastolic congestive heart failure 07/02/2013    Morbid obesity 07/02/2013    Osteoarthritis 07/02/2013    GERD (gastroesophageal  reflux disease) 07/02/2013    Cerebral microvascular disease 07/01/2013    Type 2 diabetes mellitus with hypertension and end stage renal disease on dialysis 07/01/2013    Hyperlipidemia 07/01/2013        Angelina Beard is a 68 y.o. female  Who  Has a past medical history of ESRD on HD. The patient presented to [facility] on 8/1/2019 with a primary complaint of SOB. Ms. Beard was discharged yesterday from [facility]. She was admitted with acute on top of chronic heart failure with pulmonary edema. She was dialyzed on the day of admission for 2 hrs   (7/30) and yesterday for 3.5hrs with UF of #.5 liters.   After discharge home , she continued to experience SOB. She had mild chest pain that has resolved now.     #) ESRD on HD (MWF)  -for HD today , plan to remove 3 liters of fluids   -chest xray shows bilateral opacities , improved  compared to CXR on previous admission ( 3 days ago )  -will plan for HD tomorrow   -we suggest investigating  other causes for SOB   - dose meds for GFR < 10  - strict I/O, daily weights  - please avoid gadolinium, fleets, phos-based laxatives, NSAIDs      #) Hypertension  BP readings ar not within desired range   -Resume home BP medications .  -monitor BP readings after HD      Addendum : Patient developed SOB and Chest pain during dialysis . She had 40 min left in the dialysis session. We stopped dialysis. She can undergo he regular dialysis session tomorrow. We advice for further workup of chest pain and SOB  ( cardiac Vs pulmonary causes )       Taylor Ladd MD  [facility]   [phone]  If after 5pm please forward any questions to the [facility] Fellow/Attending on call.

## 2019-08-01 NOTE — ED NOTES
"Pt states chest pain has ceased.  Reports still feeling "minimal SOB." AAOx4.  Repositioned in bed. Bed locked and low. Side rails up x 2.  Call light within reach.    "

## 2019-08-01 NOTE — ASSESSMENT & PLAN NOTE
Shortness of breath  Paroxysmal atrial fibrillation  Coronary artery disease  Elevated Troponin  Trend troponin]  Continue with Lasix  continue with supplemental Oxygen   Strict I/O  Continue Plavix, Eliquis, Amlodipine, statin and metoprolol  Consult nephrology

## 2019-08-01 NOTE — NURSING
"Pt c/o CP, SOB, and stated "everything is hurting." Notified nephrologist Dr. Madden, who stated to end HD tx. 2.2 L fluid removal during HD. BP remains elevated and MD aware.  "

## 2019-08-01 NOTE — ED PROVIDER NOTES
"Encounter Date: 8/1/2019    SCRIBE #1 NOTE: I, Michelle Ahumada, am scribing for, and in the presence of,  Dr. Corbin. I have scribed the entire note.       History     Chief Complaint   Patient presents with    Shortness of Breath     68 year old female presents to ed cc of sob with general weakness patient recently discharged from this facility states sob has not improved since discharge patient does state gets relief when on 4l nc     Time seen by provider: 9:55 AM    This is a 68 y.o. female with a PMHx of CHF, DM, HTN, and asthma who presents to the ED via EMS from home with complaint of shortness of breath along with generalized weakness that started yesterday. Patient was discharged yesterday from the hospital after being admitted for hypoxia. She said "I wasn't supposed to leave but my son was having a fit". Patient also reports a productive cough with brownish colored mucus. She says she had right sided chest pain this morning but says it has since resolved. Denies fever, nausea, vomiting, numbness, tingling, or any other complaints at this time. She says she does not use supplemental O2 at home. Patient says she has someone to help her with her activities throughout the day but not at night.    The history is provided by the patient.     Review of patient's allergies indicates:  No Known Allergies  Past Medical History:   Diagnosis Date    Anemia due to gastrointestinal blood loss 7/11/2018    Arthritis     Asthma     Breast cancer, left     CHF (congestive heart failure)     Cholelithiasis     Coronary artery disease     Diabetes mellitus, type 2     Encounter for blood transfusion     End-stage renal disease on hemodialysis     Hemodialysis access site with arteriovenous graft     mon-wed -fri    Hyperlipidemia     Hypertension     Sarcoidosis      Past Surgical History:   Procedure Laterality Date    av graft      left arm    BRACHIAL ARTERY GRAFT Left 05/04/2016    brachiocephalic, " Dr. Anson Crocker    BREAST BIOPSY Left     breast ca    BREAST LUMPECTOMY Left     radiation only    btl      COLONOSCOPY N/A 7/13/2018    [facility]    ELBOW SURGERY      left    ESOPHAGOGASTRODUODENOSCOPY (EGD) N/A 7/13/2018    [facility]    TVUHODVFS-CQEUK-PTYVVFHLJRNAV Right 5/4/2016    [facility] OR    LIPOMA RESECTION      back of neck    pilondial cyst      REPLACEMENT-KNEE-TOTAL Right 2/23/2016    [facility] 2ND FLR    TOTAL KNEE ARTHROPLASTY Right 02/23/2016     Family History   Problem Relation Age of Onset    Diabetes Mother     Kidney disease Mother     Hypertension Mother     Diabetes Sister     Heart disease Sister      Social History     Tobacco Use    Smoking status: Never Smoker    Smokeless tobacco: Never Used   Substance Use Topics    Alcohol use: No    Drug use: No     Review of Systems   Respiratory: Positive for cough and shortness of breath.    Cardiovascular: Positive for chest pain.   Neurological: Positive for weakness.   All other systems reviewed and are negative.      Physical Exam     Initial Vitals   BP Pulse Resp Temp SpO2   08/01/19 0946 08/01/19 0946 08/01/19 1032 08/01/19 0946 08/01/19 0946   (!) 203/175 67 16 98.4 °F (36.9 °C) 95 %      MAP       --                Physical Exam    Nursing note and vitals reviewed.  Constitutional: She appears well-developed and well-nourished. No distress.   No distress   HENT:   Head: Normocephalic and atraumatic.   Mouth/Throat: Oropharynx is clear and moist.   Eyes: Conjunctivae and EOM are normal. Pupils are equal, round, and reactive to light.   Neck: Normal range of motion. Neck supple. No stridor present. No tracheal deviation present.   Cardiovascular: Normal rate and regular rhythm.   Murmur heard.   Systolic murmur is present.  Systolic ejection murmur   Pulmonary/Chest: No  respiratory distress. She has no wheezes. She has rales.   Slightly diminished breath sounds to lower right lobe with faint crackles   Abdominal: Soft. Bowel sounds are normal. There is no tenderness. There is no rebound and no guarding.   Musculoskeletal: Normal range of motion. She exhibits no edema or tenderness.   No LE edema   Neurological: She is alert and oriented to person, place, and time. No sensory deficit.   Skin: Skin is warm and dry. Capillary refill takes less than 2 seconds.   Psychiatric: She has a normal mood and affect. Her behavior is normal.         ED Course   Procedures  Labs Reviewed   CBC W/ AUTO DIFFERENTIAL - Abnormal; Notable for the following components:       Result Value    RBC 3.97 (*)     Mean Corpuscular Hemoglobin Conc 31.8 (*)     Lymph # 0.9 (*)     Gran% 76.9 (*)     Lymph% 12.3 (*)     All other components within normal limits   COMPREHENSIVE METABOLIC PANEL - Abnormal; Notable for the following components:    CO2 21 (*)     Glucose 121 (*)     BUN, Bld 39 (*)     Creatinine 7.6 (*)     Calcium 10.6 (*)     Albumin 3.1 (*)     Anion Gap 19 (*)     eGFR if  6 (*)     eGFR if non  5 (*)     All other components within normal limits   TROPONIN I - Abnormal; Notable for the following components:    Troponin I 0.034 (*)     All other components within normal limits   MAGNESIUM   PHOSPHORUS        ECG Results          EKG 12-lead (In process)  Result time 08/01/19 10:19:18    In process by Interface, Lab In [facility] (08/01/19 10:19:18)                 Narrative:    Test Reason : N18.6,    Vent. Rate : 072 BPM     Atrial Rate : 072 BPM     P-R Int : 142 ms          QRS Dur : 086 ms      QT Int : 424 ms       P-R-T Axes : 041 -01 023 degrees     QTc Int : 464 ms    Sinus rhythm with Premature atrial complexes with Aberrant conduction  Cannot rule out Anterior infarct ,age undetermined  Abnormal ECG  When compared with ECG of 29-JUL-2019 15:16,  No  significant change was found    Referred By: System System           Confirmed By:                             X-Rays:   Independently Interpreted Readings:   Other Readings:  Reviewed by myself, read by radiology.    Imaging Results          X-Ray Chest 1 View (Final result)  Result time 08/01/19 11:13:59    Final result by Alexa Banegas MD (08/01/19 11:13:59)                 Impression:      Stable cardiomegaly with pulmonary edema type pattern.      Electronically signed by: Alexa Banegas  Date:    08/01/2019  Time:    11:13             Narrative:    EXAMINATION:  XR CHEST 1 VIEW    CLINICAL HISTORY:  ESRD;    TECHNIQUE:  Single view of the chest was obtained.    COMPARISON:  Multiple priors, most recent 02/12/2019    FINDINGS:  Stable cardiomegaly..  Central pulmonary vascular congestion and diffuse interstitial opacities.  Low lung volumes.  .  No pneumothorax or large pleural effusion.  Surgical clips and a vascular stent project over the left axilla.                              Medical Decision Making:   Clinical Tests:   Lab Tests: Reviewed and Ordered  Radiological Study: Ordered and Reviewed  Medical Tests: Ordered and Reviewed  ED Management:  77-year-old female with end-stage renal disease presenting with shortness of breath.  Patient was just discharged from this facility yesterday.  I have arranged for her to have supplemental oxygen at home.  Patient was dialyzed while awaiting transport home.  During dialysis patient began to complain of severe chest pain and shortness of breath. Her dialysis was cut short by 40 min.  She will now be admitted for observation by Dr. Eden.                      Clinical Impression:     1. ESRD (end stage renal disease)    2. Chronic pulmonary edema    3. Chest pain, unspecified type              I, Dr. Yan Kelley, personally performed the services described in this documentation. All medical record entries made by the scribe were at my direction and in my  presence. I have reviewed the chart and agree that the record reflects my personal performance and is accurate and complete. Yan Corbin MD.  4:19 PM 08/01/2019                   Yan Corbin MD  08/01/19 [id number]

## 2019-08-02 LAB
ALBUMIN SERPL BCP-MCNC: 2.9 G/DL (ref 3.5–5.2)
ALP SERPL-CCNC: 67 U/L (ref 55–135)
ALT SERPL W/O P-5'-P-CCNC: 20 U/L (ref 10–44)
ANION GAP SERPL CALC-SCNC: 17 MMOL/L (ref 8–16)
AST SERPL-CCNC: 18 U/L (ref 10–40)
BASOPHILS # BLD AUTO: 0.01 K/UL (ref 0–0.2)
BASOPHILS NFR BLD: 0.2 % (ref 0–1.9)
BILIRUB SERPL-MCNC: 0.4 MG/DL (ref 0.1–1)
BUN SERPL-MCNC: 31 MG/DL (ref 8–23)
CALCIUM SERPL-MCNC: 10.2 MG/DL (ref 8.7–10.5)
CHLORIDE SERPL-SCNC: 95 MMOL/L (ref 95–110)
CO2 SERPL-SCNC: 25 MMOL/L (ref 23–29)
CREAT SERPL-MCNC: 6.3 MG/DL (ref 0.5–1.4)
DIFFERENTIAL METHOD: ABNORMAL
EOSINOPHIL # BLD AUTO: 0.2 K/UL (ref 0–0.5)
EOSINOPHIL NFR BLD: 2.5 % (ref 0–8)
ERYTHROCYTE [DISTWIDTH] IN BLOOD BY AUTOMATED COUNT: 14.3 % (ref 11.5–14.5)
EST. GFR  (AFRICAN AMERICAN): 7 ML/MIN/1.73 M^2
EST. GFR  (NON AFRICAN AMERICAN): 6 ML/MIN/1.73 M^2
GLUCOSE SERPL-MCNC: 115 MG/DL (ref 70–110)
HCT VFR BLD AUTO: 35.2 % (ref 37–48.5)
HGB BLD-MCNC: 11.2 G/DL (ref 12–16)
LYMPHOCYTES # BLD AUTO: 1.2 K/UL (ref 1–4.8)
LYMPHOCYTES NFR BLD: 19.6 % (ref 18–48)
MAGNESIUM SERPL-MCNC: 1.9 MG/DL (ref 1.6–2.6)
MCH RBC QN AUTO: 30 PG (ref 27–31)
MCHC RBC AUTO-ENTMCNC: 31.8 G/DL (ref 32–36)
MCV RBC AUTO: 94 FL (ref 82–98)
MONOCYTES # BLD AUTO: 0.5 K/UL (ref 0.3–1)
MONOCYTES NFR BLD: 7.7 % (ref 4–15)
NEUTROPHILS # BLD AUTO: 4.2 K/UL (ref 1.8–7.7)
NEUTROPHILS NFR BLD: 70 % (ref 38–73)
PHOSPHATE SERPL-MCNC: 4.4 MG/DL (ref 2.7–4.5)
PLATELET # BLD AUTO: 260 K/UL (ref 150–350)
PMV BLD AUTO: 10.1 FL (ref 9.2–12.9)
POCT GLUCOSE: 113 MG/DL (ref 70–110)
POCT GLUCOSE: 120 MG/DL (ref 70–110)
POCT GLUCOSE: 135 MG/DL (ref 70–110)
POCT GLUCOSE: 138 MG/DL (ref 70–110)
POTASSIUM SERPL-SCNC: 4.1 MMOL/L (ref 3.5–5.1)
PROT SERPL-MCNC: 7.7 G/DL (ref 6–8.4)
RBC # BLD AUTO: 3.73 M/UL (ref 4–5.4)
SODIUM SERPL-SCNC: 137 MMOL/L (ref 136–145)
TROPONIN I SERPL DL<=0.01 NG/ML-MCNC: 0.04 NG/ML (ref 0–0.03)
WBC # BLD AUTO: 5.97 K/UL (ref 3.9–12.7)

## 2019-08-02 PROCEDURE — 93010 ELECTROCARDIOGRAM REPORT: CPT | Mod: ,,, | Performed by: INTERNAL MEDICINE

## 2019-08-02 PROCEDURE — 11000001 HC ACUTE MED/SURG PRIVATE ROOM

## 2019-08-02 PROCEDURE — 84100 ASSAY OF PHOSPHORUS: CPT

## 2019-08-02 PROCEDURE — 25000003 PHARM REV CODE 250: Performed by: EMERGENCY MEDICINE

## 2019-08-02 PROCEDURE — 97530 THERAPEUTIC ACTIVITIES: CPT

## 2019-08-02 PROCEDURE — 93005 ELECTROCARDIOGRAM TRACING: CPT

## 2019-08-02 PROCEDURE — 80053 COMPREHEN METABOLIC PANEL: CPT

## 2019-08-02 PROCEDURE — 97530 THERAPEUTIC ACTIVITIES: CPT | Performed by: PHYSICAL THERAPIST

## 2019-08-02 PROCEDURE — 84484 ASSAY OF TROPONIN QUANT: CPT | Mod: 91

## 2019-08-02 PROCEDURE — 97161 PT EVAL LOW COMPLEX 20 MIN: CPT | Performed by: PHYSICAL THERAPIST

## 2019-08-02 PROCEDURE — 97165 OT EVAL LOW COMPLEX 30 MIN: CPT

## 2019-08-02 PROCEDURE — 83735 ASSAY OF MAGNESIUM: CPT

## 2019-08-02 PROCEDURE — 85025 COMPLETE CBC W/AUTO DIFF WBC: CPT

## 2019-08-02 PROCEDURE — 99223 PR INITIAL HOSPITAL CARE,LEVL III: ICD-10-PCS | Mod: ,,, | Performed by: NURSE PRACTITIONER

## 2019-08-02 PROCEDURE — 93010 EKG 12-LEAD: ICD-10-PCS | Mod: ,,, | Performed by: INTERNAL MEDICINE

## 2019-08-02 PROCEDURE — 36415 COLL VENOUS BLD VENIPUNCTURE: CPT

## 2019-08-02 PROCEDURE — 25000003 PHARM REV CODE 250: Performed by: FAMILY MEDICINE

## 2019-08-02 PROCEDURE — 94761 N-INVAS EAR/PLS OXIMETRY MLT: CPT

## 2019-08-02 PROCEDURE — 99223 1ST HOSP IP/OBS HIGH 75: CPT | Mod: ,,, | Performed by: NURSE PRACTITIONER

## 2019-08-02 PROCEDURE — 80100016 HC MAINTENANCE HEMODIALYSIS

## 2019-08-02 PROCEDURE — 27000221 HC OXYGEN, UP TO 24 HOURS

## 2019-08-02 RX ORDER — SODIUM CHLORIDE 9 MG/ML
INJECTION, SOLUTION INTRAVENOUS ONCE
Status: DISCONTINUED | OUTPATIENT
Start: 2019-08-02 | End: 2019-08-03 | Stop reason: HOSPADM

## 2019-08-02 RX ORDER — SODIUM CHLORIDE 9 MG/ML
INJECTION, SOLUTION INTRAVENOUS
Status: DISCONTINUED | OUTPATIENT
Start: 2019-08-02 | End: 2019-08-03 | Stop reason: HOSPADM

## 2019-08-02 RX ORDER — GABAPENTIN 300 MG/1
300 CAPSULE ORAL 3 TIMES DAILY PRN
Status: DISCONTINUED | OUTPATIENT
Start: 2019-08-02 | End: 2019-08-03 | Stop reason: HOSPADM

## 2019-08-02 RX ADMIN — CALCIUM ACETATE 667 MG: 667 CAPSULE ORAL at 08:08

## 2019-08-02 RX ADMIN — DOCUSATE SODIUM 100 MG: 100 CAPSULE, LIQUID FILLED ORAL at 12:08

## 2019-08-02 RX ADMIN — PRAVASTATIN SODIUM 80 MG: 40 TABLET ORAL at 12:08

## 2019-08-02 RX ADMIN — SERTRALINE HYDROCHLORIDE 100 MG: 50 TABLET ORAL at 12:08

## 2019-08-02 RX ADMIN — ACETAMINOPHEN 650 MG: 325 TABLET ORAL at 01:08

## 2019-08-02 RX ADMIN — PANTOPRAZOLE SODIUM 40 MG: 40 TABLET, DELAYED RELEASE ORAL at 12:08

## 2019-08-02 RX ADMIN — CALCIUM ACETATE 667 MG: 667 CAPSULE ORAL at 12:08

## 2019-08-02 RX ADMIN — CIPROFLOXACIN HYDROCHLORIDE 500 MG: 500 TABLET, FILM COATED ORAL at 12:08

## 2019-08-02 RX ADMIN — CALCIUM ACETATE 667 MG: 667 CAPSULE ORAL at 06:08

## 2019-08-02 RX ADMIN — GABAPENTIN 300 MG: 300 CAPSULE ORAL at 04:08

## 2019-08-02 RX ADMIN — GABAPENTIN 300 MG: 300 CAPSULE ORAL at 12:08

## 2019-08-02 RX ADMIN — APIXABAN 2.5 MG: 2.5 TABLET, FILM COATED ORAL at 12:08

## 2019-08-02 RX ADMIN — CLOPIDOGREL BISULFATE 75 MG: 75 TABLET ORAL at 12:08

## 2019-08-02 RX ADMIN — APIXABAN 2.5 MG: 2.5 TABLET, FILM COATED ORAL at 08:08

## 2019-08-02 RX ADMIN — ASCORBIC ACID, THIAMINE MONONITRATE,RIBOFLAVIN, NIACINAMIDE, PYRIDOXINE HYDROCHLORIDE, FOLIC ACID, CYANOCOBALAMIN, BIOTIN, CALCIUM PANTOTHENATE, 1 CAPSULE: 100; 1.5; 1.7; 20; 10; 1; 6000; 150000; 5 CAPSULE, LIQUID FILLED ORAL at 12:08

## 2019-08-02 RX ADMIN — DOCUSATE SODIUM 100 MG: 100 CAPSULE, LIQUID FILLED ORAL at 08:08

## 2019-08-02 NOTE — PROGRESS NOTES
as pt was not discharged to home from ED - portable oxygen (2 cannisters) returned to [facility] [place] [place] closet today.

## 2019-08-02 NOTE — PLAN OF CARE
VN cued into room for introduction and assessment. Admit questions complete. Plan of care discussed with patient. Patient educated on safety measures, call bell in reach and instrcuted to call for assistance.

## 2019-08-02 NOTE — ASSESSMENT & PLAN NOTE
Shortness of breath  Paroxysmal atrial fibrillation  Coronary artery disease  Elevated Troponin  Trend troponin  Continue with Lasix  continue with supplemental Oxygen   Strict I/O  Continue Plavix, Eliquis, Amlodipine, statin and metoprolol  Consult nephrology  Consult cardiology

## 2019-08-02 NOTE — NURSING
Patient arrived back to room in bed from dialysis. Tele monitor in place. 4L oxygen via NC in place. VSS. . Morning medications administered. Bed alarm on, bed locked and low, side rails up x2, and call bell in reach. No acute distress noted.

## 2019-08-02 NOTE — PLAN OF CARE
Problem: Adult Inpatient Plan of Care  Goal: Plan of Care Review  Outcome: Ongoing (interventions implemented as appropriate)  Admitted 68 year old female patient from ED, brought in to the unit around 1900H, via stretcher. Conscious , coherent to time, place date, and situation, with saline lock on the left upper arm, gauge 20 and gauge 22 flushed patent, with clean and dry dressing.Right arm precaution, right av fistula, positive with thrill and bruit, with clean and dry dressing. Patient case ESRD, status post HD. Patient had no subjective complaint pain, afebrile, stable VS. NP Chairs  informed regarding this admission. Telemetry Paroxysmal atrial fibrillation, (90's). Oxygen saturation 95% on 3 lpm via NC.  Advised patient to call for any assistance. Safety fall precaution measures noted, Bed alarm ON. Call bell with in reach.  Will continue to monitor patient.  0600H- NO untoward signs and symptoms noted over the night. No urine output. Stable V. Slept well. IV line patent. Telelemetry no ectopy. Will endorse to day shift nurse.

## 2019-08-02 NOTE — HPI
69yo female with history of HTN, DMII, chronic diastolic heart failure, morbid obesity, nonobstructive CAD, renovascular HTN, PAF, PAD, HLP, renovascular HTN and left breast cancer s/p mastectomy who presented to the ER with complaints of SOB. She was admitted from 7/29-7/31 with the same symptoms with elevated BNP and pulmonary vascular congestion on CXR. She was diuresed with  HD and was negative 3 liters. She was apparently discharged per her request due to some family issues. She reports continued SOB with no major improved and returned to the ER. She was noted to be hypertensive with BP 200s/100s with CHF pattern on CXR although slightly improved in comparison to previous. She was continued on her current medication regimen with improvement in her BP. She underwent HD with 2.7 liters removed 8/1 and 1.7 liters removed today and is negative 3.1 liters since admission. EKG with SR PACs nonspecific ST abnormality unchanged from previous EKG. Troponin mildly elevated at .[phone]. Cardiology consulted for elevated troponin

## 2019-08-02 NOTE — CONSULTS
"U Nephrology Progress Note    Date of Admit: 2019      Subjective:      Feeling better today , NO SOB , No chest pain      History of Present Illness:  Angelina Beard is a 68 y.o. female  Who  Has a past medical history of ESRD on HD. The patient presented to [facility] on 2019 with a primary complaint of SOB. Ms. Beard was discharged yesterday from [facility]. She was admitted with acute on top of chronic heart failure with pulmonary edema. She was dialyzed on the day of admission for 2 hrs.  () and on for 3.5hrs with UF of 3.5 liters.   After discharge home , she continued to experience SOB. She had mild chest pain that has resolved now.       Review of Systems:  Pertinent positives and negatives are listed in HPI.  All other systems are reviewed and are negative.     Objective:   Last 24 Hour Vital Signs:  BP  Min: 120/68  Max: 213/72  Temp  Av.4 °F (36.3 °C)  Min: 96.7 °F (35.9 °C)  Max: 97.9 °F (36.6 °C)  Pulse  Av  Min: 60  Max: 95  Resp  Av.2  Min: 12  Max: 20  SpO2  Av.8 %  Min: 86 %  Max: 100 %  Height  Av' 7" (170.2 cm)  Min: 5' 7" (170.2 cm)  Max: 5' 7" (170.2 cm)  Weight  Av.5 kg (230 lb 6.1 oz)  Min: 104.5 kg (230 lb 6.1 oz)  Max: 104.5 kg (230 lb 6.1 oz)  Body mass index is 36.08 kg/m².  I/O last 3 completed shifts:  In: 925 [P.O.:425; Other:500]  Out: 8556 [Other:8279]    Physical Examination:  General: No acute distress,   HEENT: EOMI, PERRL, MMM, OP clear and without exudate or erythema  Cardiovascular: normal S1, S2   Chest/Pulmonary:decreaseed air entry bilaterally  , with bilateral crepitations , more pronounced at the bases , markedly improved compared to yesterday examination.   Abdomen:  soft, nontender, nondistended, bowel sounds heard in all four quadrants and normo-active   MSKL: without gross deformity, active FROM  Lymphatic: no appreciated LAD  Skin: without cyanosis or clubbing, without  peripheral edema "     Laboratory:  Most Recent Data:  CBC:   Lab Results   Component Value Date    WBC 5.97 08/02/2019    HGB 11.2 (L) 08/02/2019    HCT 35.2 (L) 08/02/2019     08/02/2019    MCV 94 08/02/2019    RDW 14.3 08/02/2019     BMP:   Lab Results   Component Value Date     08/02/2019    K 4.1 08/02/2019    CL 95 08/02/2019    CO2 25 08/02/2019    BUN 31 (H) 08/02/2019    CREATININE 6.3 (H) 08/02/2019     (H) 08/02/2019    CALCIUM 10.2 08/02/2019    MG 1.9 08/02/2019    PHOS 4.4 08/02/2019     LFTs:   Lab Results   Component Value Date    PROT 7.7 08/02/2019    ALBUMIN 2.9 (L) 08/02/2019    BILITOT 0.4 08/02/2019    AST 18 08/02/2019    ALKPHOS 67 08/02/2019    ALT 20 08/02/2019     Coags:   Lab Results   Component Value Date    INR 1.3 (H) 07/29/2019     Urinalysis:   Lab Results   Component Value Date    LABURIN  11/08/2016     Multiple organisms isolated. None in predominance.  Repeat if    LABURIN clinically necessary. 11/08/2016    COLORU Yellow 07/29/2019    SPECGRAV 1.015 07/29/2019    NITRITE Negative 07/29/2019    KETONESU Negative 07/29/2019    UROBILINOGEN Negative 07/29/2019    WBCUA 5 07/29/2019       Trended Lab Data:  Recent Labs   Lab 07/31/19  0643 08/01/19  1028 08/02/19  0140   WBC 7.87 7.23 5.97   HGB 10.1* 12.0 11.2*   HCT 32.5* 37.7 35.2*    228 260   MCV 96 95 94   RDW 14.6* 14.4 14.3    136 137   K 4.9 4.7 4.1   CL 96 96 95   CO2 24 21* 25   BUN 49* 39* 31*   CREATININE 8.6* 7.6* 6.3*   * 121* 115*   PROT 7.1 8.2 7.7   ALBUMIN 2.9* 3.1* 2.9*   BILITOT 0.3 0.4 0.4   AST 19 18 18   ALKPHOS 59 72 67   ALT 22 23 20       Trended Cardiac Data:  Recent Labs   Lab 07/30/19  0538  08/01/19  2347 08/02/19  0140 08/02/19  0612   TROPONINI  --    < > 0.040* 0.040* 0.043*   BNP 1,244*  --   --   --   --     < > = values in this interval not displayed.       Microbiology Data:  none    Other Laboratory Data:  none    Other Results:    Radiology:  Imaging Results          X-Ray  Chest 1 View (Final result)  Result time 08/01/19 11:13:59    Final result by Alexa Banegas MD (08/01/19 11:13:59)                 Impression:      Stable cardiomegaly with pulmonary edema type pattern.      Electronically signed by: Alexa Banegas  Date:    08/01/2019  Time:    11:13             Narrative:    EXAMINATION:  XR CHEST 1 VIEW    CLINICAL HISTORY:  ESRD;    TECHNIQUE:  Single view of the chest was obtained.    COMPARISON:  Multiple priors, most recent 02/12/2019    FINDINGS:  Stable cardiomegaly..  Central pulmonary vascular congestion and diffuse interstitial opacities.  Low lung volumes.  .  No pneumothorax or large pleural effusion.  Surgical clips and a vascular stent project over the left axilla.                                   Assessment and Plan:        Angelina Beard is a 68 y.o. female  Who  Has a past medical history of ESRD on HD. The patient presented to [facility] on 8/1/2019 with a primary complaint of SOB. Ms. Beard was discharged the day before the admission from [facility]. She was admitted with acute on top of chronic heart failure with pulmonary edema. She was dialyzed on the day of admission for 2 hrs (7/30) and and the following day for 3.5hrs with UF of 3.5 liters.   After discharge home , she continued to experience SOB. She had mild chest pain that has resolved now.     #) ESRD on HD (MWF)  -for HD today , plan to remove 3 liters of fluids   -chest xray shows bilateral opacities , improved  compared to CXR on previous admission ( 3 days ago )  - dose meds for GFR < 10  - strict I/O, daily weights  - please avoid gadolinium, fleets, phos-based laxatives, NSAIDs      #) Hypertension  BP readings ar not within desired range   -Resume home BP medications .  -monitor BP readings after HD    We recommend cardiology evaluation for SOB and chest pain       Thank you for allowing us to participate in this patient care.  Case dsicussed with nephrology team  attending Dr. Maryanne Ladd MD  [facility] Nephrology   [phone]  If after 5pm please forward any questions to the [facility] Nephrology Fellow/Attending on call.

## 2019-08-02 NOTE — PLAN OF CARE
Problem: Physical Therapy Goal  Goal: Physical Therapy Goal  1.  Bed to/from chair with supervision with RW  2.  Ambulate 100' with RW with CG/sup   3.  Sit in chair 2 hours  4.  Ascend/descend 1 step with RW with CG/sup    Outcome: Ongoing (interventions implemented as appropriate)  Pt would benefit from skilled PT to progress functional mobility/safety.

## 2019-08-02 NOTE — H&P
"[facility] Medicine  History & Physical    Patient Name: Angelina Beard  MRN: [id number]  Admission Date: 8/1/2019  Attending Physician: Herlinda Rivas*   Primary Care Provider: Ben Mena MD         Patient information was obtained from patient and ER records.     Subjective:     Principal Problem:Acute on chronic diastolic congestive heart failure    Chief Complaint:   Chief Complaint   Patient presents with    Shortness of Breath     68 year old female presents to ed cc of sob with general weakness patient recently discharged from this facility states sob has not improved since discharge patient does state gets relief when on 4l nc        HPI: Chirag Munson, is a 69 y/o F with PMH of CHF, DM, HTN, and asthma discharge from our service on 7/31 presented via EMS today with complaint of one day history of shortness of breath with generalized weakness that started yesterday. There is associated productive cough with brownish colored mucus. Denies fever, chills, chest pain, nausea, vomiting,. Patient stated "I wasn't supposed to leave but my son was having a fit. Patient noted she does not have help with her ADLs. O2 sat in the ED stayed >93% on RA.   Patient reported chest pain during dialysis this afternoon. Will admit to trend troponin and possible placement.     Past Medical History:   Diagnosis Date    Anemia due to gastrointestinal blood loss 7/11/2018    Arthritis     Asthma     Breast cancer, left     CHF (congestive heart failure)     Cholelithiasis     Coronary artery disease     Diabetes mellitus, type 2     Encounter for blood transfusion     End-stage renal disease on hemodialysis     Hemodialysis access site with arteriovenous graft     mon-wed -fri    Hyperlipidemia     Hypertension     Sarcoidosis        Past Surgical History:   Procedure Laterality Date    av graft      left arm    BRACHIAL ARTERY GRAFT Left 05/04/2016    Dr. juno " Anson Crocker    BREAST BIOPSY Left     breast ca    BREAST LUMPECTOMY Left     radiation only    btl      COLONOSCOPY N/A 7/13/2018    [facility]    ELBOW SURGERY      left    ESOPHAGOGASTRODUODENOSCOPY (EGD) N/A 7/13/2018    [facility] ENDO    [id number] Right 5/4/2016    [facility] at [facility] OR    LIPOMA RESECTION      back of neck    pilondial cyst      REPLACEMENT-KNEE-TOTAL Right 2/23/2016    Performed by [facility] OR 2ND FLR    TOTAL KNEE ARTHROPLASTY Right 02/23/2016       Review of patient's allergies indicates:  No Known Allergies    Current Facility-Administered Medications on File Prior to Encounter   Medication    [DISCONTINUED] 0.9%  NaCl infusion    [DISCONTINUED] 0.9%  NaCl infusion    [DISCONTINUED] 0.9%  NaCl infusion    [DISCONTINUED] acetaminophen tablet 1,000 mg    [DISCONTINUED] acetaminophen tablet 650 mg    [DISCONTINUED] amLODIPine tablet 10 mg    [DISCONTINUED] apixaban tablet 2.5 mg    [DISCONTINUED] calcium acetate capsule 667 mg    [DISCONTINUED] ciprofloxacin HCl tablet 500 mg    [DISCONTINUED] clopidogrel tablet 75 mg    [DISCONTINUED] dextrose 10% (D10W) Bolus    [DISCONTINUED] dextrose 10% (D10W) Bolus    [DISCONTINUED] docusate sodium capsule 100 mg    [DISCONTINUED] furosemide injection 40 mg    [DISCONTINUED] gabapentin capsule 300 mg    [DISCONTINUED] glucagon (human recombinant) injection 1 mg    [DISCONTINUED] glucose chewable tablet 16 g    [DISCONTINUED] glucose chewable tablet 24 g    [DISCONTINUED] insulin aspart U-100 pen 0-5 Units    [DISCONTINUED] insulin detemir U-100 pen 15 Units    [DISCONTINUED] metoprolol succinate (TOPROL-XL) 24 hr tablet 50 mg    [DISCONTINUED] mupirocin 2 % ointment    [DISCONTINUED] nitroGLYCERIN SL tablet 0.4 mg    [DISCONTINUED] ondansetron injection 4 mg    [DISCONTINUED]  "pantoprazole EC tablet 40 mg    [DISCONTINUED] pravastatin tablet 80 mg    [DISCONTINUED] sertraline tablet 100 mg    [DISCONTINUED] sodium chloride 0.9% flush 10 mL    [DISCONTINUED] vitamin renal formula (B-complex-vitamin c-folic acid) 1 mg per capsule 1 capsule     Current Outpatient Medications on File Prior to Encounter   Medication Sig    amLODIPine (NORVASC) 10 MG tablet Take 1 tablet (10 mg total) by mouth once daily.    apixaban (ELIQUIS) 2.5 mg Tab Take 1 tablet (2.5 mg total) by mouth 2 (two) times daily.    bismuth tribrom-petrolatum,wh (XEROFORM PETROLATUM DRESSING) 1 X 8 " Bndg Apply locally every other day    calcium acetate (PHOSLO) 667 mg capsule Take 667 mg by mouth 3 (three) times daily with meals.    ciprofloxacin HCl (CIPRO) 500 MG tablet Take 1 tablet (500 mg total) by mouth once daily.    clopidogrel (PLAVIX) 75 mg tablet Take 1 tablet (75 mg total) by mouth once daily.    furosemide (LASIX) 40 MG tablet Take 1 tablet (40 mg total) by mouth once daily.    gabapentin (NEURONTIN) 300 MG capsule Take 1 capsule (300 mg total) by mouth every 8 (eight) hours as needed.    insulin (LANTUS SOLOSTAR U-100 INSULIN) glargine 100 units/mL (3mL) SubQ pen Inject 20 Units into the skin once daily.    isosorbide mononitrate (IMDUR) 30 MG 24 hr tablet Take 1 tablet (30 mg total) by mouth once daily.    metoprolol succinate (TOPROL-XL) 50 MG 24 hr tablet Take 1 tablet (50 mg total) by mouth once daily.    mupirocin (BACTROBAN) 2 % ointment apply by Nasal route 2 (two) times daily.    nitroGLYCERIN (NITROSTAT) 0.4 MG SL tablet Place 1 tablet (0.4 mg total) under the tongue every 5 (five) minutes as needed for Chest pain. if second dose is needed call 911.    pantoprazole (PROTONIX) 40 MG tablet Take 1 tablet (40 mg total) by mouth once daily.    polyethylene glycol (GLYCOLAX) 17 gram/dose powder     pravastatin (PRAVACHOL) 80 MG tablet Take 1 tablet (80 mg total) by mouth once daily.    " sertraline (ZOLOFT) 100 MG tablet Take 1 tablet (100 mg total) by mouth once daily.     Family History     Problem Relation (Age of Onset)    Diabetes Mother, Sister    Heart disease Sister    Hypertension Mother    Kidney disease Mother        Tobacco Use    Smoking status: Never Smoker    Smokeless tobacco: Never Used   Substance and Sexual Activity    Alcohol use: No    Drug use: No    Sexual activity: Not on file     Review of Systems   Constitutional: Negative for fatigue and fever.   HENT: Negative for congestion and mouth sores.    Eyes: Negative for photophobia and visual disturbance.   Respiratory: Positive for cough and shortness of breath. Negative for chest tightness.    Cardiovascular: Negative for chest pain.   Gastrointestinal: Negative for abdominal pain, blood in stool, nausea and vomiting.   Endocrine: Negative for polydipsia and polyphagia.   Genitourinary: Negative for dysuria.   Musculoskeletal: Negative for arthralgias.   Skin: Negative for rash and wound.   Neurological: Positive for weakness. Negative for dizziness.   Psychiatric/Behavioral: Negative for agitation.     Objective:     Vital Signs (Most Recent):  Temp: 97.6 °F (36.4 °C) (08/01/19 1540)  Pulse: 75 (08/01/19 1608)  Resp: 18 (08/01/19 1608)  BP: 131/60 (08/01/19 1608)  SpO2: 98 % (08/01/19 1608) Vital Signs (24h Range):  Temp:  [97.6 °F (36.4 °C)-98.4 °F (36.9 °C)] 97.6 °F (36.4 °C)  Pulse:  [60-75] 75  Resp:  [12-20] 18  SpO2:  [86 %-100 %] 98 %  BP: (120-213)/() 131/60     Weight: 104.3 kg (230 lb)  Body mass index is 36.02 kg/m².    Physical Exam   Constitutional: She is oriented to person, place, and time. She appears well-developed and well-nourished.   HENT:   Head: Normocephalic and atraumatic.   Mouth/Throat: Oropharynx is clear and moist.   Eyes: Pupils are equal, round, and reactive to light. EOM are normal.   Neck: Neck supple.   Cardiovascular: Normal rate, regular rhythm, normal heart sounds and intact  distal pulses. Exam reveals no gallop and no friction rub.   No murmur heard.  Pulmonary/Chest: Breath sounds normal.   Abdominal: Soft. Bowel sounds are normal. She exhibits no distension and no mass. There is no tenderness. There is no rebound.   Musculoskeletal: She exhibits no edema or tenderness.   Neurological: She is alert and oriented to person, place, and time. She has normal reflexes.   Skin: Skin is warm.   Psychiatric: She has a normal mood and affect. Her behavior is normal.         CRANIAL NERVES     CN III, IV, VI   Pupils are equal, round, and reactive to light.  Extraocular motions are normal.        Significant Labs:   A1C:   Recent Labs   Lab 02/12/19  0958 05/07/19  0945   HGBA1C 6.8* 6.5*     BMP:   Recent Labs   Lab 08/01/19  1028   *      K 4.7   CL 96   CO2 21*   BUN 39*   CREATININE 7.6*   CALCIUM 10.6*   MG 1.9     CBC:   Recent Labs   Lab 07/31/19  0643 08/01/19  1028   WBC 7.87 7.23   HGB 10.1* 12.0   HCT 32.5* 37.7    228     CMP:   Recent Labs   Lab 07/31/19  0643 08/01/19  1028    136   K 4.9 4.7   CL 96 96   CO2 24 21*   * 121*   BUN 49* 39*   CREATININE 8.6* 7.6*   CALCIUM 9.3 10.6*   PROT 7.1 8.2   ALBUMIN 2.9* 3.1*   BILITOT 0.3 0.4   ALKPHOS 59 72   AST 19 18   ALT 22 23   ANIONGAP 16 19*   EGFRNONAA 4* 5*     Lactic Acid:   Recent Labs   Lab 08/01/19  1732   LACTATE 1.3     Magnesium:   Recent Labs   Lab 07/31/19  0643 08/01/19  1028   MG 1.8 1.9     POCT Glucose:   Recent Labs   Lab 07/31/19  0555 07/31/19  1658   POCTGLUCOSE 134* 186*     Troponin:   Recent Labs   Lab 08/01/19  1028   TROPONINI 0.034*     All pertinent labs within the past 24 hours have been reviewed.    Significant Imaging: I have reviewed all pertinent imaging results/findings within the past 24 hours.    Assessment/Plan:     * Acute on chronic diastolic congestive heart failure  Shortness of breath  Paroxysmal atrial fibrillation  Coronary artery disease  Elevated  Troponin  Trend troponin]  Continue with Lasix  continue with supplemental Oxygen   Strict I/O  Continue Plavix, Eliquis, Amlodipine, statin and metoprolol  Consult nephrology      PAD (peripheral artery disease)  History of stroke  Hyperlipidemia  Hypertension  Continue statin and Plavix   Amlodipine, Lasix and metoprolol      End-stage renal disease on hemodialysis  Anemia in ESRD  On HD MWF  Consult nephrology        Debility  Obesity  Physical deconditioning  Consult PT/OT  Consult case management for placement        Diabetic neuropathy associated with type 2 diabetes mellitus  HbA1c 6.5 on 5/7 19  Low dose SSI   accucheck AC&HS   diabetic diet     History of sarcoidosis  chronic      Osteoarthritis of lumbar spine  Chronic        GERD (gastroesophageal reflux disease)  Continue PPI        VTE Risk Mitigation (From admission, onward)        Ordered     apixaban tablet 2.5 mg  2 times daily      08/01/19 1947             Carissa Ivan, APRN, FNP-C  Department of Hospital Medicine   [facility]

## 2019-08-02 NOTE — PLAN OF CARE
Problem: Fluid Volume Excess (Chronic Kidney Disease)  Goal: Fluid Balance  Outcome: Ongoing (interventions implemented as appropriate)  POC discussed with patient  HD with UF today

## 2019-08-02 NOTE — NURSING
Notified by tele monitor tech that patient is sustaining a HR 130s. Patient noted to be resting in bed and reporting midsternal CP a 7/10 that is described as pressure. Patient denies any SOB. VSS. Dr. Eden notified and order for EKG placed. Respiratory notified. Will continue to monitor.

## 2019-08-02 NOTE — SUBJECTIVE & OBJECTIVE
"Past Medical History:   Diagnosis Date    Anemia due to gastrointestinal blood loss 7/11/2018    Arthritis     Asthma     Breast cancer, left     CHF (congestive heart failure)     Cholelithiasis     Coronary artery disease     Diabetes mellitus, type 2     Encounter for blood transfusion     End-stage renal disease on hemodialysis     Hemodialysis access site with arteriovenous graft     mon-wed -fri    Hyperlipidemia     Hypertension     Sarcoidosis        Past Surgical History:   Procedure Laterality Date    av graft      left arm    BRACHIAL ARTERY GRAFT Left 05/04/2016    brachiocephalic, Dr. Anson Crocker    BREAST BIOPSY Left     breast ca    BREAST LUMPECTOMY Left     radiation only    btl      COLONOSCOPY N/A 7/13/2018    [facility]formed [facility] at [facility] ENDO    ELBOW SURGERY      left    ESOPHAGOGASTRODUODENOSCOPY (EGD) N/A 7/13/2018    [facility] at [facility] ENDO    [id number] Right 5/4/2016    Performed by [facility] at [facility] OR    LIPOMA RESECTION      back of neck    pilondial cyst      REPLACEMENT-KNEE-TOTAL Right 2/23/2016    [facility] OR 2ND FLR    TOTAL KNEE ARTHROPLASTY Right 02/23/2016       Review of patient's allergies indicates:  No Known Allergies    No current facility-administered medications on file prior to encounter.      Current Outpatient Medications on File Prior to Encounter   Medication Sig    amLODIPine (NORVASC) 10 MG tablet Take 1 tablet (10 mg total) by mouth once daily.    apixaban (ELIQUIS) 2.5 mg Tab Take 1 tablet (2.5 mg total) by mouth 2 (two) times daily.    bismuth tribrom-petrolatum,wh (XEROFORM PETROLATUM DRESSING) 1 X 8 " Bndg Apply locally every other day    calcium acetate (PHOSLO) 667 mg capsule Take 667 mg by mouth 3 (three) times daily with meals.    ciprofloxacin HCl (CIPRO) 500 MG tablet Take 1 tablet (500 mg total) by mouth once daily.    " clopidogrel (PLAVIX) 75 mg tablet Take 1 tablet (75 mg total) by mouth once daily.    gabapentin (NEURONTIN) 300 MG capsule Take 1 capsule (300 mg total) by mouth every 8 (eight) hours as needed.    insulin (LANTUS SOLOSTAR U-100 INSULIN) glargine 100 units/mL (3mL) SubQ pen Inject 20 Units into the skin once daily.    metoprolol succinate (TOPROL-XL) 50 MG 24 hr tablet Take 1 tablet (50 mg total) by mouth once daily.    mupirocin (BACTROBAN) 2 % ointment apply by Nasal route 2 (two) times daily.    pantoprazole (PROTONIX) 40 MG tablet Take 1 tablet (40 mg total) by mouth once daily.    polyethylene glycol (GLYCOLAX) 17 gram/dose powder     pravastatin (PRAVACHOL) 80 MG tablet Take 1 tablet (80 mg total) by mouth once daily.    sertraline (ZOLOFT) 100 MG tablet Take 1 tablet (100 mg total) by mouth once daily.    furosemide (LASIX) 40 MG tablet Take 1 tablet (40 mg total) by mouth once daily.    isosorbide mononitrate (IMDUR) 30 MG 24 hr tablet Take 1 tablet (30 mg total) by mouth once daily.    nitroGLYCERIN (NITROSTAT) 0.4 MG SL tablet Place 1 tablet (0.4 mg total) under the tongue every 5 (five) minutes as needed for Chest pain. if second dose is needed call 911.     Family History     Problem Relation (Age of Onset)    Diabetes Mother, Sister    Heart disease Sister    Hypertension Mother    Kidney disease Mother        Tobacco Use    Smoking status: Never Smoker    Smokeless tobacco: Never Used   Substance and Sexual Activity    Alcohol use: No    Drug use: No    Sexual activity: Not on file     Review of Systems   Constitution: Negative for chills, decreased appetite, diaphoresis and fever.   Cardiovascular: Positive for dyspnea on exertion. Negative for chest pain, claudication, cyanosis, irregular heartbeat, leg swelling, near-syncope, orthopnea, palpitations, paroxysmal nocturnal dyspnea and syncope.   Respiratory: Negative for cough, hemoptysis, shortness of breath and wheezing.     Gastrointestinal: Negative for bloating, abdominal pain, constipation, diarrhea, melena, nausea and vomiting.   Neurological: Negative for dizziness and weakness.     Objective:     Vital Signs (Most Recent):  Temp: 96.7 °F (35.9 °C) (08/02/19 1241)  Pulse: 72 (08/02/19 1241)  Resp: 16 (08/02/19 1241)  BP: 132/60 (08/02/19 1241)  SpO2: 98 % (08/02/19 1231) Vital Signs (24h Range):  Temp:  [96.7 °F (35.9 °C)-98.1 °F (36.7 °C)] 96.7 °F (35.9 °C)  Pulse:  [54-95] 72  Resp:  [16-20] 16  SpO2:  [93 %-100 %] 98 %  BP: ()/() 132/60     Weight: 104.5 kg (230 lb 6.1 oz)  Body mass index is 36.08 kg/m².    SpO2: 98 %  O2 Device (Oxygen Therapy): nasal cannula      Intake/Output Summary (Last 24 hours) at 8/2/2019 1404  Last data filed at 8/2/2019 1140  Gross per 24 hour   Intake 1425 ml   Output 4436 ml   Net -3011 ml       Lines/Drains/Airways     Drain                 Hemodialysis AV Graft -- days         Hemodialysis AV Graft Right upper arm -- days          Peripheral Intravenous Line                 Peripheral IV - Single Lumen 20 G Left Upper Arm -- days         Peripheral IV - Single Lumen 08/01/19 1020 22 G Left Upper Arm 1 day                Physical Exam   Constitutional: She is oriented to person, place, and time. She appears well-developed and well-nourished. No distress.   Cardiovascular: Normal rate and regular rhythm. Exam reveals no gallop.   No murmur heard.  Pulmonary/Chest: Effort normal and breath sounds normal. No respiratory distress. She has no wheezes.   Abdominal: Soft. Bowel sounds are normal. She exhibits no distension. There is no tenderness.   Neurological: She is alert and oriented to person, place, and time.   Skin: Skin is warm and dry.       Significant Labs:     Recent Labs   Lab 08/02/19  0140   WBC 5.97   RBC 3.73*   HGB 11.2*   HCT 35.2*      MCV 94   MCH 30.0   MCHC 31.8*     Recent Labs   Lab 08/02/19  0140      K 4.1   CL 95   CO2 25   BUN 31*   CREATININE  6.3*   MG 1.9     Recent Labs   Lab 08/02/19  0743   TROPONINI 0.039*       Significant Imaging:     TTE 9/24/2018    CONCLUSIONS     1 - Concentric hypertrophy.     2 - Normal left ventricular systolic function (EF 60-65%).     3 - Impaired LV relaxation, normal LAP (grade 1 diastolic dysfunction).     4 - Normal right ventricular systolic function .     5 - The estimated PA systolic pressure is 34 mmHg.

## 2019-08-02 NOTE — PT/OT/SLP EVAL
Occupational Therapy   Evaluation/tx    Name: Angelina Beard  MRN: [id number]  Admitting Diagnosis:  Acute on chronic diastolic congestive heart failure      Recommendations:     Discharge Recommendations: (TBD pending progress)  Discharge Equipment Recommendations:  (TBD)  Barriers to discharge:  None    Assessment:   Pt presents w/ decreased overall endurance/conditioning, balance/mobility & coordination w/ subsequent decline in (I)/safety w/ BADLs, fxnl mobility & fxnl t/f's.  OT 5x/wk to increase phys/fxnl status & maximize potential to achieve established goals for d/c-->TBD pending progress.    Angelina Beard is a 68 y.o. female with a medical diagnosis of Acute on chronic diastolic congestive heart failure.  She presents with . Performance deficits affecting function: weakness, impaired endurance, impaired sensation, gait instability, impaired functional mobilty, impaired self care skills, impaired balance, decreased lower extremity function, decreased coordination, decreased safety awareness, pain, impaired cardiopulmonary response to activity.      Rehab Prognosis: Good; patient would benefit from acute skilled OT services to address these deficits and reach maximum level of function.       Plan:     Patient to be seen 5 x/week to address the above listed problems via self-care/home management, therapeutic exercises, therapeutic activities  · Plan of Care Expires: 09/02/19  · Plan of Care Reviewed with: patient    Subjective     Chief Complaint: SOB; chest pain  Patient/Family Comments/goals: return home    Occupational Profile:  Living Environment: alone in 1st fl apt w/ 0STE; t/s combo  Previous level of function: pt reports MI via WC>RW, but has pd cg S/A 8hrs QD  Roles and Routines: WC bound; HD MWF  Equipment Used at Home:  walker, rolling, wheelchair, cane, straight, rollator, bath bench, hospital bed  Assistance upon Discharge: pd cg    Pain/Comfort:  · Pain Rating 1: 8/10  · Location - Side 1:  "Left  · Location - Orientation 1: (dorsum)  · Location 1: foot  · Pain Addressed 1: Reposition, Distraction, Cessation of Activity, Nurse notified  · Pain Rating Post-Intervention 1: 8/10    Patients cultural, spiritual, Sabianism conflicts given the current situation:      Objective:     Communicated with: nsg prior to session.  Patient found right sidelying with bed alarm, telemetry, oxygen upon OT entry to room.    General Precautions: Standard, fall, diabetic, respiratory   Orthopedic Precautions:N/A   Braces: N/A     Occupational Performance:    Bed Mobility:    · Patient completed Supine to Sit with stand by assistance   · EOB-->sup w/ SBA    Functional Mobility/Transfers:  · Patient completed Sit <> Stand Transfer with contact guard assistance  with  rolling walker   · Functional Mobility:     Activities of Daily Living:  · Lower Body Dressing: moderate assistance don undergarments over buttocks in standing; SBA to don L sock at EOB    Cognitive/Visual Perceptual:  AO4    Physical Exam:  BUEs WFL at least 3+/5    Sit balance: F to F+  Stand balance: Poor    AMPAC 6 Click ADL:  AMPAC Total Score: 16    Treatment & Education:  Pt found in R sidelying & agreeable to OT eval/tx this PM. Pt lives alone (w/ PRN S by son & 8hr QD S/A by pt cg) in 1st fl apt w/ 0STE; t/s combo. PLOF: pt states that she could perf all BADLs from WC>RW level, but gets A w/ higher level tasks from pd cg; able to amb via RW to car for HD. Currently, pt c/o 8/10 neuropathy pain at L foot dorsum & perf the following: sup-->EOB w/ SBA & rose'd x ~10 min w/ S-SBA; donned L sock at EOB w/ SBA; standing via RW w/ CGA, but returned to sitting EOB w/in 20 sec 2/2 signif lightheadedness/weakness. Pt requested to return to R sidelying stating, "I'm always real weak after dialysis, so I don't think I should be doing all this moving around right now.". Nsg notified.  Edu/tx re: general safety techs, deep/PLBing, HEP. Pt verbalized " understanding.    Patient left right sidelying with all lines intact, call button in reach, bed alarm on and nsg notified    GOALS:   Multidisciplinary Problems     Occupational Therapy Goals        Problem: Occupational Therapy Goal    Goal Priority Disciplines Outcome Interventions   Occupational Therapy Goal     OT, PT/OT Ongoing (interventions implemented as appropriate)    Description:  Goals to be met by: 08/02     Patient will increase functional independence with ADLs by performing:    UE Dressing with Stand-by Assistance.  LE Dressing with Stand-by Assistance.  Grooming while seated with Stand-by Assistance.  Toileting from bedside commode with Stand-by Assistance for hygiene and clothing management.   Toilet transfer to bedside commode with Stand-by Assistance.  Increased functional strength to WFL for ADLs.                      History:     Past Medical History:   Diagnosis Date    Anemia due to gastrointestinal blood loss 7/11/2018    Arthritis     Asthma     Breast cancer, left     CHF (congestive heart failure)     Cholelithiasis     Coronary artery disease     Diabetes mellitus, type 2     Encounter for blood transfusion     End-stage renal disease on hemodialysis     Hemodialysis access site with arteriovenous graft     mon-wed -fri    Hyperlipidemia     Hypertension     Sarcoidosis        Past Surgical History:   Procedure Laterality Date    av graft      left arm    BRACHIAL ARTERY GRAFT Left 05/04/2016    brachiocephalic, Dr. Anson Crocker    BREAST BIOPSY Left     breast ca    BREAST LUMPECTOMY Left     radiation only    btl      COLONOSCOPY N/A 7/13/2018    [facility]    ELBOW SURGERY      left    ESOPHAGOGASTRODUODENOSCOPY (EGD) N/A 7/13/2018    [facility] ENDO    [id number] Right 5/4/2016    [facility] at [facility] OR    LIPOMA RESECTION      back of neck     pilondial cyst      REPLACEMENT-KNEE-TOTAL Right 2/23/2016    [facility] by [facility] OR 2ND FLR    TOTAL KNEE ARTHROPLASTY Right 02/23/2016       Time Tracking:     OT Date of Treatment: 08/02/19  OT Start Time: 1420  OT Stop Time: 1444  OT Total Time (min): 24 min    Billable Minutes:Evaluation 10  Therapeutic Activity 14  Total Time 24    THANIA Leonardo  8/2/2019

## 2019-08-02 NOTE — CONSULTS
[facility]  Cardiology  Consult Note    Patient Name: Angelina Beard  MRN: [id number]  Admission Date: 8/1/2019  Hospital Length of Stay: 1 days  Code Status: Full Code   Attending Provider: Herlinda Rivas*   Consulting Provider: NNAMDI Escobedo ANP  Primary Care Physician: Ben Mena MD  Principal Problem:Acute on chronic diastolic congestive heart failure    Patient information was obtained from patient, past medical records and ER records.     Inpatient consult to [facility]  Consult performed by: NNAMDI Black ANP  Consult ordered by: Herlinda Rivas MD  Reason for consult: elevated troponin         Subjective:     Chief Complaint:  SOB      HPI:   69yo female with history of HTN, DMII, chronic diastolic heart failure, morbid obesity, nonobstructive CAD, renovascular HTN, PAF, PAD, HLP, renovascular HTN and left breast cancer s/p mastectomy who presented to the ER with complaints of SOB. She was admitted from 7/29-7/31 with the same symptoms with elevated BNP and pulmonary vascular congestion on CXR. She was diuresed with  HD and was negative 3 liters. She was apparently discharged per her request due to some family issues. She reports continued SOB with no major improved and returned to the ER. She was noted to be hypertensive with BP 200s/100s with CHF pattern on CXR although slightly improved in comparison to previous. She was continued on her current medication regimen with improvement in her BP. She underwent HD with 2.7 liters removed 8/1 and 1.7 liters removed today and is negative 3.1 liters since admission. EKG with  PACs nonspecific ST abnormality unchanged from previous EKG. Troponin mildly elevated at .[phone]. Cardiology consulted for elevated troponin     Hospital Course:    Per HPI   Past Medical History:   Diagnosis Date    Anemia due to gastrointestinal blood loss 7/11/2018    Arthritis     Asthma     Breast cancer,  "left     CHF (congestive heart failure)     Cholelithiasis     Coronary artery disease     Diabetes mellitus, type 2     Encounter for blood transfusion     End-stage renal disease on hemodialysis     Hemodialysis access site with arteriovenous graft     mon-wed -fri    Hyperlipidemia     Hypertension     Sarcoidosis        Past Surgical History:   Procedure Laterality Date    av graft      left arm    BRACHIAL ARTERY GRAFT Left 05/04/2016    brachiocephalic, Dr. Anson Crocker    BREAST BIOPSY Left     breast ca    BREAST LUMPECTOMY Left     radiation only    btl      COLONOSCOPY N/A 7/13/2018    [facility]    ELBOW SURGERY      left    ESOPHAGOGASTRODUODENOSCOPY (EGD) N/A 7/13/2018    [facility] ENDO    [id number] Right 5/4/2016    [facility] at [facility] OR    LIPOMA RESECTION      back of neck    pilondial cyst      REPLACEMENT-KNEE-TOTAL Right 2/23/2016    [facility]formed [facility] OR 2ND FLR    TOTAL KNEE ARTHROPLASTY Right 02/23/2016       Review of patient's allergies indicates:  No Known Allergies    No current facility-administered medications on file prior to encounter.      Current Outpatient Medications on File Prior to Encounter   Medication Sig    amLODIPine (NORVASC) 10 MG tablet Take 1 tablet (10 mg total) by mouth once daily.    apixaban (ELIQUIS) 2.5 mg Tab Take 1 tablet (2.5 mg total) by mouth 2 (two) times daily.    bismuth tribrom-petrolatum,wh (XEROFORM PETROLATUM DRESSING) 1 X 8 " Bndg Apply locally every other day    calcium acetate (PHOSLO) 667 mg capsule Take 667 mg by mouth 3 (three) times daily with meals.    ciprofloxacin HCl (CIPRO) 500 MG tablet Take 1 tablet (500 mg total) by mouth once daily.    clopidogrel (PLAVIX) 75 mg tablet Take 1 tablet (75 mg total) by mouth once daily.    gabapentin (NEURONTIN) 300 MG capsule Take 1 capsule " (300 mg total) by mouth every 8 (eight) hours as needed.    insulin (LANTUS SOLOSTAR U-100 INSULIN) glargine 100 units/mL (3mL) SubQ pen Inject 20 Units into the skin once daily.    metoprolol succinate (TOPROL-XL) 50 MG 24 hr tablet Take 1 tablet (50 mg total) by mouth once daily.    mupirocin (BACTROBAN) 2 % ointment apply by Nasal route 2 (two) times daily.    pantoprazole (PROTONIX) 40 MG tablet Take 1 tablet (40 mg total) by mouth once daily.    polyethylene glycol (GLYCOLAX) 17 gram/dose powder     pravastatin (PRAVACHOL) 80 MG tablet Take 1 tablet (80 mg total) by mouth once daily.    sertraline (ZOLOFT) 100 MG tablet Take 1 tablet (100 mg total) by mouth once daily.    furosemide (LASIX) 40 MG tablet Take 1 tablet (40 mg total) by mouth once daily.    isosorbide mononitrate (IMDUR) 30 MG 24 hr tablet Take 1 tablet (30 mg total) by mouth once daily.    nitroGLYCERIN (NITROSTAT) 0.4 MG SL tablet Place 1 tablet (0.4 mg total) under the tongue every 5 (five) minutes as needed for Chest pain. if second dose is needed call 911.     Family History     Problem Relation (Age of Onset)    Diabetes Mother, Sister    Heart disease Sister    Hypertension Mother    Kidney disease Mother        Tobacco Use    Smoking status: Never Smoker    Smokeless tobacco: Never Used   Substance and Sexual Activity    Alcohol use: No    Drug use: No    Sexual activity: Not on file     Review of Systems   Constitution: Negative for chills, decreased appetite, diaphoresis and fever.   Cardiovascular: Positive for dyspnea on exertion. Negative for chest pain, claudication, cyanosis, irregular heartbeat, leg swelling, near-syncope, orthopnea, palpitations, paroxysmal nocturnal dyspnea and syncope.   Respiratory: Negative for cough, hemoptysis, shortness of breath and wheezing.    Gastrointestinal: Negative for bloating, abdominal pain, constipation, diarrhea, melena, nausea and vomiting.   Neurological: Negative for dizziness  and weakness.     Objective:     Vital Signs (Most Recent):  Temp: 96.7 °F (35.9 °C) (08/02/19 1241)  Pulse: 72 (08/02/19 1241)  Resp: 16 (08/02/19 1241)  BP: 132/60 (08/02/19 1241)  SpO2: 98 % (08/02/19 1231) Vital Signs (24h Range):  Temp:  [96.7 °F (35.9 °C)-98.1 °F (36.7 °C)] 96.7 °F (35.9 °C)  Pulse:  [54-95] 72  Resp:  [16-20] 16  SpO2:  [93 %-100 %] 98 %  BP: ()/() 132/60     Weight: 104.5 kg (230 lb 6.1 oz)  Body mass index is 36.08 kg/m².    SpO2: 98 %  O2 Device (Oxygen Therapy): nasal cannula      Intake/Output Summary (Last 24 hours) at 8/2/2019 1404  Last data filed at 8/2/2019 1140  Gross per 24 hour   Intake 1425 ml   Output 4436 ml   Net -3011 ml       Lines/Drains/Airways     Drain                 Hemodialysis AV Graft -- days         Hemodialysis AV Graft Right upper arm -- days          Peripheral Intravenous Line                 Peripheral IV - Single Lumen 20 G Left Upper Arm -- days         Peripheral IV - Single Lumen 08/01/19 1020 22 G Left Upper Arm 1 day                Physical Exam   Constitutional: She is oriented to person, place, and time. She appears well-developed and well-nourished. No distress.   Cardiovascular: Normal rate and regular rhythm. Exam reveals no gallop.   No murmur heard.  Pulmonary/Chest: Effort normal and breath sounds normal. No respiratory distress. She has no wheezes.   Abdominal: Soft. Bowel sounds are normal. She exhibits no distension. There is no tenderness.   Neurological: She is alert and oriented to person, place, and time.   Skin: Skin is warm and dry.       Significant Labs:     Recent Labs   Lab 08/02/19  0140   WBC 5.97   RBC 3.73*   HGB 11.2*   HCT 35.2*      MCV 94   MCH 30.0   MCHC 31.8*     Recent Labs   Lab 08/02/19  0140      K 4.1   CL 95   CO2 25   BUN 31*   CREATININE 6.3*   MG 1.9     Recent Labs   Lab 08/02/19  0743   TROPONINI 0.039*       Significant Imaging:     TTE 9/24/2018    CONCLUSIONS     1 - Concentric  hypertrophy.     2 - Normal left ventricular systolic function (EF 60-65%).     3 - Impaired LV relaxation, normal LAP (grade 1 diastolic dysfunction).     4 - Normal right ventricular systolic function .     5 - The estimated PA systolic pressure is 34 mmHg.     Assessment and Plan:     Paroxysmal atrial fibrillation  -history of PAF treated with BB and Eliquis  -EKG with NSR; no afib noted on telemetry  -continue BB and Eliquis     Renovascular hypertension  -BP elevated at 200s/100s upon admission  -BP trended down to 130s-140s/60s overnight and 100s/60s prior to HD prompting holding of CCB and BB  -on Norvasc, Toprol XL and Imdur at home and anticipate resumption as long as hypotension does not occur  -suspect some level of dietary noncompliance; if BP unable to tolerate all 3 agents would recommend BB and Imdur     Coronary artery disease  -nonobstructive CAD per previous C by Dr. Murphy  -recent nuclear stress test in 2/2019 with no evidence of ischemia  -EKG with no acute changes; troponin .[phone] (basline .026-.088); no concern for ACS; elevated troponin felt to be related to chronic issue vs demand etiology in setting of elevated BP  -continue BB, statin, Plavix; no ASA due to use of Eliquis; resume Imdur as long as BP tolerates         VTE Risk Mitigation (From admission, onward)        Ordered     apixaban tablet 2.5 mg  2 times daily      08/01/19 1947          Thank you for your consult. I will sign off. Please contact us if you have any additional questions.    NNAMDI Escobedo, ANP  Cardiology   [facility]

## 2019-08-02 NOTE — HOSPITAL COURSE
Disease education and trajectory discussed with patient and her son Cristi. Recommend placement as patient need assistance with her ADLs. placement with son   Troponin peaked at 0.43- consult cardiology. Appreciates rec's- Imdur when Bp tolerates, continue BB, statin, Plavix, Eliquis

## 2019-08-02 NOTE — PT/OT/SLP EVAL
Physical Therapy Evaluation    Patient Name:  Angelina Beard   MRN:  [id number]    Recommendations:     Discharge Recommendations:  TBD  Discharge Equipment Recommendations: (TBD)   Barriers to discharge: Decreased caregiver support    Assessment:     Angelina Beard is a 68 y.o. female admitted with a medical diagnosis of Acute on chronic diastolic congestive heart failure.  She presents with the following impairments/functional limitations:  weakness, impaired self care skills, impaired endurance, impaired functional mobilty, decreased lower extremity function, gait instability, impaired cardiopulmonary response to activity, impaired balance.  Pt transferred from bed to w/c with RW with Min assist of 1.    Rehab Prognosis: Fair; patient would benefit from acute skilled PT services to address these deficits and reach maximum level of function.    Recent Surgery: * No surgery found *      Plan:     During this hospitalization, patient to be seen 6 x/week to address the identified rehab impairments via gait training, therapeutic activities, therapeutic exercises, neuromuscular re-education and progress toward the following goals:    · Plan of Care Expires:  09/02/19    Subjective     Chief Complaint: left knee gives out  Patient/Family Comments/goals: not fall any more  Pain/Comfort:  · Pain Rating 1: 0/10    Patients cultural, spiritual, Pentecostal conflicts given the current situation: no    Living Environment:  Pt lives alone in a 2 story apartment that she utilizes the 1st floor of only, no step up into apt.  Pt reports difficulty and a history of falling when going to/from dialysis 2/2 to no ramp to street from apartment so she has to step down the curb, step up to caregivers vehicle.    Prior to admission, patients level of function was living alone exept for 7 hours/day most days when she has a sitter.  Equipment used at home: cane, straight, walker, rolling, shower chair, raised toilet, rollator.  DME owned  "(not currently used): none.  Upon discharge, patient will have assistance from TBD.    Objective:     Communicated with nurse prior to session.  Patient found supine with telemetry, peripheral IV  upon PT entry to room.    General Precautions: Standard,     Orthopedic Precautions:    Braces:       Exams:  · Pt is oriented x 4.  Pt lacks 15 degrees of left knee extension with 2+ crepitus noted in left knee.  Pt has RLE PROM WFL.  Pt has 3 to 5/5 BLE strength.    Functional Mobility:  · Bed Mobility:     · Supine to Sit: modified independence  · Transfers:     · Sit to Stand:  minimum assistance with rolling walker  · Bed to Chair: minimum assistance with  rolling walker  using  Stand Pivot  · Gait: Pt ambulated 2' to chair with RW with Min assist 1, noting decreased stance on LLE.  · Balance: Pt has F/F- dynamic standing balance      Therapeutic Activities and Exercises:   Rx of LLE HS stretch in sitting 30" x 3.    AM-PAC 6 CLICK MOBILITY  Total Score:17     Patient left up in chair with all lines intact, call button in reach, chair alarm on, nurse notified and son present.    GOALS:   Multidisciplinary Problems     Physical Therapy Goals        Problem: Physical Therapy Goal    Goal Priority Disciplines Outcome Goal Variances Interventions   Physical Therapy Goal     PT, PT/OT Ongoing (interventions implemented as appropriate)     Description:  1.  Bed to/from chair with supervision with RW  2.  Ambulate 100' with RW with CG/sup   3.  Sit in chair 2 hours  4.  Ascend/descend 1 step with RW with CG/sup                      History:     Past Medical History:   Diagnosis Date    Anemia due to gastrointestinal blood loss 7/11/2018    Arthritis     Asthma     Breast cancer, left     CHF (congestive heart failure)     Cholelithiasis     Coronary artery disease     Diabetes mellitus, type 2     Encounter for blood transfusion     End-stage renal disease on hemodialysis     Hemodialysis access site with " arteriovenous graft     mon-wed -fri    Hyperlipidemia     Hypertension     Sarcoidosis        Past Surgical History:   Procedure Laterality Date    av graft      left arm    BRACHIAL ARTERY GRAFT Left 05/04/2016    brachiocephalic, Dr. Anson Crocker    BREAST BIOPSY Left     breast ca    BREAST LUMPECTOMY Left     radiation only    btl      COLONOSCOPY N/A 7/13/2018    [facility] ENDO    ELBOW SURGERY      left    ESOPHAGOGASTRODUODENOSCOPY (EGD) N/A 7/13/2018    [facility] by [facility] ENDO    [id number] Right 5/4/2016    Performed by [facility]cker [facility] at [facility] OR    LIPOMA RESECTION      back of neck    pilondial cyst      REPLACEMENT-KNEE-TOTAL Right 2/23/2016    [facility] OR 2ND FLR    TOTAL KNEE ARTHROPLASTY Right 02/23/2016       Time Tracking:     PT Received On: 08/02/19  PT Start Time: 0747     PT Stop Time: 0812  PT Total Time (min): 25 min     Billable Minutes: Evaluation 13 and TA 12        Nora Haas, PT  08/02/2019

## 2019-08-02 NOTE — PLAN OF CARE
"Problem: Occupational Therapy Goal  Goal: Occupational Therapy Goal  Goals to be met by: 08/02     Patient will increase functional independence with ADLs by performing:    UE Dressing with Stand-by Assistance.  LE Dressing with Stand-by Assistance.  Grooming while seated with Stand-by Assistance.  Toileting from bedside commode with Stand-by Assistance for hygiene and clothing management.   Toilet transfer to bedside commode with Stand-by Assistance.  Increased functional strength to WFL for ADLs.    Outcome: Ongoing (interventions implemented as appropriate)  Pt found in R sidelying & agreeable to OT eval/tx this PM. Pt lives alone (w/ PRN S by son & 8hr QD S/A by pt cg) in 1st fl apt w/ 0STE; t/s combo. PLOF: pt states that she could perf all BADLs from WC>RW level, but gets A w/ higher level tasks from pd cg; able to amb via RW to car for HD. Currently, pt c/o 8/10 neuropathy pain at L foot dorsum & perf the following: sup-->EOB w/ SBA & rose'd x ~10 min w/ S-SBA; donned L sock at EOB w/ SBA; standing via RW w/ CGA, but returned to sitting EOB w/in 20 sec 2/2 signif lightheadedness/weakness. Pt requested to return to R sidelying stating, "I'm always real weak after dialysis, so I don't think I should be doing all this moving around right now.". Nsg notified.  Edu/tx re: general safety techs, deep/PLBing, HEP. Pt verbalized understanding.    Pt presents w/ decreased overall endurance/conditioning, balance/mobility & coordination w/ subsequent decline in (I)/safety w/ BADLs, fxnl mobility & fxnl t/f's.  OT 5x/wk to increase phys/fxnl status & maximize potential to achieve established goals for d/c-->TBD pending progress.      "

## 2019-08-02 NOTE — PLAN OF CARE
"Patient AAOx3  Lives at home with son (son is there at night)  Has medicaid waiver program with PCA 43 hours a week- PCA bring patient to HD   Goes to outpatient HD at [facility], [place], operated by [facility] 9-1:30pm  Uses [facility] with SN/PT/OT    Discussed low sodium/renal diet with patient and patient states "my diet is horrible".    Patient has all DME equipment (does NOT wear oxygen)    Discussed SNF and patient does NOT want to go to SNF.    Future Appointments   Date Time Provider Department Center   11/12/2019 10:00 AM [facility]   11/14/2019 10:30 AM Ben Mena MD [facility]          08/02/19 3054   Discharge Assessment   Assessment Type Discharge Planning Assessment   Confirmed/corrected address and phone number on facesheet? Yes   Assessment information obtained from? Patient   Communicated expected length of stay with patient/caregiver yes   Prior to hospitilization cognitive status: Alert/Oriented   Prior to hospitalization functional status: Independent;Assistive Equipment   Current cognitive status: Alert/Oriented   Current Functional Status: Independent   Lives With alone   Is patient able to care for self after discharge? Yes   Patient's perception of discharge disposition home or selfcare;home health   Readmission Within the Last 30 Days no previous admission in last 30 days   Patient currently being followed by outpatient case management? No   Patient currently receives any other outside agency services? No   Equipment Currently Used at Home wheelchair;walker, rolling;cane, straight;rollator   Do you have any problems affording any of your prescribed medications? No   Is the patient taking medications as prescribed? yes   Does the patient have transportation home? Yes   Transportation Anticipated family or friend will provide   Does the patient receive services at the [facility]? No   Discharge Plan A Home;Home with family;Home Health   DME Needed Upon " Discharge  none   Patient/Family in Agreement with Plan yes       Lotus Wharton, RN, CCM, CMSRN  RN Transition Navigator  [phone]

## 2019-08-02 NOTE — PLAN OF CARE
Problem: Adult Inpatient Plan of Care  Goal: Plan of Care Review  Outcome: Ongoing (interventions implemented as appropriate)  POC reviewed with patient. Patient AAOx4 and reports BLE pain. PRN Tylenol and Gabapentin administered. Dialysis completed removing 1.2L. 4L of oxygen via NC in place. Tele monitor in place reading NSR. No red alarms or ectopy noted. Patient reported CP this afternoon while converting to Afib then back to NSR. EKG completed. Blood glucose checks completed ACHS. PRN insulin administered per sliding scale. Bed alarm on, bed locked and low, side rails up x2, and call bell in reach. Patient verbalizes clear understanding of POC.

## 2019-08-02 NOTE — PLAN OF CARE
VN cued into pt's room for introduction. VN informed pt that VN would be working along side bedside nurse and PCT throughout shift. Level of present pain assessed. At present no distress noted. Discussed with patient today's plan of care and dialysis. Discussed with patient High fall risk protocol and interventions that have been initiated and cont be in place for safety. Patient verbalized clear understanding and cooperation using teach back method. Bed alarm presently activated and in use. Will cont to be available to patient and intervene prn.

## 2019-08-02 NOTE — PROGRESS NOTES
"[facility] Hospital Medicine  Progress Note    Patient Name: Angelina Beard  MRN: [id number]  Patient Class: IP- Inpatient   Admission Date: 8/1/2019  Length of Stay: 1 days  Attending Physician: Herlinda Rivas*  Primary Care Provider: Ben Mena MD        Subjective:     Principal Problem:Acute on chronic diastolic congestive heart failure      HPI:  Chirag Munson, is a 67 y/o F with PMH of CHF, DM, HTN, and asthma discharge from our service on 7/31 presented via EMS today with complaint of one day history of shortness of breath with generalized weakness that started yesterday. There is associated productive cough with brownish colored mucus. Denies fever, chills, chest pain, nausea, vomiting,. Patient stated "I wasn't supposed to leave but my son was having a fit. Patient noted she does not have help with her ADLs. O2 sat in the ED stayed >93% on RA.   Patient reported chest pain during dialysis this afternoon. Will admit to trend troponin and possible placement.     Overview/Hospital Course:  Disease education and trajectory discussed with patient and her son Cristi. Recommend placement as patient need assistance with her ADLs. placement with son   Troponin peaked at 0.43- consult cardiology        Interval History:awake and alert, in HD, disease education and trajectory discussed with son. Recommend placement as patient need assistance with her ADLs. placement with son Cristi.  Troponin peaked at 0.43- consult cardiology    continue present management    Review of Systems   Constitutional: Negative for diaphoresis and fatigue.   Respiratory: Negative for cough, chest tightness, shortness of breath and wheezing.    Cardiovascular: Negative for chest pain and leg swelling.   Gastrointestinal: Negative for abdominal pain, nausea and vomiting.   Genitourinary: Negative for flank pain and hematuria.   Musculoskeletal: Negative for back pain and myalgias.   Neurological: Negative " for syncope and weakness.   Psychiatric/Behavioral: Negative for confusion.     Objective:     Vital Signs (Most Recent):  Temp: 98.1 °F (36.7 °C) (08/02/19 0850)  Pulse: 75 (08/02/19 1146)  Resp: 18 (08/02/19 0850)  BP: (!) 118/40 (08/02/19 1100)  SpO2: 98 % (08/02/19 0445) Vital Signs (24h Range):  Temp:  [96.7 °F (35.9 °C)-98.1 °F (36.7 °C)] 98.1 °F (36.7 °C)  Pulse:  [54-95] 75  Resp:  [14-20] 18  SpO2:  [93 %-100 %] 98 %  BP: ()/() 118/40     Weight: 104.5 kg (230 lb 6.1 oz)  Body mass index is 36.08 kg/m².    Intake/Output Summary (Last 24 hours) at 8/2/2019 1151  Last data filed at 8/2/2019 0128  Gross per 24 hour   Intake 925 ml   Output 2736 ml   Net -1811 ml      Physical Exam   Constitutional: She is oriented to person, place, and time. She appears well-developed and well-nourished.   HENT:   Head: Normocephalic and atraumatic.   Neck: Normal range of motion.   Cardiovascular: Normal rate, regular rhythm, normal heart sounds and intact distal pulses.   Pulmonary/Chest: Effort normal and breath sounds normal.   Abdominal: Soft. Bowel sounds are normal. She exhibits no distension.   Musculoskeletal: Normal range of motion. She exhibits edema. She exhibits no tenderness.   Neurological: She is alert and oriented to person, place, and time.   Skin: Skin is warm and dry. Capillary refill takes less than 2 seconds.   Psychiatric: She has a normal mood and affect.       Significant Labs:   CBC:   Recent Labs   Lab 08/01/19  1028 08/02/19  0140   WBC 7.23 5.97   HGB 12.0 11.2*   HCT 37.7 35.2*    260     CMP:   Recent Labs   Lab 08/01/19  1028 08/02/19  0140    137   K 4.7 4.1   CL 96 95   CO2 21* 25   * 115*   BUN 39* 31*   CREATININE 7.6* 6.3*   CALCIUM 10.6* 10.2   PROT 8.2 7.7   ALBUMIN 3.1* 2.9*   BILITOT 0.4 0.4   ALKPHOS 72 67   AST 18 18   ALT 23 20   ANIONGAP 19* 17*   EGFRNONAA 5* 6*     Troponin:   Recent Labs   Lab 08/02/19  0140 08/02/19  0612 08/02/19  0743    TROPONINI 0.040* 0.043* 0.039*     TSH:   Recent Labs   Lab 02/12/19  0958   TSH 1.470     Urine Culture: No results for input(s): LABURIN in the last 48 hours.  Urine Studies:   No results for input(s): COLORU, APPEARANCEUA, PHUR, SPECGRAV, PROTEINUA, GLUCUA, KETONESU, BILIRUBINUA, OCCULTUA, NITRITE, UROBILINOGEN, LEUKOCYTESUR, RBCUA, WBCUA, BACTERIA, SQUAMEPITHEL, HYALINECASTS in the last 48 hours.    Invalid input(s): WRIGHTSUR    Significant Imaging: I have reviewed all pertinent imaging results/findings within the past 24 hours.      Assessment/Plan:      * Acute on chronic diastolic congestive heart failure  Shortness of breath  Paroxysmal atrial fibrillation  Coronary artery disease  Elevated Troponin  Trend troponin  Continue with Lasix  continue with supplemental Oxygen   Strict I/O  Continue Plavix, Eliquis, Amlodipine, statin and metoprolol  Consult nephrology  Consult cardiology      PAD (peripheral artery disease)  History of stroke  Hyperlipidemia  Hypertension  Continue statin and Plavix   Amlodipine, Lasix and metoprolol      Physical deconditioning        End-stage renal disease on hemodialysis  Anemia in ESRD  On HD MWF  Consult nephrology        Debility  Obesity  Physical deconditioning  Consult PT/OT  Consult case management for placement  Discussed worsening debility with patient needing assistance with her ADL and disease education and trajectory discussed with son. Recommend placement       Diabetic neuropathy associated with type 2 diabetes mellitus  HbA1c 6.5 on 5/7 19  Low dose SSI   accucheck AC&HS   diabetic diet     History of sarcoidosis  chronic      Osteoarthritis of lumbar spine  Chronic        GERD (gastroesophageal reflux disease)  Continue PPI        VTE Risk Mitigation (From admission, onward)        Ordered     apixaban tablet 2.5 mg  2 times daily      08/01/19 1947                Herlinda Rivas MD  Department of Hospital Medicine   [facility]

## 2019-08-02 NOTE — ASSESSMENT & PLAN NOTE
-nonobstructive CAD per previous LHC by Dr. Murphy  -recent nuclear stress test in 2/2019 with no evidence of ischemia  -EKG with no acute changes; troponin .033-.040-.040 (basline .026-.088); no concern for ACS; elevated troponin felt to be related to chronic issue vs demand etiology in setting of elevated BP  -continue BB, statin, Plavix; no ASA due to use of Eliquis; resume Imdur as long as BP tolerates

## 2019-08-02 NOTE — ASSESSMENT & PLAN NOTE
Obesity  Physical deconditioning  Consult PT/OT  Consult case management for placement  Discussed worsening debility with patient needing assistance with her ADL and disease education and trajectory discussed with son. Recommend placement

## 2019-08-02 NOTE — SUBJECTIVE & OBJECTIVE
Interval History:awake and alert, in HD, disease education and trajectory discussed with son. Recommend placement as patient need assistance with her ADLs. placement with son Cristi.  Troponin peaked at 0.43- consult cardiology    continue present management    Review of Systems   Constitutional: Negative for diaphoresis and fatigue.   Respiratory: Negative for cough, chest tightness, shortness of breath and wheezing.    Cardiovascular: Negative for chest pain and leg swelling.   Gastrointestinal: Negative for abdominal pain, nausea and vomiting.   Genitourinary: Negative for flank pain and hematuria.   Musculoskeletal: Negative for back pain and myalgias.   Neurological: Negative for syncope and weakness.   Psychiatric/Behavioral: Negative for confusion.     Objective:     Vital Signs (Most Recent):  Temp: 98.1 °F (36.7 °C) (08/02/19 0850)  Pulse: 75 (08/02/19 1146)  Resp: 18 (08/02/19 0850)  BP: (!) 118/40 (08/02/19 1100)  SpO2: 98 % (08/02/19 0445) Vital Signs (24h Range):  Temp:  [96.7 °F (35.9 °C)-98.1 °F (36.7 °C)] 98.1 °F (36.7 °C)  Pulse:  [54-95] 75  Resp:  [14-20] 18  SpO2:  [93 %-100 %] 98 %  BP: ()/() 118/40     Weight: 104.5 kg (230 lb 6.1 oz)  Body mass index is 36.08 kg/m².    Intake/Output Summary (Last 24 hours) at 8/2/2019 1151  Last data filed at 8/2/2019 0128  Gross per 24 hour   Intake 925 ml   Output 2736 ml   Net -1811 ml      Physical Exam   Constitutional: She is oriented to person, place, and time. She appears well-developed and well-nourished.   HENT:   Head: Normocephalic and atraumatic.   Neck: Normal range of motion.   Cardiovascular: Normal rate, regular rhythm, normal heart sounds and intact distal pulses.   Pulmonary/Chest: Effort normal and breath sounds normal.   Abdominal: Soft. Bowel sounds are normal. She exhibits no distension.   Musculoskeletal: Normal range of motion. She exhibits edema. She exhibits no tenderness.   Neurological: She is alert and oriented to  person, place, and time.   Skin: Skin is warm and dry. Capillary refill takes less than 2 seconds.   Psychiatric: She has a normal mood and affect.       Significant Labs:   CBC:   Recent Labs   Lab 08/01/19  1028 08/02/19  0140   WBC 7.23 5.97   HGB 12.0 11.2*   HCT 37.7 35.2*    260     CMP:   Recent Labs   Lab 08/01/19  1028 08/02/19  0140    137   K 4.7 4.1   CL 96 95   CO2 21* 25   * 115*   BUN 39* 31*   CREATININE 7.6* 6.3*   CALCIUM 10.6* 10.2   PROT 8.2 7.7   ALBUMIN 3.1* 2.9*   BILITOT 0.4 0.4   ALKPHOS 72 67   AST 18 18   ALT 23 20   ANIONGAP 19* 17*   EGFRNONAA 5* 6*     Troponin:   Recent Labs   Lab 08/02/19  0140 08/02/19  0612 08/02/19  0743   TROPONINI 0.040* 0.043* 0.039*     TSH:   Recent Labs   Lab 02/12/19  0958   TSH 1.470     Urine Culture: No results for input(s): LABURIN in the last 48 hours.  Urine Studies:   No results for input(s): COLORU, APPEARANCEUA, PHUR, SPECGRAV, PROTEINUA, GLUCUA, KETONESU, BILIRUBINUA, OCCULTUA, NITRITE, UROBILINOGEN, LEUKOCYTESUR, RBCUA, WBCUA, BACTERIA, SQUAMEPITHEL, HYALINECASTS in the last 48 hours.    Invalid input(s): ENRIQUE    Significant Imaging: I have reviewed all pertinent imaging results/findings within the past 24 hours.

## 2019-08-02 NOTE — ASSESSMENT & PLAN NOTE
-history of PAF treated with BB and Eliquis  -EKG with NSR; no afib noted on telemetry  -continue BB and Eliquis

## 2019-08-02 NOTE — ASSESSMENT & PLAN NOTE
-BP elevated at 200s/100s upon admission  -BP trended down to 130s-140s/60s overnight and 100s/60s prior to HD prompting holding of CCB and BB  -on Norvasc, Toprol XL and Imdur at home and anticipate resumption as long as hypotension does not occur  -suspect some level of dietary noncompliance; if BP unable to tolerate all 3 agents would recommend BB and Imdur

## 2019-08-03 VITALS
RESPIRATION RATE: 20 BRPM | TEMPERATURE: 97 F | OXYGEN SATURATION: 100 % | BODY MASS INDEX: 33.78 KG/M2 | HEIGHT: 67 IN | SYSTOLIC BLOOD PRESSURE: 108 MMHG | WEIGHT: 215.19 LBS | DIASTOLIC BLOOD PRESSURE: 54 MMHG | HEART RATE: 67 BPM

## 2019-08-03 LAB
ALBUMIN SERPL BCP-MCNC: 3 G/DL (ref 3.5–5.2)
ALP SERPL-CCNC: 70 U/L (ref 55–135)
ALT SERPL W/O P-5'-P-CCNC: 19 U/L (ref 10–44)
ANION GAP SERPL CALC-SCNC: 16 MMOL/L (ref 8–16)
AST SERPL-CCNC: 18 U/L (ref 10–40)
BASOPHILS # BLD AUTO: 0.02 K/UL (ref 0–0.2)
BASOPHILS NFR BLD: 0.4 % (ref 0–1.9)
BILIRUB SERPL-MCNC: 0.4 MG/DL (ref 0.1–1)
BUN SERPL-MCNC: 37 MG/DL (ref 8–23)
CALCIUM SERPL-MCNC: 10.2 MG/DL (ref 8.7–10.5)
CHLORIDE SERPL-SCNC: 96 MMOL/L (ref 95–110)
CO2 SERPL-SCNC: 23 MMOL/L (ref 23–29)
CREAT SERPL-MCNC: 6.4 MG/DL (ref 0.5–1.4)
DIFFERENTIAL METHOD: ABNORMAL
EOSINOPHIL # BLD AUTO: 0.2 K/UL (ref 0–0.5)
EOSINOPHIL NFR BLD: 2.9 % (ref 0–8)
ERYTHROCYTE [DISTWIDTH] IN BLOOD BY AUTOMATED COUNT: 14.3 % (ref 11.5–14.5)
EST. GFR  (AFRICAN AMERICAN): 7 ML/MIN/1.73 M^2
EST. GFR  (NON AFRICAN AMERICAN): 6 ML/MIN/1.73 M^2
GLUCOSE SERPL-MCNC: 172 MG/DL (ref 70–110)
HCT VFR BLD AUTO: 37.7 % (ref 37–48.5)
HGB BLD-MCNC: 11.9 G/DL (ref 12–16)
LYMPHOCYTES # BLD AUTO: 1.1 K/UL (ref 1–4.8)
LYMPHOCYTES NFR BLD: 20.6 % (ref 18–48)
MAGNESIUM SERPL-MCNC: 1.9 MG/DL (ref 1.6–2.6)
MCH RBC QN AUTO: 29.8 PG (ref 27–31)
MCHC RBC AUTO-ENTMCNC: 31.6 G/DL (ref 32–36)
MCV RBC AUTO: 95 FL (ref 82–98)
MONOCYTES # BLD AUTO: 0.7 K/UL (ref 0.3–1)
MONOCYTES NFR BLD: 12.3 % (ref 4–15)
NEUTROPHILS # BLD AUTO: 3.5 K/UL (ref 1.8–7.7)
NEUTROPHILS NFR BLD: 63.8 % (ref 38–73)
PHOSPHATE SERPL-MCNC: 4.6 MG/DL (ref 2.7–4.5)
PLATELET # BLD AUTO: 261 K/UL (ref 150–350)
PMV BLD AUTO: 10 FL (ref 9.2–12.9)
POCT GLUCOSE: 161 MG/DL (ref 70–110)
POCT GLUCOSE: 172 MG/DL (ref 70–110)
POCT GLUCOSE: 181 MG/DL (ref 70–110)
POTASSIUM SERPL-SCNC: 4.5 MMOL/L (ref 3.5–5.1)
PROT SERPL-MCNC: 7.7 G/DL (ref 6–8.4)
RBC # BLD AUTO: 3.99 M/UL (ref 4–5.4)
SODIUM SERPL-SCNC: 135 MMOL/L (ref 136–145)
WBC # BLD AUTO: 5.54 K/UL (ref 3.9–12.7)

## 2019-08-03 PROCEDURE — 84100 ASSAY OF PHOSPHORUS: CPT

## 2019-08-03 PROCEDURE — 27000221 HC OXYGEN, UP TO 24 HOURS

## 2019-08-03 PROCEDURE — 25000003 PHARM REV CODE 250: Performed by: FAMILY MEDICINE

## 2019-08-03 PROCEDURE — 83735 ASSAY OF MAGNESIUM: CPT

## 2019-08-03 PROCEDURE — 94761 N-INVAS EAR/PLS OXIMETRY MLT: CPT

## 2019-08-03 PROCEDURE — 85025 COMPLETE CBC W/AUTO DIFF WBC: CPT

## 2019-08-03 PROCEDURE — 25000003 PHARM REV CODE 250: Performed by: EMERGENCY MEDICINE

## 2019-08-03 PROCEDURE — 80053 COMPREHEN METABOLIC PANEL: CPT

## 2019-08-03 PROCEDURE — 63600175 PHARM REV CODE 636 W HCPCS: Performed by: EMERGENCY MEDICINE

## 2019-08-03 RX ORDER — CIPROFLOXACIN 500 MG/1
500 TABLET ORAL DAILY
Qty: 7 TABLET | Refills: 0 | Status: SHIPPED | OUTPATIENT
Start: 2019-08-04 | End: 2019-08-11

## 2019-08-03 RX ORDER — ACETAMINOPHEN 500 MG
1000 TABLET ORAL EVERY 8 HOURS PRN
Refills: 0 | Status: ON HOLD | COMMUNITY
Start: 2019-08-03 | End: 2019-11-25

## 2019-08-03 RX ORDER — NITROGLYCERIN 0.4 MG/1
0.4 TABLET SUBLINGUAL EVERY 5 MIN PRN
Qty: 10 TABLET | Refills: 1 | Status: SHIPPED | OUTPATIENT
Start: 2019-08-03 | End: 2020-07-16 | Stop reason: SDUPTHER

## 2019-08-03 RX ORDER — DOCUSATE SODIUM 100 MG/1
100 CAPSULE, LIQUID FILLED ORAL 2 TIMES DAILY
Refills: 0 | COMMUNITY
Start: 2019-08-03

## 2019-08-03 RX ORDER — PANTOPRAZOLE SODIUM 40 MG/1
40 TABLET, DELAYED RELEASE ORAL DAILY
Qty: 30 TABLET | Refills: 11 | Status: SHIPPED | OUTPATIENT
Start: 2019-08-04 | End: 2020-02-04 | Stop reason: SDUPTHER

## 2019-08-03 RX ORDER — ISOSORBIDE MONONITRATE 30 MG/1
30 TABLET, EXTENDED RELEASE ORAL DAILY
Status: DISCONTINUED | OUTPATIENT
Start: 2019-08-03 | End: 2019-08-03 | Stop reason: HOSPADM

## 2019-08-03 RX ORDER — ISOSORBIDE MONONITRATE 30 MG/1
30 TABLET, EXTENDED RELEASE ORAL DAILY
Qty: 30 TABLET | Refills: 11 | Status: SHIPPED | OUTPATIENT
Start: 2019-08-03 | End: 2019-12-02 | Stop reason: SDUPTHER

## 2019-08-03 RX ORDER — MUPIROCIN 20 MG/G
OINTMENT TOPICAL 2 TIMES DAILY
Qty: 2 G | Refills: 1 | Status: ON HOLD | OUTPATIENT
Start: 2019-08-03 | End: 2019-11-25

## 2019-08-03 RX ADMIN — GABAPENTIN 300 MG: 300 CAPSULE ORAL at 09:08

## 2019-08-03 RX ADMIN — ACETAMINOPHEN 650 MG: 325 TABLET ORAL at 09:08

## 2019-08-03 RX ADMIN — PANTOPRAZOLE SODIUM 40 MG: 40 TABLET, DELAYED RELEASE ORAL at 09:08

## 2019-08-03 RX ADMIN — ASCORBIC ACID, THIAMINE MONONITRATE,RIBOFLAVIN, NIACINAMIDE, PYRIDOXINE HYDROCHLORIDE, FOLIC ACID, CYANOCOBALAMIN, BIOTIN, CALCIUM PANTOTHENATE, 1 CAPSULE: 100; 1.5; 1.7; 20; 10; 1; 6000; 150000; 5 CAPSULE, LIQUID FILLED ORAL at 09:08

## 2019-08-03 RX ADMIN — CALCIUM ACETATE 667 MG: 667 CAPSULE ORAL at 09:08

## 2019-08-03 RX ADMIN — CIPROFLOXACIN HYDROCHLORIDE 500 MG: 500 TABLET, FILM COATED ORAL at 09:08

## 2019-08-03 RX ADMIN — PRAVASTATIN SODIUM 80 MG: 40 TABLET ORAL at 09:08

## 2019-08-03 RX ADMIN — SERTRALINE HYDROCHLORIDE 100 MG: 50 TABLET ORAL at 09:08

## 2019-08-03 RX ADMIN — AMLODIPINE BESYLATE 10 MG: 5 TABLET ORAL at 09:08

## 2019-08-03 RX ADMIN — DOCUSATE SODIUM 100 MG: 100 CAPSULE, LIQUID FILLED ORAL at 09:08

## 2019-08-03 RX ADMIN — ISOSORBIDE MONONITRATE 30 MG: 30 TABLET, EXTENDED RELEASE ORAL at 12:08

## 2019-08-03 RX ADMIN — FUROSEMIDE 40 MG: 10 INJECTION, SOLUTION INTRAVENOUS at 09:08

## 2019-08-03 RX ADMIN — CLOPIDOGREL BISULFATE 75 MG: 75 TABLET ORAL at 09:08

## 2019-08-03 RX ADMIN — APIXABAN 2.5 MG: 2.5 TABLET, FILM COATED ORAL at 09:08

## 2019-08-03 RX ADMIN — METOPROLOL SUCCINATE 50 MG: 50 TABLET, EXTENDED RELEASE ORAL at 09:08

## 2019-08-03 RX ADMIN — CALCIUM ACETATE 667 MG: 667 CAPSULE ORAL at 12:08

## 2019-08-03 NOTE — DISCHARGE SUMMARY
"[facility] Medicine  Discharge Summary      Patient Name: Angelina Beard  MRN: [id number]  Admission Date: 8/1/2019  Hospital Length of Stay: 2 days  Discharge Date and Time: 8/3/2019  4:33 PM  Attending Physician: Herlinda Rivas*   Discharging Provider: Herlinda Rivas MD  Primary Care Provider: Ben Mena MD      HPI:   Chirag Munson, is a 67 y/o F with PMH of CHF, DM, HTN, and asthma discharge from our service on 7/31 presented via EMS today with complaint of one day history of shortness of breath with generalized weakness that started yesterday. There is associated productive cough with brownish colored mucus. Denies fever, chills, chest pain, nausea, vomiting,. Patient stated "I wasn't supposed to leave but my son was having a fit. Patient noted she does not have help with her ADLs. O2 sat in the ED stayed >93% on RA.   Patient reported chest pain during dialysis this afternoon. Will admit to trend troponin and possible placement.     * No surgery found *      Hospital Course:   Disease education and trajectory discussed with patient and her son Cristi. Recommend placement as patient need assistance with her ADLs. placement with son   Troponin peaked at 0.43- consult cardiology. Appreciates rec's- Imdur when Bp tolerates, continue BB, statin, Plavix, Eliquis     Consults:   Consults (From admission, onward)        Status Ordering Provider     Inpatient consult to [facility]iology[facility]  Once     Provider:  Faraz Flores MD    Completed HERLINDA RIVAS     Inpatient consult to Social Work  Once     Provider:  (Not yet assigned)    Acknowledged HERLINDA RIVAS     Inpatient consult to Social Work/Case Management  Once     Provider:  (Not yet assigned)    Acknowledged FRANCISCO OLGUIN          * Acute on chronic diastolic congestive heart failure  Shortness of breath  Paroxysmal atrial fibrillation  Coronary artery disease  Elevated " Troponin  Trend troponin  Continue with Lasix  continue with supplemental Oxygen   Strict I/O  Continue Plavix, Eliquis, Amlodipine, statin and metoprolol  Consult nephrology  Consult cardiology      PAD (peripheral artery disease)  History of stroke  Hyperlipidemia  Hypertension  Continue statin and Plavix   Amlodipine, Lasix and metoprolol      End-stage renal disease on hemodialysis  Anemia in ESRD  On HD MWF  Consult nephrology        Debility  Obesity  Physical deconditioning  Consult PT/OT  Consult case management for placement  Discussed worsening debility with patient needing assistance with her ADL and disease education and trajectory discussed with son. Recommend placement       GERD (gastroesophageal reflux disease)  Continue PPI        Final Active Diagnoses:    Diagnosis Date Noted POA    PRINCIPAL PROBLEM:  Acute on chronic diastolic congestive heart failure [I50.33] 09/22/2018 Yes    ESRD (end stage renal disease) [N18.6] 08/01/2019 Yes    PAD (peripheral artery disease) [I73.9] 01/24/2019 Yes     Chronic    History of stroke [Z86.73]  Not Applicable     Chronic    Physical deconditioning [R53.81] 09/22/2018 Yes    Paroxysmal atrial fibrillation [I48.0] 06/21/2018 Yes     Chronic    End-stage renal disease on hemodialysis [N18.6, Z99.2] 05/03/2016 Not Applicable     Chronic    Anemia in end-stage renal disease [N18.6, D63.1] 03/06/2016 Yes     Chronic    Chronic constipation [K59.09] 03/06/2016 Yes     Chronic    Renovascular hypertension [I15.0] 03/04/2016 Yes     Chronic    Debility [R53.81] 02/25/2016 Yes    Status post total right knee replacement 2/23/16 [Z96.651] 02/23/2016 Not Applicable     Chronic    Diabetic neuropathy associated with type 2 diabetes mellitus [E11.40] 02/19/2016 Yes     Chronic    History of left breast cancer s/p mastectomy [Z85.3] 02/19/2016 Not Applicable     Chronic    History of sarcoidosis [Z86.2] 02/18/2016 Yes     Chronic    Coronary artery disease  "[I25.10] 02/18/2016 Yes     Chronic    Osteoarthritis of lumbar spine [M47.816] 07/03/2013 Yes    Chronic diastolic congestive heart failure [I50.32] 07/02/2013 Yes     Chronic    Morbid obesity [E66.01] 07/02/2013 Yes     Chronic    Osteoarthritis [M19.90] 07/02/2013 Yes     Chronic    GERD (gastroesophageal reflux disease) [K21.9] 07/02/2013 Yes     Chronic    Cerebral microvascular disease [I67.9] 07/01/2013 Yes     Chronic    Type 2 diabetes mellitus with hypertension and end stage renal disease on dialysis [E11.22, I12.0, Z99.2, N18.6] 07/01/2013 Not Applicable     Chronic    Hyperlipidemia [E78.5] 07/01/2013 Yes      Problems Resolved During this Admission:       Discharged Condition: stable    Disposition: Home or Self Care    Follow Up:    Patient Instructions:      OXYGEN FOR HOME USE     Order Specific Question Answer Comments   Liter Flow 4    Duration Continuous    Qualifying SpO2: 86%    Testing done at: Rest    Route nasal cannula    Portable mode: continuous    Device home concentrator with portable unit    Length of need (in months): 99 mos    Patient condition with qualifying saturation CHF ESRD   Height: 5' 7" (1.702 m)    Weight: 104.3 kg (230 lb)    Does patient have medical equipment at home? none    Alternative treatment measures have been tried or considered and deemed clinically ineffective. Yes      OXYGEN FOR HOME USE     Order Specific Question Answer Comments   Liter Flow 3    Duration Continuous    Qualifying SpO2: CHF    Testing done at: Exercise/Activity    Device home concentrator with portable unit    Height: 5' 7" (1.702 m)    Weight: 97.6 kg (215 lb 2.7 oz)    Does patient have medical equipment at home? walker, rolling    Does patient have medical equipment at home? wheelchair    Does patient have medical equipment at home? cane, straight    Does patient have medical equipment at home? rollator    Does patient have medical equipment at home? bath bench    Does patient have " medical equipment at home? hospital bed    Alternative treatment measures have been tried or considered and deemed clinically ineffective. Yes      Diet diabetic     SUBSEQUENT HOME HEALTH ORDERS   Order Comments: Subsequent Home Health Orders    Current Medications:  Current Facility-Administered Medications:  0.9%  NaCl infusion, , Intravenous, PRN, Taylor Ladd MD  0.9%  NaCl infusion, , Intravenous, Once, Taylor Ladd MD  0.9%  NaCl infusion, , Intravenous, PRN, Taylor Ladd MD  0.9%  NaCl infusion, , Intravenous, Once, Taylor Ladd MD  0.9%  NaCl infusion, , Intravenous, PRN, Yan Corbin MD  0.9%  NaCl infusion, , Intravenous, PRN, Yan Corbin MD  0.9%  NaCl infusion, , Intravenous, PRN, Taylor Ladd MD  0.9%  NaCl infusion, , Intravenous, Once, Taylor Ladd MD  acetaminophen tablet 1,000 mg, 1,000 mg, Oral, Q8H PRN, Yan Corbin MD  acetaminophen tablet 650 mg, 650 mg, Oral, Q6H PRN, Yan Corbin MD, 650 mg at 08/03/19 0918  amLODIPine tablet 10 mg, 10 mg, Oral, Daily, Yan Corbin MD, 10 mg at 08/03/19 0918  apixaban tablet 2.5 mg, 2.5 mg, Oral, BID, Yan Corbin MD, 2.5 mg at 08/03/19 0919  calcium acetate capsule 667 mg, 667 mg, Oral, TID WM, Yan Corbin MD, 667 mg at 08/03/19 0919  ciprofloxacin HCl tablet 500 mg, 500 mg, Oral, Daily, Yan Corbin MD, 500 mg at 08/03/19 0918  clopidogrel tablet 75 mg, 75 mg, Oral, Daily, Yan Corbin MD, 75 mg at 08/03/19 0918  dextrose 10% (D10W) Bolus, 12.5 g, Intravenous, PRN, Yan Corbin MD  dextrose 10% (D10W) Bolus, 25 g, Intravenous, PRN, Yan Corbin MD  docusate sodium capsule 100 mg, 100 mg, Oral, BID, Yan Corbin MD, 100 mg at 08/03/19 0918  furosemide injection 40 mg, 40 mg, Intravenous, BID, Yan Corbin MD, 40 mg at 08/03/19 0917  gabapentin capsule 300 mg, 300 mg, Oral, TID PRN, Herlinda MONREAL  MD Evelyn, 300 mg at 08/03/19 0918  glucagon (human recombinant) injection 1 mg, 1 mg, Intramuscular, PRN, Yan Coribn MD  glucose chewable tablet 16 g, 16 g, Oral, PRN, Yan Corbin MD  glucose chewable tablet 24 g, 24 g, Oral, PRN, Yan Corbin MD  insulin aspart U-100 pen 0-5 Units, 0-5 Units, Subcutaneous, QID (AC + HS) PRN, Yan Corbin MD  insulin detemir U-100 pen 15 Units, 15 Units, Subcutaneous, Daily, Yan Corbin MD, Stopped at 08/02/19 0900  isosorbide mononitrate 24 hr tablet 30 mg, 30 mg, Oral, Daily, Herlinda N. MD Evelyn  metoprolol succinate (TOPROL-XL) 24 hr tablet 50 mg, 50 mg, Oral, Daily, Yan Corbin MD, 50 mg at 08/03/19 0918  mupirocin 2 % ointment, , Nasal, BID, Yan Corbin MD, Stopped at 08/02/19 0900  nitroGLYCERIN SL tablet 0.4 mg, 0.4 mg, Sublingual, Q5 Min PRN, Yan Corbin MD  ondansetron injection 4 mg, 4 mg, Intravenous, Q8H PRN, Yan Corbin MD  pantoprazole EC tablet 40 mg, 40 mg, Oral, Daily, Yan Corbin MD, 40 mg at 08/03/19 0919  pravastatin tablet 80 mg, 80 mg, Oral, Daily, Yan Corbin MD, 80 mg at 08/03/19 0918  sertraline tablet 100 mg, 100 mg, Oral, Daily, Yan Corbin MD, 100 mg at 08/03/19 0918  sodium chloride 0.9% flush 10 mL, 10 mL, Intravenous, PRN, Yan Corbin MD  vitamin renal formula (B-complex-vitamin c-folic acid) 1 mg per capsule 1 capsule, 1 capsule, Oral, Daily, Yan Corbin MD, 1 capsule at 08/03/19 0918        Nursing:   Heart Failure:      SN to instruct on the following:    Instruct on the definition of CHF.   Instruct on the signs/sympoms of CHF to be reported.   Instruct on and monitor daily weights.   Instruct on factors that cause exacerbation.   Instruct on action, dose, schedule, and side effects of medications.   Instruct on diet as prescribed.   Instruct on activity  allowed.   Instruct on life-style modifications for life long management of CHF   SN to assess compliance with daily weights, diet, medications, fluid retention,     safety precautions, activities permitted and life-style modifications.   Additional 1-2 SN visits per week as needed for signs and symptoms     of CHF exacerbation.      For Weight Gain > 2-3 lbs in 1 day or 4-6 lbs over 1 week notify PCP:    Home Oxygen:  Oxygen at 3L L/min nasal canula to be used:  Continuously.    Diet:   diabetic diet 2000 calorie    Activities:   activity as tolerated    Labs:      Referrals/ Consults  Physical Therapy to evaluate and treat. Evaluate for home safety and equipment needs; Establish/upgrade home exercise program. Perform / instruct on therapeutic exercises, gait training, transfer training, and Range of Motion.  Occupational Therapy to evaluate and treat. Evaluate home environment for safety and equipment needs. Perform/Instruct on transfers, ADL training, ROM, and therapeutic exercises.  Aide to provide assistance with personal care, ADLs, and vital signs.    Home Health Aide:  Nursing Three times weekly, Physical Therapy Three times weekly, Occupational Therapy Three times weekly and Home Health Aide every 48 hours     Order Specific Question Answer Comments   What Home Health Agency is the patient currently using? Other/External      Activity as tolerated       Significant Diagnostic Studies:     Pending Diagnostic Studies:     None         Medications:  Reconciled Home Medications:      Medication List      START taking these medications    acetaminophen 500 MG tablet  Commonly known as:  TYLENOL  Take 2 tablets (1,000 mg total) by mouth every 8 (eight) hours as needed.     docusate sodium 100 MG capsule  Commonly known as:  COLACE  Take 1 capsule (100 mg total) by mouth 2 (two) times daily.     vitamin renal formula (B-complex-vitamin c-folic acid) 1 mg Cap  Commonly known as:  NEPHROCAP  Take 1 capsule by mouth  once daily.  Start taking on:  8/4/2019        CHANGE how you take these medications    * nitroGLYCERIN 0.4 MG SL tablet  Commonly known as:  NITROSTAT  Place 1 tablet (0.4 mg total) under the tongue every 5 (five) minutes as needed for Chest pain. if second dose is needed call 911.  What changed:  Another medication with the same name was added. Make sure you understand how and when to take each.     * nitroGLYCERIN 0.4 MG SL tablet  Commonly known as:  NITROSTAT  Place 1 tablet (0.4 mg total) under the tongue every 5 (five) minutes as needed for Chest pain.  What changed:  You were already taking a medication with the same name, and this prescription was added. Make sure you understand how and when to take each.         * This list has 2 medication(s) that are the same as other medications prescribed for you. Read the directions carefully, and ask your doctor or other care provider to review them with you.            CONTINUE taking these medications    amLODIPine 10 MG tablet  Commonly known as:  NORVASC  Take 1 tablet (10 mg total) by mouth once daily.     apixaban 2.5 mg Tab  Commonly known as:  ELIQUIS  Take 1 tablet (2.5 mg total) by mouth 2 (two) times daily.     calcium acetate 667 mg capsule  Commonly known as:  PHOSLO  Take 667 mg by mouth 3 (three) times daily with meals.     ciprofloxacin HCl 500 MG tablet  Commonly known as:  CIPRO  Take 1 tablet (500 mg total) by mouth once daily. for 7 days  Start taking on:  8/4/2019     clopidogrel 75 mg tablet  Commonly known as:  PLAVIX  Take 1 tablet (75 mg total) by mouth once daily.     furosemide 40 MG tablet  Commonly known as:  LASIX  Take 1 tablet (40 mg total) by mouth once daily.     gabapentin 300 MG capsule  Commonly known as:  NEURONTIN  Take 1 capsule (300 mg total) by mouth every 8 (eight) hours as needed.     insulin glargine 100 units/mL (3mL) SubQ pen  Commonly known as:  LANTUS SOLOSTAR U-100 INSULIN  Inject 20 Units into the skin once daily.    "  isosorbide mononitrate 30 MG 24 hr tablet  Commonly known as:  IMDUR  Take 1 tablet (30 mg total) by mouth once daily.     metoprolol succinate 50 MG 24 hr tablet  Commonly known as:  TOPROL-XL  Take 1 tablet (50 mg total) by mouth once daily.     mupirocin 2 % ointment  Commonly known as:  BACTROBAN  by Nasal route 2 (two) times daily.     pantoprazole 40 MG tablet  Commonly known as:  PROTONIX  Take 1 tablet (40 mg total) by mouth once daily.  Start taking on:  8/4/2019     polyethylene glycol 17 gram/dose powder  Commonly known as:  GLYCOLAX     pravastatin 80 MG tablet  Commonly known as:  PRAVACHOL  Take 1 tablet (80 mg total) by mouth once daily.     sertraline 100 MG tablet  Commonly known as:  ZOLOFT  Take 1 tablet (100 mg total) by mouth once daily.        STOP taking these medications    bismuth tribrom-petrolatum,wh 1 X 8 " Bndg  Commonly known as:  XEROFORM PETROLATUM DRESSING            Indwelling Lines/Drains at time of discharge:   Lines/Drains/Airways     Drain                 Hemodialysis AV Graft -- days         Hemodialysis AV Graft Right upper arm -- days                Time spent on the discharge of patient: 45 minutes  Patient was seen and examined on the date of discharge and determined to be suitable for discharge.         Herlinda Rivas MD  Department of Hospital Medicine  [facility]  "

## 2019-08-03 NOTE — PROGRESS NOTES
"[facility] Medicine  Progress Note    Patient Name: Angelina Beard  MRN: [id number]  Patient Class: IP- Inpatient   Admission Date: 8/1/2019  Length of Stay: 2 days  Attending Physician: Herlinda Rivas*  Primary Care Provider: Ben Mena MD        Subjective:     Principal Problem:Acute on chronic diastolic congestive heart failure      HPI:  Chirag Munson, is a 69 y/o F with PMH of CHF, DM, HTN, and asthma discharge from our service on 7/31 presented via EMS today with complaint of one day history of shortness of breath with generalized weakness that started yesterday. There is associated productive cough with brownish colored mucus. Denies fever, chills, chest pain, nausea, vomiting,. Patient stated "I wasn't supposed to leave but my son was having a fit. Patient noted she does not have help with her ADLs. O2 sat in the ED stayed >93% on RA.   Patient reported chest pain during dialysis this afternoon. Will admit to trend troponin and possible placement.     Overview/Hospital Course:  Disease education and trajectory discussed with patient and her son Cristi. Recommend placement as patient need assistance with her ADLs. placement with son   Troponin peaked at 0.43- consult cardiology. Appreciates rec's- Imdur when Bp tolerates, continue BB, statin, Plavix, Eliquis    Interval History: awake and alert, no new complaint. Requesting to be discharge.     Troponin peaked at 0.43- consult cardiology. Appreciates rec's- Imdur when Bp tolerates, continue BB, statin, Plavix, Eliquis  Discharge home on supplemental oxygen    Review of Systems   Constitutional: Negative for diaphoresis and fatigue.   Respiratory: Negative for chest tightness and shortness of breath.    Cardiovascular: Negative for chest pain.   Gastrointestinal: Negative for abdominal pain and nausea.   Musculoskeletal: Negative for back pain and myalgias.   Neurological: Negative for weakness. "   Psychiatric/Behavioral: Negative for confusion.     Objective:     Vital Signs (Most Recent):  Temp: 98 °F (36.7 °C) (08/03/19 0917)  Pulse: 69 (08/03/19 0917)  Resp: 20 (08/03/19 0917)  BP: (!) 155/64 (08/03/19 0917)  SpO2: (!) 93 % (08/03/19 0917) Vital Signs (24h Range):  Temp:  [96.2 °F (35.7 °C)-98.1 °F (36.7 °C)] 98 °F (36.7 °C)  Pulse:  [56-85] 69  Resp:  [16-20] 20  SpO2:  [93 %-98 %] 93 %  BP: ()/(36-82) 155/64     Weight: 97.6 kg (215 lb 2.7 oz)  Body mass index is 33.7 kg/m².    Intake/Output Summary (Last 24 hours) at 8/3/2019 0929  Last data filed at 8/2/2019 2100  Gross per 24 hour   Intake 750 ml   Output 1700 ml   Net -950 ml      Physical Exam   Constitutional: She is oriented to person, place, and time. She appears well-developed and well-nourished.   HENT:   Head: Normocephalic and atraumatic.   Neck: Normal range of motion.   Cardiovascular: Normal rate, regular rhythm, normal heart sounds and intact distal pulses.   Pulmonary/Chest: Effort normal and breath sounds normal.   Abdominal: Soft. Bowel sounds are normal. She exhibits no distension.   Musculoskeletal: Normal range of motion. She exhibits no edema.   Neurological: She is alert and oriented to person, place, and time.   Skin: Skin is warm and dry. Capillary refill takes less than 2 seconds.   Psychiatric: She has a normal mood and affect.       Significant Labs:   CBC:   Recent Labs   Lab 08/01/19  1028 08/02/19  0140 08/03/19  0445   WBC 7.23 5.97 5.54   HGB 12.0 11.2* 11.9*   HCT 37.7 35.2* 37.7    260 261     CMP:   Recent Labs   Lab 08/01/19  1028 08/02/19  0140 08/03/19  0444    137 135*   K 4.7 4.1 4.5   CL 96 95 96   CO2 21* 25 23   * 115* 172*   BUN 39* 31* 37*   CREATININE 7.6* 6.3* 6.4*   CALCIUM 10.6* 10.2 10.2   PROT 8.2 7.7 7.7   ALBUMIN 3.1* 2.9* 3.0*   BILITOT 0.4 0.4 0.4   ALKPHOS 72 67 70   AST 18 18 18   ALT 23 20 19   ANIONGAP 19* 17* 16   EGFRNONAA 5* 6* 6*     Troponin:   Recent Labs   Lab  08/02/19  0612 08/02/19  0743 08/02/19  1457   TROPONINI 0.043* 0.039* 0.035*     TSH:   Recent Labs   Lab 02/12/19  0958   TSH 1.470     Urine Culture: No results for input(s): LABURIN in the last 48 hours.  Urine Studies:   No results for input(s): COLORU, APPEARANCEUA, PHUR, SPECGRAV, PROTEINUA, GLUCUA, KETONESU, BILIRUBINUA, OCCULTUA, NITRITE, UROBILINOGEN, LEUKOCYTESUR, RBCUA, WBCUA, BACTERIA, SQUAMEPITHEL, HYALINECASTS in the last 48 hours.    Invalid input(s): WRIGHTSUR    Significant Imaging: I have reviewed all pertinent imaging results/findings within the past 24 hours.      Assessment/Plan:      * Acute on chronic diastolic congestive heart failure  Shortness of breath  Paroxysmal atrial fibrillation  Coronary artery disease  Elevated Troponin  Trend troponin  Continue with Lasix  continue with supplemental Oxygen   Strict I/O  Continue Plavix, Eliquis, Amlodipine, statin and metoprolol  Consult nephrology  Consult cardiology      PAD (peripheral artery disease)  History of stroke  Hyperlipidemia  Hypertension  Continue statin and Plavix   Amlodipine, Lasix and metoprolol      Physical deconditioning        End-stage renal disease on hemodialysis  Anemia in ESRD  On HD MWF  Consult nephrology        Debility  Obesity  Physical deconditioning  Consult PT/OT  Consult case management for placement  Discussed worsening debility with patient needing assistance with her ADL and disease education and trajectory discussed with son. Recommend placement       Diabetic neuropathy associated with type 2 diabetes mellitus  HbA1c 6.5 on 5/7 19  Low dose SSI   accucheck AC&HS   diabetic diet     History of sarcoidosis  chronic      Osteoarthritis of lumbar spine  Chronic        GERD (gastroesophageal reflux disease)  Continue PPI        VTE Risk Mitigation (From admission, onward)        Ordered     apixaban tablet 2.5 mg  2 times daily      08/01/19 1947                Herlinda Rivas MD  Department of  Hospital Medicine [facility]

## 2019-08-03 NOTE — PLAN OF CARE
Problem: Adult Inpatient Plan of Care  Goal: Plan of Care Review  Outcome: Ongoing (interventions implemented as appropriate)  Plan of care reviewed with patient and son, understanding verbalized. Pt remains SR on tele. 3L oxygen/NC maintained. BG monitored through shift and SSI administered per orders.  No complaints or acute distress noted. Instructed to call for any assistance, understanding verbalized.  Bed alarm on, call light in reach, fall precautions in place. Will continue to monitor.

## 2019-08-03 NOTE — PLAN OF CARE
resumption Orders sent via [employer] to [facility], [phone], per Raquel, resumpiion of care will begin on Sunday, 8/4/19    [place] faxed DME: O2 orders to [facility] [phone], fax [phone], Mr rowe, he has several deliveries ahead of this one, he will be here around 3:00 pm and deliver O2 to pt bedside    Future Appointments   Date Time Provider Department Center   11/12/2019 10:00 AM LAB [facility] LAB [facility]   11/14/2019 10:30 AM Ben Mena MD [facility] [place]       Pt's nurse will go over medications/signs and symptoms prior to discharge       08/03/19 1201   Final Note   Assessment Type Final Discharge Note   Anticipated Discharge Disposition Home-Health   What phone number can be called within the next 1-3 days to see how you are doing after discharge? [phone]   Hospital Follow Up  Appt(s) scheduled? No  (Offices closed for Weekend. Patient to schedule own follow up appointment.)   Right Care Referral Info   Post Acute Recommendation Home-care   Facility Name [facility]     Allyn Moore, RN Transitional Navigator  [phone]

## 2019-08-03 NOTE — SUBJECTIVE & OBJECTIVE
Interval History: awake and alert, no new complaint. Requesting to be discharge.     Troponin peaked at 0.43- consult cardiology. Appreciates rec's- Imdur when Bp tolerates, continue BB, statin, Plavix, Eliquis  Discharge home on supplemental oxygen    Review of Systems   Constitutional: Negative for diaphoresis and fatigue.   Respiratory: Negative for chest tightness and shortness of breath.    Cardiovascular: Negative for chest pain.   Gastrointestinal: Negative for abdominal pain and nausea.   Musculoskeletal: Negative for back pain and myalgias.   Neurological: Negative for weakness.   Psychiatric/Behavioral: Negative for confusion.     Objective:     Vital Signs (Most Recent):  Temp: 98 °F (36.7 °C) (08/03/19 0917)  Pulse: 69 (08/03/19 0917)  Resp: 20 (08/03/19 0917)  BP: (!) 155/64 (08/03/19 0917)  SpO2: (!) 93 % (08/03/19 0917) Vital Signs (24h Range):  Temp:  [96.2 °F (35.7 °C)-98.1 °F (36.7 °C)] 98 °F (36.7 °C)  Pulse:  [56-85] 69  Resp:  [16-20] 20  SpO2:  [93 %-98 %] 93 %  BP: ()/(36-82) 155/64     Weight: 97.6 kg (215 lb 2.7 oz)  Body mass index is 33.7 kg/m².    Intake/Output Summary (Last 24 hours) at 8/3/2019 0929  Last data filed at 8/2/2019 2100  Gross per 24 hour   Intake 750 ml   Output 1700 ml   Net -950 ml      Physical Exam   Constitutional: She is oriented to person, place, and time. She appears well-developed and well-nourished.   HENT:   Head: Normocephalic and atraumatic.   Neck: Normal range of motion.   Cardiovascular: Normal rate, regular rhythm, normal heart sounds and intact distal pulses.   Pulmonary/Chest: Effort normal and breath sounds normal.   Abdominal: Soft. Bowel sounds are normal. She exhibits no distension.   Musculoskeletal: Normal range of motion. She exhibits no edema.   Neurological: She is alert and oriented to person, place, and time.   Skin: Skin is warm and dry. Capillary refill takes less than 2 seconds.   Psychiatric: She has a normal mood and affect.        Significant Labs:   CBC:   Recent Labs   Lab 08/01/19  1028 08/02/19  0140 08/03/19  0445   WBC 7.23 5.97 5.54   HGB 12.0 11.2* 11.9*   HCT 37.7 35.2* 37.7    260 261     CMP:   Recent Labs   Lab 08/01/19  1028 08/02/19  0140 08/03/19  0444    137 135*   K 4.7 4.1 4.5   CL 96 95 96   CO2 21* 25 23   * 115* 172*   BUN 39* 31* 37*   CREATININE 7.6* 6.3* 6.4*   CALCIUM 10.6* 10.2 10.2   PROT 8.2 7.7 7.7   ALBUMIN 3.1* 2.9* 3.0*   BILITOT 0.4 0.4 0.4   ALKPHOS 72 67 70   AST 18 18 18   ALT 23 20 19   ANIONGAP 19* 17* 16   EGFRNONAA 5* 6* 6*     Troponin:   Recent Labs   Lab 08/02/19  0612 08/02/19  0743 08/02/19  1457   TROPONINI 0.043* 0.039* 0.035*     TSH:   Recent Labs   Lab 02/12/19  0958   TSH 1.470     Urine Culture: No results for input(s): LABURIN in the last 48 hours.  Urine Studies:   No results for input(s): COLORU, APPEARANCEUA, PHUR, SPECGRAV, PROTEINUA, GLUCUA, KETONESU, BILIRUBINUA, OCCULTUA, NITRITE, UROBILINOGEN, LEUKOCYTESUR, RBCUA, WBCUA, BACTERIA, SQUAMEPITHEL, HYALINECASTS in the last 48 hours.    Invalid input(s): ENRIQUE    Significant Imaging: I have reviewed all pertinent imaging results/findings within the past 24 hours.

## 2019-08-03 NOTE — PROGRESS NOTES
TN contacted pt's floor nurse to enter ambulating O2 qualifying note as well as Dr Eden to place resumption of HH orders.

## 2019-08-03 NOTE — PLAN OF CARE
AVS and educational attachments shared with patient and son via [employer]. Discussed thoroughly. Patient and son verbalized clear understanding using teach back method. Notified bedside nurse of completion of education. At present no distress noted. Patient to be discharged via w/c with escort service and family with all of patient's belonings. Will cont to be available to patient and family and intervene prn.

## 2019-08-03 NOTE — DISCHARGE INSTRUCTIONS
Leg Swelling in Both Legs (English) View Edit Remove  Balancing Calcium and Phosphorus, Kidney Disease (English) View Edit Remove  Choosing the Right Protein for Your Body, Kidney Disease (English) View Edit Remove  Chronic Kidney Disease, Diet for (English) View Edit Remove  Sodium, Eating Less: Kidney Disease (English) View Edit Remove  Eating Heart-Healthy Foods (English) View Edit Remove  4 Steps for Eating Healthier (English) View Edit Remove  Eating Out, Healthy Tips for (English) View Edit Remove  Eating Out, More Healthy Tips (English) View Edit Remove  Eating Out: Tips for Making Healthy Choices (English) View Edit Remove  Oxygen, Using at Home (English) View Edit Remove  Oxygen, Using Safely (English) View Edit Remove

## 2019-08-03 NOTE — NURSING
Patient discharged in wheelchair by escort services with all patient belongings. IV removed, no bleeding noted. Tele monitor removed and returned. Discharge instructions and educational materials reviewed with patient and family by VN. No acute distress noted upon discharge.

## 2019-08-03 NOTE — NURSING
Home Oxygen Evaluation    Date Performed: 8/3/2019    1) Patient's Home O2 Sat on room air, while at rest: 93%        If O2 sats on room air at rest are 88% or below, patient qualifies. No additional testing needed. Document N/A in steps 2 and 3. If 89% or above, complete steps 2.      2) Patient's O2 Sat on room air while exercisin%        If O2 sats on room air while exercising remain 89% or above patient does not qualify, no further testing needed Document N/A in step 3. If O2 sats on room air while exercising are 88% or below, continue to step 3.      3) Patient's O2 Sat while exercising on O2: 95% at 2 LPM         (Must show improvement from #2 for patients to qualify)    If O2 sats improve on oxygen, patient qualifies for portable oxygen. If not, the patient does not qualify.

## 2019-08-06 ENCOUNTER — PATIENT OUTREACH (OUTPATIENT)
Dept: ADMINISTRATIVE | Facility: CLINIC | Age: 69
End: 2019-08-06

## 2019-08-06 NOTE — TELEPHONE ENCOUNTER
C3 nurse attempted to contact patient. The following occurred:   C3 nurse attempted to contact Angelina Beard for a TCC post hospital discharge follow up call. The patient is unable to conduct the call @ this time. The patient requested a callback.    The patient does not have a scheduled HOSFU appointment within 7-14 days post hospital discharge date 8/3/19.

## 2019-08-06 NOTE — PATIENT INSTRUCTIONS
When Your Child Has Congestive Heart Failure (CHF)  Congestive heart failure (CHF) is a condition in which the heart does not pump as well as it should. When this happens, fluid can build up in the lungs or body tissues (congestion). CHF can cause lung problems, organ failure, and other serious problems in the body. CHF can usually be treated, but it is important to find out the underlying cause. Your childs healthcare provider will evaluate your childs heart and discuss treatment options with you.  What causes congestive heart failure?  CHF often develops in children with certain heart defects present at birth (congenital heart defects). These include defects such as holes in the heart, which cause an increased amount of blood flow from one side of the heart to the other. This changes the dynamics of blood flow and can cause one side of the heart to become weaker. The heart then is unable to support the blood flow resulting in worsening heart function. CHF can also be caused by other types of heart problems such as cardiomyopathy, a condition in which the hearts pumping function is impaired. Some non-heart problems, such as kidney failure, can lead to CHF due to changes in the body's fluid balance or hormone changes that lead to high blood pressure.    What are the symptoms of CHF?  Symptoms vary but may include:  · Swelling (edema) in the face, abdomen, ankles, or feet  · Shortness of breath, rapid breathing, wheezing, or excessive coughing  · Sweating  · Weakness or tiredness  · Poor feeding and weight gain (in infants)  · Racing heartbeat  · Wheezing  · Abdominal pain and nausea  In older children, symptoms may also include:  · Weight loss  · Passing out  · Chest pain  · Tiring easily during exercise  How is the cause of congestive heart failure diagnosed?  Heart problems in children are usually diagnosed and treated by a pediatric cardiologist. This is a doctor who specializes in diagnosing and treating  children's heart disease. The cardiologist will do a physical exam and ask about your childs health history. The following tests may be done to find the underlying cause of CHF:  · Chest X-ray. This test takes a picture of the heart and lungs. The picture can show your childs heart size and shape. This picture also shows the fluid status of your child's lungs, which can be a clue to the heart's function.  · Electrocardiogram (ECG or EKG). During this test, the electrical activity of the heart is recorded to check for heart rhythm problems (arrhythmias) or problems with heart structure.  · Echocardiogram (echo). During this test, sound waves (ultrasound) are used to create a picture of the heart. This test can show problems with heart structure or function. This includes showing how well the heart pumps, if the heart is enlarged, the direction and strength of blood flow, or if there are any valve problems.  · Lab tests. For these tests, blood and urine samples are taken to check for problems in the kidneys or other organs.  How is congestive heart failure treated?  Specific treatment for your child depends on the cause of CHF. If the cause of CHF in your child is a congenital heart defect, a catheter or surgical procedure may be done to repair the defect.  · Medicines are often prescribed to help manage your childs symptoms. These can include:  ¨ Diuretics help rid the body of excess water. This reduces fluid in the lungs and may improve breathing. These are very important in helping manage fluid status in heart failure.  ¨ Digoxin helps the heart pump blood with more force. This improves the hearts performance.  ¨ ACE inhibitors make blood vessels relax and allow blood to flow more easily from the heart.   ¨ Angiotensin receptor blockers or ARBs are very similar to ACE inhibitors. They may be used in a child who can't take an ACE inhibitor.  ¨ Beta-blockers lower blood pressure and slow heart rate by altering  hormones that can damage the heart. Beta-blockers can also improve the hearts pumping action over time.  · Pacemaker. Some children with heart failure need an artificial pacemaker. The pacemaker may help when the heart is not pumping well because of a slow heartbeat.  · Cardiac resynchronization therapy. This uses a special type of pacemaker that paces both pumping chambers of the heart at the same time to coordinate contractions and improve the heart's function. This treatment may be used in some children with long-term heart failure.  · Heart transplant. This is when the diseased heart is replaced with a healthy heart from a donor. This is only an option for very serious disease. And, it often takes some time to find a suitable heart. In some cases, a child may be able to be helped with devices that help the heart pump while he or she waits for a transplant.  Your child may benefit from seeing a nutritionist to help with nutrition for growth problems and to help balance fluids. He or she may also participate in an exercise rehab program to help with the ability to be active.  What are the long-term concerns?  The outcome for a child with CHF depends on many factors, including the underlying heart problem. The cardiologist will discuss your childs condition, treatment options, and potential outcomes with you.  Date Last Reviewed: 3/6/2016  © 9555-1677 [employer]. [address] [place] [address], [place] [address]. All rights reserved. This information is not intended as a substitute for professional medical care. Always follow your healthcare professional's instructions.

## 2019-09-10 ENCOUNTER — OFFICE VISIT (OUTPATIENT)
Dept: INTERNAL MEDICINE | Facility: CLINIC | Age: 69
End: 2019-09-10
Payer: MEDICARE

## 2019-09-10 VITALS
BODY MASS INDEX: 36.47 KG/M2 | SYSTOLIC BLOOD PRESSURE: 128 MMHG | OXYGEN SATURATION: 87 % | HEIGHT: 67 IN | DIASTOLIC BLOOD PRESSURE: 64 MMHG | HEART RATE: 68 BPM | WEIGHT: 232.38 LBS

## 2019-09-10 DIAGNOSIS — I50.32 CHRONIC DIASTOLIC CONGESTIVE HEART FAILURE: Primary | ICD-10-CM

## 2019-09-10 DIAGNOSIS — I12.0 TYPE 2 DIABETES MELLITUS WITH HYPERTENSION AND END STAGE RENAL DISEASE ON DIALYSIS: ICD-10-CM

## 2019-09-10 DIAGNOSIS — Z99.2 TYPE 2 DIABETES MELLITUS WITH HYPERTENSION AND END STAGE RENAL DISEASE ON DIALYSIS: ICD-10-CM

## 2019-09-10 DIAGNOSIS — E11.22 TYPE 2 DIABETES MELLITUS WITH HYPERTENSION AND END STAGE RENAL DISEASE ON DIALYSIS: ICD-10-CM

## 2019-09-10 DIAGNOSIS — N18.6 TYPE 2 DIABETES MELLITUS WITH HYPERTENSION AND END STAGE RENAL DISEASE ON DIALYSIS: ICD-10-CM

## 2019-09-10 DIAGNOSIS — M79.10 MUSCLE PAIN: ICD-10-CM

## 2019-09-10 PROBLEM — I50.33 ACUTE ON CHRONIC DIASTOLIC CONGESTIVE HEART FAILURE: Status: RESOLVED | Noted: 2018-09-22 | Resolved: 2019-09-10

## 2019-09-10 PROBLEM — L97.512 SKIN ULCER OF TOE OF RIGHT FOOT WITH FAT LAYER EXPOSED: Status: RESOLVED | Noted: 2018-11-27 | Resolved: 2019-09-10

## 2019-09-10 PROCEDURE — 99999 PR PBB SHADOW E&M-EST. PATIENT-LVL III: CPT | Mod: PBBFAC,,, | Performed by: INTERNAL MEDICINE

## 2019-09-10 PROCEDURE — 99214 OFFICE O/P EST MOD 30 MIN: CPT | Mod: S$PBB,,, | Performed by: INTERNAL MEDICINE

## 2019-09-10 PROCEDURE — 90662 IIV NO PRSV INCREASED AG IM: CPT | Mod: PBBFAC,PN

## 2019-09-10 PROCEDURE — 99999 PR PBB SHADOW E&M-EST. PATIENT-LVL III: ICD-10-PCS | Mod: PBBFAC,,, | Performed by: INTERNAL MEDICINE

## 2019-09-10 PROCEDURE — 99214 PR OFFICE/OUTPT VISIT, EST, LEVL IV, 30-39 MIN: ICD-10-PCS | Mod: S$PBB,,, | Performed by: INTERNAL MEDICINE

## 2019-09-10 PROCEDURE — 99213 OFFICE O/P EST LOW 20 MIN: CPT | Mod: PBBFAC,PN | Performed by: INTERNAL MEDICINE

## 2019-09-10 RX ORDER — INSULIN GLARGINE 100 [IU]/ML
20 INJECTION, SOLUTION SUBCUTANEOUS DAILY
Qty: 3 SYRINGE | Refills: 3 | Status: ON HOLD | OUTPATIENT
Start: 2019-09-10 | End: 2019-11-27 | Stop reason: SDUPTHER

## 2019-09-10 RX ORDER — TIZANIDINE 4 MG/1
4 TABLET ORAL EVERY 6 HOURS PRN
Qty: 60 TABLET | Refills: 3 | Status: SHIPPED | OUTPATIENT
Start: 2019-09-10 | End: 2019-09-20

## 2019-09-10 NOTE — PROGRESS NOTES
Subjective:       Patient ID: Angelina Beard is a 69 y.o. female.    Chief Complaint: Hospital Follow Up and Neck Pain    HPI  Recheck from hospital.  Chart reviewed.  BSs OK.  SOB off of O2.  No CP, palpitations.  C/O R neck pain w/o radicular sxs.  Eye check due.  Review of Systems   All other systems reviewed and are negative.      Objective:      Physical Exam   Constitutional: She appears well-developed. No distress.   HENT:   Head: Normocephalic.   Eyes: EOM are normal.   Neck: Normal range of motion. No tracheal deviation present.   Cardiovascular: Normal rate, normal heart sounds and intact distal pulses.   irreg irreg   Pulmonary/Chest: Effort normal and breath sounds normal. No respiratory distress.   Abdominal: She exhibits no distension.   Musculoskeletal: Normal range of motion. She exhibits edema (tr ankles) and tenderness (R trap).   Neurological: She is alert. No cranial nerve deficit. She exhibits normal muscle tone. Coordination normal.   Skin: Skin is warm and dry. No rash noted. She is not diaphoretic. No erythema.   Eczema L chin, cheek   Psychiatric: She has a normal mood and affect. Her behavior is normal.   Vitals reviewed.      Assessment:       1. Chronic diastolic congestive heart failure    2. Type 2 diabetes mellitus with hypertension and end stage renal disease on dialysis    3. Muscle pain        Plan:       Angelina was seen today for hospital follow up and neck pain.    Diagnoses and all orders for this visit:    Chronic diastolic congestive heart failure  -     PORTABLE O2; Future    Type 2 diabetes mellitus with hypertension and end stage renal disease on dialysis  -     insulin (LANTUS SOLOSTAR U-100 INSULIN) glargine 100 units/mL (3mL) SubQ pen; Inject 20 Units into the skin once daily.  -     Hemoglobin A1c; Future    Muscle pain  -     tiZANidine (ZANAFLEX) 4 MG tablet; Take 1 tablet (4 mg total) by mouth every 6 (six) hours as needed.      Follow up in about 2 months (around  11/10/2019).

## 2019-09-16 ENCOUNTER — TELEPHONE (OUTPATIENT)
Dept: INTERNAL MEDICINE | Facility: CLINIC | Age: 69
End: 2019-09-16

## 2019-09-16 NOTE — TELEPHONE ENCOUNTER
----- Message from Kiera Adams sent at 9/16/2019  9:21 AM CDT -----  Contact: Gracia ruiz/ [facility] [employer] [phone]  Gracia is requesting to speak with you regarding a refill for pen needles and syringes. Please advise.

## 2019-09-23 ENCOUNTER — TELEPHONE (OUTPATIENT)
Dept: INTERNAL MEDICINE | Facility: CLINIC | Age: 69
End: 2019-09-23

## 2019-09-23 DIAGNOSIS — I50.32 CHRONIC DIASTOLIC CONGESTIVE HEART FAILURE: Primary | ICD-10-CM

## 2019-09-23 NOTE — TELEPHONE ENCOUNTER
----- Message from Khalida Smith LPN sent at 9/23/2019  4:28 PM CDT -----  Regarding: FW: Ana with [facility]  Dr. Mena  Would you mind reordering patients oxgyen  Per marcella she states the order that was placed is invalid  Please order oxygen for home use instead.      Thank you.  ----- Message -----  From: Ana Hoover  Sent: 9/23/2019   4:00 PM CDT  To: Khalida Smith LPN  Subject: RE: Ana with [facility]                               If he puts it in, I can go in the chart and get it.    ----- Message -----  From: Khalida Smith LPN  Sent: 9/23/2019   2:30 PM CDT  To: Ana Hoover  Subject: RE: Ana with [facility]                               Hi marcella does dr. Mena need to resubmit the order ?  ----- Message -----  From: Ana Hoover  Sent: 9/23/2019   1:56 PM CDT  To: Trina Black  Subject: Ana with [facility]                                   I received an order for oxygen for Ms. Beard, but it is an invalid order.  Please use the oxygen for home use order, as that has all of the needed information.  Thank you

## 2019-10-01 ENCOUNTER — TELEPHONE (OUTPATIENT)
Dept: INTERNAL MEDICINE | Facility: CLINIC | Age: 69
End: 2019-10-01

## 2019-10-01 DIAGNOSIS — M17.0 OSTEOARTHRITIS OF BOTH KNEES, UNSPECIFIED OSTEOARTHRITIS TYPE: Chronic | ICD-10-CM

## 2019-10-01 RX ORDER — GABAPENTIN 300 MG/1
300 CAPSULE ORAL EVERY 8 HOURS PRN
Qty: 90 CAPSULE | Refills: 3 | Status: SHIPPED | OUTPATIENT
Start: 2019-10-01 | End: 2020-02-04 | Stop reason: SDUPTHER

## 2019-10-01 NOTE — TELEPHONE ENCOUNTER
----- Message from Kath Pena sent at 10/1/2019  2:15 PM CDT -----  Contact: Crista ruiz/ [facility] [employer] [phone]   Crista is calling to inform you that the pt's has shingles. Crista states that the pt's been having shingles for 3 weeks now. Crista states that the pt is experiencing  Itching and burning. Crista is requesting medication for the pt. [facility] [address] Please advise

## 2019-10-03 ENCOUNTER — TELEPHONE (OUTPATIENT)
Dept: HOME HEALTH SERVICES | Facility: HOSPITAL | Age: 69
End: 2019-10-03

## 2019-10-04 NOTE — TELEPHONE ENCOUNTER
Nurse at dialysis states pain continues to complain of R sided neck pain side of neck, nurse assessed the atea she states it looks like a rash.  Pt Was given norco by kidney doctor (dr. Winter)  Just an FYI  ----- Message from Monica Davis sent at 10/4/2019 10:06 AM CDT -----  Contact: [facility]  Ms. [facility] is calling to speak with Staff regarding an order for an MRI for severe neck pain.      She can be reached at [phone].    Thank you.

## 2019-10-11 ENCOUNTER — TELEPHONE (OUTPATIENT)
Dept: HOME HEALTH SERVICES | Facility: HOSPITAL | Age: 69
End: 2019-10-11

## 2019-10-16 ENCOUNTER — HOSPITAL ENCOUNTER (INPATIENT)
Facility: HOSPITAL | Age: 69
LOS: 2 days | Discharge: HOME HEALTH | DRG: 640 | End: 2019-10-18
Attending: EMERGENCY MEDICINE | Admitting: HOSPITALIST
Payer: MEDICARE

## 2019-10-16 DIAGNOSIS — N18.6 ESRD (END STAGE RENAL DISEASE): ICD-10-CM

## 2019-10-16 DIAGNOSIS — D63.1 ANEMIA IN END-STAGE RENAL DISEASE: Chronic | ICD-10-CM

## 2019-10-16 DIAGNOSIS — I50.32 CHRONIC DIASTOLIC CONGESTIVE HEART FAILURE: Chronic | ICD-10-CM

## 2019-10-16 DIAGNOSIS — N18.6 ANEMIA IN END-STAGE RENAL DISEASE: Chronic | ICD-10-CM

## 2019-10-16 DIAGNOSIS — E87.5 HYPERKALEMIA: Primary | ICD-10-CM

## 2019-10-16 DIAGNOSIS — I10 HYPERTENSION, UNSPECIFIED TYPE: ICD-10-CM

## 2019-10-16 DIAGNOSIS — E87.70 HYPERVOLEMIA ASSOCIATED WITH RENAL INSUFFICIENCY: ICD-10-CM

## 2019-10-16 DIAGNOSIS — M79.89 LEG SWELLING: ICD-10-CM

## 2019-10-16 DIAGNOSIS — R06.02 SOB (SHORTNESS OF BREATH): ICD-10-CM

## 2019-10-16 DIAGNOSIS — R07.9 CHEST PAIN: ICD-10-CM

## 2019-10-16 DIAGNOSIS — N28.9 HYPERVOLEMIA ASSOCIATED WITH RENAL INSUFFICIENCY: ICD-10-CM

## 2019-10-16 DIAGNOSIS — N18.6 END-STAGE RENAL DISEASE ON HEMODIALYSIS: Chronic | ICD-10-CM

## 2019-10-16 DIAGNOSIS — Z99.2 END-STAGE RENAL DISEASE ON HEMODIALYSIS: Chronic | ICD-10-CM

## 2019-10-16 LAB
ALBUMIN SERPL BCP-MCNC: 3.4 G/DL (ref 3.5–5.2)
ALP SERPL-CCNC: 62 U/L (ref 55–135)
ALT SERPL W/O P-5'-P-CCNC: 35 U/L (ref 10–44)
ANION GAP SERPL CALC-SCNC: 16 MMOL/L (ref 8–16)
AST SERPL-CCNC: 31 U/L (ref 10–40)
BASOPHILS # BLD AUTO: 0.02 K/UL (ref 0–0.2)
BASOPHILS NFR BLD: 0.3 % (ref 0–1.9)
BILIRUB SERPL-MCNC: 0.4 MG/DL (ref 0.1–1)
BNP SERPL-MCNC: 720 PG/ML (ref 0–99)
BUN SERPL-MCNC: 76 MG/DL (ref 8–23)
CALCIUM SERPL-MCNC: 10.7 MG/DL (ref 8.7–10.5)
CHLORIDE SERPL-SCNC: 98 MMOL/L (ref 95–110)
CO2 SERPL-SCNC: 26 MMOL/L (ref 23–29)
CREAT SERPL-MCNC: 11.7 MG/DL (ref 0.5–1.4)
DIFFERENTIAL METHOD: ABNORMAL
EOSINOPHIL # BLD AUTO: 0.2 K/UL (ref 0–0.5)
EOSINOPHIL NFR BLD: 2.6 % (ref 0–8)
ERYTHROCYTE [DISTWIDTH] IN BLOOD BY AUTOMATED COUNT: 14.8 % (ref 11.5–14.5)
EST. GFR  (AFRICAN AMERICAN): 3 ML/MIN/1.73 M^2
EST. GFR  (NON AFRICAN AMERICAN): 3 ML/MIN/1.73 M^2
GLUCOSE SERPL-MCNC: 129 MG/DL (ref 70–110)
HCT VFR BLD AUTO: 30.8 % (ref 37–48.5)
HGB BLD-MCNC: 9.6 G/DL (ref 12–16)
LYMPHOCYTES # BLD AUTO: 1.2 K/UL (ref 1–4.8)
LYMPHOCYTES NFR BLD: 18.4 % (ref 18–48)
MCH RBC QN AUTO: 29.8 PG (ref 27–31)
MCHC RBC AUTO-ENTMCNC: 31.2 G/DL (ref 32–36)
MCV RBC AUTO: 96 FL (ref 82–98)
MONOCYTES # BLD AUTO: 0.3 K/UL (ref 0.3–1)
MONOCYTES NFR BLD: 4.3 % (ref 4–15)
NEUTROPHILS # BLD AUTO: 4.8 K/UL (ref 1.8–7.7)
NEUTROPHILS NFR BLD: 74.4 % (ref 38–73)
PLATELET # BLD AUTO: 208 K/UL (ref 150–350)
PMV BLD AUTO: 10.2 FL (ref 9.2–12.9)
POCT GLUCOSE: 112 MG/DL (ref 70–110)
POCT GLUCOSE: 139 MG/DL (ref 70–110)
POCT GLUCOSE: 175 MG/DL (ref 70–110)
POCT GLUCOSE: 83 MG/DL (ref 70–110)
POTASSIUM SERPL-SCNC: 6.5 MMOL/L (ref 3.5–5.1)
PROT SERPL-MCNC: 7.3 G/DL (ref 6–8.4)
RBC # BLD AUTO: 3.22 M/UL (ref 4–5.4)
SODIUM SERPL-SCNC: 140 MMOL/L (ref 136–145)
TROPONIN I SERPL DL<=0.01 NG/ML-MCNC: 0.05 NG/ML (ref 0–0.03)
WBC # BLD AUTO: 6.53 K/UL (ref 3.9–12.7)

## 2019-10-16 PROCEDURE — 85025 COMPLETE CBC W/AUTO DIFF WBC: CPT

## 2019-10-16 PROCEDURE — 93005 ELECTROCARDIOGRAM TRACING: CPT

## 2019-10-16 PROCEDURE — 80053 COMPREHEN METABOLIC PANEL: CPT

## 2019-10-16 PROCEDURE — 80100016 HC MAINTENANCE HEMODIALYSIS

## 2019-10-16 PROCEDURE — 93010 EKG 12-LEAD: ICD-10-PCS | Mod: ,,, | Performed by: INTERNAL MEDICINE

## 2019-10-16 PROCEDURE — 25000003 PHARM REV CODE 250: Performed by: HOSPITALIST

## 2019-10-16 PROCEDURE — 93010 ELECTROCARDIOGRAM REPORT: CPT | Mod: ,,, | Performed by: INTERNAL MEDICINE

## 2019-10-16 PROCEDURE — 83880 ASSAY OF NATRIURETIC PEPTIDE: CPT

## 2019-10-16 PROCEDURE — 63600175 PHARM REV CODE 636 W HCPCS: Performed by: HOSPITALIST

## 2019-10-16 PROCEDURE — 99291 CRITICAL CARE FIRST HOUR: CPT | Mod: 25

## 2019-10-16 PROCEDURE — 84484 ASSAY OF TROPONIN QUANT: CPT

## 2019-10-16 PROCEDURE — 11000001 HC ACUTE MED/SURG PRIVATE ROOM

## 2019-10-16 RX ORDER — ACETAMINOPHEN 325 MG/1
650 TABLET ORAL EVERY 6 HOURS PRN
Status: DISCONTINUED | OUTPATIENT
Start: 2019-10-16 | End: 2019-10-18 | Stop reason: HOSPADM

## 2019-10-16 RX ORDER — SODIUM CHLORIDE 9 MG/ML
INJECTION, SOLUTION INTRAVENOUS
Status: CANCELLED | OUTPATIENT
Start: 2019-10-16

## 2019-10-16 RX ORDER — IBUPROFEN 200 MG
16 TABLET ORAL
Status: DISCONTINUED | OUTPATIENT
Start: 2019-10-16 | End: 2019-10-18 | Stop reason: HOSPADM

## 2019-10-16 RX ORDER — INSULIN ASPART 100 [IU]/ML
0-5 INJECTION, SOLUTION INTRAVENOUS; SUBCUTANEOUS
Status: DISCONTINUED | OUTPATIENT
Start: 2019-10-16 | End: 2019-10-18 | Stop reason: HOSPADM

## 2019-10-16 RX ORDER — PRAVASTATIN SODIUM 40 MG/1
80 TABLET ORAL DAILY
Status: DISCONTINUED | OUTPATIENT
Start: 2019-10-16 | End: 2019-10-18 | Stop reason: HOSPADM

## 2019-10-16 RX ORDER — FUROSEMIDE 40 MG/1
40 TABLET ORAL DAILY
Status: DISCONTINUED | OUTPATIENT
Start: 2019-10-16 | End: 2019-10-16

## 2019-10-16 RX ORDER — GLUCAGON 1 MG
1 KIT INJECTION
Status: DISCONTINUED | OUTPATIENT
Start: 2019-10-16 | End: 2019-10-18 | Stop reason: HOSPADM

## 2019-10-16 RX ORDER — SERTRALINE HYDROCHLORIDE 50 MG/1
100 TABLET, FILM COATED ORAL DAILY
Status: DISCONTINUED | OUTPATIENT
Start: 2019-10-16 | End: 2019-10-18 | Stop reason: HOSPADM

## 2019-10-16 RX ORDER — CLONIDINE HYDROCHLORIDE 0.1 MG/1
0.1 TABLET ORAL EVERY 4 HOURS PRN
Status: DISCONTINUED | OUTPATIENT
Start: 2019-10-16 | End: 2019-10-18 | Stop reason: HOSPADM

## 2019-10-16 RX ORDER — HYDRALAZINE HYDROCHLORIDE 25 MG/1
50 TABLET, FILM COATED ORAL EVERY 8 HOURS PRN
Status: DISCONTINUED | OUTPATIENT
Start: 2019-10-16 | End: 2019-10-18 | Stop reason: HOSPADM

## 2019-10-16 RX ORDER — SODIUM CHLORIDE 9 MG/ML
INJECTION, SOLUTION INTRAVENOUS ONCE
Status: CANCELLED | OUTPATIENT
Start: 2019-10-16 | End: 2019-10-16

## 2019-10-16 RX ORDER — NITROGLYCERIN 0.4 MG/1
0.4 TABLET SUBLINGUAL EVERY 5 MIN PRN
Status: DISCONTINUED | OUTPATIENT
Start: 2019-10-16 | End: 2019-10-18 | Stop reason: HOSPADM

## 2019-10-16 RX ORDER — IBUPROFEN 200 MG
24 TABLET ORAL
Status: DISCONTINUED | OUTPATIENT
Start: 2019-10-16 | End: 2019-10-18 | Stop reason: HOSPADM

## 2019-10-16 RX ORDER — GABAPENTIN 300 MG/1
300 CAPSULE ORAL EVERY 8 HOURS PRN
Status: DISCONTINUED | OUTPATIENT
Start: 2019-10-16 | End: 2019-10-18 | Stop reason: HOSPADM

## 2019-10-16 RX ORDER — FUROSEMIDE 10 MG/ML
120 INJECTION INTRAMUSCULAR; INTRAVENOUS 3 TIMES DAILY
Status: DISCONTINUED | OUTPATIENT
Start: 2019-10-16 | End: 2019-10-18 | Stop reason: HOSPADM

## 2019-10-16 RX ORDER — PANTOPRAZOLE SODIUM 40 MG/1
40 TABLET, DELAYED RELEASE ORAL DAILY
Status: DISCONTINUED | OUTPATIENT
Start: 2019-10-16 | End: 2019-10-18 | Stop reason: HOSPADM

## 2019-10-16 RX ORDER — CLOPIDOGREL BISULFATE 75 MG/1
75 TABLET ORAL DAILY
Status: DISCONTINUED | OUTPATIENT
Start: 2019-10-16 | End: 2019-10-18 | Stop reason: HOSPADM

## 2019-10-16 RX ORDER — CALCIUM ACETATE 667 MG/1
667 CAPSULE ORAL
Status: DISCONTINUED | OUTPATIENT
Start: 2019-10-16 | End: 2019-10-18 | Stop reason: HOSPADM

## 2019-10-16 RX ORDER — ONDANSETRON 2 MG/ML
4 INJECTION INTRAMUSCULAR; INTRAVENOUS EVERY 8 HOURS PRN
Status: DISCONTINUED | OUTPATIENT
Start: 2019-10-16 | End: 2019-10-18 | Stop reason: HOSPADM

## 2019-10-16 RX ORDER — ISOSORBIDE MONONITRATE 30 MG/1
30 TABLET, EXTENDED RELEASE ORAL DAILY
Status: DISCONTINUED | OUTPATIENT
Start: 2019-10-16 | End: 2019-10-18 | Stop reason: HOSPADM

## 2019-10-16 RX ORDER — AMLODIPINE BESYLATE 10 MG/1
10 TABLET ORAL
COMMUNITY
End: 2020-05-01 | Stop reason: SDUPTHER

## 2019-10-16 RX ORDER — SODIUM CHLORIDE 0.9 % (FLUSH) 0.9 %
10 SYRINGE (ML) INJECTION
Status: DISCONTINUED | OUTPATIENT
Start: 2019-10-16 | End: 2019-10-18 | Stop reason: HOSPADM

## 2019-10-16 RX ORDER — DOCUSATE SODIUM 100 MG/1
100 CAPSULE, LIQUID FILLED ORAL 2 TIMES DAILY
Status: DISCONTINUED | OUTPATIENT
Start: 2019-10-16 | End: 2019-10-18 | Stop reason: HOSPADM

## 2019-10-16 RX ORDER — METOPROLOL SUCCINATE 50 MG/1
50 TABLET, EXTENDED RELEASE ORAL DAILY
Status: DISCONTINUED | OUTPATIENT
Start: 2019-10-16 | End: 2019-10-18 | Stop reason: HOSPADM

## 2019-10-16 RX ORDER — AMLODIPINE BESYLATE 5 MG/1
10 TABLET ORAL DAILY
Status: DISCONTINUED | OUTPATIENT
Start: 2019-10-16 | End: 2019-10-18 | Stop reason: HOSPADM

## 2019-10-16 RX ADMIN — NEPHROCAP 1 CAPSULE: 1 CAP ORAL at 01:10

## 2019-10-16 RX ADMIN — FUROSEMIDE 120 MG: 10 INJECTION, SOLUTION INTRAVENOUS at 10:10

## 2019-10-16 RX ADMIN — CALCIUM ACETATE 667 MG: 667 CAPSULE ORAL at 05:10

## 2019-10-16 RX ADMIN — APIXABAN 2.5 MG: 2.5 TABLET, FILM COATED ORAL at 09:10

## 2019-10-16 RX ADMIN — DOCUSATE SODIUM 100 MG: 100 CAPSULE, LIQUID FILLED ORAL at 09:10

## 2019-10-16 RX ADMIN — CLOPIDOGREL BISULFATE 75 MG: 75 TABLET ORAL at 09:10

## 2019-10-16 RX ADMIN — AMLODIPINE BESYLATE 10 MG: 5 TABLET ORAL at 01:10

## 2019-10-16 RX ADMIN — PANTOPRAZOLE SODIUM 40 MG: 40 TABLET, DELAYED RELEASE ORAL at 01:10

## 2019-10-16 RX ADMIN — HYDRALAZINE HYDROCHLORIDE 50 MG: 25 TABLET, FILM COATED ORAL at 09:10

## 2019-10-16 RX ADMIN — CALCIUM ACETATE 667 MG: 667 CAPSULE ORAL at 01:10

## 2019-10-16 RX ADMIN — ISOSORBIDE MONONITRATE 30 MG: 30 TABLET, EXTENDED RELEASE ORAL at 01:10

## 2019-10-16 RX ADMIN — FUROSEMIDE 40 MG: 40 TABLET ORAL at 01:10

## 2019-10-16 RX ADMIN — METOPROLOL SUCCINATE 50 MG: 25 TABLET, FILM COATED, EXTENDED RELEASE ORAL at 01:10

## 2019-10-16 RX ADMIN — SERTRALINE HYDROCHLORIDE 100 MG: 50 TABLET ORAL at 01:10

## 2019-10-16 RX ADMIN — GABAPENTIN 300 MG: 300 CAPSULE ORAL at 09:10

## 2019-10-16 RX ADMIN — PRAVASTATIN SODIUM 80 MG: 40 TABLET ORAL at 01:10

## 2019-10-16 NOTE — ASSESSMENT & PLAN NOTE
Diabetic neuropathy associated with type 2 diabetes mellitus  She takes insulin glargine 20 units daily. Give 15 units daily, insulin aspart sliding scale. Monitor serum glucose.

## 2019-10-16 NOTE — HPI
Angelina Beard is a 69 year old black woman with hypertension, chronic diastolic congestive heart failure, paroxysmal atrial fibrillation (anticoagulated on apixaban), diabetes mellitus type 2 (hemoglobin A1c 6.5% on 5/7/19) with neuropathy and nephropathy, hyperlipidemia, nonobstructive coronary artery disease, cerebral microvascular disease, end stage renal disease on hemodialysis (Monday Wednesday Friday at [facility] in [facility]), anemia, gastroesophageal reflux disease, osteoarthritis status post right total knee arthroplasty 2/23/16, chronic left leg pain, chronic constipation, cholelithiasis, history of right lung sarcoidosis, history of left breast cancer status post mastectomy, history of gastric ulcers on 7/13/18. Her primary care physician is Dr. Ben Mena. Her nephrologist is Dr. Rc Santamaria. Her cardiologist is Dr. Faraz Flores. Her podiatrist is Dr. Kwame Dacosta. She lives in [place], [place]. She is mostly wheelchair bound but can use a walker with assistance. She still makes some urine.   She presented to [facility] on Wednesday 10/16/19 with shortness of breath, abdominal fluid buildup, and bilateral leg edema. Shortness of breath was worse with exertion and associated with chest pressure. She had missed dialysis on Monday 10/14/19. Blood pressure was as high as 222/99. She had not taken her morning antihypertensive medications. Labs showed hyperkalemia (potassium 6.5), elevated BNP (720), and elevated troponin (0.049). Bilateral lower extremity venous ultrasound showed no deep vein thrombosis. Chest X-ray showed pulmonary edema. [facility] was contacted for emergent dialysis. She was admitted to [facility].

## 2019-10-16 NOTE — ASSESSMENT & PLAN NOTE
End-stage renal disease on hemodialysis  Hyperkalemia, diminished renal excretion  Chest pain  Chest pain due to pulmonary edema. Dialysis. Appreciate Nephrology. Continue home renal vitamins, calcium acetate. Monitor labs.

## 2019-10-16 NOTE — SUBJECTIVE & OBJECTIVE
Past Medical History:   Diagnosis Date    Anemia due to gastrointestinal blood loss 7/11/2018    Arthritis     Asthma     Breast cancer, left     CHF (congestive heart failure)     Cholelithiasis     Coronary artery disease     Diabetes mellitus, type 2     Encounter for blood transfusion     End-stage renal disease on hemodialysis     Hemodialysis access site with arteriovenous graft     mon-wed -fri    Hyperlipidemia     Hypertension     Sarcoidosis        Past Surgical History:   Procedure Laterality Date    av graft      left arm    BRACHIAL ARTERY GRAFT Left 05/04/2016    brachiocephalic, Dr. Anson Crocker    BREAST BIOPSY Left     breast ca    BREAST LUMPECTOMY Left     radiation only    btl      COLONOSCOPY N/A 7/13/2018    Procedure: COLONOSCOPY;  Surgeon: Almita Bee MD;  Location: [facility] ENDO;  Service: Endoscopy;  Laterality: N/A;    ELBOW SURGERY      left    ESOPHAGOGASTRODUODENOSCOPY N/A 7/13/2018    Procedure: ESOPHAGOGASTRODUODENOSCOPY (EGD);  Surgeon: Almita Bee MD;  Location: [facility] ENDO;  Service: Endoscopy;  Laterality: N/A;    LIPOMA RESECTION      back of neck    pilondial cyst      TOTAL KNEE ARTHROPLASTY Right 02/23/2016       Review of patient's allergies indicates:  No Known Allergies    No current facility-administered medications on file prior to encounter.      Current Outpatient Medications on File Prior to Encounter   Medication Sig    amLODIPine (NORVASC) 10 MG tablet Take 10 mg by mouth every Tuesday, Thursday, Saturday, Sunday.    acetaminophen (TYLENOL) 500 MG tablet Take 2 tablets (1,000 mg total) by mouth every 8 (eight) hours as needed.    apixaban (ELIQUIS) 2.5 mg Tab Take 1 tablet (2.5 mg total) by mouth 2 (two) times daily.    calcium acetate (PHOSLO) 667 mg capsule Take 667 mg by mouth 3 (three) times daily with meals.    clopidogrel (PLAVIX) 75 mg tablet Take 1 tablet (75 mg total) by mouth once daily.    docusate sodium (COLACE)  100 MG capsule Take 1 capsule (100 mg total) by mouth 2 (two) times daily.    furosemide (LASIX) 40 MG tablet Take 1 tablet (40 mg total) by mouth once daily.    gabapentin (NEURONTIN) 300 MG capsule Take 1 capsule (300 mg total) by mouth every 8 (eight) hours as needed.    insulin (LANTUS SOLOSTAR U-100 INSULIN) glargine 100 units/mL (3mL) SubQ pen Inject 20 Units into the skin once daily.    isosorbide mononitrate (IMDUR) 30 MG 24 hr tablet Take 1 tablet (30 mg total) by mouth once daily.    metoprolol succinate (TOPROL-XL) 50 MG 24 hr tablet Take 1 tablet (50 mg total) by mouth once daily.    mupirocin (BACTROBAN) 2 % ointment by Nasal route 2 (two) times daily.    nitroGLYCERIN (NITROSTAT) 0.4 MG SL tablet Place 1 tablet (0.4 mg total) under the tongue every 5 (five) minutes as needed for Chest pain.    pantoprazole (PROTONIX) 40 MG tablet Take 1 tablet (40 mg total) by mouth once daily.    polyethylene glycol (GLYCOLAX) 17 gram/dose powder     pravastatin (PRAVACHOL) 80 MG tablet Take 1 tablet (80 mg total) by mouth once daily.    sertraline (ZOLOFT) 100 MG tablet Take 1 tablet (100 mg total) by mouth once daily.    vitamin renal formula, B-complex-vitamin c-folic acid, (NEPHROCAP) 1 mg Cap Take 1 capsule by mouth once daily.    [DISCONTINUED] amLODIPine (NORVASC) 10 MG tablet Take 1 tablet (10 mg total) by mouth once daily. (Patient taking differently: Take 10 mg by mouth every Tuesday, Thursday, Saturday, Sunday. )     Family History     Problem Relation (Age of Onset)    Diabetes Mother, Sister    Heart disease Sister    Hypertension Mother    Kidney disease Mother        Tobacco Use    Smoking status: Never Smoker    Smokeless tobacco: Never Used   Substance and Sexual Activity    Alcohol use: No    Drug use: No    Sexual activity: Not on file     Review of Systems   Constitutional: Negative for chills and fever.   HENT: Negative for trouble swallowing and voice change.    Eyes: Negative  for pain and redness.   Respiratory: Positive for shortness of breath. Negative for cough.    Cardiovascular: Positive for chest pain and leg swelling.   Gastrointestinal: Positive for abdominal distention and constipation.   Genitourinary: Negative for dysuria and hematuria.   Musculoskeletal: Positive for back pain and neck pain. Negative for neck stiffness.   Skin: Negative for rash and wound.   Neurological: Negative for syncope and speech difficulty.     Objective:     Vital Signs (Most Recent):  Pulse: 76 (10/16/19 0946)  Resp: 20 (10/16/19 0946)  BP: (!) 197/105 (10/16/19 0946)  SpO2: 99 % (10/16/19 0946) Vital Signs (24h Range):  Pulse:  [75-77] 76  Resp:  [13-20] 20  SpO2:  [91 %-99 %] 99 %  BP: (184-222)/() 197/105     Weight: 103.4 kg (228 lb)  Body mass index is 35.71 kg/m².    Physical Exam   Constitutional: She is oriented to person, place, and time. She appears well-developed. No distress.   HENT:   Head: Normocephalic and atraumatic.   Mouth/Throat: Oropharynx is clear and moist.   Eyes: Conjunctivae are normal. No scleral icterus.   Neck: Neck supple. No tracheal deviation present.   Cardiovascular: Normal rate and regular rhythm.   Pulmonary/Chest: Effort normal. No respiratory distress. She has rales.   Abdominal: Soft. She exhibits distension. There is no tenderness. There is no guarding.   Musculoskeletal: She exhibits edema. She exhibits no tenderness.   Neurological: She is alert and oriented to person, place, and time.   Skin: Skin is warm and dry.   Psychiatric: She has a normal mood and affect.   Nursing note and vitals reviewed.          Significant Labs: All pertinent labs within the past 24 hours have been reviewed.    Significant Imaging: I have reviewed all pertinent imaging results/findings within the past 24 hours.   X-Ray Chest 1 View 10/16/19: FINDINGS:  The mediastinal structures are midline.  The cardiac silhouette is stable.  There is atherosclerotic calcification of the  aortic arch.  There is pulmonary vascular congestion and bilateral ground-glass opacities suggesting edema.  No definite consolidation or pleural fluid.  No osseous abnormalities are seen.  Impression: Findings suggest moderate pulmonary edema, slightly improved from 08/01/2019 exam.  US Lower Extremity Veins Bilateral 10/16/19: FINDINGS:  The bilateral common femoral, superficial femoral and popliteal veins demonstrate normal gray scale graded compression, color-flow and Doppler wave forms. The Doppler wave form imaging demonstrate normal respiratory phasicity and augmentation. No evidence for bilateral lower extremity deep venous thrombosis.  Impression: No evidence for bilateral lower extremity deep venous thrombosis. Clinical correlation and follow up as clinically warranted.

## 2019-10-16 NOTE — ED PROVIDER NOTES
CHIEF COMPLAINT: Shortness of breath      HPI    Angelina Beard 69 y.o. presents to the emergency department today with a complaint of shortness of breath. She reports that around midnight today she started feeling short of breath.  She also reports that she has chest pain. She describes substernal and pressure-like.  Her chest pain has resolved.  She reports that she is on home O2 and supposed to wear it 24-7 however she takes it off from time to time.  Patient was not wearing her oxygen when she was feeling short of breath. She denies any palpitations, nausea, diaphoresis, cough, sputum production, hemoptysis.  Patient reports that she is also having increased leg swelling. It has been going on for some time.  Patient is end-stage renal on dialysis.  She does Monday, Wednesday and Friday.  She sees Dr. Madden.  Her dialysis center is in [facility]. She started noticing some small blisters on her left leg yesterday.       ROS    Constitutional: No fever, no chills.  Eyes: No discharge. No pain.  HENT: No nasal drainage. No ear ache. No sore throat.  Cardiovascular: See above.   Respiratory: See above.   Gastrointestinal: No abdominal pain, no vomiting. No diarrhea.  Genitourinary: No hematuria, dysuria, urgency.  Musculoskeletal: No back pain.   Skin: No rashes, no lesions.  Neurological: No headache, no focal weakness.    Otherwise remaining ROS negative     The history is provided by the patient      ALLERGIES REVIEWED  MEDICATIONS REVIEWED  PMH/PSH/SOC/FH REVIEWED       Past Medical History:   Diagnosis Date    Anemia due to gastrointestinal blood loss 7/11/2018    Arthritis     Asthma     Breast cancer, left     CHF (congestive heart failure)     Cholelithiasis     Coronary artery disease     Diabetes mellitus, type 2     Encounter for blood transfusion     End-stage renal disease on hemodialysis     Hemodialysis access site with arteriovenous graft     mon-wed -fri    Hyperlipidemia      "Hypertension     Sarcoidosis          Past Surgical History:   Procedure Laterality Date    av graft      left arm    BRACHIAL ARTERY GRAFT Left 05/04/2016    brachiocephalic, Dr. Anson Crocker    BREAST BIOPSY Left     breast ca    BREAST LUMPECTOMY Left     radiation only    btl      COLONOSCOPY N/A 7/13/2018    Procedure: COLONOSCOPY;  Surgeon: Almita Bee MD;  Location: [facility] ENDO;  Service: Endoscopy;  Laterality: N/A;    ELBOW SURGERY      left    ESOPHAGOGASTRODUODENOSCOPY N/A 7/13/2018    Procedure: ESOPHAGOGASTRODUODENOSCOPY (EGD);  Surgeon: Almita Bee MD;  Location: [facility] ENDO;  Service: Endoscopy;  Laterality: N/A;    LIPOMA RESECTION      back of neck    pilondial cyst      TOTAL KNEE ARTHROPLASTY Right 02/23/2016         Social History     Tobacco Use    Smoking status: Never Smoker    Smokeless tobacco: Never Used   Substance Use Topics    Alcohol use: No    Drug use: No       Family History   Problem Relation Age of Onset    Diabetes Mother     Kidney disease Mother     Hypertension Mother     Diabetes Sister     Heart disease Sister        Nursing/Ancillary staff note reviewed.  VS reviewed         Physical Exam     BP (!) 178/84   Pulse 67   Resp 18   Ht 5' 7" (1.702 m)   Wt 103.4 kg (228 lb)   LMP  (LMP Unknown)   SpO2 99%   BMI 35.71 kg/m²     Physical Exam    General Appearance: The patient is alert, has no immediate need for airway protection and no signs of toxicity. No acute distress. Lying in bed but able to sit up without difficulty.   HEENT: Eyes: Pupils equal and round no pallor or injection. Extra ocular movements intact. No drainage.       Mouth: Mucous membranes are moist. Oropharynx clear.   Neck:Neck is supple non-tender. No lymphadenopathy. No stridor.   Respiratory: There are no retractions, lungs are coarse to auscultation. No wheezing, no crackles. Chest wall nontender to palpation.   Cardiovascular: Regular rate and rhythm. No murmurs, " rubs or gallops.  Gastrointestinal:  Abdomen is soft and non-tender, no masses, bowel sounds normal. No guarding, no rebound.  No pulsatile mass.   Neurological: Alert and oriented x 4. CN II-XII grossly intact. No focal weakness. Strength intact 5/5 bilaterally in upper and lower extremities.   Skin: Warm and dry, no rashes. There are small vesicular lesions along the left LE.   Musculoskeletal: Extremities are non-tender, 2+pitting edema and have full range of motion. Back NTTP along the midline.     DIFFERENTIAL DIAGNOSIS: After history and physical exam a differential diagnosis was considered, but was not limited to, pulmonary infectious process, COPD, asthma, pulmonary embolus and congestive heart failure.          Initial management: This is a 70 yo female who presents to the ED today with SOB, she has missed her dialysis session Monday. WIll obtain EKG, labs, CXR, monitor and reassess.           Labs Reviewed   CBC W/ AUTO DIFFERENTIAL - Abnormal; Notable for the following components:       Result Value    RBC 3.22 (*)     Hemoglobin 9.6 (*)     Hematocrit 30.8 (*)     Mean Corpuscular Hemoglobin Conc 31.2 (*)     RDW 14.8 (*)     Gran% 74.4 (*)     All other components within normal limits   B-TYPE NATRIURETIC PEPTIDE - Abnormal; Notable for the following components:     (*)     All other components within normal limits   COMPREHENSIVE METABOLIC PANEL - Abnormal; Notable for the following components:    Potassium 6.5 (*)     Glucose 129 (*)     BUN, Bld 76 (*)     Creatinine 11.7 (*)     Calcium 10.7 (*)     Albumin 3.4 (*)     eGFR if  3 (*)     eGFR if non  3 (*)     All other components within normal limits    Narrative:      POTASSIUM  critical result(s) called and verbal readback obtained   from MARYAN ARCHIBALD RN, 10/16/2019 08:24   TROPONIN I - Abnormal; Notable for the following components:    Troponin I 0.049 (*)     All other components within normal limits    POCT GLUCOSE - Abnormal; Notable for the following components:    POCT Glucose 139 (*)     All other components within normal limits   POCT GLUCOSE - Abnormal; Notable for the following components:    POCT Glucose 112 (*)     All other components within normal limits   POCT GLUCOSE, HAND-HELD DEVICE   POCT GLUCOSE         US Lower Extremity Veins Bilateral   Final Result      No evidence for bilateral lower extremity deep venous thrombosis. Clinical correlation and follow up as clinically warranted.         Electronically signed by: Gerry Andino DO   Date:    10/16/2019   Time:    09:02      X-Ray Chest 1 View   Final Result      Findings suggest moderate pulmonary edema, slightly improved from 2019 exam.         Electronically signed by: Khalida Coe MD   Date:    10/16/2019   Time:    08:10                        ED Course     ED Course as of Oct 16 1729   Wed Oct 16, 2019   0745 EK bpm, normal axis, no ST elevations, no peaked T waves.     [JA]   0820 The pt has signs of fluid overload on her CXR. She has hyperkalemia without evidence of EKG changes. She will need emergent dialysis. I have discussed this with the pt and the need for admission, she understands.     [JA]   0833 I spoke with Dr Jenkins, nephrology [facility], they will get pt to dialysis ASAP.     [JA]   0858 I spoke with Dr Cobb re:the pts presentation and he accepts.     [JA]      ED Course User Index  [JA] Chuy Claros MD       Critical Care  Date/Time: 10/16/2019 5:28 PM  Performed by: Chuy Claros MD  Authorized by: Chuy Claros MD   Total critical care time (exclusive of procedural time) : 32 minutes  Critical care time was exclusive of separately billable procedures and treating other patients.  Critical care was necessary to treat or prevent imminent or life-threatening deterioration of the following conditions: metabolic crisis and renal failure.  Critical care was time spent personally by me on the following  activities: development of treatment plan with patient or surrogate, discussions with consultants, discussions with primary provider, evaluation of patient's response to treatment, examination of patient, obtaining history from patient or surrogate, ordering and performing treatments and interventions, ordering and review of laboratory studies, ordering and review of radiographic studies, pulse oximetry, re-evaluation of patient's condition and review of old charts.               ERICK Beard  69 y.o. presents to the ED today with SOB, she is found to have fluid overload and hyperkalemia. She is need of emergent dialysis, she will be admitted and have emergent dialysis.       Voice recognition software utilized in this note.              Impression        The primary encounter diagnosis was Hyperkalemia. Diagnoses of SOB (shortness of breath), Leg swelling, Chest pain, Hypervolemia associated with renal insufficiency, and ESRD (end stage renal disease) were also pertinent to this visit.                 Chuy Claros MD  10/16/19 9514

## 2019-10-16 NOTE — NURSING
Pt arrived to room via stretcher awake, alert and oriented x4. Pt denies pain at this time. No distress noted. Applied tele monitor. Discussed POC with pt. All questions answered. Verbalized understanding. Oriented to surroundings. Bed in lowest position. Side rails up x2. Call bell/belongings within reach. Instructed to call with any needs/concerns. Verbalized understanding. Will continue to monitor.

## 2019-10-16 NOTE — H&P
[facility]  History & Physical    Patient Name: Angelina Beard  MRN: [id number]  Admission Date: 10/16/2019  Attending Physician: Jeb Cobb MD   Primary Care Provider: Ben Mena MD         Patient information was obtained from patient, past medical records and ER records.     Subjective:     Principal Problem:Hypervolemia associated with renal insufficiency    Chief Complaint:   Chief Complaint   Patient presents with    Fatigue     Reports is on O2 at home and was off it for short period of time. Son called EMS reporting his mother was awake but not responding to him. Pt currently AAOx4. Complaining of weakness to BLE with edema. Pt due for dialysis, states did not go on Mon.         HPI: Angelina Beard is a 69 year old black woman with hypertension, chronic diastolic congestive heart failure, paroxysmal atrial fibrillation (anticoagulated on apixaban), diabetes mellitus type 2 (hemoglobin A1c 6.5% on 5/7/19) with neuropathy and nephropathy, hyperlipidemia, nonobstructive coronary artery disease, cerebral microvascular disease, end stage renal disease on hemodialysis (Monday Wednesday Friday at [facility] in [facility]), anemia, gastroesophageal reflux disease, osteoarthritis status post right total knee arthroplasty 2/23/16, chronic left leg pain, chronic constipation, cholelithiasis, history of right lung sarcoidosis, history of left breast cancer status post mastectomy, history of gastric ulcers on 7/13/18. Her primary care physician is Dr. Ben Mena. Her nephrologist is Dr. Rc Santamaria. Her cardiologist is Dr. Faraz Flores. Her podiatrist is Dr. Kwame Dacosta. She lives in [place], [place]. She is mostly wheelchair bound but can use a walker with assistance. She still makes some urine.   She presented to [facility] on Wednesday 10/16/19 with shortness of breath, abdominal fluid buildup, and bilateral  leg edema. Shortness of breath was worse with exertion and associated with chest pressure. She had missed dialysis on Monday 10/14/19. Blood pressure was as high as 222/99. She had not taken her morning antihypertensive medications. Labs showed hyperkalemia (potassium 6.5), elevated BNP (720), and elevated troponin (0.049). Bilateral lower extremity venous ultrasound showed no deep vein thrombosis. Chest X-ray showed pulmonary edema. [facility] was contacted for emergent dialysis. She was admitted to [facility].    Past Medical History:   Diagnosis Date    Anemia due to gastrointestinal blood loss 7/11/2018    Arthritis     Asthma     Breast cancer, left     CHF (congestive heart failure)     Cholelithiasis     Coronary artery disease     Diabetes mellitus, type 2     Encounter for blood transfusion     End-stage renal disease on hemodialysis     Hemodialysis access site with arteriovenous graft     mon-wed -[id number]    Hyperlipidemia     Hypertension     Sarcoidosis        Past Surgical History:   Procedure Laterality Date    av graft      left arm    BRACHIAL ARTERY GRAFT Left 05/04/2016    brachiocephalic, Dr. Anson Crocker    BREAST BIOPSY Left     breast ca    BREAST LUMPECTOMY Left     radiation only    btl      COLONOSCOPY N/A 7/13/2018    Procedure: COLONOSCOPY;  Surgeon: Almita Bee MD;  Location: [facility] ENDO;  Service: Endoscopy;  Laterality: N/A;    ELBOW SURGERY      left    ESOPHAGOGASTRODUODENOSCOPY N/A 7/13/2018    Procedure: ESOPHAGOGASTRODUODENOSCOPY (EGD);  Surgeon: Almita Bee MD;  Location: [facility] ENDO;  Service: Endoscopy;  Laterality: N/A;    LIPOMA RESECTION      back of neck    pilondial cyst      TOTAL KNEE ARTHROPLASTY Right 02/23/2016       Review of patient's allergies indicates:  No Known Allergies    No current facility-administered medications on file prior to encounter.      Current Outpatient Medications on File Prior to Encounter    Medication Sig    amLODIPine (NORVASC) 10 MG tablet Take 10 mg by mouth every Tuesday, Thursday, Saturday, Sunday.    acetaminophen (TYLENOL) 500 MG tablet Take 2 tablets (1,000 mg total) by mouth every 8 (eight) hours as needed.    apixaban (ELIQUIS) 2.5 mg Tab Take 1 tablet (2.5 mg total) by mouth 2 (two) times daily.    calcium acetate (PHOSLO) 667 mg capsule Take 667 mg by mouth 3 (three) times daily with meals.    clopidogrel (PLAVIX) 75 mg tablet Take 1 tablet (75 mg total) by mouth once daily.    docusate sodium (COLACE) 100 MG capsule Take 1 capsule (100 mg total) by mouth 2 (two) times daily.    furosemide (LASIX) 40 MG tablet Take 1 tablet (40 mg total) by mouth once daily.    gabapentin (NEURONTIN) 300 MG capsule Take 1 capsule (300 mg total) by mouth every 8 (eight) hours as needed.    insulin (LANTUS SOLOSTAR U-100 INSULIN) glargine 100 units/mL (3mL) SubQ pen Inject 20 Units into the skin once daily.    isosorbide mononitrate (IMDUR) 30 MG 24 hr tablet Take 1 tablet (30 mg total) by mouth once daily.    metoprolol succinate (TOPROL-XL) 50 MG 24 hr tablet Take 1 tablet (50 mg total) by mouth once daily.    mupirocin (BACTROBAN) 2 % ointment by Nasal route 2 (two) times daily.    nitroGLYCERIN (NITROSTAT) 0.4 MG SL tablet Place 1 tablet (0.4 mg total) under the tongue every 5 (five) minutes as needed for Chest pain.    pantoprazole (PROTONIX) 40 MG tablet Take 1 tablet (40 mg total) by mouth once daily.    polyethylene glycol (GLYCOLAX) 17 gram/dose powder     pravastatin (PRAVACHOL) 80 MG tablet Take 1 tablet (80 mg total) by mouth once daily.    sertraline (ZOLOFT) 100 MG tablet Take 1 tablet (100 mg total) by mouth once daily.    vitamin renal formula, B-complex-vitamin c-folic acid, (NEPHROCAP) 1 mg Cap Take 1 capsule by mouth once daily.    [DISCONTINUED] amLODIPine (NORVASC) 10 MG tablet Take 1 tablet (10 mg total) by mouth once daily. (Patient taking differently: Take 10 mg by  mouth every Tuesday, Thursday, Saturday, Sunday. )     Family History     Problem Relation (Age of Onset)    Diabetes Mother, Sister    Heart disease Sister    Hypertension Mother    Kidney disease Mother        Tobacco Use    Smoking status: Never Smoker    Smokeless tobacco: Never Used   Substance and Sexual Activity    Alcohol use: No    Drug use: No    Sexual activity: Not on file     Review of Systems   Constitutional: Negative for chills and fever.   HENT: Negative for trouble swallowing and voice change.    Eyes: Negative for pain and redness.   Respiratory: Positive for shortness of breath. Negative for cough.    Cardiovascular: Positive for chest pain and leg swelling.   Gastrointestinal: Positive for abdominal distention and constipation.   Genitourinary: Negative for dysuria and hematuria.   Musculoskeletal: Positive for back pain and neck pain. Negative for neck stiffness.   Skin: Negative for rash and wound.   Neurological: Negative for syncope and speech difficulty.     Objective:     Vital Signs (Most Recent):  Pulse: 76 (10/16/19 0946)  Resp: 20 (10/16/19 0946)  BP: (!) 197/105 (10/16/19 0946)  SpO2: 99 % (10/16/19 0946) Vital Signs (24h Range):  Pulse:  [75-77] 76  Resp:  [13-20] 20  SpO2:  [91 %-99 %] 99 %  BP: (184-222)/() 197/105     Weight: 103.4 kg (228 lb)  Body mass index is 35.71 kg/m².    Physical Exam   Constitutional: She is oriented to person, place, and time. She appears well-developed. No distress.   HENT:   Head: Normocephalic and atraumatic.   Mouth/Throat: Oropharynx is clear and moist.   Eyes: Conjunctivae are normal. No scleral icterus.   Neck: Neck supple. No tracheal deviation present.   Cardiovascular: Normal rate and regular rhythm.   Pulmonary/Chest: Effort normal. No respiratory distress. She has rales.   Abdominal: Soft. She exhibits distension. There is no tenderness. There is no guarding.   Musculoskeletal: She exhibits edema. She exhibits no tenderness.    Neurological: She is alert and oriented to person, place, and time.   Skin: Skin is warm and dry.   Psychiatric: She has a normal mood and affect.   Nursing note and vitals reviewed.          Significant Labs: All pertinent labs within the past 24 hours have been reviewed.    Significant Imaging: I have reviewed all pertinent imaging results/findings within the past 24 hours.   X-Ray Chest 1 View 10/16/19: FINDINGS:  The mediastinal structures are midline.  The cardiac silhouette is stable.  There is atherosclerotic calcification of the aortic arch.  There is pulmonary vascular congestion and bilateral ground-glass opacities suggesting edema.  No definite consolidation or pleural fluid.  No osseous abnormalities are seen.  Impression: Findings suggest moderate pulmonary edema, slightly improved from 08/01/2019 exam.  US Lower Extremity Veins Bilateral 10/16/19: FINDINGS:  The bilateral common femoral, superficial femoral and popliteal veins demonstrate normal gray scale graded compression, color-flow and Doppler wave forms. The Doppler wave form imaging demonstrate normal respiratory phasicity and augmentation. No evidence for bilateral lower extremity deep venous thrombosis.  Impression: No evidence for bilateral lower extremity deep venous thrombosis. Clinical correlation and follow up as clinically warranted.      Assessment/Plan:     * Hypervolemia associated with renal insufficiency  End-stage renal disease on hemodialysis  Hyperkalemia, diminished renal excretion  Chest pain  Chest pain due to pulmonary edema. Dialysis. Appreciate Nephrology. Continue home renal vitamins, calcium acetate. Monitor labs.    PAD (peripheral artery disease)  Continue home clopidrogel, pravastatin.    Paroxysmal atrial fibrillation  Continue home apixaban and metoprolol succinate.    Chronic constipation  Continue home docusate twice daily.    Anemia in end-stage renal disease  Stable.    Renovascular hypertension  Continue home  amlodipine, isosorbide mononitrate, metoprolol succinate, furosemide.    Coronary artery disease  Continue home clopidrogel, pravastatin.    GERD (gastroesophageal reflux disease)  Continue home pantoprazole.    Osteoarthritis  Osteoarthritis of lumbar spine  Status post total right knee replacement 2/23/16  Give acetaminophen prn. Continue home gabapentin prn.    Chronic diastolic congestive heart failure  Ultrafiltration with dialysis.    Hyperlipidemia  Continue home pravastatin.    Type 2 diabetes mellitus with hypertension and end stage renal disease on dialysis  Diabetic neuropathy associated with type 2 diabetes mellitus  She takes insulin glargine 20 units daily. Give 15 units daily, insulin aspart sliding scale. Monitor serum glucose.    Cerebral microvascular disease  History of stroke  Continue home clopidrogel, pravastatin.      VTE Risk Mitigation (From admission, onward)    None             Jeb oCbb MD  Department of Hospital Medicine   [facility]

## 2019-10-16 NOTE — ASSESSMENT & PLAN NOTE
Osteoarthritis of lumbar spine  Status post total right knee replacement 2/23/16  Give acetaminophen prn. Continue home gabapentin prn.

## 2019-10-17 LAB
ALBUMIN SERPL BCP-MCNC: 3.2 G/DL (ref 3.5–5.2)
ANION GAP SERPL CALC-SCNC: 16 MMOL/L (ref 8–16)
BUN SERPL-MCNC: 49 MG/DL (ref 8–23)
CALCIUM SERPL-MCNC: 9.8 MG/DL (ref 8.7–10.5)
CHLORIDE SERPL-SCNC: 99 MMOL/L (ref 95–110)
CO2 SERPL-SCNC: 22 MMOL/L (ref 23–29)
CREAT SERPL-MCNC: 9 MG/DL (ref 0.5–1.4)
EST. GFR  (AFRICAN AMERICAN): 5 ML/MIN/1.73 M^2
EST. GFR  (NON AFRICAN AMERICAN): 4 ML/MIN/1.73 M^2
GLUCOSE SERPL-MCNC: 180 MG/DL (ref 70–110)
MAGNESIUM SERPL-MCNC: 2 MG/DL (ref 1.6–2.6)
PHOSPHATE SERPL-MCNC: 4.4 MG/DL (ref 2.7–4.5)
POCT GLUCOSE: 156 MG/DL (ref 70–110)
POCT GLUCOSE: 164 MG/DL (ref 70–110)
POCT GLUCOSE: 91 MG/DL (ref 70–110)
POCT GLUCOSE: 97 MG/DL (ref 70–110)
POTASSIUM SERPL-SCNC: 5.7 MMOL/L (ref 3.5–5.1)
SODIUM SERPL-SCNC: 137 MMOL/L (ref 136–145)
TROPONIN I SERPL DL<=0.01 NG/ML-MCNC: 0.04 NG/ML (ref 0–0.03)

## 2019-10-17 PROCEDURE — 25000003 PHARM REV CODE 250: Performed by: HOSPITALIST

## 2019-10-17 PROCEDURE — 80100016 HC MAINTENANCE HEMODIALYSIS

## 2019-10-17 PROCEDURE — 94761 N-INVAS EAR/PLS OXIMETRY MLT: CPT

## 2019-10-17 PROCEDURE — 11000001 HC ACUTE MED/SURG PRIVATE ROOM

## 2019-10-17 PROCEDURE — 84484 ASSAY OF TROPONIN QUANT: CPT

## 2019-10-17 PROCEDURE — 80069 RENAL FUNCTION PANEL: CPT

## 2019-10-17 PROCEDURE — 63600175 PHARM REV CODE 636 W HCPCS: Performed by: HOSPITALIST

## 2019-10-17 PROCEDURE — 83735 ASSAY OF MAGNESIUM: CPT

## 2019-10-17 PROCEDURE — C9399 UNCLASSIFIED DRUGS OR BIOLOG: HCPCS | Performed by: HOSPITALIST

## 2019-10-17 RX ADMIN — CLOPIDOGREL BISULFATE 75 MG: 75 TABLET ORAL at 09:10

## 2019-10-17 RX ADMIN — DOCUSATE SODIUM 100 MG: 100 CAPSULE, LIQUID FILLED ORAL at 08:10

## 2019-10-17 RX ADMIN — NEPHROCAP 1 CAPSULE: 1 CAP ORAL at 09:10

## 2019-10-17 RX ADMIN — AMLODIPINE BESYLATE 10 MG: 5 TABLET ORAL at 09:10

## 2019-10-17 RX ADMIN — PANTOPRAZOLE SODIUM 40 MG: 40 TABLET, DELAYED RELEASE ORAL at 09:10

## 2019-10-17 RX ADMIN — ACETAMINOPHEN 650 MG: 325 TABLET ORAL at 04:10

## 2019-10-17 RX ADMIN — APIXABAN 2.5 MG: 2.5 TABLET, FILM COATED ORAL at 09:10

## 2019-10-17 RX ADMIN — SERTRALINE HYDROCHLORIDE 100 MG: 50 TABLET ORAL at 09:10

## 2019-10-17 RX ADMIN — METOPROLOL SUCCINATE 50 MG: 25 TABLET, FILM COATED, EXTENDED RELEASE ORAL at 09:10

## 2019-10-17 RX ADMIN — ISOSORBIDE MONONITRATE 30 MG: 30 TABLET, EXTENDED RELEASE ORAL at 09:10

## 2019-10-17 RX ADMIN — DOCUSATE SODIUM 100 MG: 100 CAPSULE, LIQUID FILLED ORAL at 09:10

## 2019-10-17 RX ADMIN — APIXABAN 2.5 MG: 2.5 TABLET, FILM COATED ORAL at 08:10

## 2019-10-17 RX ADMIN — GABAPENTIN 300 MG: 300 CAPSULE ORAL at 08:10

## 2019-10-17 RX ADMIN — ACETAMINOPHEN 650 MG: 325 TABLET ORAL at 09:10

## 2019-10-17 RX ADMIN — FUROSEMIDE 120 MG: 10 INJECTION, SOLUTION INTRAVENOUS at 09:10

## 2019-10-17 RX ADMIN — CALCIUM ACETATE 667 MG: 667 CAPSULE ORAL at 09:10

## 2019-10-17 RX ADMIN — CALCIUM ACETATE 667 MG: 667 CAPSULE ORAL at 05:10

## 2019-10-17 RX ADMIN — FUROSEMIDE 120 MG: 10 INJECTION, SOLUTION INTRAVENOUS at 08:10

## 2019-10-17 RX ADMIN — PRAVASTATIN SODIUM 80 MG: 40 TABLET ORAL at 09:10

## 2019-10-17 RX ADMIN — FUROSEMIDE 120 MG: 10 INJECTION, SOLUTION INTRAVENOUS at 04:10

## 2019-10-17 RX ADMIN — INSULIN DETEMIR 15 UNITS: 100 INJECTION, SOLUTION SUBCUTANEOUS at 09:10

## 2019-10-17 RX ADMIN — ACETAMINOPHEN 650 MG: 325 TABLET ORAL at 08:10

## 2019-10-17 NOTE — PLAN OF CARE
Received patient upon rounds at [facility], patient seen in bed on semi-candelaria's position. conscious , coherent to time, date, place, person and situation. GCS 15. With subjective complaint of back  pain 6/10, due prn pain medications given, blood pressure on the high side, hydralazine po and lasix IV given.With left AC gauge 20 saline lock, flushed,patent, with clean and dry dressing. Right Av fistula upper an lower right arm, positive with thrill and bruit. Left AV fistula not in use. Advised patient to call for any assistance. Blood sugar monitored. 96% oxygen saturation room air. Call bell within reach. Safety fall precaution maintained.Telemetry Normal Sinus rhythm with PVC's. 70's.Will continue to monitor patient  0643H- Patient blood pressure improved, afebrile. No untoward signs and symptoms noted over the night.Telemetry no ectopy noted over the night. Free from fall. IV line patent.Will endorse patient to day shift Nurse.

## 2019-10-17 NOTE — HOSPITAL COURSE
She underwent dialysis with 4200 mL ultrafiltration. She had an extra dialysis session the next day, then her usual dialysis session on 10/18/19. All acute problems resolved. She was discharged home.

## 2019-10-17 NOTE — PLAN OF CARE
Patient AAOx3  Lives at home along- has PCA worker 55 hours/week  Goes to [facility] MWF (PCA transports patient)  On home oxygen    Future Appointments   Date Time Provider Department Center   11/5/2019 10:50 AM [facility] RV LAB [place]   11/12/2019  1:45 PM Ben Mena MD [facility] [place]     This  put name on white board and explained blue discharge folder to patient. Discharge planning brochure and/or business card given to patient.  Patient verbalized understanding.       10/17/19 0805   Discharge Assessment   Assessment Type Discharge Planning Assessment   Confirmed/corrected address and phone number on facesheet? Yes   Assessment information obtained from? Patient   Prior to hospitilization cognitive status: Alert/Oriented   Prior to hospitalization functional status: Independent;Assistive Equipment   Current cognitive status: Alert/Oriented   Current Functional Status: Independent;Assistive Equipment   Lives With alone   Able to Return to Prior Arrangements yes   Is patient able to care for self after discharge? Yes   Patient's perception of discharge disposition home health;home or selfcare   Readmission Within the Last 30 Days no previous admission in last 30 days   Patient currently being followed by outpatient case management? No   Patient currently receives any other outside agency services? Yes   How many hours a day does the patient receive services? 55   Name and contact number of agency or person providing outside services has PCA 55 hours per week. Also uses [facility] with skilled nurse and PT/OT   Is it the patient/care giver preference to resume care with the current outside agency? Yes   Equipment Currently Used at Home walker, rolling;wheelchair;bedside commode;shower chair;oxygen   Do you have any problems affording any of your prescribed medications? No   Is the patient taking medications as prescribed? yes   Does the patient have transportation home?  Yes   Transportation Anticipated family or friend will provide   Dialysis Name and Scheduled days [facility] in [facility] with Dr. Mckinley   Does the patient receive services at the [facility]? No   Discharge Plan A Home;Home with family;Home Health   DME Needed Upon Discharge  none     Lotus Wharton, RN, CCM, CMSRN  RN Transition Navigator  [phone]

## 2019-10-17 NOTE — PROGRESS NOTES
[facility]  Progress Note    Patient Name: Angelina Beard  MRN: [id number]  Patient Class: IP- Inpatient   Admission Date: 10/16/2019  Length of Stay: 1 days  Attending Physician: Jeb Cobb MD  Primary Care Provider: Ben Mena MD        Subjective:     Principal Problem:Hypervolemia associated with renal insufficiency        HPI:  Angelina Beard is a 69 year old black woman with hypertension, chronic diastolic congestive heart failure, paroxysmal atrial fibrillation (anticoagulated on apixaban), diabetes mellitus type 2 (hemoglobin A1c 6.5% on 5/7/19) with neuropathy and nephropathy, hyperlipidemia, nonobstructive coronary artery disease, cerebral microvascular disease, end stage renal disease on hemodialysis (Monday Wednesday Friday at [facility] in [facility]), anemia, gastroesophageal reflux disease, osteoarthritis status post right total knee arthroplasty 2/23/16, chronic left leg pain, chronic constipation, cholelithiasis, history of right lung sarcoidosis, history of left breast cancer status post mastectomy, history of gastric ulcers on 7/13/18. Her primary care physician is Dr. Ben Mena. Her nephrologist is Dr. Rc Santamaria. Her cardiologist is Dr. Faraz Flores. Her podiatrist is Dr. Kwame Dacosta. She lives in [place], [place]. She is mostly wheelchair bound but can use a walker with assistance. She still makes some urine.   She presented to [facility] on Wednesday 10/16/19 with shortness of breath, abdominal fluid buildup, and bilateral leg edema. Shortness of breath was worse with exertion and associated with chest pressure. She had missed dialysis on Monday 10/14/19. Blood pressure was as high as 222/99. She had not taken her morning antihypertensive medications. Labs showed hyperkalemia (potassium 6.5), elevated BNP (720), and elevated troponin (0.049). Bilateral lower extremity venous  ultrasound showed no deep vein thrombosis. Chest X-ray showed pulmonary edema. [facility] was contacted for emergent dialysis. She was admitted to [facility].    Overview/Hospital Course:  She underwent dialysis with 4200 mL ultrafiltration.     Interval History: Feels better. No recurrent chest pressure.    Review of Systems   Constitutional: Negative for chills and fever.   Cardiovascular: Negative for chest pain.   Gastrointestinal: Negative for nausea and vomiting.     Objective:     Vital Signs (Most Recent):  Temp: 98.2 °F (36.8 °C) (10/17/19 1125)  Pulse: 60 (10/17/19 1300)  Resp: 16 (10/17/19 1125)  BP: (!) 144/68 (10/17/19 1300)  SpO2: (!) 94 % (10/17/19 1040) Vital Signs (24h Range):  Temp:  [96.1 °F (35.6 °C)-98.2 °F (36.8 °C)] 98.2 °F (36.8 °C)  Pulse:  [59-73] 60  Resp:  [16-20] 16  SpO2:  [91 %-98 %] 94 %  BP: (104-191)/() 144/68     Weight: 104 kg (229 lb 4.5 oz)  Body mass index is 35.91 kg/m².    Intake/Output Summary (Last 24 hours) at 10/17/2019 1314  Last data filed at 10/17/2019 0900  Gross per 24 hour   Intake 830 ml   Output 4200 ml   Net -3370 ml      Physical Exam   Constitutional: She is oriented to person, place, and time. She appears well-developed. No distress.   Pulmonary/Chest: Effort normal. No respiratory distress.   Neurological: She is alert and oriented to person, place, and time.   Psychiatric: She has a normal mood and affect.   Nursing note and vitals reviewed.      Significant Labs: All pertinent labs within the past 24 hours have been reviewed.   Recent Labs   Lab 10/16/19  0739 10/17/19  0928    137   K 6.5* 5.7*   CL 98 99   CO2 26 22*   BUN 76* 49*   CREATININE 11.7* 9.0*   CALCIUM 10.7* 9.8   PROT 7.3  --    BILITOT 0.4  --    ALKPHOS 62  --    ALT 35  --    AST 31  --        Significant Imaging: I have reviewed all pertinent imaging results/findings within the past 24 hours.      Assessment/Plan:      * Hypervolemia associated with renal  insufficiency  End-stage renal disease on hemodialysis  Hyperkalemia, diminished renal excretion  Dialysis. Appreciate Nephrology. Continue home renal vitamins, calcium acetate. Monitor labs.    PAD (peripheral artery disease)  Continue home clopidrogel, pravastatin.    Paroxysmal atrial fibrillation  Continue home apixaban and metoprolol succinate.    Chronic constipation  Continue home docusate twice daily.    Anemia in end-stage renal disease  Stable.    Renovascular hypertension  Continue home amlodipine, isosorbide mononitrate, metoprolol succinate, furosemide.    Coronary artery disease  Continue home clopidrogel, pravastatin.    GERD (gastroesophageal reflux disease)  Continue home pantoprazole.    Osteoarthritis  Osteoarthritis of lumbar spine  Status post total right knee replacement 2/23/16  Give acetaminophen prn. Continue home gabapentin prn.    Chronic diastolic congestive heart failure  Ultrafiltration with dialysis.    Hyperlipidemia  Continue home pravastatin.    Type 2 diabetes mellitus with hypertension and end stage renal disease on dialysis  Diabetic neuropathy associated with type 2 diabetes mellitus  She takes insulin glargine 20 units daily. Give 15 units daily, insulin aspart sliding scale. Monitor serum glucose.    Cerebral microvascular disease  History of stroke  Continue home clopidrogel, pravastatin.      VTE Risk Mitigation (From admission, onward)         Ordered     apixaban tablet 2.5 mg  2 times daily      10/16/19 1240     IP VTE HIGH RISK PATIENT  Once      10/16/19 1240     Reason for No Pharmacological VTE Prophylaxis  Once      10/16/19 1240                      Jeb Cobb MD  Department of Hospital Medicine   [facility]

## 2019-10-17 NOTE — ASSESSMENT & PLAN NOTE
End-stage renal disease on hemodialysis  Hyperkalemia, diminished renal excretion  Dialysis. Appreciate Nephrology. Continue home renal vitamins, calcium acetate. Monitor labs.

## 2019-10-17 NOTE — SUBJECTIVE & OBJECTIVE
Interval History: Feels better. No recurrent chest pressure.    Review of Systems   Constitutional: Negative for chills and fever.   Cardiovascular: Negative for chest pain.   Gastrointestinal: Negative for nausea and vomiting.     Objective:     Vital Signs (Most Recent):  Temp: 98.2 °F (36.8 °C) (10/17/19 1125)  Pulse: 60 (10/17/19 1300)  Resp: 16 (10/17/19 1125)  BP: (!) 144/68 (10/17/19 1300)  SpO2: (!) 94 % (10/17/19 1040) Vital Signs (24h Range):  Temp:  [96.1 °F (35.6 °C)-98.2 °F (36.8 °C)] 98.2 °F (36.8 °C)  Pulse:  [59-73] 60  Resp:  [16-20] 16  SpO2:  [91 %-98 %] 94 %  BP: (104-191)/() 144/68     Weight: 104 kg (229 lb 4.5 oz)  Body mass index is 35.91 kg/m².    Intake/Output Summary (Last 24 hours) at 10/17/2019 1314  Last data filed at 10/17/2019 0900  Gross per 24 hour   Intake 830 ml   Output 4200 ml   Net -3370 ml      Physical Exam   Constitutional: She is oriented to person, place, and time. She appears well-developed. No distress.   Pulmonary/Chest: Effort normal. No respiratory distress.   Neurological: She is alert and oriented to person, place, and time.   Psychiatric: She has a normal mood and affect.   Nursing note and vitals reviewed.      Significant Labs: All pertinent labs within the past 24 hours have been reviewed.   Recent Labs   Lab 10/16/19  0739 10/17/19  0928    137   K 6.5* 5.7*   CL 98 99   CO2 26 22*   BUN 76* 49*   CREATININE 11.7* 9.0*   CALCIUM 10.7* 9.8   PROT 7.3  --    BILITOT 0.4  --    ALKPHOS 62  --    ALT 35  --    AST 31  --        Significant Imaging: I have reviewed all pertinent imaging results/findings within the past 24 hours.

## 2019-10-18 VITALS
SYSTOLIC BLOOD PRESSURE: 175 MMHG | WEIGHT: 220.69 LBS | HEIGHT: 67 IN | OXYGEN SATURATION: 95 % | HEART RATE: 72 BPM | RESPIRATION RATE: 18 BRPM | DIASTOLIC BLOOD PRESSURE: 99 MMHG | BODY MASS INDEX: 34.64 KG/M2 | TEMPERATURE: 98 F

## 2019-10-18 PROBLEM — E87.70 HYPERVOLEMIA ASSOCIATED WITH RENAL INSUFFICIENCY: Status: RESOLVED | Noted: 2019-10-16 | Resolved: 2019-10-18

## 2019-10-18 PROBLEM — R07.9 CHEST PAIN: Status: RESOLVED | Noted: 2019-10-16 | Resolved: 2019-10-18

## 2019-10-18 PROBLEM — N28.9 HYPERVOLEMIA ASSOCIATED WITH RENAL INSUFFICIENCY: Status: RESOLVED | Noted: 2019-10-16 | Resolved: 2019-10-18

## 2019-10-18 LAB
ALBUMIN SERPL BCP-MCNC: 3.2 G/DL (ref 3.5–5.2)
ANION GAP SERPL CALC-SCNC: 14 MMOL/L (ref 8–16)
BUN SERPL-MCNC: 33 MG/DL (ref 8–23)
CALCIUM SERPL-MCNC: 9.6 MG/DL (ref 8.7–10.5)
CHLORIDE SERPL-SCNC: 96 MMOL/L (ref 95–110)
CO2 SERPL-SCNC: 26 MMOL/L (ref 23–29)
CREAT SERPL-MCNC: 6.7 MG/DL (ref 0.5–1.4)
EST. GFR  (AFRICAN AMERICAN): 7 ML/MIN/1.73 M^2
EST. GFR  (NON AFRICAN AMERICAN): 6 ML/MIN/1.73 M^2
GLUCOSE SERPL-MCNC: 88 MG/DL (ref 70–110)
MAGNESIUM SERPL-MCNC: 1.9 MG/DL (ref 1.6–2.6)
PHOSPHATE SERPL-MCNC: 3.7 MG/DL (ref 2.7–4.5)
POCT GLUCOSE: 102 MG/DL (ref 70–110)
POTASSIUM SERPL-SCNC: 4.5 MMOL/L (ref 3.5–5.1)
SODIUM SERPL-SCNC: 136 MMOL/L (ref 136–145)

## 2019-10-18 PROCEDURE — 25000003 PHARM REV CODE 250: Performed by: HOSPITALIST

## 2019-10-18 PROCEDURE — 83735 ASSAY OF MAGNESIUM: CPT

## 2019-10-18 PROCEDURE — 80100016 HC MAINTENANCE HEMODIALYSIS

## 2019-10-18 PROCEDURE — 36415 COLL VENOUS BLD VENIPUNCTURE: CPT

## 2019-10-18 PROCEDURE — 80069 RENAL FUNCTION PANEL: CPT

## 2019-10-18 RX ORDER — SODIUM CHLORIDE 9 MG/ML
INJECTION, SOLUTION INTRAVENOUS ONCE
Status: CANCELLED | OUTPATIENT
Start: 2019-10-18 | End: 2019-10-18

## 2019-10-18 RX ORDER — SODIUM CHLORIDE 9 MG/ML
INJECTION, SOLUTION INTRAVENOUS
Status: CANCELLED | OUTPATIENT
Start: 2019-10-18

## 2019-10-18 RX ADMIN — CLOPIDOGREL BISULFATE 75 MG: 75 TABLET ORAL at 08:10

## 2019-10-18 RX ADMIN — GABAPENTIN 300 MG: 300 CAPSULE ORAL at 08:10

## 2019-10-18 RX ADMIN — INSULIN DETEMIR 15 UNITS: 100 INJECTION, SOLUTION SUBCUTANEOUS at 08:10

## 2019-10-18 RX ADMIN — CALCIUM ACETATE 667 MG: 667 CAPSULE ORAL at 08:10

## 2019-10-18 RX ADMIN — PRAVASTATIN SODIUM 80 MG: 40 TABLET ORAL at 08:10

## 2019-10-18 RX ADMIN — APIXABAN 2.5 MG: 2.5 TABLET, FILM COATED ORAL at 08:10

## 2019-10-18 RX ADMIN — NEPHROCAP 1 CAPSULE: 1 CAP ORAL at 08:10

## 2019-10-18 RX ADMIN — SERTRALINE HYDROCHLORIDE 100 MG: 50 TABLET ORAL at 08:10

## 2019-10-18 RX ADMIN — DOCUSATE SODIUM 100 MG: 100 CAPSULE, LIQUID FILLED ORAL at 08:10

## 2019-10-18 RX ADMIN — PANTOPRAZOLE SODIUM 40 MG: 40 TABLET, DELAYED RELEASE ORAL at 08:10

## 2019-10-18 NOTE — DISCHARGE SUMMARY
[facility] Medicine  Discharge Summary      Patient Name: Angelina Beard  MRN: [id number]  Admission Date: 10/16/2019  Hospital Length of Stay: 2 days  Discharge Date and Time: 10/18/2019  6:20 PM  Attending Physician: Jeb Cobb MD   Discharging Provider: Jeb Cobb MD  Primary Care Provider: Ben Mena MD      HPI:   Angelina Beard is a 69 year old black woman with hypertension, chronic diastolic congestive heart failure, paroxysmal atrial fibrillation (anticoagulated on apixaban), diabetes mellitus type 2 (hemoglobin A1c 6.5% on 5/7/19) with neuropathy and nephropathy, hyperlipidemia, nonobstructive coronary artery disease, cerebral microvascular disease, end stage renal disease on hemodialysis (Monday Wednesday Friday at [facility] in [facility]), anemia, gastroesophageal reflux disease, osteoarthritis status post right total knee arthroplasty 2/23/16, chronic left leg pain, chronic constipation, cholelithiasis, history of right lung sarcoidosis, history of left breast cancer status post mastectomy, history of gastric ulcers on 7/13/18. Her primary care physician is Dr. Ben Mena. Her nephrologist is Dr. Rc Santamaria. Her cardiologist is Dr. Faraz Flores. Her podiatrist is Dr. Kwame Dacosta. She lives in [place], [place]. She is mostly wheelchair bound but can use a walker with assistance. She still makes some urine.   She presented to [facility] on Wednesday 10/16/19 with shortness of breath, abdominal fluid buildup, and bilateral leg edema. Shortness of breath was worse with exertion and associated with chest pressure. She had missed dialysis on Monday 10/14/19. Blood pressure was as high as 222/99. She had not taken her morning antihypertensive medications. Labs showed hyperkalemia (potassium 6.5), elevated BNP (720), and elevated troponin (0.049). Bilateral lower extremity venous ultrasound showed no deep  vein thrombosis. Chest X-ray showed pulmonary edema. [facility] was contacted for emergent dialysis. She was admitted to [facility].      Hospital Course:   She underwent dialysis with 4200 mL ultrafiltration. She had an extra dialysis session the next day, then her usual dialysis session on 10/18/19. All acute problems resolved. She was discharged home.     Consults:   Consults (From admission, onward)        Status Ordering Provider     Inpatient consult to Nephrology-[facility]  Once     Provider:  (Not yet assigned)    Acknowledged ANAMARIA KELLER     IP consult to case management  Once     Provider:  (Not yet assigned)    Acknowledged ANAMARIA KELLER        Final Active Diagnoses:    Diagnosis Date Noted POA    Hypertension [I10]  Yes    ESRD (end stage renal disease) [N18.6] 08/01/2019 Yes    PAD (peripheral artery disease) [I73.9] 01/24/2019 Yes     Chronic    History of stroke [Z86.73]  Not Applicable     Chronic    Paroxysmal atrial fibrillation [I48.0] 06/21/2018 Yes     Chronic    End-stage renal disease on hemodialysis [N18.6, Z99.2] 05/03/2016 Not Applicable     Chronic    Anemia in end-stage renal disease [N18.6, D63.1] 03/06/2016 Yes     Chronic    Chronic constipation [K59.09] 03/06/2016 Yes     Chronic    Renovascular hypertension [I15.0] 03/04/2016 Yes     Chronic    Status post total right knee replacement 2/23/16 [Z96.651] 02/23/2016 Not Applicable     Chronic    Diabetic neuropathy associated with type 2 diabetes mellitus [E11.40] 02/19/2016 Yes     Chronic    History of left breast cancer s/p mastectomy [Z85.3] 02/19/2016 Not Applicable     Chronic    Coronary artery disease [I25.10] 02/18/2016 Yes     Chronic    History of sarcoidosis [Z86.2] 02/18/2016 Yes     Chronic    Chronic diastolic congestive heart failure [I50.32] 07/02/2013 Yes     Chronic    GERD (gastroesophageal reflux disease) [K21.9] 07/02/2013 Yes     Chronic    Morbid obesity [E66.01] 07/02/2013  Yes     Chronic    Osteoarthritis [M19.90] 07/02/2013 Yes     Chronic    Cerebral microvascular disease [I67.9] 07/01/2013 Yes     Chronic    Hyperlipidemia [E78.5] 07/01/2013 Yes     Chronic    Type 2 diabetes mellitus with hypertension and end stage renal disease on dialysis [E11.22, I12.0, Z99.2, N18.6] 07/01/2013 Not Applicable     Chronic      Problems Resolved During this Admission:    Diagnosis Date Noted Date Resolved POA    PRINCIPAL PROBLEM:  Hypervolemia associated with renal insufficiency [E87.70, N28.9] 10/16/2019 10/18/2019 Yes    Chest pain [R07.9] 10/16/2019 10/18/2019 Yes    Hyperkalemia [E87.5] 04/17/2016 10/18/2019 Yes       Discharged Condition: good    Disposition: Home or Self Care    Follow Up:  Follow-up Information     Ben Mena MD On 11/12/2019.    Specialty:  Internal Medicine  Why:  11:45am  Contact information:  [address] [place] [id number]  [phone]                 Patient Instructions:      Diet Adult Regular     Order Specific Question Answer Comments   Na restriction, if any: 2gNa    Fluid restriction: Fluid - 2000mL    Additional restrictions: Diabetic 2000      Notify your health care provider if you experience any of the following:  difficulty breathing or increased cough     SUBSEQUENT HOME HEALTH ORDERS   Order Comments: Subsequent Home Health Orders    Current Medications:  Current Facility-Administered Medications:  acetaminophen tablet 650 mg, 650 mg, Oral, Q6H PRN, Jeb Cobb MD, 650 mg at 10/17/19 2033  amLODIPine tablet 10 mg, 10 mg, Oral, Daily, Jeb Cobb MD, 10 mg at 10/17/19 0955  apixaban tablet 2.5 mg, 2.5 mg, Oral, BID, Jeb Cobb MD, 2.5 mg at 10/18/19 0856  calcium acetate capsule 667 mg, 667 mg, Oral, TID WM, Jeb Cobb MD, 667 mg at 10/18/19 0856  cloNIDine tablet 0.1 mg, 0.1 mg, Oral, Q4H PRN, Jeb Cobb MD  clopidogrel tablet 75 mg, 75 mg, Oral, Daily, Jeb Cobb MD, 75 mg at 10/18/19  0855  dextrose 10% (D10W) Bolus, 12.5 g, Intravenous, PRN, Jeb Cobb MD  dextrose 10% (D10W) Bolus, 25 g, Intravenous, PRN, Jeb Cobb MD  docusate sodium capsule 100 mg, 100 mg, Oral, BID, Jeb Cobb MD, 100 mg at 10/18/19 0856  furosemide injection 120 mg, 120 mg, Intravenous, TID, Jeb Cobb MD, Stopped at 10/18/19 0900  gabapentin capsule 300 mg, 300 mg, Oral, Q8H PRN, Jeb Cobb MD, 300 mg at 10/18/19 0856  glucagon (human recombinant) injection 1 mg, 1 mg, Intramuscular, PRN, Jeb Cobb MD  glucose chewable tablet 16 g, 16 g, Oral, PRN, Jeb Cobb MD  glucose chewable tablet 24 g, 24 g, Oral, PRN, Jeb Cobb MD  hydrALAZINE tablet 50 mg, 50 mg, Oral, Q8H PRN, Jeb Cobb MD, 50 mg at 10/16/19 2113  insulin aspart U-100 pen 0-5 Units, 0-5 Units, Subcutaneous, QID (AC + HS) PRN, Jeb Cobb MD  insulin detemir U-100 pen 15 Units, 15 Units, Subcutaneous, Daily, Jeb Cobb MD, 15 Units at 10/18/19 0853  isosorbide mononitrate 24 hr tablet 30 mg, 30 mg, Oral, Daily, Jeb Cobb MD, 30 mg at 10/17/19 0954  metoprolol succinate (TOPROL-XL) 24 hr tablet 50 mg, 50 mg, Oral, Daily, Jeb Cobb MD, 50 mg at 10/17/19 0954  nitroGLYCERIN SL tablet 0.4 mg, 0.4 mg, Sublingual, Q5 Min PRN, Jeb Cobb MD  ondansetron injection 4 mg, 4 mg, Intravenous, Q8H PRN, Jeb Cobb MD  pantoprazole EC tablet 40 mg, 40 mg, Oral, Daily, Jeb Cobb MD, 40 mg at 10/18/19 0856  pravastatin tablet 80 mg, 80 mg, Oral, Daily, Jeb Cobb MD, 80 mg at 10/18/19 0856  sertraline tablet 100 mg, 100 mg, Oral, Daily, Jeb Cobb MD, 100 mg at 10/18/19 0856  sodium chloride 0.9% flush 10 mL, 10 mL, Intravenous, PRN, Jeb Cobb MD  vitamin renal formula (B-complex-vitamin c-folic acid) 1 mg per capsule 1 capsule, 1 capsule, Oral, Daily, Jeb Cobb MD, 1 capsule at 10/18/19 0855        Nursing:   Resume prior orders    Diet:   no  change    Activities:   activity as tolerated    Labs:  none    Referrals/ Consults  Resume prior orders    Home Health Aide:  Resume prior orders     Order Specific Question Answer Comments   What Home Health Agency is the patient currently using? [facility]      Activity as tolerated       Significant Diagnostic Studies:   Recent Labs   Lab 10/16/19  0739 10/17/19  0928 10/18/19  0739    137 136   K 6.5* 5.7* 4.5   CL 98 99 96   CO2 26 22* 26   BUN 76* 49* 33*   CREATININE 11.7* 9.0* 6.7*   CALCIUM 10.7* 9.8 9.6   PROT 7.3  --   --    BILITOT 0.4  --   --    ALKPHOS 62  --   --    ALT 35  --   --    AST 31  --   --        X-Ray Chest 1 View 10/16/19: FINDINGS:  The mediastinal structures are midline.  The cardiac silhouette is stable.  There is atherosclerotic calcification of the aortic arch.  There is pulmonary vascular congestion and bilateral ground-glass opacities suggesting edema.  No definite consolidation or pleural fluid.  No osseous abnormalities are seen.  Impression: Findings suggest moderate pulmonary edema, slightly improved from 08/01/2019 exam.  US Lower Extremity Veins Bilateral 10/16/19: FINDINGS:  The bilateral common femoral, superficial femoral and popliteal veins demonstrate normal gray scale graded compression, color-flow and Doppler wave forms. The Doppler wave form imaging demonstrate normal respiratory phasicity and augmentation. No evidence for bilateral lower extremity deep venous thrombosis.  Impression: No evidence for bilateral lower extremity deep venous thrombosis. Clinical correlation and follow up as clinically warranted.     Medications:  Reconciled Home Medications:      Medication List      CONTINUE taking these medications    acetaminophen 500 MG tablet  Commonly known as:  TYLENOL  Take 2 tablets (1,000 mg total) by mouth every 8 (eight) hours as needed.     amLODIPine 10 MG tablet  Commonly known as:  NORVASC  Take 10 mg by mouth every Tuesday, Thursday,  Saturday, Sunday.     apixaban 2.5 mg Tab  Commonly known as:  ELIQUIS  Take 1 tablet (2.5 mg total) by mouth 2 (two) times daily.     calcium acetate 667 mg capsule  Commonly known as:  PHOSLO  Take 667 mg by mouth 3 (three) times daily with meals.     clopidogrel 75 mg tablet  Commonly known as:  PLAVIX  Take 1 tablet (75 mg total) by mouth once daily.     docusate sodium 100 MG capsule  Commonly known as:  COLACE  Take 1 capsule (100 mg total) by mouth 2 (two) times daily.     furosemide 40 MG tablet  Commonly known as:  LASIX  Take 1 tablet (40 mg total) by mouth once daily.     gabapentin 300 MG capsule  Commonly known as:  NEURONTIN  Take 1 capsule (300 mg total) by mouth every 8 (eight) hours as needed.     insulin glargine 100 units/mL (3mL) SubQ pen  Commonly known as:  LANTUS SOLOSTAR U-100 INSULIN  Inject 20 Units into the skin once daily.     isosorbide mononitrate 30 MG 24 hr tablet  Commonly known as:  IMDUR  Take 1 tablet (30 mg total) by mouth once daily.     metoprolol succinate 50 MG 24 hr tablet  Commonly known as:  TOPROL-XL  Take 1 tablet (50 mg total) by mouth once daily.     mupirocin 2 % ointment  Commonly known as:  BACTROBAN  by Nasal route 2 (two) times daily.     nitroGLYCERIN 0.4 MG SL tablet  Commonly known as:  NITROSTAT  Place 1 tablet (0.4 mg total) under the tongue every 5 (five) minutes as needed for Chest pain.     pantoprazole 40 MG tablet  Commonly known as:  PROTONIX  Take 1 tablet (40 mg total) by mouth once daily.     polyethylene glycol 17 gram/dose powder  Commonly known as:  GLYCOLAX     pravastatin 80 MG tablet  Commonly known as:  PRAVACHOL  Take 1 tablet (80 mg total) by mouth once daily.     sertraline 100 MG tablet  Commonly known as:  ZOLOFT  Take 1 tablet (100 mg total) by mouth once daily.     vitamin renal formula (B-complex-vitamin c-folic acid) 1 mg Cap  Commonly known as:  NEPHROCAP  Take 1 capsule by mouth once daily.            Indwelling Lines/Drains at time  of discharge:   Lines/Drains/Airways     Drain                 Hemodialysis AV Graft -- days         Hemodialysis AV Graft Right upper arm -- days    Female External Urinary Catheter 10/16/19 2100 1 day                Time spent on the discharge of patient: 35 minutes  Patient was seen and examined on the date of discharge and determined to be suitable for discharge.         Jeb Cobb MD  Department of Hospital Medicine  [facility]

## 2019-10-18 NOTE — PLAN OF CARE
Future Appointments   Date Time Provider Department Center   11/5/2019 10:50 AM LAB, [facility] [place]   11/12/2019  1:45 PM Ben Mena MD [facility]     Discharge rounds on patient. Discussed followup appointments, blue discharge folder, discharge nurse will go over home medications and reasons for medications and final discharge instructions. All patient/caregiver questions answered. Patient verbalized understanding.         10/18/19 1446   Final Note   Assessment Type Final Discharge Note   Anticipated Discharge Disposition [facility]   Hospital Follow Up  Appt(s) scheduled? Yes   Discharge plans and expectations educations in teach back method with documentation complete? Yes   Right Care Referral Info   Post Acute Recommendation Home-care   Referral Type home Care    Facility Name [facility]     Lotus Wharton, RN, CCM, CMSRN  RN Transition Navigator  [phone]

## 2019-10-18 NOTE — PLAN OF CARE
Received patient upon rounds at 1915H, patient seen in bed on semi-candelaria's position. conscious , coherent to time, date, place, person and situation. GCS 15. With subjective complaint of back  pain 6/10, due prn pain medications given. With left AC gauge 20 saline lock, flushed,patent, with clean and dry dressing. Right Av fistula upper an lower right arm, positive with thrill and bruit. Left AV fistula not in use. Advised patient to call for any assistance. Blood sugar monitored. 96% oxygen saturation on 2 lpm via NC. Call bell within reach. Safety fall precaution maintained.Telemetry Normal Sinus rhythm with PVC's. 60's.Will continue to monitor patient  0635H- Patient stable VS over the night, afebrile. No episodes of nausea and vomiting. Telemetry no ectopy noted over the night. Free from fall. IV line patent.Will endorse patient to day shift Nurse.

## 2019-10-18 NOTE — PROGRESS NOTES
[facility] Nephrology Progress Note    Subjective:      Angelina Beard is a 69 y.o.  female who is being followed by the [facility] service at [facility] for ESRD.  Patient was seen in dialysis, Patient is complaining of leg cramps, still complaining of shortness of breath but much improved, on nasal cannula. No complaint of nausea, vomiting, abdominal pain, headache or dizziness.        Objective:   Last 24 Hour Vital Signs:  BP  Min: 104/48  Max: 168/98  Temp  Av.1 °F (36.2 °C)  Min: 96.1 °F (35.6 °C)  Max: 98.2 °F (36.8 °C)  Pulse  Av.7  Min: 59  Max: 82  Resp  Av  Min: 16  Max: 20  SpO2  Av.1 %  Min: 91 %  Max: 98 %  I/O last 3 completed shifts:  In: 1430 [P.O.:530; Other:900]  Out: 5855 [Other:5854; Stool:1]    Physical Examination:  General Appearance: The patient is alert, has no immediate need for airway protection and no signs of toxicity. No acute distress. Lying in bed but able to sit up without difficulty.   HEENT: Eyes: Pupils equal and round no pallor or injection. Extra ocular movements intact. No drainage.       Mouth: Mucous membranes are moist. Oropharynx clear.   Neck:Neck is supple non-tender. No lymphadenopathy. No stridor.   Respiratory: There are no retractions, lungs are coarse to auscultation. No wheezing, no crackles. Chest wall nontender to palpation.   Cardiovascular: Regular rate and rhythm. No murmurs, rubs or gallops.  Gastrointestinal:  Abdomen is soft and non-tender, no masses, bowel sounds normal. No guarding, no rebound.  No pulsatile mass.   Neurological: Alert and oriented x 4. CN II-XII grossly intact. No focal weakness. Strength intact 5/5 bilaterally in upper and lower extremities.   Skin: Warm and dry, no rashes. There are small vesicular lesions along the left LE.   Musculoskeletal: Extremities are non-tender, 2+pitting edema and have full range of motion. Back NTTP along the midline.   Access: LUE AVG    Laboratory:  Laboratory Data  Reviewed: yes    Microbiology Data Reviewed: yes     Radiology Data Reviewed: yes    Current Medications:     Infusions:         Scheduled:   amLODIPine  10 mg Oral Daily    apixaban  2.5 mg Oral BID    calcium acetate  667 mg Oral TID WM    clopidogrel  75 mg Oral Daily    docusate sodium  100 mg Oral BID    furosemide  120 mg Intravenous TID    insulin detemir U-100  15 Units Subcutaneous Daily    isosorbide mononitrate  30 mg Oral Daily    metoprolol succinate  50 mg Oral Daily    pantoprazole  40 mg Oral Daily    pravastatin  80 mg Oral Daily    sertraline  100 mg Oral Daily    vitamin renal formula (B-complex-vitamin c-folic acid)  1 capsule Oral Daily        PRN:  acetaminophen, cloNIDine, Dextrose 10% Bolus, Dextrose 10% Bolus, gabapentin, glucagon (human recombinant), glucose, glucose, hydrALAZINE, insulin aspart U-100, nitroGLYCERIN, ondansetron, sodium chloride 0.9%      Assessment and Plan:      Angelina Beard is a 69 y.o.female with  Patient Active Problem List    Diagnosis Date Noted    Hypervolemia associated with renal insufficiency 10/16/2019    Chest pain 10/16/2019    PAD (peripheral artery disease) 01/24/2019    History of stroke     Physical deconditioning 09/22/2018    Paroxysmal atrial fibrillation 06/21/2018    End-stage renal disease on hemodialysis 05/03/2016    Hyperkalemia, diminished renal excretion 04/17/2016    Anemia in end-stage renal disease 03/06/2016    Chronic constipation 03/06/2016    Renovascular hypertension 03/04/2016    Status post total right knee replacement 2/23/16 02/23/2016    Diabetic neuropathy associated with type 2 diabetes mellitus 02/19/2016    History of left breast cancer s/p mastectomy 02/19/2016    History of sarcoidosis 02/18/2016    Coronary artery disease 02/18/2016    Osteoarthritis of lumbar spine 07/03/2013    Chronic diastolic congestive heart failure 07/02/2013    Morbid obesity 07/02/2013    Osteoarthritis 07/02/2013     GERD (gastroesophageal reflux disease) 07/02/2013    Cerebral microvascular disease 07/01/2013    Type 2 diabetes mellitus with hypertension and end stage renal disease on dialysis 07/01/2013    Hyperlipidemia 07/01/2013      #) ESRD on HD  - Schedule: MW  - HD center: [facility]  - Nephrologist: Dr. Madden  - Last dialyzed: Monday (10/14/19)  - Access: LUE AVG  - Dry Weight: Unknown  - Presented with SOB. B/L LE Edema and fluid overload  - Will dialyze her today  - dose meds for GFR < 10  - nephro caps  - strict I/O, daily weights  - please avoid gadolinium, fleets, phos-based laxatives, NSAIDs  10/17/19: will HD again today    #) Hyperkalemia  10/17/19: K: 5.7  -Will do another session of HD with 2K bath     #) Hypertension   BP: 130/61  - Current Meds: Norvasc 10 PO QD, Imdur 30 PO QD and Lopressor 50 PO QD.   10/17/19: BP: 143/73     #) Anemia  H/H: 9.6/30.8        #)  Mineral Bone Disease   - On calcium acetate 667 mg PO TID     #) Nutrition  - Albumin: 3.4, Target > 3.6  10/17/19: Albumin: 3.2  - Recommend adding nephrocap PO QD     #) Access:   SHRADDHA DUPONT     Thank you for allowing us in taking care of this patient, please contact us for any questions.     Dwight Jenkins MD  \[facility] [place] [facility]\"" Nephrology HO-IV  [phone]   If after 5pm please forward any questions to the [facility] Nephrology Fellow/Attending on call.

## 2019-10-18 NOTE — PLAN OF CARE
VN cued into room.  Pt not in room.  Off floor to dialysis.  No family in room.  VN will continue to follow and be available as needed.

## 2019-10-18 NOTE — PLAN OF CARE
VN cued into room to review discharge instructions with pt and son.  Discussed home medications, home diet, home health, follow up appointments.  Son expressed some concerns about home physical therapy, but pt is already established with physical therapy.  VN ordered transport.

## 2019-10-18 NOTE — PROGRESS NOTES
U Nephrology Progress Note    Subjective:      Angelina Beard is a 69 y.o.  female who is being followed by the [facility]ph[facility] service at [facility] for HD.  Patient was seen in dialysis, doing well, no complains of nausea, vomiting, leg cramps, headache or dizziness.      Objective:   Last 24 Hour Vital Signs:  BP  Min: 127/68  Max: 179/107  Temp  Av.2 °F (36.2 °C)  Min: 96.2 °F (35.7 °C)  Max: 98.1 °F (36.7 °C)  Pulse  Av.3  Min: 61  Max: 89  Resp  Av  Min: 16  Max: 18  SpO2  Av.4 %  Min: 94 %  Max: 99 %  Weight  Av.1 kg (220 lb 10.9 oz)  Min: 100.1 kg (220 lb 10.9 oz)  Max: 100.1 kg (220 lb 10.9 oz)  I/O last 3 completed shifts:  In: 1130 [P.O.:730; Other:400]  Out: 1657 [Other:1654; Stool:3]    Physical Examination:  General Appearance: The patient is alert, has no immediate need for airway protection and no signs of toxicity. No acute distress. Lying in bed but able to sit up without difficulty.   HEENT: Eyes: Pupils equal and round no pallor or injection. Extra ocular movements intact. No drainage.       Mouth: Mucous membranes are moist. Oropharynx clear.   Neck:Neck is supple non-tender. No lymphadenopathy. No stridor.   Respiratory: There are no retractions, lungs are coarse to auscultation. No wheezing, no crackles. Chest wall nontender to palpation.   Cardiovascular: Regular rate and rhythm. No murmurs, rubs or gallops.  Gastrointestinal:  Abdomen is soft and non-tender, no masses, bowel sounds normal. No guarding, no rebound.  No pulsatile mass.   Neurological: Alert and oriented x 4. CN II-XII grossly intact. No focal weakness. Strength intact 5/5 bilaterally in upper and lower extremities.   Skin: Warm and dry, no rashes. There are small vesicular lesions along the left LE.   Musculoskeletal: Extremities are non-tender, 2+pitting edema and have full range of motion. Back NTTP along the midline.   Access: LUE AVG      Laboratory:  Laboratory Data  Reviewed: yes    Microbiology Data Reviewed: yes    Radiology Data Reviewed: yes      Current Medications:     Infusions:       Scheduled:   amLODIPine  10 mg Oral Daily    apixaban  2.5 mg Oral BID    calcium acetate  667 mg Oral TID WM    clopidogrel  75 mg Oral Daily    docusate sodium  100 mg Oral BID    furosemide  120 mg Intravenous TID    insulin detemir U-100  15 Units Subcutaneous Daily    isosorbide mononitrate  30 mg Oral Daily    metoprolol succinate  50 mg Oral Daily    pantoprazole  40 mg Oral Daily    pravastatin  80 mg Oral Daily    sertraline  100 mg Oral Daily    vitamin renal formula (B-complex-vitamin c-folic acid)  1 capsule Oral Daily        PRN:  acetaminophen, cloNIDine, Dextrose 10% Bolus, Dextrose 10% Bolus, gabapentin, glucagon (human recombinant), glucose, glucose, hydrALAZINE, insulin aspart U-100, nitroGLYCERIN, ondansetron, sodium chloride 0.9%      Assessment and Plan:      Angelina Beard is a 69 y.o.female with  Patient Active Problem List    Diagnosis Date Noted    Hypertension     ESRD (end stage renal disease) 08/01/2019    PAD (peripheral artery disease) 01/24/2019    History of stroke     Physical deconditioning 09/22/2018    Paroxysmal atrial fibrillation 06/21/2018    End-stage renal disease on hemodialysis 05/03/2016    Anemia in end-stage renal disease 03/06/2016    Chronic constipation 03/06/2016    Renovascular hypertension 03/04/2016    Status post total right knee replacement 2/23/16 02/23/2016    Diabetic neuropathy associated with type 2 diabetes mellitus 02/19/2016    History of left breast cancer s/p mastectomy 02/19/2016    History of sarcoidosis 02/18/2016    Coronary artery disease 02/18/2016    Osteoarthritis of lumbar spine 07/03/2013    Chronic diastolic congestive heart failure 07/02/2013    Morbid obesity 07/02/2013    Osteoarthritis 07/02/2013    GERD (gastroesophageal reflux disease) 07/02/2013    Cerebral microvascular  disease 07/01/2013    Type 2 diabetes mellitus with hypertension and end stage renal disease on dialysis 07/01/2013    Hyperlipidemia 07/01/2013       #) ESRD on HD  - Schedule: MWF  - HD center: [facility]  - Nephrologist: Dr. Madden  - Last dialyzed: Monday (10/14/19)  - Access: SHRADDHA DUPONT  - Dry Weight: 104 kg per patient. Today weight is 100 kg, Will suggest new dry weight of 102 kg.   - Presented with SOB. B/L LE Edema and fluid overload  - Will dialyze her today  - dose meds for GFR < 10  - nephro caps  - strict I/O, daily weights  - please avoid gadolinium, fleets, phos-based laxatives, NSAIDs  10/17/19: will HD again today  10/18/19: Will HD today per [facility] schedule, UF goal upto 2 L.  - Patient is stable from renal standpoint to be discharged and follow with her regular HD unit on Monday.      #) Hyperkalemia  10/17/19: K: 5.7  -Will do another session of HD with 2K bath  10/18/19: Resolved     #) Hypertension   BP: 130/61  - Current Meds: Norvasc 10 PO QD, Imdur 30 PO QD and Lopressor 50 PO QD.   10/17/19: BP: 143/73  10/18/19: 127/68     #) Anemia  H/H: 9.6/30.8        #)  Mineral Bone Disease   - On calcium acetate 667 mg PO TID     #) Nutrition  - Albumin: 3.4, Target > 3.6  10/17/19: Albumin: 3.2  - Recommend adding nephrocap PO QD     #) Access:   SHRADDHA DUPONT     Thank you for allowing us in taking care of this patient, please contact us for any questions.     Dwight Jenkins MD  [facility]  [phone]   If after 5pm please forward any questions to the U Nephrology Fellow/Attending on call.

## 2019-10-18 NOTE — PROGRESS NOTES
[facility] Consult Note    Date of Admit: 10/16/2019    Chief Complaint/Reason for Consult     Fatigue (Reports is on O2 at home and was off it for short period of time. Son called EMS reporting his mother was awake but not responding to him. Pt currently AAOx4. Complaining of weakness to BLE with edema. Pt due for dialysis, states did not go on Mon. )   [facility] nephrology was consulted for missed dialysis.     Subjective:      History of Present Illness:  Angelina Beard is a 69 y.o.  who  has a past medical history of Anemia due to gastrointestinal blood loss (7/11/2018), Arthritis, Asthma, Breast cancer, left, CHF (congestive heart failure), Cholelithiasis, Coronary artery disease, Diabetes mellitus, type 2, Encounter for blood transfusion, End-stage renal disease on hemodialysis, Hemodialysis access site with arteriovenous graft, Hyperlipidemia, Hypertension, and Sarcoidosis.. The patient presented to [facility] on 10/16/2019 with a primary complaint of Fatigue (Reports is on O2 at home and was off it for short period of time. Son called EMS reporting his mother was awake but not responding to him. Pt currently AAOx4. Complaining of weakness to BLE with edema. Pt due for dialysis, states did not go on Mon. )  Patient missed the Monday dialysis session, presented with SOB since midnight, associated with substernal pressure like pain, which resolved in the ED. Patient is also complaining of bilateral leg edema. Patient was seen in dialysis, no complains of nausea, vomiting, abdominal pain, headache or dizziness.     Past Medical History:  Past Medical History:   Diagnosis Date    Anemia due to gastrointestinal blood loss 7/11/2018    Arthritis     Asthma     Breast cancer, left     CHF (congestive heart failure)     Cholelithiasis     Coronary artery disease     Diabetes mellitus, type 2     Encounter for blood transfusion     End-stage renal disease on hemodialysis     Hemodialysis  access site with arteriovenous graft     [id number]-wed -[id number]    Hyperlipidemia     Hypertension     Sarcoidosis        Past Surgical History:  Past Surgical History:   Procedure Laterality Date    av graft      left arm    BRACHIAL ARTERY GRAFT Left 05/04/2016    brachiocephalic, Dr. Anson Crocker    BREAST BIOPSY Left     breast ca    BREAST LUMPECTOMY Left     radiation only    btl      COLONOSCOPY N/A 7/13/2018    Procedure: COLONOSCOPY;  Surgeon: Almita Bee MD;  Location: [facility] ENDO;  Service: Endoscopy;  Laterality: N/A;    ELBOW SURGERY      left    ESOPHAGOGASTRODUODENOSCOPY N/A 7/13/2018    Procedure: ESOPHAGOGASTRODUODENOSCOPY (EGD);  Surgeon: Almita Bee MD;  Location: [facility] ENDO;  Service: Endoscopy;  Laterality: N/A;    LIPOMA RESECTION      back of neck    pilondial cyst      TOTAL KNEE ARTHROPLASTY Right 02/23/2016       Allergies:  Review of patient's allergies indicates:  No Known Allergies    Home Medications:  Prior to Admission medications    Medication Sig Start Date End Date Taking? Authorizing Provider   acetaminophen (TYLENOL) 500 MG tablet Take 2 tablets (1,000 mg total) by mouth every 8 (eight) hours as needed. 8/3/19  Yes Herlinda Rivas MD   amLODIPine (NORVASC) 10 MG tablet Take 10 mg by mouth every Tuesday, Thursday, Saturday, Sunday.   Yes Historical Provider, MD   apixaban (ELIQUIS) 2.5 mg Tab Take 1 tablet (2.5 mg total) by mouth 2 (two) times daily. 1/10/19  Yes Ben Mena MD   calcium acetate (PHOSLO) 667 mg capsule Take 667 mg by mouth 3 (three) times daily with meals.   Yes Historical Provider, MD   clopidogrel (PLAVIX) 75 mg tablet Take 1 tablet (75 mg total) by mouth once daily. 12/11/18 12/11/19 Yes Phoenix Borja MD   gabapentin (NEURONTIN) 300 MG capsule Take 1 capsule (300 mg total) by mouth every 8 (eight) hours as needed. 10/1/19  Yes Ben Mena MD   insulin (LANTUS SOLOSTAR U-100 INSULIN) glargine 100 units/mL (3mL) SubQ pen  Inject 20 Units into the skin once daily. 9/10/19 9/9/20 Yes Ben Mena MD   isosorbide mononitrate (IMDUR) 30 MG 24 hr tablet Take 1 tablet (30 mg total) by mouth once daily. 8/3/19 8/2/20 Yes Herlinda Rivas MD   metoprolol succinate (TOPROL-XL) 50 MG 24 hr tablet Take 1 tablet (50 mg total) by mouth once daily. 2/14/19 2/14/20 Yes Ben Mena MD   mupirocin (BACTROBAN) 2 % ointment by Nasal route 2 (two) times daily. 8/3/19  Yes Herlinda Rivas MD   polyethylene glycol (GLYCOLAX) 17 gram/dose powder  9/12/18  Yes Frandy Francisco MD   pravastatin (PRAVACHOL) 80 MG tablet Take 1 tablet (80 mg total) by mouth once daily. 2/14/19 2/14/20 Yes Ben Mena MD   sertraline (ZOLOFT) 100 MG tablet Take 1 tablet (100 mg total) by mouth once daily. 1/10/19 1/10/20 Yes Ben Mena MD   vitamin renal formula, B-complex-vitamin c-folic acid, (NEPHROCAP) 1 mg Cap Take 1 capsule by mouth once daily. 8/4/19  Yes Herlinda Rivas MD   docusate sodium (COLACE) 100 MG capsule Take 1 capsule (100 mg total) by mouth 2 (two) times daily. 8/3/19   Herlinda Rivas MD   furosemide (LASIX) 40 MG tablet Take 1 tablet (40 mg total) by mouth once daily. 1/22/19 9/10/19  Ben Mena MD   nitroGLYCERIN (NITROSTAT) 0.4 MG SL tablet Place 1 tablet (0.4 mg total) under the tongue every 5 (five) minutes as needed for Chest pain. 8/3/19 8/2/20  Herlinda Rivas MD   pantoprazole (PROTONIX) 40 MG tablet Take 1 tablet (40 mg total) by mouth once daily. 8/4/19 8/3/20  Herlinda Rivas MD       Family History:  Family History   Problem Relation Age of Onset    Diabetes Mother     Kidney disease Mother     Hypertension Mother     Diabetes Sister     Heart disease Sister        Social History:  Social History     Tobacco Use    Smoking status: Never Smoker    Smokeless tobacco: Never Used   Substance Use Topics    Alcohol use: No    Drug use: No       Review of  Systems:  Pertinent positives and negatives are listed in HPI.  All other systems are reviewed and are negative.     Objective:   Last 24 Hour Vital Signs:  BP  Min: 104/48  Max: 168/98  Temp  Av.1 °F (36.2 °C)  Min: 96.1 °F (35.6 °C)  Max: 98.2 °F (36.8 °C)  Pulse  Av.7  Min: 59  Max: 82  Resp  Av  Min: 16  Max: 20  SpO2  Av.1 %  Min: 91 %  Max: 98 %  Body mass index is 35.91 kg/m².  I/O last 3 completed shifts:  In: 1430 [P.O.:530; Other:900]  Out: 5855 [Other:5854; Stool:1]    Physical Examination:  General Appearance: The patient is alert, has no immediate need for airway protection and no signs of toxicity. No acute distress. Lying in bed but able to sit up without difficulty.   HEENT: Eyes: Pupils equal and round no pallor or injection. Extra ocular movements intact. No drainage.       Mouth: Mucous membranes are moist. Oropharynx clear.   Neck:Neck is supple non-tender. No lymphadenopathy. No stridor.   Respiratory: There are no retractions, lungs are coarse to auscultation. No wheezing, no crackles. Chest wall nontender to palpation.   Cardiovascular: Regular rate and rhythm. No murmurs, rubs or gallops.  Gastrointestinal:  Abdomen is soft and non-tender, no masses, bowel sounds normal. No guarding, no rebound.  No pulsatile mass.   Neurological: Alert and oriented x 4. CN II-XII grossly intact. No focal weakness. Strength intact 5/5 bilaterally in upper and lower extremities.   Skin: Warm and dry, no rashes. There are small vesicular lesions along the left LE.   Musculoskeletal: Extremities are non-tender, 2+pitting edema and have full range of motion. Back NTTP along the midline.   Access: TRESAE AVG    Laboratory:  Most Recent Data:  CBC:   Lab Results   Component Value Date    WBC 6.53 10/16/2019    HGB 9.6 (L) 10/16/2019    HCT 30.8 (L) 10/16/2019     10/16/2019    MCV 96 10/16/2019    RDW 14.8 (H) 10/16/2019     BMP:   Lab Results   Component Value Date     10/17/2019    K  5.7 (H) 10/17/2019    CL 99 10/17/2019    CO2 22 (L) 10/17/2019    BUN 49 (H) 10/17/2019    CREATININE 9.0 (H) 10/17/2019     (H) 10/17/2019    CALCIUM 9.8 10/17/2019    MG 2.0 10/17/2019    PHOS 4.4 10/17/2019     LFTs:   Lab Results   Component Value Date    PROT 7.3 10/16/2019    ALBUMIN 3.2 (L) 10/17/2019    BILITOT 0.4 10/16/2019    AST 31 10/16/2019    ALKPHOS 62 10/16/2019    ALT 35 10/16/2019     Coags:   Lab Results   Component Value Date    INR 1.3 (H) 07/29/2019     Urinalysis:   Lab Results   Component Value Date    LABURIN  11/08/2016     Multiple organisms isolated. None in predominance.  Repeat if    LABURIN clinically necessary. 11/08/2016    COLORU Yellow 07/29/2019    SPECGRAV 1.015 07/29/2019    NITRITE Negative 07/29/2019    KETONESU Negative 07/29/2019    UROBILINOGEN Negative 07/29/2019    WBCUA 5 07/29/2019       Trended Lab Data:  Recent Labs   Lab 10/16/19  0739 10/17/19  0928   WBC 6.53  --    HGB 9.6*  --    HCT 30.8*  --      --    MCV 96  --    RDW 14.8*  --     137   K 6.5* 5.7*   CL 98 99   CO2 26 22*   BUN 76* 49*   CREATININE 11.7* 9.0*   * 180*   PROT 7.3  --    ALBUMIN 3.4* 3.2*   BILITOT 0.4  --    AST 31  --    ALKPHOS 62  --    ALT 35  --        Trended Cardiac Data:  Recent Labs   Lab 10/16/19  0739 10/17/19  0928   TROPONINI 0.049* 0.045*   *  --          Radiology:  Imaging Results          US Lower Extremity Veins Bilateral (Final result)  Result time 10/16/19 09:02:25    Final result by Gerry Andino DO (10/16/19 09:02:25)                 Impression:      No evidence for bilateral lower extremity deep venous thrombosis. Clinical correlation and follow up as clinically warranted.      Electronically signed by: Gerry Andino DO  Date:    10/16/2019  Time:    09:02             Narrative:    CLINICAL HISTORY:  Other specified soft tissue disorders    TECHNIQUE:  Duplex and color flow Doppler evaluation of the bilateral lower extremity veins  was performed.  grayscale graded compression, color-flow and Doppler wave form imaging of the bilateral lower extremity venous system.    COMPARISON:  None    FINDINGS:  The bilateral common femoral, superficial femoral and popliteal veins demonstrate normal gray scale graded compression, color-flow and Doppler wave forms. The Doppler wave form imaging demonstrate normal respiratory phasicity and augmentation. No evidence for bilateral lower extremity deep venous thrombosis.                               X-Ray Chest 1 View (Final result)  Result time 10/16/19 08:10:42    Final result by Khalida Coe MD (10/16/19 08:10:42)                 Impression:      Findings suggest moderate pulmonary edema, slightly improved from 08/01/2019 exam.      Electronically signed by: Khalida Coe MD  Date:    10/16/2019  Time:    08:10             Narrative:    EXAMINATION:  XR CHEST 1 VIEW    CLINICAL HISTORY:  Shortness of breath    TECHNIQUE:  Single frontal view of the chest was performed.    COMPARISON:  Prior dated 08/01/2019    FINDINGS:  The mediastinal structures are midline.  The cardiac silhouette is stable.  There is atherosclerotic calcification of the aortic arch.  There is pulmonary vascular congestion and bilateral ground-glass opacities suggesting edema.  No definite consolidation or pleural fluid.    No osseous abnormalities are seen.                                   Assessment and Plan:      Angelina Beard is a 69 y.o.female with  Patient Active Problem List    Diagnosis Date Noted    Hypervolemia associated with renal insufficiency 10/16/2019    Chest pain 10/16/2019    PAD (peripheral artery disease) 01/24/2019    History of stroke     Physical deconditioning 09/22/2018    Paroxysmal atrial fibrillation 06/21/2018    End-stage renal disease on hemodialysis 05/03/2016    Hyperkalemia, diminished renal excretion 04/17/2016    Anemia in end-stage renal disease 03/06/2016    Chronic  constipation 03/06/2016    Renovascular hypertension 03/04/2016    Status post total right knee replacement 2/23/16 02/23/2016    Diabetic neuropathy associated with type 2 diabetes mellitus 02/19/2016    History of left breast cancer s/p mastectomy 02/19/2016    History of sarcoidosis 02/18/2016    Coronary artery disease 02/18/2016    Osteoarthritis of lumbar spine 07/03/2013    Chronic diastolic congestive heart failure 07/02/2013    Morbid obesity 07/02/2013    Osteoarthritis 07/02/2013    GERD (gastroesophageal reflux disease) 07/02/2013    Cerebral microvascular disease 07/01/2013    Type 2 diabetes mellitus with hypertension and end stage renal disease on dialysis 07/01/2013    Hyperlipidemia 07/01/2013            #) ESRD on HD  - Schedule: MW  - HD center: [facility]  - Nephrologist: Dr. Madden  - Last dialyzed: Monday (10/14/19)  - Access: SHRADDHA AVISSAC  - Dry Weight: Unknown  - Presented with SOB. B/L LE Edema and fluid overload  - Will dialyze her today  - dose meds for GFR < 10  - nephro caps  - strict I/O, daily weights  - please avoid gadolinium, fleets, phos-based laxatives, NSAIDs      #) Hypertension   BP: 130/61  - Current Meds: Norvasc 10 PO QD, Imdur 30 PO QD and Lopressor 50 PO QD.     #) Anemia  H/H: 9.6/30.8    #)  Mineral Bone Disease   - On calcium acetate 667 mg PO TID    #) Nutrition  - Albumin: 3.4, Target > 3.6    #) Access:   SHRADDHA DUPONT    Thank you for allowing us in taking care of this patient, please contact us for any questions.    Dwight Jenkins MD  [facility]  [phone]    If after 5pm please forward any questions to the [facility] Ne[facility] Fellow/Attending on call.

## 2019-11-04 ENCOUNTER — TELEPHONE (OUTPATIENT)
Dept: HOME HEALTH SERVICES | Facility: HOSPITAL | Age: 69
End: 2019-11-04

## 2019-11-05 RX ORDER — CALCIUM ACETATE 667 MG/1
667 CAPSULE ORAL
Qty: 270 CAPSULE | Refills: 4 | Status: SHIPPED | OUTPATIENT
Start: 2019-11-05 | End: 2020-02-04 | Stop reason: SDUPTHER

## 2019-11-18 ENCOUNTER — HOSPITAL ENCOUNTER (INPATIENT)
Facility: HOSPITAL | Age: 69
LOS: 8 days | Discharge: NURSING, REHAB OR LONG-TERM CARE | DRG: 291 | End: 2019-11-27
Attending: EMERGENCY MEDICINE | Admitting: INTERNAL MEDICINE
Payer: MEDICARE

## 2019-11-18 DIAGNOSIS — R29.898 WEAKNESS OF LEFT LOWER EXTREMITY: ICD-10-CM

## 2019-11-18 DIAGNOSIS — Z99.2 ESRD ON DIALYSIS: ICD-10-CM

## 2019-11-18 DIAGNOSIS — Z86.2 HISTORY OF SARCOIDOSIS: Chronic | ICD-10-CM

## 2019-11-18 DIAGNOSIS — N28.9 HYPERVOLEMIA ASSOCIATED WITH RENAL INSUFFICIENCY: Primary | ICD-10-CM

## 2019-11-18 DIAGNOSIS — E66.01 MORBID OBESITY: Chronic | ICD-10-CM

## 2019-11-18 DIAGNOSIS — R53.81 PHYSICAL DECONDITIONING: ICD-10-CM

## 2019-11-18 DIAGNOSIS — E87.5 HYPERKALEMIA: ICD-10-CM

## 2019-11-18 DIAGNOSIS — I12.0 TYPE 2 DIABETES MELLITUS WITH HYPERTENSION AND END STAGE RENAL DISEASE ON DIALYSIS: Chronic | ICD-10-CM

## 2019-11-18 DIAGNOSIS — Z99.2 TYPE 2 DIABETES MELLITUS WITH HYPERTENSION AND END STAGE RENAL DISEASE ON DIALYSIS: Chronic | ICD-10-CM

## 2019-11-18 DIAGNOSIS — I73.9 PAD (PERIPHERAL ARTERY DISEASE): Chronic | ICD-10-CM

## 2019-11-18 DIAGNOSIS — M79.606 LEG PAIN: ICD-10-CM

## 2019-11-18 DIAGNOSIS — M17.0 OSTEOARTHRITIS OF BOTH KNEES, UNSPECIFIED OSTEOARTHRITIS TYPE: Chronic | ICD-10-CM

## 2019-11-18 DIAGNOSIS — R53.1 WEAKNESS: ICD-10-CM

## 2019-11-18 DIAGNOSIS — I50.32 CHRONIC DIASTOLIC CONGESTIVE HEART FAILURE: Chronic | ICD-10-CM

## 2019-11-18 DIAGNOSIS — Z86.73 HISTORY OF STROKE: Chronic | ICD-10-CM

## 2019-11-18 DIAGNOSIS — J96.02 ACUTE RESPIRATORY FAILURE WITH HYPOXIA AND HYPERCARBIA: ICD-10-CM

## 2019-11-18 DIAGNOSIS — E11.22 TYPE 2 DIABETES MELLITUS WITH HYPERTENSION AND END STAGE RENAL DISEASE ON DIALYSIS: Chronic | ICD-10-CM

## 2019-11-18 DIAGNOSIS — I50.9 CONGESTIVE HEART FAILURE: ICD-10-CM

## 2019-11-18 DIAGNOSIS — D63.1 ANEMIA IN END-STAGE RENAL DISEASE: Chronic | ICD-10-CM

## 2019-11-18 DIAGNOSIS — E87.70 HYPERVOLEMIA ASSOCIATED WITH RENAL INSUFFICIENCY: Primary | ICD-10-CM

## 2019-11-18 DIAGNOSIS — R06.00 DYSPNEA: ICD-10-CM

## 2019-11-18 DIAGNOSIS — R53.81 DEBILITY: ICD-10-CM

## 2019-11-18 DIAGNOSIS — N18.6 TYPE 2 DIABETES MELLITUS WITH HYPERTENSION AND END STAGE RENAL DISEASE ON DIALYSIS: Chronic | ICD-10-CM

## 2019-11-18 DIAGNOSIS — N18.6 ESRD ON DIALYSIS: ICD-10-CM

## 2019-11-18 DIAGNOSIS — J96.01 ACUTE RESPIRATORY FAILURE WITH HYPOXIA AND HYPERCARBIA: ICD-10-CM

## 2019-11-18 DIAGNOSIS — N18.6 ANEMIA IN END-STAGE RENAL DISEASE: Chronic | ICD-10-CM

## 2019-11-18 LAB
ANION GAP SERPL CALC-SCNC: 14 MMOL/L (ref 8–16)
BASOPHILS # BLD AUTO: 0.03 K/UL (ref 0–0.2)
BASOPHILS NFR BLD: 0.4 % (ref 0–1.9)
BUN SERPL-MCNC: 62 MG/DL (ref 8–23)
CALCIUM SERPL-MCNC: 10 MG/DL (ref 8.7–10.5)
CHLORIDE SERPL-SCNC: 103 MMOL/L (ref 95–110)
CO2 SERPL-SCNC: 27 MMOL/L (ref 23–29)
CREAT SERPL-MCNC: 9.5 MG/DL (ref 0.5–1.4)
DIFFERENTIAL METHOD: ABNORMAL
EOSINOPHIL # BLD AUTO: 0.2 K/UL (ref 0–0.5)
EOSINOPHIL NFR BLD: 3.5 % (ref 0–8)
ERYTHROCYTE [DISTWIDTH] IN BLOOD BY AUTOMATED COUNT: 14.6 % (ref 11.5–14.5)
EST. GFR  (AFRICAN AMERICAN): 4.4 ML/MIN/1.73 M^2
EST. GFR  (NON AFRICAN AMERICAN): 3.8 ML/MIN/1.73 M^2
GLUCOSE SERPL-MCNC: 86 MG/DL (ref 70–110)
HCT VFR BLD AUTO: 29.4 % (ref 37–48.5)
HGB BLD-MCNC: 9 G/DL (ref 12–16)
IMM GRANULOCYTES # BLD AUTO: 0.03 K/UL (ref 0–0.04)
IMM GRANULOCYTES NFR BLD AUTO: 0.4 % (ref 0–0.5)
LYMPHOCYTES # BLD AUTO: 1 K/UL (ref 1–4.8)
LYMPHOCYTES NFR BLD: 14.4 % (ref 18–48)
MCH RBC QN AUTO: 30 PG (ref 27–31)
MCHC RBC AUTO-ENTMCNC: 30.6 G/DL (ref 32–36)
MCV RBC AUTO: 98 FL (ref 82–98)
MONOCYTES # BLD AUTO: 0.5 K/UL (ref 0.3–1)
MONOCYTES NFR BLD: 7.3 % (ref 4–15)
NEUTROPHILS # BLD AUTO: 5 K/UL (ref 1.8–7.7)
NEUTROPHILS NFR BLD: 74 % (ref 38–73)
NRBC BLD-RTO: 0 /100 WBC
PLATELET # BLD AUTO: 196 K/UL (ref 150–350)
PMV BLD AUTO: 9.9 FL (ref 9.2–12.9)
POCT GLUCOSE: 154 MG/DL (ref 70–110)
POTASSIUM SERPL-SCNC: 5.6 MMOL/L (ref 3.5–5.1)
RBC # BLD AUTO: 3 M/UL (ref 4–5.4)
SODIUM SERPL-SCNC: 144 MMOL/L (ref 136–145)
WBC # BLD AUTO: 6.81 K/UL (ref 3.9–12.7)

## 2019-11-18 PROCEDURE — 99223 1ST HOSP IP/OBS HIGH 75: CPT | Mod: AI,,, | Performed by: PHYSICIAN ASSISTANT

## 2019-11-18 PROCEDURE — 99285 EMERGENCY DEPT VISIT HI MDM: CPT | Mod: ,,, | Performed by: INTERNAL MEDICINE

## 2019-11-18 PROCEDURE — 99285 PR EMERGENCY DEPT VISIT,LEVEL V: ICD-10-PCS | Mod: ,,, | Performed by: INTERNAL MEDICINE

## 2019-11-18 PROCEDURE — G0378 HOSPITAL OBSERVATION PER HR: HCPCS

## 2019-11-18 PROCEDURE — 25000003 PHARM REV CODE 250: Performed by: EMERGENCY MEDICINE

## 2019-11-18 PROCEDURE — 99285 EMERGENCY DEPT VISIT HI MDM: CPT | Mod: 25

## 2019-11-18 PROCEDURE — 82962 GLUCOSE BLOOD TEST: CPT

## 2019-11-18 PROCEDURE — 80048 BASIC METABOLIC PNL TOTAL CA: CPT

## 2019-11-18 PROCEDURE — 85025 COMPLETE CBC W/AUTO DIFF WBC: CPT

## 2019-11-18 PROCEDURE — 99223 PR INITIAL HOSPITAL CARE,LEVL III: ICD-10-PCS | Mod: AI,,, | Performed by: PHYSICIAN ASSISTANT

## 2019-11-18 PROCEDURE — 25000003 PHARM REV CODE 250: Performed by: PHYSICIAN ASSISTANT

## 2019-11-18 PROCEDURE — 96374 THER/PROPH/DIAG INJ IV PUSH: CPT | Performed by: INTERNAL MEDICINE

## 2019-11-18 PROCEDURE — 96375 TX/PRO/DX INJ NEW DRUG ADDON: CPT | Performed by: INTERNAL MEDICINE

## 2019-11-18 PROCEDURE — 63600175 PHARM REV CODE 636 W HCPCS: Performed by: EMERGENCY MEDICINE

## 2019-11-18 RX ORDER — CALCIUM ACETATE 667 MG/1
667 CAPSULE ORAL
Status: DISCONTINUED | OUTPATIENT
Start: 2019-11-19 | End: 2019-11-27 | Stop reason: HOSPADM

## 2019-11-18 RX ORDER — HYDRALAZINE HYDROCHLORIDE 20 MG/ML
20 INJECTION INTRAMUSCULAR; INTRAVENOUS
Status: COMPLETED | OUTPATIENT
Start: 2019-11-18 | End: 2019-11-18

## 2019-11-18 RX ORDER — SODIUM CHLORIDE 0.9 % (FLUSH) 0.9 %
5 SYRINGE (ML) INJECTION
Status: DISCONTINUED | OUTPATIENT
Start: 2019-11-19 | End: 2019-11-27 | Stop reason: HOSPADM

## 2019-11-18 RX ORDER — CLOPIDOGREL BISULFATE 75 MG/1
75 TABLET ORAL DAILY
Status: DISCONTINUED | OUTPATIENT
Start: 2019-11-19 | End: 2019-11-27 | Stop reason: HOSPADM

## 2019-11-18 RX ORDER — TALC
6 POWDER (GRAM) TOPICAL NIGHTLY PRN
Status: DISCONTINUED | OUTPATIENT
Start: 2019-11-19 | End: 2019-11-27 | Stop reason: HOSPADM

## 2019-11-18 RX ORDER — IPRATROPIUM BROMIDE AND ALBUTEROL SULFATE 2.5; .5 MG/3ML; MG/3ML
3 SOLUTION RESPIRATORY (INHALATION) EVERY 4 HOURS PRN
Status: DISCONTINUED | OUTPATIENT
Start: 2019-11-19 | End: 2019-11-27 | Stop reason: HOSPADM

## 2019-11-18 RX ORDER — LIDOCAINE 50 MG/G
1 PATCH TOPICAL DAILY
Status: DISCONTINUED | OUTPATIENT
Start: 2019-11-19 | End: 2019-11-27 | Stop reason: HOSPADM

## 2019-11-18 RX ORDER — ACETAMINOPHEN 325 MG/1
650 TABLET ORAL EVERY 4 HOURS PRN
Status: DISCONTINUED | OUTPATIENT
Start: 2019-11-19 | End: 2019-11-27 | Stop reason: HOSPADM

## 2019-11-18 RX ORDER — INSULIN ASPART 100 [IU]/ML
0-5 INJECTION, SOLUTION INTRAVENOUS; SUBCUTANEOUS
Status: DISCONTINUED | OUTPATIENT
Start: 2019-11-19 | End: 2019-11-27 | Stop reason: HOSPADM

## 2019-11-18 RX ORDER — PANTOPRAZOLE SODIUM 40 MG/1
40 TABLET, DELAYED RELEASE ORAL DAILY
Status: DISCONTINUED | OUTPATIENT
Start: 2019-11-19 | End: 2019-11-27 | Stop reason: HOSPADM

## 2019-11-18 RX ORDER — ONDANSETRON 8 MG/1
8 TABLET, ORALLY DISINTEGRATING ORAL EVERY 8 HOURS PRN
Status: DISCONTINUED | OUTPATIENT
Start: 2019-11-19 | End: 2019-11-27 | Stop reason: HOSPADM

## 2019-11-18 RX ORDER — FUROSEMIDE 40 MG/1
40 TABLET ORAL DAILY
Status: DISCONTINUED | OUTPATIENT
Start: 2019-11-19 | End: 2019-11-19

## 2019-11-18 RX ORDER — GABAPENTIN 300 MG/1
300 CAPSULE ORAL EVERY 8 HOURS PRN
Status: DISCONTINUED | OUTPATIENT
Start: 2019-11-19 | End: 2019-11-27 | Stop reason: HOSPADM

## 2019-11-18 RX ORDER — IBUPROFEN 200 MG
16 TABLET ORAL
Status: DISCONTINUED | OUTPATIENT
Start: 2019-11-19 | End: 2019-11-27 | Stop reason: HOSPADM

## 2019-11-18 RX ORDER — POLYETHYLENE GLYCOL 3350 17 G/17G
17 POWDER, FOR SOLUTION ORAL 3 TIMES DAILY PRN
Status: DISCONTINUED | OUTPATIENT
Start: 2019-11-19 | End: 2019-11-27 | Stop reason: HOSPADM

## 2019-11-18 RX ORDER — SERTRALINE HYDROCHLORIDE 100 MG/1
100 TABLET, FILM COATED ORAL DAILY
Status: DISCONTINUED | OUTPATIENT
Start: 2019-11-19 | End: 2019-11-27 | Stop reason: HOSPADM

## 2019-11-18 RX ORDER — PRAVASTATIN SODIUM 80 MG/1
80 TABLET ORAL DAILY
Status: DISCONTINUED | OUTPATIENT
Start: 2019-11-19 | End: 2019-11-27 | Stop reason: HOSPADM

## 2019-11-18 RX ORDER — GLUCAGON 1 MG
1 KIT INJECTION
Status: DISCONTINUED | OUTPATIENT
Start: 2019-11-19 | End: 2019-11-27 | Stop reason: HOSPADM

## 2019-11-18 RX ORDER — AMLODIPINE BESYLATE 10 MG/1
10 TABLET ORAL
Status: DISCONTINUED | OUTPATIENT
Start: 2019-11-19 | End: 2019-11-23

## 2019-11-18 RX ORDER — ISOSORBIDE MONONITRATE 30 MG/1
30 TABLET, EXTENDED RELEASE ORAL DAILY
Status: DISCONTINUED | OUTPATIENT
Start: 2019-11-19 | End: 2019-11-27 | Stop reason: HOSPADM

## 2019-11-18 RX ORDER — METOPROLOL SUCCINATE 50 MG/1
50 TABLET, EXTENDED RELEASE ORAL DAILY
Status: DISCONTINUED | OUTPATIENT
Start: 2019-11-19 | End: 2019-11-27 | Stop reason: HOSPADM

## 2019-11-18 RX ORDER — AMOXICILLIN 250 MG
1 CAPSULE ORAL 2 TIMES DAILY
Status: DISCONTINUED | OUTPATIENT
Start: 2019-11-18 | End: 2019-11-27 | Stop reason: HOSPADM

## 2019-11-18 RX ORDER — IBUPROFEN 200 MG
24 TABLET ORAL
Status: DISCONTINUED | OUTPATIENT
Start: 2019-11-19 | End: 2019-11-27 | Stop reason: HOSPADM

## 2019-11-18 RX ORDER — PROMETHAZINE HYDROCHLORIDE 25 MG/1
25 TABLET ORAL EVERY 6 HOURS PRN
Status: DISCONTINUED | OUTPATIENT
Start: 2019-11-19 | End: 2019-11-27 | Stop reason: HOSPADM

## 2019-11-18 RX ADMIN — SENNOSIDES AND DOCUSATE SODIUM 1 TABLET: 8.6; 5 TABLET ORAL at 11:11

## 2019-11-18 RX ADMIN — DEXTROSE 250 ML: 10 SOLUTION INTRAVENOUS at 09:11

## 2019-11-18 RX ADMIN — SODIUM POLYSTYRENE SULFONATE 30 G: 15 SUSPENSION ORAL; RECTAL at 09:11

## 2019-11-18 RX ADMIN — INSULIN HUMAN 6 UNITS: 100 INJECTION, SOLUTION PARENTERAL at 09:11

## 2019-11-18 RX ADMIN — HYDRALAZINE HYDROCHLORIDE 20 MG: 20 INJECTION INTRAMUSCULAR; INTRAVENOUS at 09:11

## 2019-11-18 NOTE — ED NOTES
"Pt reports left leg numbness and weakness x several months, neck pain and rash. Denies fever, chills, N/V/D. Last received dialysis Friday. Pt states "I missed today because I couldn't get up."  "

## 2019-11-19 PROBLEM — R29.898 WEAKNESS OF LEFT LOWER EXTREMITY: Status: ACTIVE | Noted: 2019-11-19

## 2019-11-19 LAB
ADENOVIRUS: NOT DETECTED
ALBUMIN SERPL BCP-MCNC: 3 G/DL (ref 3.5–5.2)
ALBUMIN SERPL BCP-MCNC: 3.1 G/DL (ref 3.5–5.2)
ALLENS TEST: ABNORMAL
ALP SERPL-CCNC: 61 U/L (ref 55–135)
ALT SERPL W/O P-5'-P-CCNC: 27 U/L (ref 10–44)
ANION GAP SERPL CALC-SCNC: 11 MMOL/L (ref 8–16)
ANION GAP SERPL CALC-SCNC: 15 MMOL/L (ref 8–16)
ANION GAP SERPL CALC-SCNC: 16 MMOL/L (ref 8–16)
ASCENDING AORTA: 3.11 CM
AST SERPL-CCNC: 21 U/L (ref 10–40)
BASOPHILS # BLD AUTO: 0.04 K/UL (ref 0–0.2)
BASOPHILS NFR BLD: 0.5 % (ref 0–1.9)
BILIRUB SERPL-MCNC: 0.3 MG/DL (ref 0.1–1)
BNP SERPL-MCNC: 717 PG/ML (ref 0–99)
BORDETELLA PARAPERTUSSIS (IS1001): NOT DETECTED
BORDETELLA PERTUSSIS (PTXP): NOT DETECTED
BSA FOR ECHO PROCEDURE: 2.24 M2
BUN SERPL-MCNC: 50 MG/DL (ref 8–23)
BUN SERPL-MCNC: 62 MG/DL (ref 8–23)
BUN SERPL-MCNC: 63 MG/DL (ref 8–23)
CALCIUM SERPL-MCNC: 9.2 MG/DL (ref 8.7–10.5)
CALCIUM SERPL-MCNC: 9.4 MG/DL (ref 8.7–10.5)
CALCIUM SERPL-MCNC: 9.7 MG/DL (ref 8.7–10.5)
CHLAMYDIA PNEUMONIAE: NOT DETECTED
CHLORIDE SERPL-SCNC: 101 MMOL/L (ref 95–110)
CHLORIDE SERPL-SCNC: 102 MMOL/L (ref 95–110)
CHLORIDE SERPL-SCNC: 105 MMOL/L (ref 95–110)
CO2 SERPL-SCNC: 26 MMOL/L (ref 23–29)
CO2 SERPL-SCNC: 26 MMOL/L (ref 23–29)
CO2 SERPL-SCNC: 27 MMOL/L (ref 23–29)
CORONAVIRUS 229E, COMMON COLD VIRUS: NOT DETECTED
CORONAVIRUS HKU1, COMMON COLD VIRUS: NOT DETECTED
CORONAVIRUS NL63, COMMON COLD VIRUS: NOT DETECTED
CORONAVIRUS OC43, COMMON COLD VIRUS: NOT DETECTED
CREAT SERPL-MCNC: 8 MG/DL (ref 0.5–1.4)
CREAT SERPL-MCNC: 9.6 MG/DL (ref 0.5–1.4)
CREAT SERPL-MCNC: 9.8 MG/DL (ref 0.5–1.4)
CV ECHO LV RWT: 0.43 CM
DELSYS: ABNORMAL
DIFFERENTIAL METHOD: ABNORMAL
DOP CALC LVOT AREA: 3.1 CM2
DOP CALC LVOT DIAMETER: 2 CM
DOP CALC LVOT PEAK VEL: 0.98 M/S
DOP CALC LVOT STROKE VOLUME: 69.36 CM3
DOP CALCLVOT PEAK VEL VTI: 22.09 CM
E WAVE DECELERATION TIME: 202.62 MSEC
E/A RATIO: 1.51
E/E' RATIO: 24.91 M/S
ECHO LV POSTERIOR WALL: 1.07 CM (ref 0.6–1.1)
EOSINOPHIL # BLD AUTO: 0.2 K/UL (ref 0–0.5)
EOSINOPHIL NFR BLD: 2.6 % (ref 0–8)
EP: 5
EP: 5
ERYTHROCYTE [DISTWIDTH] IN BLOOD BY AUTOMATED COUNT: 14.7 % (ref 11.5–14.5)
ERYTHROCYTE [SEDIMENTATION RATE] IN BLOOD BY WESTERGREN METHOD: 12 MM/H
EST. GFR  (AFRICAN AMERICAN): 4.2 ML/MIN/1.73 M^2
EST. GFR  (AFRICAN AMERICAN): 4.3 ML/MIN/1.73 M^2
EST. GFR  (AFRICAN AMERICAN): 5.4 ML/MIN/1.73 M^2
EST. GFR  (NON AFRICAN AMERICAN): 3.7 ML/MIN/1.73 M^2
EST. GFR  (NON AFRICAN AMERICAN): 3.8 ML/MIN/1.73 M^2
EST. GFR  (NON AFRICAN AMERICAN): 4.7 ML/MIN/1.73 M^2
ESTIMATED AVG GLUCOSE: 120 MG/DL (ref 68–131)
FIO2: 40
FLOW: 3
FLUBV RNA NPH QL NAA+NON-PROBE: NOT DETECTED
FRACTIONAL SHORTENING: 53 % (ref 28–44)
GLUCOSE SERPL-MCNC: 103 MG/DL (ref 70–110)
GLUCOSE SERPL-MCNC: 104 MG/DL (ref 70–110)
GLUCOSE SERPL-MCNC: 116 MG/DL (ref 70–110)
GLUCOSE SERPL-MCNC: 123 MG/DL (ref 70–110)
HBA1C MFR BLD HPLC: 5.8 % (ref 4–5.6)
HCO3 UR-SCNC: 25.6 MMOL/L (ref 24–28)
HCO3 UR-SCNC: 27.6 MMOL/L (ref 24–28)
HCO3 UR-SCNC: 28 MMOL/L (ref 24–28)
HCT VFR BLD AUTO: 30 % (ref 37–48.5)
HGB BLD-MCNC: 8.9 G/DL (ref 12–16)
HPIV1 RNA NPH QL NAA+NON-PROBE: NOT DETECTED
HPIV2 RNA NPH QL NAA+NON-PROBE: NOT DETECTED
HPIV3 RNA NPH QL NAA+NON-PROBE: NOT DETECTED
HPIV4 RNA NPH QL NAA+NON-PROBE: NOT DETECTED
HUMAN METAPNEUMOVIRUS: NOT DETECTED
IMM GRANULOCYTES # BLD AUTO: 0.03 K/UL (ref 0–0.04)
IMM GRANULOCYTES NFR BLD AUTO: 0.4 % (ref 0–0.5)
INFLUENZA A (SUBTYPES H1,H1-2009,H3): NOT DETECTED
INTERVENTRICULAR SEPTUM: 1.14 CM (ref 0.6–1.1)
IP: 12
IP: 12
IVRT: 0.06 MSEC
LA MAJOR: 7.95 CM
LA MINOR: 8.19 CM
LA WIDTH: 4.33 CM
LACTATE SERPL-SCNC: 0.7 MMOL/L (ref 0.5–2.2)
LEFT ATRIUM SIZE: 3.9 CM
LEFT ATRIUM VOLUME INDEX: 53.5 ML/M2
LEFT ATRIUM VOLUME: 115.81 CM3
LEFT INTERNAL DIMENSION IN SYSTOLE: 2.34 CM (ref 2.1–4)
LEFT VENTRICLE DIASTOLIC VOLUME INDEX: 55.1 ML/M2
LEFT VENTRICLE DIASTOLIC VOLUME: 119.31 ML
LEFT VENTRICLE MASS INDEX: 97 G/M2
LEFT VENTRICLE SYSTOLIC VOLUME INDEX: 8.7 ML/M2
LEFT VENTRICLE SYSTOLIC VOLUME: 18.86 ML
LEFT VENTRICULAR INTERNAL DIMENSION IN DIASTOLE: 5.02 CM (ref 3.5–6)
LEFT VENTRICULAR MASS: 209.79 G
LV LATERAL E/E' RATIO: 27.4 M/S
LV SEPTAL E/E' RATIO: 22.83 M/S
LYMPHOCYTES # BLD AUTO: 0.7 K/UL (ref 1–4.8)
LYMPHOCYTES NFR BLD: 8.6 % (ref 18–48)
MAGNESIUM SERPL-MCNC: 2.1 MG/DL (ref 1.6–2.6)
MCH RBC QN AUTO: 29.1 PG (ref 27–31)
MCHC RBC AUTO-ENTMCNC: 29.7 G/DL (ref 32–36)
MCV RBC AUTO: 98 FL (ref 82–98)
MIN VOL: 13
MODE: ABNORMAL
MONOCYTES # BLD AUTO: 0.6 K/UL (ref 0.3–1)
MONOCYTES NFR BLD: 7.2 % (ref 4–15)
MV PEAK A VEL: 0.91 M/S
MV PEAK E VEL: 1.37 M/S
MYCOPLASMA PNEUMONIAE: NOT DETECTED
NEUTROPHILS # BLD AUTO: 6.8 K/UL (ref 1.8–7.7)
NEUTROPHILS NFR BLD: 80.7 % (ref 38–73)
NRBC BLD-RTO: 0 /100 WBC
PCO2 BLDA: 49 MMHG (ref 35–45)
PCO2 BLDA: 51.6 MMHG (ref 35–45)
PCO2 BLDA: 58.1 MMHG (ref 35–45)
PH SMN: 7.29 [PH] (ref 7.35–7.45)
PH SMN: 7.33 [PH] (ref 7.35–7.45)
PH SMN: 7.34 [PH] (ref 7.35–7.45)
PHOSPHATE SERPL-MCNC: 4.6 MG/DL (ref 2.7–4.5)
PHOSPHATE SERPL-MCNC: 5.5 MG/DL (ref 2.7–4.5)
PISA TR MAX VEL: 3.72 M/S
PLATELET # BLD AUTO: 198 K/UL (ref 150–350)
PMV BLD AUTO: 10.2 FL (ref 9.2–12.9)
PO2 BLDA: 51 MMHG (ref 40–60)
PO2 BLDA: 61 MMHG (ref 80–100)
PO2 BLDA: 65 MMHG (ref 80–100)
POC BE: 0 MMOL/L
POC BE: 1 MMOL/L
POC BE: 2 MMOL/L
POC SATURATED O2: 82 % (ref 95–100)
POC SATURATED O2: 87 % (ref 95–100)
POC SATURATED O2: 91 % (ref 95–100)
POC TCO2: 27 MMOL/L (ref 24–29)
POC TCO2: 29 MMOL/L (ref 23–27)
POC TCO2: 30 MMOL/L (ref 23–27)
POCT GLUCOSE: 112 MG/DL (ref 70–110)
POCT GLUCOSE: 115 MG/DL (ref 70–110)
POCT GLUCOSE: 121 MG/DL (ref 70–110)
POCT GLUCOSE: 129 MG/DL (ref 70–110)
POCT GLUCOSE: 97 MG/DL (ref 70–110)
POCT GLUCOSE: 98 MG/DL (ref 70–110)
POTASSIUM SERPL-SCNC: 5 MMOL/L (ref 3.5–5.1)
POTASSIUM SERPL-SCNC: 5.2 MMOL/L (ref 3.5–5.1)
POTASSIUM SERPL-SCNC: 5.2 MMOL/L (ref 3.5–5.1)
PROT SERPL-MCNC: 6.8 G/DL (ref 6–8.4)
PROVIDER CREDENTIALS: ABNORMAL
PROVIDER NOTIFIED: ABNORMAL
RA MAJOR: 6.75 CM
RA PRESSURE: 15 MMHG
RA WIDTH: 3.49 CM
RBC # BLD AUTO: 3.06 M/UL (ref 4–5.4)
RESPIRATORY INFECTION PANEL SOURCE: NORMAL
RIGHT VENTRICULAR END-DIASTOLIC DIMENSION: 4.53 CM
RSV RNA NPH QL NAA+NON-PROBE: NOT DETECTED
RV TISSUE DOPPLER FREE WALL SYSTOLIC VELOCITY 1 (APICAL 4 CHAMBER VIEW): 11.98 CM/S
RV+EV RNA NPH QL NAA+NON-PROBE: NOT DETECTED
SAMPLE: ABNORMAL
SINUS: 3 CM
SITE: ABNORMAL
SODIUM SERPL-SCNC: 142 MMOL/L (ref 136–145)
SODIUM SERPL-SCNC: 143 MMOL/L (ref 136–145)
SODIUM SERPL-SCNC: 144 MMOL/L (ref 136–145)
SPONT RATE: 17
STJ: 2.39 CM
TDI LATERAL: 0.05 M/S
TDI SEPTAL: 0.06 M/S
TDI: 0.06 M/S
TR MAX PG: 55 MMHG
TRICUSPID ANNULAR PLANE SYSTOLIC EXCURSION: 3.22 CM
TROPONIN I SERPL DL<=0.01 NG/ML-MCNC: 0.04 NG/ML (ref 0–0.03)
TV REST PULMONARY ARTERY PRESSURE: 70 MMHG
VERBAL RESULT READBACK PERFORMED: YES
WBC # BLD AUTO: 8.36 K/UL (ref 3.9–12.7)

## 2019-11-19 PROCEDURE — 96376 TX/PRO/DX INJ SAME DRUG ADON: CPT | Performed by: INTERNAL MEDICINE

## 2019-11-19 PROCEDURE — 96375 TX/PRO/DX INJ NEW DRUG ADDON: CPT | Performed by: INTERNAL MEDICINE

## 2019-11-19 PROCEDURE — 83880 ASSAY OF NATRIURETIC PEPTIDE: CPT

## 2019-11-19 PROCEDURE — 36600 WITHDRAWAL OF ARTERIAL BLOOD: CPT

## 2019-11-19 PROCEDURE — 27000221 HC OXYGEN, UP TO 24 HOURS

## 2019-11-19 PROCEDURE — 99291 CRITICAL CARE FIRST HOUR: CPT | Mod: ,,, | Performed by: PHYSICIAN ASSISTANT

## 2019-11-19 PROCEDURE — C9399 UNCLASSIFIED DRUGS OR BIOLOG: HCPCS | Performed by: PHYSICIAN ASSISTANT

## 2019-11-19 PROCEDURE — 63600175 PHARM REV CODE 636 W HCPCS: Performed by: PHYSICIAN ASSISTANT

## 2019-11-19 PROCEDURE — 99223 PR INITIAL HOSPITAL CARE,LEVL III: ICD-10-PCS | Mod: ,,, | Performed by: NURSE PRACTITIONER

## 2019-11-19 PROCEDURE — 94761 N-INVAS EAR/PLS OXIMETRY MLT: CPT

## 2019-11-19 PROCEDURE — 63600175 PHARM REV CODE 636 W HCPCS: Performed by: INTERNAL MEDICINE

## 2019-11-19 PROCEDURE — 83605 ASSAY OF LACTIC ACID: CPT

## 2019-11-19 PROCEDURE — 80053 COMPREHEN METABOLIC PANEL: CPT

## 2019-11-19 PROCEDURE — 80048 BASIC METABOLIC PNL TOTAL CA: CPT

## 2019-11-19 PROCEDURE — 84484 ASSAY OF TROPONIN QUANT: CPT

## 2019-11-19 PROCEDURE — 83735 ASSAY OF MAGNESIUM: CPT

## 2019-11-19 PROCEDURE — 84100 ASSAY OF PHOSPHORUS: CPT

## 2019-11-19 PROCEDURE — 80069 RENAL FUNCTION PANEL: CPT

## 2019-11-19 PROCEDURE — 82803 BLOOD GASES ANY COMBINATION: CPT

## 2019-11-19 PROCEDURE — 99223 1ST HOSP IP/OBS HIGH 75: CPT | Mod: ,,, | Performed by: NURSE PRACTITIONER

## 2019-11-19 PROCEDURE — 99291 PR CRITICAL CARE, E/M 30-74 MINUTES: ICD-10-PCS | Mod: ,,, | Performed by: PHYSICIAN ASSISTANT

## 2019-11-19 PROCEDURE — 27000190 HC CPAP FULL FACE MASK W/VALVE

## 2019-11-19 PROCEDURE — 93010 EKG 12-LEAD: ICD-10-PCS | Mod: ,,, | Performed by: INTERNAL MEDICINE

## 2019-11-19 PROCEDURE — 83036 HEMOGLOBIN GLYCOSYLATED A1C: CPT

## 2019-11-19 PROCEDURE — 93005 ELECTROCARDIOGRAM TRACING: CPT

## 2019-11-19 PROCEDURE — 99900035 HC TECH TIME PER 15 MIN (STAT)

## 2019-11-19 PROCEDURE — 99291 PR CRITICAL CARE, E/M 30-74 MINUTES: ICD-10-PCS | Mod: GC,,, | Performed by: INTERNAL MEDICINE

## 2019-11-19 PROCEDURE — 93010 ELECTROCARDIOGRAM REPORT: CPT | Mod: ,,, | Performed by: INTERNAL MEDICINE

## 2019-11-19 PROCEDURE — 25000003 PHARM REV CODE 250: Performed by: PHYSICIAN ASSISTANT

## 2019-11-19 PROCEDURE — 87798 DETECT AGENT NOS DNA AMP: CPT

## 2019-11-19 PROCEDURE — 82800 BLOOD PH: CPT

## 2019-11-19 PROCEDURE — 94660 CPAP INITIATION&MGMT: CPT

## 2019-11-19 PROCEDURE — 80100014 HC HEMODIALYSIS 1:1

## 2019-11-19 PROCEDURE — 85025 COMPLETE CBC W/AUTO DIFF WBC: CPT

## 2019-11-19 PROCEDURE — 99291 CRITICAL CARE FIRST HOUR: CPT | Mod: GC,,, | Performed by: INTERNAL MEDICINE

## 2019-11-19 PROCEDURE — 20000000 HC ICU ROOM

## 2019-11-19 RX ORDER — SODIUM CHLORIDE 9 MG/ML
INJECTION, SOLUTION INTRAVENOUS
Status: DISCONTINUED | OUTPATIENT
Start: 2019-11-19 | End: 2019-11-22

## 2019-11-19 RX ORDER — HYDRALAZINE HYDROCHLORIDE 20 MG/ML
10 INJECTION INTRAMUSCULAR; INTRAVENOUS ONCE
Status: COMPLETED | OUTPATIENT
Start: 2019-11-19 | End: 2019-11-19

## 2019-11-19 RX ORDER — LABETALOL HCL 20 MG/4 ML
20 SYRINGE (ML) INTRAVENOUS EVERY 4 HOURS PRN
Status: DISCONTINUED | OUTPATIENT
Start: 2019-11-19 | End: 2019-11-19

## 2019-11-19 RX ORDER — FUROSEMIDE 10 MG/ML
160 INJECTION INTRAMUSCULAR; INTRAVENOUS ONCE
Status: DISCONTINUED | OUTPATIENT
Start: 2019-11-19 | End: 2019-11-19

## 2019-11-19 RX ORDER — SODIUM CHLORIDE 9 MG/ML
INJECTION, SOLUTION INTRAVENOUS ONCE
Status: DISCONTINUED | OUTPATIENT
Start: 2019-11-19 | End: 2019-11-21

## 2019-11-19 RX ORDER — LABETALOL HCL 20 MG/4 ML
10 SYRINGE (ML) INTRAVENOUS EVERY 4 HOURS PRN
Status: DISCONTINUED | OUTPATIENT
Start: 2019-11-19 | End: 2019-11-26

## 2019-11-19 RX ORDER — CEFTRIAXONE 1 G/1
1 INJECTION, POWDER, FOR SOLUTION INTRAMUSCULAR; INTRAVENOUS
Status: DISCONTINUED | OUTPATIENT
Start: 2019-11-19 | End: 2019-11-21

## 2019-11-19 RX ORDER — FUROSEMIDE 10 MG/ML
80 INJECTION INTRAMUSCULAR; INTRAVENOUS ONCE
Status: COMPLETED | OUTPATIENT
Start: 2019-11-19 | End: 2019-11-19

## 2019-11-19 RX ORDER — SODIUM CHLORIDE 9 MG/ML
INJECTION, SOLUTION INTRAVENOUS ONCE
Status: DISCONTINUED | OUTPATIENT
Start: 2019-11-20 | End: 2019-11-21

## 2019-11-19 RX ADMIN — CALCIUM ACETATE 667 MG: 667 CAPSULE ORAL at 09:11

## 2019-11-19 RX ADMIN — APIXABAN 2.5 MG: 2.5 TABLET, FILM COATED ORAL at 09:11

## 2019-11-19 RX ADMIN — HYDRALAZINE HYDROCHLORIDE 10 MG: 20 INJECTION INTRAMUSCULAR; INTRAVENOUS at 03:11

## 2019-11-19 RX ADMIN — CEFTRIAXONE SODIUM 1 G: 1 INJECTION, POWDER, FOR SOLUTION INTRAMUSCULAR; INTRAVENOUS at 03:11

## 2019-11-19 RX ADMIN — SENNOSIDES AND DOCUSATE SODIUM 1 TABLET: 8.6; 5 TABLET ORAL at 08:11

## 2019-11-19 RX ADMIN — PANTOPRAZOLE SODIUM 40 MG: 40 TABLET, DELAYED RELEASE ORAL at 09:11

## 2019-11-19 RX ADMIN — AZITHROMYCIN MONOHYDRATE 500 MG: 500 INJECTION, POWDER, LYOPHILIZED, FOR SOLUTION INTRAVENOUS at 03:11

## 2019-11-19 RX ADMIN — APIXABAN 2.5 MG: 2.5 TABLET, FILM COATED ORAL at 08:11

## 2019-11-19 RX ADMIN — FUROSEMIDE 80 MG: 10 INJECTION, SOLUTION INTRAMUSCULAR; INTRAVENOUS at 03:11

## 2019-11-19 RX ADMIN — METOPROLOL SUCCINATE 50 MG: 50 TABLET, EXTENDED RELEASE ORAL at 09:11

## 2019-11-19 RX ADMIN — SERTRALINE HYDROCHLORIDE 100 MG: 100 TABLET ORAL at 09:11

## 2019-11-19 RX ADMIN — INSULIN DETEMIR 15 UNITS: 100 INJECTION, SOLUTION SUBCUTANEOUS at 09:11

## 2019-11-19 RX ADMIN — CLOPIDOGREL BISULFATE 75 MG: 75 TABLET ORAL at 09:11

## 2019-11-19 RX ADMIN — NEPHROCAP 1 CAPSULE: 1 CAP ORAL at 09:11

## 2019-11-19 RX ADMIN — LIDOCAINE 1 PATCH: 50 PATCH TOPICAL at 09:11

## 2019-11-19 RX ADMIN — PRAVASTATIN SODIUM 80 MG: 40 TABLET ORAL at 09:11

## 2019-11-19 RX ADMIN — CALCIUM ACETATE 667 MG: 667 CAPSULE ORAL at 11:11

## 2019-11-19 RX ADMIN — FUROSEMIDE 80 MG: 10 INJECTION, SOLUTION INTRAMUSCULAR; INTRAVENOUS at 02:11

## 2019-11-19 RX ADMIN — AMLODIPINE BESYLATE 10 MG: 10 TABLET ORAL at 04:11

## 2019-11-19 RX ADMIN — CALCIUM ACETATE 667 MG: 667 CAPSULE ORAL at 04:11

## 2019-11-19 RX ADMIN — ISOSORBIDE MONONITRATE 30 MG: 30 TABLET, EXTENDED RELEASE ORAL at 09:11

## 2019-11-19 NOTE — PROGRESS NOTES
69 yof with ESRD & chronic diastolic heart failure presenting with 1-day history of cough productive of brown sputum & dyspnea. She did not go to dialysis yesterday because she was feeling poorly.    1) Acute hypoxemic respiratory failure. CXR shows pulmonary edema. Supported with bipap. Urgent dialysis provided overnight. Try to wean off bipap this morning. Continue empiric abx for cap for now.  2) Acute on chronic diastolic CHF. As above.  3) ESRD. Appreciate nephrology assistance.    Critical Care Time: 45 minutes  Critical care was time spent personally by me on the following activities: evaluating this patient's organ dysfunction, development of treatment plan, discussing treatment plan with patient or surrogate and bedside caregivers, discussions with consultants, evaluation of patient's response to treatment, examination of patient, ordering and performing treatments and interventions, ordering and review of laboratory studies, ordering and review of radiographic studies, re-evaluation of patient's condition. This critical care time did not overlap with that of any other provider or involve time for any procedures.    Juanjose Riley MD  [facility]

## 2019-11-19 NOTE — ASSESSMENT & PLAN NOTE
End-stage renal disease on hemodialysis    - K 5.6 in setting of missed HD.  - Shifted in ER, will repeat labs.  - Nephrology consulted for HD.  - Patient on MWF schedule, missed today (Monday).

## 2019-11-19 NOTE — PLAN OF CARE
Plan of care reviewed with patient. Patient AAOx4 follows all commands and moves all extremities. On BiPAP 12/5 and 40%. SR. SBP maintained <180. Oliguric. 180 mg of lasix IVP given total, no urine occurrences yet. HD currently in session. Skin remains free from break down. VSS, will continue to monitor.

## 2019-11-19 NOTE — SUBJECTIVE & OBJECTIVE
Past Medical History:   Diagnosis Date    Anemia due to gastrointestinal blood loss 7/11/2018    Arthritis     Asthma     Breast cancer, left     CHF (congestive heart failure)     Cholelithiasis     Coronary artery disease     Diabetes mellitus, type 2     Encounter for blood transfusion     End-stage renal disease on hemodialysis     Hemodialysis access site with arteriovenous graft     mon-wed -fri    Hyperlipidemia     Hypertension     Sarcoidosis        Past Surgical History:   Procedure Laterality Date    av graft      left arm    BRACHIAL ARTERY GRAFT Left 05/04/2016    brachiocephalic, Dr. Anson Crocker    BREAST BIOPSY Left     breast ca    BREAST LUMPECTOMY Left     radiation only    btl      COLONOSCOPY N/A 7/13/2018    Procedure: COLONOSCOPY;  Surgeon: Almita Bee MD;  Location: [facility] ENDO;  Service: Endoscopy;  Laterality: N/A;    ELBOW SURGERY      left    ESOPHAGOGASTRODUODENOSCOPY N/A 7/13/2018    Procedure: ESOPHAGOGASTRODUODENOSCOPY (EGD);  Surgeon: Almita Bee MD;  Location: [facility] ENDO;  Service: Endoscopy;  Laterality: N/A;    LIPOMA RESECTION      back of neck    pilondial cyst      TOTAL KNEE ARTHROPLASTY Right 02/23/2016       Review of patient's allergies indicates:  No Known Allergies  Current Facility-Administered Medications   Medication Frequency    0.9%  NaCl infusion PRN    0.9%  NaCl infusion Once    [START ON 11/20/2019] 0.9%  NaCl infusion Once    acetaminophen tablet 650 mg Q4H PRN    albuterol-ipratropium 2.5 mg-0.5 mg/3 mL nebulizer solution 3 mL Q4H PRN    amLODIPine tablet 10 mg Every Tues, Thurs, Sat, Sun    apixaban tablet 2.5 mg BID    [START ON 11/20/2019] azithromycin 500 mg in dextrose 5 % 250 mL IVPB (ready to mix system) Q24H    calcium acetate capsule 667 mg TID WM    cefTRIAXone injection 1 g Q24H    clopidogrel tablet 75 mg Daily    dextrose 10% (D10W) Bolus PRN    dextrose 10% (D10W) Bolus PRN    gabapentin capsule  300 mg Q8H PRN    glucagon (human recombinant) injection 1 mg PRN    glucose chewable tablet 16 g PRN    glucose chewable tablet 24 g PRN    insulin aspart U-100 pen 0-5 Units QID (AC + HS) PRN    insulin detemir U-100 pen 15 Units Daily    isosorbide mononitrate 24 hr tablet 30 mg Daily    labetalol 20 mg/4 mL (5 mg/mL) IV syring Q4H PRN    lidocaine 5 % patch 1 patch Daily    melatonin tablet 6 mg Nightly PRN    metoprolol succinate (TOPROL-XL) 24 hr tablet 50 mg Daily    ondansetron disintegrating tablet 8 mg Q8H PRN    pantoprazole EC tablet 40 mg Daily    polyethylene glycol packet 17 g TID PRN    pravastatin tablet 80 mg Daily    promethazine tablet 25 mg Q6H PRN    senna-docusate 8.6-50 mg per tablet 1 tablet BID    sertraline tablet 100 mg Daily    sodium chloride 0.9% flush 5 mL PRN    vitamin renal formula (B-complex-vitamin c-folic acid) 1 mg per capsule 1 capsule Daily     Family History     Problem Relation (Age of Onset)    Diabetes Mother, Sister    Heart disease Sister    Hypertension Mother    Kidney disease Mother        Tobacco Use    Smoking status: Never Smoker    Smokeless tobacco: Never Used   Substance and Sexual Activity    Alcohol use: No    Drug use: No    Sexual activity: Not on file     Review of Systems   Constitutional: Positive for fatigue. Negative for chills, fever and unexpected weight change.   HENT: Negative for sinus pressure, sneezing and sore throat.    Eyes: Negative for visual disturbance.   Respiratory: Positive for shortness of breath. Negative for cough, chest tightness and wheezing.    Cardiovascular: Positive for leg swelling. Negative for chest pain and palpitations.   Gastrointestinal: Negative for abdominal pain, blood in stool, constipation, diarrhea, nausea and vomiting.   Genitourinary: Negative for difficulty urinating.   Musculoskeletal: Negative for arthralgias and myalgias.   Skin: Negative for pallor, rash and wound.   Neurological:  Negative for weakness and headaches.   Hematological: Negative for adenopathy.   Psychiatric/Behavioral: Negative for agitation, behavioral problems, confusion and decreased concentration.     Objective:     Vital Signs (Most Recent):  Temp: 97.8 °F (36.6 °C) (11/19/19 1100)  Pulse: 69 (11/19/19 1300)  Resp: 19 (11/19/19 1300)  BP: (!) 166/81 (11/19/19 1300)  SpO2: (!) 93 % (11/19/19 1300)  O2 Device (Oxygen Therapy): BiPAP (11/19/19 1300) Vital Signs (24h Range):  Temp:  [97 °F (36.1 °C)-98.2 °F (36.8 °C)] 97.8 °F (36.6 °C)  Pulse:  [69-88] 69  Resp:  [11-26] 19  SpO2:  [78 %-98 %] 93 %  BP: (124-208)/() 166/81     Weight: 106.6 kg (235 lb 0.2 oz) (11/19/19 0400)  Body mass index is 36.81 kg/m².  Body surface area is 2.24 meters squared.    I/O last 3 completed shifts:  In: 502 [I.V.:252; IV Piggyback:250]  Out: -     Physical Exam   Constitutional: She is oriented to person, place, and time. She appears well-developed and well-nourished.   HENT:   Head: Normocephalic and atraumatic.   Right Ear: External ear normal.   Left Ear: External ear normal.   Mouth/Throat: No oropharyngeal exudate.   Eyes: Right eye exhibits no discharge. Left eye exhibits no discharge. No scleral icterus.   Neck: Normal range of motion.   Pulmonary/Chest: No stridor. She is in respiratory distress (improved). She has no wheezes. She has rales.   Abdominal: Soft. Bowel sounds are normal. She exhibits no distension and no mass. There is no tenderness. There is no guarding.   Musculoskeletal: Normal range of motion. She exhibits edema. She exhibits no deformity.   Neurological: She is alert and oriented to person, place, and time.   Skin: Skin is warm and dry. No rash noted. No erythema.   Psychiatric: She has a normal mood and affect. Her behavior is normal.   Nursing note and vitals reviewed.      Significant Labs:  CBC:   Recent Labs   Lab 11/19/19  0349   WBC 8.36   RBC 3.06*   HGB 8.9*   HCT 30.0*      MCV 98   MCH 29.1    MCHC 29.7*     CMP:   Recent Labs   Lab 11/19/19  0349   *   CALCIUM 9.4   ALBUMIN 3.1*   PROT 6.8      K 5.2*   CO2 26      BUN 63*   CREATININE 9.8*   ALKPHOS 61   ALT 27   AST 21   BILITOT 0.3

## 2019-11-19 NOTE — ED PROVIDER NOTES
Encounter Date: 11/18/2019       History     Chief Complaint   Patient presents with    Extremity Weakness     states L leg has been getting weak for several months and states that it is getting worse     [age] year year old female presents with left leg weakness it has been gradual and constant over the last few months.  Describes a cramping like pain.  Patient mainly uses a wheelchair to get around but occasionally gets up with assistance with a walker.  She states over the last few weeks it is getting worse.  Also complains of right shoulder pain that is been going also on all constantly for months.  She has received physical therapy for this but no he has been able to help out.  Also complains of a rash that is been there for a month and wants to know when it is going to go way.  Denies any fevers.  Does admit to some nausea but also states she always has nausea.  Denies any abnormal bowel movements.  No shortness of breath.        Review of patient's allergies indicates:  No Known Allergies  Past Medical History:   Diagnosis Date    Anemia due to gastrointestinal blood loss 7/11/2018    Arthritis     Asthma     Breast cancer, left     CHF (congestive heart failure)     Cholelithiasis     Coronary artery disease     Diabetes mellitus, type 2     Encounter for blood transfusion     End-stage renal disease on hemodialysis     Hemodialysis access site with arteriovenous graft     mon-wed -fri    Hyperlipidemia     Hypertension     Sarcoidosis      Past Surgical History:   Procedure Laterality Date    av graft      left arm    BRACHIAL ARTERY GRAFT Left 05/04/2016    brachiocephalic, Dr. Anson Crocker    BREAST BIOPSY Left     breast ca    BREAST LUMPECTOMY Left     radiation only    btl      COLONOSCOPY N/A 7/13/2018    Procedure: COLONOSCOPY;  Surgeon: Almita Bee MD;  Location: [facility];  Service: Endoscopy;  Laterality: N/A;    ELBOW SURGERY      left     ESOPHAGOGASTRODUODENOSCOPY N/A 7/13/2018    Procedure: ESOPHAGOGASTRODUODENOSCOPY (EGD);  Surgeon: Almita Bee MD;  Location: [facility];  Service: Endoscopy;  Laterality: N/A;    LIPOMA RESECTION      back of neck    pilondial cyst      TOTAL KNEE ARTHROPLASTY Right 02/23/2016     Family History   Problem Relation Age of Onset    Diabetes Mother     Kidney disease Mother     Hypertension Mother     Diabetes Sister     Heart disease Sister      Social History     Tobacco Use    Smoking status: Never Smoker    Smokeless tobacco: Never Used   Substance Use Topics    Alcohol use: No    Drug use: No     Review of Systems   Constitutional: Negative for activity change, appetite change, chills and diaphoresis.   HENT: Negative for congestion.    Eyes: Negative for discharge.   Respiratory: Negative for shortness of breath.    Cardiovascular: Negative for chest pain.   Gastrointestinal: Negative for diarrhea, nausea, rectal pain and vomiting.   Genitourinary: Negative for dysuria.   Musculoskeletal: Positive for neck pain. Negative for back pain.        Positive for left leg pain   Neurological: Positive for weakness. Negative for dizziness.   Hematological: Does not bruise/bleed easily.   All other systems reviewed and are negative.      Physical Exam     Initial Vitals [11/18/19 1701]   BP Pulse Resp Temp SpO2   (!) 164/85 71 16 98.2 °F (36.8 °C) 97 %      MAP       --         Physical Exam    Nursing note and vitals reviewed.  Constitutional: She appears well-developed and well-nourished.  Non-toxic appearance. She does not have a sickly appearance. No distress.   HENT:   Head: Normocephalic and atraumatic.   Nose: Nose normal.   Mouth/Throat: Oropharynx is clear and moist.   Eyes: Conjunctivae, EOM and lids are normal. Pupils are equal, round, and reactive to light. Right eye exhibits no nystagmus. Left eye exhibits no nystagmus.   Neck: Trachea normal, normal range of motion and phonation normal. Neck  supple. No stridor present.   Cardiovascular: Normal rate, regular rhythm, normal heart sounds and normal pulses. Exam reveals no gallop and no friction rub.    No murmur heard.  Pulmonary/Chest: Breath sounds normal. No respiratory distress. She has no wheezes. She has no rhonchi. She has no rales.   Abdominal: Soft. Normal appearance and bowel sounds are normal. She exhibits no distension. There is no tenderness. There is no rigidity, no rebound, no guarding, no tenderness at McBurney's point and negative Moreland's sign.   Musculoskeletal: She exhibits no edema or tenderness.   Left lower extremity 4/5 strength.  All other extremities show 5/5 strength.  Left lower extremity more swollen than the right lower extremity with some mild tenderness to palpation of the left calf.  No erythema.  Normal temperature and equal bilaterally.   Neurological: She is alert and oriented to person, place, and time. She has normal strength and normal reflexes. She displays normal reflexes. No cranial nerve deficit or sensory deficit. She displays a negative Romberg sign. GCS score is 15. GCS eye subscore is 4. GCS verbal subscore is 5. GCS motor subscore is 6.   Skin: Skin is warm, dry and intact. Capillary refill takes less than 2 seconds. No rash noted. No pallor.   Psychiatric: Her speech is normal and behavior is normal.         ED Course   Procedures  Labs Reviewed   CBC W/ AUTO DIFFERENTIAL - Abnormal; Notable for the following components:       Result Value    RBC 3.00 (*)     Hemoglobin 9.0 (*)     Hematocrit 29.4 (*)     Mean Corpuscular Hemoglobin Conc 30.6 (*)     RDW 14.6 (*)     Gran% 74.0 (*)     Lymph% 14.4 (*)     All other components within normal limits   BASIC METABOLIC PANEL - Abnormal; Notable for the following components:    Potassium 5.6 (*)     BUN, Bld 62 (*)     Creatinine 9.5 (*)     eGFR if  4.4 (*)     eGFR if non  3.8 (*)     All other components within normal limits    POCT GLUCOSE MONITORING CONTINUOUS     EKG Readings: (Independently Interpreted)   EKG independently interpreted by myself shows normal sinus rhythm at a rate of 81, normal intervals, narrow QRS, no acute ST T wave abnormalities overall impression no acute disease.  Compared to an EKG on 08/02/2019 shows no acute change.       Imaging Results          CT Head Without Contrast (Final result)  Result time 11/18/19 20:56:38    Final result by Jonatan Ramírez MD (11/18/19 20:56:38)                 Impression:      No CT evidence of acute intracranial abnormality.    Senescent and chronic microvascular ischemic change.      Electronically signed by: Jonatan Ramírez MD  Date:    11/18/2019  Time:    20:56             Narrative:    EXAMINATION:  CT HEAD WITHOUT CONTRAST    CLINICAL HISTORY:  Focal neuro deficit, new, fixed or worsening, >6 hours; Weakness.    TECHNIQUE:  Low dose axial images were obtained through the head.  Coronal and sagittal reformations were also performed. Contrast was not administered.    COMPARISON:  08/19/2016.    FINDINGS:  No evidence of acute territorial infarct, hemorrhage, mass effect, or midline shift.    Mild generalized cerebral volume loss with ex vacuo dilation of the ventricles and sulci.  Moderate degree of periventricular white matter hypoattenuation suggestive of chronic microvascular ischemic disease.  Brain parenchyma appears similar to the prior study.    No displaced calvarial fracture.    Visualized paranasal sinuses and mastoid air cells are clear.                               US Lower Extremity Veins Left (Final result)  Result time 11/18/19 19:30:07   Procedure changed from US Lower Extremity Venous Insufficiency Left     Final result by Hillary Bowman MD (11/18/19 19:30:07)                 Impression:      No evidence of deep venous thrombosis in the left lower extremity.    Mild left lower extremity edema.    Electronically signed by resident: Dotty  Alena  Date:    11/18/2019  Time:    19:08    Electronically signed by: Hillary Bowman MD  Date:    11/18/2019  Time:    19:30             Narrative:    EXAMINATION:  US LOWER EXTREMITY VEINS LEFT    CLINICAL HISTORY:  left leg swelling;    TECHNIQUE:  Duplex and color flow Doppler evaluation and graded compression of the left lower extremity veins was performed.    COMPARISON:  US bilateral lower extremity veins 10/16/2019    FINDINGS:  Left thigh veins: The common femoral, femoral, popliteal, upper greater saphenous, and deep femoral veins are patent and free of thrombus. The veins are normally compressible and have normal phasic flow and augmentation response.    Left calf veins: The visualized calf veins are patent.    Contralateral CFV: The contralateral (right) common femoral vein is patent and free of thrombus.    Miscellaneous: Mild subcutaneous edema throughout the left lower extremity.                                 Medical Decision Making:   History:   Old Medical Records: I decided to obtain old medical records.  Old Records Summarized: records from previous admission(s).       <> Summary of Records: 10/16/2019-10/18/2019 Recent hospital admission at [facility] for pulmonary edema. Patient dialyzed and discharged home.  Initial Assessment:   61-year-old female presents with left lower extremity weakness and pain that is been gradual for months.  My examination does appear to have some left lower extremity compared to the right.  She has no low back pain to suggest spinal cord issue.  Very low suspicion of stroke.  Will get a CT scan to evaluate for remote infarcts.  She did miss her dialysis today and so will get some electrolytes to rule out hyperkalemia or any other significant abnormalities.  Will get an EKG.  Her left lower extremity does appear to be little bit more swollen than the right.  Differential would include fluid overload verses DVT.  Will get an ultrasound.  She otherwise has  normal vital signs and does not appear to be acutely distressed at this time.  Her May concern is finding out why she has had weakness for months and wire shoulders hurt her for months today.  I have explained to her that we will unlikely be able to tell her why she has been having niece in symptoms for months but will rule out the acute issues.                   ED Course as of Nov 18 2059   Mon Nov 18, 2019 1934 Hemoglobin is 9.0.One month ago was 9.6.  Her potassium is mildly elevated at 5.6.  No hemolysis detected.  Again patient Mr. dialysis today.    [SM]   1935 Ultrasound shows no suggestion of DVT in the left lower extremity.    [SM]   2015 Will speak with Nephrology regarding the patient's potassium of 5.6.  I did update the patient on the plan of care.  Let her know or ultrasound is negative. Did noted blood pressure is a little bit elevated.  She is in no distress. She did not take her evening meds and will go ahead and administer those.    [SM]   2059 CT scan shows no acute abnormality.  Patient to be admitted for observation dialysis tomorrow.  Updated the patient answered all questions.    This is the extent to the patients complaints today here in the emergency department.    [SM]      ED Course User Index  [SM] Rogelio Schreiber,                 Clinical Impression:     1. Hyperkalemia    2. Leg pain    3. Weakness                               Rogelio Schreiber,   11/18/19 2059

## 2019-11-19 NOTE — ED NOTES
Pt care assumed. Report received by CHRISTINA Caballero. Pt lying in stretcher in low and locked position and side rails raised x2. Call light, pt's belongings, and bedside table within pt's reach. Pt on continuous cardiac monitoring, pulse oximetry, and BP cycling every 30 minutes. Pt in NAD and verbalized no needs at this time.

## 2019-11-19 NOTE — CARE UPDATE
RAPID RESPONSE NURSE NOTE     Admit Date: 2019  LOS: 0  Code Status: Full Code   Date of Consult: 2019  : 1950  Age: 69 y.o.  Weight:   Wt Readings from Last 1 Encounters:   19 105.4 kg (232 lb 5.8 oz)     Sex: female  Race: Black or    Bed: OBS 3078/OBS 3078A:   MRN: [id number]  Time Rapid Response Team page Received: 021    Time Rapid Response Team at Bedside: 021  Time Rapid Response Team left Bedside: 0250  Was the patient discharged from an ICU this admission?   no  Was the patient discharged from a PACU within last 24 hours?  no  Did the patient receive conscious sedation/general anesthesia within last 24 hours?  no  Was the patient in the ED within the past 24 hours?  yes  Was the patient started on NIPPV within the past 24 hours?  no  Did this progress into an ARC or CPA:  no  Attending Physician: Sanjay Rincon MD  Primary Service: [facility]  Consult Requested By: Sanjay Rincon MD     SITUATION     Reason for Call: SpO2 70s  Called to evaluate the patient for Respiratory    BACKGROUND     Why is the patient in the hospital?: Hyperkalemia    Patient has a past medical history of Anemia due to gastrointestinal blood loss, Arthritis, Asthma, Breast cancer, left, CHF (congestive heart failure), Cholelithiasis, Coronary artery disease, Diabetes mellitus, type 2, Encounter for blood transfusion, End-stage renal disease on hemodialysis, Hemodialysis access site with arteriovenous graft, Hyperlipidemia, Hypertension, and Sarcoidosis.    ASSESSMENT/INTERVENTIONS     BP (!) 175/84   Pulse 87   Temp 97.6 °F (36.4 °C)   Resp 20   Wt 105.4 kg (232 lb 5.8 oz)   LMP  (LMP Unknown)   SpO2 (!) 78%   Breastfeeding? No   BMI 36.39 kg/m²     What did you find: Upon assessment, patient with SpO2 72% on 3.5L NC. Patient is drowsy but AAOx4. Endorses shortness of breath. Accessory and abdominal muscles in use. NRB @ 15L applied to patient. SpO2 increased to 90%. WALE Garcia  RENETTA at bedside. ABG ordered and completed. BiPap and transfer to SD unit placed. Patient updated on POC.    RECOMMENDATIONS     We recommend: As stated above.    FOLLOW-UP/CONTINGENCY PLAN     Patient needs a second visit at : 0400    Call the Rapid Response Nurse, Renetta Keyes RN at x [phone] for additional questions or concerns.    PHYSICIAN ESCALATION     Orders received and case discussed with MELISSA Britt.    Disposition: Tx to [facility], bed 3092.

## 2019-11-19 NOTE — PLAN OF CARE
CM performing discharge planning assessment from medical records.  Patient is poor historian; emergency contacts cannot be reached after multiple tries.   My health packet given, after informed of it.  Patient verbalized understanding.        Ben Mena MD  [address] [place] [address] [address] / [place] [address]    Extended Emergency Contact Information  Primary Emergency Contact: [place] [address] [facility]  Home Phone: [phone]  Mobile Phone: [phone]  Relation: Son  Secondary Emergency Contact: Campbell,[place] [address] [facility]  Home Phone: [phone]  Relation: Sister      [id number] - [place] - [address]  [address]  [facility] [place] [address]  Phone: [phone] Fax: [phone]    [facility] [place] [address]  [address]  [place] [address]  Phone: [phone] Fax: [phone]    Payor: MEDICARE / Plan: MEDICARE PART A & B / Product Type: Government /     Future Appointments   Date Time Provider Department Center   11/21/2019 10:00 AM Ben Mena MD [facility] [place]            11/19/19 1411   Discharge Assessment   Assessment Type Discharge Planning Assessment   Confirmed/corrected address and phone number on facesheet? Yes   Assessment information obtained from? Medical Record   Prior to hospitilization cognitive status:   (unable to assess)   Prior to hospitalization functional status:   (unable to assess)   Current cognitive status: Alert/Oriented   Current Functional Status:   (unable to assess)   Is patient able to care for self after discharge? Unable to determine at this time (comments)   Who are your caregiver(s) and their phone number(s)? Cristi Blount (son)- [phone]; Katelynn Guerrero (sister)- [phone]   Patient currently being followed by outpatient case management? Unable to determine (comments)   Equipment Currently Used  at Home other (see comments)  (unable to assess)   Do you have any problems affording any of your prescribed medications? No   Is the patient taking medications as prescribed? yes   Dialysis Name and Scheduled days M, W, F   Does the patient receive services at the [facility]? No   Discharge Plan A Home Health   Discharge Plan B Home with family   DME Needed Upon Discharge  other (see comments)  (TBD)   Patient/Family in Agreement with Plan unable to assess       Esme Colon MPH, RN, CM  Ext. [phone]

## 2019-11-19 NOTE — ASSESSMENT & PLAN NOTE
The patient is a ESRD patient initially admitted for hyperkalemia, volume overload, and c/o weakness/LLE cramping. The decompensated overnight requiring ICU transfer for hypercapnic and hypoxic respiratory failure secondary to volume overload. The patient required BiPAP and emergent dialysis overnight. Net UF 3 L.     Nephrology History  iHD Schedule: MWF  Unit/MD:  / Dr. Madden   Duration: 3.5 hrs  EDW: 102 kg  Access: RFA AVG  Resodial Renal Function: Minimum      Assessment/Plan:   - Patient urgently dialyze this AM mainly for volume management. Net UF 3 L. Patient on BiPAP this AM but endorses improvement of respiratory status. Patient mildly hyperkalemic but labs were drawn prior to HD.  - Recommend renal function panel at this time.   - Will plan for HD tomorrow for volume management and metabolic clearance.   - Recommend renal diet when appropriate   - Restart PhosLo w/ diet   - Restart home hypertension regimen. Goal for BP is <140 mmHg SBP and BDP <90 mmHg, maintain MAP > 65.  - Hgb 8.9  - Will collect iron studies and ferritin   - Will collect outpatient care plan for MANDIE therapy   - Continue to monitor intake and output, daily weights   - Avoid nephrotoxic medication and renal dose medications to GFR  - Will discuss with staff

## 2019-11-19 NOTE — ASSESSMENT & PLAN NOTE
- Continue BB, Lasix 40mg daily.  ACEi/ARB likely held due to hyperkalemia.    Echo 9/24/2018:   1 - Concentric hypertrophy.     2 - Normal left ventricular systolic function (EF 60-65%).     3 - Impaired LV relaxation, normal LAP (grade 1 diastolic dysfunction).     4 - Normal right ventricular systolic function .     5 - The estimated PA systolic pressure is 34 mmHg.

## 2019-11-19 NOTE — ASSESSMENT & PLAN NOTE
K of 5 on presentation, shifted in ED. Dialyzed on AM of 11/19/19    - Management of dialysis per ESRD   Back Pain

## 2019-11-19 NOTE — NURSING
Rapid called due to sats dropping in the 50s on 3lnc. Sats increased to 76% on 4 lnc. Pt encouraged to deep breath. Pt repositioned in bed to keep the head of the bed elevated.  Pts nurse informed

## 2019-11-19 NOTE — NURSING
Pt complains of shortness of breath. Sats 77% on 2lnc. Oxygen increased to 3lnc, sats increased to 90-92%. Resp called for a breathing treatment. Pts nurse informed.

## 2019-11-19 NOTE — ASSESSMENT & PLAN NOTE
- Being managed w/ volume removal per HD. Plans for HD tomorrow again for metabolic clearance and volume management.

## 2019-11-19 NOTE — PROGRESS NOTES
3-hr HD completed with 3L UF removed. Tolerated well. Rinsed back blood. Pulled out needles. Pressure appplied and hemostasisi achieved. Gauzed and taped. Report given to primary RN.

## 2019-11-19 NOTE — H&P
[facility]  Critical Care Medicine  History & Physical    Patient Name: Angelina Beard  MRN: [id number]  Admission Date: 11/18/2019  Hospital Length of Stay: 0 days  Code Status: Full Code  Attending Physician: Armando Adamson MD   Primary Care Provider: Ben Mena MD   Principal Problem: Hypervolemia associated with renal insufficiency    Subjective:     HPI:  Ms. Beard 69 year old lady initially admitted on 11/18/19 for weakness and hyperkalemia. Patient has a hsitory of CVA, HTN, HLD, ESRD on HD (MWF), A.fib, HFpEF, and A.fib. Patient presented with leg cramping and weakness, she missed her scheduled dialysis that day (11/18/19) due to weakness. Patient's potassium was elevated and she was shifted in the ED. The patient was scheduled for dialysis on 11/19/19 however overnight she became acutely hypoxic. She was given a dose of lasix but had minimal urine output. CXR was notable for significant cardiomegaly and pulmonary edema. A rapid response was called because patient was requiring NRB to keep O2 sat over 90% and critical care medicine was consulted for further evaluation. On questioning patient was on bipap but answering questions appropriately. She states she has not been as complaint with dialysis and her diet as she could be. She denies any chest pain, nausea, vomiting, chest pain, or diarrhea. She denies any other symptoms at this time.    Hospital/ICU Course:  Patient admitted to critical care medicine with volume overload and respiratory distress requiring BiPAP. ESRD consulted and patient started on HD via upper extremity fistula. Tolerated HD well with 3L removed.     Past Medical History:   Diagnosis Date    Anemia due to gastrointestinal blood loss 7/11/2018    Arthritis     Asthma     Breast cancer, left     CHF (congestive heart failure)     Cholelithiasis     Coronary artery disease     Diabetes mellitus, type 2     Encounter for blood transfusion     End-stage  renal disease on hemodialysis     Hemodialysis access site with arteriovenous graft     mon-wed -[id number]    Hyperlipidemia     Hypertension     Sarcoidosis        Past Surgical History:   Procedure Laterality Date    av graft      left arm    BRACHIAL ARTERY GRAFT Left 05/04/2016    brachiocephalic, Dr. Anson Crocker    BREAST BIOPSY Left     breast ca    BREAST LUMPECTOMY Left     radiation only    btl      COLONOSCOPY N/A 7/13/2018    Procedure: COLONOSCOPY;  Surgeon: Almita Bee MD;  Location: [facility] ENDO;  Service: Endoscopy;  Laterality: N/A;    ELBOW SURGERY      left    ESOPHAGOGASTRODUODENOSCOPY N/A 7/13/2018    Procedure: ESOPHAGOGASTRODUODENOSCOPY (EGD);  Surgeon: Almita Bee MD;  Location: [facility] ENDO;  Service: Endoscopy;  Laterality: N/A;    LIPOMA RESECTION      back of neck    pilondial cyst      TOTAL KNEE ARTHROPLASTY Right 02/23/2016       Review of patient's allergies indicates:  No Known Allergies    Family History     Problem Relation (Age of Onset)    Diabetes Mother, Sister    Heart disease Sister    Hypertension Mother    Kidney disease Mother        Tobacco Use    Smoking status: Never Smoker    Smokeless tobacco: Never Used   Substance and Sexual Activity    Alcohol use: No    Drug use: No    Sexual activity: Not on file      Review of Systems   Constitutional: Positive for fatigue. Negative for chills, fever and unexpected weight change.   HENT: Negative for sinus pressure, sneezing and sore throat.    Eyes: Negative for visual disturbance.   Respiratory: Positive for shortness of breath. Negative for cough, chest tightness and wheezing.    Cardiovascular: Positive for leg swelling. Negative for chest pain and palpitations.   Gastrointestinal: Negative for abdominal pain, blood in stool, constipation, diarrhea, nausea and vomiting.   Genitourinary: Negative for difficulty urinating.   Musculoskeletal: Negative for arthralgias and myalgias.   Skin: Negative for  pallor, rash and wound.   Neurological: Negative for weakness and headaches.   Hematological: Negative for adenopathy.     Objective:     Vital Signs (Most Recent):  Temp: 97 °F (36.1 °C) (11/19/19 0700)  Pulse: 78 (11/19/19 0715)  Resp: 18 (11/19/19 0715)  BP: (!) 157/70 (11/19/19 0715)  SpO2: 98 % (11/19/19 0715) Vital Signs (24h Range):  Temp:  [97 °F (36.1 °C)-98.2 °F (36.8 °C)] 97 °F (36.1 °C)  Pulse:  [71-88] 78  Resp:  [14-26] 18  SpO2:  [78 %-98 %] 98 %  BP: (124-208)/() 157/70   Weight: 106.6 kg (235 lb 0.2 oz)  Body mass index is 36.81 kg/m².      Intake/Output Summary (Last 24 hours) at 11/19/2019 0744  Last data filed at 11/19/2019 0500  Gross per 24 hour   Intake 502 ml   Output --   Net 502 ml       Physical Exam   Constitutional: She is oriented to person, place, and time. She appears well-developed and well-nourished.   HENT:   Head: Normocephalic and atraumatic.   Mouth/Throat: No oropharyngeal exudate.   Eyes: Pupils are equal, round, and reactive to light. Conjunctivae and EOM are normal.   Pulmonary/Chest: No stridor. She is in respiratory distress (Using accessory muscles to breathe). She has no wheezes. She has rales (bilateral to mid lung). She exhibits no tenderness.   Abdominal: Soft. Bowel sounds are normal. She exhibits no distension and no mass. There is no tenderness. There is no guarding.   Musculoskeletal: Normal range of motion. She exhibits edema. She exhibits no deformity.   Neurological: She is alert and oriented to person, place, and time.   Skin: Skin is warm and dry. No rash noted. No erythema.   Nursing note and vitals reviewed.      Vents:  Oxygen Concentration (%): 40 (11/19/19 0700)  Lines/Drains/Airways     Drain                 Hemodialysis AV Graft -- days         Hemodialysis AV Graft Right upper arm -- days          Peripheral Intravenous Line                 Peripheral IV - Single Lumen 11/18/19 1812 20 G Left Upper Arm less than 1 day         Peripheral IV -  "Single Lumen 11/19/19 0340 20 G Left Upper Arm less than 1 day              Significant Labs:    CBC/Anemia Profile:  Recent Labs   Lab 11/18/19  1813 11/19/19  0349   WBC 6.81 8.36   HGB 9.0* 8.9*   HCT 29.4* 30.0*    198   MCV 98 98   RDW 14.6* 14.7*        Chemistries:  Recent Labs   Lab 11/18/19  1813 11/19/19  0029 11/19/19  0349    144 143   K 5.6* 5.0 5.2*    102 101   CO2 27 27 26   BUN 62* 62* 63*   CREATININE 9.5* 9.6* 9.8*   CALCIUM 10.0 9.7 9.4   ALBUMIN  --   --  3.1*   PROT  --   --  6.8   BILITOT  --   --  0.3   ALKPHOS  --   --  61   ALT  --   --  27   AST  --   --  21   MG  --   --  2.1   PHOS  --   --  5.5*       ABGs:   Recent Labs   Lab 11/19/19  0341   PH 7.336*   PCO2 51.6*   HCO3 27.6   POCSATURATED 91*   BE 2     Blood Culture: No results for input(s): LABBLOO in the last 48 hours.  All pertinent labs within the past 24 hours have been reviewed.    Significant Imaging: I have reviewed all pertinent imaging results/findings within the past 24 hours.    EXAMINATION:  XR CHEST AP PORTABLE    CLINICAL HISTORY:  Provided history is "Shortness of breath;  ".    TECHNIQUE:  One view of the chest.    COMPARISON:  10/16/2019.    FINDINGS:  Cardiac wires overlie the chest.  Cardiomediastinal silhouette is enlarged.  Atherosclerotic calcifications overlie the aortic arch.  There are patchy bilateral airspace opacities right side greater than left.  No sizable pleural effusion.  No pneumothorax.      Impression       Patchy bilateral airspace opacities, right side greater than left.  Findings are suggestive asymmetric pulmonary edema versus pneumonia or aspiration.    Cardiomegaly.      Electronically signed by: Jonatan Ramírez MD  Date: 11/19/2019  Time: 00:40         Assessment/Plan:     Pulmonary  Acute respiratory failure with hypoxia and hypercarbia  Patient initially presenting with volume overload and hyperkalemia after missing dialysis on 11/18/19. Rapid response called " overnight due to worsening respiratory status. Exam notable for fluid overload and respiratory distress. Requiring BiPAP to oxygenate. ABGs notable for hypercapnic respiratory acidosis with hypoxemia. ESRD consulted for emergent dialysis  Dry weight is 102 kg per patient, 106 on admit  CXR with right upper air bronchograms, / possible consolidation    - Patient received emergent dialysis, 3L removed  - Patient remains on BiPAP, desats when off  - Will discuss with nephrology about possibly removing more volume today  - Continuing empiric CAP coverage at this time, respiratory infection panel ordered    Renal/  Hyperkalemia  K of 5 on presentation, shifted in ED. Dialyzed on AM of 11/19/19    - Management of dialysis per ESRD    Other  * Hypervolemia associated with renal insufficiency  Patient missed dialysis on day of admit. Patient does make small amount of urine, however did not respond to lasix. Dialysis performed at bedside in ICU, 3L removed    - ESRD consulted, appreciate their assistance  - daily renal function panels          Critical Care Daily Checklist:    A: Awake: RASS Goal/Actual Goal: RASS Goal: 0-->alert and calm  Actual: Bedoya Agitation Sedation Scale (RASS): Alert and calm   B: Spontaneous Breathing Trial Performed?     C: SAT & SBT Coordinated?  NA                      D: Delirium: CAM-ICU Overall CAM-ICU: Negative   E: Early Mobility Performed? No   F: Feeding Goal:    Status:     Current Diet Order   Procedures    Diet NPO Except for: Sips with Medication     Order Specific Question:   Except for     Answer:   Sips with Medication      AS: Analgesia/Sedation NA   T: Thromboembolic Prophylaxis Apixaban   H: HOB > 300 Yes   U: Stress Ulcer Prophylaxis (if needed) NA   G: Glucose Control NA   B: Bowel Function Stool Occurrence: 1   I: Indwelling Catheter (Lines & Teixeira) Necessity NA   D: De-escalation of Antimicrobials/Pharmacotherapies Continuing empiric coverage    Plan for the day/ETD  Titrate off BiPAP    Code Status:  Family/Goals of Care: Full Code         Critical secondary to Patient has a condition that poses threat to life and bodily function: Acute hypoxemic and hypercapnic respiratory failure.     Critical care was time spent personally by me on the following activities: development of treatment plan with patient or surrogate and bedside caregivers, discussions with consultants, evaluation of patient's response to treatment, examination of patient, ordering and performing treatments and interventions, ordering and review of laboratory studies, ordering and review of radiographic studies, pulse oximetry, re-evaluation of patient's condition. This critical care time did not overlap with that of any other provider or involve time for any procedures.     David Rendon MD  Critical Care Medicine  [facility]

## 2019-11-19 NOTE — PLAN OF CARE
Pt resting comfortably in bed. Drowsy but oriented x4, follows commands and moves all extremities. HR 60-80s NSR, SBP <185 maintained per order, and sats >92% on biPAP, 30% FiO2, 14/5. Pt remains oliguric. 1 BM this shift. Labs reviewed. ACHS accuchecks. Sips with medications only; otherwise pt NPO. Afebrile. Plan for HD tonight. Questions and concerns addressed. Will continue to monitor.

## 2019-11-19 NOTE — CONSULTS
Patient to be admitted to MICU for acute hypercapnic and hypoxic respiratory failure presumed to be 2/2 to volume overload after missing HD.                Gretel Mckeon MD  Resident Physician - PGY3

## 2019-11-19 NOTE — NURSING
Pawan TORRES returned call for team F. Informed of pts current status with respiratory issues of dropping in the 50s increasing to 70s on 3.5 -4ln. Informed Rapid has been paged. PA states will put orders in.

## 2019-11-19 NOTE — ASSESSMENT & PLAN NOTE
Patient initially presenting with volume overload and hyperkalemia after missing dialysis on 11/18/19. Rapid response called overnight due to worsening respiratory status. Exam notable for fluid overload and respiratory distress. Requiring BiPAP to oxygenate. ABGs notable for hypercapnic respiratory acidosis with hypoxemia. ESRD consulted for emergent dialysis  Dry weight is 102 kg per patient, 106 on admit  CXR with right upper air bronchograms, / possible consolidation    - Patient received emergent dialysis, 3L removed  - Patient remains on BiPAP, desats when off  - Will discuss with nephrology about possibly removing more volume today  - Continuing empiric CAP coverage at this time, respiratory infection panel ordered

## 2019-11-19 NOTE — SIGNIFICANT EVENT
Significant Event  [facility] Medicine    CC:  Hyperkalemia  Date:  11/19/2019  Admit Date:  11/18/2019  Hospital Length of Stay:  0    Code Status:  Full Code     SUBJECTIVE:     Significant Events:  Called urgently to the bedside by nurse to evaluate patient for hypoxia in the 70s.  Patient seen and examined.  Satting 92% on NRB.  ABG ordered and noted below.  Lasix 80mg IV given.  Patient started on BiPAP, sats only in high 80s.  SBP 190s, hydralazine given.  Patient using accessory muscles to breath.  CCM consulted and will take patient.        OBJECTIVE:     Physical Exam:  Last Vitals:   Vitals:    11/19/19 0400   BP:    Pulse: 87   Resp: (!) 23   Temp:      GA:  Mild distress, use of accessory muscles.  HEENT:  PERRL.  No scleral icterus or JVD.   Pulmonary:  Crackles on exam.  Cardiac:  RRR, S1 & S2 w/o rubs/murmurs/gallops.   Abdominal:  Bowel sounds present x 4. No appreciable hepatosplenomegaly.  Neuro:  --GCS:  15  --CN II-XII grossly intact.  Corneal reflex, gag, cough intact.  --Extremity strength and sensation intact x 4.     ABG  Recent Labs   Lab 11/19/19  0341   PH 7.336*   PO2 65*   PCO2 51.6*   HCO3 27.6   BE 2         ASSESSMENT AND PLAN/INTERVENTIONS:     1) Hypervolemia, hypoxemia  Plan/Interventions:  - Patient received 80mg IV Lasix and 10mg IV hydralazine.  - Minimal improvement on BiPAP.  - CCM consulted and will take patient.    Uninterrupted Critical Care/Counseling Time (not including procedures):  60 minutes    PETRA Britt, PA-C  Hospital Medicine Department  Staff:  Dr. Haider

## 2019-11-19 NOTE — H&P
[facility] Medicine  History & Physical    Patient Name: Angelina Beard  MRN: [id number]  Admission Date: 11/18/2019  Attending Physician: Sanjay Rincon MD   Primary Care Provider: Ben Mena MD    [facility] Medicine Team: [facility] F Mel Garcia PA-C     Patient information was obtained from patient, past medical records and ER records.     Subjective:     Principal Problem:Hyperkalemia    Chief Complaint:   Chief Complaint   Patient presents with    Extremity Weakness     states L leg has been getting weak for several months and states that it is getting worse        HPI: Patient is a 69 year old lady with a h/o CVA, HTN, HLD, ESRD on HD, A Fib, CAD, chronic diastolic HF.  She presents with LLE cramping and weakness.  No family at bedside, patient is a poor historian.  She notes associated, intermittent low back pain.  She also complains of right shoulder pain that has been going on for months.  She states she is weak all over.  Patient missed HD today.  Last session was Friday (on MWF schedule) and this was completed without issue.  Reports mild SOB.  Denies chest pain, dizziness, palpitations, fever/chills, N/V/D, abdominal pain.  Patient primarily uses a wheelchair at baseline.    Past Medical History:   Diagnosis Date    Anemia due to gastrointestinal blood loss 7/11/2018    Arthritis     Asthma     Breast cancer, left     CHF (congestive heart failure)     Cholelithiasis     Coronary artery disease     Diabetes mellitus, type 2     Encounter for blood transfusion     End-stage renal disease on hemodialysis     Hemodialysis access site with arteriovenous graft     mon-wed -fri    Hyperlipidemia     Hypertension     Sarcoidosis        Past Surgical History:   Procedure Laterality Date    av graft      left arm    BRACHIAL ARTERY GRAFT Left 05/04/2016    brachiocephalic, Dr. Anson Crocker    BREAST BIOPSY Left     breast ca    BREAST LUMPECTOMY  Left     radiation only    btl      COLONOSCOPY N/A 7/13/2018    Procedure: COLONOSCOPY;  Surgeon: Almita Bee MD;  Location: [facility] ENDO;  Service: Endoscopy;  Laterality: N/A;    ELBOW SURGERY      left    ESOPHAGOGASTRODUODENOSCOPY N/A 7/13/2018    Procedure: ESOPHAGOGASTRODUODENOSCOPY (EGD);  Surgeon: Almita Bee MD;  Location: [facility] ENDO;  Service: Endoscopy;  Laterality: N/A;    LIPOMA RESECTION      back of neck    pilondial cyst      TOTAL KNEE ARTHROPLASTY Right 02/23/2016       Review of patient's allergies indicates:  No Known Allergies    No current facility-administered medications on file prior to encounter.      Current Outpatient Medications on File Prior to Encounter   Medication Sig    acetaminophen (TYLENOL) 500 MG tablet Take 2 tablets (1,000 mg total) by mouth every 8 (eight) hours as needed.    amLODIPine (NORVASC) 10 MG tablet Take 10 mg by mouth every Tuesday, Thursday, Saturday, Sunday.    apixaban (ELIQUIS) 2.5 mg Tab Take 1 tablet (2.5 mg total) by mouth 2 (two) times daily.    calcium acetate (PHOSLO) 667 mg capsule Take 1 capsule (667 mg total) by mouth 3 (three) times daily with meals.    clopidogrel (PLAVIX) 75 mg tablet Take 1 tablet (75 mg total) by mouth once daily.    docusate sodium (COLACE) 100 MG capsule Take 1 capsule (100 mg total) by mouth 2 (two) times daily.    gabapentin (NEURONTIN) 300 MG capsule Take 1 capsule (300 mg total) by mouth every 8 (eight) hours as needed.    insulin (LANTUS SOLOSTAR U-100 INSULIN) glargine 100 units/mL (3mL) SubQ pen Inject 20 Units into the skin once daily.    isosorbide mononitrate (IMDUR) 30 MG 24 hr tablet Take 1 tablet (30 mg total) by mouth once daily.    metoprolol succinate (TOPROL-XL) 50 MG 24 hr tablet Take 1 tablet (50 mg total) by mouth once daily.    mupirocin (BACTROBAN) 2 % ointment by Nasal route 2 (two) times daily.    nitroGLYCERIN (NITROSTAT) 0.4 MG SL tablet Place 1 tablet (0.4 mg total) under the  tongue every 5 (five) minutes as needed for Chest pain.    pantoprazole (PROTONIX) 40 MG tablet Take 1 tablet (40 mg total) by mouth once daily.    polyethylene glycol (GLYCOLAX) 17 gram/dose powder     pravastatin (PRAVACHOL) 80 MG tablet Take 1 tablet (80 mg total) by mouth once daily.    sertraline (ZOLOFT) 100 MG tablet Take 1 tablet (100 mg total) by mouth once daily.    vitamin renal formula, B-complex-vitamin c-folic acid, (NEPHROCAP) 1 mg Cap Take 1 capsule by mouth once daily.    furosemide (LASIX) 40 MG tablet Take 1 tablet (40 mg total) by mouth once daily.     Family History     Problem Relation (Age of Onset)    Diabetes Mother, Sister    Heart disease Sister    Hypertension Mother    Kidney disease Mother        Tobacco Use    Smoking status: Never Smoker    Smokeless tobacco: Never Used   Substance and Sexual Activity    Alcohol use: No    Drug use: No    Sexual activity: Not on file     Review of Systems   Constitutional: Negative for activity change, appetite change, chills, fatigue, fever and unexpected weight change.   HENT: Negative for congestion, rhinorrhea, sore throat, trouble swallowing and voice change.    Eyes: Negative for visual disturbance.   Respiratory: Positive for shortness of breath. Negative for cough, choking, chest tightness and wheezing.    Cardiovascular: Negative for chest pain, palpitations and leg swelling.   Gastrointestinal: Negative for abdominal distention, abdominal pain, anal bleeding, blood in stool, constipation, diarrhea, nausea and vomiting.   Endocrine: Negative for cold intolerance, heat intolerance, polydipsia and polyuria.   Genitourinary: Negative for dysuria, flank pain, frequency, hematuria and urgency.   Musculoskeletal: Positive for arthralgias. Negative for back pain, joint swelling and myalgias.   Skin: Negative for color change and rash.   Neurological: Positive for weakness. Negative for dizziness, seizures, syncope, facial asymmetry,  speech difficulty, light-headedness, numbness and headaches.   Hematological: Negative for adenopathy. Does not bruise/bleed easily.   Psychiatric/Behavioral: Negative for agitation, confusion, hallucinations and suicidal ideas.     Objective:     Vital Signs (Most Recent):  Temp: 98.2 °F (36.8 °C) (11/18/19 1701)  Pulse: 76 (11/18/19 2318)  Resp: 16 (11/18/19 2318)  BP: (!) 154/72 (11/18/19 2318)  SpO2: 95 % (11/18/19 2318) Vital Signs (24h Range):  Temp:  [98.2 °F (36.8 °C)] 98.2 °F (36.8 °C)  Pulse:  [71-80] 76  Resp:  [14-18] 16  SpO2:  [95 %-97 %] 95 %  BP: (154-208)/() 154/72        There is no height or weight on file to calculate BMI.    Physical Exam   Constitutional: She is oriented to person, place, and time. She appears well-developed and well-nourished. No distress.   HENT:   Head: Normocephalic and atraumatic.   Eyes: Pupils are equal, round, and reactive to light.   Neck: Neck supple. Carotid bruit is not present. No thyromegaly present.   Cardiovascular: Normal rate and regular rhythm. Exam reveals no gallop.   No murmur heard.  Pulmonary/Chest: Effort normal and breath sounds normal. No respiratory distress. She has no wheezes.   Abdominal: Soft. Bowel sounds are normal. She exhibits no distension and no mass. There is no splenomegaly or hepatomegaly. There is no tenderness.   Musculoskeletal: Normal range of motion. She exhibits edema (1+ LLE). She exhibits no deformity.   Neurological: She is alert and oriented to person, place, and time. No cranial nerve deficit or sensory deficit. GCS eye subscore is 4. GCS verbal subscore is 5. GCS motor subscore is 6.   Left lower extremity 4/5 strength.  All other extremities show 5/5 strength.   Skin: Skin is warm and dry. No rash noted.   Psychiatric: She has a normal mood and affect.         CRANIAL NERVES     CN III, IV, VI   Pupils are equal, round, and reactive to light.       Significant Labs:   CBC:   Recent Labs   Lab 11/18/19 1813   WBC 6.81    HGB 9.0*   HCT 29.4*        CMP:   Recent Labs   Lab 11/18/19  1813      K 5.6*      CO2 27   GLU 86   BUN 62*   CREATININE 9.5*   CALCIUM 10.0   ANIONGAP 14   EGFRNONAA 3.8*       Significant Imaging: I have reviewed all pertinent imaging results/findings within the past 24 hours.    Assessment/Plan:     * Hyperkalemia  End-stage renal disease on hemodialysis    - K 5.6 in setting of missed HD.  - Shifted in ER, will repeat labs.  - Nephrology consulted for HD.  - Patient on MWF schedule, missed today (Monday).    Weakness of left lower extremity  - Patient is a poor historian, no family at bedside.  - Per ER note, patient reports LLE pain and weakness and right shoulder pain for months.  Complains of LBP on this provider's exam, but denied to ER provider.  - CT head showed no evidence of acute intracranial abnormality, senescent and chronic microvascular ischemic change.  - Will get lumbar x-ray.  - PT/OT, SW consult.  Supportive care, fall precautions.  - Neuro checks q4hrs.        History of stroke  - Continue secondary prevention with Plavix, statin.      Paroxysmal atrial fibrillation  - Continue Eliquis 2.5mg BID.  - [id number] VASc score of at least 8 for CHF, HTN, DM II, CVA, CAD, age, and sex.      Anemia in end-stage renal disease  - H/H 9.0/29.4, mildly decreased from baseline.  - Will follow closely.      Renovascular hypertension  - Continue home amlodipine 10mg TuThSaSu, metoprolol succinate 50mg daily.      Coronary artery disease  - Continue secondary prevention, Plavix, Imdur.      GERD (gastroesophageal reflux disease)  - Protonix.      Chronic diastolic congestive heart failure  - Continue BB, Lasix 40mg daily.  ACEi/ARB likely held due to hyperkalemia.    Echo 9/24/2018:   1 - Concentric hypertrophy.     2 - Normal left ventricular systolic function (EF 60-65%).     3 - Impaired LV relaxation, normal LAP (grade 1 diastolic dysfunction).     4 - Normal right ventricular systolic  function .     5 - The estimated PA systolic pressure is 34 mmHg.        Hyperlipidemia  - Continue pravastatin 80mg daily.      Type 2 diabetes mellitus with hypertension and end stage renal disease on dialysis  - Diabetic renal diet, SSI.  - Insulin detemir 15 units daily.      VTE Risk Mitigation (From admission, onward)         Ordered     apixaban tablet 2.5 mg  2 times daily      11/18/19 2321     Place sequential compression device  Until discontinued      11/18/19 2321     Place CAMELIA hose  Until discontinued      11/18/19 2321     IP VTE HIGH RISK PATIENT  Once      11/18/19 2321                   MAYA BriceñoC  Department of Hospital Medicine   [facility]

## 2019-11-19 NOTE — CARE UPDATE
Acute dialysis session ordered given patient's current clinical status. Full consult to follow in AM.           Demarcus Bell MD  Nephrology Fellow  [facility]

## 2019-11-19 NOTE — CONSULTS
[facility]  Nephrology  Consult Note    Patient Name: Angelina Beard  MRN: [id number]  Admission Date: 11/18/2019  Hospital Length of Stay: 0 days  Attending Provider: Armando Adamson MD   Primary Care Physician: Ben Mena MD  Principal Problem:Hypervolemia associated with renal insufficiency    Inpatient consult to Nephrology  Consult performed by: Dang Kim, MARIELOS, FNP-C  Consult ordered by: Mel Garcia PA-C  Reason for consult: ESRD Management        Subjective:     HPI: The patient is a 69 year-old AA female who has a history of CVA, ESRD (on HD, MWF), HLD, AFib, HFpEF, HTN, and T2DM who was admitted on 11/18 with complaints of weakness and LLE cramping. She missed her HD treatment on 11/18 due to the aforementioned complaints. In the ED, she was found to be hyperkalemic (5.6). She was shifted in the ED and her repeat potassium was 5.0 after medical management. Overnight, the patient became acutely hypoxic despite NC at 3 L. Her oxygen saturations were in the 50s despite supplemental oxygen. Her CXR was notable for significant cardiomegaly and pulmonary edema. She was given a dose of lasix but had minimal urine output. ABGs notable for hypercapnic respiratory acidosis with hypoxemia. A rapid response was called and the patient was stepped up to the MICU for acute hypercapnic and hypoxic respiratory failure secondary to volume overload. The patient was placed on BiPAP and emergent HD was performed at bedside with 3 L being removed. The patient remains on BiPAP this AM but states that her shortness of breath is much improved. She currently denies any chest pain, nausea, vomiting, chest pain, or diarrhea. She is a MWF dialysis patient of Dr. Madden at [facility]. She states that she dialyzes for 3.5 hrs via a RFA AVG. Her dry weight is about ~ 102 kg. She states that she last dialyze on Friday, 11/15. Nephrology consulted for management of ESRD and HD treatment.    Past  Medical History:   Diagnosis Date    Anemia due to gastrointestinal blood loss 7/11/2018    Arthritis     Asthma     Breast cancer, left     CHF (congestive heart failure)     Cholelithiasis     Coronary artery disease     Diabetes mellitus, type 2     Encounter for blood transfusion     End-stage renal disease on hemodialysis     Hemodialysis access site with arteriovenous graft     mon-wed -fri    Hyperlipidemia     Hypertension     Sarcoidosis        Past Surgical History:   Procedure Laterality Date    av graft      left arm    BRACHIAL ARTERY GRAFT Left 05/04/2016    brachiocephalic, Dr. Anson Crocker    BREAST BIOPSY Left     breast ca    BREAST LUMPECTOMY Left     radiation only    btl      COLONOSCOPY N/A 7/13/2018    Procedure: COLONOSCOPY;  Surgeon: Almita Bee MD;  Location: [facility] ENDO;  Service: Endoscopy;  Laterality: N/A;    ELBOW SURGERY      left    ESOPHAGOGASTRODUODENOSCOPY N/A 7/13/2018    Procedure: ESOPHAGOGASTRODUODENOSCOPY (EGD);  Surgeon: Almita Bee MD;  Location: [facility] ENDO;  Service: Endoscopy;  Laterality: N/A;    LIPOMA RESECTION      back of neck    pilondial cyst      TOTAL KNEE ARTHROPLASTY Right 02/23/2016       Review of patient's allergies indicates:  No Known Allergies  Current Facility-Administered Medications   Medication Frequency    0.9%  NaCl infusion PRN    0.9%  NaCl infusion Once    [START ON 11/20/2019] 0.9%  NaCl infusion Once    acetaminophen tablet 650 mg Q4H PRN    albuterol-ipratropium 2.5 mg-0.5 mg/3 mL nebulizer solution 3 mL Q4H PRN    amLODIPine tablet 10 mg Every Tues, Thurs, Sat, Sun    apixaban tablet 2.5 mg BID    [START ON 11/20/2019] azithromycin 500 mg in dextrose 5 % 250 mL IVPB (ready to mix system) Q24H    calcium acetate capsule 667 mg TID WM    cefTRIAXone injection 1 g Q24H    clopidogrel tablet 75 mg Daily    dextrose 10% (D10W) Bolus PRN    dextrose 10% (D10W) Bolus PRN    gabapentin capsule 300 mg  Q8H PRN    glucagon (human recombinant) injection 1 mg PRN    glucose chewable tablet 16 g PRN    glucose chewable tablet 24 g PRN    insulin aspart U-100 pen 0-5 Units QID (AC + HS) PRN    insulin detemir U-100 pen 15 Units Daily    isosorbide mononitrate 24 hr tablet 30 mg Daily    labetalol 20 mg/4 mL (5 mg/mL) IV syring Q4H PRN    lidocaine 5 % patch 1 patch Daily    melatonin tablet 6 mg Nightly PRN    metoprolol succinate (TOPROL-XL) 24 hr tablet 50 mg Daily    ondansetron disintegrating tablet 8 mg Q8H PRN    pantoprazole EC tablet 40 mg Daily    polyethylene glycol packet 17 g TID PRN    pravastatin tablet 80 mg Daily    promethazine tablet 25 mg Q6H PRN    senna-docusate 8.6-50 mg per tablet 1 tablet BID    sertraline tablet 100 mg Daily    sodium chloride 0.9% flush 5 mL PRN    vitamin renal formula (B-complex-vitamin c-folic acid) 1 mg per capsule 1 capsule Daily     Family History     Problem Relation (Age of Onset)    Diabetes Mother, Sister    Heart disease Sister    Hypertension Mother    Kidney disease Mother        Tobacco Use    Smoking status: Never Smoker    Smokeless tobacco: Never Used   Substance and Sexual Activity    Alcohol use: No    Drug use: No    Sexual activity: Not on file     Review of Systems   Constitutional: Positive for fatigue. Negative for chills, fever and unexpected weight change.   HENT: Negative for sinus pressure, sneezing and sore throat.    Eyes: Negative for visual disturbance.   Respiratory: Positive for shortness of breath. Negative for cough, chest tightness and wheezing.    Cardiovascular: Positive for leg swelling. Negative for chest pain and palpitations.   Gastrointestinal: Negative for abdominal pain, blood in stool, constipation, diarrhea, nausea and vomiting.   Genitourinary: Negative for difficulty urinating.   Musculoskeletal: Negative for arthralgias and myalgias.   Skin: Negative for pallor, rash and wound.   Neurological: Negative  for weakness and headaches.   Hematological: Negative for adenopathy.   Psychiatric/Behavioral: Negative for agitation, behavioral problems, confusion and decreased concentration.     Objective:     Vital Signs (Most Recent):  Temp: 97.8 °F (36.6 °C) (11/19/19 1100)  Pulse: 69 (11/19/19 1300)  Resp: 19 (11/19/19 1300)  BP: (!) 166/81 (11/19/19 1300)  SpO2: (!) 93 % (11/19/19 1300)  O2 Device (Oxygen Therapy): BiPAP (11/19/19 1300) Vital Signs (24h Range):  Temp:  [97 °F (36.1 °C)-98.2 °F (36.8 °C)] 97.8 °F (36.6 °C)  Pulse:  [69-88] 69  Resp:  [11-26] 19  SpO2:  [78 %-98 %] 93 %  BP: (124-208)/() 166/81     Weight: 106.6 kg (235 lb 0.2 oz) (11/19/19 0400)  Body mass index is 36.81 kg/m².  Body surface area is 2.24 meters squared.    I/O last 3 completed shifts:  In: 502 [I.V.:252; IV Piggyback:250]  Out: -     Physical Exam   Constitutional: She is oriented to person, place, and time. She appears well-developed and well-nourished.   HENT:   Head: Normocephalic and atraumatic.   Right Ear: External ear normal.   Left Ear: External ear normal.   Mouth/Throat: No oropharyngeal exudate.   Eyes: Right eye exhibits no discharge. Left eye exhibits no discharge. No scleral icterus.   Neck: Normal range of motion.   Pulmonary/Chest: No stridor. She is in respiratory distress (improved). She has no wheezes. She has rales.   Abdominal: Soft. Bowel sounds are normal. She exhibits no distension and no mass. There is no tenderness. There is no guarding.   Musculoskeletal: Normal range of motion. She exhibits edema. She exhibits no deformity.   Neurological: She is alert and oriented to person, place, and time.   Skin: Skin is warm and dry. No rash noted. No erythema.   Psychiatric: She has a normal mood and affect. Her behavior is normal.   Nursing note and vitals reviewed.      Significant Labs:  CBC:   Recent Labs   Lab 11/19/19  0349   WBC 8.36   RBC 3.06*   HGB 8.9*   HCT 30.0*      MCV 98   MCH 29.1   MCHC 29.7*      CMP:   Recent Labs   Lab 11/19/19  0349   *   CALCIUM 9.4   ALBUMIN 3.1*   PROT 6.8      K 5.2*   CO2 26      BUN 63*   CREATININE 9.8*   ALKPHOS 61   ALT 27   AST 21   BILITOT 0.3           Assessment/Plan:     * Hypervolemia associated with renal insufficiency  - Being managed w/ volume removal per HD. Plans for HD tomorrow again for metabolic clearance and volume management.     ESRD on dialysis  The patient is a ESRD patient initially admitted for hyperkalemia, volume overload, and c/o weakness/LLE cramping. The decompensated overnight requiring ICU transfer for hypercapnic and hypoxic respiratory failure secondary to volume overload. The patient required BiPAP and emergent dialysis overnight. Net UF 3 L.     Nephrology History  iHD Schedule: MWF  Unit/MD:  / Dr. Madden   Duration: 3.5 hrs  EDW: 102 kg  Access: RFA AVG  Resodial Renal Function: Minimum      Assessment/Plan:   - Patient urgently dialyze this AM mainly for volume management. Net UF 3 L. Patient on BiPAP this AM but verbalizes improvement of respiratory status. Patient mildly hyperkalemic but labs were drawn prior to HD.  - Recommend renal function panel at this time.   - Will plan for HD tomorrow for volume management and metabolic clearance.   - Recommend renal diet when appropriate   - Restart PhosLo w/ diet   - Restart home hypertension regimen. Goal for BP is <140 mmHg SBP and BDP <90 mmHg, maintain MAP > 65.  - Hgb 8.9  - Will collect iron studies and ferritin   - Will collect outpatient care plan for MANDIE therapy   - Continue to monitor intake and output, daily weights   - Avoid nephrotoxic medication and renal dose medications to GFR  - Will discuss with staff          Thank you for your consult. I will follow-up with patient. Please contact us if you have any additional questions.    Dang Kim, MARIELOS, FNP-C  Nephrology  [facility]

## 2019-11-19 NOTE — PROGRESS NOTES
HD initiated via RUE loop graft using 15g needles. Target goal 3L. Patient on bipap. O2 sat ranging at 90-93%. Will continue to monitor.

## 2019-11-19 NOTE — SUBJECTIVE & OBJECTIVE
Past Medical History:   Diagnosis Date    Anemia due to gastrointestinal blood loss 7/11/2018    Arthritis     Asthma     Breast cancer, left     CHF (congestive heart failure)     Cholelithiasis     Coronary artery disease     Diabetes mellitus, type 2     Encounter for blood transfusion     End-stage renal disease on hemodialysis     Hemodialysis access site with arteriovenous graft     mon-wed -fri    Hyperlipidemia     Hypertension     Sarcoidosis        Past Surgical History:   Procedure Laterality Date    av graft      left arm    BRACHIAL ARTERY GRAFT Left 05/04/2016    brachiocephalic, Dr. Anson Crocker    BREAST BIOPSY Left     breast ca    BREAST LUMPECTOMY Left     radiation only    btl      COLONOSCOPY N/A 7/13/2018    Procedure: COLONOSCOPY;  Surgeon: Almita Bee MD;  Location: [facility] ENDO;  Service: Endoscopy;  Laterality: N/A;    ELBOW SURGERY      left    ESOPHAGOGASTRODUODENOSCOPY N/A 7/13/2018    Procedure: ESOPHAGOGASTRODUODENOSCOPY (EGD);  Surgeon: Almita Bee MD;  Location: [facility] ENDO;  Service: Endoscopy;  Laterality: N/A;    LIPOMA RESECTION      back of neck    pilondial cyst      TOTAL KNEE ARTHROPLASTY Right 02/23/2016       Review of patient's allergies indicates:  No Known Allergies    No current facility-administered medications on file prior to encounter.      Current Outpatient Medications on File Prior to Encounter   Medication Sig    acetaminophen (TYLENOL) 500 MG tablet Take 2 tablets (1,000 mg total) by mouth every 8 (eight) hours as needed.    amLODIPine (NORVASC) 10 MG tablet Take 10 mg by mouth every Tuesday, Thursday, Saturday, Sunday.    apixaban (ELIQUIS) 2.5 mg Tab Take 1 tablet (2.5 mg total) by mouth 2 (two) times daily.    calcium acetate (PHOSLO) 667 mg capsule Take 1 capsule (667 mg total) by mouth 3 (three) times daily with meals.    clopidogrel (PLAVIX) 75 mg tablet Take 1 tablet (75 mg total) by mouth once daily.    docusate  sodium (COLACE) 100 MG capsule Take 1 capsule (100 mg total) by mouth 2 (two) times daily.    gabapentin (NEURONTIN) 300 MG capsule Take 1 capsule (300 mg total) by mouth every 8 (eight) hours as needed.    insulin (LANTUS SOLOSTAR U-100 INSULIN) glargine 100 units/mL (3mL) SubQ pen Inject 20 Units into the skin once daily.    isosorbide mononitrate (IMDUR) 30 MG 24 hr tablet Take 1 tablet (30 mg total) by mouth once daily.    metoprolol succinate (TOPROL-XL) 50 MG 24 hr tablet Take 1 tablet (50 mg total) by mouth once daily.    mupirocin (BACTROBAN) 2 % ointment by Nasal route 2 (two) times daily.    nitroGLYCERIN (NITROSTAT) 0.4 MG SL tablet Place 1 tablet (0.4 mg total) under the tongue every 5 (five) minutes as needed for Chest pain.    pantoprazole (PROTONIX) 40 MG tablet Take 1 tablet (40 mg total) by mouth once daily.    polyethylene glycol (GLYCOLAX) 17 gram/dose powder     pravastatin (PRAVACHOL) 80 MG tablet Take 1 tablet (80 mg total) by mouth once daily.    sertraline (ZOLOFT) 100 MG tablet Take 1 tablet (100 mg total) by mouth once daily.    vitamin renal formula, B-complex-vitamin c-folic acid, (NEPHROCAP) 1 mg Cap Take 1 capsule by mouth once daily.    furosemide (LASIX) 40 MG tablet Take 1 tablet (40 mg total) by mouth once daily.     Family History     Problem Relation (Age of Onset)    Diabetes Mother, Sister    Heart disease Sister    Hypertension Mother    Kidney disease Mother        Tobacco Use    Smoking status: Never Smoker    Smokeless tobacco: Never Used   Substance and Sexual Activity    Alcohol use: No    Drug use: No    Sexual activity: Not on file     Review of Systems   Constitutional: Negative for activity change, appetite change, chills, fatigue, fever and unexpected weight change.   HENT: Negative for congestion, rhinorrhea, sore throat, trouble swallowing and voice change.    Eyes: Negative for visual disturbance.   Respiratory: Positive for shortness of breath.  Negative for cough, choking, chest tightness and wheezing.    Cardiovascular: Negative for chest pain, palpitations and leg swelling.   Gastrointestinal: Negative for abdominal distention, abdominal pain, anal bleeding, blood in stool, constipation, diarrhea, nausea and vomiting.   Endocrine: Negative for cold intolerance, heat intolerance, polydipsia and polyuria.   Genitourinary: Negative for dysuria, flank pain, frequency, hematuria and urgency.   Musculoskeletal: Positive for arthralgias. Negative for back pain, joint swelling and myalgias.   Skin: Negative for color change and rash.   Neurological: Positive for weakness. Negative for dizziness, seizures, syncope, facial asymmetry, speech difficulty, light-headedness, numbness and headaches.   Hematological: Negative for adenopathy. Does not bruise/bleed easily.   Psychiatric/Behavioral: Negative for agitation, confusion, hallucinations and suicidal ideas.     Objective:     Vital Signs (Most Recent):  Temp: 98.2 °F (36.8 °C) (11/18/19 1701)  Pulse: 76 (11/18/19 2318)  Resp: 16 (11/18/19 2318)  BP: (!) 154/72 (11/18/19 2318)  SpO2: 95 % (11/18/19 2318) Vital Signs (24h Range):  Temp:  [98.2 °F (36.8 °C)] 98.2 °F (36.8 °C)  Pulse:  [71-80] 76  Resp:  [14-18] 16  SpO2:  [95 %-97 %] 95 %  BP: (154-208)/() 154/72        There is no height or weight on file to calculate BMI.    Physical Exam   Constitutional: She is oriented to person, place, and time. She appears well-developed and well-nourished. No distress.   HENT:   Head: Normocephalic and atraumatic.   Eyes: Pupils are equal, round, and reactive to light.   Neck: Neck supple. Carotid bruit is not present. No thyromegaly present.   Cardiovascular: Normal rate and regular rhythm. Exam reveals no gallop.   No murmur heard.  Pulmonary/Chest: Effort normal and breath sounds normal. No respiratory distress. She has no wheezes.   Abdominal: Soft. Bowel sounds are normal. She exhibits no distension and no mass.  There is no splenomegaly or hepatomegaly. There is no tenderness.   Musculoskeletal: Normal range of motion. She exhibits edema (1+ LLE). She exhibits no deformity.   Neurological: She is alert and oriented to person, place, and time. No cranial nerve deficit or sensory deficit. GCS eye subscore is 4. GCS verbal subscore is 5. GCS motor subscore is 6.   Left lower extremity 4/5 strength.  All other extremities show 5/5 strength.   Skin: Skin is warm and dry. No rash noted.   Psychiatric: She has a normal mood and affect.         CRANIAL NERVES     CN III, IV, VI   Pupils are equal, round, and reactive to light.       Significant Labs:   CBC:   Recent Labs   Lab 11/18/19  1813   WBC 6.81   HGB 9.0*   HCT 29.4*        CMP:   Recent Labs   Lab 11/18/19  1813      K 5.6*      CO2 27   GLU 86   BUN 62*   CREATININE 9.5*   CALCIUM 10.0   ANIONGAP 14   EGFRNONAA 3.8*       Significant Imaging: I have reviewed all pertinent imaging results/findings within the past 24 hours.

## 2019-11-19 NOTE — CARE UPDATE
Following SD transfer order being placed, patient continued to desat 88-89% while on BiPap and became increasingly hypertensive with SBP > 190. CCM consult was placed. Mike HERCULES w/ ESPERANZA at bedside - decision made to transfer patient to ICU for emergent dialysis. Nephrology consult, 10 mg IV Hydralazine, and repeat ABG in 1 hr placed. Patient transferred to [facility] on telemetry with RRNx2, RNX1, RRTx1 on telemetry.

## 2019-11-19 NOTE — ED NOTES
Patient identifiers verified and correct for Angelina S Brazos.    LOC: The patient is awake, alert and aware of environment with an appropriate affect, the patient is oriented x 3 and speaking appropriately.  APPEARANCE: Patient resting comfortably and in no acute distress, patient is clean and well groomed, patient's clothing is properly fastened.  SKIN: The skin is warm and dry, color consistent with ethnicity, patient has normal skin turgor and moist mucus membranes, skin intact, no breakdown or bruising noted.  MUSCULOSKELETAL: Patient moving all extremities spontaneously. LLE swelling.   RESPIRATORY: Airway is open and patent, respirations are spontaneous, patient has a normal effort and rate, no accessory muscle use noted, bilateral breath sounds present.  CARDIAC: Patient has a normal rate and regular rhythm, no periphreal edema noted, capillary refill < 3 seconds.  ABDOMEN: Soft and non tender to palpation, no distention noted, normoactive bowel sounds present in all four quadrants.  NEUROLOGIC: PERRL, 3 mm bilaterally, eyes open spontaneously, behavior appropriate to situation, follows commands, facial expression symmetrical, bilateral hand grasp equal and even, purposeful motor response noted, normal sensation in all extremities when touched with a finger.

## 2019-11-19 NOTE — SUBJECTIVE & OBJECTIVE
Past Medical History:   Diagnosis Date    Anemia due to gastrointestinal blood loss 7/11/2018    Arthritis     Asthma     Breast cancer, left     CHF (congestive heart failure)     Cholelithiasis     Coronary artery disease     Diabetes mellitus, type 2     Encounter for blood transfusion     End-stage renal disease on hemodialysis     Hemodialysis access site with arteriovenous graft     mon-wed -fri    Hyperlipidemia     Hypertension     Sarcoidosis        Past Surgical History:   Procedure Laterality Date    av graft      left arm    BRACHIAL ARTERY GRAFT Left 05/04/2016    brachiocephalic, Dr. Anson Crocker    BREAST BIOPSY Left     breast ca    BREAST LUMPECTOMY Left     radiation only    btl      COLONOSCOPY N/A 7/13/2018    Procedure: COLONOSCOPY;  Surgeon: Almita Bee MD;  Location: [facility] ENDO;  Service: Endoscopy;  Laterality: N/A;    ELBOW SURGERY      left    ESOPHAGOGASTRODUODENOSCOPY N/A 7/13/2018    Procedure: ESOPHAGOGASTRODUODENOSCOPY (EGD);  Surgeon: Almita Bee MD;  Location: [facility] ENDO;  Service: Endoscopy;  Laterality: N/A;    LIPOMA RESECTION      back of neck    pilondial cyst      TOTAL KNEE ARTHROPLASTY Right 02/23/2016       Review of patient's allergies indicates:  No Known Allergies    Family History     Problem Relation (Age of Onset)    Diabetes Mother, Sister    Heart disease Sister    Hypertension Mother    Kidney disease Mother        Tobacco Use    Smoking status: Never Smoker    Smokeless tobacco: Never Used   Substance and Sexual Activity    Alcohol use: No    Drug use: No    Sexual activity: Not on file      Review of Systems   Constitutional: Positive for fatigue. Negative for chills, fever and unexpected weight change.   HENT: Negative for sinus pressure, sneezing and sore throat.    Eyes: Negative for visual disturbance.   Respiratory: Positive for shortness of breath. Negative for cough, chest tightness and wheezing.    Cardiovascular:  Positive for leg swelling. Negative for chest pain and palpitations.   Gastrointestinal: Negative for abdominal pain, blood in stool, constipation, diarrhea, nausea and vomiting.   Genitourinary: Negative for difficulty urinating.   Musculoskeletal: Negative for arthralgias and myalgias.   Skin: Negative for pallor, rash and wound.   Neurological: Negative for weakness and headaches.   Hematological: Negative for adenopathy.     Objective:     Vital Signs (Most Recent):  Temp: 97 °F (36.1 °C) (11/19/19 0700)  Pulse: 78 (11/19/19 0715)  Resp: 18 (11/19/19 0715)  BP: (!) 157/70 (11/19/19 0715)  SpO2: 98 % (11/19/19 0715) Vital Signs (24h Range):  Temp:  [97 °F (36.1 °C)-98.2 °F (36.8 °C)] 97 °F (36.1 °C)  Pulse:  [71-88] 78  Resp:  [14-26] 18  SpO2:  [78 %-98 %] 98 %  BP: (124-208)/() 157/70   Weight: 106.6 kg (235 lb 0.2 oz)  Body mass index is 36.81 kg/m².      Intake/Output Summary (Last 24 hours) at 11/19/2019 0744  Last data filed at 11/19/2019 0500  Gross per 24 hour   Intake 502 ml   Output --   Net 502 ml       Physical Exam   Constitutional: She is oriented to person, place, and time. She appears well-developed and well-nourished.   HENT:   Head: Normocephalic and atraumatic.   Mouth/Throat: No oropharyngeal exudate.   Eyes: Pupils are equal, round, and reactive to light. Conjunctivae and EOM are normal.   Pulmonary/Chest: No stridor. She is in respiratory distress (Using accessory muscles to breathe). She has no wheezes. She has rales (bilateral to mid lung). She exhibits no tenderness.   Abdominal: Soft. Bowel sounds are normal. She exhibits no distension and no mass. There is no tenderness. There is no guarding.   Musculoskeletal: Normal range of motion. She exhibits edema. She exhibits no deformity.   Neurological: She is alert and oriented to person, place, and time.   Skin: Skin is warm and dry. No rash noted. No erythema.   Nursing note and vitals reviewed.      Vents:  Oxygen Concentration (%):  "40 (11/19/19 0700)  Lines/Drains/Airways     Drain                 Hemodialysis AV Graft -- days         Hemodialysis AV Graft Right upper arm -- days          Peripheral Intravenous Line                 Peripheral IV - Single Lumen 11/18/19 1812 20 G Left Upper Arm less than 1 day         Peripheral IV - Single Lumen 11/19/19 0340 20 G Left Upper Arm less than 1 day              Significant Labs:    CBC/Anemia Profile:  Recent Labs   Lab 11/18/19 1813 11/19/19 0349   WBC 6.81 8.36   HGB 9.0* 8.9*   HCT 29.4* 30.0*    198   MCV 98 98   RDW 14.6* 14.7*        Chemistries:  Recent Labs   Lab 11/18/19 1813 11/19/19  0029 11/19/19 0349    144 143   K 5.6* 5.0 5.2*    102 101   CO2 27 27 26   BUN 62* 62* 63*   CREATININE 9.5* 9.6* 9.8*   CALCIUM 10.0 9.7 9.4   ALBUMIN  --   --  3.1*   PROT  --   --  6.8   BILITOT  --   --  0.3   ALKPHOS  --   --  61   ALT  --   --  27   AST  --   --  21   MG  --   --  2.1   PHOS  --   --  5.5*       ABGs:   Recent Labs   Lab 11/19/19 0341   PH 7.336*   PCO2 51.6*   HCO3 27.6   POCSATURATED 91*   BE 2     Blood Culture: No results for input(s): LABBLOO in the last 48 hours.  All pertinent labs within the past 24 hours have been reviewed.    Significant Imaging: I have reviewed all pertinent imaging results/findings within the past 24 hours.    EXAMINATION:  XR CHEST AP PORTABLE    CLINICAL HISTORY:  Provided history is "Shortness of breath;  ".    TECHNIQUE:  One view of the chest.    COMPARISON:  10/16/2019.    FINDINGS:  Cardiac wires overlie the chest.  Cardiomediastinal silhouette is enlarged.  Atherosclerotic calcifications overlie the aortic arch.  There are patchy bilateral airspace opacities right side greater than left.  No sizable pleural effusion.  No pneumothorax.      Impression       Patchy bilateral airspace opacities, right side greater than left.  Findings are suggestive asymmetric pulmonary edema versus pneumonia or " aspiration.    Cardiomegaly.      Electronically signed by: Jonatan Ramírez MD  Date: 11/19/2019  Time: 00:40

## 2019-11-19 NOTE — ASSESSMENT & PLAN NOTE
- Patient is a poor historian, no family at bedside.  - Per ER note, patient reports LLE pain and weakness and right shoulder pain for months.  Complains of LBP on this provider's exam, but denied to ER provider.  - CT head showed no evidence of acute intracranial abnormality, senescent and chronic microvascular ischemic change.  - Will get lumbar x-ray.  - PT/OT, SW consult.  Supportive care, fall precautions.  - Neuro checks q4hrs.

## 2019-11-19 NOTE — ED NOTES
at bedside for re evaluation and to update pt on plan of care . Ct scan called to see when they can come get pt for ordered ct scan. Ct scan technicians state that there are three patients ahead of her

## 2019-11-19 NOTE — ASSESSMENT & PLAN NOTE
- Continue Eliquis 2.5mg BID.  - [id number] VASc score of at least 8 for CHF, HTN, DM II, CVA, CAD, age, and sex.

## 2019-11-19 NOTE — HOSPITAL COURSE
Patient admitted to critical care medicine with volume overload and respiratory distress requiring BiPAP. ESRD consulted and patient started on HD via upper extremity fistula. Tolerated HD well with 3L removed. 11/20: HD planned. Still requires BiPAP, desats to 80s when off.

## 2019-11-19 NOTE — ASSESSMENT & PLAN NOTE
Patient missed dialysis on day of admit. Patient does make small amount of urine, however did not respond to lasix. Dialysis performed at bedside in ICU, 3L removed    - ESRD consulted, appreciate their assistance  - daily renal function panels

## 2019-11-20 LAB
ALBUMIN SERPL BCP-MCNC: 2.9 G/DL (ref 3.5–5.2)
ALP SERPL-CCNC: 54 U/L (ref 55–135)
ALT SERPL W/O P-5'-P-CCNC: 22 U/L (ref 10–44)
ANION GAP SERPL CALC-SCNC: 14 MMOL/L (ref 8–16)
AST SERPL-CCNC: 18 U/L (ref 10–40)
BASOPHILS # BLD AUTO: 0.03 K/UL (ref 0–0.2)
BASOPHILS NFR BLD: 0.5 % (ref 0–1.9)
BILIRUB SERPL-MCNC: 0.3 MG/DL (ref 0.1–1)
BUN SERPL-MCNC: 51 MG/DL (ref 8–23)
CALCIUM SERPL-MCNC: 9.5 MG/DL (ref 8.7–10.5)
CHLORIDE SERPL-SCNC: 105 MMOL/L (ref 95–110)
CO2 SERPL-SCNC: 23 MMOL/L (ref 23–29)
CREAT SERPL-MCNC: 8.8 MG/DL (ref 0.5–1.4)
DIFFERENTIAL METHOD: ABNORMAL
EOSINOPHIL # BLD AUTO: 0.2 K/UL (ref 0–0.5)
EOSINOPHIL NFR BLD: 2.3 % (ref 0–8)
ERYTHROCYTE [DISTWIDTH] IN BLOOD BY AUTOMATED COUNT: 14.8 % (ref 11.5–14.5)
EST. GFR  (AFRICAN AMERICAN): 4.8 ML/MIN/1.73 M^2
EST. GFR  (NON AFRICAN AMERICAN): 4.2 ML/MIN/1.73 M^2
FERRITIN SERPL-MCNC: 1215 NG/ML (ref 20–300)
GLUCOSE SERPL-MCNC: 73 MG/DL (ref 70–110)
HCT VFR BLD AUTO: 28.9 % (ref 37–48.5)
HGB BLD-MCNC: 8.5 G/DL (ref 12–16)
IMM GRANULOCYTES # BLD AUTO: 0.02 K/UL (ref 0–0.04)
IMM GRANULOCYTES NFR BLD AUTO: 0.3 % (ref 0–0.5)
IRON SERPL-MCNC: 31 UG/DL (ref 30–160)
LYMPHOCYTES # BLD AUTO: 0.8 K/UL (ref 1–4.8)
LYMPHOCYTES NFR BLD: 13 % (ref 18–48)
MAGNESIUM SERPL-MCNC: 2.1 MG/DL (ref 1.6–2.6)
MCH RBC QN AUTO: 29.2 PG (ref 27–31)
MCHC RBC AUTO-ENTMCNC: 29.4 G/DL (ref 32–36)
MCV RBC AUTO: 99 FL (ref 82–98)
MONOCYTES # BLD AUTO: 0.7 K/UL (ref 0.3–1)
MONOCYTES NFR BLD: 10.4 % (ref 4–15)
NEUTROPHILS # BLD AUTO: 4.8 K/UL (ref 1.8–7.7)
NEUTROPHILS NFR BLD: 73.5 % (ref 38–73)
NRBC BLD-RTO: 0 /100 WBC
PHOSPHATE SERPL-MCNC: 5.3 MG/DL (ref 2.7–4.5)
PLATELET # BLD AUTO: 183 K/UL (ref 150–350)
PMV BLD AUTO: 10.3 FL (ref 9.2–12.9)
POCT GLUCOSE: 101 MG/DL (ref 70–110)
POCT GLUCOSE: 81 MG/DL (ref 70–110)
POCT GLUCOSE: 91 MG/DL (ref 70–110)
POTASSIUM SERPL-SCNC: 5 MMOL/L (ref 3.5–5.1)
PROT SERPL-MCNC: 6.5 G/DL (ref 6–8.4)
RBC # BLD AUTO: 2.91 M/UL (ref 4–5.4)
SATURATED IRON: 14 % (ref 20–50)
SODIUM SERPL-SCNC: 142 MMOL/L (ref 136–145)
TOTAL IRON BINDING CAPACITY: 229 UG/DL (ref 250–450)
TRANSFERRIN SERPL-MCNC: 155 MG/DL (ref 200–375)
WBC # BLD AUTO: 6.47 K/UL (ref 3.9–12.7)

## 2019-11-20 PROCEDURE — 82728 ASSAY OF FERRITIN: CPT

## 2019-11-20 PROCEDURE — 85025 COMPLETE CBC W/AUTO DIFF WBC: CPT

## 2019-11-20 PROCEDURE — 83735 ASSAY OF MAGNESIUM: CPT

## 2019-11-20 PROCEDURE — 94660 CPAP INITIATION&MGMT: CPT

## 2019-11-20 PROCEDURE — 80100014 HC HEMODIALYSIS 1:1

## 2019-11-20 PROCEDURE — 80053 COMPREHEN METABOLIC PANEL: CPT

## 2019-11-20 PROCEDURE — 83540 ASSAY OF IRON: CPT

## 2019-11-20 PROCEDURE — 84100 ASSAY OF PHOSPHORUS: CPT

## 2019-11-20 PROCEDURE — 25000003 PHARM REV CODE 250: Performed by: PHYSICIAN ASSISTANT

## 2019-11-20 PROCEDURE — 99233 SBSQ HOSP IP/OBS HIGH 50: CPT | Mod: ,,, | Performed by: INTERNAL MEDICINE

## 2019-11-20 PROCEDURE — 63600175 PHARM REV CODE 636 W HCPCS: Performed by: PHYSICIAN ASSISTANT

## 2019-11-20 PROCEDURE — 99900035 HC TECH TIME PER 15 MIN (STAT)

## 2019-11-20 PROCEDURE — 94761 N-INVAS EAR/PLS OXIMETRY MLT: CPT

## 2019-11-20 PROCEDURE — 99233 PR SUBSEQUENT HOSPITAL CARE,LEVL III: ICD-10-PCS | Mod: ,,, | Performed by: INTERNAL MEDICINE

## 2019-11-20 PROCEDURE — 99291 CRITICAL CARE FIRST HOUR: CPT | Mod: ,,, | Performed by: NURSE PRACTITIONER

## 2019-11-20 PROCEDURE — 99291 PR CRITICAL CARE, E/M 30-74 MINUTES: ICD-10-PCS | Mod: ,,, | Performed by: NURSE PRACTITIONER

## 2019-11-20 PROCEDURE — 97165 OT EVAL LOW COMPLEX 30 MIN: CPT

## 2019-11-20 PROCEDURE — 11000001 HC ACUTE MED/SURG PRIVATE ROOM

## 2019-11-20 PROCEDURE — 27000221 HC OXYGEN, UP TO 24 HOURS

## 2019-11-20 RX ADMIN — PANTOPRAZOLE SODIUM 40 MG: 40 TABLET, DELAYED RELEASE ORAL at 08:11

## 2019-11-20 RX ADMIN — SERTRALINE HYDROCHLORIDE 100 MG: 100 TABLET ORAL at 08:11

## 2019-11-20 RX ADMIN — CALCIUM ACETATE 667 MG: 667 CAPSULE ORAL at 06:11

## 2019-11-20 RX ADMIN — ISOSORBIDE MONONITRATE 30 MG: 30 TABLET, EXTENDED RELEASE ORAL at 08:11

## 2019-11-20 RX ADMIN — NEPHROCAP 1 CAPSULE: 1 CAP ORAL at 08:11

## 2019-11-20 RX ADMIN — CALCIUM ACETATE 667 MG: 667 CAPSULE ORAL at 08:11

## 2019-11-20 RX ADMIN — SENNOSIDES AND DOCUSATE SODIUM 1 TABLET: 8.6; 5 TABLET ORAL at 08:11

## 2019-11-20 RX ADMIN — LIDOCAINE 1 PATCH: 50 PATCH TOPICAL at 08:11

## 2019-11-20 RX ADMIN — PRAVASTATIN SODIUM 80 MG: 40 TABLET ORAL at 08:11

## 2019-11-20 RX ADMIN — APIXABAN 2.5 MG: 2.5 TABLET, FILM COATED ORAL at 08:11

## 2019-11-20 RX ADMIN — CALCIUM ACETATE 667 MG: 667 CAPSULE ORAL at 12:11

## 2019-11-20 RX ADMIN — CEFTRIAXONE SODIUM 1 G: 1 INJECTION, POWDER, FOR SOLUTION INTRAMUSCULAR; INTRAVENOUS at 03:11

## 2019-11-20 RX ADMIN — INSULIN DETEMIR 15 UNITS: 100 INJECTION, SOLUTION SUBCUTANEOUS at 08:11

## 2019-11-20 RX ADMIN — METOPROLOL SUCCINATE 50 MG: 50 TABLET, EXTENDED RELEASE ORAL at 08:11

## 2019-11-20 RX ADMIN — CLOPIDOGREL BISULFATE 75 MG: 75 TABLET ORAL at 08:11

## 2019-11-20 RX ADMIN — AZITHROMYCIN MONOHYDRATE 500 MG: 500 INJECTION, POWDER, LYOPHILIZED, FOR SOLUTION INTRAVENOUS at 03:11

## 2019-11-20 NOTE — SUBJECTIVE & OBJECTIVE
Interval History/Significant Events: Requires BiPAP. HD scheduled. No acute distress.    Review of Systems   Constitutional: Positive for fatigue. Negative for chills, fever and unexpected weight change.   HENT: Negative for congestion, sore throat, trouble swallowing and voice change.    Eyes: Negative for photophobia and visual disturbance.   Respiratory: Positive for shortness of breath. Negative for cough, chest tightness and wheezing.    Cardiovascular: Negative for chest pain and palpitations.   Gastrointestinal: Negative for abdominal distention, abdominal pain, constipation, diarrhea, nausea and vomiting.   Genitourinary: Negative for difficulty urinating.   Musculoskeletal: Negative for arthralgias and myalgias.   Skin: Negative for pallor and rash.   Neurological: Positive for weakness. Negative for dizziness and headaches.   Psychiatric/Behavioral: Negative for confusion.     Objective:     Vital Signs (Most Recent):  Temp: 98 °F (36.7 °C) (11/20/19 1100)  Pulse: 67 (11/20/19 1300)  Resp: 20 (11/20/19 1300)  BP: (!) 169/79 (11/20/19 1300)  SpO2: (!) 93 % (11/20/19 1315) Vital Signs (24h Range):  Temp:  [97.4 °F (36.3 °C)-99.1 °F (37.3 °C)] 98 °F (36.7 °C)  Pulse:  [62-73] 67  Resp:  [13-27] 20  SpO2:  [92 %-100 %] 93 %  BP: (122-189)/() 169/79   Weight: 109 kg (240 lb 4.8 oz)  Body mass index is 37.64 kg/m².      Intake/Output Summary (Last 24 hours) at 11/20/2019 1428  Last data filed at 11/20/2019 1300  Gross per 24 hour   Intake 929 ml   Output --   Net 929 ml       Physical Exam   Constitutional: She is oriented to person, place, and time. Vital signs are normal. She appears well-developed and well-nourished. She is cooperative. No distress.   HENT:   Head: Normocephalic and atraumatic.   Mouth/Throat: No oropharyngeal exudate.   Eyes: Pupils are equal, round, and reactive to light. Conjunctivae, EOM and lids are normal.   Neck: Normal range of motion.   Cardiovascular: Normal rate, regular  rhythm, normal heart sounds, intact distal pulses and normal pulses.   No murmur heard.  Pulmonary/Chest: Effort normal and breath sounds normal. No stridor. No respiratory distress. She has no wheezes. She has no rales.   Abdominal: Soft. Normal appearance and bowel sounds are normal. She exhibits no distension. There is no tenderness.   Obese.   Musculoskeletal: Normal range of motion. She exhibits edema. She exhibits no deformity.   Neurological: She is alert and oriented to person, place, and time. GCS eye subscore is 4. GCS verbal subscore is 5. GCS motor subscore is 6.   Skin: Skin is warm and dry. Capillary refill takes less than 2 seconds. She is not diaphoretic. No erythema.   Psychiatric: She has a normal mood and affect. Her speech is normal and behavior is normal.   Nursing note and vitals reviewed.      Vents:  Oxygen Concentration (%): 35 (11/20/19 1100)  Lines/Drains/Airways     Drain                 Hemodialysis AV Graft -- days         Hemodialysis AV Graft Right upper arm -- days          Peripheral Intravenous Line                 Peripheral IV - Single Lumen 11/18/19 1812 20 G Left Upper Arm 1 day         Peripheral IV - Single Lumen 11/19/19 0340 20 G Left Upper Arm 1 day              Significant Labs:    CBC/Anemia Profile:  Recent Labs   Lab 11/18/19  1813 11/19/19  0349 11/20/19  0311   WBC 6.81 8.36 6.47   HGB 9.0* 8.9* 8.5*   HCT 29.4* 30.0* 28.9*    198 183   MCV 98 98 99*   RDW 14.6* 14.7* 14.8*   IRON  --   --  31   FERRITIN  --   --  1,215*        Chemistries:  Recent Labs   Lab 11/19/19  0349 11/19/19  1615 11/20/19  0311    142 142   K 5.2* 5.2* 5.0    105 105   CO2 26 26 23   BUN 63* 50* 51*   CREATININE 9.8* 8.0* 8.8*   CALCIUM 9.4 9.2 9.5   ALBUMIN 3.1* 3.0* 2.9*   PROT 6.8  --  6.5   BILITOT 0.3  --  0.3   ALKPHOS 61  --  54*   ALT 27  --  22   AST 21  --  18   MG 2.1  --  2.1   PHOS 5.5* 4.6* 5.3*       All pertinent labs within the past 24 hours have been  reviewed.    Significant Imaging:  I have reviewed all pertinent imaging results/findings within the past 24 hours.

## 2019-11-20 NOTE — PT/OT/SLP EVAL
"Occupational Therapy   Evaluation    Name: Angelina Beard  MRN: [id number]  Admitting Diagnosis:  Hypervolemia associated with renal insufficiency     Pt was t/f to ICU due to acute hypercapnia and hypoxic respiratory failure presumed due to volume overload after missing HD    Recommendations:     Discharge Recommendations: home health OT with caregiver assist     Assessment:     Angelina Beard is a 69 y.o. female with a medical diagnosis of Hypervolemia associated with renal insufficiency.  . Performance deficits affecting function: weakness, impaired self care skills, impaired functional mobilty, impaired endurance, gait instability, decreased upper extremity function, decreased lower extremity function.  Pt tolerated session fairly well with good effort and performance. OT anticipates pt will be ready for d/c home with fly and caregiver support when medically stable.     Rehab Prognosis: Good; patient would benefit from acute skilled OT services to address these deficits and reach maximum level of function.       Plan:     Patient to be seen 3 x/week to address the above listed problems via self-care/home management, therapeutic activities, therapeutic exercises  · Plan of Care Expires:    · Plan of Care Reviewed with:      Subjective     "I really don't stand up" pt reports.       Occupational Profile:  Pt reports she lives in 2 story apartment without steps to enter. Pt has full flight of stairs in the apartment, but pt reports she stays on first floor. Pt has RW, rollator, BSC, TTB and old w/c and wears 2 LPM oxygen daily/nightly   Pt reports she mostly uses rollator but only takes few steps at a time and bends forward and supports upper body on walker   Pt reports she is able to perform basic ADl skills and has caregiver in the home daily and son is home at night.     Pain/Comfort:  · Pain Rating 1: 0/10    Patients cultural, spiritual, Jainism conflicts given the current situation: no    Objective: "     Communicated with: nsg prior to session.    Pt found supine in bed with 2 LPM oxygen in place.     General Precautions: Standard,   fall    Occupational Performance:    Bed Mobility:    · Patient completed Supine to Sit with contact guard assistance    Functional Mobility/Transfers:  · Patient completed Sit <> Stand Transfer with minimum assistance  with  no assistive device   · Pt completed stand pivot t/f with MIN A bed>chair.   ·     Activities of Daily Living:  · Feeding: independent  · G/H: seated with set-up  · LE dressing: TOTAL A   · UE dressing: MAX A     Cognitive/Visual Perceptual  Pt is awake, alert and following commands.     Physical Exam  Pt is right hand dominant and demo 2/5 R shoulder strength and WFL remainder of of right UE and L UE.  Pt with intact light touch sensation and Good .      AMPAC 6 Click ADL:  AMPAC Total Score: 11    Treatment & Education:  Pt demo Fair+ sitting balance and able to complete t/f to chair with MIN A   Education provided re: OT POC and safety with functional mobility/ADL skills.   Education:    Patient left up in chair with needs in reach and nsg aware.   GOALS:   Multidisciplinary Problems     Occupational Therapy Goals        Problem: Occupational Therapy Goal    Goal Priority Disciplines Outcome Interventions   Occupational Therapy Goal     OT, PT/OT Ongoing, Progressing    Description:  Goals to be met by: 11/27/19     Patient will increase functional independence with ADLs by performing:    Pt to complete UE dressing with set-up  Pt to complete LE dressing with MIN A   Pt to complete toileting with SBA  Pt to complete t/f to BSC with SBA  Pt to tolerated OOB x 2-3 hours daily to increase endurance for functional activity.                       History:     Past Medical History:   Diagnosis Date    Anemia due to gastrointestinal blood loss 7/11/2018    Arthritis     Asthma     Breast cancer, left     CHF (congestive heart failure)     Cholelithiasis      Coronary artery disease     Diabetes mellitus, type 2     Encounter for blood transfusion     End-stage renal disease on hemodialysis     Hemodialysis access site with arteriovenous graft     mon-wed -fri    Hyperlipidemia     Hypertension     Sarcoidosis        Past Surgical History:   Procedure Laterality Date    av graft      left arm    BRACHIAL ARTERY GRAFT Left 05/04/2016    brachiocephalic, Dr. Anson Crocker    BREAST BIOPSY Left     breast ca    BREAST LUMPECTOMY Left     radiation only    btl      COLONOSCOPY N/A 7/13/2018    Procedure: COLONOSCOPY;  Surgeon: Almita Bee MD;  Location: [facility] ENDO;  Service: Endoscopy;  Laterality: N/A;    ELBOW SURGERY      left    ESOPHAGOGASTRODUODENOSCOPY N/A 7/13/2018    Procedure: ESOPHAGOGASTRODUODENOSCOPY (EGD);  Surgeon: Almita Bee MD;  Location: [facility];  Service: Endoscopy;  Laterality: N/A;    LIPOMA RESECTION      back of neck    pilondial cyst      TOTAL KNEE ARTHROPLASTY Right 02/23/2016       Time Tracking:     OT Date of Treatment: 11/20/19  OT Start Time: 1239  OT Stop Time: 1305  OT Total Time (min): 26 min    Billable Minutes:Evaluation 26    THANIA Melton  11/20/2019

## 2019-11-20 NOTE — ASSESSMENT & PLAN NOTE
Patient initially presenting with volume overload and hyperkalemia after missing dialysis on 11/18/19. Rapid response called overnight due to worsening respiratory status. Exam notable for fluid overload and respiratory distress. Requiring BiPAP to oxygenate. ABGs notable for hypercapnic respiratory acidosis with hypoxemia. ESRD consulted for emergent dialysis  Dry weight is 102 kg per patient, 106 on admit  CXR with right upper air bronchograms, / possible consolidation    - Patient received emergent dialysis, 3L removed  - Patient remains on BiPAP, desats when off  - Will discuss with nephrology about possibly removing more volume today  - Continuing empiric CAP coverage at this time,   - respiratory infection panel negative

## 2019-11-20 NOTE — ASSESSMENT & PLAN NOTE
K of 5 on presentation, shifted in ED. Dialyzed on AM of 11/19/19    - Management of dialysis per ESRD

## 2019-11-20 NOTE — SUBJECTIVE & OBJECTIVE
Interval History:   On BiPAP this morning, oxygenating well w/o complaints.  Net positive 800 ml/24h.    Hypervolemic on exam.    Review of patient's allergies indicates:  No Known Allergies  Current Facility-Administered Medications   Medication Frequency    0.9%  NaCl infusion PRN    0.9%  NaCl infusion Once    0.9%  NaCl infusion Once    acetaminophen tablet 650 mg Q4H PRN    albuterol-ipratropium 2.5 mg-0.5 mg/3 mL nebulizer solution 3 mL Q4H PRN    amLODIPine tablet 10 mg Every Tues, Thurs, Sat, Sun    apixaban tablet 2.5 mg BID    azithromycin 500 mg in dextrose 5 % 250 mL IVPB (ready to mix system) Q24H    calcium acetate capsule 667 mg TID WM    cefTRIAXone injection 1 g Q24H    clopidogrel tablet 75 mg Daily    dextrose 10% (D10W) Bolus PRN    dextrose 10% (D10W) Bolus PRN    gabapentin capsule 300 mg Q8H PRN    glucagon (human recombinant) injection 1 mg PRN    glucose chewable tablet 16 g PRN    glucose chewable tablet 24 g PRN    insulin aspart U-100 pen 0-5 Units QID (AC + HS) PRN    insulin detemir U-100 pen 15 Units Daily    isosorbide mononitrate 24 hr tablet 30 mg Daily    labetalol 20 mg/4 mL (5 mg/mL) IV syring Q4H PRN    lidocaine 5 % patch 1 patch Daily    melatonin tablet 6 mg Nightly PRN    metoprolol succinate (TOPROL-XL) 24 hr tablet 50 mg Daily    ondansetron disintegrating tablet 8 mg Q8H PRN    pantoprazole EC tablet 40 mg Daily    polyethylene glycol packet 17 g TID PRN    pravastatin tablet 80 mg Daily    promethazine tablet 25 mg Q6H PRN    senna-docusate 8.6-50 mg per tablet 1 tablet BID    sertraline tablet 100 mg Daily    sodium chloride 0.9% flush 5 mL PRN    vitamin renal formula (B-complex-vitamin c-folic acid) 1 mg per capsule 1 capsule Daily       Objective:     Vital Signs (Most Recent):  Temp: 97.4 °F (36.3 °C) (11/20/19 0700)  Pulse: 73 (11/20/19 0800)  Resp: (!) 23 (11/20/19 0800)  BP: (!) 160/70 (11/20/19 0800)  SpO2: (!) 94 % (11/20/19  0800)  O2 Device (Oxygen Therapy): nasal cannula (11/20/19 0800) Vital Signs (24h Range):  Temp:  [97.4 °F (36.3 °C)-99.1 °F (37.3 °C)] 97.4 °F (36.3 °C)  Pulse:  [62-80] 73  Resp:  [13-27] 23  SpO2:  [92 %-100 %] 94 %  BP: (122-177)/() 160/70     Weight: 109 kg (240 lb 4.8 oz) (11/20/19 0359)  Body mass index is 37.64 kg/m².  Body surface area is 2.27 meters squared.    I/O last 3 completed shifts:  In: 1311 [P.O.:553; I.V.:508; IV Piggyback:250]  Out: -     Physical Exam   Constitutional: She is oriented to person, place, and time. She appears well-developed and well-nourished.   HENT:   Head: Normocephalic and atraumatic.   Right Ear: External ear normal.   Left Ear: External ear normal.   Mouth/Throat: No oropharyngeal exudate.   Eyes: Right eye exhibits no discharge. Left eye exhibits no discharge. No scleral icterus.   Neck: Normal range of motion.   Pulmonary/Chest: No stridor. No respiratory distress. She has no wheezes. She has rales.   BiPAP   Abdominal: Soft. Bowel sounds are normal. She exhibits no distension and no mass. There is no tenderness. There is no guarding.   Musculoskeletal: Normal range of motion. She exhibits edema. She exhibits no deformity.   Neurological: She is alert and oriented to person, place, and time.   Skin: Skin is warm and dry. No rash noted. No erythema.   Psychiatric: She has a normal mood and affect. Her behavior is normal.   Nursing note and vitals reviewed.      Significant Labs:  CBC:   Recent Labs   Lab 11/20/19 0311   WBC 6.47   RBC 2.91*   HGB 8.5*   HCT 28.9*      MCV 99*   MCH 29.2   MCHC 29.4*     CMP:   Recent Labs   Lab 11/20/19 0311   GLU 73   CALCIUM 9.5   ALBUMIN 2.9*   PROT 6.5      K 5.0   CO2 23      BUN 51*   CREATININE 8.8*   ALKPHOS 54*   ALT 22   AST 18   BILITOT 0.3

## 2019-11-20 NOTE — SUBJECTIVE & OBJECTIVE
Interval History/Significant Events: ***    Review of Systems  Objective:     Vital Signs (Most Recent):  Temp: 97.8 °F (36.6 °C) (11/20/19 1515)  Pulse: 67 (11/20/19 1525)  Resp: 14 (11/20/19 1525)  BP: (!) 168/73 (11/20/19 1525)  SpO2: 95 % (11/20/19 1525) Vital Signs (24h Range):  Temp:  [97.4 °F (36.3 °C)-98.1 °F (36.7 °C)] 97.8 °F (36.6 °C)  Pulse:  [62-73] 67  Resp:  [13-27] 14  SpO2:  [92 %-100 %] 95 %  BP: (122-189)/() 168/73   Weight: 109 kg (240 lb 4.8 oz)  Body mass index is 37.64 kg/m².      Intake/Output Summary (Last 24 hours) at 11/20/2019 1541  Last data filed at 11/20/2019 1500  Gross per 24 hour   Intake 1049 ml   Output 200 ml   Net 849 ml       Physical Exam    Vents:  Oxygen Concentration (%): 35 (11/20/19 1100)  Lines/Drains/Airways     Drain                 Hemodialysis AV Graft -- days         Hemodialysis AV Graft Right forearm -- days         Hemodialysis AV Graft Right upper arm -- days          Peripheral Intravenous Line                 Peripheral IV - Single Lumen 11/18/19 1812 20 G Left Upper Arm 1 day         Peripheral IV - Single Lumen 11/19/19 0340 20 G Left Upper Arm 1 day              Significant Labs:    CBC/Anemia Profile:  Recent Labs   Lab 11/18/19 1813 11/19/19 0349 11/20/19  0311   WBC 6.81 8.36 6.47   HGB 9.0* 8.9* 8.5*   HCT 29.4* 30.0* 28.9*    198 183   MCV 98 98 99*   RDW 14.6* 14.7* 14.8*   IRON  --   --  31   FERRITIN  --   --  1,215*        Chemistries:  Recent Labs   Lab 11/19/19  0349 11/19/19  1615 11/20/19  0311    142 142   K 5.2* 5.2* 5.0    105 105   CO2 26 26 23   BUN 63* 50* 51*   CREATININE 9.8* 8.0* 8.8*   CALCIUM 9.4 9.2 9.5   ALBUMIN 3.1* 3.0* 2.9*   PROT 6.8  --  6.5   BILITOT 0.3  --  0.3   ALKPHOS 61  --  54*   ALT 27  --  22   AST 21  --  18   MG 2.1  --  2.1   PHOS 5.5* 4.6* 5.3*       {Results:23090}    Significant Imaging:  {Imaging Review:77840}

## 2019-11-20 NOTE — PROGRESS NOTES
[facility]  Nephrology  Progress Note    Patient Name: Angelina Beard  MRN: [id number]  Admission Date: 11/18/2019  Hospital Length of Stay: 1 days  Attending Provider: Armando Adamson MD   Primary Care Physician: Ben Mena MD  Principal Problem:Hypervolemia associated with renal insufficiency    Subjective:     Interval History:   On BiPAP this morning, oxygenating well w/o complaints.  Net positive 800 ml/24h.    Hypervolemic on exam.    Review of patient's allergies indicates:  No Known Allergies  Current Facility-Administered Medications   Medication Frequency    0.9%  NaCl infusion PRN    0.9%  NaCl infusion Once    0.9%  NaCl infusion Once    acetaminophen tablet 650 mg Q4H PRN    albuterol-ipratropium 2.5 mg-0.5 mg/3 mL nebulizer solution 3 mL Q4H PRN    amLODIPine tablet 10 mg Every Tues, Thurs, Sat, Sun    apixaban tablet 2.5 mg BID    azithromycin 500 mg in dextrose 5 % 250 mL IVPB (ready to mix system) Q24H    calcium acetate capsule 667 mg TID WM    cefTRIAXone injection 1 g Q24H    clopidogrel tablet 75 mg Daily    dextrose 10% (D10W) Bolus PRN    dextrose 10% (D10W) Bolus PRN    gabapentin capsule 300 mg Q8H PRN    glucagon (human recombinant) injection 1 mg PRN    glucose chewable tablet 16 g PRN    glucose chewable tablet 24 g PRN    insulin aspart U-100 pen 0-5 Units QID (AC + HS) PRN    insulin detemir U-100 pen 15 Units Daily    isosorbide mononitrate 24 hr tablet 30 mg Daily    labetalol 20 mg/4 mL (5 mg/mL) IV syring Q4H PRN    lidocaine 5 % patch 1 patch Daily    melatonin tablet 6 mg Nightly PRN    metoprolol succinate (TOPROL-XL) 24 hr tablet 50 mg Daily    ondansetron disintegrating tablet 8 mg Q8H PRN    pantoprazole EC tablet 40 mg Daily    polyethylene glycol packet 17 g TID PRN    pravastatin tablet 80 mg Daily    promethazine tablet 25 mg Q6H PRN    senna-docusate 8.6-50 mg per tablet 1 tablet BID    sertraline tablet 100 mg Daily     sodium chloride 0.9% flush 5 mL PRN    vitamin renal formula (B-complex-vitamin c-folic acid) 1 mg per capsule 1 capsule Daily       Objective:     Vital Signs (Most Recent):  Temp: 97.4 °F (36.3 °C) (11/20/19 0700)  Pulse: 73 (11/20/19 0800)  Resp: (!) 23 (11/20/19 0800)  BP: (!) 160/70 (11/20/19 0800)  SpO2: (!) 94 % (11/20/19 0800)  O2 Device (Oxygen Therapy): nasal cannula (11/20/19 0800) Vital Signs (24h Range):  Temp:  [97.4 °F (36.3 °C)-99.1 °F (37.3 °C)] 97.4 °F (36.3 °C)  Pulse:  [62-80] 73  Resp:  [13-27] 23  SpO2:  [92 %-100 %] 94 %  BP: (122-177)/() 160/70     Weight: 109 kg (240 lb 4.8 oz) (11/20/19 0359)  Body mass index is 37.64 kg/m².  Body surface area is 2.27 meters squared.    I/O last 3 completed shifts:  In: 1311 [P.O.:553; I.V.:508; IV Piggyback:250]  Out: -     Physical Exam   Constitutional: She is oriented to person, place, and time. She appears well-developed and well-nourished.   HENT:   Head: Normocephalic and atraumatic.   Right Ear: External ear normal.   Left Ear: External ear normal.   Mouth/Throat: No oropharyngeal exudate.   Eyes: Right eye exhibits no discharge. Left eye exhibits no discharge. No scleral icterus.   Neck: Normal range of motion.   Pulmonary/Chest: No stridor. No respiratory distress. She has no wheezes. She has rales.   BiPAP   Abdominal: Soft. Bowel sounds are normal. She exhibits no distension and no mass. There is no tenderness. There is no guarding.   Musculoskeletal: Normal range of motion. She exhibits edema. She exhibits no deformity.   Neurological: She is alert and oriented to person, place, and time.   Skin: Skin is warm and dry. No rash noted. No erythema.   Psychiatric: She has a normal mood and affect. Her behavior is normal.   Nursing note and vitals reviewed.      Significant Labs:  CBC:   Recent Labs   Lab 11/20/19  0311   WBC 6.47   RBC 2.91*   HGB 8.5*   HCT 28.9*      MCV 99*   MCH 29.2   MCHC 29.4*     CMP:   Recent Labs   Lab  11/20/19  0311   GLU 73   CALCIUM 9.5   ALBUMIN 2.9*   PROT 6.5      K 5.0   CO2 23      BUN 51*   CREATININE 8.8*   ALKPHOS 54*   ALT 22   AST 18   BILITOT 0.3            Assessment/Plan:     ESRD on dialysis  The patient is a ESRD patient initially admitted for hyperkalemia, volume overload, and c/o weakness/LLE cramping. The decompensated overnight requiring ICU transfer for hypercapnic and hypoxic respiratory failure secondary to volume overload. The patient required BiPAP and emergent dialysis overnight. Net UF 3 L.     Nephrology History  iHD Schedule: MWF  Unit/MD:  / Dr. Madden   Duration: 3.5 hrs  EDW: 102 kg  Access: RFA AVG  Resodial Renal Function: Minimum      Assessment/Plan:   -HD today for metabolic clearance/volume management  -UF 3-3.5L as tolerated.  -may need additional ultrafiltration tomorrow, will assess tolerability with HD today.  - Recommend renal diet when appropriate   - PhosLo w/ meals  - Will collect outpatient care plan for MANDIE therapy   - Continue to monitor intake and output, daily weights   - Avoid nephrotoxic medication and renal dose medications to GFR  - Will discuss with staff        Poncho Thomas NP  Nephrology  [facility]

## 2019-11-20 NOTE — ASSESSMENT & PLAN NOTE
- Iron studies: Fe 31, TIBC 229, Saturated Fe 14, transferrin 155, ferritin 1215  - anemia of chronic disease

## 2019-11-20 NOTE — PROGRESS NOTES
[facility]  Critical Care Medicine  Progress Note    Patient Name: Angelina Beard  MRN: [id number]  Admission Date: 11/18/2019  Hospital Length of Stay: 1 days  Code Status: Full Code  Attending Provider: Armando Adamson MD  Primary Care Provider: Ben Mena MD   Principal Problem: Hypervolemia associated with renal insufficiency    Subjective:     HPI:  Ms. Beard 69 year old lady initially admitted on 11/18/19 for weakness and hyperkalemia. Patient has a hsitory of CVA, HTN, HLD, ESRD on HD (MWF), A.fib, HFpEF, and A.fib. Patient presented with leg cramping and weakness, she missed her scheduled dialysis that day (11/18/19) due to weakness. Patient's potassium was elevated and she was shifted in the ED. The patient was scheduled for dialysis on 11/19/19 however overnight she became acutely hypoxic. She was given a dose of lasix but had minimal urine output. CXR was notable for significant cardiomegaly and pulmonary edema. A rapid response was called because patient was requiring NRB to keep O2 sat over 90% and critical care medicine was consulted for further evaluation. On questioning patient was on bipap but answering questions appropriately. She states she has not been as complaint with dialysis and her diet as she could be. She denies any chest pain, nausea, vomiting, chest pain, or diarrhea. She denies any other symptoms at this time.    Hospital/ICU Course:  Patient admitted to critical care medicine with volume overload and respiratory distress requiring BiPAP. ESRD consulted and patient started on HD via upper extremity fistula. Tolerated HD well with 3L removed. 11/20: HD planned. Still requires BiPAP, desats to 80s when off.    Interval History/Significant Events: Requires BiPAP. HD scheduled. No acute distress.    Review of Systems   Constitutional: Positive for fatigue. Negative for chills, fever and unexpected weight change.   HENT: Negative for congestion, sore throat, trouble  swallowing and voice change.    Eyes: Negative for photophobia and visual disturbance.   Respiratory: Positive for shortness of breath. Negative for cough, chest tightness and wheezing.    Cardiovascular: Negative for chest pain and palpitations.   Gastrointestinal: Negative for abdominal distention, abdominal pain, constipation, diarrhea, nausea and vomiting.   Genitourinary: Negative for difficulty urinating.   Musculoskeletal: Negative for arthralgias and myalgias.   Skin: Negative for pallor and rash.   Neurological: Positive for weakness. Negative for dizziness and headaches.   Psychiatric/Behavioral: Negative for confusion.     Objective:     Vital Signs (Most Recent):  Temp: 98 °F (36.7 °C) (11/20/19 1100)  Pulse: 67 (11/20/19 1300)  Resp: 20 (11/20/19 1300)  BP: (!) 169/79 (11/20/19 1300)  SpO2: (!) 93 % (11/20/19 1315) Vital Signs (24h Range):  Temp:  [97.4 °F (36.3 °C)-99.1 °F (37.3 °C)] 98 °F (36.7 °C)  Pulse:  [62-73] 67  Resp:  [13-27] 20  SpO2:  [92 %-100 %] 93 %  BP: (122-189)/() 169/79   Weight: 109 kg (240 lb 4.8 oz)  Body mass index is 37.64 kg/m².      Intake/Output Summary (Last 24 hours) at 11/20/2019 1428  Last data filed at 11/20/2019 1300  Gross per 24 hour   Intake 929 ml   Output --   Net 929 ml       Physical Exam   Constitutional: She is oriented to person, place, and time. Vital signs are normal. She appears well-developed and well-nourished. She is cooperative. No distress.   HENT:   Head: Normocephalic and atraumatic.   Mouth/Throat: No oropharyngeal exudate.   Eyes: Pupils are equal, round, and reactive to light. Conjunctivae, EOM and lids are normal.   Neck: Normal range of motion.   Cardiovascular: Normal rate, regular rhythm, normal heart sounds, intact distal pulses and normal pulses.   No murmur heard.  Pulmonary/Chest: Effort normal and breath sounds normal. No stridor. No respiratory distress. She has no wheezes. She has no rales.   Abdominal: Soft. Normal appearance and  bowel sounds are normal. She exhibits no distension. There is no tenderness.   Obese.   Musculoskeletal: Normal range of motion. She exhibits edema. She exhibits no deformity.   Neurological: She is alert and oriented to person, place, and time. GCS eye subscore is 4. GCS verbal subscore is 5. GCS motor subscore is 6.   Skin: Skin is warm and dry. Capillary refill takes less than 2 seconds. She is not diaphoretic. No erythema.   Psychiatric: She has a normal mood and affect. Her speech is normal and behavior is normal.   Nursing note and vitals reviewed.      Vents:  Oxygen Concentration (%): 35 (11/20/19 1100)  Lines/Drains/Airways     Drain                 Hemodialysis AV Graft -- days         Hemodialysis AV Graft Right upper arm -- days          Peripheral Intravenous Line                 Peripheral IV - Single Lumen 11/18/19 1812 20 G Left Upper Arm 1 day         Peripheral IV - Single Lumen 11/19/19 0340 20 G Left Upper Arm 1 day              Significant Labs:    CBC/Anemia Profile:  Recent Labs   Lab 11/18/19  1813 11/19/19  0349 11/20/19  0311   WBC 6.81 8.36 6.47   HGB 9.0* 8.9* 8.5*   HCT 29.4* 30.0* 28.9*    198 183   MCV 98 98 99*   RDW 14.6* 14.7* 14.8*   IRON  --   --  31   FERRITIN  --   --  1,215*        Chemistries:  Recent Labs   Lab 11/19/19  0349 11/19/19  1615 11/20/19  0311    142 142   K 5.2* 5.2* 5.0    105 105   CO2 26 26 23   BUN 63* 50* 51*   CREATININE 9.8* 8.0* 8.8*   CALCIUM 9.4 9.2 9.5   ALBUMIN 3.1* 3.0* 2.9*   PROT 6.8  --  6.5   BILITOT 0.3  --  0.3   ALKPHOS 61  --  54*   ALT 27  --  22   AST 21  --  18   MG 2.1  --  2.1   PHOS 5.5* 4.6* 5.3*       All pertinent labs within the past 24 hours have been reviewed.    Significant Imaging:  I have reviewed all pertinent imaging results/findings within the past 24 hours.      ABG  Recent Labs   Lab 11/19/19  0823   PH 7.326*   PO2 51   PCO2 49.0*   HCO3 25.6   BE 0     Assessment/Plan:     Neuro  History of stroke  -  continue apixaban  - continue clopidogrel    Pulmonary  Acute respiratory failure with hypoxia and hypercarbia  Patient initially presenting with volume overload and hyperkalemia after missing dialysis on 11/18/19. Rapid response called overnight due to worsening respiratory status. Exam notable for fluid overload and respiratory distress. Requiring BiPAP to oxygenate. ABGs notable for hypercapnic respiratory acidosis with hypoxemia. ESRD consulted for emergent dialysis  Dry weight is 102 kg per patient, 106 on admit  CXR with right upper air bronchograms, / possible consolidation    - Patient received emergent dialysis, 3L removed  - Patient remains on BiPAP, desats when off  - Will discuss with nephrology about possibly removing more volume today  - Continuing empiric CAP coverage at this time,   - respiratory infection panel negative    Cardiac/Vascular  Paroxysmal atrial fibrillation  - continue apixaban    Hyperlipidemia  - continue pravastatin    Renal/  Hyperkalemia  K of 5 on presentation, shifted in ED. Dialyzed on AM of 11/19/19    - Management of dialysis per ESRD    ESRD on dialysis  - HD (M-W-F)   - Dialyzed on 11/19  - scheduled 11/20    Oncology  Anemia in end-stage renal disease  - Iron studies: Fe 31, TIBC 229, Saturated Fe 14, transferrin 155, ferritin 1215  - anemia of chronic disease    Endocrine  Type 2 diabetes mellitus with hypertension and end stage renal disease on dialysis  - Detemir U-100, 15 units  - SSI     Other  * Hypervolemia associated with renal insufficiency  Patient missed dialysis on day of admit. Patient does make small amount of urine, however did not respond to lasix. Dialysis performed at bedside in ICU, 3L removed    - ESRD consulted, appreciate their assistance  - daily renal function panels         Critical Care Daily Checklist:    A: Awake: RASS Goal/Actual Goal: RASS Goal: 0-->alert and calm  Actual: Bedoya Agitation Sedation Scale (RASS): Alert and calm   B:  Spontaneous Breathing Trial Performed?  Not intubated   C: SAT & SBT Coordinated?  N/A                     D: Delirium: CAM-ICU Overall CAM-ICU: Negative   E: Early Mobility Performed? No   F: Feeding Goal:    Status:     Current Diet Order   Procedures    Diet clear liquid      AS: Analgesia/Sedation Acetaminophen   T: Thromboembolic Prophylaxis On apixaban   H: HOB > 300 Yes   U: Stress Ulcer Prophylaxis (if needed) Protonix   G: Glucose Control SSI   B: Bowel Function Stool Occurrence: 1   I: Indwelling Catheter (Lines & Teixeira) Necessity PIVs   D: De-escalation of Antimicrobials/Pharmacotherapies Azithromycin, ceftriaxone    Plan for the day/ETD HD    Code Status:  Family/Goals of Care: Full Code  Discussed with son     Critical Care Time: 60 minutes  Critical secondary to Patient has a condition that poses threat to life and bodily function: respiratory distress, volume overload, and ESRD.      Critical care was time spent personally by me on the following activities: development of treatment plan with patient or surrogate and bedside caregivers, discussions with consultants, evaluation of patient's response to treatment, examination of patient, ordering and performing treatments and interventions, ordering and review of laboratory studies, ordering and review of radiographic studies, pulse oximetry, re-evaluation of patient's condition. This critical care time did not overlap with that of any other provider or involve time for any procedures.     Amber Almanza DNP  Critical Care Medicine  [facility]

## 2019-11-20 NOTE — ASSESSMENT & PLAN NOTE
The patient is a ESRD patient initially admitted for hyperkalemia, volume overload, and c/o weakness/LLE cramping. The decompensated overnight requiring ICU transfer for hypercapnic and hypoxic respiratory failure secondary to volume overload. The patient required BiPAP and emergent dialysis overnight. Net UF 3 L.     Nephrology History  iHD Schedule: MWF  Unit/MD:  / Dr. Madden   Duration: 3.5 hrs  EDW: 102 kg  Access: RFA AVG  Resodial Renal Function: Minimum      Assessment/Plan:   -HD today for metabolic clearance/volume management  -UF 3-3.5L as tolerated.  -may need additional ultrafiltration tomorrow, will assess tolerability with HD today.  - Recommend renal diet when appropriate   - Restart PhosLo w/ diet   - Will collect outpatient care plan for MANDIE therapy   - Continue to monitor intake and output, daily weights   - Avoid nephrotoxic medication and renal dose medications to GFR  - Will discuss with staff

## 2019-11-20 NOTE — PROGRESS NOTES
Primary nurse informed of HD.Orders and machine settings verified. Bedside HD tx started via RFA AVG.

## 2019-11-20 NOTE — PLAN OF CARE
Goals and POC established this date.   Problem: Occupational Therapy Goal  Goal: Occupational Therapy Goal  Description  Goals to be met by: 11/27/19     Patient will increase functional independence with ADLs by performing:    Pt to complete UE dressing with set-up  Pt to complete LE dressing with MIN A   Pt to complete toileting with SBA  Pt to complete t/f to BSC with SBA  Pt to tolerated OOB x 2-3 hours daily to increase endurance for functional activity.      Outcome: Ongoing, Progressing

## 2019-11-21 LAB
ALBUMIN SERPL BCP-MCNC: 3 G/DL (ref 3.5–5.2)
ALP SERPL-CCNC: 65 U/L (ref 55–135)
ALT SERPL W/O P-5'-P-CCNC: 25 U/L (ref 10–44)
ANION GAP SERPL CALC-SCNC: 14 MMOL/L (ref 8–16)
AST SERPL-CCNC: 25 U/L (ref 10–40)
BASOPHILS # BLD AUTO: 0.03 K/UL (ref 0–0.2)
BASOPHILS NFR BLD: 0.4 % (ref 0–1.9)
BILIRUB SERPL-MCNC: 0.2 MG/DL (ref 0.1–1)
BUN SERPL-MCNC: 37 MG/DL (ref 8–23)
CALCIUM SERPL-MCNC: 9.1 MG/DL (ref 8.7–10.5)
CHLORIDE SERPL-SCNC: 100 MMOL/L (ref 95–110)
CO2 SERPL-SCNC: 22 MMOL/L (ref 23–29)
CREAT SERPL-MCNC: 7.4 MG/DL (ref 0.5–1.4)
DIFFERENTIAL METHOD: ABNORMAL
EOSINOPHIL # BLD AUTO: 0.2 K/UL (ref 0–0.5)
EOSINOPHIL NFR BLD: 2.2 % (ref 0–8)
ERYTHROCYTE [DISTWIDTH] IN BLOOD BY AUTOMATED COUNT: 14.3 % (ref 11.5–14.5)
EST. GFR  (AFRICAN AMERICAN): 5.9 ML/MIN/1.73 M^2
EST. GFR  (NON AFRICAN AMERICAN): 5.2 ML/MIN/1.73 M^2
GLUCOSE SERPL-MCNC: 158 MG/DL (ref 70–110)
HCT VFR BLD AUTO: 28 % (ref 37–48.5)
HGB BLD-MCNC: 9.2 G/DL (ref 12–16)
IMM GRANULOCYTES # BLD AUTO: 0.04 K/UL (ref 0–0.04)
IMM GRANULOCYTES NFR BLD AUTO: 0.6 % (ref 0–0.5)
LYMPHOCYTES # BLD AUTO: 0.9 K/UL (ref 1–4.8)
LYMPHOCYTES NFR BLD: 12.8 % (ref 18–48)
MAGNESIUM SERPL-MCNC: 2.1 MG/DL (ref 1.6–2.6)
MCH RBC QN AUTO: 30.5 PG (ref 27–31)
MCHC RBC AUTO-ENTMCNC: 32.9 G/DL (ref 32–36)
MCV RBC AUTO: 93 FL (ref 82–98)
MONOCYTES # BLD AUTO: 0.7 K/UL (ref 0.3–1)
MONOCYTES NFR BLD: 9.3 % (ref 4–15)
NEUTROPHILS # BLD AUTO: 5.3 K/UL (ref 1.8–7.7)
NEUTROPHILS NFR BLD: 74.7 % (ref 38–73)
NRBC BLD-RTO: 0 /100 WBC
PHOSPHATE SERPL-MCNC: 4.9 MG/DL (ref 2.7–4.5)
PLATELET # BLD AUTO: 194 K/UL (ref 150–350)
PMV BLD AUTO: 10.2 FL (ref 9.2–12.9)
POCT GLUCOSE: 121 MG/DL (ref 70–110)
POCT GLUCOSE: 129 MG/DL (ref 70–110)
POCT GLUCOSE: 158 MG/DL (ref 70–110)
POTASSIUM SERPL-SCNC: 4.8 MMOL/L (ref 3.5–5.1)
PROT SERPL-MCNC: 7.3 G/DL (ref 6–8.4)
RBC # BLD AUTO: 3.02 M/UL (ref 4–5.4)
SODIUM SERPL-SCNC: 136 MMOL/L (ref 136–145)
WBC # BLD AUTO: 7.13 K/UL (ref 3.9–12.7)

## 2019-11-21 PROCEDURE — 80053 COMPREHEN METABOLIC PANEL: CPT

## 2019-11-21 PROCEDURE — 99238 HOSP IP/OBS DSCHRG MGMT 30/<: CPT | Mod: GC,,, | Performed by: INTERNAL MEDICINE

## 2019-11-21 PROCEDURE — 97161 PT EVAL LOW COMPLEX 20 MIN: CPT

## 2019-11-21 PROCEDURE — 85025 COMPLETE CBC W/AUTO DIFF WBC: CPT

## 2019-11-21 PROCEDURE — 83735 ASSAY OF MAGNESIUM: CPT

## 2019-11-21 PROCEDURE — 63600175 PHARM REV CODE 636 W HCPCS: Performed by: NURSE PRACTITIONER

## 2019-11-21 PROCEDURE — 11000001 HC ACUTE MED/SURG PRIVATE ROOM

## 2019-11-21 PROCEDURE — 94761 N-INVAS EAR/PLS OXIMETRY MLT: CPT

## 2019-11-21 PROCEDURE — 27000221 HC OXYGEN, UP TO 24 HOURS

## 2019-11-21 PROCEDURE — 99238 PR HOSPITAL DISCHARGE DAY,<30 MIN: ICD-10-PCS | Mod: GC,,, | Performed by: INTERNAL MEDICINE

## 2019-11-21 PROCEDURE — 90935 PR HEMODIALYSIS, ONE EVALUATION: ICD-10-PCS | Mod: ,,, | Performed by: NURSE PRACTITIONER

## 2019-11-21 PROCEDURE — 90935 HEMODIALYSIS ONE EVALUATION: CPT

## 2019-11-21 PROCEDURE — 97530 THERAPEUTIC ACTIVITIES: CPT

## 2019-11-21 PROCEDURE — 90935 HEMODIALYSIS ONE EVALUATION: CPT | Mod: ,,, | Performed by: NURSE PRACTITIONER

## 2019-11-21 PROCEDURE — 63600175 PHARM REV CODE 636 W HCPCS: Performed by: PHYSICIAN ASSISTANT

## 2019-11-21 PROCEDURE — 25000003 PHARM REV CODE 250: Performed by: PHYSICIAN ASSISTANT

## 2019-11-21 PROCEDURE — 84100 ASSAY OF PHOSPHORUS: CPT

## 2019-11-21 PROCEDURE — 36415 COLL VENOUS BLD VENIPUNCTURE: CPT

## 2019-11-21 RX ORDER — SODIUM CHLORIDE 9 MG/ML
INJECTION, SOLUTION INTRAVENOUS
Status: DISCONTINUED | OUTPATIENT
Start: 2019-11-21 | End: 2019-11-26

## 2019-11-21 RX ORDER — SODIUM CHLORIDE 9 MG/ML
INJECTION, SOLUTION INTRAVENOUS ONCE
Status: COMPLETED | OUTPATIENT
Start: 2019-11-21 | End: 2019-11-21

## 2019-11-21 RX ADMIN — SERTRALINE HYDROCHLORIDE 100 MG: 100 TABLET ORAL at 08:11

## 2019-11-21 RX ADMIN — Medication 6 MG: at 11:11

## 2019-11-21 RX ADMIN — AZITHROMYCIN MONOHYDRATE 500 MG: 500 INJECTION, POWDER, LYOPHILIZED, FOR SOLUTION INTRAVENOUS at 03:11

## 2019-11-21 RX ADMIN — APIXABAN 2.5 MG: 2.5 TABLET, FILM COATED ORAL at 08:11

## 2019-11-21 RX ADMIN — CALCIUM ACETATE 667 MG: 667 CAPSULE ORAL at 08:11

## 2019-11-21 RX ADMIN — LIDOCAINE 1 PATCH: 50 PATCH TOPICAL at 08:11

## 2019-11-21 RX ADMIN — PANTOPRAZOLE SODIUM 40 MG: 40 TABLET, DELAYED RELEASE ORAL at 08:11

## 2019-11-21 RX ADMIN — NEPHROCAP 1 CAPSULE: 1 CAP ORAL at 08:11

## 2019-11-21 RX ADMIN — CALCIUM ACETATE 667 MG: 667 CAPSULE ORAL at 12:11

## 2019-11-21 RX ADMIN — ISOSORBIDE MONONITRATE 30 MG: 30 TABLET, EXTENDED RELEASE ORAL at 08:11

## 2019-11-21 RX ADMIN — GABAPENTIN 300 MG: 300 CAPSULE ORAL at 08:11

## 2019-11-21 RX ADMIN — ACETAMINOPHEN 650 MG: 325 TABLET ORAL at 05:11

## 2019-11-21 RX ADMIN — METOPROLOL SUCCINATE 50 MG: 50 TABLET, EXTENDED RELEASE ORAL at 08:11

## 2019-11-21 RX ADMIN — CEFTRIAXONE SODIUM 1 G: 1 INJECTION, POWDER, FOR SOLUTION INTRAMUSCULAR; INTRAVENOUS at 03:11

## 2019-11-21 RX ADMIN — CLOPIDOGREL BISULFATE 75 MG: 75 TABLET ORAL at 08:11

## 2019-11-21 RX ADMIN — SODIUM CHLORIDE 250 ML: 0.9 INJECTION, SOLUTION INTRAVENOUS at 03:11

## 2019-11-21 RX ADMIN — INSULIN DETEMIR 15 UNITS: 100 INJECTION, SOLUTION SUBCUTANEOUS at 08:11

## 2019-11-21 RX ADMIN — PRAVASTATIN SODIUM 80 MG: 40 TABLET ORAL at 08:11

## 2019-11-21 RX ADMIN — ACETAMINOPHEN 650 MG: 325 TABLET ORAL at 12:11

## 2019-11-21 RX ADMIN — SENNOSIDES AND DOCUSATE SODIUM 1 TABLET: 8.6; 5 TABLET ORAL at 08:11

## 2019-11-21 NOTE — PLAN OF CARE
11/21/19 1100   Discharge Assessment   Assessment Type Discharge Planning Assessment   Confirmed/corrected address and phone number on facesheet? Yes   Assessment information obtained from? Patient   Expected Length of Stay (days) 3   Communicated expected length of stay with patient/caregiver yes   Prior to hospitilization cognitive status: Alert/Oriented   Prior to hospitalization functional status: Independent;Assistive Equipment   Current cognitive status: Alert/Oriented   Current Functional Status: Independent;Assistive Equipment   Lives With child(sissy), dependent  (sitter)   Able to Return to Prior Arrangements yes   Is patient able to care for self after discharge? Yes  (with assistance)   Who are your caregiver(s) and their phone number(s)? Cristi duarte [phone]   Patient's perception of discharge disposition home health   Readmission Within the Last 30 Days no previous admission in last 30 days   Patient currently being followed by outpatient case management? Unable to determine (comments)   Patient currently receives any other outside agency services? Yes  ([facility])   Equipment Currently Used at Home oxygen;walker, rolling;wheelchair  (O2 4L cont. per NC)   Do you have any problems affording any of your prescribed medications? No   Is the patient taking medications as prescribed? yes   Does the patient have transportation home? Yes   Transportation Anticipated family or friend will provide   Dialysis Name and Scheduled days [facility]    Does the patient receive services at the [facility]? No   Discharge Plan A Home Health;Home with family   Discharge Plan B Home Health;Home with family   DME Needed Upon Discharge  none   Patient/Family in Agreement with Plan yes

## 2019-11-21 NOTE — RESIDENT HANDOFF
ICU Transfer of Care Note  Critical Care Medicine    Admit Date: 11/18/2019  LOS: 1    CC: Hypervolemia associated with renal insufficiency    Code Status: Full Code     Transfer to Hospital Medicine.     HPI and Hospital Course:     HPI:  Ms. Beard 69 year old lady initially admitted on 11/18/19 for weakness and hyperkalemia. Patient has a hsitory of CVA, HTN, HLD, ESRD on HD (MWF), A.fib, HFpEF, and A.fib. Patient presented with leg cramping and weakness, she missed her scheduled dialysis that day (11/18/19) due to weakness. Patient's potassium was elevated and she was shifted in the ED. The patient was scheduled for dialysis on 11/19/19 however overnight she became acutely hypoxic. She was given a dose of lasix but had minimal urine output. CXR was notable for significant cardiomegaly and pulmonary edema. A rapid response was called because patient was requiring NRB to keep O2 sat over 90% and critical care medicine was consulted for further evaluation. On questioning patient was on bipap but answering questions appropriately. She states she has not been as complaint with dialysis and her diet as she could be. She denies any chest pain, nausea, vomiting, chest pain, or diarrhea. She denies any other symptoms at this time.    Hospital/ICU Course:  Patient admitted to critical care medicine with volume overload and respiratory distress requiring BiPAP. ESRD consulted and patient started on HD via upper extremity fistula. Tolerated HD well with 3L removed. 11/20: HD planned. Still requires BiPAP, desats to 80s when off.      To Follow Up:     HD today, UF 3L., M-W-F  Recheck electrolytes, K 5.0, Phos 5.3  Monitor O2  Empiric Abx for lower respiratory infection      Discharge Plan:     To be determined.    Call with questions.   [phone] or [phone]

## 2019-11-21 NOTE — NURSING
Daughter April would like to be called back by primary team, had a few concerns:     1) Pt has had neck pain and daughter stated there may be a lump in the back of her neck, pain goes down to right side of body.      2) Pt is unable to walk or feel both feet     3) Pt stumped her 3rd toe on the right foot, would like that to be addressed.      April's number is [phone].  She would like to be called back prior to pt getting discharged.

## 2019-11-21 NOTE — PLAN OF CARE
HD currently. Pt resting comfortably in bed. Pt AAOx4, follows commands, and moves all extremities. All VSS at this time. HR 60-70s NSR. SBP <185 maintained per order. Pt on 3L NC, sats >93%. Afebrile. Pt OOBTC for approximately 4 hours this shift. UO ~200cc this shift. Renal diet, tolerating well. ACHS accuchecks. Plan of care reviewed. Questions and concerns addressed. Will continue to monitor.

## 2019-11-21 NOTE — PT/OT/SLP EVAL
"Physical Therapy Evaluation    Patient Name:  Angelina Beard   MRN:  [id number]    Recommendations:     Discharge Recommendations:  home health PT   Discharge Equipment Recommendations: hospital bed   Barriers to discharge: decreased functional mobility requiring increased assist      Assessment:     Angelina Beard is a 69 y.o. female admitted with a medical diagnosis of Hypervolemia associated with renal insufficiency.  She presents with the following impairments/functional limitations:  weakness, gait instability, impaired cardiopulmonary response to activity, impaired endurance, impaired balance, decreased lower extremity function, decreased safety awareness, impaired self care skills, impaired functional mobilty. Pt oriented x4 on this date reporting she feels she is near baseline. At baseline pt reports she is primarily bed bound requiring assist with bathing, dressing, and [place] [facility] Pt is non-ambulatory and has sitters x7 day/wk from 7a.m.-7p.m. Pt performed supine to sit SBA but required multiple attempts and Max A to perform sit-to-stand. Pt states "they put that belt on me and yank me up" and "it usually takes me a few times at home before I can stand". Pt reports she is current with  and would like to resume service upon discharge. Pt additionally request to have Hosp. Bed to decrease burden of care. Pt would benefit from continued skilled acute PT 2x/wk to improve functional mobility.  Recommending pt receive PT services in HHPT setting following discharge from hospital once medically cleared.     Rehab Prognosis: Fair; patient would benefit from acute skilled PT services to address these deficits and reach maximum level of function.    Recent Surgery: * No surgery found *      Plan:     During this hospitalization, patient to be seen 2 x/week to address the identified rehab impairments via gait training, therapeutic activities, therapeutic exercises, neuromuscular re-education and progress " "toward the following goals:    · Plan of Care Expires:  12/21/19    Subjective     Chief Complaint: global weakness  Patient/Family Comments/goals: "they put that belt on me and yank me up" and "it usually takes me a few times at home before I can stand"  Pain/Comfort:  · Pain Rating 1: 0/10    Patients cultural, spiritual, Druze conflicts given the current situation: no    Living Environment:  Pt lives with son in 2 story apartment but resides on 1st level.   Prior to admission, patients level of function was primarily bed bound requiring assist with bathing, dressing, and transfers(to [facility]). Pt is non-ambulatory and has sitters x7 day/wk from 7a.m.-7p.m..  Equipment used at home: walker, rolling, rollator, cane, straight, shower chair, wheelchair, oxygen, raised toilet.  DME owned (not currently used): none.  Upon discharge, patient will have assistance from son and sitters.    Objective:     Communicated with RN prior to session.  Patient found HOB elevated with oxygen  upon PT entry to room.    General Precautions: Standard, fall   Orthopedic Precautions:N/A   Braces: N/A     Exams:  · RLE ROM: WFL  · RLE Strength: 4/5  · LLE ROM: WFL  · LLE Strength: 3+/5    Functional Mobility:  · Bed Mobility:     · Rolling Right: stand by assistance  · Scooting EOB: moderate assistance  · Scooting supine to HOB: Max A  · Supine to Sit: SBA  · Sit to Supine: minimum assistance  · Transfers:     · Sit to Stand:  maximal assistance(on 4th attempt, unable on 1-3 attempts) with rolling walker  · Gait: ~side steps with RW and Max RW management with VC for LE placement to widen BRYCE  · Balance: SBA      Therapeutic Activities and Exercises:   - Scooting EOB Mod A   - Sitting EOB ~6mins SBA  - Pt educated on:   -PT roles, expectations, and POC    -Safety with mobility   -Benefits of OOB activities to increase strength and functional mobility    -Performing ther ex for increasing LE ROM and strength   -Discharge recommendations "     AM-PAC 6 CLICK MOBILITY  Total Score:11     Patient left HOB elevated with all lines intact and call button in reach.    GOALS:   Multidisciplinary Problems     Physical Therapy Goals        Problem: Physical Therapy Goal    Goal Priority Disciplines Outcome Goal Variances Interventions   Physical Therapy Goal     PT, PT/OT Ongoing, Progressing     Description:  Goals to be met by: 2019    Patient will increase functional independence with mobility by performin. Sit to supine with Stand-by Assistance  2. Sit to stand transfer with Minimal Assistance  3. Lower extremity exercise program x15 reps per handout, with independence                       History:     Past Medical History:   Diagnosis Date    Anemia due to gastrointestinal blood loss 2018    Arthritis     Asthma     Breast cancer, left     CHF (congestive heart failure)     Cholelithiasis     Coronary artery disease     Diabetes mellitus, type 2     Encounter for blood transfusion     End-stage renal disease on hemodialysis     Hemodialysis access site with arteriovenous graft         Hyperlipidemia     Hypertension     Sarcoidosis        Past Surgical History:   Procedure Laterality Date    av graft      left arm    BRACHIAL ARTERY GRAFT Left 2016    brachiocephalic, Dr. Anson Crocker    BREAST BIOPSY Left     breast ca    BREAST LUMPECTOMY Left     radiation only    btl      COLONOSCOPY N/A 2018    Procedure: COLONOSCOPY;  Surgeon: Almita Bee MD;  Location: [facility] ENDO;  Service: Endoscopy;  Laterality: N/A;    ELBOW SURGERY      left    ESOPHAGOGASTRODUODENOSCOPY N/A 2018    Procedure: ESOPHAGOGASTRODUODENOSCOPY (EGD);  Surgeon: Almita Bee MD;  Location: [facility] ENDO;  Service: Endoscopy;  Laterality: N/A;    LIPOMA RESECTION      back of neck    pilondial cyst      TOTAL KNEE ARTHROPLASTY Right 2016       Time Tracking:     PT Received On: 19  PT Start Time:  0920     PT Stop Time: 0938  PT Total Time (min): 18 min     Billable Minutes: Evaluation 10 and Therapeutic Activity 8      Jayden Adler, PT  11/21/2019

## 2019-11-21 NOTE — CONSULTS
Consult received per MD for skin breakdown to 3rd toe nail.     Pt was admitted on 11/18/19 with hypervolemia associated with renal insufficiency. Pt has a PMHx of of CVA, HTN, HLD, ESRD on HD (MWF), A.fib, HFpEF, and A.fib.    Received pt awake and alert and pending transport to dialysis.     Upon assessment of bilateral toes: long toe nails with jagged edges noted to bilateral feet. Pt has a band aid noted to her right 3rd toe. Upon removal of band aid to right 3rd toe dried blood noted beneath nail bed with nail stable without any drainage or open areas or visible signs of infection noted. No open areas noted to surrounding right toes, in between right toes or bilateral right heels at this time.      To left 3rd toe, nail has a split and jagged edge noted to the wound bed. No open areas noted to surrounding left toes, in between left toes or bilateral left heels at this time.    (see photos below)    Encouraged pt to follow up with out patient Podiatry to have her toe nails trimmed. Pt is a diabetic and requires her nails to be trimmed by a professional who knows how to care for Diabetic Feet.      Spoke with Dr. Andreea Ferreira with the Internal Medicine Team and recommended at Podiatry consult to have pt's nails trimmed properly or for pt to have an appointment made post discharge with outpatient Podiatry to have toe nails trimmed. Dr. Ferreira stated she would recommend that pt will follow Podiatry out patient.     Wound care to sign off at this time. Please re-consult if further assistance is needed. [id number]    Right 3rd to        Left 3rd Toe

## 2019-11-21 NOTE — DISCHARGE SUMMARY
[facility] Hospital Medicine  Discharge Summary      Patient Name: Angelina Beard  MRN: [id number]  Admission Date: 11/18/2019  Hospital Length of Stay: 2 days  Discharge Date and Time:  11/21/2019 4:35 PM  Attending Physician: Chase Beard MD   Discharging Provider: Atnonio Ferreira MD  Primary Care Provider: Ben Mena MD  [facility] Medicine Team: Networked reference to record PCT  Antonio Ferreira MD    HPI: Ms. Lozada is a 69 year-old AA female with CVA, ESRD (on HD, MWF), HLD, AFib, HFpEF, HTN, and T2DM who was admitted on 11/18 with complaints of weakness and LLE cramping. She missed her HD treatment on 11/18 due to the aforementioned complaints. In the ED, she was found to be hyperkalemic (5.6). She was shifted in the ED and her repeat potassium was 5.0 after medical management. Overnight, the patient became acutely hypoxic despite NC at 3 L. Her oxygen saturations were in the 50s despite supplemental oxygen. Her CXR was notable for significant cardiomegaly and pulmonary edema. She was given a dose of lasix but had minimal urine output. ABGs notable for hypercapnic respiratory acidosis with hypoxemia. A rapid response was called on 11/19/19 and the patient was stepped up to the MICU for acute hypercapnic and hypoxic respiratory failure secondary to volume overload. The patient was placed on BiPAP and emergent HD was performed at bedside with 3 L being removed. The patient remains on BiPAP this AM of 11/20/19 but states that her shortness of breath is much improved. She is a MWF dialysis patient of Dr. Madden at [facility]. She states that she dialyzes for 3.5 hrs via a RFA AVG. Her dry weight is about ~ 102 kg. She states that she last dialyze on Friday, 11/15. She missed her dialysis on Monday 11/18/19 because of generalize weakness and lower extremity cramps.    Hospital Course:   Patient dialyzed emergently on 11/19/19 and 3.0 L was removed. BIPAP transitioned to nasal canula on  11/21/19 on 2.5 L home oxygen requirements. Patient had a repeat dialysis on 11/20/19 and another 3.0 L removed. Patient stepped down to hospital medicine team on 11/21/19.     11/21/2019: Patient seen and examined at the bedside. She did not have any cardiopulmonary distress. Continued to require 2.5 L oxygen at baseline. Per nephrology reccs, patient to receive dialysis today 11/21/19 for 3 hours. After dialysis, patient assessed to be stable for discharge and resume dialysis 3 x/week MWF. Case management informed and home health PT/OT oxygen orders placed.   -- Patient requested replacement of her wheelchair, per case management, patient should get a repair by DME within 3 years of issuance of supplies  -- Patient daughter April (Ph [phone]) had concerns of skin breakdown on right foot. Wound care consulted and no wounds/skin breakdown seen. Patient has poor foot care and underlying ESRD, DM and a podiatry ambulatory referral given upon discharge.     Review of Systems   Constitutional: Positive for fatigue. Negative for chills, fever and unexpected weight change.   HENT: Negative for sinus pressure, sneezing and sore throat.    Eyes: Negative for visual disturbance.   Respiratory: Positive for shortness of breath. Negative for cough, chest tightness and wheezing.    Cardiovascular: Positive for leg swelling. Negative for chest pain and palpitations.   Gastrointestinal: Negative for abdominal pain, blood in stool, constipation, diarrhea, nausea and vomiting.   Genitourinary: Negative for difficulty urinating.   Musculoskeletal: Negative for arthralgias and myalgias.   Skin: Negative for pallor, rash and wound.   Neurological: Negative for weakness and headaches.   Hematological: Negative for adenopathy.   Psychiatric/Behavioral: Negative for agitation, behavioral problems, confusion and decreased concentration.      Objective:      BP (!) 163/80   Pulse 74   Temp 98.1 °F (36.7 °C) (Oral)   Resp 14   Ht  "5' 7" (1.702 m)   Wt 109 kg (240 lb 4.8 oz)   LMP  (LMP Unknown)   SpO2 (!) 89%   Breastfeeding? No   BMI 37.64 kg/m²     Physical Exam   Constitutional: She is oriented to person, place, and time. She appears well-developed and well-nourished.   HENT:   Head: Normocephalic and atraumatic.   Right Ear: External ear normal.   Left Ear: External ear normal.   Mouth/Throat: No oropharyngeal exudate.   Eyes: Right eye exhibits no discharge. Left eye exhibits no discharge. No scleral icterus.   Neck: Normal range of motion.   Pulmonary/Chest: No stridor. She is in respiratory distress (improved). She has no wheezes. She has rales.   Abdominal: Soft. Bowel sounds are normal. She exhibits no distension and no mass. There is no tenderness. There is no guarding.   Musculoskeletal: Normal range of motion. She exhibits edema. She exhibits no deformity.   Neurological: She is alert and oriented to person, place, and time.   Skin: Skin is warm and dry. No rash noted. No erythema.   Psychiatric: She has a normal mood and affect. Her behavior is normal.   Nursing note and vitals reviewed    Consults:   Consults (From admission, onward)        Status Ordering Provider     Inpatient consult to Critical Care Medicine  Once     Provider:  (Not yet assigned)    BISHNU Addison.     Inpatient consult to Nephrology  Once     Provider:  (Not yet assigned)    Completed BISHNU UNDERWOOD     Inpatient consult to Social Work  Once     Provider:  (Not yet assigned)    Acknowledged BISHNU UNDERWOOD        Final Active Diagnoses:    Diagnosis Date Noted POA    PRINCIPAL PROBLEM:  Hypervolemia associated with renal insufficiency [E87.70, N28.9] 10/16/2019 Yes    Weakness of left lower extremity [R29.898] 11/19/2019 Yes    Acute respiratory failure with hypoxia and hypercarbia [J96.01, J96.02]  No    Hyperkalemia [E87.5] 11/18/2019 Yes    History of stroke [Z86.73]  Not Applicable     Chronic    ESRD on dialysis " [N18.6, Z99.2]  Not Applicable    Paroxysmal atrial fibrillation [I48.0] 06/21/2018 Yes     Chronic    Anemia in end-stage renal disease [N18.6, D63.1] 03/06/2016 Yes     Chronic    Renovascular hypertension [I15.0] 03/04/2016 Yes     Chronic    Coronary artery disease [I25.10] 02/18/2016 Yes     Chronic    Chronic diastolic congestive heart failure [I50.32] 07/02/2013 Yes     Chronic    GERD (gastroesophageal reflux disease) [K21.9] 07/02/2013 Yes     Chronic    Type 2 diabetes mellitus with hypertension and end stage renal disease on dialysis [E11.22, I12.0, Z99.2, N18.6] 07/01/2013 Not Applicable     Chronic    Hyperlipidemia [E78.5] 07/01/2013 Yes     Chronic      Problems Resolved During this Admission:     Acute respiratory failure with hypoxia and hypercarbia  Patient initially presenting with volume overload and hyperkalemia after missing dialysis on 11/18/19. Rapid response called overnight due to worsening respiratory status. Exam notable for fluid overload and respiratory distress. Requiring BiPAP to oxygenate. ABGs notable for hypercapnic respiratory acidosis with hypoxemia. ESRD consulted for emergent dialysis  Dry weight is 102 kg per patient, 106 on admit  CXR with right upper air bronchograms, / possible consolidation  - Patient received emergent dialysis, 3L removed  - Patient initially on BiPAP, weaned off to oxygen 2.5 L (home oxygen)  Plan:  - Continue home oxygen use  - CPAP QHS      H/O CVA  Paroxysmal atrial fibrillation  - continue apixaban  - Continue Clopidogrel      Hyperlipidemia  - continue pravastatin     Renal/  Hyperkalemia  K of 5 on presentation, shifted in ED. Dialyzed on AM of 11/19/19  - Management of dialysis per ESRD     ESRD on dialysis  - HD (M-W-F)   - Dialyzed on 11/19  - scheduled 11/20  - Maintenance 11/21  - resume dialysis 3 times a week upon discharge      Anemia in end-stage renal disease  - Iron studies: Fe 31, TIBC 229, Saturated Fe 14, transferrin 155,  ferritin 1215  - anemia of chronic disease  - monitor for now     Endocrine  Type 2 diabetes mellitus with hypertension and end stage renal disease on dialysis  - Detemir U-100, 15 units  - resume upon discharge   - podiatry referral upon discharge      Other  * Hypervolemia associated with renal insufficiency  Patient missed dialysis on day of admit. Patient does make small amount of urine, however did not respond to lasix. Dialysis performed at bedside   Plan  - resume dialysis 3 times a week    Discharged Condition: fair    Disposition: Home or Self Care    Follow Up:  Follow-up Information     Ben Mena MD In 1 week.    Specialty:  Internal Medicine  Why:  Post discharge follow up  Contact information:  [address]  [address]  [place] [address]  [phone]             Please follow up.    Contact information:  continue HD MWF               Patient Instructions:      Ambulatory Referral to Podiatry   Referral Priority: Routine Referral Type: Consultation   Referral Reason: Specialty Services Required   Requested Specialty: Podiatry   Number of Visits Requested: 1     Diet renal     Notify your health care provider if you experience any of the following:  temperature >100.4     Notify your health care provider if you experience any of the following:  persistent nausea and vomiting or diarrhea     Notify your health care provider if you experience any of the following:  severe uncontrolled pain     Notify your health care provider if you experience any of the following:  redness, tenderness, or signs of infection (pain, swelling, redness, odor or green/yellow discharge around incision site)     Notify your health care provider if you experience any of the following:  difficulty breathing or increased cough     Notify your health care provider if you experience any of the following:  severe persistent headache     Notify your health care provider if you experience any of the following:  worsening rash      Notify your health care provider if you experience any of the following:  persistent dizziness, light-headedness, or visual disturbances     Notify your health care provider if you experience any of the following:  increased confusion or weakness     Activity as tolerated     Significant Diagnostic Studies: Labs:   BMP:   Recent Labs   Lab 11/20/19  0311 11/21/19  1215   GLU 73 158*    136   K 5.0 4.8    100   CO2 23 22*   BUN 51* 37*   CREATININE 8.8* 7.4*   CALCIUM 9.5 9.1   MG 2.1 2.1   , CMP   Recent Labs   Lab 11/20/19  0311 11/21/19  1215    136   K 5.0 4.8    100   CO2 23 22*   GLU 73 158*   BUN 51* 37*   CREATININE 8.8* 7.4*   CALCIUM 9.5 9.1   PROT 6.5 7.3   ALBUMIN 2.9* 3.0*   BILITOT 0.3 0.2   ALKPHOS 54* 65   AST 18 25   ALT 22 25   ANIONGAP 14 14   ESTGFRAFRICA 4.8* 5.9*   EGFRNONAA 4.2* 5.2*   , CBC   Recent Labs   Lab 11/20/19 0311 11/21/19  1215   WBC 6.47 7.13   HGB 8.5* 9.2*   HCT 28.9* 28.0*    194   , INR   Lab Results   Component Value Date    INR 1.3 (H) 07/29/2019    INR 1.0 03/03/2018    INR 1.0 04/17/2016   , Lipid Panel   Lab Results   Component Value Date    CHOL 150 05/07/2019    HDL 50 05/07/2019    LDLCALC 81.0 05/07/2019    TRIG 95 05/07/2019    CHOLHDL 33.3 05/07/2019   , Troponin   Recent Labs   Lab 11/19/19  0349   TROPONINI 0.038*   , A1C:   Recent Labs   Lab 11/19/19  0349   HGBA1C 5.8*    and All labs within the past 24 hours have been reviewed  Microbiology:   Blood Culture   Lab Results   Component Value Date    LABBLOO No growth after 5 days. 08/19/2016    and Urine Culture    Lab Results   Component Value Date    LABURIN  11/08/2016     Multiple organisms isolated. None in predominance.  Repeat if    LABURIN clinically necessary. 11/08/2016       Pending Diagnostic Studies:     Procedure Component Value Units Date/Time    Comprehensive metabolic panel [[id number]] Collected:  11/19/19 0349    Order Status:  Sent Lab Status:  In process  Updated:  11/19/19 0349    Specimen:  Blood     Narrative:       Collection has been rescheduled by AM2 at 11/19/2019 03:18 Reason:   unable to collect, hard stick         Medications:  Reconciled Home Medications:      Medication List      CONTINUE taking these medications    acetaminophen 500 MG tablet  Commonly known as:  TYLENOL  Take 2 tablets (1,000 mg total) by mouth every 8 (eight) hours as needed.     amLODIPine 10 MG tablet  Commonly known as:  NORVASC  Take 10 mg by mouth every Tuesday, Thursday, Saturday, Sunday.     apixaban 2.5 mg Tab  Commonly known as:  ELIQUIS  Take 1 tablet (2.5 mg total) by mouth 2 (two) times daily.     calcium acetate 667 mg capsule  Commonly known as:  PHOSLO  Take 1 capsule (667 mg total) by mouth 3 (three) times daily with meals.     clopidogrel 75 mg tablet  Commonly known as:  PLAVIX  Take 1 tablet (75 mg total) by mouth once daily.     docusate sodium 100 MG capsule  Commonly known as:  COLACE  Take 1 capsule (100 mg total) by mouth 2 (two) times daily.     furosemide 40 MG tablet  Commonly known as:  LASIX  Take 1 tablet (40 mg total) by mouth once daily.     gabapentin 300 MG capsule  Commonly known as:  NEURONTIN  Take 1 capsule (300 mg total) by mouth every 8 (eight) hours as needed.     insulin glargine 100 units/mL (3mL) SubQ pen  Commonly known as:  Lantus Solostar U-100 Insulin  Inject 20 Units into the skin once daily.     isosorbide mononitrate 30 MG 24 hr tablet  Commonly known as:  IMDUR  Take 1 tablet (30 mg total) by mouth once daily.     metoprolol succinate 50 MG 24 hr tablet  Commonly known as:  TOPROL-XL  Take 1 tablet (50 mg total) by mouth once daily.     mupirocin 2 % ointment  Commonly known as:  BACTROBAN  by Nasal route 2 (two) times daily.     nitroGLYCERIN 0.4 MG SL tablet  Commonly known as:  NITROSTAT  Place 1 tablet (0.4 mg total) under the tongue every 5 (five) minutes as needed for Chest pain.     pantoprazole 40 MG tablet  Commonly known as:   PROTONIX  Take 1 tablet (40 mg total) by mouth once daily.     polyethylene glycol 17 gram/dose powder  Commonly known as:  GLYCOLAX     pravastatin 80 MG tablet  Commonly known as:  PRAVACHOL  Take 1 tablet (80 mg total) by mouth once daily.     sertraline 100 MG tablet  Commonly known as:  ZOLOFT  Take 1 tablet (100 mg total) by mouth once daily.     vitamin renal formula (B-complex-vitamin c-folic acid) 1 mg Cap  Commonly known as:  NEPHROCAP  Take 1 capsule by mouth once daily.          Indwelling Lines/Drains at time of discharge:   Lines/Drains/Airways     Drain                 Hemodialysis AV Graft -- days         Hemodialysis AV Graft Right forearm -- days         Hemodialysis AV Graft Right upper arm -- days              Time spent on the discharge of patient: 45 minutes  Patient was seen and examined on the date of discharge and determined to be suitable for discharge.    Antonio Ferreira MD   Internal Medicine PGY 3  Department of Hospital Medicine  [facility]

## 2019-11-21 NOTE — PLAN OF CARE
HD tx completed,dialyzed for 3hrs with fluid removal of 3L and pt tolerated well. Blood rinsed back,15g needle removed x2,gauze and tape applied with pressure to each site un til hemostasis achieved. Report given to the primary nurse.

## 2019-11-21 NOTE — PLAN OF CARE
SW faxed referral to [facility] via  for review. NEETA will continue to follow.      11/21/19 2175   Post-Acute Status   Post-Acute Authorization Home Health/Hospice   Home Health/Hospice Status Referrals Sent     Isaura Mc LMSW   - [facility]  Ext. [phone]

## 2019-11-21 NOTE — PLAN OF CARE
[facility]  HOME HEALTH ORDERS  FACE TO FACE ENCOUNTER    Patient Name: Angelina Beard  YOB: 1950    PCP: Ben Mena MD   PCP Address: [address] [place] [address] [address] / [place] [address]  PCP Phone Number: [phone]  PCP Fax: [phone]    Encounter Date: 11/21/2019    Admit to Home Health    Diagnoses:  Active Hospital Problems    Diagnosis  POA    *Hypervolemia associated with renal insufficiency [E87.70, N28.9]  Yes    Weakness of left lower extremity [R29.898]  Yes    Acute respiratory failure with hypoxia and hypercarbia [J96.01, J96.02]  No    Hyperkalemia [E87.5]  Yes    History of stroke [Z86.73]  Not Applicable     Chronic    ESRD on dialysis [N18.6, Z99.2]  Not Applicable    Paroxysmal atrial fibrillation [I48.0]  Yes     Chronic    Anemia in end-stage renal disease [N18.6, D63.1]  Yes     Chronic    Renovascular hypertension [I15.0]  Yes     Chronic    Coronary artery disease [I25.10]  Yes     Chronic    Chronic diastolic congestive heart failure [I50.32]  Yes     Chronic    GERD (gastroesophageal reflux disease) [K21.9]  Yes     Chronic    Type 2 diabetes mellitus with hypertension and end stage renal disease on dialysis [E11.22, I12.0, Z99.2, N18.6]  Not Applicable     Chronic    Hyperlipidemia [E78.5]  Yes     Chronic      Resolved Hospital Problems   No resolved problems to display.     No future appointments.  Follow-up Information     Ben Mena MD In 1 week.    Specialty:  Internal Medicine  Why:  Post discharge follow up  Contact information:  [facility]  [address]  [place] [id number] [phone]           Please follow up.    Contact information:  continue HD MWF               I have seen and examined this patient face to face today. My clinical findings that support the need for the home health skilled services and home bound status are the following:  Weakness/numbness causing balance and gait disturbance due to Weakness/Debility  making it taxing to leave home.  Requiring assistive device to leave home due to unsteady gait caused by  Weakness/Debility.  Medical restrictions requiring assistance of another human to leave home due to  Home oxygen requirement.    Allergies:Review of patient's allergies indicates:  No Known Allergies    Diet: renal diet    Activities: activity as tolerated    CONSULTS:    Physical Therapy to evaluate and treat. Evaluate for home safety and equipment needs; Establish/upgrade home exercise program. Perform / instruct on therapeutic exercises, gait training, transfer training, and Range of Motion.  Occupational Therapy to evaluate and treat. Evaluate home environment for safety and equipment needs. Perform/Instruct on transfers, ADL training, ROM, and therapeutic exercises.    MISCELLANEOUS CARE:  Home Oxygen:  Oxygen at 4 L/min nasal canula to be used:  Continuously.    Medications: Review discharge medications with patient and family and provide education.      Current Discharge Medication List      CONTINUE these medications which have NOT CHANGED    Details   acetaminophen (TYLENOL) 500 MG tablet Take 2 tablets (1,000 mg total) by mouth every 8 (eight) hours as needed.  Refills: 0      amLODIPine (NORVASC) 10 MG tablet Take 10 mg by mouth every Tuesday, Thursday, Saturday, Sunday.      apixaban (ELIQUIS) 2.5 mg Tab Take 1 tablet (2.5 mg total) by mouth 2 (two) times daily.  Qty: 180 tablet, Refills: 3    Associated Diagnoses: Chronic atrial fibrillation      calcium acetate (PHOSLO) 667 mg capsule Take 1 capsule (667 mg total) by mouth 3 (three) times daily with meals.  Qty: 270 capsule, Refills: 4      clopidogrel (PLAVIX) 75 mg tablet Take 1 tablet (75 mg total) by mouth once daily.  Qty: 30 tablet, Refills: 11      docusate sodium (COLACE) 100 MG capsule Take 1 capsule (100 mg total) by mouth 2 (two) times daily.  Refills: 0      gabapentin (NEURONTIN) 300 MG capsule Take 1 capsule (300 mg total) by mouth  every 8 (eight) hours as needed.  Qty: 90 capsule, Refills: 3    Associated Diagnoses: Osteoarthritis of both knees, unspecified osteoarthritis type      insulin (LANTUS SOLOSTAR U-100 INSULIN) glargine 100 units/mL (3mL) SubQ pen Inject 20 Units into the skin once daily.  Qty: 3 Syringe, Refills: 3    Associated Diagnoses: Type 2 diabetes mellitus with hypertension and end stage renal disease on dialysis      isosorbide mononitrate (IMDUR) 30 MG 24 hr tablet Take 1 tablet (30 mg total) by mouth once daily.  Qty: 30 tablet, Refills: 11      metoprolol succinate (TOPROL-XL) 50 MG 24 hr tablet Take 1 tablet (50 mg total) by mouth once daily.  Qty: 90 tablet, Refills: 3    Associated Diagnoses: Hypertension associated with diabetes      mupirocin (BACTROBAN) 2 % ointment by Nasal route 2 (two) times daily.  Qty: 2 g, Refills: 1      nitroGLYCERIN (NITROSTAT) 0.4 MG SL tablet Place 1 tablet (0.4 mg total) under the tongue every 5 (five) minutes as needed for Chest pain.  Qty: 10 tablet, Refills: 1      pantoprazole (PROTONIX) 40 MG tablet Take 1 tablet (40 mg total) by mouth once daily.  Qty: 30 tablet, Refills: 11      polyethylene glycol (GLYCOLAX) 17 gram/dose powder       pravastatin (PRAVACHOL) 80 MG tablet Take 1 tablet (80 mg total) by mouth once daily.  Qty: 30 tablet, Refills: 11    Associated Diagnoses: Hyperlipidemia associated with type 2 diabetes mellitus      sertraline (ZOLOFT) 100 MG tablet Take 1 tablet (100 mg total) by mouth once daily.  Qty: 90 tablet, Refills: 3    Associated Diagnoses: Recurrent major depressive disorder, in partial remission      vitamin renal formula, B-complex-vitamin c-folic acid, (NEPHROCAP) 1 mg Cap Take 1 capsule by mouth once daily.  Qty: 30 capsule, Refills: 11      furosemide (LASIX) 40 MG tablet Take 1 tablet (40 mg total) by mouth once daily.  Qty: 30 tablet, Refills: 3           I certify that this patient is confined to her home and needs physical therapy and  occupational therapy.    Antonio Ferreira MD   Internal Medicine PGY - 3  Department of [facility] Medicine   [facility]

## 2019-11-21 NOTE — PROGRESS NOTES
Hemodialysis Note  Pt seen and evaluated while undergoing dialysis. Tolerating dialysis with current UFR, without complications. Labs reviewed and dialysate bath adjusted.     BFR: 400  UF goal: 3L as tolerated; last HD yesterday with 3L removed; admitted with volume overload; s/p X-ray yesterday: moderate edema; currently on 91% with 3L/NC; pt reports is usually on home O2 @ 2-2.5L/NC; pt also states to report to her OP HD unit ([facility]) tomorrow for HD  Anemia: Hgb 9.2; ferritin 1215 11/20; hold epogen; anticipated d/c today  MBD: on Ca+ acetate  HTN: SBPs 160s on HD  Diet: renal diet

## 2019-11-21 NOTE — NURSING
As per resident Dr. Ferreira, pt is to be discharged after Hemodialysis and to call her after finished.  However, don't know if ride is needed for pt.  Will call Dr. Ferreira to confirm and also pt is not done with hemodialysis yet.  Will await call.

## 2019-11-21 NOTE — NURSING
As per Dr. Ferreira, do not discharge pt until she has gone to dialysis today.  After dialysis, call [phone] to let Dr. Ferreira know that we are going to discharge pt.

## 2019-11-21 NOTE — NURSING TRANSFER
Nursing Transfer Note      11/20/2019     Transfer To: Room 7072 from Room 88096    Transfer via bed    Transfer with 3L NC connected to O2 tank and BiPAP machine, cardiac monitoring in pocket and connected to the patient. Ambu-bag at bedside.     Transported by ICU RN and ICU PCT.    Medicines sent: Insulin pen and antibiotic.    Chart send with patient: Yes    Notified: son    Patient reassessed at: 10:50 11/20/19     Upon arrival to floor: cardiac monitor applied, patient oriented to room, call bell in reach and bed in lowest position.

## 2019-11-22 LAB
ALBUMIN SERPL BCP-MCNC: 2.8 G/DL (ref 3.5–5.2)
ALP SERPL-CCNC: 61 U/L (ref 55–135)
ALT SERPL W/O P-5'-P-CCNC: 23 U/L (ref 10–44)
ANION GAP SERPL CALC-SCNC: 12 MMOL/L (ref 8–16)
AST SERPL-CCNC: 22 U/L (ref 10–40)
BASOPHILS # BLD AUTO: 0.03 K/UL (ref 0–0.2)
BASOPHILS NFR BLD: 0.6 % (ref 0–1.9)
BILIRUB SERPL-MCNC: 0.2 MG/DL (ref 0.1–1)
BUN SERPL-MCNC: 24 MG/DL (ref 8–23)
CALCIUM SERPL-MCNC: 9.1 MG/DL (ref 8.7–10.5)
CHLORIDE SERPL-SCNC: 101 MMOL/L (ref 95–110)
CO2 SERPL-SCNC: 23 MMOL/L (ref 23–29)
CREAT SERPL-MCNC: 5.4 MG/DL (ref 0.5–1.4)
DIFFERENTIAL METHOD: ABNORMAL
EOSINOPHIL # BLD AUTO: 0.2 K/UL (ref 0–0.5)
EOSINOPHIL NFR BLD: 3.5 % (ref 0–8)
ERYTHROCYTE [DISTWIDTH] IN BLOOD BY AUTOMATED COUNT: 14.2 % (ref 11.5–14.5)
EST. GFR  (AFRICAN AMERICAN): 8.7 ML/MIN/1.73 M^2
EST. GFR  (NON AFRICAN AMERICAN): 7.6 ML/MIN/1.73 M^2
GLUCOSE SERPL-MCNC: 91 MG/DL (ref 70–110)
HCT VFR BLD AUTO: 28.1 % (ref 37–48.5)
HGB BLD-MCNC: 9.1 G/DL (ref 12–16)
IMM GRANULOCYTES # BLD AUTO: 0.06 K/UL (ref 0–0.04)
IMM GRANULOCYTES NFR BLD AUTO: 1.2 % (ref 0–0.5)
LYMPHOCYTES # BLD AUTO: 0.9 K/UL (ref 1–4.8)
LYMPHOCYTES NFR BLD: 16.6 % (ref 18–48)
MAGNESIUM SERPL-MCNC: 2 MG/DL (ref 1.6–2.6)
MCH RBC QN AUTO: 30.5 PG (ref 27–31)
MCHC RBC AUTO-ENTMCNC: 32.4 G/DL (ref 32–36)
MCV RBC AUTO: 94 FL (ref 82–98)
MONOCYTES # BLD AUTO: 0.6 K/UL (ref 0.3–1)
MONOCYTES NFR BLD: 11.1 % (ref 4–15)
NEUTROPHILS # BLD AUTO: 3.4 K/UL (ref 1.8–7.7)
NEUTROPHILS NFR BLD: 67 % (ref 38–73)
NRBC BLD-RTO: 0 /100 WBC
PHOSPHATE SERPL-MCNC: 4.5 MG/DL (ref 2.7–4.5)
PLATELET # BLD AUTO: 171 K/UL (ref 150–350)
PMV BLD AUTO: 10.7 FL (ref 9.2–12.9)
POCT GLUCOSE: 104 MG/DL (ref 70–110)
POCT GLUCOSE: 122 MG/DL (ref 70–110)
POCT GLUCOSE: 128 MG/DL (ref 70–110)
POCT GLUCOSE: 137 MG/DL (ref 70–110)
POCT GLUCOSE: 139 MG/DL (ref 70–110)
POCT GLUCOSE: 97 MG/DL (ref 70–110)
POTASSIUM SERPL-SCNC: 4.1 MMOL/L (ref 3.5–5.1)
PROT SERPL-MCNC: 6.9 G/DL (ref 6–8.4)
RBC # BLD AUTO: 2.98 M/UL (ref 4–5.4)
SODIUM SERPL-SCNC: 136 MMOL/L (ref 136–145)
WBC # BLD AUTO: 5.12 K/UL (ref 3.9–12.7)

## 2019-11-22 PROCEDURE — 99232 PR SUBSEQUENT HOSPITAL CARE,LEVL II: ICD-10-PCS | Mod: GC,,, | Performed by: HOSPITALIST

## 2019-11-22 PROCEDURE — 25000003 PHARM REV CODE 250: Performed by: STUDENT IN AN ORGANIZED HEALTH CARE EDUCATION/TRAINING PROGRAM

## 2019-11-22 PROCEDURE — C9399 UNCLASSIFIED DRUGS OR BIOLOG: HCPCS | Performed by: PHYSICIAN ASSISTANT

## 2019-11-22 PROCEDURE — 25000003 PHARM REV CODE 250: Performed by: PHYSICIAN ASSISTANT

## 2019-11-22 PROCEDURE — 99232 SBSQ HOSP IP/OBS MODERATE 35: CPT | Mod: GC,,, | Performed by: HOSPITALIST

## 2019-11-22 PROCEDURE — 84100 ASSAY OF PHOSPHORUS: CPT

## 2019-11-22 PROCEDURE — 83735 ASSAY OF MAGNESIUM: CPT

## 2019-11-22 PROCEDURE — 80053 COMPREHEN METABOLIC PANEL: CPT

## 2019-11-22 PROCEDURE — 36415 COLL VENOUS BLD VENIPUNCTURE: CPT

## 2019-11-22 PROCEDURE — 11000001 HC ACUTE MED/SURG PRIVATE ROOM

## 2019-11-22 PROCEDURE — 85025 COMPLETE CBC W/AUTO DIFF WBC: CPT

## 2019-11-22 PROCEDURE — 63600175 PHARM REV CODE 636 W HCPCS: Performed by: PHYSICIAN ASSISTANT

## 2019-11-22 RX ORDER — FUROSEMIDE 40 MG/1
40 TABLET ORAL DAILY
Status: DISCONTINUED | OUTPATIENT
Start: 2019-11-22 | End: 2019-11-27 | Stop reason: HOSPADM

## 2019-11-22 RX ADMIN — CALCIUM ACETATE 667 MG: 667 CAPSULE ORAL at 05:11

## 2019-11-22 RX ADMIN — CALCIUM ACETATE 667 MG: 667 CAPSULE ORAL at 12:11

## 2019-11-22 RX ADMIN — CLOPIDOGREL BISULFATE 75 MG: 75 TABLET ORAL at 09:11

## 2019-11-22 RX ADMIN — PRAVASTATIN SODIUM 80 MG: 40 TABLET ORAL at 09:11

## 2019-11-22 RX ADMIN — NEPHROCAP 1 CAPSULE: 1 CAP ORAL at 09:11

## 2019-11-22 RX ADMIN — PANTOPRAZOLE SODIUM 40 MG: 40 TABLET, DELAYED RELEASE ORAL at 09:11

## 2019-11-22 RX ADMIN — CALCIUM ACETATE 667 MG: 667 CAPSULE ORAL at 07:11

## 2019-11-22 RX ADMIN — APIXABAN 2.5 MG: 2.5 TABLET, FILM COATED ORAL at 09:11

## 2019-11-22 RX ADMIN — ACETAMINOPHEN 650 MG: 325 TABLET ORAL at 07:11

## 2019-11-22 RX ADMIN — SENNOSIDES AND DOCUSATE SODIUM 1 TABLET: 8.6; 5 TABLET ORAL at 09:11

## 2019-11-22 RX ADMIN — FUROSEMIDE 40 MG: 40 TABLET ORAL at 09:11

## 2019-11-22 RX ADMIN — ISOSORBIDE MONONITRATE 30 MG: 30 TABLET, EXTENDED RELEASE ORAL at 09:11

## 2019-11-22 RX ADMIN — METOPROLOL SUCCINATE 50 MG: 50 TABLET, EXTENDED RELEASE ORAL at 09:11

## 2019-11-22 RX ADMIN — ACETAMINOPHEN 650 MG: 325 TABLET ORAL at 10:11

## 2019-11-22 RX ADMIN — SERTRALINE HYDROCHLORIDE 100 MG: 100 TABLET ORAL at 09:11

## 2019-11-22 RX ADMIN — Medication 6 MG: at 09:11

## 2019-11-22 RX ADMIN — LIDOCAINE 1 PATCH: 50 PATCH TOPICAL at 09:11

## 2019-11-22 RX ADMIN — INSULIN DETEMIR 15 UNITS: 100 INJECTION, SOLUTION SUBCUTANEOUS at 09:11

## 2019-11-22 NOTE — ASSESSMENT & PLAN NOTE
- Continue Eliquis 2.5mg BID.  - [id number] VASc score of at least 8 for CHF, HTN, DM II, CVA, CAD, age, and sex.  - Rate controlled. Continue metorpolol

## 2019-11-22 NOTE — ASSESSMENT & PLAN NOTE
- Hb 9.1, mildly decreased from baseline.  - Will monitor for now  - consider ferrous sulfate supplements

## 2019-11-22 NOTE — ASSESSMENT & PLAN NOTE
- Continue home amlodipine 10mg TuThSaSu, metoprolol succinate 50mg daily.  - Goal BP < 130/80 mmHg

## 2019-11-22 NOTE — NURSING
Pt came back from dialysis, refused blood pressure medication, stated that it bottoms out her pressure if she takes blood pressure medication. Also refused calcium b/c she will not be eating, doesn't have any appetite.

## 2019-11-22 NOTE — ASSESSMENT & PLAN NOTE
- Continue BB, Lasix 40mg daily.  ACEi/ARB likely held due to hyperkalemia.    Echo 9/24/2018:   1 - Concentric hypertrophy.     2 - Normal left ventricular systolic function (EF 60-65%).     3 - Impaired LV relaxation, normal LAP (grade 1 diastolic dysfunction).     4 - Normal right ventricular systolic function .     5 - The estimated PA systolic pressure is 34 mmHg.    - resume lasix 40 mg PO daily  - daily weights and Strict I and O's

## 2019-11-22 NOTE — ASSESSMENT & PLAN NOTE
- Continue BB, Lasix 40mg daily.  ACEi/ARB likely held due to hyperkalemia.    Echo 9/24/2018:   1 - Concentric hypertrophy.     2 - Normal left ventricular systolic function (EF 60-65%).     3 - Impaired LV relaxation, normal LAP (grade 1 diastolic dysfunction).     4 - Normal right ventricular systolic function .     5 - The estimated PA systolic pressure is 34 mmHg.    - Continues to have bibasilar rales bilaterally.   - resume lasix 40 mg PO daily  - daily weights and Strict I and O's

## 2019-11-22 NOTE — PLAN OF CARE
Pt had hemodialysis yesterday, took 3 liters off yesterday and today, she went for hemodialysis again in the afternoon.  She was taken down in bed around 3pm and hasn't gotten back on the floor as of yet.  Pt's discharge will be delayed until tomorrow due to family having multiple concerns that need to be addressed and reevaluated prior to discharge.  Pt will stay for the night.

## 2019-11-22 NOTE — ASSESSMENT & PLAN NOTE
End-stage renal disease on hemodialysis    - K 5.6 in setting of missed HD.  - Shifted in ER, will repeat labs.  - Nephrology consulted for HD.  - Patient on MWF schedule, (missed Monday initially).

## 2019-11-22 NOTE — ASSESSMENT & PLAN NOTE
- Diabetic renal diet, SSI.  - Insulin detemir 15 units daily changed to 12 units giving fasting glucose of 97  - Glucose POC checks QID   - Goal Glucose 140-180 mg/dL

## 2019-11-22 NOTE — PROGRESS NOTES
[facility] Hospital Medicine  Progress Note    Patient Name: Angelina Beard  MRN: [id number]  Patient Class: IP- Inpatient   Admission Date: 11/18/2019  Length of Stay: 3 days  Attending Physician: Chase Beard MD  Primary Care Provider: Ben Mena MD    [facility] Medicine Team: [facility] 2 Antonio Ferreira MD    Subjective:     Principal Problem:Hypervolemia associated with renal insufficiency    HPI:  Ms. Lozada is a 69 year-old AA female with CVA, ESRD (on HD, MWF), HLD, AFib, HFpEF, HTN, and T2DM who was admitted on 11/18 with complaints of weakness and LLE cramping. She missed her HD treatment on 11/18 due to the aforementioned complaints. In the ED, she was found to be hyperkalemic (5.6). She was shifted in the ED and her repeat potassium was 5.0 after medical management. Overnight, the patient became acutely hypoxic despite NC at 3 L. Her oxygen saturations were in the 50s despite supplemental oxygen. Her CXR was notable for significant cardiomegaly and pulmonary edema. She was given a dose of lasix but had minimal urine output. ABGs notable for hypercapnic respiratory acidosis with hypoxemia. A rapid response was called on 11/19/19 and the patient was stepped up to the MICU for acute hypercapnic and hypoxic respiratory failure secondary to volume overload. The patient was placed on BiPAP and emergent HD was performed at bedside with 3 L being removed. The patient remains on BiPAP this AM of 11/20/19 but states that her shortness of breath is much improved. She is a MWF dialysis patient of Dr. Madden at [facility]. She states that she dialyzes for 3.5 hrs via a RFA AVG. Her dry weight is about ~ 102 kg. She states that she last dialyze on Friday, 11/15. She missed her dialysis on Monday 11/18/19 because of generalize weakness and lower extremity cramps.    Overview/Hospital Course:  Patient dialyzed emergently on 11/19/19 and 3.0 L was removed. BIPAP transitioned to nasal canula  on 11/21/19 on 2.5 L home oxygen requirements. Patient had a repeat dialysis on 11/20/19 and another 3.0 L removed. Patient stepped down to hospital medicine team on 11/21/19.      11/21/2019: Patient seen and examined at the bedside. She did not have any cardiopulmonary distress. Continued to require 2.5 L oxygen at baseline. Per nephrology reccs, patient to receive dialysis today 11/21/19 for 3 hours. After dialysis, patient assessed to be stable for discharge and resume dialysis 3 x/week MWF. Case management informed and home health PT/OT oxygen orders placed.   -- Patient requested replacement of her wheelchair, per case management, patient should get a repair by DME within 3 years of issuance of supplies  -- Patient daughter April (Ph [phone]) had concerns of skin breakdown on right foot. Wound care consulted and no wounds/skin breakdown seen. Patient has poor foot care and underlying ESRD, DM and a podiatry ambulatory referral given upon discharge  -- Later around 7 pm, when patient's son came to  the patient, he raised few concerns regarding discharge. Firstly, he states his mom is too weak to go home. He risks falls and would like physical therapy to re-evaluate and give some recommendations including wheelchair and Rolator options. Secondly, he is concerned about right arm tingling and weakness. Per the patient, this is a chronic problem and was told that she has nerve impingement. Since patient needs evaluation, discharge was cancelled and family was informed that we will re evaluate in the morning.     Review of Systems   Constitutional: Positive for activity change, appetite change and fatigue. Negative for chills and unexpected weight change.   HENT: Negative for congestion, rhinorrhea, sinus pressure and sinus pain.    Eyes: Negative for photophobia and visual disturbance.   Respiratory: Positive for cough and shortness of breath.    Cardiovascular: Negative for chest pain, palpitations and  leg swelling.   Gastrointestinal: Negative for abdominal distention, abdominal pain, constipation and diarrhea.   Musculoskeletal: Positive for arthralgias and gait problem. Negative for back pain.   Skin: Negative for color change, pallor, rash and wound.   Hematological: Negative for adenopathy. Does not bruise/bleed easily.     Objective:     Vital Signs (Most Recent):  Temp: 97.6 °F (36.4 °C) (11/22/19 0734)  Pulse: 65 (11/22/19 0734)  Resp: 18 (11/22/19 0734)  BP: (!) 182/76 (11/22/19 0738)  SpO2: 99 % (11/22/19 0734) Vital Signs (24h Range):  Temp:  [96.3 °F (35.7 °C)-98.1 °F (36.7 °C)] 97.6 °F (36.4 °C)  Pulse:  [65-80] 65  Resp:  [12-18] 18  SpO2:  [89 %-100 %] 99 %  BP: (120-198)/(46-96) 182/76     Weight: 109 kg (240 lb 4.8 oz)  Body mass index is 37.64 kg/m².    Intake/Output Summary (Last 24 hours) at 11/22/2019 0832  Last data filed at 11/21/2019 1830  Gross per 24 hour   Intake 550 ml   Output 3550 ml   Net -3000 ml      Physical Exam   Constitutional: No distress.   HENT:   Mouth/Throat: Oropharynx is clear and moist. No oropharyngeal exudate.   Eyes: Conjunctivae are normal. No scleral icterus.   Neck: Normal range of motion. Neck supple. No JVD present. No tracheal deviation present. No thyromegaly present.   Cardiovascular: Normal rate, regular rhythm, normal heart sounds and intact distal pulses. Exam reveals no gallop and no friction rub.   No murmur heard.  Pulmonary/Chest: Effort normal. No respiratory distress. She has no wheezes. She has rales (bilateral basillar lung fields).   Abdominal: Soft. Bowel sounds are normal. She exhibits no distension and no mass. There is no tenderness. There is no guarding.   Musculoskeletal: Normal range of motion. She exhibits no edema, tenderness or deformity.   Poor foot hygiene and care; long toe nails which are chipped. No skin breakdown.    Right upper extremity strength 4/5   Lymphadenopathy:     She has no cervical adenopathy.   Skin: Skin is warm.  Capillary refill takes less than 2 seconds. No rash noted. She is not diaphoretic. No erythema.     Significant Labs:   CBC:   Recent Labs   Lab 11/21/19  1215 11/22/19  0552   WBC 7.13 5.12   HGB 9.2* 9.1*   HCT 28.0* 28.1*    171     CMP:   Recent Labs   Lab 11/21/19  1215      K 4.8      CO2 22*   *   BUN 37*   CREATININE 7.4*   CALCIUM 9.1   PROT 7.3   ALBUMIN 3.0*   BILITOT 0.2   ALKPHOS 65   AST 25   ALT 25   ANIONGAP 14   EGFRNONAA 5.2*     Magnesium:   Recent Labs   Lab 11/21/19  1215 11/22/19  0552   MG 2.1 2.0     Significant Imaging: I have reviewed all pertinent imaging results/findings within the past 24 hours.    Assessment/Plan:     * Hypervolemia associated with renal insufficiency  - resolved now  - ensure dialysis compliance and lasix PO daily    Weakness of left lower extremity  - Patient is a poor historian, no family at bedside.  - Per ER note, patient reports LLE pain and weakness and right shoulder pain for months.  Complains of LBP on this provider's exam, but denied to ER provider.  - CT head showed no evidence of acute intracranial abnormality, senescent and chronic microvascular ischemic change.  - PT/OT, SW consult.  Supportive care, fall precautions.  - Neuro checks q4hrs.    Hyperkalemia  End-stage renal disease on hemodialysis  - K 5.6 in setting of missed HD.  - Shifted in ER, will repeat labs.  - Nephrology consulted for HD.  - Patient on MWF schedule, missed today (Monday).  Plan:  - continue home lasix; consider long term kayexalate 1x/week    History of stroke  - Continue secondary prevention with Plavix, statin.    ESRD on dialysis  Resume dialysis MWF  Nephrology following; BMP daily to monitor electrolytes    Paroxysmal atrial fibrillation  - Continue Eliquis 2.5mg BID.  - [id number] VASc score of at least 8 for CHF, HTN, DM II, CVA, CAD, age, and sex.  - Rate controlled. Continue metorpolol    Anemia in end-stage renal disease  - H/H 9.0/29.4, mildly  decreased from baseline.  - Will monitor for now  - consider ferrous sulfate supplements    Renovascular hypertension  - Continue home amlodipine 10mg TuThSaSu, metoprolol succinate 50mg daily.  - Goal BP < 130/80 mmHg    Debility  Will get PT/OT to evaluate the disposition needs  Currently patient unable to ambulate without help    Coronary artery disease  - Continue secondary prevention, Plavix, Imdur.  - No symptoms currently  - continue to monitor    GERD (gastroesophageal reflux disease)  - Protonix.    Chronic diastolic congestive heart failure  - Continue BB, Lasix 40mg daily.  ACEi/ARB likely held due to hyperkalemia.  Echo 9/24/2018:   1 - Concentric hypertrophy.     2 - Normal left ventricular systolic function (EF 60-65%).     3 - Impaired LV relaxation, normal LAP (grade 1 diastolic dysfunction).     4 - Normal right ventricular systolic function .     5 - The estimated PA systolic pressure is 34 mmHg.  Plan:  - Continues to have bibasilar rales bilaterally.   - resume lasix 40 mg PO daily  - daily weights and Strict I and O's    Hyperlipidemia  - Continue pravastatin 80mg daily.    Type 2 diabetes mellitus with hypertension and end stage renal disease on dialysis  - Diabetic renal diet, SSI.  - Insulin detemir 15 units daily.  - Glucose POC checks QID   - Goal Glucose 140-180 mg/dL     VTE Risk Mitigation (From admission, onward)         Ordered     apixaban tablet 2.5 mg  2 times daily      11/18/19 2321     Place sequential compression device  Until discontinued      11/18/19 2321     Place CAMELIA hose  Until discontinued      11/18/19 2321     IP VTE HIGH RISK PATIENT  Once      11/18/19 2321              Antonio Ferreira MD   Internal Medicine PGY 3  Department of Hospital Medicine   [facility]

## 2019-11-22 NOTE — ASSESSMENT & PLAN NOTE
- Diabetic renal diet, SSI.  - Insulin detemir 15 units daily.  - Glucose POC checks QID   - Goal Glucose 140-180 mg/dL

## 2019-11-22 NOTE — NURSING
Dr. Ferreira called back and spoke with pt's son Genet, who had many concerns about pt.  Pt does not have any help at home and she is not at baseline physically, she is unable to toilet self, unable to ambulate at this time and after hemodialysis, she will be even weaker.  Pt's son wants several concerns to be addressed:     1) Neck pain which radiates down right side of body, believes there is a lump in the back of pt's neck.      2) Walking- prior to a few days ago, pt was able to ambulate and at least dress self.  Pt continues to be weak, can't feel both feet, unable to walk at this time.  Pt has limited support at home and unable to toilet self.      Son's Contact information will also be in sticky notes Genet [employer]- [phone].  Work # [phone].      Pt is to be kept overnight for reevaluation tomorrow.

## 2019-11-22 NOTE — SUBJECTIVE & OBJECTIVE
Interval History: Ms. Beard denies any new complaints today. She reports that her difficulty with ADLs and her neck pain and right arm weakness are chronic.     Review of Systems   Constitutional: Positive for fatigue. Negative for activity change, appetite change, chills and unexpected weight change.   HENT: Negative for congestion, rhinorrhea, sinus pressure and sinus pain.    Eyes: Negative for photophobia and visual disturbance.   Cardiovascular: Negative for chest pain, palpitations and leg swelling.   Gastrointestinal: Negative for abdominal distention, abdominal pain, constipation and diarrhea.   Musculoskeletal: Positive for arthralgias and gait problem. Negative for back pain.   Skin: Negative for color change, pallor, rash and wound.   Hematological: Negative for adenopathy. Does not bruise/bleed easily.     Objective:     Vital Signs (Most Recent):  Temp: 97.6 °F (36.4 °C) (11/22/19 1248)  Pulse: 63 (11/22/19 1248)  Resp: 18 (11/22/19 1248)  BP: (!) 140/67 (11/22/19 1248)  SpO2: 96 % (11/22/19 1248) Vital Signs (24h Range):  Temp:  [96.3 °F (35.7 °C)-98.1 °F (36.7 °C)] 97.6 °F (36.4 °C)  Pulse:  [63-80] 63  Resp:  [14-18] 18  SpO2:  [89 %-99 %] 96 %  BP: (120-198)/(46-96) 140/67     Weight: 109 kg (240 lb 4.8 oz)  Body mass index is 37.64 kg/m².    Intake/Output Summary (Last 24 hours) at 11/22/2019 1424  Last data filed at 11/21/2019 1830  Gross per 24 hour   Intake 550 ml   Output 3550 ml   Net -3000 ml      Physical Exam   Constitutional: No distress.   HENT:   Mouth/Throat: Oropharynx is clear and moist. No oropharyngeal exudate.   Eyes: Conjunctivae are normal. No scleral icterus.   Neck: Normal range of motion. Neck supple. No JVD present. No tracheal deviation present. No thyromegaly present.   Cardiovascular: Normal rate, regular rhythm, normal heart sounds and intact distal pulses. Exam reveals no gallop and no friction rub.   No murmur heard.  Pulmonary/Chest: Effort normal. No respiratory  distress. She has no wheezes.   Abdominal: Soft. Bowel sounds are normal. She exhibits no distension and no mass. There is no tenderness. There is no guarding.   Musculoskeletal: Normal range of motion. She exhibits no edema, tenderness or deformity.   Poor foot hygiene and care. No skin breakdown.   Lymphadenopathy:     She has no cervical adenopathy.   Neurological:   LUE strength 5/5; RUE difficult to assess as patient states cannot abduct arm greater than 90 degrees and holds arm close to body when trying to assess strength   Skin: Skin is warm. Capillary refill takes less than 2 seconds. No rash noted. She is not diaphoretic. No erythema.       Significant Labs: All pertinent labs within the past 24 hours have been reviewed.    Significant Imaging: I have reviewed and interpreted all pertinent imaging results/findings within the past 24 hours.

## 2019-11-22 NOTE — ASSESSMENT & PLAN NOTE
- Continue Eliquis 2.5mg BID.  - [id number] VASc score of at least 8 for CHF, HTN, DM II, CVA, CAD, age, and sex.  - Rate controlled. Continue metoprolol

## 2019-11-22 NOTE — NURSING
Received report back from Ma in Dialysis.  Pt ran for 2.5 hours, took off 3 liters.  BG was 121, no coverage needed, she can eat dinner when she comes back.

## 2019-11-22 NOTE — PROGRESS NOTES
[facility] Hospital Medicine  Progress Note    Patient Name: Angelina Beard  MRN: [id number]  Patient Class: IP- Inpatient   Admission Date: 11/18/2019  Length of Stay: 3 days  Attending Physician: Chase Beard MD  Primary Care Provider: Ben Mena MD    [facility] Medicine Team: [facility] 2 Sariah Levy MD    Subjective:     Principal Problem:Hypervolemia associated with renal insufficiency        HPI:  Ms. Lozada is a 69 year-old AA female with CVA, ESRD (on HD, MWF), HLD, AFib, HFpEF, HTN, and T2DM who was admitted on 11/18 with complaints of weakness and LLE cramping. She missed her HD treatment on 11/18 due to the aforementioned complaints. In the ED, she was found to be hyperkalemic (5.6). She was shifted in the ED and her repeat potassium was 5.0 after medical management. Overnight, the patient became acutely hypoxic despite NC at 3 L. Her oxygen saturations were in the 50s despite supplemental oxygen. Her CXR was notable for significant cardiomegaly and pulmonary edema. She was given a dose of lasix but had minimal urine output. ABGs notable for hypercapnic respiratory acidosis with hypoxemia. A rapid response was called on 11/19/19 and the patient was stepped up to the MICU for acute hypercapnic and hypoxic respiratory failure secondary to volume overload. The patient was placed on BiPAP and emergent HD was performed at bedside with 3 L being removed. The patient remains on BiPAP this AM of 11/20/19 but states that her shortness of breath is much improved. She is a MWF dialysis patient of Dr. Madden at [facility]. She states that she dialyzes for 3.5 hrs via a RFA AVG. Her dry weight is about ~ 102 kg. She states that she last dialyze on Friday, 11/15. She missed her dialysis on Monday 11/18/19 because of generalize weakness and lower extremity cramps.    Overview/Hospital Course:  Patient dialyzed emergently on 11/19/19 and 3.0 L was removed. BIPAP transitioned to nasal  canagustina on 11/21/19 on 2.5 L home oxygen requirements. Patient had a repeat dialysis on 11/20/19 and another 3.0 L removed. Patient stepped down to hospital medicine team on 11/21/19.      11/21/2019: Patient seen and examined at the bedside. She did not have any cardiopulmonary distress. Continued to require 2.5 L oxygen at baseline. Per nephrology reccs, patient to receive dialysis today 11/21/19 for 3 hours. After dialysis, patient assessed to be stable for discharge and resume dialysis 3 x/week MWF. Case management informed and home health PT/OT oxygen orders placed.   -- Patient requested replacement of her wheelchair, per case management, patient should get a repair by DME within 3 years of issuance of supplies  -- Patient daughter April (Ph [phone]) had concerns of skin breakdown on right foot. Wound care consulted and no wounds/skin breakdown seen. Patient has poor foot care and underlying ESRD, DM and a podiatry ambulatory referral given upon discharge  -- Later around 7 pm, when patient's son came to  the patient, he raised few concerns regarding discharge. Firstly, he states his mom is too weak to go home. He risks falls and would like physical therapy to re-evaluate and give some recommendations including wheelchair and Rolator options. Secondly, he is concerned about right arm tingling and weakness. Per the patient, this is a chronic problem and was told that she has nerve impingement. Since patient needs evaluation, discharge was cancelled and family was informed that we will re evaluate in the morning. Discharge remains delayed as son (who takes care of patient) states he cannot safely take care of her right now.     Interval History: Ms. Beard denies any new complaints today. She reports that her difficulty with ADLs and her neck pain and right arm weakness are chronic.     Review of Systems   Constitutional: Positive for fatigue. Negative for activity change, appetite change, chills and  unexpected weight change.   HENT: Negative for congestion, rhinorrhea, sinus pressure and sinus pain.    Eyes: Negative for photophobia and visual disturbance.   Cardiovascular: Negative for chest pain, palpitations and leg swelling.   Gastrointestinal: Negative for abdominal distention, abdominal pain, constipation and diarrhea.   Musculoskeletal: Positive for arthralgias and gait problem. Negative for back pain.   Skin: Negative for color change, pallor, rash and wound.   Hematological: Negative for adenopathy. Does not bruise/bleed easily.     Objective:     Vital Signs (Most Recent):  Temp: 97.6 °F (36.4 °C) (11/22/19 1248)  Pulse: 63 (11/22/19 1248)  Resp: 18 (11/22/19 1248)  BP: (!) 140/67 (11/22/19 1248)  SpO2: 96 % (11/22/19 1248) Vital Signs (24h Range):  Temp:  [96.3 °F (35.7 °C)-98.1 °F (36.7 °C)] 97.6 °F (36.4 °C)  Pulse:  [63-80] 63  Resp:  [14-18] 18  SpO2:  [89 %-99 %] 96 %  BP: (120-198)/(46-96) 140/67     Weight: 109 kg (240 lb 4.8 oz)  Body mass index is 37.64 kg/m².    Intake/Output Summary (Last 24 hours) at 11/22/2019 1424  Last data filed at 11/21/2019 1830  Gross per 24 hour   Intake 550 ml   Output 3550 ml   Net -3000 ml      Physical Exam   Constitutional: No distress.   HENT:   Mouth/Throat: Oropharynx is clear and moist. No oropharyngeal exudate.   Eyes: Conjunctivae are normal. No scleral icterus.   Neck: Normal range of motion. Neck supple. No JVD present. No tracheal deviation present. No thyromegaly present.   Cardiovascular: Normal rate, regular rhythm, normal heart sounds and intact distal pulses. Exam reveals no gallop and no friction rub.   No murmur heard.  Pulmonary/Chest: Effort normal. No respiratory distress. She has no wheezes.   Abdominal: Soft. Bowel sounds are normal. She exhibits no distension and no mass. There is no tenderness. There is no guarding.   Musculoskeletal: Normal range of motion. She exhibits no edema, tenderness or deformity.   Poor foot hygiene and care.  No skin breakdown.   Lymphadenopathy:     She has no cervical adenopathy.   Neurological:   LUE strength 5/5; RUE difficult to assess as patient states cannot abduct arm greater than 90 degrees and holds arm close to body when trying to assess strength   Skin: Skin is warm. Capillary refill takes less than 2 seconds. No rash noted. She is not diaphoretic. No erythema.       Significant Labs: All pertinent labs within the past 24 hours have been reviewed.    Significant Imaging: I have reviewed and interpreted all pertinent imaging results/findings within the past 24 hours.      Assessment/Plan:      * Hypervolemia associated with renal insufficiency        Weakness of left lower extremity  - Patient is a poor historian, no family at bedside.  - Per ER note, patient reports LLE pain and weakness and right shoulder pain for months.  Complains of LBP on this provider's exam, but denied to ER provider.  - CT head showed no evidence of acute intracranial abnormality, senescent and chronic microvascular ischemic change.  - Will get lumbar x-ray.  - PT/OT, SW consult.  Supportive care, fall precautions.  - Neuro checks q4hrs.        Hyperkalemia  End-stage renal disease on hemodialysis    - K 5.6 in setting of missed HD.  - Shifted in ER, will repeat labs.  - Nephrology consulted for HD.  - Patient on MWF schedule, (missed Monday initially).    History of stroke  - Continue secondary prevention with Plavix, statin.      ESRD on dialysis  Resume dialysis [facility]  Nephrology following; BMP daily to monitor electrolytes      Paroxysmal atrial fibrillation  - Continue Eliquis 2.5mg BID.  - [id number] VASc score of at least 8 for CHF, HTN, DM II, CVA, CAD, age, and sex.  - Rate controlled. Continue metoprolol      Anemia in end-stage renal disease  - Hb 9.1, mildly decreased from baseline.  - Will monitor for now  - consider ferrous sulfate supplements      Renovascular hypertension  - Continue home amlodipine 10mg Osmani  metoprolol succinate 50mg daily.  - Goal BP < 130/80 mmHg      Debility  Will get PT/OT to evaluate the disposition needs  Currently patient unable to ambulate without help        Coronary artery disease  - Continue secondary prevention, Plavix, Imdur.  - No symptoms currently  - continue to monitor      GERD (gastroesophageal reflux disease)  - Protonix.      Chronic diastolic congestive heart failure  - Continue BB, Lasix 40mg daily.  ACEi/ARB likely held due to hyperkalemia.    Echo 9/24/2018:   1 - Concentric hypertrophy.     2 - Normal left ventricular systolic function (EF 60-65%).     3 - Impaired LV relaxation, normal LAP (grade 1 diastolic dysfunction).     4 - Normal right ventricular systolic function .     5 - The estimated PA systolic pressure is 34 mmHg.    - resume lasix 40 mg PO daily  - daily weights and Strict I and O's        Hyperlipidemia  - Continue pravastatin 80mg daily.      Type 2 diabetes mellitus with hypertension and end stage renal disease on dialysis  - Diabetic renal diet, SSI.  - Insulin detemir 15 units daily changed to 12 units giving fasting glucose of 97  - Glucose POC checks QID   - Goal Glucose 140-180 mg/dL        VTE Risk Mitigation (From admission, onward)         Ordered     apixaban tablet 2.5 mg  2 times daily      11/18/19 2321     Place sequential compression device  Until discontinued      11/18/19 2321     Place CAMELIA hose  Until discontinued      11/18/19 2321     IP VTE HIGH RISK PATIENT  Once      11/18/19 2321                      Sariah Levy MD  Department of Hospital Medicine   [facility]

## 2019-11-22 NOTE — HOSPITAL COURSE
Patient admitted to hospital medicine on 11/18. She was dialyzed emergently on 11/19/19 and 3.0 L was removed. BIPAP transitioned to nasal canula on 11/21/19 on 2.5 L home oxygen requirements. Patient had a repeat dialysis on 11/20/19 and another 3.0 L removed. Patient stepped down to hospital medicine team on 11/21/19, at which point she did not have any cardiopulmonary distress. Continued to require 2.5 L oxygen at baseline. Per nephrology reccs, patient to receive dialysis today 11/21/19 for 3 hours. After dialysis, patient assessed to be stable for discharge and resume dialysis 3 x/week MWF. Case management informed and home health PT/OT oxygen orders placed. Patient requested replacement of her wheelchair, per case management, patient should get a repair by DME within 3 years of issuance of supplies. Patient's daughter April (Ph [phone]) had concerns of skin breakdown on right foot. Wound care consulted and no wounds/skin breakdown seen. Patient has poor foot care and underlying ESRD, DM and a podiatry ambulatory referral given upon discharge. Later around 7 pm, when patient's son came to  the patient, he raised few concerns regarding discharge. First, he stated his mom is too weak to go home. He risks falls and would like physical therapy to re-evaluate and give some recommendations including wheelchair options. Second, he is concerned about right arm tingling and weakness. Per the patient, this is a chronic problem and was told that she has nerve impingement. Since patient needs evaluation, discharge was cancelled and family was informed that we will re evaluate in the morning. Discharge remained delayed over several days as son (who takes care of patient) states he cannot safely take care of her right now and wants patient to go to SNF. On 11/27, patient was discharged to SNF.

## 2019-11-22 NOTE — ASSESSMENT & PLAN NOTE
- H/H 9.0/29.4, mildly decreased from baseline.  - Will monitor for now  - consider ferrous sulfate supplements

## 2019-11-22 NOTE — SUBJECTIVE & OBJECTIVE
Review of Systems   Constitutional: Positive for activity change, appetite change and fatigue. Negative for chills and unexpected weight change.   HENT: Negative for congestion, rhinorrhea, sinus pressure and sinus pain.    Eyes: Negative for photophobia and visual disturbance.   Respiratory: Positive for cough and shortness of breath.    Cardiovascular: Negative for chest pain, palpitations and leg swelling.   Gastrointestinal: Negative for abdominal distention, abdominal pain, constipation and diarrhea.   Musculoskeletal: Positive for arthralgias and gait problem. Negative for back pain.   Skin: Negative for color change, pallor, rash and wound.   Hematological: Negative for adenopathy. Does not bruise/bleed easily.     Objective:     Vital Signs (Most Recent):  Temp: 97.6 °F (36.4 °C) (11/22/19 0734)  Pulse: 65 (11/22/19 0734)  Resp: 18 (11/22/19 0734)  BP: (!) 182/76 (11/22/19 0738)  SpO2: 99 % (11/22/19 0734) Vital Signs (24h Range):  Temp:  [96.3 °F (35.7 °C)-98.1 °F (36.7 °C)] 97.6 °F (36.4 °C)  Pulse:  [65-80] 65  Resp:  [12-18] 18  SpO2:  [89 %-100 %] 99 %  BP: (120-198)/(46-96) 182/76     Weight: 109 kg (240 lb 4.8 oz)  Body mass index is 37.64 kg/m².    Intake/Output Summary (Last 24 hours) at 11/22/2019 0832  Last data filed at 11/21/2019 1830  Gross per 24 hour   Intake 550 ml   Output 3550 ml   Net -3000 ml      Physical Exam   Constitutional: No distress.   HENT:   Mouth/Throat: Oropharynx is clear and moist. No oropharyngeal exudate.   Eyes: Conjunctivae are normal. No scleral icterus.   Neck: Normal range of motion. Neck supple. No JVD present. No tracheal deviation present. No thyromegaly present.   Cardiovascular: Normal rate, regular rhythm, normal heart sounds and intact distal pulses. Exam reveals no gallop and no friction rub.   No murmur heard.  Pulmonary/Chest: Effort normal. No respiratory distress. She has no wheezes. She has rales (bilateral basillar lung fields).   Abdominal: Soft.  Bowel sounds are normal. She exhibits no distension and no mass. There is no tenderness. There is no guarding.   Musculoskeletal: Normal range of motion. She exhibits no edema, tenderness or deformity.   Poor foot hygiene and care; long toe nails which are chipped. No skin breakdown.    Right upper extremity strength 4/5   Lymphadenopathy:     She has no cervical adenopathy.   Skin: Skin is warm. Capillary refill takes less than 2 seconds. No rash noted. She is not diaphoretic. No erythema.     Significant Labs:   CBC:   Recent Labs   Lab 11/21/19  1215 11/22/19  0552   WBC 7.13 5.12   HGB 9.2* 9.1*   HCT 28.0* 28.1*    171     CMP:   Recent Labs   Lab 11/21/19  1215      K 4.8      CO2 22*   *   BUN 37*   CREATININE 7.4*   CALCIUM 9.1   PROT 7.3   ALBUMIN 3.0*   BILITOT 0.2   ALKPHOS 65   AST 25   ALT 25   ANIONGAP 14   EGFRNONAA 5.2*     Magnesium:   Recent Labs   Lab 11/21/19  1215 11/22/19  0552   MG 2.1 2.0     Significant Imaging: I have reviewed all pertinent imaging results/findings within the past 24 hours.

## 2019-11-22 NOTE — ASSESSMENT & PLAN NOTE
Will get PT/OT to evaluate the disposition needs  Currently patient unable to ambulate without help

## 2019-11-22 NOTE — PLAN OF CARE
HD tx ended, dialyzed for 2.5hrs of 3hr tx NP is aware, 3L of fluid removed  and pt tolerated well. Blood returned,15g needle removed x2,gauze and tape applied with pressure to each site until hemostasis achieved. Report given to CHRISTINA Gomez.

## 2019-11-23 PROBLEM — Z75.8 DISCHARGE PLANNING ISSUES: Status: ACTIVE | Noted: 2019-11-23

## 2019-11-23 LAB
ALBUMIN SERPL BCP-MCNC: 2.8 G/DL (ref 3.5–5.2)
ALP SERPL-CCNC: 61 U/L (ref 55–135)
ALT SERPL W/O P-5'-P-CCNC: 21 U/L (ref 10–44)
ANION GAP SERPL CALC-SCNC: 12 MMOL/L (ref 8–16)
AST SERPL-CCNC: 20 U/L (ref 10–40)
BASOPHILS # BLD AUTO: 0.03 K/UL (ref 0–0.2)
BASOPHILS NFR BLD: 0.6 % (ref 0–1.9)
BILIRUB SERPL-MCNC: 0.3 MG/DL (ref 0.1–1)
BUN SERPL-MCNC: 39 MG/DL (ref 8–23)
CALCIUM SERPL-MCNC: 9.5 MG/DL (ref 8.7–10.5)
CHLORIDE SERPL-SCNC: 102 MMOL/L (ref 95–110)
CO2 SERPL-SCNC: 24 MMOL/L (ref 23–29)
CREAT SERPL-MCNC: 7.5 MG/DL (ref 0.5–1.4)
DIFFERENTIAL METHOD: ABNORMAL
EOSINOPHIL # BLD AUTO: 0.2 K/UL (ref 0–0.5)
EOSINOPHIL NFR BLD: 3.4 % (ref 0–8)
ERYTHROCYTE [DISTWIDTH] IN BLOOD BY AUTOMATED COUNT: 14.3 % (ref 11.5–14.5)
EST. GFR  (AFRICAN AMERICAN): 5.9 ML/MIN/1.73 M^2
EST. GFR  (NON AFRICAN AMERICAN): 5.1 ML/MIN/1.73 M^2
GLUCOSE SERPL-MCNC: 136 MG/DL (ref 70–110)
HCT VFR BLD AUTO: 27.5 % (ref 37–48.5)
HGB BLD-MCNC: 8.3 G/DL (ref 12–16)
IMM GRANULOCYTES # BLD AUTO: 0.01 K/UL (ref 0–0.04)
IMM GRANULOCYTES NFR BLD AUTO: 0.2 % (ref 0–0.5)
LYMPHOCYTES # BLD AUTO: 0.9 K/UL (ref 1–4.8)
LYMPHOCYTES NFR BLD: 19.5 % (ref 18–48)
MAGNESIUM SERPL-MCNC: 2.1 MG/DL (ref 1.6–2.6)
MCH RBC QN AUTO: 29.3 PG (ref 27–31)
MCHC RBC AUTO-ENTMCNC: 30.2 G/DL (ref 32–36)
MCV RBC AUTO: 97 FL (ref 82–98)
MONOCYTES # BLD AUTO: 0.5 K/UL (ref 0.3–1)
MONOCYTES NFR BLD: 11.2 % (ref 4–15)
NEUTROPHILS # BLD AUTO: 3 K/UL (ref 1.8–7.7)
NEUTROPHILS NFR BLD: 65.1 % (ref 38–73)
NRBC BLD-RTO: 0 /100 WBC
PHOSPHATE SERPL-MCNC: 6 MG/DL (ref 2.7–4.5)
PLATELET # BLD AUTO: 176 K/UL (ref 150–350)
PMV BLD AUTO: 10.2 FL (ref 9.2–12.9)
POCT GLUCOSE: 128 MG/DL (ref 70–110)
POCT GLUCOSE: 146 MG/DL (ref 70–110)
POCT GLUCOSE: 159 MG/DL (ref 70–110)
POCT GLUCOSE: 195 MG/DL (ref 70–110)
POTASSIUM SERPL-SCNC: 4.2 MMOL/L (ref 3.5–5.1)
PROT SERPL-MCNC: 6.6 G/DL (ref 6–8.4)
RBC # BLD AUTO: 2.83 M/UL (ref 4–5.4)
SODIUM SERPL-SCNC: 138 MMOL/L (ref 136–145)
WBC # BLD AUTO: 4.66 K/UL (ref 3.9–12.7)

## 2019-11-23 PROCEDURE — 83735 ASSAY OF MAGNESIUM: CPT

## 2019-11-23 PROCEDURE — 94660 CPAP INITIATION&MGMT: CPT

## 2019-11-23 PROCEDURE — 25000003 PHARM REV CODE 250: Performed by: STUDENT IN AN ORGANIZED HEALTH CARE EDUCATION/TRAINING PROGRAM

## 2019-11-23 PROCEDURE — 84100 ASSAY OF PHOSPHORUS: CPT

## 2019-11-23 PROCEDURE — 94761 N-INVAS EAR/PLS OXIMETRY MLT: CPT

## 2019-11-23 PROCEDURE — 63600175 PHARM REV CODE 636 W HCPCS: Performed by: STUDENT IN AN ORGANIZED HEALTH CARE EDUCATION/TRAINING PROGRAM

## 2019-11-23 PROCEDURE — 25000003 PHARM REV CODE 250: Performed by: PHYSICIAN ASSISTANT

## 2019-11-23 PROCEDURE — 63600175 PHARM REV CODE 636 W HCPCS: Performed by: NURSE PRACTITIONER

## 2019-11-23 PROCEDURE — 90935 PR HEMODIALYSIS, ONE EVALUATION: ICD-10-PCS | Mod: ,,, | Performed by: NURSE PRACTITIONER

## 2019-11-23 PROCEDURE — 27000221 HC OXYGEN, UP TO 24 HOURS

## 2019-11-23 PROCEDURE — 11000001 HC ACUTE MED/SURG PRIVATE ROOM

## 2019-11-23 PROCEDURE — 99232 SBSQ HOSP IP/OBS MODERATE 35: CPT | Mod: GC,,, | Performed by: HOSPITALIST

## 2019-11-23 PROCEDURE — 36415 COLL VENOUS BLD VENIPUNCTURE: CPT

## 2019-11-23 PROCEDURE — 90935 HEMODIALYSIS ONE EVALUATION: CPT | Mod: ,,, | Performed by: NURSE PRACTITIONER

## 2019-11-23 PROCEDURE — 80053 COMPREHEN METABOLIC PANEL: CPT

## 2019-11-23 PROCEDURE — 99900035 HC TECH TIME PER 15 MIN (STAT)

## 2019-11-23 PROCEDURE — 90935 HEMODIALYSIS ONE EVALUATION: CPT

## 2019-11-23 PROCEDURE — 99232 PR SUBSEQUENT HOSPITAL CARE,LEVL II: ICD-10-PCS | Mod: GC,,, | Performed by: HOSPITALIST

## 2019-11-23 PROCEDURE — C9399 UNCLASSIFIED DRUGS OR BIOLOG: HCPCS | Performed by: STUDENT IN AN ORGANIZED HEALTH CARE EDUCATION/TRAINING PROGRAM

## 2019-11-23 PROCEDURE — 85025 COMPLETE CBC W/AUTO DIFF WBC: CPT

## 2019-11-23 RX ORDER — SODIUM CHLORIDE 9 MG/ML
INJECTION, SOLUTION INTRAVENOUS ONCE
Status: COMPLETED | OUTPATIENT
Start: 2019-11-23 | End: 2019-11-23

## 2019-11-23 RX ORDER — AMLODIPINE BESYLATE 10 MG/1
10 TABLET ORAL
Status: DISCONTINUED | OUTPATIENT
Start: 2019-11-24 | End: 2019-11-25

## 2019-11-23 RX ADMIN — SODIUM CHLORIDE: 0.9 INJECTION, SOLUTION INTRAVENOUS at 01:11

## 2019-11-23 RX ADMIN — CLOPIDOGREL BISULFATE 75 MG: 75 TABLET ORAL at 08:11

## 2019-11-23 RX ADMIN — FUROSEMIDE 40 MG: 40 TABLET ORAL at 08:11

## 2019-11-23 RX ADMIN — ISOSORBIDE MONONITRATE 30 MG: 30 TABLET, EXTENDED RELEASE ORAL at 08:11

## 2019-11-23 RX ADMIN — Medication 6 MG: at 09:11

## 2019-11-23 RX ADMIN — CALCIUM ACETATE 667 MG: 667 CAPSULE ORAL at 08:11

## 2019-11-23 RX ADMIN — SENNOSIDES AND DOCUSATE SODIUM 1 TABLET: 8.6; 5 TABLET ORAL at 09:11

## 2019-11-23 RX ADMIN — SENNOSIDES AND DOCUSATE SODIUM 1 TABLET: 8.6; 5 TABLET ORAL at 08:11

## 2019-11-23 RX ADMIN — CALCIUM ACETATE 667 MG: 667 CAPSULE ORAL at 12:11

## 2019-11-23 RX ADMIN — PANTOPRAZOLE SODIUM 40 MG: 40 TABLET, DELAYED RELEASE ORAL at 08:11

## 2019-11-23 RX ADMIN — INSULIN DETEMIR 12 UNITS: 100 INJECTION, SOLUTION SUBCUTANEOUS at 08:11

## 2019-11-23 RX ADMIN — NEPHROCAP 1 CAPSULE: 1 CAP ORAL at 08:11

## 2019-11-23 RX ADMIN — LIDOCAINE 1 PATCH: 50 PATCH TOPICAL at 08:11

## 2019-11-23 RX ADMIN — PRAVASTATIN SODIUM 80 MG: 40 TABLET ORAL at 08:11

## 2019-11-23 RX ADMIN — CALCIUM ACETATE 667 MG: 667 CAPSULE ORAL at 05:11

## 2019-11-23 RX ADMIN — METOPROLOL SUCCINATE 50 MG: 50 TABLET, EXTENDED RELEASE ORAL at 08:11

## 2019-11-23 RX ADMIN — APIXABAN 2.5 MG: 2.5 TABLET, FILM COATED ORAL at 09:11

## 2019-11-23 RX ADMIN — SERTRALINE HYDROCHLORIDE 100 MG: 100 TABLET ORAL at 08:11

## 2019-11-23 RX ADMIN — APIXABAN 2.5 MG: 2.5 TABLET, FILM COATED ORAL at 08:11

## 2019-11-23 RX ADMIN — ACETAMINOPHEN 650 MG: 325 TABLET ORAL at 09:11

## 2019-11-23 NOTE — PLAN OF CARE
Pt vitals stable.  Pt was pivoted onto chair due to weakness and being unable to ambulate at this time.  She stayed up in chair for a few hours and then transferred back into bed as per request.  Pt continues to complain of weakness in bilateral extremities and having a hard time getting up and transferring self.  While in bed however, she is able to transfer self to sitting up at bedside but she is very weak to one side and has a difficult time if not assisted. Otherwise pt has not complained of any pain today.

## 2019-11-23 NOTE — PLAN OF CARE
Problem: Fall Injury Risk  Goal: Absence of Fall and Fall-Related Injury  Outcome: Ongoing, Progressing  Intervention: Identify and Manage Contributors to Fall Injury Risk  Flowsheets (Taken 11/23/2019 1505)  Self-Care Promotion: independence encouraged; BADL personal objects within reach; BADL personal routines maintained  Medication Review/Management: medications reviewed  Intervention: Promote Injury-Free Environment  Flowsheets (Taken 11/23/2019 1505)  Safety Promotion/Fall Prevention: assistive device/personal item within reach; diversional activities provided; Fall Risk reviewed with patient/family; Fall Risk signage in place; medications reviewed; lighting adjusted; nonskid shoes/socks when out of bed; side rails raised x 3; instructed to call staff for mobility  Environmental Safety Modification: assistive device/personal items within reach; clutter free environment maintained; lighting adjusted

## 2019-11-23 NOTE — ASSESSMENT & PLAN NOTE
- Continue Eliquis 2.5mg BID.  - [id number] VASc score of at least 8 for CHF, HTN, DM II, CVA, CAD, age, and sex.  - Rate controlled. Continue metoprolol

## 2019-11-23 NOTE — ASSESSMENT & PLAN NOTE
Patient lives with son who takes care of her since she is wheelchair dependent. Have spoken with son repeatedly on multiple occasions who continues to insist that he cannot safely take care of her at home and wants her to go to SNF.  - SNF referral sent

## 2019-11-23 NOTE — ASSESSMENT & PLAN NOTE
PT/OT to evaluate the disposition needs  Currently patient unable to ambulate without help  Plans for PT with home health outpatient upon discharge  Ambulatory referral to neurosurgery for chronic neck pain and right arm weakness   Ambulatory referral to pain management

## 2019-11-23 NOTE — PLAN OF CARE
Hemodialysis tx complete, 2L removed in a tx of 3 hours, tolerated well. Blood returned via right lower arm AVG, 15g needles removed x2, gauze and tape applied, pressure held to each site for 10 minutes,hemostasis achieved. Report given to flr nurse Sam. Transferred from unit via hospital bed by transport back to room

## 2019-11-23 NOTE — PROGRESS NOTES
[facility] Hospital Medicine  Progress Note    Patient Name: Angelina Beard  MRN: [id number]  Patient Class: IP- Inpatient   Admission Date: 11/18/2019  Length of Stay: 4 days  Attending Physician: Chase Beard MD  Primary Care Provider: Ben Mena MD    [facility] Medicine Team: [facility] 2 Sariah Levy MD    Subjective:     Principal Problem:Hypervolemia associated with renal insufficiency        HPI:  Ms. Lozada is a 69 year-old AA female with CVA, ESRD (on HD, MWF), HLD, AFib, HFpEF, HTN, and T2DM who was admitted on 11/18 with complaints of weakness and LLE cramping. She missed her HD treatment on 11/18 due to the aforementioned complaints. In the ED, she was found to be hyperkalemic (5.6). She was shifted in the ED and her repeat potassium was 5.0 after medical management. Overnight, the patient became acutely hypoxic despite NC at 3 L. Her oxygen saturations were in the 50s despite supplemental oxygen. Her CXR was notable for significant cardiomegaly and pulmonary edema. She was given a dose of lasix but had minimal urine output. ABGs notable for hypercapnic respiratory acidosis with hypoxemia. A rapid response was called on 11/19/19 and the patient was stepped up to the MICU for acute hypercapnic and hypoxic respiratory failure secondary to volume overload. The patient was placed on BiPAP and emergent HD was performed at bedside with 3 L being removed. The patient remains on BiPAP this AM of 11/20/19 but states that her shortness of breath is much improved. She is a MWF dialysis patient of Dr. Madden at [facility]. She states that she dialyzes for 3.5 hrs via a RFA AVG. Her dry weight is about ~ 102 kg. She states that she last dialyze on Friday, 11/15. She missed her dialysis on Monday 11/18/19 because of generalize weakness and lower extremity cramps.    Overview/Hospital Course:  Patient dialyzed emergently on 11/19/19 and 3.0 L was removed. BIPAP transitioned to nasal  peter on 11/21/19 on 2.5 L home oxygen requirements. Patient had a repeat dialysis on 11/20/19 and another 3.0 L removed. Patient stepped down to hospital medicine team on 11/21/19.      11/21/2019: Patient seen and examined at the bedside. She did not have any cardiopulmonary distress. Continued to require 2.5 L oxygen at baseline. Per nephrology reccs, patient to receive dialysis today 11/21/19 for 3 hours. After dialysis, patient assessed to be stable for discharge and resume dialysis 3 x/week MWF. Case management informed and home health PT/OT oxygen orders placed.   -- Patient requested replacement of her wheelchair, per case management, patient should get a repair by DME within 3 years of issuance of supplies  -- Patient daughter April (Ph [phone]) had concerns of skin breakdown on right foot. Wound care consulted and no wounds/skin breakdown seen. Patient has poor foot care and underlying ESRD, DM and a podiatry ambulatory referral given upon discharge  -- Later around 7 pm, when patient's son came to  the patient, he raised few concerns regarding discharge. Firstly, he states his mom is too weak to go home. He risks falls and would like physical therapy to re-evaluate and give some recommendations including wheelchair and Rolator options. Secondly, he is concerned about right arm tingling and weakness. Per the patient, this is a chronic problem and was told that she has nerve impingement. Since patient needs evaluation, discharge was cancelled and family was informed that we will re evaluate in the morning. Discharge remains delayed as son (who takes care of patient) states he cannot safely take care of her right now and wants patient to go to SNF.    Interval History: Over the phone, patient's son is still strongly insisting that he (her primary caregiver) cannot safely take care of her at home if she is discharged today and wants her to go to a SNF. Patient has no new complaints today. Plan  for dialysis today. Will hold amlodipine.    Review of Systems   Constitutional: Positive for fatigue. Negative for activity change, appetite change, chills and unexpected weight change.   HENT: Negative for congestion, rhinorrhea, sinus pressure and sinus pain.    Eyes: Negative for photophobia and visual disturbance.   Cardiovascular: Negative for chest pain, palpitations and leg swelling.   Gastrointestinal: Negative for abdominal distention, abdominal pain, constipation and diarrhea.   Musculoskeletal: Positive for arthralgias and gait problem. Negative for back pain.   Skin: Negative for color change, pallor, rash and wound.   Hematological: Negative for adenopathy. Does not bruise/bleed easily.     Objective:     Vital Signs (Most Recent):  Temp: 97.4 °F (36.3 °C) (11/23/19 1140)  Pulse: 64 (11/23/19 1140)  Resp: 18 (11/23/19 1140)  BP: (!) 168/80 (11/23/19 1140)  SpO2: 96 % (11/23/19 1140) Vital Signs (24h Range):  Temp:  [97.4 °F (36.3 °C)-98 °F (36.7 °C)] 97.4 °F (36.3 °C)  Pulse:  [63-67] 64  Resp:  [16-18] 18  SpO2:  [92 %-98 %] 96 %  BP: (112-168)/(55-94) 168/80     Weight: 101 kg (222 lb 10.6 oz)  Body mass index is 34.87 kg/m².    Intake/Output Summary (Last 24 hours) at 11/23/2019 1143  Last data filed at 11/22/2019 1800  Gross per 24 hour   Intake 400 ml   Output --   Net 400 ml      Physical Exam   Constitutional: No distress.   HENT:   Mouth/Throat: Oropharynx is clear and moist. No oropharyngeal exudate.   Eyes: Conjunctivae are normal. No scleral icterus.   Neck: Normal range of motion. Neck supple. No JVD present. No tracheal deviation present. No thyromegaly present.   Cardiovascular: Normal rate, regular rhythm, normal heart sounds and intact distal pulses. Exam reveals no gallop and no friction rub.   No murmur heard.  Pulmonary/Chest: Effort normal. No respiratory distress. She has no wheezes.   Abdominal: Soft. Bowel sounds are normal. She exhibits no distension and no mass. There is no  tenderness. There is no guarding.   Musculoskeletal: Normal range of motion. She exhibits no edema, tenderness or deformity.   Poor foot hygiene and care. No skin breakdown.   Lymphadenopathy:     She has no cervical adenopathy.   Skin: Skin is warm. Capillary refill takes less than 2 seconds. No rash noted. She is not diaphoretic. No erythema.       Significant Labs: All pertinent labs within the past 24 hours have been reviewed.    Significant Imaging: I have reviewed and interpreted all pertinent imaging results/findings within the past 24 hours.      Assessment/Plan:      * Hypervolemia associated with renal insufficiency        Discharge planning issues  Patient lives with son who takes care of her since she is wheelchair dependent. Have spoken with son repeatedly on multiple occasions who continues to insist that he cannot safely take care of her at home and wants her to go to SNF.  - SNF referral sent      Weakness of left lower extremity  - Patient is a poor historian, no family at bedside.  - Per ER note, patient reports LLE pain and weakness and right shoulder pain for months.  Complains of LBP on this provider's exam, but denied to ER provider.  - CT head showed no evidence of acute intracranial abnormality, senescent and chronic microvascular ischemic change.  - Will get lumbar x-ray.  - PT/OT, SW consult.  Supportive care, fall precautions.  - Neuro checks q4hrs.        Hyperkalemia  End-stage renal disease on hemodialysis    - K 5.6 in setting of missed HD.  - Shifted in ER, will repeat labs.  - Nephrology consulted for HD.  - Patient on MWF schedule, (missed Monday initially).    History of stroke  - Continue secondary prevention with Plavix, statin.      ESRD on dialysis  Resume dialysis [facility]  Nephrology following; BMP daily to monitor electrolytes      Paroxysmal atrial fibrillation  - Continue Eliquis 2.5mg BID.  - [id number] VASc score of at least 8 for CHF, HTN, DM II, CVA, CAD, age, and sex.  - Rate  controlled. Continue metoprolol      Anemia in end-stage renal disease  - Hb 8.3, mildly decreased from baseline.  - Will monitor for now  - consider ferrous sulfate supplements      Renovascular hypertension  - Continue home amlodipine 10mg [facility], metoprolol succinate 50mg daily, isosorbide mononitrate daily  - Goal BP < 130/80 mmHg  - hold amlodipine 11/23 for dialysis while inpatient      Debility  PT/OT to evaluate the disposition needs  Currently patient unable to ambulate without help  Plans for PT with home health outpatient upon discharge  Ambulatory referral to neurosurgery for chronic neck pain and right arm weakness   Ambulatory referral to pain management         Coronary artery disease  - Continue secondary prevention, Plavix, Imdur.  - No symptoms currently  - continue to monitor      GERD (gastroesophageal reflux disease)  - Protonix.      Chronic diastolic congestive heart failure  - Continue BB, Lasix 40mg daily.  ACEi/ARB likely held due to hyperkalemia.    Echo 9/24/2018:   1 - Concentric hypertrophy.     2 - Normal left ventricular systolic function (EF 60-65%).     3 - Impaired LV relaxation, normal LAP (grade 1 diastolic dysfunction).     4 - Normal right ventricular systolic function .     5 - The estimated PA systolic pressure is 34 mmHg.    - resume lasix 40 mg PO daily  - daily weights and Strict I and O's        Hyperlipidemia  - Continue pravastatin 80mg daily.      Type 2 diabetes mellitus with hypertension and end stage renal disease on dialysis  - Diabetic renal diet, SSI.  - Insulin detemir 15 units daily changed to 12 units giving fasting glucose of 97  - Glucose POC checks QID   - Goal Glucose 140-180 mg/dL        VTE Risk Mitigation (From admission, onward)         Ordered     apixaban tablet 2.5 mg  2 times daily      11/18/19 2321     Place sequential compression device  Until discontinued      11/18/19 2321     Place CAMELIA hose  Until discontinued      11/18/19 2321     IP VTE  HIGH RISK PATIENT  Once      11/18/19 1901                      Sariah Levy MD  Department of Hospital Medicine   [facility]

## 2019-11-23 NOTE — SUBJECTIVE & OBJECTIVE
Interval History: Over the phone, patient's son is still strongly insisting that he (her primary caregiver) cannot safely take care of her at home if she is discharged today and wants her to go to a SNF. Patient has no new complaints today. Plan for dialysis today. Will hold amlodipine.    Review of Systems   Constitutional: Positive for fatigue. Negative for activity change, appetite change, chills and unexpected weight change.   HENT: Negative for congestion, rhinorrhea, sinus pressure and sinus pain.    Eyes: Negative for photophobia and visual disturbance.   Cardiovascular: Negative for chest pain, palpitations and leg swelling.   Gastrointestinal: Negative for abdominal distention, abdominal pain, constipation and diarrhea.   Musculoskeletal: Positive for arthralgias and gait problem. Negative for back pain.   Skin: Negative for color change, pallor, rash and wound.   Hematological: Negative for adenopathy. Does not bruise/bleed easily.     Objective:     Vital Signs (Most Recent):  Temp: 97.4 °F (36.3 °C) (11/23/19 1140)  Pulse: 64 (11/23/19 1140)  Resp: 18 (11/23/19 1140)  BP: (!) 168/80 (11/23/19 1140)  SpO2: 96 % (11/23/19 1140) Vital Signs (24h Range):  Temp:  [97.4 °F (36.3 °C)-98 °F (36.7 °C)] 97.4 °F (36.3 °C)  Pulse:  [63-67] 64  Resp:  [16-18] 18  SpO2:  [92 %-98 %] 96 %  BP: (112-168)/(55-94) 168/80     Weight: 101 kg (222 lb 10.6 oz)  Body mass index is 34.87 kg/m².    Intake/Output Summary (Last 24 hours) at 11/23/2019 1143  Last data filed at 11/22/2019 1800  Gross per 24 hour   Intake 400 ml   Output --   Net 400 ml      Physical Exam   Constitutional: No distress.   HENT:   Mouth/Throat: Oropharynx is clear and moist. No oropharyngeal exudate.   Eyes: Conjunctivae are normal. No scleral icterus.   Neck: Normal range of motion. Neck supple. No JVD present. No tracheal deviation present. No thyromegaly present.   Cardiovascular: Normal rate, regular rhythm, normal heart sounds and intact distal  pulses. Exam reveals no gallop and no friction rub.   No murmur heard.  Pulmonary/Chest: Effort normal. No respiratory distress. She has no wheezes.   Abdominal: Soft. Bowel sounds are normal. She exhibits no distension and no mass. There is no tenderness. There is no guarding.   Musculoskeletal: Normal range of motion. She exhibits no edema, tenderness or deformity.   Poor foot hygiene and care. No skin breakdown.   Lymphadenopathy:     She has no cervical adenopathy.   Skin: Skin is warm. Capillary refill takes less than 2 seconds. No rash noted. She is not diaphoretic. No erythema.       Significant Labs: All pertinent labs within the past 24 hours have been reviewed.    Significant Imaging: I have reviewed and interpreted all pertinent imaging results/findings within the past 24 hours.

## 2019-11-23 NOTE — PLAN OF CARE
CM requested to meet with family to give list of Skilled Nursing.  CM met with son and daughter who chose [facility] as 1st choice and [facility] as 2nd choice.  Referral sent via [employer].

## 2019-11-23 NOTE — ASSESSMENT & PLAN NOTE
- Hb 8.3, mildly decreased from baseline.  - Will monitor for now  - consider ferrous sulfate supplements

## 2019-11-23 NOTE — ASSESSMENT & PLAN NOTE
- Continue home amlodipine 10mg [facility], metoprolol succinate 50mg daily, isosorbide mononitrate daily  - Goal BP < 130/80 mmHg  - hold amlodipine 11/23 for dialysis while inpatient

## 2019-11-23 NOTE — PROGRESS NOTES
Maintenance hemodialysis tx initiated via right lower arm AVG, tolerated well, flows good. Dialysis days are MWF. UF goal is 2L

## 2019-11-24 LAB
ALBUMIN SERPL BCP-MCNC: 2.9 G/DL (ref 3.5–5.2)
ALP SERPL-CCNC: 63 U/L (ref 55–135)
ALT SERPL W/O P-5'-P-CCNC: 22 U/L (ref 10–44)
ANION GAP SERPL CALC-SCNC: 15 MMOL/L (ref 8–16)
AST SERPL-CCNC: 22 U/L (ref 10–40)
BASOPHILS # BLD AUTO: 0.04 K/UL (ref 0–0.2)
BASOPHILS NFR BLD: 0.7 % (ref 0–1.9)
BILIRUB SERPL-MCNC: 0.3 MG/DL (ref 0.1–1)
BUN SERPL-MCNC: 24 MG/DL (ref 8–23)
CALCIUM SERPL-MCNC: 9.2 MG/DL (ref 8.7–10.5)
CHLORIDE SERPL-SCNC: 106 MMOL/L (ref 95–110)
CO2 SERPL-SCNC: 22 MMOL/L (ref 23–29)
CREAT SERPL-MCNC: 5.4 MG/DL (ref 0.5–1.4)
DIFFERENTIAL METHOD: ABNORMAL
EOSINOPHIL # BLD AUTO: 0.2 K/UL (ref 0–0.5)
EOSINOPHIL NFR BLD: 3 % (ref 0–8)
ERYTHROCYTE [DISTWIDTH] IN BLOOD BY AUTOMATED COUNT: 14 % (ref 11.5–14.5)
EST. GFR  (AFRICAN AMERICAN): 8.7 ML/MIN/1.73 M^2
EST. GFR  (NON AFRICAN AMERICAN): 7.6 ML/MIN/1.73 M^2
GLUCOSE SERPL-MCNC: 108 MG/DL (ref 70–110)
HCT VFR BLD AUTO: 27.9 % (ref 37–48.5)
HGB BLD-MCNC: 8.5 G/DL (ref 12–16)
IMM GRANULOCYTES # BLD AUTO: 0.02 K/UL (ref 0–0.04)
IMM GRANULOCYTES NFR BLD AUTO: 0.4 % (ref 0–0.5)
LYMPHOCYTES # BLD AUTO: 0.9 K/UL (ref 1–4.8)
LYMPHOCYTES NFR BLD: 15.7 % (ref 18–48)
MAGNESIUM SERPL-MCNC: 2 MG/DL (ref 1.6–2.6)
MCH RBC QN AUTO: 29.6 PG (ref 27–31)
MCHC RBC AUTO-ENTMCNC: 30.5 G/DL (ref 32–36)
MCV RBC AUTO: 97 FL (ref 82–98)
MONOCYTES # BLD AUTO: 0.6 K/UL (ref 0.3–1)
MONOCYTES NFR BLD: 10.6 % (ref 4–15)
NEUTROPHILS # BLD AUTO: 4 K/UL (ref 1.8–7.7)
NEUTROPHILS NFR BLD: 69.6 % (ref 38–73)
NRBC BLD-RTO: 0 /100 WBC
PHOSPHATE SERPL-MCNC: 4.1 MG/DL (ref 2.7–4.5)
PLATELET # BLD AUTO: 210 K/UL (ref 150–350)
PMV BLD AUTO: 10.4 FL (ref 9.2–12.9)
POCT GLUCOSE: 123 MG/DL (ref 70–110)
POCT GLUCOSE: 126 MG/DL (ref 70–110)
POCT GLUCOSE: 131 MG/DL (ref 70–110)
POCT GLUCOSE: 136 MG/DL (ref 70–110)
POTASSIUM SERPL-SCNC: 4.3 MMOL/L (ref 3.5–5.1)
PROT SERPL-MCNC: 7 G/DL (ref 6–8.4)
RBC # BLD AUTO: 2.87 M/UL (ref 4–5.4)
SODIUM SERPL-SCNC: 143 MMOL/L (ref 136–145)
WBC # BLD AUTO: 5.67 K/UL (ref 3.9–12.7)

## 2019-11-24 PROCEDURE — 63600175 PHARM REV CODE 636 W HCPCS: Performed by: STUDENT IN AN ORGANIZED HEALTH CARE EDUCATION/TRAINING PROGRAM

## 2019-11-24 PROCEDURE — 94761 N-INVAS EAR/PLS OXIMETRY MLT: CPT

## 2019-11-24 PROCEDURE — 25000003 PHARM REV CODE 250: Performed by: PHYSICIAN ASSISTANT

## 2019-11-24 PROCEDURE — C9399 UNCLASSIFIED DRUGS OR BIOLOG: HCPCS | Performed by: STUDENT IN AN ORGANIZED HEALTH CARE EDUCATION/TRAINING PROGRAM

## 2019-11-24 PROCEDURE — 99232 SBSQ HOSP IP/OBS MODERATE 35: CPT | Mod: GC,,, | Performed by: HOSPITALIST

## 2019-11-24 PROCEDURE — 25000003 PHARM REV CODE 250: Performed by: STUDENT IN AN ORGANIZED HEALTH CARE EDUCATION/TRAINING PROGRAM

## 2019-11-24 PROCEDURE — 80053 COMPREHEN METABOLIC PANEL: CPT

## 2019-11-24 PROCEDURE — 11000001 HC ACUTE MED/SURG PRIVATE ROOM

## 2019-11-24 PROCEDURE — 99232 PR SUBSEQUENT HOSPITAL CARE,LEVL II: ICD-10-PCS | Mod: GC,,, | Performed by: HOSPITALIST

## 2019-11-24 PROCEDURE — 83735 ASSAY OF MAGNESIUM: CPT

## 2019-11-24 PROCEDURE — 36415 COLL VENOUS BLD VENIPUNCTURE: CPT

## 2019-11-24 PROCEDURE — 85025 COMPLETE CBC W/AUTO DIFF WBC: CPT

## 2019-11-24 PROCEDURE — 27000221 HC OXYGEN, UP TO 24 HOURS

## 2019-11-24 PROCEDURE — 84100 ASSAY OF PHOSPHORUS: CPT

## 2019-11-24 RX ADMIN — CALCIUM ACETATE 667 MG: 667 CAPSULE ORAL at 08:11

## 2019-11-24 RX ADMIN — APIXABAN 2.5 MG: 2.5 TABLET, FILM COATED ORAL at 09:11

## 2019-11-24 RX ADMIN — SENNOSIDES AND DOCUSATE SODIUM 1 TABLET: 8.6; 5 TABLET ORAL at 09:11

## 2019-11-24 RX ADMIN — FUROSEMIDE 40 MG: 40 TABLET ORAL at 08:11

## 2019-11-24 RX ADMIN — LIDOCAINE 1 PATCH: 50 PATCH TOPICAL at 08:11

## 2019-11-24 RX ADMIN — PANTOPRAZOLE SODIUM 40 MG: 40 TABLET, DELAYED RELEASE ORAL at 08:11

## 2019-11-24 RX ADMIN — METOPROLOL SUCCINATE 50 MG: 50 TABLET, EXTENDED RELEASE ORAL at 08:11

## 2019-11-24 RX ADMIN — ACETAMINOPHEN 650 MG: 325 TABLET ORAL at 09:11

## 2019-11-24 RX ADMIN — NEPHROCAP 1 CAPSULE: 1 CAP ORAL at 09:11

## 2019-11-24 RX ADMIN — ISOSORBIDE MONONITRATE 30 MG: 30 TABLET, EXTENDED RELEASE ORAL at 08:11

## 2019-11-24 RX ADMIN — POLYETHYLENE GLYCOL 3350 17 G: 17 POWDER, FOR SOLUTION ORAL at 08:11

## 2019-11-24 RX ADMIN — INSULIN DETEMIR 12 UNITS: 100 INJECTION, SOLUTION SUBCUTANEOUS at 08:11

## 2019-11-24 RX ADMIN — CALCIUM ACETATE 667 MG: 667 CAPSULE ORAL at 05:11

## 2019-11-24 RX ADMIN — APIXABAN 2.5 MG: 2.5 TABLET, FILM COATED ORAL at 08:11

## 2019-11-24 RX ADMIN — SERTRALINE HYDROCHLORIDE 100 MG: 100 TABLET ORAL at 08:11

## 2019-11-24 RX ADMIN — SENNOSIDES AND DOCUSATE SODIUM 1 TABLET: 8.6; 5 TABLET ORAL at 08:11

## 2019-11-24 RX ADMIN — CLOPIDOGREL BISULFATE 75 MG: 75 TABLET ORAL at 08:11

## 2019-11-24 RX ADMIN — CALCIUM ACETATE 667 MG: 667 CAPSULE ORAL at 12:11

## 2019-11-24 RX ADMIN — PRAVASTATIN SODIUM 80 MG: 40 TABLET ORAL at 08:11

## 2019-11-24 RX ADMIN — Medication 6 MG: at 09:11

## 2019-11-24 RX ADMIN — AMLODIPINE BESYLATE 10 MG: 10 TABLET ORAL at 05:11

## 2019-11-24 NOTE — ASSESSMENT & PLAN NOTE
- Patient is a poor historian, no family at bedside.  - Per ER note, patient reports LLE pain and weakness and right shoulder pain for months.  Complains of LBP on this provider's exam, but denied to ER provider.  - CT head showed no evidence of acute intracranial abnormality, senescent and chronic microvascular ischemic change.  - Lumbar x-ray shows: lumbar spondylosis with significant degenerative change and spondylolisthesis of L4 with respect L5, similar to slightly worse when compared with the prior study.  MRI could provide improved characterization of the spinal canal and neural foramina if the patient has an acute lower extremity neurologic deficit.  - PT/OT, SW consult.  Supportive care, fall precautions.  - Neuro checks q4hrs.

## 2019-11-24 NOTE — ASSESSMENT & PLAN NOTE
- Continue home amlodipine 10mg [facility], metoprolol succinate 50mg daily, isosorbide mononitrate daily  - Goal BP < 130/80 mmHg

## 2019-11-24 NOTE — ASSESSMENT & PLAN NOTE
PT/OT to evaluate the disposition needs  Currently patient unable to ambulate without help  Plans for PT with home health outpatient upon discharge  Patient's primary caregiver, son, insists that he cannot safely take care of patient at home when she is discharged  Appreciate social work/case management's assistance with SNF referrals  Communicated with patient's son on more than one occasion that if SNF declines patient and he cannot safely take care of her, there may need to be consideration for a nursing home  Ambulatory referral to neurosurgery for chronic neck pain and right arm weakness   Ambulatory referral to pain management

## 2019-11-24 NOTE — PLAN OF CARE
Problem: Fall Injury Risk  Goal: Absence of Fall and Fall-Related Injury  Outcome: Ongoing, Progressing  Intervention: Identify and Manage Contributors to Fall Injury Risk  Flowsheets (Taken 11/24/2019 1530)  Self-Care Promotion: independence encouraged; BADL personal objects within reach; BADL personal routines maintained; meal setup provided  Medication Review/Management: medications reviewed  Intervention: Promote Injury-Free Environment  Flowsheets (Taken 11/24/2019 1530)  Safety Promotion/Fall Prevention: assistive device/personal item within reach; commode/urinal/bedpan at bedside; diversional activities provided; Fall Risk reviewed with patient/family; in recliner, wheels locked; medications reviewed; side rails raised x 3; instructed to call staff for mobility  Environmental Safety Modification: clutter free environment maintained; lighting adjusted; assistive device/personal items within reach

## 2019-11-24 NOTE — ASSESSMENT & PLAN NOTE
- Continue Eliquis 2.5mg BID.  - [id number] VASc score of at least 8 for CHF, HTN, DM II, CVA, CAD, age, and sex.  - Rate controlled. Continue metoprolol

## 2019-11-24 NOTE — ASSESSMENT & PLAN NOTE
Patient lives with son who takes care of her since she is wheelchair dependent. Have spoken with son repeatedly on multiple occasions who continues to insist that he cannot safely take care of her at home and wants her to go to SNF.  - SNF referral sent  - see debility

## 2019-11-24 NOTE — PROGRESS NOTES
[facility] Hospital Medicine  Progress Note    Patient Name: Angelina Beard  MRN: [id number]  Patient Class: IP- Inpatient   Admission Date: 11/18/2019  Length of Stay: 5 days  Attending Physician: Chase Beard MD  Primary Care Provider: Ben Mena MD    [facility] Medicine Team: [facility] HOSP MED 2 Sariah Levy MD    Subjective:     Principal Problem:Hypervolemia associated with renal insufficiency        HPI:  Ms. Lozada is a 69 year-old AA female with CVA, ESRD (on HD, MWF), HLD, AFib, HFpEF, HTN, and T2DM who was admitted on 11/18 with complaints of weakness and LLE cramping. She missed her HD treatment on 11/18 due to the aforementioned complaints. In the ED, she was found to be hyperkalemic (5.6). She was shifted in the ED and her repeat potassium was 5.0 after medical management. Overnight, the patient became acutely hypoxic despite NC at 3 L. Her oxygen saturations were in the 50s despite supplemental oxygen. Her CXR was notable for significant cardiomegaly and pulmonary edema. She was given a dose of lasix but had minimal urine output. ABGs notable for hypercapnic respiratory acidosis with hypoxemia. A rapid response was called on 11/19/19 and the patient was stepped up to the MICU for acute hypercapnic and hypoxic respiratory failure secondary to volume overload. The patient was placed on BiPAP and emergent HD was performed at bedside with 3 L being removed. The patient remains on BiPAP this AM of 11/20/19 but states that her shortness of breath is much improved. She is a MWF dialysis patient of Dr. Madden at [facility]. She states that she dialyzes for 3.5 hrs via a RFA AVG. Her dry weight is about ~ 102 kg. She states that she last dialyze on Friday, 11/15. She missed her dialysis on Monday 11/18/19 because of generalize weakness and lower extremity cramps.    Overview/Hospital Course:  Patient dialyzed emergently on 11/19/19 and 3.0 L was removed. BIPAP transitioned to nasal  peter on 11/21/19 on 2.5 L home oxygen requirements. Patient had a repeat dialysis on 11/20/19 and another 3.0 L removed. Patient stepped down to hospital medicine team on 11/21/19.      11/21/2019: Patient seen and examined at the bedside. She did not have any cardiopulmonary distress. Continued to require 2.5 L oxygen at baseline. Per nephrology reccs, patient to receive dialysis today 11/21/19 for 3 hours. After dialysis, patient assessed to be stable for discharge and resume dialysis 3 x/week MWF. Case management informed and home health PT/OT oxygen orders placed.   -- Patient requested replacement of her wheelchair, per case management, patient should get a repair by DME within 3 years of issuance of supplies  -- Patient daughter April (Ph [phone]) had concerns of skin breakdown on right foot. Wound care consulted and no wounds/skin breakdown seen. Patient has poor foot care and underlying ESRD, DM and a podiatry ambulatory referral given upon discharge  -- Later around 7 pm, when patient's son came to  the patient, he raised few concerns regarding discharge. Firstly, he states his mom is too weak to go home. He risks falls and would like physical therapy to re-evaluate and give some recommendations including wheelchair and Rolator options. Secondly, he is concerned about right arm tingling and weakness. Per the patient, this is a chronic problem and was told that she has nerve impingement. Since patient needs evaluation, discharge was cancelled and family was informed that we will re evaluate in the morning. Discharge remains delayed as son (who takes care of patient) states he cannot safely take care of her right now and wants patient to go to SNF.    Interval History: No changes overnight. No new complaints.     Review of Systems   Constitutional: Positive for fatigue. Negative for activity change, appetite change, chills and unexpected weight change.   HENT: Negative for congestion,  rhinorrhea, sinus pressure and sinus pain.    Eyes: Negative for photophobia and visual disturbance.   Cardiovascular: Negative for chest pain, palpitations and leg swelling.   Gastrointestinal: Negative for abdominal distention, abdominal pain, constipation and diarrhea.   Musculoskeletal: Positive for arthralgias and gait problem. Negative for back pain.   Skin: Negative for color change, pallor, rash and wound.   Hematological: Negative for adenopathy. Does not bruise/bleed easily.     Objective:     Vital Signs (Most Recent):  Temp: 98.1 °F (36.7 °C) (11/24/19 1145)  Pulse: 68 (11/24/19 1534)  Resp: 16 (11/24/19 1145)  BP: (!) 179/86 (11/24/19 1145)  SpO2: 98 % (11/24/19 1145) Vital Signs (24h Range):  Temp:  [97.1 °F (36.2 °C)-98.2 °F (36.8 °C)] 98.1 °F (36.7 °C)  Pulse:  [63-75] 68  Resp:  [16-20] 16  SpO2:  [94 %-99 %] 98 %  BP: (112-179)/(51-86) 179/86     Weight: 99.4 kg (219 lb 2.2 oz)  Body mass index is 34.32 kg/m².    Intake/Output Summary (Last 24 hours) at 11/24/2019 1608  Last data filed at 11/24/2019 0900  Gross per 24 hour   Intake 720 ml   Output 2600 ml   Net -1880 ml      Physical Exam   Constitutional: No distress.   HENT:   Mouth/Throat: Oropharynx is clear and moist. No oropharyngeal exudate.   Eyes: Conjunctivae are normal. No scleral icterus.   Neck: Normal range of motion. Neck supple. No JVD present. No tracheal deviation present. No thyromegaly present.   Cardiovascular: Normal rate, regular rhythm, normal heart sounds and intact distal pulses. Exam reveals no gallop and no friction rub.   No murmur heard.  Pulmonary/Chest: Effort normal. No respiratory distress. She has no wheezes.   Abdominal: Soft. Bowel sounds are normal. She exhibits no distension and no mass. There is no tenderness. There is no guarding.   Musculoskeletal: Normal range of motion. She exhibits no edema, tenderness or deformity.   Poor foot hygiene and care. No skin breakdown.   Lymphadenopathy:     She has no  cervical adenopathy.   Skin: Skin is warm. Capillary refill takes less than 2 seconds. No rash noted. She is not diaphoretic. No erythema.       Significant Labs: All pertinent labs within the past 24 hours have been reviewed.    Significant Imaging: I have reviewed and interpreted all pertinent imaging results/findings within the past 24 hours.      Assessment/Plan:      * Hypervolemia associated with renal insufficiency        Discharge planning issues  Patient lives with son who takes care of her since she is wheelchair dependent. Have spoken with son repeatedly on multiple occasions who continues to insist that he cannot safely take care of her at home and wants her to go to SNF.  - SNF referral sent  - see debility    Weakness of left lower extremity  - Patient is a poor historian, no family at bedside.  - Per ER note, patient reports LLE pain and weakness and right shoulder pain for months.  Complains of LBP on this provider's exam, but denied to ER provider.  - CT head showed no evidence of acute intracranial abnormality, senescent and chronic microvascular ischemic change.  - Lumbar x-ray shows: lumbar spondylosis with significant degenerative change and spondylolisthesis of L4 with respect L5, similar to slightly worse when compared with the prior study.  MRI could provide improved characterization of the spinal canal and neural foramina if the patient has an acute lower extremity neurologic deficit.  - PT/OT, SW consult.  Supportive care, fall precautions.  - Neuro checks q4hrs.        Hyperkalemia  End-stage renal disease on hemodialysis    - K 5.6 in setting of missed HD.  - Shifted in ER, will repeat labs.  - Nephrology consulted for HD.  - Patient on MW schedule, (missed Monday initially).    History of stroke  - Continue secondary prevention with Plavix, statin.      ESRD on dialysis  Resume dialysis [facility]  Nephrology following; BMP daily to monitor electrolytes      Paroxysmal atrial fibrillation  -  Continue Eliquis 2.5mg BID.  - [id number] VASc score of at least 8 for CHF, HTN, DM II, CVA, CAD, age, and sex.  - Rate controlled. Continue metoprolol      Anemia in end-stage renal disease  - Hb 8.3, mildly decreased from baseline.  - Will monitor for now  - consider ferrous sulfate supplements      Renovascular hypertension  - Continue home amlodipine 10mg TuThSaSu, metoprolol succinate 50mg daily, isosorbide mononitrate daily  - Goal BP < 130/80 mmHg        Debility  PT/OT to evaluate the disposition needs  Currently patient unable to ambulate without help  Plans for PT with home health outpatient upon discharge  Patient's primary caregiver, son, insists that he cannot safely take care of patient at home when she is discharged  Appreciate social work/case management's assistance with SNF referrals  Communicated with patient's son on more than one occasion that if SNF declines patient and he cannot safely take care of her, there may need to be consideration for a nursing home  Ambulatory referral to neurosurgery for chronic neck pain and right arm weakness   Ambulatory referral to pain management         Coronary artery disease  - Continue secondary prevention, Plavix, Imdur.  - No symptoms currently  - continue to monitor      GERD (gastroesophageal reflux disease)  - Protonix.      Chronic diastolic congestive heart failure  - Continue BB, Lasix 40mg daily.  ACEi/ARB likely held due to hyperkalemia.    Echo 9/24/2018:   1 - Concentric hypertrophy.     2 - Normal left ventricular systolic function (EF 60-65%).     3 - Impaired LV relaxation, normal LAP (grade 1 diastolic dysfunction).     4 - Normal right ventricular systolic function .     5 - The estimated PA systolic pressure is 34 mmHg.    - resume lasix 40 mg PO daily  - daily weights and Strict I and O's        Hyperlipidemia  - Continue pravastatin 80mg daily.      Type 2 diabetes mellitus with hypertension and end stage renal disease on dialysis  - Diabetic  renal diet, SSI.  - Insulin detemir 15 units daily changed to 12 units giving fasting glucose of 97  - Glucose POC checks QID   - Goal Glucose 140-180 mg/dL        VTE Risk Mitigation (From admission, onward)         Ordered     apixaban tablet 2.5 mg  2 times daily      11/18/19 2321     Place sequential compression device  Until discontinued      11/18/19 2321     Place CAMELIA hose  Until discontinued      11/18/19 2321     IP VTE HIGH RISK PATIENT  Once      11/18/19 2321                      Sariah Levy MD  Department of Hospital Medicine   [facility]

## 2019-11-25 LAB
ALBUMIN SERPL BCP-MCNC: 3 G/DL (ref 3.5–5.2)
ALP SERPL-CCNC: 64 U/L (ref 55–135)
ALT SERPL W/O P-5'-P-CCNC: 21 U/L (ref 10–44)
ANION GAP SERPL CALC-SCNC: 11 MMOL/L (ref 8–16)
AST SERPL-CCNC: 20 U/L (ref 10–40)
BASOPHILS # BLD AUTO: 0.04 K/UL (ref 0–0.2)
BASOPHILS NFR BLD: 0.7 % (ref 0–1.9)
BILIRUB SERPL-MCNC: 0.3 MG/DL (ref 0.1–1)
BUN SERPL-MCNC: 39 MG/DL (ref 8–23)
CALCIUM SERPL-MCNC: 9.4 MG/DL (ref 8.7–10.5)
CHLORIDE SERPL-SCNC: 105 MMOL/L (ref 95–110)
CO2 SERPL-SCNC: 25 MMOL/L (ref 23–29)
CREAT SERPL-MCNC: 7.3 MG/DL (ref 0.5–1.4)
DIFFERENTIAL METHOD: ABNORMAL
EOSINOPHIL # BLD AUTO: 0.2 K/UL (ref 0–0.5)
EOSINOPHIL NFR BLD: 3.5 % (ref 0–8)
ERYTHROCYTE [DISTWIDTH] IN BLOOD BY AUTOMATED COUNT: 14.2 % (ref 11.5–14.5)
EST. GFR  (AFRICAN AMERICAN): 6 ML/MIN/1.73 M^2
EST. GFR  (NON AFRICAN AMERICAN): 5.2 ML/MIN/1.73 M^2
GLUCOSE SERPL-MCNC: 122 MG/DL (ref 70–110)
HAV IGM SERPL QL IA: NEGATIVE
HBV CORE IGM SERPL QL IA: NEGATIVE
HBV SURFACE AG SERPL QL IA: NEGATIVE
HCT VFR BLD AUTO: 27.6 % (ref 37–48.5)
HCV AB SERPL QL IA: NEGATIVE
HGB BLD-MCNC: 8.5 G/DL (ref 12–16)
IMM GRANULOCYTES # BLD AUTO: 0.02 K/UL (ref 0–0.04)
IMM GRANULOCYTES NFR BLD AUTO: 0.3 % (ref 0–0.5)
LYMPHOCYTES # BLD AUTO: 1 K/UL (ref 1–4.8)
LYMPHOCYTES NFR BLD: 16.8 % (ref 18–48)
MAGNESIUM SERPL-MCNC: 2.2 MG/DL (ref 1.6–2.6)
MCH RBC QN AUTO: 29.4 PG (ref 27–31)
MCHC RBC AUTO-ENTMCNC: 30.8 G/DL (ref 32–36)
MCV RBC AUTO: 96 FL (ref 82–98)
MONOCYTES # BLD AUTO: 0.5 K/UL (ref 0.3–1)
MONOCYTES NFR BLD: 9 % (ref 4–15)
NEUTROPHILS # BLD AUTO: 4 K/UL (ref 1.8–7.7)
NEUTROPHILS NFR BLD: 69.7 % (ref 38–73)
NRBC BLD-RTO: 0 /100 WBC
PHOSPHATE SERPL-MCNC: 5.4 MG/DL (ref 2.7–4.5)
PLATELET # BLD AUTO: 205 K/UL (ref 150–350)
PMV BLD AUTO: 10.3 FL (ref 9.2–12.9)
POCT GLUCOSE: 114 MG/DL (ref 70–110)
POCT GLUCOSE: 131 MG/DL (ref 70–110)
POCT GLUCOSE: 137 MG/DL (ref 70–110)
POCT GLUCOSE: 154 MG/DL (ref 70–110)
POTASSIUM SERPL-SCNC: 4.7 MMOL/L (ref 3.5–5.1)
PROT SERPL-MCNC: 6.9 G/DL (ref 6–8.4)
RBC # BLD AUTO: 2.89 M/UL (ref 4–5.4)
SODIUM SERPL-SCNC: 141 MMOL/L (ref 136–145)
WBC # BLD AUTO: 5.77 K/UL (ref 3.9–12.7)

## 2019-11-25 PROCEDURE — 11000001 HC ACUTE MED/SURG PRIVATE ROOM

## 2019-11-25 PROCEDURE — 97110 THERAPEUTIC EXERCISES: CPT

## 2019-11-25 PROCEDURE — 99232 SBSQ HOSP IP/OBS MODERATE 35: CPT | Mod: GC,,, | Performed by: HOSPITALIST

## 2019-11-25 PROCEDURE — 25000003 PHARM REV CODE 250: Performed by: PHYSICIAN ASSISTANT

## 2019-11-25 PROCEDURE — 99232 PR SUBSEQUENT HOSPITAL CARE,LEVL II: ICD-10-PCS | Mod: GC,,, | Performed by: HOSPITALIST

## 2019-11-25 PROCEDURE — 97535 SELF CARE MNGMENT TRAINING: CPT

## 2019-11-25 PROCEDURE — 80053 COMPREHEN METABOLIC PANEL: CPT

## 2019-11-25 PROCEDURE — 25000003 PHARM REV CODE 250: Performed by: STUDENT IN AN ORGANIZED HEALTH CARE EDUCATION/TRAINING PROGRAM

## 2019-11-25 PROCEDURE — 83735 ASSAY OF MAGNESIUM: CPT

## 2019-11-25 PROCEDURE — 85025 COMPLETE CBC W/AUTO DIFF WBC: CPT

## 2019-11-25 PROCEDURE — 36415 COLL VENOUS BLD VENIPUNCTURE: CPT

## 2019-11-25 PROCEDURE — 84100 ASSAY OF PHOSPHORUS: CPT

## 2019-11-25 PROCEDURE — 80074 ACUTE HEPATITIS PANEL: CPT

## 2019-11-25 RX ORDER — CLOPIDOGREL BISULFATE 75 MG/1
75 TABLET ORAL DAILY
Status: CANCELLED | OUTPATIENT
Start: 2019-11-26

## 2019-11-25 RX ORDER — SODIUM CHLORIDE 9 MG/ML
INJECTION, SOLUTION INTRAVENOUS ONCE
Status: COMPLETED | OUTPATIENT
Start: 2019-11-26 | End: 2019-11-26

## 2019-11-25 RX ORDER — SODIUM CHLORIDE 9 MG/ML
INJECTION, SOLUTION INTRAVENOUS
Status: DISCONTINUED | OUTPATIENT
Start: 2019-11-26 | End: 2019-11-27 | Stop reason: HOSPADM

## 2019-11-25 RX ORDER — NITROGLYCERIN 0.4 MG/1
0.4 TABLET SUBLINGUAL EVERY 5 MIN PRN
Status: CANCELLED | OUTPATIENT
Start: 2019-11-25

## 2019-11-25 RX ORDER — CALCIUM ACETATE 667 MG/1
667 CAPSULE ORAL
Status: CANCELLED | OUTPATIENT
Start: 2019-11-25

## 2019-11-25 RX ORDER — FUROSEMIDE 40 MG/1
40 TABLET ORAL DAILY
Status: CANCELLED | OUTPATIENT
Start: 2019-11-26

## 2019-11-25 RX ORDER — GABAPENTIN 300 MG/1
300 CAPSULE ORAL 3 TIMES DAILY
Status: CANCELLED | OUTPATIENT
Start: 2019-11-25

## 2019-11-25 RX ORDER — SERTRALINE HYDROCHLORIDE 100 MG/1
100 TABLET, FILM COATED ORAL DAILY
Status: CANCELLED | OUTPATIENT
Start: 2019-11-26

## 2019-11-25 RX ORDER — ISOSORBIDE MONONITRATE 30 MG/1
30 TABLET, EXTENDED RELEASE ORAL DAILY
Status: CANCELLED | OUTPATIENT
Start: 2019-11-26

## 2019-11-25 RX ORDER — PRAVASTATIN SODIUM 80 MG/1
80 TABLET ORAL DAILY
Status: CANCELLED | OUTPATIENT
Start: 2019-11-26

## 2019-11-25 RX ORDER — METOPROLOL SUCCINATE 50 MG/1
50 TABLET, EXTENDED RELEASE ORAL DAILY
Status: CANCELLED | OUTPATIENT
Start: 2019-11-26

## 2019-11-25 RX ORDER — POLYETHYLENE GLYCOL 3350 17 G/17G
17 POWDER, FOR SOLUTION ORAL ONCE
Status: CANCELLED | OUTPATIENT
Start: 2019-11-25

## 2019-11-25 RX ORDER — PANTOPRAZOLE SODIUM 40 MG/1
40 TABLET, DELAYED RELEASE ORAL DAILY
Status: CANCELLED | OUTPATIENT
Start: 2019-11-26

## 2019-11-25 RX ORDER — AMLODIPINE BESYLATE 10 MG/1
10 TABLET ORAL
Status: DISCONTINUED | OUTPATIENT
Start: 2019-11-25 | End: 2019-11-27 | Stop reason: HOSPADM

## 2019-11-25 RX ORDER — AMLODIPINE BESYLATE 10 MG/1
10 TABLET ORAL
Status: CANCELLED | OUTPATIENT
Start: 2019-11-26

## 2019-11-25 RX ORDER — DOCUSATE SODIUM 100 MG/1
100 CAPSULE, LIQUID FILLED ORAL 2 TIMES DAILY
Status: CANCELLED | OUTPATIENT
Start: 2019-11-25

## 2019-11-25 RX ADMIN — SERTRALINE HYDROCHLORIDE 100 MG: 100 TABLET ORAL at 09:11

## 2019-11-25 RX ADMIN — ISOSORBIDE MONONITRATE 30 MG: 30 TABLET, EXTENDED RELEASE ORAL at 09:11

## 2019-11-25 RX ADMIN — CLOPIDOGREL BISULFATE 75 MG: 75 TABLET ORAL at 09:11

## 2019-11-25 RX ADMIN — APIXABAN 2.5 MG: 2.5 TABLET, FILM COATED ORAL at 09:11

## 2019-11-25 RX ADMIN — APIXABAN 2.5 MG: 2.5 TABLET, FILM COATED ORAL at 08:11

## 2019-11-25 RX ADMIN — CALCIUM ACETATE 667 MG: 667 CAPSULE ORAL at 07:11

## 2019-11-25 RX ADMIN — SENNOSIDES AND DOCUSATE SODIUM 1 TABLET: 8.6; 5 TABLET ORAL at 09:11

## 2019-11-25 RX ADMIN — CALCIUM ACETATE 667 MG: 667 CAPSULE ORAL at 05:11

## 2019-11-25 RX ADMIN — METOPROLOL SUCCINATE 50 MG: 50 TABLET, EXTENDED RELEASE ORAL at 09:11

## 2019-11-25 RX ADMIN — FUROSEMIDE 40 MG: 40 TABLET ORAL at 09:11

## 2019-11-25 RX ADMIN — PRAVASTATIN SODIUM 80 MG: 40 TABLET ORAL at 09:11

## 2019-11-25 RX ADMIN — AMLODIPINE BESYLATE 10 MG: 10 TABLET ORAL at 05:11

## 2019-11-25 RX ADMIN — NEPHROCAP 1 CAPSULE: 1 CAP ORAL at 09:11

## 2019-11-25 RX ADMIN — PANTOPRAZOLE SODIUM 40 MG: 40 TABLET, DELAYED RELEASE ORAL at 09:11

## 2019-11-25 RX ADMIN — CALCIUM ACETATE 667 MG: 667 CAPSULE ORAL at 12:11

## 2019-11-25 RX ADMIN — SENNOSIDES AND DOCUSATE SODIUM 1 TABLET: 8.6; 5 TABLET ORAL at 08:11

## 2019-11-25 RX ADMIN — ACETAMINOPHEN 650 MG: 325 TABLET ORAL at 05:11

## 2019-11-25 RX ADMIN — INSULIN DETEMIR 12 UNITS: 100 INJECTION, SOLUTION SUBCUTANEOUS at 09:11

## 2019-11-25 RX ADMIN — LIDOCAINE 1 PATCH: 50 PATCH TOPICAL at 09:11

## 2019-11-25 NOTE — SUBJECTIVE & OBJECTIVE
Interval History: No changes overnight.     Review of Systems   Constitutional: Positive for fatigue. Negative for activity change, appetite change, chills and unexpected weight change.   HENT: Negative for congestion, rhinorrhea, sinus pressure and sinus pain.    Eyes: Negative for photophobia and visual disturbance.   Cardiovascular: Negative for chest pain, palpitations and leg swelling.   Gastrointestinal: Negative for abdominal distention, abdominal pain, constipation and diarrhea.   Musculoskeletal: Positive for arthralgias and gait problem. Negative for back pain.   Skin: Negative for color change, pallor, rash and wound.   Hematological: Negative for adenopathy. Does not bruise/bleed easily.     Objective:     Vital Signs (Most Recent):  Temp: 98.1 °F (36.7 °C) (11/25/19 0755)  Pulse: 64 (11/25/19 0755)  Resp: 17 (11/25/19 0755)  BP: (!) 150/69 (11/25/19 0755)  SpO2: 100 % (11/25/19 0755) Vital Signs (24h Range):  Temp:  [96.2 °F (35.7 °C)-98.1 °F (36.7 °C)] 98.1 °F (36.7 °C)  Pulse:  [63-68] 64  Resp:  [16-18] 17  SpO2:  [97 %-100 %] 100 %  BP: (119-187)/(57-87) 150/69     Weight: 101 kg (222 lb 10.6 oz)  Body mass index is 34.87 kg/m².  No intake or output data in the 24 hours ending 11/25/19 0916   Physical Exam   Constitutional: No distress.   HENT:   Mouth/Throat: Oropharynx is clear and moist. No oropharyngeal exudate.   Eyes: Conjunctivae are normal. No scleral icterus.   Neck: Normal range of motion. Neck supple. No JVD present. No tracheal deviation present. No thyromegaly present.   Cardiovascular: Normal rate, regular rhythm, normal heart sounds and intact distal pulses. Exam reveals no gallop and no friction rub.   No murmur heard.  Pulmonary/Chest: Effort normal. No respiratory distress. She has no wheezes.   Abdominal: Soft. Bowel sounds are normal. She exhibits no distension and no mass. There is no tenderness. There is no guarding.   Musculoskeletal: Normal range of motion. She exhibits no  edema, tenderness or deformity.   Poor foot hygiene and care. No skin breakdown.   Lymphadenopathy:     She has no cervical adenopathy.   Skin: Skin is warm. Capillary refill takes less than 2 seconds. No rash noted. She is not diaphoretic. No erythema.       Significant Labs: All pertinent labs within the past 24 hours have been reviewed.    Significant Imaging: I have reviewed and interpreted all pertinent imaging results/findings within the past 24 hours.

## 2019-11-25 NOTE — ASSESSMENT & PLAN NOTE
- Continue home amlodipine 10mg [facility], metoprolol succinate 50mg daily, isosorbide mononitrate daily  - Goal BP < 130/80 mmHg

## 2019-11-25 NOTE — PT/OT/SLP PROGRESS
"Occupational Therapy   Treatment    Name: Angelina Beard  MRN: [id number]  Admitting Diagnosis:  Hypervolemia associated with renal insufficiency       Recommendations:     Discharge Recommendations: nursing facility, skilled  Discharge Equipment Recommendations:  hospital bed  Barriers to discharge:       Assessment:     Angelina Beard is a 69 y.o. female with a medical diagnosis of Hypervolemia associated with renal insufficiency. Pt presents with decreased endurance and impaired mobility performance as limited by cardiovascular status and generalized weakness. Pt found sleeping however willing to participate this morning in bed mobility, EOB sitting balance tasks, and seated ADLs. Pt SBA with bed mobility this date and setup at EOB for grooming tasks. Pt offered  services as pt appearing flat affect however pt reporting appreciation however she was "just tired." POC updated as pt and OT had discussion related to prior levels with pt explaining she fears she will not be able to complete t/f training without significant help. Per chart review, this was a skill the pt could complete prior to hospitalization.  Performance deficits affecting function are weakness, impaired endurance, impaired self care skills, impaired balance, gait instability, impaired functional mobilty, decreased lower extremity function. Pt would benefit from continued OT skilled services 3x/wk to improve daily living skills to optimize QOL. Pt is recommended to discharge to SNF at this time.      Rehab Prognosis:  Fair; patient would benefit from acute skilled OT services to address these deficits and reach maximum level of function.       Plan:     Patient to be seen 3 x/week to address the above listed problems via self-care/home management, therapeutic activities, therapeutic exercises  · Plan of Care Expires: 12/25/19  · Plan of Care Reviewed with: patient    Subjective     Pain/Comfort:  · Pain Rating 1: 0/10  · Pain Rating " Post-Intervention 1: 0/10    Objective:     Communicated with: RN prior to session.  Patient found HOB elevated with oxygen upon OT entry to room.    General Precautions: Standard, fall   Orthopedic Precautions:N/A   Braces: N/A     Occupational Performance:     Bed Mobility:    · Patient completed Rolling/Turning to Right with stand by assistance  · Patient completed Scooting/Bridging with stand by assistance  · Patient completed Supine to Sit with stand by assistance  · Patient completed Sit to Supine with stand by assistance       Activities of Daily Living:  · Grooming: supervision at EOB with setup for face cloth and teeth brushing items       [facility] 6 Click ADL: 14    Treatment & Education:  Pt educated on role of occupational therapy, POC, and safety during ADLs and functional mobility. Pt and OT discussed importance of safe, continued mobility to optimize daily living skills. Pt verbalized understanding.   Pt completed the following during session: bed mobility, EOB sitting tolerance task, grooming tasks at EOB. See above for associated level of mobility performance. White board updated during session. Pt given instruction to call for medical staff/nurse for assistance.       Patient left HOB elevated with all lines intact, call button in reach and RN notifiedEducation:      GOALS:   Multidisciplinary Problems     Occupational Therapy Goals        Problem: Occupational Therapy Goal    Goal Priority Disciplines Outcome Interventions   Occupational Therapy Goal     OT, PT/OT Ongoing, Progressing    Description:  Goals to be met by: 11/27/19     Patient will increase functional independence with ADLs by performing:    Pt to complete UE dressing with set-up  Pt to complete LE dressing with MIN A   Pt to complete toileting with SBA  Pt to complete t/f to BSC with SBA  Pt to tolerated OOB x 2-3 hours daily to increase endurance for functional activity.                       Time Tracking:     OT Date of Treatment:  11/25/19  OT Start Time: 0958  OT Stop Time: 1012  OT Total Time (min): 14 min    Billable Minutes:Self Care/Home Management 14 min    Hollie Maldonado OT  11/25/2019

## 2019-11-25 NOTE — PLAN OF CARE
Problem: Physical Therapy Goal  Goal: Physical Therapy Goal  Description  Goals to be met by: 2019    Patient will increase functional independence with mobility by performin. Sit to supine with Stand-by Assistance - not met  2. Sit to stand transfer with Minimal Assistance - not met  3. Bed to Chair transfer with Minimal Assistance - not met  3. Lower extremity exercise program x15 reps per handout, with independence - met       Outcome: Ongoing, Progressing     Treatment completed and Goal 3 met. Goals appropriate

## 2019-11-25 NOTE — PLAN OF CARE
Patient has been accepted by [facility]. [place] [facility] is trying to get in touch with the patient's son's to complete her paperwork. SW will continue to follow.      11/25/19 1227   Post-Acute Status   Post-Acute Authorization Placement   Post-Acute Placement Status Authorization Obtained     Isaura Mc LMSW   - [facility]  Ext. [phone]

## 2019-11-25 NOTE — PLAN OF CARE
Problem: Occupational Therapy Goal  Goal: Occupational Therapy Goal  Description  Goals to be met by: 11/27/19     Patient will increase functional independence with ADLs by performing:    Pt to complete UE dressing with set-up  Pt to complete LE dressing with MIN A   Pt to complete toileting with SBA  Pt to complete t/f to BSC with SBA  Pt to tolerated OOB x 2-3 hours daily to increase endurance for functional activity.    Continue OT POC.  Hollie Maldonado OT  11/25/2019    Outcome: Ongoing, Progressing

## 2019-11-25 NOTE — PLAN OF CARE
11/25/19 1649   Discharge Reassessment   Assessment Type Discharge Planning Reassessment   Provided patient/caregiver education on the expected discharge date and the discharge plan Yes   Do you have any problems affording any of your prescribed medications? No   Discharge Plan A Skilled Nursing Facility   Discharge Plan B Skilled Nursing Facility   DME Needed Upon Discharge  none   Anticipated Discharge Disposition SNF

## 2019-11-25 NOTE — PLAN OF CARE
[facility]  SKILLED NURSING   FACILITY TRANSFER ORDERS      Patient Name: Angelina Beard  YOB: 1950    PCP: Ben Mena MD   PCP Address: [address] [place] [address] / [place] [id number]  PCP Phone Number: [phone]  PCP Fax: [phone]    Encounter Date: 11/25/2019    Admit to: SNF    Vital Signs:  Routine    Diagnoses:   Active Hospital Problems    Diagnosis  POA    *Hypervolemia associated with renal insufficiency [E87.70, N28.9]  Yes    Discharge planning issues [Z02.9]  Not Applicable    Weakness of left lower extremity [R29.898]  Yes    Acute and chr resp failure, unsp w hypoxia or hypercapnia [J96.20]  No    Hyperkalemia [E87.5]  Yes    History of stroke [Z86.73]  Not Applicable     Chronic    ESRD on dialysis [N18.6, Z99.2]  Not Applicable    Paroxysmal atrial fibrillation [I48.0]  Yes     Chronic    Anemia in end-stage renal disease [N18.6, D63.1]  Yes     Chronic    Renovascular hypertension [I15.0]  Yes     Chronic    Debility [R53.81]  Yes    Coronary artery disease [I25.10]  Yes     Chronic    Chronic diastolic congestive heart failure [I50.32]  Yes     Chronic    Type 2 diabetes mellitus with hypertension and end stage renal disease on dialysis [E11.22, I12.0, Z99.2, N18.6]  Not Applicable     Chronic      Resolved Hospital Problems   No resolved problems to display.       Allergies:Review of patient's allergies indicates:  No Known Allergies    Diet: diabetic diet: 2000 calorie    Activities: Activity as tolerated    Nursing: Per facility protocol    Labs: CBC and BMP Daily for On dialysis days days     CONSULTS:    Physical Therapy to evaluate and treat.  and Occupational Therapy to evaluate and treat.    MISCELLANEOUS CARE:  Diabetes Care:   SN to perform and educate Diabetic management with blood glucose monitoring:, Fingerstick blood sugar a.m. and p.m. and Report CBG < 60 or > 350 to physician.    Continuous oxygen requirements 3.0 L oxygen via  nasal canula    Continue Dialysis three times a week Monday/Wednesday and Friday    Medications: Review discharge medications with patient and family and provide education.      Current Discharge Medication List      CONTINUE these medications which have NOT CHANGED    Details   acetaminophen (TYLENOL) 500 MG tablet Take 2 tablets (1,000 mg total) by mouth every 8 (eight) hours as needed.  Refills: 0      amLODIPine (NORVASC) 10 MG tablet Take 10 mg by mouth every Tuesday, Thursday, Saturday, Sunday.      apixaban (ELIQUIS) 2.5 mg Tab Take 1 tablet (2.5 mg total) by mouth 2 (two) times daily.  Qty: 180 tablet, Refills: 3    Associated Diagnoses: Chronic atrial fibrillation      calcium acetate (PHOSLO) 667 mg capsule Take 1 capsule (667 mg total) by mouth 3 (three) times daily with meals.  Qty: 270 capsule, Refills: 4      clopidogrel (PLAVIX) 75 mg tablet Take 1 tablet (75 mg total) by mouth once daily.  Qty: 30 tablet, Refills: 11      docusate sodium (COLACE) 100 MG capsule Take 1 capsule (100 mg total) by mouth 2 (two) times daily.  Refills: 0      gabapentin (NEURONTIN) 300 MG capsule Take 1 capsule (300 mg total) by mouth every 8 (eight) hours as needed.  Qty: 90 capsule, Refills: 3    Associated Diagnoses: Osteoarthritis of both knees, unspecified osteoarthritis type      insulin (LANTUS SOLOSTAR U-100 INSULIN) glargine 100 units/mL (3mL) SubQ pen Inject 20 Units into the skin once daily.  Qty: 3 Syringe, Refills: 3    Associated Diagnoses: Type 2 diabetes mellitus with hypertension and end stage renal disease on dialysis      isosorbide mononitrate (IMDUR) 30 MG 24 hr tablet Take 1 tablet (30 mg total) by mouth once daily.  Qty: 30 tablet, Refills: 11      metoprolol succinate (TOPROL-XL) 50 MG 24 hr tablet Take 1 tablet (50 mg total) by mouth once daily.  Qty: 90 tablet, Refills: 3    Associated Diagnoses: Hypertension associated with diabetes      mupirocin (BACTROBAN) 2 % ointment by Nasal route 2 (two)  times daily.  Qty: 2 g, Refills: 1      nitroGLYCERIN (NITROSTAT) 0.4 MG SL tablet Place 1 tablet (0.4 mg total) under the tongue every 5 (five) minutes as needed for Chest pain.  Qty: 10 tablet, Refills: 1      pantoprazole (PROTONIX) 40 MG tablet Take 1 tablet (40 mg total) by mouth once daily.  Qty: 30 tablet, Refills: 11      polyethylene glycol (GLYCOLAX) 17 gram/dose powder       pravastatin (PRAVACHOL) 80 MG tablet Take 1 tablet (80 mg total) by mouth once daily.  Qty: 30 tablet, Refills: 11    Associated Diagnoses: Hyperlipidemia associated with type 2 diabetes mellitus      sertraline (ZOLOFT) 100 MG tablet Take 1 tablet (100 mg total) by mouth once daily.  Qty: 90 tablet, Refills: 3    Associated Diagnoses: Recurrent major depressive disorder, in partial remission      vitamin renal formula, B-complex-vitamin c-folic acid, (NEPHROCAP) 1 mg Cap Take 1 capsule by mouth once daily.  Qty: 30 capsule, Refills: 11      furosemide (LASIX) 40 MG tablet Take 1 tablet (40 mg total) by mouth once daily.  Qty: 30 tablet, Refills: 3              _________________________________  Antonio Ferreira MD  11/25/2019

## 2019-11-25 NOTE — ASSESSMENT & PLAN NOTE
- Continue Eliquis 2.5mg BID.  - [id number] VASc score of at least 8 for CHF, HTN, DM II, CVA, CAD, age, and sex.  - Rate controlled. Continue metoprolol

## 2019-11-25 NOTE — PROGRESS NOTES
[facility] Hospital Medicine  Progress Note    Patient Name: Angelina Beard  MRN: [id number]  Patient Class: IP- Inpatient   Admission Date: 11/18/2019  Length of Stay: 6 days  Attending Physician: Chase Beard MD  Primary Care Provider: Ben Mena MD    [facility] Medicine Team: [facility] HOSP MED 2 Sariah Levy MD    Subjective:     Principal Problem:Hypervolemia associated with renal insufficiency        HPI:  Ms. Lozada is a 69 year-old AA female with CVA, ESRD (on HD, MWF), HLD, AFib, HFpEF, HTN, and T2DM who was admitted on 11/18 with complaints of weakness and LLE cramping. She missed her HD treatment on 11/18 due to the aforementioned complaints. In the ED, she was found to be hyperkalemic (5.6). She was shifted in the ED and her repeat potassium was 5.0 after medical management. Overnight, the patient became acutely hypoxic despite NC at 3 L. Her oxygen saturations were in the 50s despite supplemental oxygen. Her CXR was notable for significant cardiomegaly and pulmonary edema. She was given a dose of lasix but had minimal urine output. ABGs notable for hypercapnic respiratory acidosis with hypoxemia. A rapid response was called on 11/19/19 and the patient was stepped up to the MICU for acute hypercapnic and hypoxic respiratory failure secondary to volume overload. The patient was placed on BiPAP and emergent HD was performed at bedside with 3 L being removed. The patient remains on BiPAP this AM of 11/20/19 but states that her shortness of breath is much improved. She is a MWF dialysis patient of Dr. Madden at [facility]. She states that she dialyzes for 3.5 hrs via a RFA AVG. Her dry weight is about ~ 102 kg. She states that she last dialyze on Friday, 11/15. She missed her dialysis on Monday 11/18/19 because of generalize weakness and lower extremity cramps.    Overview/Hospital Course:  Patient dialyzed emergently on 11/19/19 and 3.0 L was removed. BIPAP transitioned to nasal  peter on 11/21/19 on 2.5 L home oxygen requirements. Patient had a repeat dialysis on 11/20/19 and another 3.0 L removed. Patient stepped down to hospital medicine team on 11/21/19.      11/21/2019: Patient seen and examined at the bedside. She did not have any cardiopulmonary distress. Continued to require 2.5 L oxygen at baseline. Per nephrology reccs, patient to receive dialysis today 11/21/19 for 3 hours. After dialysis, patient assessed to be stable for discharge and resume dialysis 3 x/week MWF. Case management informed and home health PT/OT oxygen orders placed.   -- Patient requested replacement of her wheelchair, per case management, patient should get a repair by DME within 3 years of issuance of supplies  -- Patient daughter April (Ph [phone]) had concerns of skin breakdown on right foot. Wound care consulted and no wounds/skin breakdown seen. Patient has poor foot care and underlying ESRD, DM and a podiatry ambulatory referral given upon discharge  -- Later around 7 pm, when patient's son came to  the patient, he raised few concerns regarding discharge. Firstly, he states his mom is too weak to go home. He risks falls and would like physical therapy to re-evaluate and give some recommendations including wheelchair and Rolator options. Secondly, he is concerned about right arm tingling and weakness. Per the patient, this is a chronic problem and was told that she has nerve impingement. Since patient needs evaluation, discharge was cancelled and family was informed that we will re evaluate in the morning. Discharge remains delayed as son (who takes care of patient) states he cannot safely take care of her right now and wants patient to go to SNF.    Interval History: No changes overnight.     Review of Systems   Constitutional: Positive for fatigue. Negative for activity change, appetite change, chills and unexpected weight change.   HENT: Negative for congestion, rhinorrhea, sinus pressure  and sinus pain.    Eyes: Negative for photophobia and visual disturbance.   Cardiovascular: Negative for chest pain, palpitations and leg swelling.   Gastrointestinal: Negative for abdominal distention, abdominal pain, constipation and diarrhea.   Musculoskeletal: Positive for arthralgias and gait problem. Negative for back pain.   Skin: Negative for color change, pallor, rash and wound.   Hematological: Negative for adenopathy. Does not bruise/bleed easily.     Objective:     Vital Signs (Most Recent):  Temp: 98.1 °F (36.7 °C) (11/25/19 0755)  Pulse: 64 (11/25/19 0755)  Resp: 17 (11/25/19 0755)  BP: (!) 150/69 (11/25/19 0755)  SpO2: 100 % (11/25/19 0755) Vital Signs (24h Range):  Temp:  [96.2 °F (35.7 °C)-98.1 °F (36.7 °C)] 98.1 °F (36.7 °C)  Pulse:  [63-68] 64  Resp:  [16-18] 17  SpO2:  [97 %-100 %] 100 %  BP: (119-187)/(57-87) 150/69     Weight: 101 kg (222 lb 10.6 oz)  Body mass index is 34.87 kg/m².  No intake or output data in the 24 hours ending 11/25/19 0916   Physical Exam   Constitutional: No distress.   HENT:   Mouth/Throat: Oropharynx is clear and moist. No oropharyngeal exudate.   Eyes: Conjunctivae are normal. No scleral icterus.   Neck: Normal range of motion. Neck supple. No JVD present. No tracheal deviation present. No thyromegaly present.   Cardiovascular: Normal rate, regular rhythm, normal heart sounds and intact distal pulses. Exam reveals no gallop and no friction rub.   No murmur heard.  Pulmonary/Chest: Effort normal. No respiratory distress. She has no wheezes.   Abdominal: Soft. Bowel sounds are normal. She exhibits no distension and no mass. There is no tenderness. There is no guarding.   Musculoskeletal: Normal range of motion. She exhibits no edema, tenderness or deformity.   Poor foot hygiene and care. No skin breakdown.   Lymphadenopathy:     She has no cervical adenopathy.   Skin: Skin is warm. Capillary refill takes less than 2 seconds. No rash noted. She is not diaphoretic. No  erythema.       Significant Labs: All pertinent labs within the past 24 hours have been reviewed.    Significant Imaging: I have reviewed and interpreted all pertinent imaging results/findings within the past 24 hours.      Assessment/Plan:      * Hypervolemia associated with renal insufficiency        Discharge planning issues  Patient lives with son who takes care of her since she is wheelchair dependent. Have spoken with son repeatedly on multiple occasions who continues to insist that he cannot safely take care of her at home and wants her to go to SNF.  - SNF referral sent  - see debility  - labs Mon, Wed, Fri with dialysis    Weakness of left lower extremity  - Patient is a poor historian, no family at bedside.  - Per ER note, patient reports LLE pain and weakness and right shoulder pain for months.  Complains of LBP on this provider's exam, but denied to ER provider.  - CT head showed no evidence of acute intracranial abnormality, senescent and chronic microvascular ischemic change.  - Lumbar x-ray shows: lumbar spondylosis with significant degenerative change and spondylolisthesis of L4 with respect L5, similar to slightly worse when compared with the prior study.  MRI could provide improved characterization of the spinal canal and neural foramina if the patient has an acute lower extremity neurologic deficit.  - PT/OT, SW consult.  Supportive care, fall precautions.  - Neuro checks q4hrs.        Hyperkalemia  End-stage renal disease on hemodialysis    - K 5.6 in setting of missed HD.  - Shifted in ER, will repeat labs.  - Nephrology consulted for HD.  - Patient on MWF schedule, (missed Monday initially).    History of stroke  - Continue secondary prevention with Plavix, statin.      ESRD on dialysis  Resume dialysis [facility]  Nephrology following; BMP daily to monitor electrolytes      Paroxysmal atrial fibrillation  - Continue Eliquis 2.5mg BID.  - [id number] VASc score of at least 8 for CHF, HTN, DM II, CVA, CAD,  age, and sex.  - Rate controlled. Continue metoprolol      Anemia in end-stage renal disease  - Hb 8.3, mildly decreased from baseline.  - Will monitor for now  - consider ferrous sulfate supplements      Renovascular hypertension  - Continue home amlodipine 10mg TuThSaSu, metoprolol succinate 50mg daily, isosorbide mononitrate daily  - Goal BP < 130/80 mmHg        Debility  PT/OT to evaluate the disposition needs  Currently patient unable to ambulate without help  Plans for PT with home health outpatient upon discharge  Patient's primary caregiver, son, insists that he cannot safely take care of patient at home when she is discharged  Appreciate social work/case management's assistance with SNF referrals  Communicated with patient's son on more than one occasion that if SNF declines patient and he cannot safely take care of her, there may need to be consideration for a nursing home  Ambulatory referral to neurosurgery for chronic neck pain and right arm weakness   Ambulatory referral to pain management         Coronary artery disease  - Continue secondary prevention, Plavix, Imdur.  - No symptoms currently  - continue to monitor      GERD (gastroesophageal reflux disease)  - Protonix.      Chronic diastolic congestive heart failure  - Continue BB, Lasix 40mg daily.  ACEi/ARB likely held due to hyperkalemia.    Echo 9/24/2018:   1 - Concentric hypertrophy.     2 - Normal left ventricular systolic function (EF 60-65%).     3 - Impaired LV relaxation, normal LAP (grade 1 diastolic dysfunction).     4 - Normal right ventricular systolic function .     5 - The estimated PA systolic pressure is 34 mmHg.    - resume lasix 40 mg PO daily  - daily weights and Strict I and O's        Hyperlipidemia  - Continue pravastatin 80mg daily.      Type 2 diabetes mellitus with hypertension and end stage renal disease on dialysis  - Diabetic renal diet, SSI.  - Insulin detemir 15 units daily changed to 12 units giving fasting glucose  of 97  - Glucose POC checks QID   - Goal Glucose 140-180 mg/dL        VTE Risk Mitigation (From admission, onward)         Ordered     apixaban tablet 2.5 mg  2 times daily      11/18/19 2321     Place sequential compression device  Until discontinued      11/18/19 2321     Place CAMELIA hose  Until discontinued      11/18/19 2321     IP VTE HIGH RISK PATIENT  Once      11/18/19 2321                      Sariah Levy MD  Department of Hospital Medicine   [facility]

## 2019-11-25 NOTE — PLAN OF CARE
[facility] declined patient via [facility].     [facility]: Under Review. SW will continue to follow.     Isaura Mc LMSW   - [facility]  Ext. [phone]

## 2019-11-25 NOTE — PT/OT/SLP PROGRESS
Physical Therapy Treatment    Patient Name:  Angelina Beard   MRN:  [id number]    Recommendations:     Discharge Recommendations: SNF  Discharge Equipment Recommendations: hospital bed   Barriers to discharge: Decreased caregiver support    Assessment:     Angelina Beard is a 69 y.o. female admitted with a medical diagnosis of Hypervolemia associated with renal insufficiency.  She presents with the following impairments/functional limitations:  weakness, impaired endurance, impaired self care skills, impaired functional mobilty, impaired cardiopulmonary response to activity. Therapy discharge recommendations changed to SNF from PT. At baseline, pt is able to stand pivot to different surfaces. Pt's B LE strength at this time is 3+/5. Pt is not at baseline with her stand pivots (keith requires max A at this time). Pt would benefit from further B LE strengthening and transfer training to maximize functional mobility, ensure safety, and decrease caregiver burden    Rehab Prognosis: Good; patient would benefit from acute skilled PT services to address these deficits and reach maximum level of function.    Recent Surgery: * No surgery found *      Plan:     During this hospitalization, patient to be seen 2 x/week to address the identified rehab impairments via gait training, therapeutic activities, therapeutic exercises, neuromuscular re-education and progress toward the following goals:    · Plan of Care Expires:  12/18/19    Subjective     Chief Complaint: none reported   Patient/Family Comments/goals: to get better and return hoem   Pain/Comfort:  · Pain Rating 1: 0/10  · Pain Rating Post-Intervention 1: 0/10      Objective:     Communicated with RN prior to session.  Patient found HOB elevated with oxygen upon PT entry to room.     General Precautions: Standard, fall   Orthopedic Precautions:N/A   Braces: N/A     Functional Mobility:  · Bed Mobility:     · Scooting: independence  · Supine to Sit:  independence  · Sit to Supine: independence  · Transfers: not performed 2nd to fatigue   · Gait: not performed       AM-PAC 6 CLICK MOBILITY  Turning over in bed (including adjusting bedclothes, sheets and blankets)?: 4  Sitting down on and standing up from a chair with arms (e.g., wheelchair, bedside commode, etc.): 2  Moving from lying on back to sitting on the side of the bed?: 4  Moving to and from a bed to a chair (including a wheelchair)?: 2  Need to walk in hospital room?: 1  Climbing 3-5 steps with a railing?: 1  Basic Mobility Total Score: 14       Therapeutic Activities and Exercises:  Educated pt on PT role/POC  Educated pt on importance of OOB activity   Pt verbalized understanding    Sitting EOB x 8 minutes with indep  · B LE AROM x 15 reps re: ankle pumps, LAQs, marching     Patient left HOB elevated with all lines intact, call button in reach and RN notified..    GOALS:   Multidisciplinary Problems     Physical Therapy Goals        Problem: Physical Therapy Goal    Goal Priority Disciplines Outcome Goal Variances Interventions   Physical Therapy Goal     PT, PT/OT Ongoing, Progressing     Description:  Goals to be met by: 2019    Patient will increase functional independence with mobility by performin. Sit to supine with Stand-by Assistance - not met  2. Sit to stand transfer with Minimal Assistance - not met  3. Bed to Chair transfer with Minimal Assistance - not met  3. Lower extremity exercise program x15 reps per handout, with independence - met                        Time Tracking:     PT Received On: 19  PT Start Time: 1353     PT Stop Time: 1401  PT Total Time (min): 8 min     Billable Minutes: Therapeutic Exercise 8    Treatment Type: Treatment  PT/PTA: PT           Mellissa Marie PT, DPT  2019  [phone]

## 2019-11-25 NOTE — PLAN OF CARE
SW completed LOCET and faxed PASRR to state, waiting on [phone]. SW is in communication with patient's CM, and patient's Care Team - IM2. NEETA will continue to follow.     Isaura Mc LMSW   - [facility]  Ext. [phone]

## 2019-11-26 LAB
ALBUMIN SERPL BCP-MCNC: 3.1 G/DL (ref 3.5–5.2)
ANION GAP SERPL CALC-SCNC: 14 MMOL/L (ref 8–16)
BUN SERPL-MCNC: 56 MG/DL (ref 8–23)
CALCIUM SERPL-MCNC: 9.8 MG/DL (ref 8.7–10.5)
CHLORIDE SERPL-SCNC: 105 MMOL/L (ref 95–110)
CO2 SERPL-SCNC: 22 MMOL/L (ref 23–29)
CREAT SERPL-MCNC: 9.2 MG/DL (ref 0.5–1.4)
EST. GFR  (AFRICAN AMERICAN): 4.6 ML/MIN/1.73 M^2
EST. GFR  (NON AFRICAN AMERICAN): 4 ML/MIN/1.73 M^2
GLUCOSE SERPL-MCNC: 130 MG/DL (ref 70–110)
PHOSPHATE SERPL-MCNC: 6.3 MG/DL (ref 2.7–4.5)
POCT GLUCOSE: 111 MG/DL (ref 70–110)
POCT GLUCOSE: 125 MG/DL (ref 70–110)
POCT GLUCOSE: 136 MG/DL (ref 70–110)
POTASSIUM SERPL-SCNC: 4.9 MMOL/L (ref 3.5–5.1)
SODIUM SERPL-SCNC: 141 MMOL/L (ref 136–145)

## 2019-11-26 PROCEDURE — 86580 TB INTRADERMAL TEST: CPT | Performed by: STUDENT IN AN ORGANIZED HEALTH CARE EDUCATION/TRAINING PROGRAM

## 2019-11-26 PROCEDURE — 90935 HEMODIALYSIS ONE EVALUATION: CPT | Mod: ,,, | Performed by: NURSE PRACTITIONER

## 2019-11-26 PROCEDURE — 90935 HEMODIALYSIS ONE EVALUATION: CPT

## 2019-11-26 PROCEDURE — 25000003 PHARM REV CODE 250: Performed by: STUDENT IN AN ORGANIZED HEALTH CARE EDUCATION/TRAINING PROGRAM

## 2019-11-26 PROCEDURE — 30200315 PPD INTRADERMAL TEST REV CODE 302: Performed by: STUDENT IN AN ORGANIZED HEALTH CARE EDUCATION/TRAINING PROGRAM

## 2019-11-26 PROCEDURE — 99231 PR SUBSEQUENT HOSPITAL CARE,LEVL I: ICD-10-PCS | Mod: GC,,, | Performed by: INTERNAL MEDICINE

## 2019-11-26 PROCEDURE — 11000001 HC ACUTE MED/SURG PRIVATE ROOM

## 2019-11-26 PROCEDURE — 63600175 PHARM REV CODE 636 W HCPCS: Performed by: NURSE PRACTITIONER

## 2019-11-26 PROCEDURE — 25000003 PHARM REV CODE 250: Performed by: PHYSICIAN ASSISTANT

## 2019-11-26 PROCEDURE — 99231 SBSQ HOSP IP/OBS SF/LOW 25: CPT | Mod: GC,,, | Performed by: INTERNAL MEDICINE

## 2019-11-26 PROCEDURE — 36415 COLL VENOUS BLD VENIPUNCTURE: CPT

## 2019-11-26 PROCEDURE — 80069 RENAL FUNCTION PANEL: CPT

## 2019-11-26 PROCEDURE — 90935 PR HEMODIALYSIS, ONE EVALUATION: ICD-10-PCS | Mod: ,,, | Performed by: NURSE PRACTITIONER

## 2019-11-26 RX ORDER — HYDRALAZINE HYDROCHLORIDE 25 MG/1
25 TABLET, FILM COATED ORAL EVERY 8 HOURS
Status: DISCONTINUED | OUTPATIENT
Start: 2019-11-26 | End: 2019-11-26

## 2019-11-26 RX ORDER — HYDRALAZINE HYDROCHLORIDE 25 MG/1
25 TABLET, FILM COATED ORAL 3 TIMES DAILY PRN
Status: DISCONTINUED | OUTPATIENT
Start: 2019-11-26 | End: 2019-11-27 | Stop reason: HOSPADM

## 2019-11-26 RX ADMIN — INSULIN DETEMIR 12 UNITS: 100 INJECTION, SOLUTION SUBCUTANEOUS at 12:11

## 2019-11-26 RX ADMIN — APIXABAN 2.5 MG: 2.5 TABLET, FILM COATED ORAL at 12:11

## 2019-11-26 RX ADMIN — HYDRALAZINE HYDROCHLORIDE 25 MG: 25 TABLET, FILM COATED ORAL at 04:11

## 2019-11-26 RX ADMIN — LIDOCAINE 1 PATCH: 50 PATCH TOPICAL at 12:11

## 2019-11-26 RX ADMIN — PANTOPRAZOLE SODIUM 40 MG: 40 TABLET, DELAYED RELEASE ORAL at 12:11

## 2019-11-26 RX ADMIN — SENNOSIDES AND DOCUSATE SODIUM 1 TABLET: 8.6; 5 TABLET ORAL at 09:11

## 2019-11-26 RX ADMIN — FUROSEMIDE 40 MG: 40 TABLET ORAL at 12:11

## 2019-11-26 RX ADMIN — PRAVASTATIN SODIUM 80 MG: 40 TABLET ORAL at 12:11

## 2019-11-26 RX ADMIN — APIXABAN 2.5 MG: 2.5 TABLET, FILM COATED ORAL at 09:11

## 2019-11-26 RX ADMIN — NEPHROCAP 1 CAPSULE: 1 CAP ORAL at 09:11

## 2019-11-26 RX ADMIN — CALCIUM ACETATE 667 MG: 667 CAPSULE ORAL at 08:11

## 2019-11-26 RX ADMIN — TUBERCULIN PURIFIED PROTEIN DERIVATIVE 5 UNITS: 5 INJECTION, SOLUTION INTRADERMAL at 12:11

## 2019-11-26 RX ADMIN — SENNOSIDES AND DOCUSATE SODIUM 1 TABLET: 8.6; 5 TABLET ORAL at 12:11

## 2019-11-26 RX ADMIN — METOPROLOL SUCCINATE 50 MG: 50 TABLET, EXTENDED RELEASE ORAL at 11:11

## 2019-11-26 RX ADMIN — ACETAMINOPHEN 650 MG: 325 TABLET ORAL at 12:11

## 2019-11-26 RX ADMIN — CLOPIDOGREL BISULFATE 75 MG: 75 TABLET ORAL at 12:11

## 2019-11-26 RX ADMIN — ISOSORBIDE MONONITRATE 30 MG: 30 TABLET, EXTENDED RELEASE ORAL at 12:11

## 2019-11-26 RX ADMIN — SERTRALINE HYDROCHLORIDE 100 MG: 100 TABLET ORAL at 12:11

## 2019-11-26 RX ADMIN — SODIUM CHLORIDE: 0.9 INJECTION, SOLUTION INTRAVENOUS at 07:11

## 2019-11-26 RX ADMIN — CALCIUM ACETATE 667 MG: 667 CAPSULE ORAL at 12:11

## 2019-11-26 NOTE — PLAN OF CARE
[facility]  Skilled Nursing Facility  FACILITY TRANSFER ORDERS      Patient Name: Angelina Beard  YOB: 1950    PCP: Ben Mena MD   PCP Address: [address] [place] [address] / [place] [address]  PCP Phone Number: [phone]  PCP Fax: [phone]    Encounter Date: 11/26/2019    Admit to: SNF    Vital Signs:  Routine    Diagnoses:   Active Hospital Problems    Diagnosis  POA    *Hypervolemia associated with renal insufficiency [E87.70, N28.9]  Yes    Discharge planning issues [Z02.9]  Not Applicable    Weakness of left lower extremity [R29.898]  Yes    Acute and chr resp failure, unsp w hypoxia or hypercapnia [J96.20]  No    Hyperkalemia [E87.5]  Yes    History of stroke [Z86.73]  Not Applicable     Chronic    ESRD on dialysis [N18.6, Z99.2]  Not Applicable    Paroxysmal atrial fibrillation [I48.0]  Yes     Chronic    Anemia in end-stage renal disease [N18.6, D63.1]  Yes     Chronic    Renovascular hypertension [I15.0]  Yes     Chronic    Debility [R53.81]  Yes    Coronary artery disease [I25.10]  Yes     Chronic    Chronic diastolic congestive heart failure [I50.32]  Yes     Chronic    Type 2 diabetes mellitus with hypertension and end stage renal disease on dialysis [E11.22, I12.0, Z99.2, N18.6]  Not Applicable     Chronic      Resolved Hospital Problems   No resolved problems to display.       Allergies:Review of patient's allergies indicates:  No Known Allergies    Diet: renal diet    Activities: Activity as tolerated    Nursing: Per SNF protocol    Labs: CBC and BMP On dialysis days MWF    Resume dialysis 3 times a week MWF    Home oxygen 3.0 L continuous nasal canula    CONSULTS:    Physical Therapy to evaluate and treat.  and Occupational Therapy to evaluate and treat.    MISCELLANEOUS CARE:  Routine Skin for Bedridden Patients: Apply moisture barrier cream to all skin folds and wet areas in perineal area daily and after baths and all bowel movements.    WOUND  CARE ORDERS  None    Medications: Review discharge medications with patient and family and provide education.      Current Discharge Medication List      CONTINUE these medications which have NOT CHANGED    Details   amLODIPine (NORVASC) 10 MG tablet Take 10 mg by mouth every Tuesday, Thursday, Saturday, Sunday.      apixaban (ELIQUIS) 2.5 mg Tab Take 1 tablet (2.5 mg total) by mouth 2 (two) times daily.  Qty: 180 tablet, Refills: 3    Associated Diagnoses: Chronic atrial fibrillation      calcium acetate (PHOSLO) 667 mg capsule Take 1 capsule (667 mg total) by mouth 3 (three) times daily with meals.  Qty: 270 capsule, Refills: 4      clopidogrel (PLAVIX) 75 mg tablet Take 1 tablet (75 mg total) by mouth once daily.  Qty: 30 tablet, Refills: 11      docusate sodium (COLACE) 100 MG capsule Take 1 capsule (100 mg total) by mouth 2 (two) times daily.  Refills: 0      gabapentin (NEURONTIN) 300 MG capsule Take 1 capsule (300 mg total) by mouth every 8 (eight) hours as needed.  Qty: 90 capsule, Refills: 3    Associated Diagnoses: Osteoarthritis of both knees, unspecified osteoarthritis type      insulin (LANTUS SOLOSTAR U-100 INSULIN) glargine 100 units/mL (3mL) SubQ pen Inject 20 Units into the skin once daily.  Qty: 3 Syringe, Refills: 3    Associated Diagnoses: Type 2 diabetes mellitus with hypertension and end stage renal disease on dialysis      isosorbide mononitrate (IMDUR) 30 MG 24 hr tablet Take 1 tablet (30 mg total) by mouth once daily.  Qty: 30 tablet, Refills: 11      metoprolol succinate (TOPROL-XL) 50 MG 24 hr tablet Take 1 tablet (50 mg total) by mouth once daily.  Qty: 90 tablet, Refills: 3    Associated Diagnoses: Hypertension associated with diabetes      nitroGLYCERIN (NITROSTAT) 0.4 MG SL tablet Place 1 tablet (0.4 mg total) under the tongue every 5 (five) minutes as needed for Chest pain.  Qty: 10 tablet, Refills: 1      pantoprazole (PROTONIX) 40 MG tablet Take 1 tablet (40 mg total) by mouth once  daily.  Qty: 30 tablet, Refills: 11      polyethylene glycol (GLYCOLAX) 17 gram/dose powder Take 1 packet daily.      pravastatin (PRAVACHOL) 80 MG tablet Take 1 tablet (80 mg total) by mouth once daily.  Qty: 30 tablet, Refills: 11    Associated Diagnoses: Hyperlipidemia associated with type 2 diabetes mellitus      sertraline (ZOLOFT) 100 MG tablet Take 1 tablet (100 mg total) by mouth once daily.  Qty: 90 tablet, Refills: 3    Associated Diagnoses: Recurrent major depressive disorder, in partial remission      vitamin renal formula, B-complex-vitamin c-folic acid, (NEPHROCAP) 1 mg Cap Take 1 capsule by mouth once daily.  Qty: 30 capsule, Refills: 11      furosemide (LASIX) 40 MG tablet Take 1 tablet (40 mg total) by mouth once daily.  Qty: 30 tablet, Refills: 3         STOP taking these medications       acetaminophen (TYLENOL) 500 MG tablet Comments:   Reason for Stopping:         mupirocin (BACTROBAN) 2 % ointment Comments:   Reason for Stopping:                    _________________________________  Antonio Ferreira MD  11/26/2019

## 2019-11-26 NOTE — PROGRESS NOTES
Maintenance hemodialysis tx started via right forearm, AVG, tolerated well, flows good. Blood specimen obtained for RFP,  called to . Dialysis days are MWF

## 2019-11-26 NOTE — PLAN OF CARE
NEETA sent additional clinicals as requested by [facility]. NEETA will continue to follow.      11/26/19 1225   Post-Acute Status   Post-Acute Authorization Placement   Post-Acute Placement Status Additional Clinical Requested     Isaura Mc LMSW   - [facility]  Ext. [phone]

## 2019-11-26 NOTE — PROGRESS NOTES
[facility]    Angelina Beard is a 69 y.o. female currently on hemodialysis for removal of uremic toxins and volume.     Patient seen and evaluated on hemodialysis, tolerating treatment, see HD flowsheet for vitals and assessments.    No Hypotension, chest pain, shortness of breath, cramping, nausea or vomiting.      Labs have been reviewed and the dialysate bath has been adjusted.    Labs:      Recent Labs   Lab 11/24/19 0337 11/25/19  0529 11/26/19  0736    141 141   K 4.3 4.7 4.9    105 105   CO2 22* 25 22*   BUN 24* 39* 56*   CREATININE 5.4* 7.3* 9.2*   CALCIUM 9.2 9.4 9.8   PHOS 4.1 5.4* 6.3*       Recent Labs   Lab 11/23/19  0512 11/24/19 0337 11/25/19  0529   WBC 4.66 5.67 5.77   HGB 8.3* 8.5* 8.5*   HCT 27.5* 27.9* 27.6*    210 205        Assessment/Plan    Ultrafiltration goal: 3 L as tolerated,  - Seen on dialysis this morning, tolerating session with current UFR, no complications.    - Patient hypertensive during treatment. HD will help with volume removal and HTN management  - Goal for BP is <140 mmHg SBP and BDP <90 mmHg, maintain MAP > 65. Adjust hypertensive management as needed.   - No lab stick/BP intake on access site  - Will continue dialysis treatments while in-patient  - Continue to monitor intake and output, daily weights   - Avoid nephrotoxic medication and renal dose medications to GFR    Anemia of ESRD  - Recommend daily CBCs. Hgb 8.5 on 11/25  - Ferritin 1215. Saturated Iron 14.  - Will consider Epogen with HD treatments on BP is more control    BMM  - Renal diet with protein intake goal 1.5 g/kg/d  - Novasource with meals  - Phos 6.3. Continue PhosLo   - Daily renal function panel       Dang Kim, MARIELOS, APRN, FNP-C  Nephrology Department  Pager:  [phone]

## 2019-11-26 NOTE — PLAN OF CARE
POC reviewed with pt at bedside.  Assessment and VS completed see flow sheet. No major health changes to report, no falls, no injury during shift.

## 2019-11-26 NOTE — PLAN OF CARE
Hemodialysis tx complete, 3 L removed in a tx of 3 hours, tolerated well. Blood returned via right forearm AVG, 15g needles removed x2, gauze and tape applied, pressure held to each site for 10 minutes, hemostasis achieved

## 2019-11-27 ENCOUNTER — TELEPHONE (OUTPATIENT)
Dept: INTERNAL MEDICINE | Facility: CLINIC | Age: 69
End: 2019-11-27

## 2019-11-27 VITALS
RESPIRATION RATE: 18 BRPM | WEIGHT: 214.5 LBS | HEART RATE: 64 BPM | HEIGHT: 67 IN | DIASTOLIC BLOOD PRESSURE: 84 MMHG | TEMPERATURE: 98 F | BODY MASS INDEX: 33.67 KG/M2 | SYSTOLIC BLOOD PRESSURE: 183 MMHG | OXYGEN SATURATION: 98 %

## 2019-11-27 LAB
ALBUMIN SERPL BCP-MCNC: 3.2 G/DL (ref 3.5–5.2)
ALP SERPL-CCNC: 69 U/L (ref 55–135)
ALT SERPL W/O P-5'-P-CCNC: 21 U/L (ref 10–44)
ANION GAP SERPL CALC-SCNC: 12 MMOL/L (ref 8–16)
AST SERPL-CCNC: 22 U/L (ref 10–40)
BASOPHILS # BLD AUTO: 0.05 K/UL (ref 0–0.2)
BASOPHILS NFR BLD: 0.9 % (ref 0–1.9)
BILIRUB SERPL-MCNC: 0.4 MG/DL (ref 0.1–1)
BUN SERPL-MCNC: 33 MG/DL (ref 8–23)
CALCIUM SERPL-MCNC: 9.6 MG/DL (ref 8.7–10.5)
CHLORIDE SERPL-SCNC: 103 MMOL/L (ref 95–110)
CO2 SERPL-SCNC: 24 MMOL/L (ref 23–29)
CREAT SERPL-MCNC: 6.5 MG/DL (ref 0.5–1.4)
DIFFERENTIAL METHOD: ABNORMAL
EOSINOPHIL # BLD AUTO: 0.1 K/UL (ref 0–0.5)
EOSINOPHIL NFR BLD: 2.5 % (ref 0–8)
ERYTHROCYTE [DISTWIDTH] IN BLOOD BY AUTOMATED COUNT: 14 % (ref 11.5–14.5)
EST. GFR  (AFRICAN AMERICAN): 7 ML/MIN/1.73 M^2
EST. GFR  (NON AFRICAN AMERICAN): 6 ML/MIN/1.73 M^2
GLUCOSE SERPL-MCNC: 105 MG/DL (ref 70–110)
HCT VFR BLD AUTO: 30.1 % (ref 37–48.5)
HGB BLD-MCNC: 9.5 G/DL (ref 12–16)
IMM GRANULOCYTES # BLD AUTO: 0.02 K/UL (ref 0–0.04)
IMM GRANULOCYTES NFR BLD AUTO: 0.4 % (ref 0–0.5)
LYMPHOCYTES # BLD AUTO: 0.9 K/UL (ref 1–4.8)
LYMPHOCYTES NFR BLD: 16.5 % (ref 18–48)
MAGNESIUM SERPL-MCNC: 2.2 MG/DL (ref 1.6–2.6)
MCH RBC QN AUTO: 29.7 PG (ref 27–31)
MCHC RBC AUTO-ENTMCNC: 31.6 G/DL (ref 32–36)
MCV RBC AUTO: 94 FL (ref 82–98)
MONOCYTES # BLD AUTO: 0.4 K/UL (ref 0.3–1)
MONOCYTES NFR BLD: 7.7 % (ref 4–15)
NEUTROPHILS # BLD AUTO: 4.1 K/UL (ref 1.8–7.7)
NEUTROPHILS NFR BLD: 72 % (ref 38–73)
NRBC BLD-RTO: 0 /100 WBC
PHOSPHATE SERPL-MCNC: 5.1 MG/DL (ref 2.7–4.5)
PLATELET # BLD AUTO: 226 K/UL (ref 150–350)
PMV BLD AUTO: 10.5 FL (ref 9.2–12.9)
POCT GLUCOSE: 98 MG/DL (ref 70–110)
POTASSIUM SERPL-SCNC: 4.5 MMOL/L (ref 3.5–5.1)
PROT SERPL-MCNC: 7.4 G/DL (ref 6–8.4)
RBC # BLD AUTO: 3.2 M/UL (ref 4–5.4)
SODIUM SERPL-SCNC: 139 MMOL/L (ref 136–145)
WBC # BLD AUTO: 5.71 K/UL (ref 3.9–12.7)

## 2019-11-27 PROCEDURE — 25000003 PHARM REV CODE 250: Performed by: PHYSICIAN ASSISTANT

## 2019-11-27 PROCEDURE — 85025 COMPLETE CBC W/AUTO DIFF WBC: CPT

## 2019-11-27 PROCEDURE — 83735 ASSAY OF MAGNESIUM: CPT

## 2019-11-27 PROCEDURE — 36415 COLL VENOUS BLD VENIPUNCTURE: CPT

## 2019-11-27 PROCEDURE — 25000003 PHARM REV CODE 250: Performed by: STUDENT IN AN ORGANIZED HEALTH CARE EDUCATION/TRAINING PROGRAM

## 2019-11-27 PROCEDURE — 99238 PR HOSPITAL DISCHARGE DAY,<30 MIN: ICD-10-PCS | Mod: GC,,, | Performed by: INTERNAL MEDICINE

## 2019-11-27 PROCEDURE — 80053 COMPREHEN METABOLIC PANEL: CPT

## 2019-11-27 PROCEDURE — 97530 THERAPEUTIC ACTIVITIES: CPT

## 2019-11-27 PROCEDURE — 84100 ASSAY OF PHOSPHORUS: CPT

## 2019-11-27 PROCEDURE — 99238 HOSP IP/OBS DSCHRG MGMT 30/<: CPT | Mod: GC,,, | Performed by: INTERNAL MEDICINE

## 2019-11-27 RX ORDER — FUROSEMIDE 40 MG/1
40 TABLET ORAL DAILY
Qty: 30 TABLET | Refills: 2 | Status: SHIPPED | OUTPATIENT
Start: 2019-11-27 | End: 2020-02-04 | Stop reason: SDUPTHER

## 2019-11-27 RX ORDER — INSULIN GLARGINE 100 [IU]/ML
12 INJECTION, SOLUTION SUBCUTANEOUS DAILY
Qty: 3 SYRINGE | Refills: 3 | Status: SHIPPED | OUTPATIENT
Start: 2019-11-27 | End: 2020-02-04

## 2019-11-27 RX ORDER — FUROSEMIDE 40 MG/1
40 TABLET ORAL DAILY
Qty: 14 TABLET | Refills: 0 | Status: SHIPPED | OUTPATIENT
Start: 2019-11-27 | End: 2019-11-27

## 2019-11-27 RX ADMIN — SENNOSIDES AND DOCUSATE SODIUM 1 TABLET: 8.6; 5 TABLET ORAL at 10:11

## 2019-11-27 RX ADMIN — PRAVASTATIN SODIUM 80 MG: 40 TABLET ORAL at 10:11

## 2019-11-27 RX ADMIN — APIXABAN 2.5 MG: 2.5 TABLET, FILM COATED ORAL at 10:11

## 2019-11-27 RX ADMIN — METOPROLOL SUCCINATE 50 MG: 50 TABLET, EXTENDED RELEASE ORAL at 10:11

## 2019-11-27 RX ADMIN — NEPHROCAP 1 CAPSULE: 1 CAP ORAL at 10:11

## 2019-11-27 RX ADMIN — CALCIUM ACETATE 667 MG: 667 CAPSULE ORAL at 10:11

## 2019-11-27 RX ADMIN — CLOPIDOGREL BISULFATE 75 MG: 75 TABLET ORAL at 10:11

## 2019-11-27 RX ADMIN — FUROSEMIDE 40 MG: 40 TABLET ORAL at 10:11

## 2019-11-27 RX ADMIN — INSULIN DETEMIR 12 UNITS: 100 INJECTION, SOLUTION SUBCUTANEOUS at 10:11

## 2019-11-27 RX ADMIN — ISOSORBIDE MONONITRATE 30 MG: 30 TABLET, EXTENDED RELEASE ORAL at 10:11

## 2019-11-27 RX ADMIN — SERTRALINE HYDROCHLORIDE 100 MG: 100 TABLET ORAL at 10:11

## 2019-11-27 RX ADMIN — PANTOPRAZOLE SODIUM 40 MG: 40 TABLET, DELAYED RELEASE ORAL at 10:11

## 2019-11-27 NOTE — PLAN OF CARE
Meds given as ordered tolerated well. No complaints of pain. No signs or symptoms of distress/discomfort noted. Assistance needed with ambulating. Vitals stable. Safety maintained. Will continue to monitor.

## 2019-11-27 NOTE — PLAN OF CARE
Continue per OT POC, all goals remain appropriate.    Problem: Occupational Therapy Goal  Goal: Occupational Therapy Goal  Description  Goals to be met by: 11/27/19     Patient will increase functional independence with ADLs by performing:    Pt to complete UE dressing with set-up  Pt to complete LE dressing with MIN A   Pt to complete toileting with SBA  Pt to complete t/f to BSC with SBA  Pt to tolerated OOB x 2-3 hours daily to increase endurance for functional activity.      Outcome: Ongoing, Progressing

## 2019-11-27 NOTE — PT/OT/SLP PROGRESS
Occupational Therapy   Treatment    Name: Angelina Beard  MRN: [id number]  Admitting Diagnosis:  Hypervolemia associated with renal insufficiency       Recommendations:     Discharge Recommendations: nursing facility, skilled  Discharge Equipment Recommendations:  hospital bed  Barriers to discharge:       Assessment:     Angelina Beard is a 69 y.o. female with a medical diagnosis of Hypervolemia associated with renal insufficiency.  She presents with the following performance deficits affecting function are weakness, impaired endurance, impaired self care skills, impaired balance, gait instability, impaired functional mobilty, decreased lower extremity function. Patient participated with therapy this day, required SBA-CGA for bed mobility and ADL's completed edge of bed, patient presented with fatigue. Patient would benefit from continued skilled OT services to maximize functional independence, as well as placement in SNF. Patient continues to require maximal assist for stand-pivot transfer to bedside chair.     Rehab Prognosis:  Good; patient would benefit from acute skilled OT services to address these deficits and reach maximum level of function.       Plan:     Patient to be seen 3 x/week to address the above listed problems via self-care/home management, therapeutic activities, therapeutic exercises  · Plan of Care Expires: 12/25/19  · Plan of Care Reviewed with: patient    Subjective     Pain/Comfort:  · Pain Rating 1: 0/10  · Pain Rating Post-Intervention 1: 0/10    Objective:     Communicated with: Myron prior to session.  Patient found supine with oxygen upon OT entry to room.    General Precautions: Standard, fall   Orthopedic Precautions:N/A   Braces: N/A     Occupational Performance:     Bed Mobility:    · Patient completed Rolling/Turning to Right with stand by assistance  · Patient completed Scooting/Bridging with stand by assistance  · Patient completed Supine to Sit with contact guard assistance      Functional Mobility/Transfers:  · Patient completed Sit <> Stand Transfer with maximal assistance with hand-held assist, required a lot of assist to clear buttocks from bed    Activities of Daily Living:  · Grooming: contact guard assistance seated in bedside chair  · Upper Body Dressing: stand by assistance seated in bedside chair  · Lower Body Dressing: maximal assistance seated edge of bed      [facility] 6 Click ADL: 15    Treatment & Education:  Pt participatory with therapy. Required inc'd cues and assist for sit to stand this day, impaired balance and FF trunk.  Pt ed on roles/goals of OT and POC.  Pt ed on adaptive strategies for functional t/f's and safety.    Patient left up in chair with all lines intact, call button in reach and nsg notifiedEducation:      GOALS:   Multidisciplinary Problems     Occupational Therapy Goals        Problem: Occupational Therapy Goal    Goal Priority Disciplines Outcome Interventions   Occupational Therapy Goal     OT, PT/OT Ongoing, Progressing    Description:  Goals to be met by: 11/27/19     Patient will increase functional independence with ADLs by performing:    Pt to complete UE dressing with set-up  Pt to complete LE dressing with MIN A   Pt to complete toileting with SBA  Pt to complete t/f to BSC with SBA  Pt to tolerated OOB x 2-3 hours daily to increase endurance for functional activity.                       Time Tracking:     OT Date of Treatment: 11/27/19  OT Start Time: 0925  OT Stop Time: 0940  OT Total Time (min): 15 min    Billable Minutes:Therapeutic Activity 15 mins    Meghana Montalvo OT  11/27/2019

## 2019-11-27 NOTE — SUBJECTIVE & OBJECTIVE
Interval History (11/26/19): Patient seen and examined at the bedside.     Review of Systems  Objective:     Vital Signs (Most Recent):  Temp: 98 °F (36.7 °C) (11/26/19 1526)  Pulse: 66 (11/26/19 1526)  Resp: 16 (11/26/19 1526)  BP: (!) 197/87 (11/26/19 1526)  SpO2: 98 % (11/26/19 1526) Vital Signs (24h Range):  Temp:  [96.5 °F (35.8 °C)-98.3 °F (36.8 °C)] 98 °F (36.7 °C)  Pulse:  [63-68] 66  Resp:  [16-19] 16  SpO2:  [94 %-99 %] 98 %  BP: (131-198)/(59-97) 197/87     Weight: 101.3 kg (223 lb 5.2 oz)  Body mass index is 34.98 kg/m².    Intake/Output Summary (Last 24 hours) at 11/26/2019 1937  Last data filed at 11/26/2019 1100  Gross per 24 hour   Intake 720 ml   Output 3600 ml   Net -2880 ml      Physical Exam    Significant Labs:   CBC:   Recent Labs   Lab 11/25/19  0529   WBC 5.77   HGB 8.5*   HCT 27.6*        CMP:   Recent Labs   Lab 11/25/19  0529 11/26/19  0736    141   K 4.7 4.9    105   CO2 25 22*   * 130*   BUN 39* 56*   CREATININE 7.3* 9.2*   CALCIUM 9.4 9.8   PROT 6.9  --    ALBUMIN 3.0* 3.1*   BILITOT 0.3  --    ALKPHOS 64  --    AST 20  --    ALT 21  --    ANIONGAP 11 14   EGFRNONAA 5.2* 4.0*     Magnesium:   Recent Labs   Lab 11/25/19  0529   MG 2.2       Significant Imaging: I have reviewed all pertinent imaging results/findings within the past 24 hours.

## 2019-11-27 NOTE — TELEPHONE ENCOUNTER
----- Message from Hammad Fish RN sent at 11/27/2019  1:19 PM CST -----  Patient hospital dc today. Needs post hospital follow up appointment.  Thank you

## 2019-11-27 NOTE — PLAN OF CARE
Patient AO x 4. Has no complaints of pain or discomfort. No falls or injuries during shift. Patient currently resting with all safety precautions in place. Will continue to monitor.

## 2019-11-27 NOTE — PLAN OF CARE
NEETA scheduled d/c transportation to [facility] through [facility]. Patient is scheduled to be picked up at 11:00 am. NEETA provided patient's nurse with report number [phone]; Room 85B. NEETA is in communication with patient's CM, and patient's Care Team - IM2.      11/27/19 0837   Post-Acute Status   Post-Acute Authorization Placement   Post-Acute Placement Status Set-up Complete     Isaura Mc LMSW   - [facility]  Ext. [phone]

## 2019-11-27 NOTE — PLAN OF CARE
[facility]  Skilled Nursing Facility  FACILITY TRANSFER ORDERS      Patient Name: Angelina Beard  YOB: 1950    PCP: Ben Mena MD   PCP Address: [address] [place] [address] [facility] / [place] [address]  PCP Phone Number: [phone]  PCP Fax: [phone]    Encounter Date: 11/27/2019    Admit to: Skilled Nursing Facility    Vital Signs:  Routine    Diagnoses:   Active Hospital Problems    Diagnosis  POA    *Hypervolemia associated with renal insufficiency [E87.70, N28.9]  Yes    Discharge planning issues [Z02.9]  Not Applicable    Weakness of left lower extremity [R29.898]  Yes    Acute and chr resp failure, unsp w hypoxia or hypercapnia [J96.20]  No    Hyperkalemia [E87.5]  Yes    History of stroke [Z86.73]  Not Applicable     Chronic    ESRD on dialysis [N18.6, Z99.2]  Not Applicable    Paroxysmal atrial fibrillation [I48.0]  Yes     Chronic    Anemia in end-stage renal disease [N18.6, D63.1]  Yes     Chronic    Renovascular hypertension [I15.0]  Yes     Chronic    Debility [R53.81]  Yes    Coronary artery disease [I25.10]  Yes     Chronic    Chronic diastolic congestive heart failure [I50.32]  Yes     Chronic    Type 2 diabetes mellitus with hypertension and end stage renal disease on dialysis [E11.22, I12.0, Z99.2, N18.6]  Not Applicable     Chronic      Resolved Hospital Problems   No resolved problems to display.       Allergies:Review of patient's allergies indicates:  No Known Allergies    Diet: renal diet    Activities: Activity as tolerated    Resume Dialysis three times a week Monday, Wednesday and Friday    Home oxygen: Continuous 3.0 L nasal canula     Labs: CBC and BMP On dialysis days     CONSULTS:    Physical Therapy to evaluate and treat.  and Occupational Therapy to evaluate and treat.    MISCELLANEOUS CARE:  Routine Skin for Bedridden Patients: Apply moisture barrier cream to all skin folds and wet areas in perineal area daily and after baths and all bowel  movements.    WOUND CARE ORDERS  None    Medications: Review discharge medications with patient and family and provide education.      Current Discharge Medication List      CONTINUE these medications which have CHANGED    Details   insulin (LANTUS SOLOSTAR U-100 INSULIN) glargine 100 units/mL (3mL) SubQ pen Inject 12 Units into the skin once daily.  Qty: 3 Syringe, Refills: 3    Associated Diagnoses: Type 2 diabetes mellitus with hypertension and end stage renal disease on dialysis         CONTINUE these medications which have NOT CHANGED    Details   amLODIPine (NORVASC) 10 MG tablet Take 10 mg by mouth every Tuesday, Thursday, Saturday, Sunday.      apixaban (ELIQUIS) 2.5 mg Tab Take 1 tablet (2.5 mg total) by mouth 2 (two) times daily.  Qty: 180 tablet, Refills: 3    Associated Diagnoses: Chronic atrial fibrillation      calcium acetate (PHOSLO) 667 mg capsule Take 1 capsule (667 mg total) by mouth 3 (three) times daily with meals.  Qty: 270 capsule, Refills: 4      clopidogrel (PLAVIX) 75 mg tablet Take 1 tablet (75 mg total) by mouth once daily.  Qty: 30 tablet, Refills: 11      docusate sodium (COLACE) 100 MG capsule Take 1 capsule (100 mg total) by mouth 2 (two) times daily.  Refills: 0      gabapentin (NEURONTIN) 300 MG capsule Take 1 capsule (300 mg total) by mouth every 8 (eight) hours as needed.  Qty: 90 capsule, Refills: 3    Associated Diagnoses: Osteoarthritis of both knees, unspecified osteoarthritis type      isosorbide mononitrate (IMDUR) 30 MG 24 hr tablet Take 1 tablet (30 mg total) by mouth once daily.  Qty: 30 tablet, Refills: 11      metoprolol succinate (TOPROL-XL) 50 MG 24 hr tablet Take 1 tablet (50 mg total) by mouth once daily.  Qty: 90 tablet, Refills: 3    Associated Diagnoses: Hypertension associated with diabetes      nitroGLYCERIN (NITROSTAT) 0.4 MG SL tablet Place 1 tablet (0.4 mg total) under the tongue every 5 (five) minutes as needed for Chest pain.  Qty: 10 tablet, Refills: 1       pantoprazole (PROTONIX) 40 MG tablet Take 1 tablet (40 mg total) by mouth once daily.  Qty: 30 tablet, Refills: 11      polyethylene glycol (GLYCOLAX) 17 gram/dose powder       pravastatin (PRAVACHOL) 80 MG tablet Take 1 tablet (80 mg total) by mouth once daily.  Qty: 30 tablet, Refills: 11    Associated Diagnoses: Hyperlipidemia associated with type 2 diabetes mellitus      sertraline (ZOLOFT) 100 MG tablet Take 1 tablet (100 mg total) by mouth once daily.  Qty: 90 tablet, Refills: 3    Associated Diagnoses: Recurrent major depressive disorder, in partial remission      vitamin renal formula, B-complex-vitamin c-folic acid, (NEPHROCAP) 1 mg Cap Take 1 capsule by mouth once daily.  Qty: 30 capsule, Refills: 11      furosemide (LASIX) 40 MG tablet Take 1 tablet (40 mg total) by mouth once daily.  Qty: 30 tablet, Refills: 3         STOP taking these medications       acetaminophen (TYLENOL) 500 MG tablet Comments:   Reason for Stopping:         mupirocin (BACTROBAN) 2 % ointment Comments:   Reason for Stopping:                    _________________________________  Antonio Ferreira MD  11/27/2019

## 2019-11-27 NOTE — PLAN OF CARE
11/27/19 1322   Final Note   Assessment Type Final Discharge Note   Anticipated Discharge Disposition residential Nu  ([facility])   What phone number can be called within the next 1-3 days to see how you are doing after discharge? [phone]   Hospital Follow Up  Appt(s) scheduled? Yes   Discharge plans and expectations educations in teach back method with documentation complete? Yes     Dr Ben Mena's office to call patient with post hospital follow up appointment date and time.

## 2019-11-27 NOTE — NURSING
Patient alert and oriented X3, assessment and VS completed see flow sheet.  TB test done today needs to be read on 11/28/19.  Patient BP was elevated MD notified prescribed intervention given per MD orders.  No major health problems, no injuries, no falls to report during shift.

## 2019-11-27 NOTE — PROGRESS NOTES
[facility] Hospital Medicine  Progress Note    Patient Name: Angelina Beard  MRN: [id number]  Patient Class: IP- Inpatient   Admission Date: 11/18/2019  Length of Stay: 7 days  Attending Physician: Gia Green MD  Primary Care Provider: Ben Mena MD    [facility] Medicine Team: [facility] HOSP MED 2 Antonio Ferreira MD    Subjective:     Principal Problem:Hypervolemia associated with renal insufficiency    HPI:  Ms. Lozada is a 69 year-old AA female with CVA, ESRD (on HD, MWF), HLD, AFib, HFpEF, HTN, and T2DM who was admitted on 11/18 with complaints of weakness and LLE cramping. She missed her HD treatment on 11/18 due to the aforementioned complaints. In the ED, she was found to be hyperkalemic (5.6). She was shifted in the ED and her repeat potassium was 5.0 after medical management. Overnight, the patient became acutely hypoxic despite NC at 3 L. Her oxygen saturations were in the 50s despite supplemental oxygen. Her CXR was notable for significant cardiomegaly and pulmonary edema. She was given a dose of lasix but had minimal urine output. ABGs notable for hypercapnic respiratory acidosis with hypoxemia. A rapid response was called on 11/19/19 and the patient was stepped up to the MICU for acute hypercapnic and hypoxic respiratory failure secondary to volume overload. The patient was placed on BiPAP and emergent HD was performed at bedside with 3 L being removed. The patient remains on BiPAP this AM of 11/20/19 but states that her shortness of breath is much improved. She is a MWF dialysis patient of Dr. Madden at [facility]. She states that she dialyzes for 3.5 hrs via a RFA AVG. Her dry weight is about ~ 102 kg. She states that she last dialyze on Friday, 11/15. She missed her dialysis on Monday 11/18/19 because of generalize weakness and lower extremity cramps.    Overview/Hospital Course:  Patient dialyzed emergently on 11/19/19 and 3.0 L was removed. BIPAP transitioned to nasal canula  on 11/21/19 on 2.5 L home oxygen requirements. Patient had a repeat dialysis on 11/20/19 and another 3.0 L removed. Patient stepped down to hospital medicine team on 11/21/19.      11/21/2019: Patient seen and examined at the bedside. She did not have any cardiopulmonary distress. Continued to require 2.5 L oxygen at baseline. Per nephrology reccs, patient to receive dialysis today 11/21/19 for 3 hours. After dialysis, patient assessed to be stable for discharge and resume dialysis 3 x/week MWF. Case management informed and home health PT/OT oxygen orders placed.   -- Patient requested replacement of her wheelchair, per case management, patient should get a repair by DME within 3 years of issuance of supplies  -- Patient daughter April (Ph [phone]) had concerns of skin breakdown on right foot. Wound care consulted and no wounds/skin breakdown seen. Patient has poor foot care and underlying ESRD, DM and a podiatry ambulatory referral given upon discharge  -- Later around 7 pm, when patient's son came to  the patient, he raised few concerns regarding discharge. Firstly, he states his mom is too weak to go home. He risks falls and would like physical therapy to re-evaluate and give some recommendations including wheelchair and Rolator options. Secondly, he is concerned about right arm tingling and weakness. Per the patient, this is a chronic problem and was told that she has nerve impingement. Since patient needs evaluation, discharge was cancelled and family was informed that we will re evaluate in the morning.   11/22/19: Discharge remains delayed as son (who takes care of patient) states he cannot safely take care of her right now and wants patient to go to SNF.  11/23-11/24: Discharge delayed because of the weekend. Patient dialyzed on 11/24/19 11/25/19: Patient's son given choice of SNF  11/26/19: Patient discharge delayed because admitting facility closes at 3 pm and the confirmation not obtained  prior to 3 pm.     Interval History (11/26/19): Patient seen and examined at the bedside. NAEON. No apparent concerns. Dialysis as scheduled MWF.     Review of Systems  Constitutional: Positive for fatigue. Negative for activity change, appetite change, chills and unexpected weight change.   HENT: Negative for congestion, rhinorrhea, sinus pressure and sinus pain.    Eyes: Negative for photophobia and visual disturbance.   Cardiovascular: Negative for chest pain, palpitations and leg swelling.   Gastrointestinal: Negative for abdominal distention, abdominal pain, constipation and diarrhea.   Musculoskeletal: Positive for arthralgias and gait problem. Negative for back pain.   Skin: Negative for color change, pallor, rash and wound.   Hematological: Negative for adenopathy. Does not bruise/bleed easily.    Objective:     Vital Signs (Most Recent):  Temp: 98 °F (36.7 °C) (11/26/19 1526)  Pulse: 66 (11/26/19 1526)  Resp: 16 (11/26/19 1526)  BP: (!) 197/87 (11/26/19 1526)  SpO2: 98 % (11/26/19 1526) Vital Signs (24h Range):  Temp:  [96.5 °F (35.8 °C)-98.3 °F (36.8 °C)] 98 °F (36.7 °C)  Pulse:  [63-68] 66  Resp:  [16-19] 16  SpO2:  [94 %-99 %] 98 %  BP: (131-198)/(59-97) 197/87     Weight: 101.3 kg (223 lb 5.2 oz)  Body mass index is 34.98 kg/m².    Intake/Output Summary (Last 24 hours) at 11/26/2019 1937  Last data filed at 11/26/2019 1100  Gross per 24 hour   Intake 720 ml   Output 3600 ml   Net -2880 ml      Physical Exam  Constitutional: No distress.   HENT:   Mouth/Throat: Oropharynx is clear and moist. No oropharyngeal exudate.   Eyes: Conjunctivae are normal. No scleral icterus.   Neck: Normal range of motion. Neck supple. No JVD present. No tracheal deviation present. No thyromegaly present.   Cardiovascular: Normal rate, regular rhythm, normal heart sounds and intact distal pulses. Exam reveals no gallop and no friction rub.   No murmur heard.  Pulmonary/Chest: Effort normal. No respiratory distress. She has no  wheezes.   Abdominal: Soft. Bowel sounds are normal. She exhibits no distension and no mass. There is no tenderness. There is no guarding.   Musculoskeletal: Normal range of motion. She exhibits no edema, tenderness or deformity.   Poor foot hygiene and care. No skin breakdown.   Lymphadenopathy:     She has no cervical adenopathy.   Skin: Skin is warm. Capillary refill takes less than 2 seconds. No rash noted. She is not diaphoretic. No erythema.     Significant Labs:   CBC:   Recent Labs   Lab 11/25/19  0529   WBC 5.77   HGB 8.5*   HCT 27.6*        CMP:   Recent Labs   Lab 11/25/19 0529 11/26/19  0736    141   K 4.7 4.9    105   CO2 25 22*   * 130*   BUN 39* 56*   CREATININE 7.3* 9.2*   CALCIUM 9.4 9.8   PROT 6.9  --    ALBUMIN 3.0* 3.1*   BILITOT 0.3  --    ALKPHOS 64  --    AST 20  --    ALT 21  --    ANIONGAP 11 14   EGFRNONAA 5.2* 4.0*     Magnesium:   Recent Labs   Lab 11/25/19  0529   MG 2.2       Significant Imaging: I have reviewed all pertinent imaging results/findings within the past 24 hours.      Assessment/Plan:      * Hypervolemia associated with renal insufficiency  - resolved now  - ensure dialysis compliance and lasix PO daily     Discharge planning issues  Debility  Patient lives with son who takes care of her since she is wheelchair dependent. Have spoken with son repeatedly on multiple occasions who continues to insist that he cannot safely take care of her at home and wants her to go to SNF.  - SNF referral sent    Weakness of left lower extremity  - Patient is a poor historian, no family at bedside.  - Per ER note, patient reports LLE pain and weakness and right shoulder pain for months.  Complains of LBP on this provider's exam, but denied to ER provider.  - CT head showed no evidence of acute intracranial abnormality, senescent and chronic microvascular ischemic change.  - PT/OT, SW consult.  Supportive care, fall precautions.  - Neuro checks q4hrs.      Hyperkalemia  End-stage renal disease on hemodialysis  - K 5.6 in setting of missed HD.  - Shifted in ER, will repeat labs.  - Nephrology consulted for HD.  - Patient on MWF schedule, missed today (Monday).  Plan:  - continue home lasix; consider long term kayexalate 1x/week     History of stroke  - Continue secondary prevention with Plavix, statin.     ESRD on dialysis  Resume dialysis MW  Nephrology following; BMP daily to monitor electrolytes     Paroxysmal atrial fibrillation  - Continue Eliquis 2.5mg BID.  - [id number] VASc score of at least 8 for CHF, HTN, DM II, CVA, CAD, age, and sex.  - Rate controlled. Continue metorpolol     Anemia in end-stage renal disease  - H/H 9.0/29.4, mildly decreased from baseline.  - Will monitor for now  - consider ferrous sulfate supplements     Renovascular hypertension  - Continue home amlodipine 10mg TuThSaSu, metoprolol succinate 50mg daily.  - Goal BP < 130/80 mmHg     Debility  Will get PT/OT to evaluate the disposition needs  Currently patient unable to ambulate without help  Disposition to SNF     Coronary artery disease  - Continue secondary prevention, Plavix, Imdur.  - No symptoms currently  - continue to monitor     GERD (gastroesophageal reflux disease)  - Protonix.     Chronic diastolic congestive heart failure  - Continue BB, Lasix 40mg daily.  ACEi/ARB likely held due to hyperkalemia.  Echo 9/24/2018:   1 - Concentric hypertrophy.     2 - Normal left ventricular systolic function (EF 60-65%).     3 - Impaired LV relaxation, normal LAP (grade 1 diastolic dysfunction).     4 - Normal right ventricular systolic function .     5 - The estimated PA systolic pressure is 34 mmHg.  Plan:  - Continues to have bibasilar rales bilaterally.   - resume lasix 40 mg PO daily  - daily weights and Strict I and O's     Hyperlipidemia  - Continue pravastatin 80mg daily.     Type 2 diabetes mellitus with hypertension and end stage renal disease on dialysis  - Diabetic renal diet,  SSI.  - Insulin detemir 12 units daily.  - Glucose POC checks QID   - Goal Glucose 140-180 mg/dL     VTE Risk Mitigation (From admission, onward)         Ordered     apixaban tablet 2.5 mg  2 times daily      11/18/19 2321     Place sequential compression device  Until discontinued      11/18/19 2321     Place CAMELIA hose  Until discontinued      11/18/19 2321     IP VTE HIGH RISK PATIENT  Once      11/18/19 2321              Dispo: Pending SNF placement. Continue Dialysis 3x/week. Continuous oxygen requirements 3.0 L nasal canula  Diet: Renal  DVT ppx: Apixiaban 2.5 mg BID    Antonio Ferreira MD   Spectra [id number]  Internal Medicine PGY 3  Department of Hospital Medicine   [facility]

## 2019-11-28 NOTE — SUBJECTIVE & OBJECTIVE
Interval History: Patient is ready to leave the hospital. She denies any new complaints today.     Review of Systems   Constitutional: Negative for activity change, appetite change, chills and unexpected weight change.   HENT: Negative for congestion, rhinorrhea, sinus pressure and sinus pain.    Eyes: Negative for photophobia and visual disturbance.   Cardiovascular: Negative for chest pain, palpitations and leg swelling.   Gastrointestinal: Negative for abdominal distention, abdominal pain, constipation and diarrhea.   Musculoskeletal: Positive for arthralgias and gait problem. Negative for back pain.   Skin: Negative for color change, pallor, rash and wound.   Hematological: Negative for adenopathy. Does not bruise/bleed easily.     Objective:     Vital Signs (Most Recent):  Temp: 97.7 °F (36.5 °C) (11/27/19 0805)  Pulse: 64 (11/27/19 0805)  Resp: 18 (11/27/19 0805)  BP: (!) 183/84 (11/27/19 0805)  SpO2: 98 % (11/27/19 1038) Vital Signs (24h Range):  Temp:  [97.1 °F (36.2 °C)-97.7 °F (36.5 °C)] 97.7 °F (36.5 °C)  Pulse:  [61-64] 64  Resp:  [16-20] 18  SpO2:  [95 %-100 %] 98 %  BP: (139-183)/(65-84) 183/84     Weight: 97.3 kg (214 lb 8.1 oz)  Body mass index is 33.6 kg/m².  No intake or output data in the 24 hours ending 11/27/19 1943   Physical Exam   Constitutional: No distress.   HENT:   Mouth/Throat: Oropharynx is clear and moist. No oropharyngeal exudate.   Eyes: Conjunctivae are normal. No scleral icterus.   Neck: Normal range of motion. Neck supple. No JVD present. No tracheal deviation present. No thyromegaly present.   Cardiovascular: Normal rate, regular rhythm, normal heart sounds and intact distal pulses. Exam reveals no gallop and no friction rub.   No murmur heard.  Pulmonary/Chest: Effort normal. No respiratory distress. She has no wheezes.   Abdominal: Soft. Bowel sounds are normal. She exhibits no distension and no mass. There is no tenderness. There is no guarding.   Musculoskeletal: Normal range  of motion. She exhibits no edema, tenderness or deformity.   Poor foot hygiene and care. No skin breakdown.   Lymphadenopathy:     She has no cervical adenopathy.   Skin: Skin is warm. Capillary refill takes less than 2 seconds. No rash noted. She is not diaphoretic. No erythema.       Significant Labs: All pertinent labs within the past 24 hours have been reviewed.    Significant Imaging: I have reviewed and interpreted all pertinent imaging results/findings within the past 24 hours.

## 2019-11-28 NOTE — DISCHARGE SUMMARY
[facility] Hospital Medicine  Discharge Summary      Patient Name: Angelina Beard  MRN: [id number]  Admission Date: 11/18/2019  Hospital Length of Stay: 8 days  Discharge Date and Time:  11/27/2019 7:45 PM  Attending Physician: Karin att. providers found   Discharging Provider: Sariah Levy MD  Primary Care Provider: Ben Mena MD  [facility] HOSP MED 2 Sariah Levy MD    HPI:   Ms. Lozada is a 69 year-old AA female with CVA, ESRD (on HD, MWF), HLD, AFib, HFpEF, HTN, and T2DM who was admitted on 11/18 with complaints of weakness and LLE cramping. She missed her HD treatment on 11/18 due to the aforementioned complaints. In the ED, she was found to be hyperkalemic (5.6). She was shifted in the ED and her repeat potassium was 5.0 after medical management. Overnight, the patient became acutely hypoxic despite NC at 3 L. Her oxygen saturations were in the 50s despite supplemental oxygen. Her CXR was notable for significant cardiomegaly and pulmonary edema. She was given a dose of lasix but had minimal urine output. ABGs notable for hypercapnic respiratory acidosis with hypoxemia. A rapid response was called on 11/19/19 and the patient was stepped up to the MICU for acute hypercapnic and hypoxic respiratory failure secondary to volume overload. The patient was placed on BiPAP and emergent HD was performed at bedside with 3 L being removed. The patient remains on BiPAP this AM of 11/20/19 but states that her shortness of breath is much improved. She is a MWF dialysis patient of Dr. Madden at [facility]. She states that she dialyzes for 3.5 hrs via a RFA AVG. Her dry weight is about ~ 102 kg. She states that she last dialyze on Friday, 11/15. She missed her dialysis on Monday 11/18/19 because of generalize weakness and lower extremity cramps.    * No surgery found *      Hospital Course:   Patient admitted to hospital medicine on 11/18. She was dialyzed emergently on 11/19/19 and  3.0 L was removed. BIPAP transitioned to nasal canula on 11/21/19 on 2.5 L home oxygen requirements. Patient had a repeat dialysis on 11/20/19 and another 3.0 L removed. Patient stepped down to hospital medicine team on 11/21/19, at which point she did not have any cardiopulmonary distress. Continued to require 2.5 L oxygen at baseline. Per nephrology reccs, patient to receive dialysis today 11/21/19 for 3 hours. After dialysis, patient assessed to be stable for discharge and resume dialysis 3 x/week MWF. Case management informed and home health PT/OT oxygen orders placed. Patient requested replacement of her wheelchair, per case management, patient should get a repair by DME within 3 years of issuance of supplies. Patient's daughter April (Ph [phone]) had concerns of skin breakdown on right foot. Wound care consulted and no wounds/skin breakdown seen. Patient has poor foot care and underlying ESRD, DM and a podiatry ambulatory referral given upon discharge. Later around 7 pm, when patient's son came to  the patient, he raised few concerns regarding discharge. First, he stated his mom is too weak to go home. He risks falls and would like physical therapy to re-evaluate and give some recommendations including wheelchair options. Second, he is concerned about right arm tingling and weakness. Per the patient, this is a chronic problem and was told that she has nerve impingement. Since patient needs evaluation, discharge was cancelled and family was informed that we will re evaluate in the morning. Discharge remained delayed over several days as son (who takes care of patient) states he cannot safely take care of her right now and wants patient to go to SNF. On 11/27, patient was discharged to SNF.      Interval History: Patient is ready to leave the hospital. She denies any new complaints today.      Review of Systems   Constitutional: Negative for activity change, appetite change, chills and unexpected  weight change.   HENT: Negative for congestion, rhinorrhea, sinus pressure and sinus pain.    Eyes: Negative for photophobia and visual disturbance.   Cardiovascular: Negative for chest pain, palpitations and leg swelling.   Gastrointestinal: Negative for abdominal distention, abdominal pain, constipation and diarrhea.   Musculoskeletal: Positive for arthralgias and gait problem. Negative for back pain.   Skin: Negative for color change, pallor, rash and wound.   Hematological: Negative for adenopathy. Does not bruise/bleed easily.      Objective:      Vital Signs (Most Recent):  Temp: 97.7 °F (36.5 °C) (11/27/19 0805)  Pulse: 64 (11/27/19 0805)  Resp: 18 (11/27/19 0805)  BP: (!) 183/84 (11/27/19 0805)  SpO2: 98 % (11/27/19 1038) Vital Signs (24h Range):  Temp:  [97.1 °F (36.2 °C)-97.7 °F (36.5 °C)] 97.7 °F (36.5 °C)  Pulse:  [61-64] 64  Resp:  [16-20] 18  SpO2:  [95 %-100 %] 98 %  BP: (139-183)/(65-84) 183/84      Weight: 97.3 kg (214 lb 8.1 oz)  Body mass index is 33.6 kg/m².  No intake or output data in the 24 hours ending 11/27/19 1943   Physical Exam   Constitutional: No distress.   HENT:   Mouth/Throat: Oropharynx is clear and moist. No oropharyngeal exudate.   Eyes: Conjunctivae are normal. No scleral icterus.   Neck: Normal range of motion. Neck supple. No JVD present. No tracheal deviation present. No thyromegaly present.   Cardiovascular: Normal rate, regular rhythm, normal heart sounds and intact distal pulses. Exam reveals no gallop and no friction rub.   No murmur heard.  Pulmonary/Chest: Effort normal. No respiratory distress. She has no wheezes.   Abdominal: Soft. Bowel sounds are normal. She exhibits no distension and no mass. There is no tenderness. There is no guarding.   Musculoskeletal: Normal range of motion. She exhibits no edema, tenderness or deformity.   Poor foot hygiene and care. No skin breakdown.   Lymphadenopathy:     She has no cervical adenopathy.   Skin: Skin is warm. Capillary  refill takes less than 2 seconds. No rash noted. She is not diaphoretic. No erythema.         Significant Labs: All pertinent labs within the past 24 hours have been reviewed.     Significant Imaging: I have reviewed and interpreted all pertinent imaging results/findings within the past 24 hours.    Consults:   Consults (From admission, onward)        Status Ordering Provider     Inpatient consult to Critical Care Medicine  Once     Provider:  (Not yet assigned)    Completed BISHNU UNDERWOOD     Inpatient consult to Nephrology  Once     Provider:  (Not yet assigned)    Completed BISHNU UNDERWOOD          No new Assessment & Plan notes have been filed under this hospital service since the last note was generated.  Service: Hospital Medicine    Final Active Diagnoses:    Diagnosis Date Noted POA    PRINCIPAL PROBLEM:  Hypervolemia associated with renal insufficiency [E87.70, N28.9] 10/16/2019 Yes    Discharge planning issues [Z02.9] 11/23/2019 Not Applicable    Weakness of left lower extremity [R29.898] 11/19/2019 Yes    Hyperkalemia [E87.5] 11/18/2019 Yes    History of stroke [Z86.73]  Not Applicable     Chronic    ESRD on dialysis [N18.6, Z99.2]  Not Applicable    Paroxysmal atrial fibrillation [I48.0] 06/21/2018 Yes     Chronic    Anemia in end-stage renal disease [N18.6, D63.1] 03/06/2016 Yes     Chronic    Renovascular hypertension [I15.0] 03/04/2016 Yes     Chronic    Debility [R53.81] 02/25/2016 Yes    Coronary artery disease [I25.10] 02/18/2016 Yes     Chronic    Acute on chronic diastolic heart failure [I50.33] 07/02/2013 Unknown     Chronic    Type 2 diabetes mellitus with hypertension and end stage renal disease on dialysis [E11.22, I12.0, Z99.2, N18.6] 07/01/2013 Not Applicable     Chronic      Problems Resolved During this Admission:    Diagnosis Date Noted Date Resolved POA    Acute and chr resp failure, unsp w hypoxia or hypercapnia [J96.20]  11/27/2019 No       Discharged  "Condition: stable    Disposition: Skilled Nursing Facility    Follow Up:  Follow-up Information     Ben Mena MD In 1 week.    Specialty:  Internal Medicine  Why:  Post discharge follow up  Contact information:  [address]  [address]  [place] [id number] [phone]             Schedule an appointment as soon as possible for a visit with [facility].    Specialty:  Podiatry  Why:  toe nails  Contact information:  Mouna Moreno  [facility] [id number] [phone]               Patient Instructions:      WHEELCHAIR FOR HOME USE     Order Specific Question Answer Comments   Hours in W/C per day: 8    Type of Wheelchair: Standard    Size(Width): 18"(STD adult)    Leg Support: STD footrests    Lap Belt: Velcro    Cushion: Basic    Height: 5' 7" (1.702 m)    Weight: 109 kg (240 lb 4.8 oz)    Does patient have medical equipment at home? rollator    Does patient have medical equipment at home? cane, straight    Does patient have medical equipment at home? shower chair    Does patient have medical equipment at home? wheelchair    Does patient have medical equipment at home? raised toilet    Length of need (1-99 months): 99    Please check all that apply: Caregiver is capable and willing to operate wheelchair safely.    Please check all that apply: The patient requires the use of a w/c for activities of daily living within the Home.    Vendor: Other (use comments)    Expected Date of Delivery: 11/22/2019      Ambulatory Referral to Podiatry   Referral Priority: Routine Referral Type: Consultation   Referral Reason: Specialty Services Required   Requested Specialty: Podiatry   Number of Visits Requested: 1     Ambulatory Referral to Neurosurgery   Referral Priority: Routine Referral Type: Consultation   Referral Reason: Specialty Services Required   Requested Specialty: Neurosurgery   Number of Visits Requested: 1     Ambulatory Referral to Pain Clinic   Referral Priority: Routine Referral Type: " Consultation   Referral Reason: Specialty Services Required   Requested Specialty: Pain Medicine   Number of Visits Requested: 1     Diet renal     Diet renal     Notify your health care provider if you experience any of the following:  temperature >100.4     Notify your health care provider if you experience any of the following:  persistent nausea and vomiting or diarrhea     Notify your health care provider if you experience any of the following:  severe uncontrolled pain     Notify your health care provider if you experience any of the following:  redness, tenderness, or signs of infection (pain, swelling, redness, odor or green/yellow discharge around incision site)     Notify your health care provider if you experience any of the following:  difficulty breathing or increased cough     Notify your health care provider if you experience any of the following:  severe persistent headache     Notify your health care provider if you experience any of the following:  worsening rash     Notify your health care provider if you experience any of the following:  persistent dizziness, light-headedness, or visual disturbances     Notify your health care provider if you experience any of the following:  increased confusion or weakness     Activity as tolerated     Activity as tolerated       Significant Diagnostic Studies: Labs: All labs within the past 24 hours have been reviewed  Radiology: X-Ray: lumbar spondylosis with significant degenerative change and spondylolisthesis of L4 with respect to L5, similar to slightly worse when compared with the prior study.   CT head: no acute process   US LE: no DVT  CXR upon admission showed edema    Pending Diagnostic Studies:     Procedure Component Value Units Date/Time    Comprehensive metabolic panel [[id number]] Collected:  11/19/19 0349    Order Status:  Sent Lab Status:  In process Updated:  11/19/19 0349    Specimen:  Blood     Narrative:       Collection has been rescheduled  by AM2 at 11/19/2019 03:18 Reason:   unable to collect, hard stick         Medications:  Reconciled Home Medications:      Medication List      START taking these medications    furosemide 40 MG tablet  Commonly known as:  LASIX  Take 1 tablet (40 mg total) by mouth once daily.        CHANGE how you take these medications    insulin glargine 100 units/mL (3mL) SubQ pen  Commonly known as:  Lantus Solostar U-100 Insulin  Inject 12 Units into the skin once daily.  What changed:  how much to take        CONTINUE taking these medications    amLODIPine 10 MG tablet  Commonly known as:  NORVASC  Take 10 mg by mouth every Tuesday, Thursday, Saturday, Sunday.     apixaban 2.5 mg Tab  Commonly known as:  ELIQUIS  Take 1 tablet (2.5 mg total) by mouth 2 (two) times daily.     calcium acetate 667 mg capsule  Commonly known as:  PHOSLO  Take 1 capsule (667 mg total) by mouth 3 (three) times daily with meals.     clopidogrel 75 mg tablet  Commonly known as:  PLAVIX  Take 1 tablet (75 mg total) by mouth once daily.     docusate sodium 100 MG capsule  Commonly known as:  COLACE  Take 1 capsule (100 mg total) by mouth 2 (two) times daily.     gabapentin 300 MG capsule  Commonly known as:  NEURONTIN  Take 1 capsule (300 mg total) by mouth every 8 (eight) hours as needed.     isosorbide mononitrate 30 MG 24 hr tablet  Commonly known as:  IMDUR  Take 1 tablet (30 mg total) by mouth once daily.     metoprolol succinate 50 MG 24 hr tablet  Commonly known as:  TOPROL-XL  Take 1 tablet (50 mg total) by mouth once daily.     nitroGLYCERIN 0.4 MG SL tablet  Commonly known as:  NITROSTAT  Place 1 tablet (0.4 mg total) under the tongue every 5 (five) minutes as needed for Chest pain.     pantoprazole 40 MG tablet  Commonly known as:  PROTONIX  Take 1 tablet (40 mg total) by mouth once daily.     polyethylene glycol 17 gram/dose powder  Commonly known as:  GLYCOLAX     pravastatin 80 MG tablet  Commonly known as:  PRAVACHOL  Take 1 tablet (80  mg total) by mouth once daily.     sertraline 100 MG tablet  Commonly known as:  ZOLOFT  Take 1 tablet (100 mg total) by mouth once daily.     vitamin renal formula (B-complex-vitamin c-folic acid) 1 mg Cap  Commonly known as:  NEPHROCAP  Take 1 capsule by mouth once daily.        STOP taking these medications    acetaminophen 500 MG tablet  Commonly known as:  TYLENOL     mupirocin 2 % ointment  Commonly known as:  BACTROBAN            Indwelling Lines/Drains at time of discharge:   Lines/Drains/Airways     Drain                 Hemodialysis AV Graft -- days         Hemodialysis AV Graft Right forearm -- days         Hemodialysis AV Graft Right upper arm -- days                Time spent on the discharge of patient: 60 minutes  Patient was seen and examined on the date of discharge and determined to be suitable for discharge.         Sariah Levy MD  Department of Hospital Medicine  [facility]

## 2019-11-29 ENCOUNTER — PATIENT OUTREACH (OUTPATIENT)
Dept: ADMINISTRATIVE | Facility: CLINIC | Age: 69
End: 2019-11-29

## 2019-12-02 DIAGNOSIS — F33.41 RECURRENT MAJOR DEPRESSIVE DISORDER, IN PARTIAL REMISSION: ICD-10-CM

## 2019-12-02 RX ORDER — SERTRALINE HYDROCHLORIDE 100 MG/1
100 TABLET, FILM COATED ORAL DAILY
Qty: 90 TABLET | Refills: 4 | Status: SHIPPED | OUTPATIENT
Start: 2019-12-02 | End: 2020-02-04 | Stop reason: SDUPTHER

## 2019-12-02 RX ORDER — ISOSORBIDE MONONITRATE 30 MG/1
30 TABLET, EXTENDED RELEASE ORAL DAILY
Qty: 90 TABLET | Refills: 4 | Status: SHIPPED | OUTPATIENT
Start: 2019-12-02 | End: 2020-02-04

## 2019-12-03 ENCOUNTER — OUTSIDE PLACE OF SERVICE (OUTPATIENT)
Dept: FAMILY MEDICINE | Facility: CLINIC | Age: 69
End: 2019-12-03
Payer: MEDICARE

## 2019-12-03 ENCOUNTER — TELEPHONE (OUTPATIENT)
Dept: FAMILY MEDICINE | Facility: CLINIC | Age: 69
End: 2019-12-03

## 2019-12-03 PROCEDURE — 99305 PR NURSING FACILITY CARE, INIT, MOD SEVERITY: ICD-10-PCS | Mod: ,,, | Performed by: FAMILY MEDICINE

## 2019-12-03 PROCEDURE — 99305 1ST NF CARE MODERATE MDM 35: CPT | Mod: ,,, | Performed by: FAMILY MEDICINE

## 2019-12-31 ENCOUNTER — TELEPHONE (OUTPATIENT)
Dept: FAMILY MEDICINE | Facility: CLINIC | Age: 69
End: 2019-12-31

## 2019-12-31 ENCOUNTER — PATIENT OUTREACH (OUTPATIENT)
Dept: ADMINISTRATIVE | Facility: CLINIC | Age: 69
End: 2019-12-31

## 2019-12-31 RX ORDER — HYDROCODONE BITARTRATE AND ACETAMINOPHEN 5; 325 MG/1; MG/1
1 TABLET ORAL
Qty: 30 TABLET | Refills: 0 | Status: ON HOLD | OUTPATIENT
Start: 2019-12-31 | End: 2020-08-19 | Stop reason: SDUPTHER

## 2019-12-31 NOTE — TELEPHONE ENCOUNTER
Anita called from [facility] stating the pt needs a script for Norco 5/325mg 1 pill by mouth everyday PRN pain sent to [facility] today before they close. Phone [phone]/ fax [phone].

## 2020-01-13 NOTE — TELEPHONE ENCOUNTER
Patient update received via fax per [facility] on 1/9/2020.    Clinical status:  Requires  a. Injections  b. IV  c. Nebulizers, 02 evaluation sats  d. Enteral feedings new or recent change  e. Care of new colostomy or teaching  f. Frequent suctioning, trach and /or vent needs  g. Frequent irrigation, replacement of urinary cath, complex suprapubic  h. Treatment stg 3/ or 4 pressure ulcers, complex wound care  i. Nursing evaluation due to unstable and complex medical condition  j. Nursing rehab teaching e.g. bowel and bladder training, adaptive care    PT/OT/ST    *Transfer mobility (eg, from bed to chair)   Independent (patient can perform an activity safely and independently without any devices, physical assistance, or supervision)  Modified independent (patient requires the use of an assistive device or extra time to complete an activity, but is otherwise independent)  Contact guard assistance (patient can perform an activity independently, but needs constant physical touch to steady or guide to ensure safe performance)  Supervision (patient can perform an activity, but supervision is provided to address safety concerns)  Minimum assistance (patient can perform most of an activity (ie, approximately 75%), but requires limited assistance from one person for safe completion)  Moderate assistance (patient can perform about half the effort of an activity, but requires extensive assistance from one or more persons for safe performance of the other half)  Maximum assistance (patient has difficulty performing an activity, but can offer some assistance (eg, approximately 25% of effort) and is dependent on one or more people to assist for safe completion)  Dependent or unable (patient is unable to perform an activity or depends on 2 or more people to complete it)    *Ambulation inside (eg, within room, hallways, to toilet) _____Ft  Independent  Modified independent  Contact guard assistance  Supervision  Minimum  assistance  Moderate assistance  Maximum assistance  Dependent or unable    *Ambulation outside (eg, getting in or out of buildings, walking on uneven surfaces) ______Ft  Independent  Modified independent  Contact guard assistance  Supervision  Minimum assistance  Moderate assistance  Maximum assistance  Dependent or unable    *Toileting self-management:   Independent  Modified independent  Contact guard assistance  Supervision  Minimum assistance  Moderate assistance  Maximum assistance  Dependent or unable    *Nourishment self-management (ie, ability to feed self)   Independent  Modified independent  Supervision  Minimum assistance  Moderate assistance  Maximum assistance  Dependent or unable    *Personal care self-management (eg, dressing, bathing, brushing teeth, washing hair)   Independent  Modified independent  Contact guard assistance  Supervision  Minimum assistance  Moderate assistance  Maximum assistance  Dependent or unable    Medication support (ie, preparing/taking all prescribed and over-the-counter medications reliably and safely, including correct dosage at correct times)   Not applicable  Independent  Modified independent  Supervision  Minimum assistance  Moderate assistance  Maximum assistance    ADL adaptive devices self-management (ie, ability to manage putting on and/or removing braces, splints, and other adaptive devices)   Not applicable  Independent  Modified independent  Supervision  Minimum assistance  Moderate assistance  Maximum assistance  Dependent or unable          For Extensions  - Unanticipated functional problems impacting safe completion of therapy  - Multiple comorbidities or frailty impairing functional improvement  - Cognitive impairment requiring additional intervention and education  - Communication impairment requiring additional intervention and education  - Assessment or care unmanageable at lower level of care  - Expect brief to moderate stay extension.    X    IQ  completed for extension    Criteria met    X    Criteria not met    X    SNF notified of IQ results      Barriers to progress:        New treatments:        Projected length of stay/ expected discharge:        Discharge Disposition:          Recommendations:

## 2020-02-03 ENCOUNTER — TELEPHONE (OUTPATIENT)
Dept: HOME HEALTH SERVICES | Facility: HOSPITAL | Age: 70
End: 2020-02-03

## 2020-02-04 ENCOUNTER — EXTERNAL HOME HEALTH (OUTPATIENT)
Dept: HOME HEALTH SERVICES | Facility: HOSPITAL | Age: 70
End: 2020-02-04

## 2020-02-04 ENCOUNTER — OFFICE VISIT (OUTPATIENT)
Dept: INTERNAL MEDICINE | Facility: CLINIC | Age: 70
End: 2020-02-04
Payer: MEDICARE

## 2020-02-04 VITALS
WEIGHT: 206.88 LBS | HEIGHT: 67 IN | OXYGEN SATURATION: 96 % | SYSTOLIC BLOOD PRESSURE: 136 MMHG | HEART RATE: 74 BPM | BODY MASS INDEX: 32.47 KG/M2 | DIASTOLIC BLOOD PRESSURE: 60 MMHG

## 2020-02-04 DIAGNOSIS — E11.69 HYPERLIPIDEMIA ASSOCIATED WITH TYPE 2 DIABETES MELLITUS: ICD-10-CM

## 2020-02-04 DIAGNOSIS — E11.22 TYPE 2 DIABETES MELLITUS WITH HYPERTENSION AND END STAGE RENAL DISEASE ON DIALYSIS: ICD-10-CM

## 2020-02-04 DIAGNOSIS — I48.20 CHRONIC ATRIAL FIBRILLATION: ICD-10-CM

## 2020-02-04 DIAGNOSIS — M17.0 OSTEOARTHRITIS OF BOTH KNEES, UNSPECIFIED OSTEOARTHRITIS TYPE: Chronic | ICD-10-CM

## 2020-02-04 DIAGNOSIS — F33.42 RECURRENT MAJOR DEPRESSIVE DISORDER, IN FULL REMISSION: ICD-10-CM

## 2020-02-04 DIAGNOSIS — Z99.2 TYPE 2 DIABETES MELLITUS WITH HYPERTENSION AND END STAGE RENAL DISEASE ON DIALYSIS: ICD-10-CM

## 2020-02-04 DIAGNOSIS — E78.5 HYPERLIPIDEMIA ASSOCIATED WITH TYPE 2 DIABETES MELLITUS: ICD-10-CM

## 2020-02-04 DIAGNOSIS — E11.59 HYPERTENSION ASSOCIATED WITH DIABETES: ICD-10-CM

## 2020-02-04 DIAGNOSIS — K21.9 GASTROESOPHAGEAL REFLUX DISEASE, ESOPHAGITIS PRESENCE NOT SPECIFIED: ICD-10-CM

## 2020-02-04 DIAGNOSIS — I25.10 CORONARY ARTERY DISEASE, ANGINA PRESENCE UNSPECIFIED, UNSPECIFIED VESSEL OR LESION TYPE, UNSPECIFIED WHETHER NATIVE OR TRANSPLANTED HEART: ICD-10-CM

## 2020-02-04 DIAGNOSIS — I12.0 TYPE 2 DIABETES MELLITUS WITH HYPERTENSION AND END STAGE RENAL DISEASE ON DIALYSIS: ICD-10-CM

## 2020-02-04 DIAGNOSIS — I48.0 PAROXYSMAL ATRIAL FIBRILLATION: ICD-10-CM

## 2020-02-04 DIAGNOSIS — I73.9 PAD (PERIPHERAL ARTERY DISEASE): ICD-10-CM

## 2020-02-04 DIAGNOSIS — C50.912 MALIGNANT NEOPLASM OF LEFT FEMALE BREAST, UNSPECIFIED ESTROGEN RECEPTOR STATUS, UNSPECIFIED SITE OF BREAST: ICD-10-CM

## 2020-02-04 DIAGNOSIS — Z12.31 ENCOUNTER FOR SCREENING MAMMOGRAM FOR MALIGNANT NEOPLASM OF BREAST: ICD-10-CM

## 2020-02-04 DIAGNOSIS — F33.41 RECURRENT MAJOR DEPRESSIVE DISORDER, IN PARTIAL REMISSION: ICD-10-CM

## 2020-02-04 DIAGNOSIS — Z99.2 END-STAGE RENAL DISEASE ON HEMODIALYSIS: ICD-10-CM

## 2020-02-04 DIAGNOSIS — I50.32 CHRONIC DIASTOLIC CONGESTIVE HEART FAILURE: Primary | ICD-10-CM

## 2020-02-04 DIAGNOSIS — N18.6 END-STAGE RENAL DISEASE ON HEMODIALYSIS: ICD-10-CM

## 2020-02-04 DIAGNOSIS — N18.6 TYPE 2 DIABETES MELLITUS WITH HYPERTENSION AND END STAGE RENAL DISEASE ON DIALYSIS: ICD-10-CM

## 2020-02-04 DIAGNOSIS — I15.2 HYPERTENSION ASSOCIATED WITH DIABETES: ICD-10-CM

## 2020-02-04 PROBLEM — Z75.8 DISCHARGE PLANNING ISSUES: Status: RESOLVED | Noted: 2019-11-23 | Resolved: 2020-02-04

## 2020-02-04 PROCEDURE — 99999 PR PBB SHADOW E&M-EST. PATIENT-LVL III: ICD-10-PCS | Mod: PBBFAC,,, | Performed by: INTERNAL MEDICINE

## 2020-02-04 PROCEDURE — 99215 PR OFFICE/OUTPT VISIT, EST, LEVL V, 40-54 MIN: ICD-10-PCS | Mod: S$PBB,,, | Performed by: INTERNAL MEDICINE

## 2020-02-04 PROCEDURE — 99999 PR PBB SHADOW E&M-EST. PATIENT-LVL III: CPT | Mod: PBBFAC,,, | Performed by: INTERNAL MEDICINE

## 2020-02-04 PROCEDURE — 99213 OFFICE O/P EST LOW 20 MIN: CPT | Mod: PBBFAC,PN | Performed by: INTERNAL MEDICINE

## 2020-02-04 PROCEDURE — 99215 OFFICE O/P EST HI 40 MIN: CPT | Mod: S$PBB,,, | Performed by: INTERNAL MEDICINE

## 2020-02-04 RX ORDER — FUROSEMIDE 40 MG/1
40 TABLET ORAL DAILY
Qty: 30 TABLET | Refills: 2 | Status: SHIPPED | OUTPATIENT
Start: 2020-02-04 | End: 2020-03-24 | Stop reason: SDUPTHER

## 2020-02-04 RX ORDER — ONDANSETRON 4 MG/1
4 TABLET, FILM COATED ORAL EVERY 8 HOURS PRN
COMMUNITY

## 2020-02-04 RX ORDER — METOPROLOL SUCCINATE 50 MG/1
50 TABLET, EXTENDED RELEASE ORAL DAILY
Qty: 90 TABLET | Refills: 3 | Status: SHIPPED | OUTPATIENT
Start: 2020-02-04 | End: 2021-02-03

## 2020-02-04 RX ORDER — PANTOPRAZOLE SODIUM 40 MG/1
40 TABLET, DELAYED RELEASE ORAL DAILY
Qty: 30 TABLET | Refills: 11 | Status: SHIPPED | OUTPATIENT
Start: 2020-02-04 | End: 2021-02-03

## 2020-02-04 RX ORDER — PRAVASTATIN SODIUM 80 MG/1
80 TABLET ORAL DAILY
Qty: 30 TABLET | Refills: 11 | Status: SHIPPED | OUTPATIENT
Start: 2020-02-04 | End: 2021-02-03

## 2020-02-04 RX ORDER — SERTRALINE HYDROCHLORIDE 100 MG/1
100 TABLET, FILM COATED ORAL DAILY
Qty: 90 TABLET | Refills: 4 | Status: ON HOLD | OUTPATIENT
Start: 2020-02-04 | End: 2020-08-19 | Stop reason: SDUPTHER

## 2020-02-04 RX ORDER — CALCIUM ACETATE 667 MG/1
667 CAPSULE ORAL
Qty: 270 CAPSULE | Refills: 4 | Status: SHIPPED | OUTPATIENT
Start: 2020-02-04

## 2020-02-04 RX ORDER — GABAPENTIN 300 MG/1
300 CAPSULE ORAL EVERY 8 HOURS PRN
Qty: 90 CAPSULE | Refills: 3 | Status: SHIPPED | OUTPATIENT
Start: 2020-02-04 | End: 2020-07-28 | Stop reason: SDUPTHER

## 2020-02-04 RX ORDER — CLOPIDOGREL BISULFATE 75 MG/1
75 TABLET ORAL DAILY
Qty: 30 TABLET | Refills: 11 | Status: ON HOLD | OUTPATIENT
Start: 2020-02-04 | End: 2020-08-19 | Stop reason: SDUPTHER

## 2020-02-04 NOTE — PROGRESS NOTES
Subjective:       Patient ID: Angelina Beard is a 69 y.o. female.    Chief Complaint: Follow-up    HPI  Recheck s/p hospital/rehab. Chart reviewed.  Weight down 26 lbs/ 5 mo.  Last A1c 5.8. Pt still chair-bound, using O2 qhs.  Family states pt not cooperating with PT.  No CP, palpitations, has Negrete.  Rare LE edema. No abd pain, nausea.  No problems with HD.  No paresthesias.  No depression.  Review of Systems   All other systems reviewed and are negative.      Objective:      Physical Exam   Constitutional: She appears well-developed.   obese   HENT:   Head: Normocephalic.   Eyes: EOM are normal.   Neck: Normal range of motion.   Cardiovascular: Normal rate, regular rhythm, normal heart sounds and intact distal pulses.   Pulmonary/Chest: Effort normal and breath sounds normal.   Abdominal: Soft. Bowel sounds are normal. She exhibits no distension.   Musculoskeletal: Normal range of motion. She exhibits no edema.   Lymphadenopathy:     She has no cervical adenopathy.   Neurological: She is alert. No cranial nerve deficit. She exhibits normal muscle tone. Coordination normal.   Skin: Skin is warm and dry.   Psychiatric: She has a normal mood and affect. Her behavior is normal.   Vitals reviewed.      Assessment:       1. Chronic diastolic congestive heart failure    2. Type 2 diabetes mellitus with hypertension and end stage renal disease on dialysis    3. Coronary artery disease, angina presence unspecified, unspecified vessel or lesion type, unspecified whether native or transplanted heart    4. Hypertension associated with diabetes    5. Recurrent major depressive disorder, in full remission    6. End-stage renal disease on hemodialysis    7. PAD (peripheral artery disease)    8. Paroxysmal atrial fibrillation    9. Malignant neoplasm of left female breast, unspecified estrogen receptor status, unspecified site of breast    10. Chronic atrial fibrillation    11. Osteoarthritis of both knees, unspecified  osteoarthritis type    12. Hyperlipidemia associated with type 2 diabetes mellitus    13. Recurrent major depressive disorder, in partial remission    14. Gastroesophageal reflux disease, esophagitis presence not specified    15. Encounter for screening mammogram for malignant neoplasm of breast         Plan:       Angelina was seen today for follow-up.    Diagnoses and all orders for this visit:    Chronic diastolic congestive heart failure  -     furosemide (LASIX) 40 MG tablet; Take 1 tablet (40 mg total) by mouth once daily.    Type 2 diabetes mellitus with hypertension and end stage renal disease on dialysis  -     Hemoglobin A1c; Future  -     C-PEPTIDE; Future    Coronary artery disease, angina presence unspecified, unspecified vessel or lesion type, unspecified whether native or transplanted heart  -     CBC auto differential; Future  -     clopidogreL (PLAVIX) 75 mg tablet; Take 1 tablet (75 mg total) by mouth once daily.    Hypertension associated with diabetes  -     metoprolol succinate (TOPROL-XL) 50 MG 24 hr tablet; Take 1 tablet (50 mg total) by mouth once daily.    Recurrent major depressive disorder, in full remission    End-stage renal disease on hemodialysis  -     CBC auto differential; Future  -     Comprehensive metabolic panel; Future  -     calcium acetate (PHOSLO) 667 mg capsule; Take 1 capsule (667 mg total) by mouth 3 (three) times daily with meals.  -     vitamin renal formula, B-complex-vitamin c-folic acid, (NEPHROCAP) 1 mg Cap; Take 1 capsule by mouth once daily.    PAD (peripheral artery disease)   Cont rx    Paroxysmal atrial fibrillation   Cont rx    Malignant neoplasm of left female breast, unspecified estrogen receptor status, unspecified site of breast  -     Mammo Digital Screening Bilat w/ Jamal; Future    Chronic atrial fibrillation  -     apixaban (ELIQUIS) 2.5 mg Tab; Take 1 tablet (2.5 mg total) by mouth 2 (two) times daily.    Osteoarthritis of both knees, unspecified  osteoarthritis type  -     gabapentin (NEURONTIN) 300 MG capsule; Take 1 capsule (300 mg total) by mouth every 8 (eight) hours as needed.    Hyperlipidemia associated with type 2 diabetes mellitus  -     Lipid panel; Future  -     pravastatin (PRAVACHOL) 80 MG tablet; Take 1 tablet (80 mg total) by mouth once daily.    Recurrent major depressive disorder, in partial remission  -     sertraline (ZOLOFT) 100 MG tablet; Take 1 tablet (100 mg total) by mouth once daily.    Gastroesophageal reflux disease, esophagitis presence not specified  -     pantoprazole (PROTONIX) 40 MG tablet; Take 1 tablet (40 mg total) by mouth once daily.    Encounter for screening mammogram for malignant neoplasm of breast   -     Mammo Digital Screening Bilat w/ Jamal; Future      Follow up in about 3 months (around 5/4/2020).

## 2020-02-13 ENCOUNTER — EXTERNAL HOME HEALTH (OUTPATIENT)
Dept: HOME HEALTH SERVICES | Facility: HOSPITAL | Age: 70
End: 2020-02-13
Payer: MEDICARE

## 2020-02-19 ENCOUNTER — TELEPHONE (OUTPATIENT)
Dept: HOME HEALTH SERVICES | Facility: HOSPITAL | Age: 70
End: 2020-02-19

## 2020-02-21 ENCOUNTER — TELEPHONE (OUTPATIENT)
Dept: HOME HEALTH SERVICES | Facility: HOSPITAL | Age: 70
End: 2020-02-21

## 2020-03-06 ENCOUNTER — TELEPHONE (OUTPATIENT)
Dept: HOME HEALTH SERVICES | Facility: HOSPITAL | Age: 70
End: 2020-03-06

## 2020-03-13 ENCOUNTER — EXTERNAL HOME HEALTH (OUTPATIENT)
Dept: HOME HEALTH SERVICES | Facility: HOSPITAL | Age: 70
End: 2020-03-13

## 2020-03-17 PROCEDURE — G0179 PR HOME HEALTH MD RECERTIFICATION: ICD-10-PCS | Mod: ,,, | Performed by: INTERNAL MEDICINE

## 2020-03-17 PROCEDURE — G0179 MD RECERTIFICATION HHA PT: HCPCS | Mod: ,,, | Performed by: INTERNAL MEDICINE

## 2020-03-23 ENCOUNTER — EXTERNAL HOME HEALTH (OUTPATIENT)
Dept: HOME HEALTH SERVICES | Facility: HOSPITAL | Age: 70
End: 2020-03-23
Payer: MEDICARE

## 2020-03-23 NOTE — PROGRESS NOTES
60 Day recert Order # [id number]  New Cert Period: 03/17 to 05/15/2020 with [facility] - Dr. Saurabh Dunlap

## 2020-03-24 DIAGNOSIS — I50.32 CHRONIC DIASTOLIC CONGESTIVE HEART FAILURE: ICD-10-CM

## 2020-03-24 RX ORDER — FUROSEMIDE 40 MG/1
40 TABLET ORAL DAILY
Qty: 30 TABLET | Refills: 2 | Status: SHIPPED | OUTPATIENT
Start: 2020-03-24 | End: 2020-06-26 | Stop reason: SDUPTHER

## 2020-03-31 ENCOUNTER — EXTERNAL HOME HEALTH (OUTPATIENT)
Dept: HOME HEALTH SERVICES | Facility: HOSPITAL | Age: 70
End: 2020-03-31
Payer: MEDICARE

## 2020-04-13 ENCOUNTER — PATIENT OUTREACH (OUTPATIENT)
Dept: ADMINISTRATIVE | Facility: HOSPITAL | Age: 70
End: 2020-04-13

## 2020-04-13 DIAGNOSIS — Z12.11 COLON CANCER SCREENING: Primary | ICD-10-CM

## 2020-04-14 ENCOUNTER — EXTERNAL HOME HEALTH (OUTPATIENT)
Dept: HOME HEALTH SERVICES | Facility: HOSPITAL | Age: 70
End: 2020-04-14
Payer: MEDICARE

## 2020-04-14 NOTE — PROGRESS NOTES
60 Day Recert Order # [id number]  New Cert Period: 03/17 to 05/15/2020  SOC 01/17/2020 with [facility]- Dr. Ben Mena

## 2020-04-28 LAB
CHOLEST SERPL-MSCNC: 173 MG/DL (ref 0–200)
CHOLESTEROL/HDL RATIO SCREEN: 3.4
HBA1C MFR BLD: 6.9 %
HDLC SERPL-MCNC: 51 MG/DL
LDLC SERPL CALC-MCNC: 94 MG/DL
NON HDL CHOL (CALC): 122
TRIGL SERPL-MCNC: 179 MG/DL

## 2020-04-30 ENCOUNTER — PATIENT OUTREACH (OUTPATIENT)
Dept: ADMINISTRATIVE | Facility: HOSPITAL | Age: 70
End: 2020-04-30

## 2020-05-01 RX ORDER — AMLODIPINE BESYLATE 10 MG/1
10 TABLET ORAL DAILY
Qty: 90 TABLET | Refills: 4 | Status: SHIPPED | OUTPATIENT
Start: 2020-05-01

## 2020-05-05 ENCOUNTER — DOCUMENT SCAN (OUTPATIENT)
Dept: HOME HEALTH SERVICES | Facility: HOSPITAL | Age: 70
End: 2020-05-05
Payer: MEDICARE

## 2020-05-16 PROCEDURE — G0179 MD RECERTIFICATION HHA PT: HCPCS | Mod: ,,, | Performed by: INTERNAL MEDICINE

## 2020-05-16 PROCEDURE — G0179 PR HOME HEALTH MD RECERTIFICATION: ICD-10-PCS | Mod: ,,, | Performed by: INTERNAL MEDICINE

## 2020-05-21 ENCOUNTER — DOCUMENT SCAN (OUTPATIENT)
Dept: HOME HEALTH SERVICES | Facility: HOSPITAL | Age: 70
End: 2020-05-21
Payer: MEDICARE

## 2020-05-29 ENCOUNTER — EXTERNAL HOME HEALTH (OUTPATIENT)
Dept: HOME HEALTH SERVICES | Facility: HOSPITAL | Age: 70
End: 2020-05-29
Payer: MEDICARE

## 2020-05-29 NOTE — PROGRESS NOTES
60 Day Recert Order # [id number]  New Cert Period: 05/16/2020 to 07/14/2020 with [facility] - Dr. Ben Mena

## 2020-06-05 ENCOUNTER — DOCUMENT SCAN (OUTPATIENT)
Dept: HOME HEALTH SERVICES | Facility: HOSPITAL | Age: 70
End: 2020-06-05
Payer: MEDICARE

## 2020-06-08 ENCOUNTER — PATIENT OUTREACH (OUTPATIENT)
Dept: ADMINISTRATIVE | Facility: HOSPITAL | Age: 70
End: 2020-06-08

## 2020-06-12 ENCOUNTER — PATIENT OUTREACH (OUTPATIENT)
Dept: ADMINISTRATIVE | Facility: OTHER | Age: 70
End: 2020-06-12

## 2020-06-12 NOTE — PROGRESS NOTES
Patient's chart was reviewed.   Requested updates within Care Everywhere.  Immunizations reconciled.    Health Maintenance was updated.  Open referral to Ophthalmology.

## 2020-06-17 ENCOUNTER — PATIENT OUTREACH (OUTPATIENT)
Dept: ADMINISTRATIVE | Facility: HOSPITAL | Age: 70
End: 2020-06-17

## 2020-06-18 ENCOUNTER — PATIENT OUTREACH (OUTPATIENT)
Dept: ADMINISTRATIVE | Facility: HOSPITAL | Age: 70
End: 2020-06-18

## 2020-06-22 ENCOUNTER — PATIENT OUTREACH (OUTPATIENT)
Dept: ADMINISTRATIVE | Facility: HOSPITAL | Age: 70
End: 2020-06-22

## 2020-06-25 ENCOUNTER — PATIENT OUTREACH (OUTPATIENT)
Dept: ADMINISTRATIVE | Facility: HOSPITAL | Age: 70
End: 2020-06-25

## 2020-06-25 ENCOUNTER — DOCUMENT SCAN (OUTPATIENT)
Dept: HOME HEALTH SERVICES | Facility: HOSPITAL | Age: 70
End: 2020-06-25
Payer: MEDICARE

## 2020-06-26 DIAGNOSIS — I50.32 CHRONIC DIASTOLIC CONGESTIVE HEART FAILURE: ICD-10-CM

## 2020-06-29 RX ORDER — FUROSEMIDE 40 MG/1
40 TABLET ORAL DAILY
Qty: 30 TABLET | Refills: 2 | Status: SHIPPED | OUTPATIENT
Start: 2020-06-29 | End: 2020-07-28 | Stop reason: SDUPTHER

## 2020-07-08 ENCOUNTER — TELEPHONE (OUTPATIENT)
Dept: INTERNAL MEDICINE | Facility: CLINIC | Age: 70
End: 2020-07-08

## 2020-07-08 NOTE — TELEPHONE ENCOUNTER
----- Message from Magdalena Curry sent at 7/8/2020  3:11 PM CDT -----  Regarding: Appointment  Contact: Jessica ruiz/ [facility] [phone]  Jessica with [facility] need to speak with you about appointment for patient. Please call

## 2020-07-15 PROCEDURE — G0179 PR HOME HEALTH MD RECERTIFICATION: ICD-10-PCS | Mod: ,,, | Performed by: INTERNAL MEDICINE

## 2020-07-15 PROCEDURE — G0179 MD RECERTIFICATION HHA PT: HCPCS | Mod: ,,, | Performed by: INTERNAL MEDICINE

## 2020-07-16 ENCOUNTER — PATIENT OUTREACH (OUTPATIENT)
Dept: ADMINISTRATIVE | Facility: HOSPITAL | Age: 70
End: 2020-07-16

## 2020-07-16 ENCOUNTER — OFFICE VISIT (OUTPATIENT)
Dept: INTERNAL MEDICINE | Facility: CLINIC | Age: 70
End: 2020-07-16
Payer: MEDICARE

## 2020-07-16 VITALS
SYSTOLIC BLOOD PRESSURE: 136 MMHG | HEART RATE: 69 BPM | OXYGEN SATURATION: 97 % | DIASTOLIC BLOOD PRESSURE: 80 MMHG | HEIGHT: 67 IN | WEIGHT: 206 LBS | BODY MASS INDEX: 32.33 KG/M2

## 2020-07-16 DIAGNOSIS — Z99.2 END-STAGE RENAL DISEASE ON HEMODIALYSIS: ICD-10-CM

## 2020-07-16 DIAGNOSIS — I73.9 PAD (PERIPHERAL ARTERY DISEASE): ICD-10-CM

## 2020-07-16 DIAGNOSIS — N18.6 TYPE 2 DIABETES MELLITUS WITH HYPERTENSION AND END STAGE RENAL DISEASE ON DIALYSIS: ICD-10-CM

## 2020-07-16 DIAGNOSIS — I25.10 CORONARY ARTERY DISEASE, ANGINA PRESENCE UNSPECIFIED, UNSPECIFIED VESSEL OR LESION TYPE, UNSPECIFIED WHETHER NATIVE OR TRANSPLANTED HEART: ICD-10-CM

## 2020-07-16 DIAGNOSIS — I50.32 CHRONIC DIASTOLIC CONGESTIVE HEART FAILURE: ICD-10-CM

## 2020-07-16 DIAGNOSIS — I15.2 HYPERTENSION ASSOCIATED WITH DIABETES: ICD-10-CM

## 2020-07-16 DIAGNOSIS — E11.59 HYPERTENSION ASSOCIATED WITH DIABETES: ICD-10-CM

## 2020-07-16 DIAGNOSIS — C50.912 MALIGNANT NEOPLASM OF LEFT FEMALE BREAST, UNSPECIFIED ESTROGEN RECEPTOR STATUS, UNSPECIFIED SITE OF BREAST: ICD-10-CM

## 2020-07-16 DIAGNOSIS — I48.0 PAROXYSMAL ATRIAL FIBRILLATION: ICD-10-CM

## 2020-07-16 DIAGNOSIS — I12.0 TYPE 2 DIABETES MELLITUS WITH HYPERTENSION AND END STAGE RENAL DISEASE ON DIALYSIS: ICD-10-CM

## 2020-07-16 DIAGNOSIS — Z00.00 ROUTINE GENERAL MEDICAL EXAMINATION AT A HEALTH CARE FACILITY: Primary | ICD-10-CM

## 2020-07-16 DIAGNOSIS — E11.22 TYPE 2 DIABETES MELLITUS WITH HYPERTENSION AND END STAGE RENAL DISEASE ON DIALYSIS: ICD-10-CM

## 2020-07-16 DIAGNOSIS — N18.6 END-STAGE RENAL DISEASE ON HEMODIALYSIS: ICD-10-CM

## 2020-07-16 DIAGNOSIS — Z99.2 TYPE 2 DIABETES MELLITUS WITH HYPERTENSION AND END STAGE RENAL DISEASE ON DIALYSIS: ICD-10-CM

## 2020-07-16 DIAGNOSIS — F33.42 RECURRENT MAJOR DEPRESSIVE DISORDER, IN FULL REMISSION: ICD-10-CM

## 2020-07-16 PROCEDURE — 99214 OFFICE O/P EST MOD 30 MIN: CPT | Mod: PBBFAC,PN | Performed by: INTERNAL MEDICINE

## 2020-07-16 PROCEDURE — 99397 PR PREVENTIVE VISIT,EST,65 & OVER: ICD-10-PCS | Mod: S$PBB,,, | Performed by: INTERNAL MEDICINE

## 2020-07-16 PROCEDURE — 99999 PR PBB SHADOW E&M-EST. PATIENT-LVL IV: CPT | Mod: PBBFAC,,, | Performed by: INTERNAL MEDICINE

## 2020-07-16 PROCEDURE — 99397 PER PM REEVAL EST PAT 65+ YR: CPT | Mod: S$PBB,,, | Performed by: INTERNAL MEDICINE

## 2020-07-16 PROCEDURE — 99999 PR PBB SHADOW E&M-EST. PATIENT-LVL IV: ICD-10-PCS | Mod: PBBFAC,,, | Performed by: INTERNAL MEDICINE

## 2020-07-16 RX ORDER — NITROGLYCERIN 0.4 MG/1
0.4 TABLET SUBLINGUAL EVERY 5 MIN PRN
Qty: 10 TABLET | Refills: 1 | Status: SHIPPED | OUTPATIENT
Start: 2020-07-16 | End: 2021-07-16

## 2020-07-16 RX ORDER — LANCETS
EACH MISCELLANEOUS
Qty: 90 EACH | Refills: 4 | Status: SHIPPED | OUTPATIENT
Start: 2020-07-16 | End: 2020-08-04 | Stop reason: SDUPTHER

## 2020-07-16 RX ORDER — INSULIN PUMP SYRINGE, 3 ML
EACH MISCELLANEOUS
Qty: 1 EACH | Refills: 0 | Status: SHIPPED | OUTPATIENT
Start: 2020-07-16 | End: 2020-08-04 | Stop reason: SDUPTHER

## 2020-07-16 RX ORDER — MIRTAZAPINE 7.5 MG/1
7.5 TABLET, FILM COATED ORAL NIGHTLY
Qty: 90 TABLET | Refills: 3 | Status: SHIPPED | OUTPATIENT
Start: 2020-07-16 | End: 2021-07-16

## 2020-07-16 NOTE — PROGRESS NOTES
Subjective:       Patient ID: Angelina Beard is a 69 y.o. female.    Chief Complaint: Follow-up and Knee Pain (left)    HPI  Wellness check.  Chart reviewed.  Weight stable.  Activity limited by knees, contemplating L TKR.  Feels weak for hours s/p HD.  Has angina q 2 weeks provoked by activity, anxiety.  Rel by sL NTG x 1. Using O2 qhs. C/O depression.  Not checking sugars.  No bleeding.  No recent ophtho visit.  Review of Systems   All other systems reviewed and are negative.      Objective:      Physical Exam  Vitals signs reviewed.   Constitutional:       Appearance: She is well-developed. She is obese.   HENT:      Head: Normocephalic.      Mouth/Throat:      Mouth: Mucous membranes are moist.   Eyes:      Conjunctiva/sclera: Conjunctivae normal.   Neck:      Musculoskeletal: Normal range of motion.   Cardiovascular:      Rate and Rhythm: Normal rate and regular rhythm.      Heart sounds: Normal heart sounds.   Pulmonary:      Effort: Pulmonary effort is normal.      Breath sounds: Normal breath sounds.   Abdominal:      General: There is no distension.      Palpations: Abdomen is soft.   Musculoskeletal:      Right lower leg: No edema.      Left lower leg: No edema.   Lymphadenopathy:      Cervical: No cervical adenopathy.   Skin:     General: Skin is warm and dry.   Neurological:      General: No focal deficit present.      Mental Status: She is alert.      Cranial Nerves: No cranial nerve deficit.      Motor: No abnormal muscle tone.      Coordination: Coordination normal.   Psychiatric:         Mood and Affect: Mood normal.         Behavior: Behavior normal.         Assessment:       1. Routine general medical examination at a health care facility    2. Chronic diastolic congestive heart failure    3. Type 2 diabetes mellitus with hypertension and end stage renal disease on dialysis    4. Coronary artery disease, angina presence unspecified, unspecified vessel or lesion type, unspecified whether native or  transplanted heart    5. Hypertension associated with diabetes    6. Recurrent major depressive disorder, in full remission    7. PAD (peripheral artery disease)    8. Paroxysmal atrial fibrillation    9. Malignant neoplasm of left female breast, unspecified estrogen receptor status, unspecified site of breast    10. End-stage renal disease on hemodialysis        Plan:       Angelina was seen today for follow-up and knee pain.    Diagnoses and all orders for this visit:    Routine general medical examination at a health care facility    Chronic diastolic congestive heart failure   Compensated    Type 2 diabetes mellitus with hypertension and end stage renal disease on dialysis  -     Hemoglobin A1C; Future  -     Ambulatory referral/consult to Ophthalmology; Future  -     blood-glucose meter kit; To check BG 1 times daily, to use with insurance preferred meter  -     lancets Misc; To check BG 1 times daily, to use with insurance preferred meter    Coronary artery disease, angina presence unspecified, unspecified vessel or lesion type, unspecified whether native or transplanted heart  -     nitroGLYCERIN (NITROSTAT) 0.4 MG SL tablet; Place 1 tablet (0.4 mg total) under the tongue every 5 (five) minutes as needed for Chest pain.    Hypertension associated with diabetes   Well-cont    Recurrent major depressive disorder, in full remission  -     mirtazapine (REMERON) 7.5 MG Tab; Take 1 tablet (7.5 mg total) by mouth every evening.    PAD (peripheral artery disease)   Stable    Paroxysmal atrial fibrillation   Cont rx    Malignant neoplasm of left female breast, unspecified estrogen receptor status, unspecified site of breast   In remission    End-stage renal disease on hemodialysis   Cont HD    Follow up in about 6 months (around 1/16/2021).

## 2020-07-17 ENCOUNTER — EXTERNAL HOME HEALTH (OUTPATIENT)
Dept: HOME HEALTH SERVICES | Facility: HOSPITAL | Age: 70
End: 2020-07-17
Payer: MEDICARE

## 2020-07-17 NOTE — PROGRESS NOTES
60 Day Recert Order # [id number]  New Cert Period: 07/15/2020 to 09/12/2020 with [facility] - Dr. Ben Mena

## 2020-07-28 DIAGNOSIS — I50.32 CHRONIC DIASTOLIC CONGESTIVE HEART FAILURE: ICD-10-CM

## 2020-07-28 DIAGNOSIS — M17.0 OSTEOARTHRITIS OF BOTH KNEES, UNSPECIFIED OSTEOARTHRITIS TYPE: Chronic | ICD-10-CM

## 2020-07-28 RX ORDER — FUROSEMIDE 40 MG/1
40 TABLET ORAL DAILY
Qty: 30 TABLET | Refills: 4 | Status: ON HOLD | OUTPATIENT
Start: 2020-07-28 | End: 2020-08-19 | Stop reason: HOSPADM

## 2020-07-28 RX ORDER — GABAPENTIN 300 MG/1
300 CAPSULE ORAL EVERY 8 HOURS PRN
Qty: 90 CAPSULE | Refills: 4 | Status: SHIPPED | OUTPATIENT
Start: 2020-07-28

## 2020-08-03 ENCOUNTER — TELEPHONE (OUTPATIENT)
Dept: INTERNAL MEDICINE | Facility: CLINIC | Age: 70
End: 2020-08-03

## 2020-08-03 NOTE — TELEPHONE ENCOUNTER
----- Message from Noreen Duke sent at 8/3/2020  3:00 PM CDT -----  Case management is calling the cnn that was sent over. Please call and advise. [phone]

## 2020-08-04 DIAGNOSIS — I12.0 TYPE 2 DIABETES MELLITUS WITH HYPERTENSION AND END STAGE RENAL DISEASE ON DIALYSIS: ICD-10-CM

## 2020-08-04 DIAGNOSIS — E11.22 TYPE 2 DIABETES MELLITUS WITH HYPERTENSION AND END STAGE RENAL DISEASE ON DIALYSIS: ICD-10-CM

## 2020-08-04 DIAGNOSIS — N18.6 TYPE 2 DIABETES MELLITUS WITH HYPERTENSION AND END STAGE RENAL DISEASE ON DIALYSIS: ICD-10-CM

## 2020-08-04 DIAGNOSIS — Z99.2 TYPE 2 DIABETES MELLITUS WITH HYPERTENSION AND END STAGE RENAL DISEASE ON DIALYSIS: ICD-10-CM

## 2020-08-04 RX ORDER — IBUPROFEN 200 MG
1 CAPSULE ORAL DAILY
COMMUNITY
End: 2020-08-04 | Stop reason: SDUPTHER

## 2020-08-04 RX ORDER — INSULIN PUMP SYRINGE, 3 ML
EACH MISCELLANEOUS
Qty: 1 EACH | Refills: 4 | Status: ON HOLD | OUTPATIENT
Start: 2020-08-04 | End: 2020-08-19 | Stop reason: HOSPADM

## 2020-08-04 RX ORDER — IBUPROFEN 200 MG
1 CAPSULE ORAL DAILY
Qty: 100 STRIP | Refills: 4 | Status: ON HOLD | OUTPATIENT
Start: 2020-08-04 | End: 2020-08-19 | Stop reason: HOSPADM

## 2020-08-04 RX ORDER — LANCETS
EACH MISCELLANEOUS
Qty: 90 EACH | Refills: 4 | Status: ON HOLD | OUTPATIENT
Start: 2020-08-04 | End: 2020-08-19 | Stop reason: HOSPADM

## 2020-08-04 NOTE — TELEPHONE ENCOUNTER
----- Message from Karlos Begum sent at 8/4/2020 12:12 PM CDT -----  Contact: Gracia ruiz/[facility]  [phone]  Gracia joseph/[facility] is requesting Rx   1.blood-glucose meter kit   2. Testing strips and lancets   [facility] [phone]

## 2020-08-04 NOTE — TELEPHONE ENCOUNTER
Jessica will refax information about re-certifcation with oxygen to our office  I will place on dr adame desk once fax obtained.

## 2020-08-07 ENCOUNTER — TELEPHONE (OUTPATIENT)
Dept: INTERNAL MEDICINE | Facility: CLINIC | Age: 70
End: 2020-08-07

## 2020-08-07 NOTE — TELEPHONE ENCOUNTER
----- Message from Sadie Denis sent at 8/7/2020  4:14 PM CDT -----  Regarding: Susie Myers  Contact: Jessica  Calling regarding a fax she just received     Please call [phone]

## 2020-08-07 NOTE — TELEPHONE ENCOUNTER
----- Message from Noreen Duke sent at 8/7/2020  1:52 PM CDT -----  Case management is calling to say they never received the cnn re-cert. Please fax to: [phone]

## 2020-08-09 ENCOUNTER — HOSPITAL ENCOUNTER (INPATIENT)
Facility: HOSPITAL | Age: 70
LOS: 8 days | Discharge: NURSING, REHAB OR LONG-TERM CARE | DRG: 252 | End: 2020-08-19
Attending: EMERGENCY MEDICINE | Admitting: HOSPITALIST
Payer: MEDICARE

## 2020-08-09 DIAGNOSIS — F33.41 RECURRENT MAJOR DEPRESSIVE DISORDER, IN PARTIAL REMISSION: ICD-10-CM

## 2020-08-09 DIAGNOSIS — N18.6 TYPE 2 DIABETES MELLITUS WITH HYPERTENSION AND END STAGE RENAL DISEASE ON DIALYSIS: ICD-10-CM

## 2020-08-09 DIAGNOSIS — M17.10 PRIMARY OSTEOARTHRITIS OF KNEE, UNSPECIFIED LATERALITY: Chronic | ICD-10-CM

## 2020-08-09 DIAGNOSIS — Z99.2 END-STAGE RENAL DISEASE ON HEMODIALYSIS: Chronic | ICD-10-CM

## 2020-08-09 DIAGNOSIS — E11.22 TYPE 2 DIABETES MELLITUS WITH HYPERTENSION AND END STAGE RENAL DISEASE ON DIALYSIS: ICD-10-CM

## 2020-08-09 DIAGNOSIS — I12.0 TYPE 2 DIABETES MELLITUS WITH HYPERTENSION AND END STAGE RENAL DISEASE ON DIALYSIS: ICD-10-CM

## 2020-08-09 DIAGNOSIS — I15.2 HYPERTENSION ASSOCIATED WITH DIABETES: ICD-10-CM

## 2020-08-09 DIAGNOSIS — N18.6 ESRD ON DIALYSIS: ICD-10-CM

## 2020-08-09 DIAGNOSIS — D63.1 ANEMIA IN END-STAGE RENAL DISEASE: Chronic | ICD-10-CM

## 2020-08-09 DIAGNOSIS — R53.83 FATIGUE, UNSPECIFIED TYPE: ICD-10-CM

## 2020-08-09 DIAGNOSIS — Z99.2 ESRD ON DIALYSIS: ICD-10-CM

## 2020-08-09 DIAGNOSIS — R05.9 COUGH: ICD-10-CM

## 2020-08-09 DIAGNOSIS — R19.7 DIARRHEA: ICD-10-CM

## 2020-08-09 DIAGNOSIS — R07.9 CHEST PAIN: ICD-10-CM

## 2020-08-09 DIAGNOSIS — T82.49XA CLOTTED DIALYSIS ACCESS, INITIAL ENCOUNTER: ICD-10-CM

## 2020-08-09 DIAGNOSIS — M00.9 PYOGENIC ARTHRITIS OF LEFT KNEE JOINT, DUE TO UNSPECIFIED ORGANISM: ICD-10-CM

## 2020-08-09 DIAGNOSIS — R94.31 ABNORMAL EKG: ICD-10-CM

## 2020-08-09 DIAGNOSIS — Z99.2 TYPE 2 DIABETES MELLITUS WITH HYPERTENSION AND END STAGE RENAL DISEASE ON DIALYSIS: ICD-10-CM
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
## 2020-08-09 DIAGNOSIS — N18.6 ANEMIA IN END-STAGE RENAL DISEASE: Chronic | ICD-10-CM

## 2020-08-09 DIAGNOSIS — N18.6 END-STAGE RENAL DISEASE ON HEMODIALYSIS: Chronic | ICD-10-CM

## 2020-08-09 DIAGNOSIS — R53.83 FATIGUE: ICD-10-CM

## 2020-08-09 DIAGNOSIS — T82.9XXA COMPLICATION OF VASCULAR ACCESS FOR DIALYSIS: ICD-10-CM

## 2020-08-09 DIAGNOSIS — I25.10 CORONARY ARTERY DISEASE, ANGINA PRESENCE UNSPECIFIED, UNSPECIFIED VESSEL OR LESION TYPE, UNSPECIFIED WHETHER NATIVE OR TRANSPLANTED HEART: ICD-10-CM

## 2020-08-09 DIAGNOSIS — T82.858A ARTERIOVENOUS FISTULA STENOSIS, INITIAL ENCOUNTER: ICD-10-CM

## 2020-08-09 DIAGNOSIS — E87.5 HYPERKALEMIA: Primary | ICD-10-CM

## 2020-08-09 DIAGNOSIS — E11.59 HYPERTENSION ASSOCIATED WITH DIABETES: ICD-10-CM

## 2020-08-09 DIAGNOSIS — T82.9XXA COMPLICATION OF VASCULAR ACCESS FOR DIALYSIS, INITIAL ENCOUNTER: ICD-10-CM

## 2020-08-09 LAB
ALBUMIN SERPL BCP-MCNC: 3.9 G/DL (ref 3.5–5.2)
ALP SERPL-CCNC: 75 U/L (ref 55–135)
ALT SERPL W/O P-5'-P-CCNC: 19 U/L (ref 10–44)
ANION GAP SERPL CALC-SCNC: 16 MMOL/L (ref 8–16)
APTT BLDCRRT: 35.6 SEC (ref 21–32)
AST SERPL-CCNC: 26 U/L (ref 10–40)
BASOPHILS # BLD AUTO: 0.03 K/UL (ref 0–0.2)
BASOPHILS NFR BLD: 0.6 % (ref 0–1.9)
BILIRUB SERPL-MCNC: 0.5 MG/DL (ref 0.1–1)
BNP SERPL-MCNC: 177 PG/ML (ref 0–99)
BUN SERPL-MCNC: 88 MG/DL (ref 8–23)
CALCIUM SERPL-MCNC: 9.7 MG/DL (ref 8.7–10.5)
CHLORIDE SERPL-SCNC: 97 MMOL/L (ref 95–110)
CO2 SERPL-SCNC: 24 MMOL/L (ref 23–29)
CREAT SERPL-MCNC: 10.4 MG/DL (ref 0.5–1.4)
CRP SERPL-MCNC: 12 MG/L (ref 0–8.2)
DIFFERENTIAL METHOD: ABNORMAL
EOSINOPHIL # BLD AUTO: 0.1 K/UL (ref 0–0.5)
EOSINOPHIL NFR BLD: 2.3 % (ref 0–8)
ERYTHROCYTE [DISTWIDTH] IN BLOOD BY AUTOMATED COUNT: 16.4 % (ref 11.5–14.5)
ERYTHROCYTE [SEDIMENTATION RATE] IN BLOOD BY WESTERGREN METHOD: 48 MM/HR (ref 0–20)
EST. GFR  (AFRICAN AMERICAN): 4 ML/MIN/1.73 M^2
EST. GFR  (NON AFRICAN AMERICAN): 3 ML/MIN/1.73 M^2
GLUCOSE SERPL-MCNC: 172 MG/DL (ref 70–110)
HCT VFR BLD AUTO: 39.2 % (ref 37–48.5)
HGB BLD-MCNC: 12.1 G/DL (ref 12–16)
IMM GRANULOCYTES # BLD AUTO: 0.01 K/UL (ref 0–0.04)
IMM GRANULOCYTES NFR BLD AUTO: 0.2 % (ref 0–0.5)
INR PPP: 1 (ref 0.8–1.2)
LYMPHOCYTES # BLD AUTO: 0.9 K/UL (ref 1–4.8)
LYMPHOCYTES NFR BLD: 17.7 % (ref 18–48)
MAGNESIUM SERPL-MCNC: 2.3 MG/DL (ref 1.6–2.6)
MCH RBC QN AUTO: 27.9 PG (ref 27–31)
MCHC RBC AUTO-ENTMCNC: 30.9 G/DL (ref 32–36)
MCV RBC AUTO: 90 FL (ref 82–98)
MONOCYTES # BLD AUTO: 0.5 K/UL (ref 0.3–1)
MONOCYTES NFR BLD: 9.6 % (ref 4–15)
NEUTROPHILS # BLD AUTO: 3.7 K/UL (ref 1.8–7.7)
NEUTROPHILS NFR BLD: 69.6 % (ref 38–73)
NRBC BLD-RTO: 0 /100 WBC
PHOSPHATE SERPL-MCNC: 7.4 MG/DL (ref 2.7–4.5)
PLATELET # BLD AUTO: 225 K/UL (ref 150–350)
PMV BLD AUTO: 10 FL (ref 9.2–12.9)
POCT GLUCOSE: 164 MG/DL (ref 70–110)
POCT GLUCOSE: 165 MG/DL (ref 70–110)
POCT GLUCOSE: 182 MG/DL (ref 70–110)
POCT GLUCOSE: 188 MG/DL (ref 70–110)
POTASSIUM SERPL-SCNC: 5.2 MMOL/L (ref 3.5–5.1)
POTASSIUM SERPL-SCNC: 6.4 MMOL/L (ref 3.5–5.1)
PROT SERPL-MCNC: 7.9 G/DL (ref 6–8.4)
PROTHROMBIN TIME: 10.7 SEC (ref 9–12.5)
RBC # BLD AUTO: 4.34 M/UL (ref 4–5.4)
SARS-COV-2 RDRP RESP QL NAA+PROBE: NEGATIVE
SODIUM SERPL-SCNC: 137 MMOL/L (ref 136–145)
TROPONIN I SERPL DL<=0.01 NG/ML-MCNC: 0.04 NG/ML (ref 0–0.03)
WBC # BLD AUTO: 5.31 K/UL (ref 3.9–12.7)

## 2020-08-09 PROCEDURE — 96375 TX/PRO/DX INJ NEW DRUG ADDON: CPT

## 2020-08-09 PROCEDURE — 87075 CULTR BACTERIA EXCEPT BLOOD: CPT

## 2020-08-09 PROCEDURE — G0378 HOSPITAL OBSERVATION PER HR: HCPCS

## 2020-08-09 PROCEDURE — 25000003 PHARM REV CODE 250: Performed by: NURSE PRACTITIONER

## 2020-08-09 PROCEDURE — 87102 FUNGUS ISOLATION CULTURE: CPT

## 2020-08-09 PROCEDURE — 99900035 HC TECH TIME PER 15 MIN (STAT)

## 2020-08-09 PROCEDURE — 83735 ASSAY OF MAGNESIUM: CPT

## 2020-08-09 PROCEDURE — 80053 COMPREHEN METABOLIC PANEL: CPT

## 2020-08-09 PROCEDURE — 87070 CULTURE OTHR SPECIMN AEROBIC: CPT

## 2020-08-09 PROCEDURE — 87206 SMEAR FLUORESCENT/ACID STAI: CPT

## 2020-08-09 PROCEDURE — 99285 EMERGENCY DEPT VISIT HI MDM: CPT | Mod: 25

## 2020-08-09 PROCEDURE — 63600175 PHARM REV CODE 636 W HCPCS: Performed by: EMERGENCY MEDICINE

## 2020-08-09 PROCEDURE — 83880 ASSAY OF NATRIURETIC PEPTIDE: CPT

## 2020-08-09 PROCEDURE — 84484 ASSAY OF TROPONIN QUANT: CPT

## 2020-08-09 PROCEDURE — 84100 ASSAY OF PHOSPHORUS: CPT

## 2020-08-09 PROCEDURE — 96365 THER/PROPH/DIAG IV INF INIT: CPT

## 2020-08-09 PROCEDURE — 25000003 PHARM REV CODE 250: Performed by: STUDENT IN AN ORGANIZED HEALTH CARE EDUCATION/TRAINING PROGRAM

## 2020-08-09 PROCEDURE — 25000242 PHARM REV CODE 250 ALT 637 W/ HCPCS: Performed by: EMERGENCY MEDICINE

## 2020-08-09 PROCEDURE — 85652 RBC SED RATE AUTOMATED: CPT

## 2020-08-09 PROCEDURE — 89051 BODY FLUID CELL COUNT: CPT

## 2020-08-09 PROCEDURE — 25000003 PHARM REV CODE 250: Performed by: EMERGENCY MEDICINE

## 2020-08-09 PROCEDURE — 94640 AIRWAY INHALATION TREATMENT: CPT

## 2020-08-09 PROCEDURE — 27000221 HC OXYGEN, UP TO 24 HOURS

## 2020-08-09 PROCEDURE — 85730 THROMBOPLASTIN TIME PARTIAL: CPT

## 2020-08-09 PROCEDURE — 84132 ASSAY OF SERUM POTASSIUM: CPT

## 2020-08-09 PROCEDURE — 63600175 PHARM REV CODE 636 W HCPCS: Performed by: NURSE PRACTITIONER

## 2020-08-09 PROCEDURE — 85025 COMPLETE CBC W/AUTO DIFF WBC: CPT

## 2020-08-09 PROCEDURE — 86140 C-REACTIVE PROTEIN: CPT

## 2020-08-09 PROCEDURE — U0002 COVID-19 LAB TEST NON-CDC: HCPCS

## 2020-08-09 PROCEDURE — 89060 EXAM SYNOVIAL FLUID CRYSTALS: CPT

## 2020-08-09 PROCEDURE — 93005 ELECTROCARDIOGRAM TRACING: CPT

## 2020-08-09 PROCEDURE — 25000003 PHARM REV CODE 250: Performed by: ORTHOPAEDIC SURGERY

## 2020-08-09 PROCEDURE — 94761 N-INVAS EAR/PLS OXIMETRY MLT: CPT

## 2020-08-09 PROCEDURE — 85610 PROTHROMBIN TIME: CPT

## 2020-08-09 PROCEDURE — 82962 GLUCOSE BLOOD TEST: CPT

## 2020-08-09 RX ORDER — ALBUTEROL SULFATE 2.5 MG/.5ML
10 SOLUTION RESPIRATORY (INHALATION)
Status: COMPLETED | OUTPATIENT
Start: 2020-08-09 | End: 2020-08-09

## 2020-08-09 RX ORDER — GLUCAGON 1 MG
1 KIT INJECTION
Status: DISCONTINUED | OUTPATIENT
Start: 2020-08-10 | End: 2020-08-19 | Stop reason: HOSPADM

## 2020-08-09 RX ORDER — SODIUM CHLORIDE 0.9 % (FLUSH) 0.9 %
10 SYRINGE (ML) INJECTION
Status: DISCONTINUED | OUTPATIENT
Start: 2020-08-09 | End: 2020-08-19 | Stop reason: HOSPADM

## 2020-08-09 RX ORDER — HYDROCODONE BITARTRATE AND ACETAMINOPHEN 5; 325 MG/1; MG/1
1 TABLET ORAL EVERY 8 HOURS PRN
Status: DISCONTINUED | OUTPATIENT
Start: 2020-08-10 | End: 2020-08-19 | Stop reason: HOSPADM

## 2020-08-09 RX ORDER — ACETAMINOPHEN 325 MG/1
650 TABLET ORAL EVERY 4 HOURS PRN
Status: DISCONTINUED | OUTPATIENT
Start: 2020-08-09 | End: 2020-08-19 | Stop reason: HOSPADM

## 2020-08-09 RX ORDER — ACETAMINOPHEN 325 MG/1
650 TABLET ORAL
Status: COMPLETED | OUTPATIENT
Start: 2020-08-09 | End: 2020-08-09

## 2020-08-09 RX ORDER — MUPIROCIN 20 MG/G
OINTMENT TOPICAL 2 TIMES DAILY
Status: DISPENSED | OUTPATIENT
Start: 2020-08-09 | End: 2020-08-14

## 2020-08-09 RX ORDER — IBUPROFEN 200 MG
16 TABLET ORAL
Status: DISCONTINUED | OUTPATIENT
Start: 2020-08-10 | End: 2020-08-19 | Stop reason: HOSPADM

## 2020-08-09 RX ORDER — SODIUM CHLORIDE 9 MG/ML
INJECTION, SOLUTION INTRAVENOUS
Status: DISCONTINUED | OUTPATIENT
Start: 2020-08-09 | End: 2020-08-19 | Stop reason: HOSPADM

## 2020-08-09 RX ORDER — SODIUM CHLORIDE 9 MG/ML
INJECTION, SOLUTION INTRAVENOUS ONCE
Status: DISCONTINUED | OUTPATIENT
Start: 2020-08-09 | End: 2020-08-15

## 2020-08-09 RX ORDER — ALBUTEROL SULFATE 2.5 MG/.5ML
10 SOLUTION RESPIRATORY (INHALATION) ONCE
Status: DISCONTINUED | OUTPATIENT
Start: 2020-08-09 | End: 2020-08-09

## 2020-08-09 RX ORDER — INSULIN ASPART 100 [IU]/ML
0-5 INJECTION, SOLUTION INTRAVENOUS; SUBCUTANEOUS
Status: DISCONTINUED | OUTPATIENT
Start: 2020-08-10 | End: 2020-08-19 | Stop reason: HOSPADM

## 2020-08-09 RX ORDER — IBUPROFEN 200 MG
24 TABLET ORAL
Status: DISCONTINUED | OUTPATIENT
Start: 2020-08-10 | End: 2020-08-19 | Stop reason: HOSPADM

## 2020-08-09 RX ORDER — GABAPENTIN 300 MG/1
300 CAPSULE ORAL EVERY 8 HOURS PRN
Status: DISCONTINUED | OUTPATIENT
Start: 2020-08-10 | End: 2020-08-17

## 2020-08-09 RX ORDER — LIDOCAINE HYDROCHLORIDE 10 MG/ML
5 INJECTION INFILTRATION; PERINEURAL ONCE
Status: COMPLETED | OUTPATIENT
Start: 2020-08-09 | End: 2020-08-09

## 2020-08-09 RX ORDER — FUROSEMIDE 10 MG/ML
40 INJECTION INTRAMUSCULAR; INTRAVENOUS
Status: COMPLETED | OUTPATIENT
Start: 2020-08-09 | End: 2020-08-09

## 2020-08-09 RX ORDER — MIRTAZAPINE 7.5 MG/1
7.5 TABLET, FILM COATED ORAL NIGHTLY
Status: DISCONTINUED | OUTPATIENT
Start: 2020-08-09 | End: 2020-08-19 | Stop reason: HOSPADM

## 2020-08-09 RX ORDER — ONDANSETRON 2 MG/ML
4 INJECTION INTRAMUSCULAR; INTRAVENOUS EVERY 8 HOURS PRN
Status: DISCONTINUED | OUTPATIENT
Start: 2020-08-09 | End: 2020-08-19 | Stop reason: HOSPADM

## 2020-08-09 RX ADMIN — MIRTAZAPINE 7.5 MG: 7.5 TABLET ORAL at 11:08

## 2020-08-09 RX ADMIN — MUPIROCIN: 20 OINTMENT TOPICAL at 11:08

## 2020-08-09 RX ADMIN — FUROSEMIDE 40 MG: 10 INJECTION, SOLUTION INTRAVENOUS at 08:08

## 2020-08-09 RX ADMIN — DEXTROSE MONOHYDRATE 25 G: 500 INJECTION PARENTERAL at 06:08

## 2020-08-09 RX ADMIN — CALCIUM GLUCONATE 1 G: 98 INJECTION, SOLUTION INTRAVENOUS at 06:08

## 2020-08-09 RX ADMIN — INSULIN HUMAN 8.44 UNITS: 100 INJECTION, SOLUTION PARENTERAL at 06:08

## 2020-08-09 RX ADMIN — ALBUTEROL SULFATE 10 MG: 2.5 SOLUTION RESPIRATORY (INHALATION) at 06:08

## 2020-08-09 RX ADMIN — PIPERACILLIN AND TAZOBACTAM 4.5 G: 4; .5 INJECTION, POWDER, LYOPHILIZED, FOR SOLUTION INTRAVENOUS; PARENTERAL at 09:08

## 2020-08-09 RX ADMIN — ACETAMINOPHEN 650 MG: 325 TABLET ORAL at 06:08

## 2020-08-09 RX ADMIN — HYDROCODONE BITARTRATE AND ACETAMINOPHEN 1 TABLET: 5; 325 TABLET ORAL at 11:08

## 2020-08-09 RX ADMIN — LIDOCAINE HYDROCHLORIDE 5 ML: 10 INJECTION, SOLUTION INFILTRATION; PERINEURAL at 10:08

## 2020-08-09 NOTE — ED NOTES
Pt presents to the ED w/ c/ of diarrhea since Tuesday. Reports generalized weakness with diarrhea since. Also reports a dry cough for the past few days. Denies n/v or SOB. Reports had chest pain yesterday but denies any today. Pt reports dialysis patient MWF and last dialyzed was Friday. Reports left sided pain for the past few days. Reports left hip and left leg pain. Reports fell onto left side on Tuesday and reports hitting head. Pt reports that she wears 2L oxygen at home. Pt is connected to cardiac monitor, BP cuff, and pulse ox. Will continue to monitor.

## 2020-08-09 NOTE — ED PROVIDER NOTES
"Encounter Date: 8/9/2020       History     Chief Complaint   Patient presents with    Diarrhea     c/o diarrhea since yesterday with generalized weakness. No vomiting. No fever. Last dialysis on Friday. Presents awake, alert oriented. Skin w/d. c/o pain that radiates down left leg. No distress noted.      70yo female presents to the ED for diarrhea "since Monday or Tuesday."  Pt states that she's had about 4 episodes of diarrhea a day since Monday or Tuesday. No fever, n/v, abdominal pain, or bloody stools.  She denies recent antibiotic use.  On Tuesday the patient was trying to walk to the bathroom when she fell to the floor and hit her head due to being light-headed. She reports landing on her left side.  Pt denies LOC.  Usually the patient uses a wheelchair or has someone help her but she was trying to ambulate independently at the time of her fall.  Pt reports that EMS was called that day but she declined going to the ED.  The patient states that she has had left knee and hip pain since falling.  Movement makes the pain worse. Rest seems to help.  Pt states that she has had a "bad knee" for over a year but swelling only started after the fall. She has not noticed any wounds.  She denies headache.  No CP or SOB.  She decided to come to the ED today because she is feeling weak. Pt has ESRD requiring dialysis and was last dialyzed on Friday.  She reports getting the full treatment.  She has had an occasional cough.  No known sick contacts.  No other complaints at this time. Pt is established with  at [facility] dialysis.  Pt makes urine about once a day.     The history is provided by the patient and medical records.     Review of patient's allergies indicates:  No Known Allergies  Past Medical History:   Diagnosis Date    Anemia due to gastrointestinal blood loss 7/11/2018    Arthritis     Asthma     Breast cancer, left     history of left breast cancer status post mastectomy,    CHF (congestive " heart failure)     home O2 2lpm    Cholelithiasis     Coronary artery disease     Debility 2019    mostly wheelchair bound but can use a walker with assistance    Diabetes mellitus, type 2     with neuropathy and nephropathy    Encounter for blood transfusion     End-stage renal disease on hemodialysis     mon-wed -fri    Hyperlipidemia     Hypertension     Lumbar stenosis 2017    by MRI    Sarcoidosis      Past Surgical History:   Procedure Laterality Date    av graft      left arm    BRACHIAL ARTERY GRAFT Left 05/04/2016    brachiocephalic, Dr. Anson Crocker    BREAST BIOPSY Left     breast ca    BREAST LUMPECTOMY Left     radiation only    btl      COLONOSCOPY N/A 7/13/2018    Procedure: COLONOSCOPY;  Surgeon: Almita Bee MD;  Location: [facility] ENDO;  Service: Endoscopy;  Laterality: N/A;    ELBOW SURGERY      left    ESOPHAGOGASTRODUODENOSCOPY N/A 7/13/2018    Procedure: ESOPHAGOGASTRODUODENOSCOPY (EGD);  Surgeon: Almita Bee MD;  Location: [facility] ENDO;  Service: Endoscopy;  Laterality: N/A;    LIPOMA RESECTION      back of neck    pilondial cyst      TOTAL KNEE ARTHROPLASTY Right 02/23/2016     Family History   Problem Relation Age of Onset    Diabetes Mother     Kidney disease Mother     Hypertension Mother     Diabetes Sister     Heart disease Sister      Social History     Tobacco Use    Smoking status: Never Smoker    Smokeless tobacco: Never Used   Substance Use Topics    Alcohol use: No    Drug use: No     Review of Systems   Constitutional: Positive for activity change and fatigue. Negative for chills and fever.   HENT: Negative for congestion.    Respiratory: Positive for cough. Negative for shortness of breath.    Cardiovascular: Negative for chest pain.   Gastrointestinal: Positive for diarrhea. Negative for abdominal pain, blood in stool, nausea and vomiting.   Musculoskeletal: Positive for arthralgias, back pain and joint swelling. Negative for neck pain.    Skin: Negative for wound.   Neurological: Positive for light-headedness. Negative for dizziness, seizures, syncope, speech difficulty, weakness, numbness and headaches.   Psychiatric/Behavioral: Negative for confusion.   All other systems reviewed and are negative.      Physical Exam     Initial Vitals [08/09/20 1639]   BP Pulse Resp Temp SpO2   (!) 193/93 68 10 98.1 °F (36.7 °C) 100 %      MAP       --         Physical Exam    Nursing note and vitals reviewed.  Constitutional: She appears well-developed and well-nourished. She is Obese . She is active and cooperative. She is easily aroused.  Non-toxic appearance. She does not have a sickly appearance. She does not appear ill. No distress. Nasal cannula in place.   HENT:   Head: Normocephalic and atraumatic.   No obvious signs of head trauma.    Eyes: Conjunctivae and EOM are normal.   Neck: Normal range of motion. Neck supple.   Cardiovascular: Normal rate and regular rhythm.   Pulses:       Dorsalis pedis pulses are 1+ on the right side and 1+ on the left side.   AV fistula right forearm with palpable thrill.    Pulmonary/Chest: Effort normal and breath sounds normal.   Abdominal: Soft. Normal appearance and bowel sounds are normal.   Musculoskeletal:      Right hip: She exhibits tenderness and bony tenderness. She exhibits normal range of motion, normal strength, no swelling, no crepitus, no deformity and no laceration.      Left hip: She exhibits decreased range of motion, tenderness and bony tenderness. She exhibits no swelling, no crepitus, no deformity and no laceration.      Left knee: She exhibits decreased range of motion, swelling, effusion, abnormal alignment, bony tenderness and MCL laxity. She exhibits no ecchymosis and no laceration. Tenderness found.      Cervical back: Normal.      Thoracic back: Normal.      Lumbar back: Normal.        Legs:       Comments: Trace BLE edema.    Neurological: She is alert and easily aroused. She has normal  strength. No cranial nerve deficit or sensory deficit. GCS eye subscore is 4. GCS verbal subscore is 5. GCS motor subscore is 6.   Skin: Skin is warm, dry and intact. Capillary refill takes less than 2 seconds. No abrasion, no bruising and no rash noted. No erythema. No pallor.   Psychiatric: She has a normal mood and affect. Her speech is normal and behavior is normal. Judgment and thought content normal. Cognition and memory are normal.         ED Course   Procedures  Labs Reviewed   CBC W/ AUTO DIFFERENTIAL - Abnormal; Notable for the following components:       Result Value    Mean Corpuscular Hemoglobin Conc 30.9 (*)     RDW 16.4 (*)     Lymph # 0.9 (*)     Lymph% 17.7 (*)     All other components within normal limits   COMPREHENSIVE METABOLIC PANEL - Abnormal; Notable for the following components:    Potassium 6.4 (*)     Glucose 172 (*)     BUN, Bld 88 (*)     Creatinine 10.4 (*)     eGFR if  4 (*)     eGFR if non  3 (*)     All other components within normal limits    Narrative:      K  critical result(s) called and verbal readback obtained from   Muna VASQUEZ  by AM1 08/09/2020 17:58   APTT - Abnormal; Notable for the following components:    aPTT 35.6 (*)     All other components within normal limits   TROPONIN I - Abnormal; Notable for the following components:    Troponin I 0.036 (*)     All other components within normal limits   B-TYPE NATRIURETIC PEPTIDE - Abnormal; Notable for the following components:     (*)     All other components within normal limits   PHOSPHORUS - Abnormal; Notable for the following components:    Phosphorus 7.4 (*)     All other components within normal limits   POTASSIUM - Abnormal; Notable for the following components:    Potassium 5.2 (*)     All other components within normal limits   POCT GLUCOSE - Abnormal; Notable for the following components:    POCT Glucose 182 (*)     All other components within normal limits   POCT GLUCOSE -  Abnormal; Notable for the following components:    POCT Glucose 164 (*)     All other components within normal limits   POCT GLUCOSE - Abnormal; Notable for the following components:    POCT Glucose 188 (*)     All other components within normal limits   POCT GLUCOSE - Abnormal; Notable for the following components:    POCT Glucose 165 (*)     All other components within normal limits   CULTURE, AEROBIC  (SPECIFY SOURCE)   CULTURE, ANAEROBIC   CULTURE, FUNGUS   DIRECT AFB STAIN   SARS-COV-2 RNA AMPLIFICATION, QUAL   PROTIME-INR   MAGNESIUM   PHOSPHORUS   MAGNESIUM   C-REACTIVE PROTEIN   URINALYSIS, REFLEX TO URINE CULTURE   SEDIMENTATION RATE   SEDIMENTATION RATE   C-REACTIVE PROTEIN   WBC & DIFF,BODY FLUID   BODY FLUID CRYSTAL   POCT GLUCOSE MONITORING CONTINUOUS   POCT GLUCOSE MONITORING CONTINUOUS          Imaging Results          CT Pelvis Without Contrast (Final result)  Result time 08/09/20 21:10:28   Procedure changed from CT Hip Without Contrast Left     Final result by Vito Garcia MD (08/09/20 21:10:28)                 Impression:      Negative CT of the pelvis for fracture specially of the left hip.    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    Severe atherosclerotic vascular disease most severe the SFA bilaterally.      Electronically signed by: Vito Garcia  Date:    08/09/2020  Time:    21:10             Narrative:    EXAMINATION:  CT PELVIS WITHOUT CONTRAST    CLINICAL HISTORY:  Hip trauma, nondiagnostic xray;    TECHNIQUE:  Axial CT images of the left hip were obtained with sagittal and coronal reformatted images.    COMPARISON:  Plain film of the hip,, 08/09/2020    FINDINGS:  The cortex and trabecular markings of the femur appear intact without displaced fracture or bony destructive process.  The intertrochanteric and proximal diaphysis appear intact as well.    Moderate osteoarthritis of the femoroacetabular joint is  present without evidence of pincer or CAM deformity.    The adjacent pelvic bones are intact.  There is mild irregularity of the ischial tuberosity insertion of the hamstring tendons suggesting mild insertional tendinitis.    The endplates of the lumbar spine are markedly irregular with erosive changes at the L4-5 level and mild compression of the anterior endplate of L5 appearing remote with vacuum phenomenon.  Facet arthropathy is prominent.  There is no spondylolysis or significant central canal stenosis evident as imaged.  Degenerative changes in the SI joints are present.    Atherosclerotic disease and a normal caliber aorta and iliac system are noted.  This extends extensively into the SFA bilaterally.    The uterus demonstrates multiple large chunky calcifications suggesting degenerating fibroadenomas.  The urinary bladder appears unremarkable.  There is no pelvic mass or free fluid.                                CT Knee Without Contrast Left (Final result)  Result time 08/09/20 20:54:03    Final result by Fuentes Vincent MD (08/09/20 20:54:03)                 Impression:      Findings indicating destruction of the knee joint secondary to aseptic process.    This report was flagged in Epic as abnormal.      Electronically signed by: uFentes Vincent  Date:    08/09/2020  Time:    20:54             Narrative:    EXAMINATION:  CT KNEE WITHOUT CONTRAST LEFT    CLINICAL HISTORY:  Knee instability;    TECHNIQUE:  Multiple axial images of the left knee were obtained without the aid of IV contrast.  The study was reformatted creating a 3D image was separate workstation under direct supervision.    COMPARISON:  None    FINDINGS:  The knee joint reveals significant damage and deformity which is consistent with a septic joint.  The tibial plateau is fractured and there does not appear to be any discernible normal articular surface involving either the tibial plateau or the distal femur.  Both condyles of the distal  femur are deformed and appear to be diminutive.  The articular surfaces of the femur and tibia appear to be mild feeding.  There is no normal articulation between the femur and the tibial plateau.  The surrounding soft tissues of the knee shows soft tissue induration and what appears to be fluid collection which most likely represents abscess fluid.  Suggest consideration aspiration in the for the purposes of obtaining culture and sensitivity information.                               X-Ray Pelvis AP Inlet And Outlet (Final result)  Result time 08/09/20 19:42:20    Final result by Poncho Handley MD (08/09/20 19:42:20)                 Impression:      1. Cortical irregularity involving the inferior pubic ramus suggests fracture, possibly subacute though not confirmed.  Correlation with focal tenderness and physical exam advised.      Electronically signed by: Poncho Handley MD  Date:    08/09/2020  Time:    19:42             Narrative:    EXAMINATION:  XR PELVIS AP INLET AND OUTLET    CLINICAL HISTORY:  Inlet and Outlet Views;  Unspecified fall, initial encounter    COMPARISON:  Pelvis radiograph 08/09/2020, abdominal radiograph 08/09/2020    FINDINGS:  Two views pelvis.    Osteopenia and artifact from habitus limit evaluation.  The bilateral sacroiliac joints appear intact.  The pubic symphysis is intact noting degenerative changes.  The region however is obscured by stool within the rectum.  The bilateral femoroacetabular joints are intact.  There is subtle cortical irregularity involving the inferior pubic ramus concerning for fracture, possibly subacute however correlation is needed.  There is extensive vascular calcification..                               X-Ray Abdomen AP 1 View (KUB) (Final result)  Result time 08/09/20 18:36:34    Final result by Poncho Handley MD (08/09/20 18:36:34)                 Impression:      1. Moderate stool in the colon, nonobstructive bowel gas pattern.  2. Suspected  fracture of the right inferior pubic ramus discussed in previous report.      Electronically signed by: Poncho Handley MD  Date:    08/09/2020  Time:    18:36             Narrative:    EXAMINATION:  XR ABDOMEN AP 1 VIEW    CLINICAL HISTORY:  Diarrhea, unspecified    TECHNIQUE:  AP View(s) of the abdomen was performed.    COMPARISON:  07/09/2018    FINDINGS:  Single-view abdomen supine.    Air and stool is seen within the large bowel and projected over the rectum.  There is moderate stool throughout the colon.  No focally dilated small bowel loops.  There are no calcifications to convincingly suggest nephrolithiasis or cholelithiasis.  Degenerative changes are noted of the osseous structures.  There is vascular calcification.                               X-Ray Chest 1 View (Final result)  Result time 08/09/20 18:35:08    Final result by Poncho Handley MD (08/09/20 18:35:08)                 Impression:      1. Interstitial findings could reflect early congestive change or edema versus chronic change in this hypoventilatory exam.  Correlation is advised.  Other nonspecific infectious or inflammatory pneumonitis is a consideration      Electronically signed by: Poncho Handley MD  Date:    08/09/2020  Time:    18:35             Narrative:    EXAMINATION:  XR CHEST 1 VIEW    CLINICAL HISTORY:  Cough    TECHNIQUE:  Single frontal view of the chest was performed.    COMPARISON:  11/20/2019    FINDINGS:  The cardiomediastinal silhouette is prominent noting magnification by technique.  There is calcification of the aorta..  There is slight obscuration of the right costophrenic angle, may reflect small effusion versus atelectasis..  The trachea is midline.  The lungs are symmetrically expanded bilaterally with mildly coarse interstitial attenuation.  No large focal consolidation seen.  There is no pneumothorax.  The osseous structures are remarkable for degenerative changes.  There is a left subclavian vascular stent.   Surgical changes project over the left axilla..                               X-Ray Femur AP/LAT Left (Final result)  Result time 08/09/20 18:32:13    Final result by Poncho Handley MD (08/09/20 18:32:13)                 Impression:      1. No convincing acute displaced fracture or dislocation of the proximal femur, please see separately dictated report for details of the distal femur at the knee.      Electronically signed by: Poncho Handley MD  Date:    08/09/2020  Time:    18:32             Narrative:    EXAMINATION:  XR FEMUR 2 VIEW LEFT    CLINICAL HISTORY:  Unspecified fall, initial encounter    TECHNIQUE:  AP and lateral views of the left femur were performed.    COMPARISON:  None}    FINDINGS:  Four views left femur.    The left femoral head maintains appropriate relationship with the acetabulum.  Please see separately dictated report for details of the distal femur at the knee as well as proximal tibia.  There is a moderate suprapatellar effusion.  There is edema about the leg.  There is vascular calcification.                               X-Ray Knee 3 View Left (Final result)  Result time 08/09/20 18:28:46    Final result by Poncho Handley MD (08/09/20 18:28:46)                 Impression:      1. Severe degenerative changes of the knee, markedly limit evaluation for acute fracture.  No previous examinations are available for comparison.  The appearance of the knee may reflect that of degenerative/post infectious change.  No definite acute displaced fracture although clinical correlation is needed and comparison with previous examinations is highly recommended.  There is a moderate suprapatellar effusion and edema about the leg.      Electronically signed by: Poncho Handley MD  Date:    08/09/2020  Time:    18:28             Narrative:    EXAMINATION:  XR KNEE 3 VIEW LEFT    CLINICAL HISTORY:  Unspecified fall, initial encounter    TECHNIQUE:  AP, lateral, and Merchant views of the left knee  were performed.    COMPARISON:  None    FINDINGS:  Three views left knee.    There is severe degenerative change of the left knee versus post infectious change.  Evaluation for fracture is severely limited given the appearance however multifocal dispersed osseous fragments appear well corticated.  There is a moderate suprapatellar effusion.  There is vascular calcification.  There is osteopenia.                                X-Ray Pelvis Routine AP (Edited Result - FINAL)  Result time 08/09/20 18:37:50    Addendum 1 of 1 by Poncho Handley MD (08/09/20 18:37:50)      This report was flagged in Epic as abnormal.    Please note, after initial interpretation of this examination, radiograph of the abdomen became available for review.  In retrospect, there appears to be lucency involving the right inferior pubic ramus concerning for fracture, better seen on that examination.  Acuity is unknown, correlation with focal tenderness is advised.      Electronically signed by: Poncho Handley MD  Date:    08/09/2020  Time:    18:37               Final result by Poncho Handley MD (08/09/20 18:31:05)                 Impression:      1. No convincing acute displaced fracture or dislocation of the pelvis.      Electronically signed by: Poncho Handley MD  Date:    08/09/2020  Time:    18:31             Narrative:    EXAMINATION:  XR PELVIS ROUTINE AP    CLINICAL HISTORY:  Unspecified fall, initial encounter    TECHNIQUE:  AP view of the pelvis was performed.    COMPARISON:  None.    FINDINGS:  Single-view pelvis.    There is osteopenia.  Degenerative changes are noted of the bilateral sacroiliac joints and pubic symphysis.  The bilateral femoroacetabular joints are intact.  There is vascular calcification.                               X-Ray Tibia Fibula 2 View Left (Final result)  Result time 08/09/20 18:29:48    Final result by Poncho Handley MD (08/09/20 18:29:48)                 Impression:      1. Please see  separately dictated report for details of the knee and proximal tibia in this patient with severe degenerative changes of the same.  No findings to suggest distal fracture.      Electronically signed by: Poncho Handley MD  Date:    08/09/2020  Time:    18:29             Narrative:    EXAMINATION:  XR TIBIA FIBULA 2 VIEW LEFT    CLINICAL HISTORY:  Unspecified fall, initial encounter    TECHNIQUE:  AP and lateral views of the left tibia and fibula were performed.    COMPARISON:  None.    FINDINGS:  Four views tibia fibula.    Severe degenerative change/post infectious change noted of the knee, evaluated on knee radiograph same date.  The remaining aspects of the tibia and fibula appear intact without acute displaced fracture or dislocation.  There is vascular calcification.  The ankle appears intact.  There is edema about the ankle and leg.                               CT Head Without Contrast (Final result)  Result time 08/09/20 17:54:28    Final result by Willie Singh MD (08/09/20 17:54:28)                 Impression:      No acute intracranial process.    Advanced age-related changes.      Electronically signed by: Willie Singh MD  Date:    08/09/2020  Time:    17:54             Narrative:    EXAMINATION:  CT HEAD WITHOUT CONTRAST    CLINICAL HISTORY:  Head trauma, minor (Age => 66y);    TECHNIQUE:  Low dose axial images were obtained through the head.  Coronal and sagittal reformations were also performed. Contrast was not administered.  There are some motion limitations to the exam.    COMPARISON:  CT dated 11/18/2019.    FINDINGS:  The subcutaneous tissues are unremarkable.  The bony calvarium is intact.  The paranasal sinuses are unremarkable.  The mastoid air cells are clear.  There are postoperative changes in the orbits.    The craniocervical junction is unremarkable.  There are no extra-axial fluid collections.  There is no evidence of intracranial hemorrhage.  The ventricles and sulci are prominent,  consistent with cerebral volume loss.  There are extensive hypodensities within the periventricular and subcortical white matter.  The gray-white differentiation is maintained.  There is no evidence of mass effect.                                 Medical Decision Making:   History:   Old Medical Records: I decided to obtain old medical records.  Differential Diagnosis:   CESAR, electrolyte derangement, arrhythmia, infection, fracture, ICH, ESRD requiring dialysis  Clinical Tests:   Lab Tests: Reviewed and Ordered  Radiological Study: Ordered and Reviewed  Medical Tests: Reviewed and Ordered  ED Management:  Labs, IV fluids, CXR, abd xray, pelvis xray, Xray left knee, tib/fib, femur, KUB      CT head negative for acute changes.   Pelvis xray reveals right pubic rami fracture.  Xray left knee shows severe degenerative changes and therefore limited exam for acute fracture. Case discussed with ortho.   K is critically elevated therefore albuterol, insulin, D50, and calcium gluconate ordered.  Discussed with nephrology.  CXR shows possible early congestive change vs chronic changes.    Repeat K 5.2.     did speak with [facility] ortho  and reviewed imaging.  He will see the patient in the ED and plan on arthrocentesis.  The patient does not have fever or elevated white blood cell count but there is concern for septic joint based upon CT reading.  She was given Zosyn while in the ED.     I discussed the case with CEDRIC Kowalski for [facility].  She will accept the patient for hyperkalemia with ESRD requiring dialysis as well as possible septic joint.  Pt is agreeable to the plan.  She was HDS while in the ED.  No diarrhea while in the ED.     Other:   I have discussed this case with another health care provider.       <> Summary of the Discussion: Discussed with - agrees with ED course and disposition.      1815:Discussed with , nephrology- will review the patient's chart and call back. I explained  that patient is hypertensive, critically elevated K, has a cough, and CXR is abnormal.  I feel that dialysis would benefit patient.   1919- Called  again.  He states that since patient is not requiring more oxygen than usual and we have shifted her K in the ED, pt may wait until tomorrow for dialysis.       1752: Discussed case with , ortho- will review the patient's imaging and return call to the ED.                                  Clinical Impression:       ICD-10-CM ICD-9-CM   1. Hyperkalemia  E87.5 276.7   2. Fatigue  R53.83 780.79   3. Fall  W19.XXXA E888.9   4. Cough  R05 786.2   5. Diarrhea  R19.7 787.91   6. Pyogenic arthritis of left knee joint, due to unspecified organism  M00.9 711.06   7. Fatigue, unspecified type  R53.83 780.79                                Ely Garduno, [facility]  08/09/20 3245

## 2020-08-10 PROBLEM — M00.9 PYOGENIC ARTHRITIS OF LEFT KNEE JOINT: Status: RESOLVED | Noted: 2020-08-10 | Resolved: 2020-08-10

## 2020-08-10 PROBLEM — M00.9 PYOGENIC ARTHRITIS OF LEFT KNEE JOINT: Status: ACTIVE | Noted: 2020-08-10

## 2020-08-10 PROBLEM — M25.562 LEFT KNEE PAIN: Status: ACTIVE | Noted: 2020-08-10

## 2020-08-10 LAB
ACID FAST MOD KINY STN SPEC: NORMAL
ALBUMIN SERPL BCP-MCNC: 3.4 G/DL (ref 3.5–5.2)
ALP SERPL-CCNC: 62 U/L (ref 55–135)
ALT SERPL W/O P-5'-P-CCNC: 16 U/L (ref 10–44)
ANION GAP SERPL CALC-SCNC: 14 MMOL/L (ref 8–16)
APPEARANCE FLD: NORMAL
AST SERPL-CCNC: 28 U/L (ref 10–40)
BASOPHILS # BLD AUTO: 0.04 K/UL (ref 0–0.2)
BASOPHILS NFR BLD: 0.9 % (ref 0–1.9)
BASOPHILS NFR FLD MANUAL: 1 %
BILIRUB SERPL-MCNC: 0.5 MG/DL (ref 0.1–1)
BODY FLD TYPE: NORMAL
BODY FLD TYPE: NORMAL
BUN SERPL-MCNC: 51 MG/DL (ref 8–23)
CALCIUM SERPL-MCNC: 8.7 MG/DL (ref 8.7–10.5)
CHLORIDE SERPL-SCNC: 98 MMOL/L (ref 95–110)
CO2 SERPL-SCNC: 24 MMOL/L (ref 23–29)
COLOR FLD: NORMAL
CREAT SERPL-MCNC: 7.2 MG/DL (ref 0.5–1.4)
CRYSTALS FLD MICRO: NEGATIVE
DIFFERENTIAL METHOD: ABNORMAL
EOSINOPHIL # BLD AUTO: 0.1 K/UL (ref 0–0.5)
EOSINOPHIL NFR BLD: 2.6 % (ref 0–8)
EOSINOPHIL NFR FLD MANUAL: 5 %
ERYTHROCYTE [DISTWIDTH] IN BLOOD BY AUTOMATED COUNT: 16.1 % (ref 11.5–14.5)
EST. GFR  (AFRICAN AMERICAN): 6 ML/MIN/1.73 M^2
EST. GFR  (NON AFRICAN AMERICAN): 5 ML/MIN/1.73 M^2
ESTIMATED AVG GLUCOSE: 148 MG/DL (ref 68–131)
GLUCOSE SERPL-MCNC: 151 MG/DL (ref 70–110)
HBA1C MFR BLD HPLC: 6.8 % (ref 4–5.6)
HCT VFR BLD AUTO: 36.7 % (ref 37–48.5)
HGB BLD-MCNC: 11.5 G/DL (ref 12–16)
IMM GRANULOCYTES # BLD AUTO: 0.03 K/UL (ref 0–0.04)
IMM GRANULOCYTES NFR BLD AUTO: 0.7 % (ref 0–0.5)
LYMPHOCYTES # BLD AUTO: 0.4 K/UL (ref 1–4.8)
LYMPHOCYTES NFR BLD: 10.3 % (ref 18–48)
LYMPHOCYTES NFR FLD MANUAL: 8 %
MCH RBC QN AUTO: 27.8 PG (ref 27–31)
MCHC RBC AUTO-ENTMCNC: 31.3 G/DL (ref 32–36)
MCV RBC AUTO: 89 FL (ref 82–98)
MONOCYTES # BLD AUTO: 0.4 K/UL (ref 0.3–1)
MONOCYTES NFR BLD: 9.3 % (ref 4–15)
NEUTROPHILS # BLD AUTO: 3.3 K/UL (ref 1.8–7.7)
NEUTROPHILS NFR BLD: 76.2 % (ref 38–73)
NEUTROPHILS NFR FLD MANUAL: 86 %
NRBC BLD-RTO: 0 /100 WBC
PLATELET # BLD AUTO: 211 K/UL (ref 150–350)
PMV BLD AUTO: 10.6 FL (ref 9.2–12.9)
POCT GLUCOSE: 112 MG/DL (ref 70–110)
POCT GLUCOSE: 164 MG/DL (ref 70–110)
POCT GLUCOSE: 193 MG/DL (ref 70–110)
POCT GLUCOSE: 206 MG/DL (ref 70–110)
POTASSIUM SERPL-SCNC: 5.6 MMOL/L (ref 3.5–5.1)
PROT SERPL-MCNC: 7.5 G/DL (ref 6–8.4)
RBC # BLD AUTO: 4.13 M/UL (ref 4–5.4)
SODIUM SERPL-SCNC: 136 MMOL/L (ref 136–145)
WBC # BLD AUTO: 4.28 K/UL (ref 3.9–12.7)
WBC # FLD: 1203 /CU MM

## 2020-08-10 PROCEDURE — 36415 COLL VENOUS BLD VENIPUNCTURE: CPT

## 2020-08-10 PROCEDURE — 25000003 PHARM REV CODE 250: Performed by: NURSE PRACTITIONER

## 2020-08-10 PROCEDURE — 87340 HEPATITIS B SURFACE AG IA: CPT

## 2020-08-10 PROCEDURE — 85025 COMPLETE CBC W/AUTO DIFF WBC: CPT

## 2020-08-10 PROCEDURE — 97535 SELF CARE MNGMENT TRAINING: CPT

## 2020-08-10 PROCEDURE — 80053 COMPREHEN METABOLIC PANEL: CPT

## 2020-08-10 PROCEDURE — 25000003 PHARM REV CODE 250: Performed by: HOSPITALIST

## 2020-08-10 PROCEDURE — 80100016 HC MAINTENANCE HEMODIALYSIS

## 2020-08-10 PROCEDURE — 86706 HEP B SURFACE ANTIBODY: CPT

## 2020-08-10 PROCEDURE — 83036 HEMOGLOBIN GLYCOSYLATED A1C: CPT

## 2020-08-10 PROCEDURE — 97161 PT EVAL LOW COMPLEX 20 MIN: CPT

## 2020-08-10 PROCEDURE — G0378 HOSPITAL OBSERVATION PER HR: HCPCS

## 2020-08-10 PROCEDURE — 97530 THERAPEUTIC ACTIVITIES: CPT

## 2020-08-10 PROCEDURE — 97165 OT EVAL LOW COMPLEX 30 MIN: CPT

## 2020-08-10 RX ORDER — AMLODIPINE BESYLATE 5 MG/1
10 TABLET ORAL DAILY
Status: DISCONTINUED | OUTPATIENT
Start: 2020-08-10 | End: 2020-08-10

## 2020-08-10 RX ORDER — PANTOPRAZOLE SODIUM 40 MG/1
40 TABLET, DELAYED RELEASE ORAL DAILY
Status: DISCONTINUED | OUTPATIENT
Start: 2020-08-10 | End: 2020-08-19 | Stop reason: HOSPADM

## 2020-08-10 RX ORDER — CLOPIDOGREL BISULFATE 75 MG/1
75 TABLET ORAL DAILY
Status: DISCONTINUED | OUTPATIENT
Start: 2020-08-10 | End: 2020-08-10

## 2020-08-10 RX ORDER — CLOPIDOGREL BISULFATE 75 MG/1
75 TABLET ORAL DAILY
Status: DISCONTINUED | OUTPATIENT
Start: 2020-08-11 | End: 2020-08-13

## 2020-08-10 RX ORDER — FUROSEMIDE 40 MG/1
40 TABLET ORAL DAILY
Status: DISCONTINUED | OUTPATIENT
Start: 2020-08-10 | End: 2020-08-17

## 2020-08-10 RX ORDER — METOPROLOL SUCCINATE 25 MG/1
50 TABLET, EXTENDED RELEASE ORAL DAILY
Status: DISCONTINUED | OUTPATIENT
Start: 2020-08-10 | End: 2020-08-19 | Stop reason: HOSPADM

## 2020-08-10 RX ORDER — DOCUSATE SODIUM 100 MG/1
100 CAPSULE, LIQUID FILLED ORAL 2 TIMES DAILY
Status: DISCONTINUED | OUTPATIENT
Start: 2020-08-10 | End: 2020-08-13

## 2020-08-10 RX ORDER — SERTRALINE HYDROCHLORIDE 100 MG/1
100 TABLET, FILM COATED ORAL DAILY
Status: DISCONTINUED | OUTPATIENT
Start: 2020-08-10 | End: 2020-08-19 | Stop reason: HOSPADM

## 2020-08-10 RX ORDER — PRAVASTATIN SODIUM 20 MG/1
80 TABLET ORAL DAILY
Status: DISCONTINUED | OUTPATIENT
Start: 2020-08-10 | End: 2020-08-19 | Stop reason: HOSPADM

## 2020-08-10 RX ORDER — CALCIUM ACETATE 667 MG/1
667 CAPSULE ORAL
Status: DISCONTINUED | OUTPATIENT
Start: 2020-08-10 | End: 2020-08-19 | Stop reason: HOSPADM

## 2020-08-10 RX ADMIN — MUPIROCIN: 20 OINTMENT TOPICAL at 08:08

## 2020-08-10 RX ADMIN — APIXABAN 2.5 MG: 2.5 TABLET, FILM COATED ORAL at 08:08

## 2020-08-10 RX ADMIN — CALCIUM ACETATE 667 MG: 667 CAPSULE ORAL at 05:08

## 2020-08-10 RX ADMIN — MIRTAZAPINE 7.5 MG: 7.5 TABLET ORAL at 08:08

## 2020-08-10 NOTE — PT/OT/SLP EVAL
Occupational Therapy   Evaluation    Name: Angelina Beard  MRN: [id number]  Admitting Diagnosis:  Hyperkalemia      Recommendations:     Discharge Recommendations: nursing facility, skilled  Discharge Equipment Recommendations:  (TBD)  Barriers to discharge:  Decreased caregiver support    Assessment:     Angelina Beard is a 69 y.o. female with a medical diagnosis of Hyperkalemia.  She presents with deconditioning and pain . Performance deficits affecting function: weakness, gait instability, impaired balance, impaired endurance, decreased lower extremity function, decreased ROM, impaired self care skills, impaired functional mobilty, pain, decreased coordination, impaired fine motor, impaired sensation, impaired skin.      Pt would benefit from cont OT services in order to maximize functional independence. Recommending SNF at d/c at this time as pt currently unable to t/f and with increased pain limited participation in ADLs.    Rehab Prognosis: Good; patient would benefit from acute skilled OT services to address these deficits and reach maximum level of function.       Plan:     Patient to be seen 5 x/week to address the above listed problems via self-care/home management, therapeutic activities, therapeutic exercises  · Plan of Care Expires: 09/10/20  · Plan of Care Reviewed with: patient    Subjective     Chief Complaint: pain in low back and L knee. States she is not able to stand now  Patient/Family Comments/goals: return to PLOF    Occupational Profile:  Living Environment: pt lives with son (who is only home at nights), in 2 story home, threshold to enter, lives on 1st floor, t/s combo upstairs   Previous level of function: pt reports mostly w/c bound, increased difficulty recently with self propulsion, assist required for t/fs from seated surfaces, assist bed mobility, mod I dsg and sponge bathing; has a sitter 6-7 hours per day, and son home at night; states she has had increasingly difficult time  standing and sitter states she is unable to provide increased physical assist    Equipment Used at Home:  rollator, bath bench, walker, rolling, bedside commode, oxygen  Assistance upon Discharge: from sitter (limited physical assist) and son at nights     Pain/Comfort:  · Pain Rating 1: 5/10  · Location - Orientation 1: lower  · Location 1: (back and L knee)  · Pain Addressed 1: Reposition, Distraction, Cessation of Activity    Patients cultural, spiritual, Mu-ism conflicts given the current situation:      Objective:     Communicated with: jewel prior to session    General Precautions: Standard, fall   Orthopedic Precautions:(WBAT BLEs)   Braces: N/A     Occupational Performance:    Bed Mobility:    · Patient completed Scooting/Bridging with maximal assistance  · Patient completed Supine to Sit with moderate assistance  · Patient completed Sit to Supine with maximal assistance   · Rolling R and L SBA    Functional Mobility/Transfers:  · Patient completed Sit <> Stand Transfer with moderate assistance, maximal assistance and of 2 persons  with  rolling walker   · Functional Mobility: unable to perform     Activities of Daily Living:  · Lower Body Dressing: total assistance    · Toileting: total assistance pad soiled with BM     Cognitive/Visual Perceptual:  Cognitive/Psychosocial Skills:     -       Oriented to: Person, Place, Time and Situation   -       Follows Commands/attention:Follows multistep  commands  -       Communication: clear/fluent  -       Memory: No Deficits noted  -       Safety awareness/insight to disability: intact   -       Mood/Affect/Coping skills/emotional control: Appropriate to situation    Physical Exam:  Balance:    -       SBA static sitting balance; max A standing   Postural examination/scapula alignment:    -       Rounded shoulders  -       Forward head  Skin integrity: Wound L knee from aspiration procedure  Upper Extremity Range of Motion:   BUE shoulders ~ 100 degrees flexion  AAROM; WFL distally, excluding L hand digits- impaired at thumb, 4th and 5th digits   Upper Extremity Strength:  Impaired B shoulders- distally grossly 3+ to 4-/5    Strength:  Poor + L hand; R fair +   Fine Motor Coordination:  Impaired L hand; WFL R hand     AMPAC 6 Click ADL:  AMPAC Total Score: 14    Treatment & Education:  Pt performing skills as above   2 stands performed from EOB mod A of 2 first trial with max A of 2 second trial with RW; Pt with BM on bed linens; total a for hygiene   Unable to take lateral steps to HOB   Able to seated scoot to HOB x 3 trials with max A and use of draw sheet   Placed in R side lying following return to supine   Education:    Patient left right sidelying with all lines intact, call button in reach, bed alarm on and nsg notified    GOALS:   Multidisciplinary Problems     Occupational Therapy Goals        Problem: Occupational Therapy Goal    Goal Priority Disciplines Outcome Interventions   Occupational Therapy Goal     OT, PT/OT Ongoing, Progressing    Description: Goals to be met by: 9/10/20     Patient will increase functional independence with ADLs by performing:    LE Dressing with Minimal Assistance.  Grooming while seated with Modified [place].  Toileting from bedside commode with Minimal Assistance for hygiene and clothing management.   Supine to sit with Contact Guard Assistance.  Stand pivot transfers with Minimal Assistance.  Toilet transfer to bedside commode with Minimal Assistance.  Increased functional strength to 4/5 for self care skills and functional mobility.  Upper extremity exercise program x10 reps per handout, with independence.                     History:     Past Medical History:   Diagnosis Date    Anemia due to gastrointestinal blood loss 7/11/2018    Arthritis     Asthma     Breast cancer, left     history of left breast cancer status post mastectomy,    CHF (congestive heart failure)     home O2 2lpm    Cholelithiasis      Coronary artery disease     Debility 2019    mostly wheelchair bound but can use a walker with assistance    Diabetes mellitus, type 2     with neuropathy and nephropathy    Encounter for blood transfusion     End-stage renal disease on hemodialysis     mon-wed -fri    Hyperlipidemia     Hypertension     Lumbar stenosis 2017    by MRI    Sarcoidosis        Past Surgical History:   Procedure Laterality Date    av graft      left arm    BRACHIAL ARTERY GRAFT Left 05/04/2016    brachiocephalic, Dr. Anson Crocker    BREAST BIOPSY Left     breast ca    BREAST LUMPECTOMY Left     radiation only    btl      COLONOSCOPY N/A 7/13/2018    Procedure: COLONOSCOPY;  Surgeon: lAmita Bee MD;  Location: [facility] ENDO;  Service: Endoscopy;  Laterality: N/A;    ELBOW SURGERY      left    ESOPHAGOGASTRODUODENOSCOPY N/A 7/13/2018    Procedure: ESOPHAGOGASTRODUODENOSCOPY (EGD);  Surgeon: Almita Bee MD;  Location: [facility];  Service: Endoscopy;  Laterality: N/A;    LIPOMA RESECTION      back of neck    pilondial cyst      TOTAL KNEE ARTHROPLASTY Right 02/23/2016       Time Tracking:     OT Date of Treatment: 08/10/20  OT Start Time: 1348  OT Stop Time: 1420  OT Total Time (min): 32 min    Billable Minutes:Evaluation 10  Self Care/Home Management 10 co treatment with PT     Emily Lange, OT  8/10/2020

## 2020-08-10 NOTE — PT/OT/SLP PROGRESS
Occupational Therapy  Visit Attempt     Patient Name:  Angelina Beard   MRN:  [id number]    Patient not seen today secondary to Dialysis. Will follow-up as available.    Emily Lange OT  8/10/2020

## 2020-08-10 NOTE — PROGRESS NOTES
[facility] ORTHOPEDICS PROGRESS NOTE     Reason for Consult: L knee pain swelling     Overnight Events:  NAEO, VSS, pt afebrile, not septic, admitted to floor resting comfortably with minimal pain, lab results from aspiration not overwhelmingly convincing of septic arthritis. Discussed this with pt, staff discussion pending. Denies fevers, chills, CP, SOB       ROS: otherwise negative except indicated in HPI      Physical Exam:  NAD, Alert, Awake, Ox4   HEENT: atraumatic, normal cephalic, neg ecchymosis, lacerations   CV: No increased work of breathing   Pulm: Normal work of breathing   Skin: No cutaneous rash, no open wounds   Vascular: 2+ RP, wwp, bcr   RLE  (-) log roll,   Well healed TKA anterior incision  Knee lig stable  ROM knee 0-110 degrees painless\  SILt distal  Foot warm well perfused  EHL/FHL 5/5  LLE  Pain over pubic symphysis  No groin pain  (-) log roll  TTP over knee diffusely  Gross coronal instability of knee  Pain with ROM 10-80  No open wounds or sinus tracts  Large palpable effusion  No pain distal to knee  Painless ankle dorsiflexion     Imaging:  Independent review of imaging shows R inf pubic ramus fractuire and significant L knee erosion with effusion on CT.        A/P:   69F on ESRD with nondisplaced R pubic ramus fracture and L knee effusion and pain     -evaluated at bedside  -lab results of aspiration not overwhelmingly convincing of septic arthritis, synovial fluid cell count 1,200/umm3, 86% neutrophils, will discuss OR plan with staff, pending at this time  -ESR resulted at 48 (elevated)  -we discussed in extent that this appears to be an infectious process that has been present for quite some time given the extent of arthritic degeneration in her knee showing complete destruction of both femoral and tibial joint surfaces  -in regards to her pubic ramus fracture, she is ok to WBAT with no indication for operative management of this  -NpO  -hold antibiotics     Dr. Yeison Tipton, PGY3  [facility]  Orthopaedic Surgery  Pager: [phone]

## 2020-08-10 NOTE — ED NOTES
Pt's daughter in law Altagracia ([phone]) called. Per patient okay to give update to family.

## 2020-08-10 NOTE — SUBJECTIVE & OBJECTIVE
Past Medical History:   Diagnosis Date    Anemia due to gastrointestinal blood loss 7/11/2018    Arthritis     Asthma     Breast cancer, left     history of left breast cancer status post mastectomy,    CHF (congestive heart failure)     home O2 2lpm    Cholelithiasis     Coronary artery disease     Debility 2019    mostly wheelchair bound but can use a walker with assistance    Diabetes mellitus, type 2     with neuropathy and nephropathy    Encounter for blood transfusion     End-stage renal disease on hemodialysis     mon-wed -fri    Hyperlipidemia     Hypertension     Lumbar stenosis 2017    by MRI    Sarcoidosis        Past Surgical History:   Procedure Laterality Date    av graft      left arm    BRACHIAL ARTERY GRAFT Left 05/04/2016    brachiocephalic, Dr. Anson Crocker    BREAST BIOPSY Left     breast ca    BREAST LUMPECTOMY Left     radiation only    btl      COLONOSCOPY N/A 7/13/2018    Procedure: COLONOSCOPY;  Surgeon: Almita Bee MD;  Location: [facility] ENDO;  Service: Endoscopy;  Laterality: N/A;    ELBOW SURGERY      left    ESOPHAGOGASTRODUODENOSCOPY N/A 7/13/2018    Procedure: ESOPHAGOGASTRODUODENOSCOPY (EGD);  Surgeon: Almita Bee MD;  Location: [facility] ENDO;  Service: Endoscopy;  Laterality: N/A;    LIPOMA RESECTION      back of neck    pilondial cyst      TOTAL KNEE ARTHROPLASTY Right 02/23/2016       Review of patient's allergies indicates:  No Known Allergies    No current facility-administered medications on file prior to encounter.      Current Outpatient Medications on File Prior to Encounter   Medication Sig    amLODIPine (NORVASC) 10 MG tablet Take 1 tablet (10 mg total) by mouth once daily.    apixaban (ELIQUIS) 2.5 mg Tab Take 1 tablet (2.5 mg total) by mouth 2 (two) times daily.    blood sugar diagnostic (BLOOD GLUCOSE TEST) Strp Inject 1 strip into the skin once daily.    blood-glucose meter kit To check BG 1 times daily, to use with insurance  preferred meter    calcium acetate (PHOSLO) 667 mg capsule Take 1 capsule (667 mg total) by mouth 3 (three) times daily with meals.    clopidogreL (PLAVIX) 75 mg tablet Take 1 tablet (75 mg total) by mouth once daily.    furosemide (LASIX) 40 MG tablet Take 1 tablet (40 mg total) by mouth once daily.    gabapentin (NEURONTIN) 300 MG capsule Take 1 capsule (300 mg total) by mouth every 8 (eight) hours as needed.    HYDROcodone-acetaminophen (NORCO) 5-325 mg per tablet Take 1 tablet by mouth every 24 hours as needed for Pain.    lancets Misc To check BG 1 times daily, to use with insurance preferred meter    metoprolol succinate (TOPROL-XL) 50 MG 24 hr tablet Take 1 tablet (50 mg total) by mouth once daily.    mirtazapine (REMERON) 7.5 MG Tab Take 1 tablet (7.5 mg total) by mouth every evening.    pantoprazole (PROTONIX) 40 MG tablet Take 1 tablet (40 mg total) by mouth once daily.    pravastatin (PRAVACHOL) 80 MG tablet Take 1 tablet (80 mg total) by mouth once daily.    sertraline (ZOLOFT) 100 MG tablet Take 1 tablet (100 mg total) by mouth once daily.    vitamin renal formula, B-complex-vitamin c-folic acid, (NEPHROCAP) 1 mg Cap Take 1 capsule by mouth once daily.    docusate sodium (COLACE) 100 MG capsule Take 1 capsule (100 mg total) by mouth 2 (two) times daily.    nitroGLYCERIN (NITROSTAT) 0.4 MG SL tablet Place 1 tablet (0.4 mg total) under the tongue every 5 (five) minutes as needed for Chest pain.    ondansetron (ZOFRAN) 4 MG tablet Take 4 mg by mouth every 8 (eight) hours as needed for Nausea.    polyethylene glycol (GLYCOLAX) 17 gram/dose powder      Family History     Problem Relation (Age of Onset)    Diabetes Mother, Sister    Heart disease Sister    Hypertension Mother    Kidney disease Mother        Tobacco Use    Smoking status: Never Smoker    Smokeless tobacco: Never Used   Substance and Sexual Activity    Alcohol use: No    Drug use: No    Sexual activity: Not on file      Review of Systems   Constitutional: Negative for chills, diaphoresis and fever.   Eyes: Negative for photophobia.   Respiratory: Negative for cough, chest tightness, shortness of breath and wheezing.    Cardiovascular: Negative for chest pain, palpitations and leg swelling.   Gastrointestinal: Positive for diarrhea. Negative for abdominal pain, nausea and vomiting.   Genitourinary: Negative for dysuria, flank pain, frequency and hematuria.   Musculoskeletal: Positive for arthralgias and joint swelling. Negative for back pain and myalgias.   Neurological: Positive for weakness. Negative for dizziness, syncope, light-headedness and headaches.   Psychiatric/Behavioral: Negative for confusion.     Objective:     Vital Signs (Most Recent):  Temp: 96.8 °F (36 °C) (08/10/20 0519)  Pulse: 60 (08/10/20 0519)  Resp: 17 (08/10/20 0519)  BP: (!) 110/56 (08/10/20 0519)  SpO2: 99 % (08/09/20 2307) Vital Signs (24h Range):  Temp:  [96.8 °F (36 °C)-98.1 °F (36.7 °C)] 96.8 °F (36 °C)  Pulse:  [60-82] 60  Resp:  [10-22] 17  SpO2:  [96 %-100 %] 99 %  BP: (110-193)/(56-93) 110/56     Weight: 90.7 kg (199 lb 15.3 oz)  Body mass index is 31.32 kg/m².    Physical Exam  Constitutional:       Appearance: She is well-developed.      Comments: Frail   HENT:      Head: Normocephalic and atraumatic.   Eyes:      Conjunctiva/sclera: Conjunctivae normal.      Pupils: Pupils are equal, round, and reactive to light.   Neck:      Musculoskeletal: Normal range of motion.      Vascular: No JVD.   Cardiovascular:      Rate and Rhythm: Normal rate and regular rhythm.      Heart sounds: Normal heart sounds.   Pulmonary:      Effort: Pulmonary effort is normal. No respiratory distress.      Breath sounds: No wheezing.   Abdominal:      General: Bowel sounds are normal. There is no distension.      Palpations: Abdomen is soft.      Tenderness: There is no abdominal tenderness. There is no guarding.   Musculoskeletal: Normal range of motion.          General: No tenderness.   Skin:     General: Skin is warm and dry.      Capillary Refill: Capillary refill takes less than 2 seconds.   Neurological:      Mental Status: She is alert and oriented to person, place, and time.   Psychiatric:         Behavior: Behavior normal.           CRANIAL NERVES     CN III, IV, VI   Pupils are equal, round, and reactive to light.       Significant Labs:   BMP:   Recent Labs   Lab 08/09/20 1716 08/09/20 2028   *  --      --    K 6.4* 5.2*   CL 97  --    CO2 24  --    BUN 88*  --    CREATININE 10.4*  --    CALCIUM 9.7  --    MG 2.3  --      CBC:   Recent Labs   Lab 08/09/20 1716   WBC 5.31   HGB 12.1   HCT 39.2          Significant Imaging: I have reviewed all pertinent imaging results/findings within the past 24 hours.

## 2020-08-10 NOTE — PLAN OF CARE
TN met with patient for d/c planning. Pt lives with adult son Cristi. Pt has a PCA for 6 to 7 hrs a day and then patient's son is at home in the evening and at night. Pt's PCA provides transportation to her appointments. Pt has a RW (self pay), BSC, SC, and home O2 with [facility]. Pt is current with [facility]. OT recs are SNF; TN informed pt that patient does not meet SNF criteria due to being in OBS status. Pt states she would prefer to go home with home health. TN informed pt to have a family member or friend bring portable O2 tank on d/c. Pt verbalized understanding. Pt's son to provide transportation. Pt requesting new RW on d/c. This  put name on white board and explained blue discharge folder to patient. Discharge planning brochure and/or business card given to patient.  Patient verbalized understanding.       08/10/20 1530   Discharge Assessment   Assessment Type Discharge Planning Assessment   Confirmed/corrected address and phone number on facesheet? Yes   Assessment information obtained from? Patient   Expected Length of Stay (days) 1   Communicated expected length of stay with patient/caregiver yes   Prior to hospitilization cognitive status: Alert/Oriented   Prior to hospitalization functional status: Assistive Equipment;Needs Assistance   Current cognitive status: Alert/Oriented   Current Functional Status: Assistive Equipment;Needs Assistance   Lives With child(sissy), adult   Able to Return to Prior Arrangements yes   Is patient able to care for self after discharge?   (pt has PCA 6-7hrs a day)   Who are your caregiver(s) and their phone number(s)? Cristi Beard (son) [phone]   Patient's perception of discharge disposition home health   Readmission Within the Last 30 Days no previous admission in last 30 days   Patient currently being followed by outpatient case management? No   Patient currently receives any other outside agency services? No   Equipment Currently Used at  Home oxygen;walker, rolling;bedside commode;shower chair   Do you have any problems affording any of your prescribed medications? No   Is the patient taking medications as prescribed? yes   Does the patient have transportation home? Yes   Transportation Anticipated family or friend will provide   Dialysis Name and Scheduled days [facility]   Discharge Plan A Home Health   Discharge Plan B Rehab   DME Needed Upon Discharge  walker, rolling   Patient/Family in Agreement with Plan yes

## 2020-08-10 NOTE — PLAN OF CARE
ORTHOPEDIC SURGERY PLAN OF CARE  After reviewing synovial fluid aspiration results, there is no indication that this is an acute infection that needs to be treated surgically at this time. Her degenerative changes in her L knee appear chronic at this time. She is ok to WBAT on bilateral lower extremities. Her R nondisplaced pubic ramus fracture will also be treated conservatively. Encourage PT/OT. \    Please contact us if any new issues arise.    Dr. Yeison Tipton, PGY3  [facility] Orthopaedic Surgery  Pager: [phone]

## 2020-08-10 NOTE — PT/OT/SLP EVAL
Physical Therapy Evaluation/Treatment    Patient Name:  Angeilna Beard   MRN:  [id number]    Recommendations:     Discharge Recommendations:  nursing facility, skilled   Discharge Equipment Recommendations: (TBD)   Barriers to discharge: Decreased caregiver support    Assessment:     nAgelina Beard is a 69 y.o. female admitted with a medical diagnosis of Hyperkalemia.  She presents with the following impairments/functional limitations:  weakness, impaired endurance, impaired sensation, impaired self care skills, impaired functional mobilty, gait instability, impaired balance, decreased coordination, impaired fine motor, impaired skin, decreased ROM, decreased upper extremity function, decreased lower extremity function Pt would benefit from cont OT services in order to maximize functional independence. Recommending SNF at d/c at this time as pt currently unable to t/f and with increased pain limited participation in ADLs. .    Rehab Prognosis: Good; patient would benefit from acute skilled PT services to address these deficits and reach maximum level of function.    Recent Surgery: * No surgery found *      Plan:     During this hospitalization, patient to be seen 6 x/week to address the identified rehab impairments via gait training, therapeutic activities, neuromuscular re-education and progress toward the following goals:    · Plan of Care Expires:  09/10/20    Subjective     Chief Complaint: Pain in low back and L knee  Patient/Family Comments/goals: return to PLOF  Pain/Comfort:  Pain Rating 1: 5/10  Location - Orientation 1: lower  Location 1: (back and L knee)    Patients cultural, spiritual, [employer]ish conflicts given the current situation: no    Living Environment:  Pt lives with son (who is only home at nights), in 2 story home, threshold to enter, lives on 1st floor, t/s combo upstairs   Previous level of function: pt reports mostly w/c bound, increased difficulty recently with self propulsion, assist  required for t/fs from seated surfaces, assist bed mobility, mod I dsg and sponge bathing; has a sitter 6-7 hours per day, and son home at night; states she has had increasingly difficult time standing and sitter states she is unable to provide increased physical assist    Equipment Used at Home:  rollator, bath bench, walker, rolling, bedside commode, oxygen  Assistance upon Discharge: from sitter (limited physical assist) and son at nights     Objective:     Communicated with nurse prior to session.  Patient found with peripheral IV  upon PT entry to room.    General Precautions: Standard, fall   Orthopedic Precautions:(WBAT BLEs)   Braces: N/A     Exams:  · Cognitive Exam:  Patient is oriented to Person, Place, Time, Situation and follows multistep commands  · Gross Motor Coordination:  decreased timing  · Postural Exam:  Patient presented with the following abnormalities:    · -       Rounded shoulders  · -       Forward head  · Sensation: numbness/tingling from knees down 2/2 peripheral neuropathy  · Skin Integrity/Edema:      · -       Skin integrity: wound L knee from aspiration procedure  · RLE ROM: WFL except 90*knee fl, ankle df~0*  · RLE Strength: R hip fl~4-, knee ext~3-, ankle df~4- grossly  · LLE ROM: WFL except knee fl~90*, ankle df~0*  · LLE Strength: hip fl~3-, knee ext~2+ to 3-, ankle df~3+ to 4- grossly    Functional Mobility:  · Bed Mobility:   VCs throughout for effective technique  · Rolling Left:  stand by assistance and use of side rail  · Rolling Right: stand by assistance and use of side rail  · Scooting: maximal assistance  · Supine to Sit: moderate assistance  · Sit to Supine: maximal assistance  · Transfers:     · Sit to Stand:  moderate assistance, maximal assistance and of 2 persons with rolling walker  · Gait: unable to take steps at this time  · Balance: sit~fair+, stand~poor    Therapeutic Activities and Exercises:   Pt educated on role of PT/POC; pt performed skills as described  above; 2 standing trials from EOB with modA x 2 first trial and maxA x 2 on 2nd trial with RW; VCs for erect stance-head lift, knee ext; knee buckling on 2nd trial of standing; pt unable to take lateral steps to HOB; pt scooted laterally to HOB x 3 trials with maxA and use of draw sheet; R sidelying upon returning to supine; increased time for mobility    AM-PAC 6 CLICK MOBILITY  Total Score:10     Patient left right sidelying with all lines intact, call button in reach, bed alarm on and nurse notified.    GOALS:   Multidisciplinary Problems     Physical Therapy Goals        Problem: Physical Therapy Goal    Goal Priority Disciplines Outcome Goal Variances Interventions   Physical Therapy Goal     PT, PT/OT Ongoing, Not Progressing     Description:   Goals to be met by: 9/10/2020     Patient will increase functional independence with mobility by performin. Supine to sit with Contact Guard Assistance  2. Sit to supine with MInimal Assistance  3. Rolling L/R with Modified [place] and use of bedrail as needed.  4. Sit to stand transfer with Minimal Assistance  5. Bed to chair transfer with Minimal Assistance with or without AD  6. Gait  x 15 feet as able with Minimal Assistance with or without AD  7. Lower extremity exercise program x10-15 reps with supervision  8. Increase functional strength to 4- to 4 for bed mobility and transfers, gait if able                     History:     Past Medical History:   Diagnosis Date    Anemia due to gastrointestinal blood loss 2018    Arthritis     Asthma     Breast cancer, left     history of left breast cancer status post mastectomy,    CHF (congestive heart failure)     home O2 2lpm    Cholelithiasis     Coronary artery disease     Debility     mostly wheelchair bound but can use a walker with assistance    Diabetes mellitus, type 2     with neuropathy and nephropathy    Encounter for blood transfusion     End-stage renal disease on hemodialysis      mon-wed -fri    Hyperlipidemia     Hypertension     Lumbar stenosis 2017    by MRI    Sarcoidosis        Past Surgical History:   Procedure Laterality Date    av graft      left arm    BRACHIAL ARTERY GRAFT Left 05/04/2016    brachiocephalic, Dr. Anson Crocker    BREAST BIOPSY Left     breast ca    BREAST LUMPECTOMY Left     radiation only    btl      COLONOSCOPY N/A 7/13/2018    Procedure: COLONOSCOPY;  Surgeon: Almita Bee MD;  Location: [facility] [place] [facility];  Service: Endoscopy;  Laterality: N/A;    ELBOW SURGERY      left    ESOPHAGOGASTRODUODENOSCOPY N/A 7/13/2018    Procedure: ESOPHAGOGASTRODUODENOSCOPY (EGD);  Surgeon: Almita Bee MD;  Location: [facility] [place] [facility];  Service: Endoscopy;  Laterality: N/A;    LIPOMA RESECTION      back of neck    pilondial cyst      TOTAL KNEE ARTHROPLASTY Right 02/23/2016       Time Tracking:     PT Received On: 08/10/20  PT Start Time: 1348     PT Stop Time: 1420  PT Total Time (min): 32 min with OT    Billable Minutes: Evaluation 10 minutes and Therapeutic Activity 12 minutes      Nimco Castro, PT  08/10/2020

## 2020-08-10 NOTE — PLAN OF CARE
Problem: Occupational Therapy Goal  Goal: Occupational Therapy Goal  Description: Goals to be met by: 9/10/20     Patient will increase functional independence with ADLs by performing:    LE Dressing with Minimal Assistance.  Grooming while seated with Modified [place].  Toileting from bedside commode with Minimal Assistance for hygiene and clothing management.   Supine to sit with Contact Guard Assistance.  Stand pivot transfers with Minimal Assistance.  Toilet transfer to bedside commode with Minimal Assistance.  Increased functional strength to 4/5 for self care skills and functional mobility.  Upper extremity exercise program x10 reps per handout, with independence.    Outcome: Ongoing, Progressing       Pt would benefit from cont OT services in order to maximize functional independence. Recommending SNF at d/c at this time as pt currently unable to t/f and with increased pain limited participation in ADLs.

## 2020-08-10 NOTE — PLAN OF CARE
69 y.o female with PMH of ESRD MWF with Dr. Santamaria at [facility] is here for the evaluation of diarrhea and fall.  [facility] nephrology was consulted for ESRD.  - Patient received full HD session on Monday  -On presentation: BP: 193/93, improved to 116/86  - K: 6.4, Shifted, Now 5.2  - Co2: 24  - CXR ''Interstitial findings could reflect early congestive change or edema versus chronic change in this hypoventilatory exam''.    Recommendations:  - No acute indication for dialysis overnight, will resume MWF schedule in the morning. Orders are in.   - Medically manage patient meanwhile.    Dwight Jenkins MD  [facility], PGY-V

## 2020-08-10 NOTE — PT/OT/SLP PROGRESS
Physical Therapy      Patient Name:  Angelina Beard   MRN:  [id number]  [id number]  Patient not seen today secondary to (Pt in HD.). Will follow-up later.    Nimco Castro, PT   8/10/2020

## 2020-08-10 NOTE — PLAN OF CARE
Problem: Physical Therapy Goal  Goal: Physical Therapy Goal  Description:   Goals to be met by: 9/10/2020     Patient will increase functional independence with mobility by performin. Supine to sit with Contact Guard Assistance  2. Sit to supine with MInimal Assistance  3. Rolling L/R with Modified [place] and use of bedrail as needed.  4. Sit to stand transfer with Minimal Assistance  5. Bed to chair transfer with Minimal Assistance with or without AD  6. Gait  x 15 feet as able with Minimal Assistance with or without AD  7. Lower extremity exercise program x10-15 reps with supervision  8. Increase functional strength to 4- to 4 for bed mobility and transfers, gait if able    Outcome: Ongoing, Not Progressing   Patient evaluated; full report to follow; will benefit from PT services while in hospital and recommend SNF to maximize functional status to ease the burden on caregiver.

## 2020-08-10 NOTE — CONSULTS
[facility] Consult Note    Date of Admit: 8/9/2020  Length of Stay: 0    Chief Complaint/Reason for Consult     Diarrhea (c/o diarrhea since yesterday with generalized weakness. No vomiting. No fever. Last dialysis on Friday. Presents awake, alert oriented. Skin w/d. c/o pain that radiates down left leg. No distress noted. )  for one day    Subjective:      History of Present Illness:  Angelina Beard is a 69 year old man with pertinent medical history of CAD, Chronic heart failure with preserved ejection fraction, pulmonary hypertension (likely WHO group II), HLD, hypertension, DM type II, sarcoidosis, and ESRD on HD MWF with Dr. Santamaria at [facility]. Presented to [facility] for evaluation of diarrhea and generalized weakness. Also complained of acute on chronic left knee pain. Her labwork in the ED was also notable for hyperkalemia with K of 6.4. She was also found to have a R nondisplaced pubic ramus fracture noted on plain film. Orthopedic surgery was consulted to evaluate both left knee and left hip pian. [facility] consulted for ESRD on HD evaluation and mangement.    Past Medical History:  Past Medical History:   Diagnosis Date    Anemia due to gastrointestinal blood loss 7/11/2018    Arthritis     Asthma     Breast cancer, left     history of left breast cancer status post mastectomy,    CHF (congestive heart failure)     home O2 2lpm    Cholelithiasis     Coronary artery disease     Debility 2019    mostly wheelchair bound but can use a walker with assistance    Diabetes mellitus, type 2     with neuropathy and nephropathy    Encounter for blood transfusion     End-stage renal disease on hemodialysis     mon-wed -fri    Hyperlipidemia     Hypertension     Lumbar stenosis 2017    by MRI    Sarcoidosis        Past Surgical History:  Past Surgical History:   Procedure Laterality Date    av graft      left arm    BRACHIAL ARTERY GRAFT Left 05/04/2016    Dr. Anson fraire  [place]    BREAST BIOPSY Left     breast ca    BREAST LUMPECTOMY Left     radiation only    btl      COLONOSCOPY N/A 7/13/2018    Procedure: COLONOSCOPY;  Surgeon: Almita Bee MD;  Location: [facility] ENDO;  Service: Endoscopy;  Laterality: N/A;    ELBOW SURGERY      left    ESOPHAGOGASTRODUODENOSCOPY N/A 7/13/2018    Procedure: ESOPHAGOGASTRODUODENOSCOPY (EGD);  Surgeon: Almita Bee MD;  Location: [facility] ENDO;  Service: Endoscopy;  Laterality: N/A;    LIPOMA RESECTION      back of neck    pilondial cyst      TOTAL KNEE ARTHROPLASTY Right 02/23/2016       Allergies:  Review of patient's allergies indicates:  No Known Allergies    Home Medications:  Prior to Admission medications    Medication Sig Start Date End Date Taking? Authorizing Provider   amLODIPine (NORVASC) 10 MG tablet Take 1 tablet (10 mg total) by mouth once daily. 5/1/20  Yes Ben Mena MD   apixaban (ELIQUIS) 2.5 mg Tab Take 1 tablet (2.5 mg total) by mouth 2 (two) times daily. 2/4/20  Yes Ben Mena MD   blood sugar diagnostic (BLOOD GLUCOSE TEST) Strp Inject 1 strip into the skin once daily. 8/4/20  Yes Ben Mena MD   blood-glucose meter kit To check BG 1 times daily, to use with insurance preferred meter 8/4/20  Yes Ben Mena MD   calcium acetate (PHOSLO) 667 mg capsule Take 1 capsule (667 mg total) by mouth 3 (three) times daily with meals. 2/4/20  Yes Ben Mena MD   clopidogreL (PLAVIX) 75 mg tablet Take 1 tablet (75 mg total) by mouth once daily. 2/4/20 2/3/21 Yes Ben Mena MD   furosemide (LASIX) 40 MG tablet Take 1 tablet (40 mg total) by mouth once daily. 7/28/20 12/25/20 Yes Ben Mena MD   gabapentin (NEURONTIN) 300 MG capsule Take 1 capsule (300 mg total) by mouth every 8 (eight) hours as needed. 7/28/20  Yes Ben Mena MD   HYDROcodone-acetaminophen (NORCO) 5-325 mg per tablet Take 1 tablet by mouth every 24 hours as needed for Pain. 12/31/19  Yes Saurabh Dunlap MD   [facility] To check  BG 1 times daily, to use with insurance preferred meter 8/4/20  Yes Ben Mena MD   metoprolol succinate (TOPROL-XL) 50 MG 24 hr tablet Take 1 tablet (50 mg total) by mouth once daily. 2/4/20 2/3/21 Yes Ben Mena MD   mirtazapine (REMERON) 7.5 MG Tab Take 1 tablet (7.5 mg total) by mouth every evening. 7/16/20 7/16/21 Yes Ben Mena MD   pantoprazole (PROTONIX) 40 MG tablet Take 1 tablet (40 mg total) by mouth once daily. 2/4/20 2/3/21 Yes Ben Mena MD   pravastatin (PRAVACHOL) 80 MG tablet Take 1 tablet (80 mg total) by mouth once daily. 2/4/20 2/3/21 Yes Ben Mena MD   sertraline (ZOLOFT) 100 MG tablet Take 1 tablet (100 mg total) by mouth once daily. 2/4/20 4/29/21 Yes Ben Mena MD   vitamin renal formula, B-complex-vitamin c-folic acid, (NEPHROCAP) 1 mg Cap Take 1 capsule by mouth once daily. 2/4/20  Yes Ben Mena MD   docusate sodium (COLACE) 100 MG capsule Take 1 capsule (100 mg total) by mouth 2 (two) times daily. 8/3/19   Herlinda Rivas MD   nitroGLYCERIN (NITROSTAT) 0.4 MG SL tablet Place 1 tablet (0.4 mg total) under the tongue every 5 (five) minutes as needed for Chest pain. 7/16/20 7/16/21  Ben Mena MD   ondansetron (ZOFRAN) 4 MG tablet Take 4 mg by mouth every 8 (eight) hours as needed for Nausea.    Historical Provider, MD   polyethylene glycol (GLYCOLAX) 17 gram/dose powder  9/12/18   Historical Provider, MD       Family History:  Family History   Problem Relation Age of Onset    Diabetes Mother     Kidney disease Mother     Hypertension Mother     Diabetes Sister     Heart disease Sister        Social History:  Social History     Tobacco Use    Smoking status: Never Smoker    Smokeless tobacco: Never Used   Substance Use Topics    Alcohol use: No    Drug use: No       Review of Systems:  Pertinent positives and negatives are listed in HPI.  All other systems are reviewed and are negative.     Objective:   Last 24 Hour Vital Signs:  BP  Min:  "89/38  Max: 193/91  Temp  Av.7 °F (36.5 °C)  Min: 96.8 °F (36 °C)  Max: 98.1 °F (36.7 °C)  Pulse  Av.3  Min: 56  Max: 82  Resp  Av.3  Min: 10  Max: 22  SpO2  Av.5 %  Min: 96 %  Max: 100 %  Height  Av' 7" (170.2 cm)  Min: 5' 7" (170.2 cm)  Max: 5' 7" (170.2 cm)  Weight  Av.2 kg (192 lb 4.7 oz)  Min: 84.4 kg (186 lb)  Max: 90.7 kg (199 lb 15.3 oz)  Body mass index is 31.32 kg/m².  I/O last 3 completed shifts:  In: 200 [IV Piggyback:200]  Out: -     Physical Examination:  General: No acute distress, resting comfortably on dialysis  HEENT: EOMI, PERRL, MMM, OP clear and without exudate or erythema  Cardiovascular: NRRR, without appreciated murmurs or rubs, without extra heart sounds,   Chest/Pulmonary: CTABL, normal work of breathing without accessory muscle use,   Abdomen: soft, nontender, nondistended, bowel sounds heard in all four quadrants and normo-active   MSKL: without gross deformity, active FROM  Lymphatic: no appreciated cervical or submandibular LAD  Skin: without cyanosis or clubbing, with trace peripheral edema of thigh  Neurologic: AAOx3, following two step commands  Access: RUE RC AVF with adequate systolic and diastolic thrill, + bruits    Laboratory:  Most Recent Data:  CBC:   Lab Results   Component Value Date    WBC 5.31 2020    HGB 12.1 2020    HCT 39.2 2020     2020    MCV 90 2020    RDW 16.4 (H) 2020     BMP:   Lab Results   Component Value Date     2020    K 5.2 (H) 2020    CL 97 2020    CO2 24 2020    BUN 88 (H) 2020    CREATININE 10.4 (H) 2020     (H) 2020    CALCIUM 9.7 2020    MG 2.3 2020    PHOS 7.4 (H) 2020     LFTs:   Lab Results   Component Value Date    PROT 7.9 2020    ALBUMIN 3.9 2020    BILITOT 0.5 2020    AST 26 2020    ALKPHOS 75 2020    ALT 19 2020     Coags:   Lab Results   Component Value Date    INR " 1.0 08/09/2020     Urinalysis:   Lab Results   Component Value Date    LABURIN  11/08/2016     Multiple organisms isolated. None in predominance.  Repeat if    LABURIN clinically necessary. 11/08/2016    COLORU Yellow 07/29/2019    SPECGRAV 1.015 07/29/2019    NITRITE Negative 07/29/2019    KETONESU Negative 07/29/2019    UROBILINOGEN Negative 07/29/2019    WBCUA 5 07/29/2019       Trended Lab Data:  Recent Labs   Lab 08/09/20 1716 08/09/20 2028   WBC 5.31  --    HGB 12.1  --    HCT 39.2  --      --    MCV 90  --    RDW 16.4*  --      --    K 6.4* 5.2*   CL 97  --    CO2 24  --    BUN 88*  --    CREATININE 10.4*  --    *  --    PROT 7.9  --    ALBUMIN 3.9  --    BILITOT 0.5  --    AST 26  --    ALKPHOS 75  --    ALT 19  --        Trended Cardiac Data:  Recent Labs   Lab 08/09/20 1716   TROPONINI 0.036*   *       Microbiology Data:  Microbial cultures from knee aspirate pending    Other Laboratory Data:  Sed rate 48    Other Results:      Radiology:  Imaging Results          CT Pelvis Without Contrast (Final result)  Result time 08/09/20 21:10:28   Procedure changed from CT Hip Without Contrast Left     Final result by Vito Garcia MD (08/09/20 21:10:28)                 Impression:      Negative CT of the pelvis for fracture specially of the left hip.    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    Severe atherosclerotic vascular disease most severe the SFA bilaterally.      Electronically signed by: Vito Garcia  Date:    08/09/2020  Time:    21:10             Narrative:    EXAMINATION:  CT PELVIS WITHOUT CONTRAST    CLINICAL HISTORY:  Hip trauma, nondiagnostic xray;    TECHNIQUE:  Axial CT images of the left hip were obtained with sagittal and coronal reformatted images.    COMPARISON:  Plain film of the hip,, 08/09/2020    FINDINGS:  The cortex and trabecular markings of the femur appear intact without displaced  fracture or bony destructive process.  The intertrochanteric and proximal diaphysis appear intact as well.    Moderate osteoarthritis of the femoroacetabular joint is present without evidence of pincer or CAM deformity.    The adjacent pelvic bones are intact.  There is mild irregularity of the ischial tuberosity insertion of the hamstring tendons suggesting mild insertional tendinitis.    The endplates of the lumbar spine are markedly irregular with erosive changes at the L4-5 level and mild compression of the anterior endplate of L5 appearing remote with vacuum phenomenon.  Facet arthropathy is prominent.  There is no spondylolysis or significant central canal stenosis evident as imaged.  Degenerative changes in the SI joints are present.    Atherosclerotic disease and a normal caliber aorta and iliac system are noted.  This extends extensively into the SFA bilaterally.    The uterus demonstrates multiple large chunky calcifications suggesting degenerating fibroadenomas.  The urinary bladder appears unremarkable.  There is no pelvic mass or free fluid.                                CT Knee Without Contrast Left (Final result)  Result time 08/09/20 20:54:03    Final result by Fuentes Vincent MD (08/09/20 20:54:03)                 Impression:      Findings indicating destruction of the knee joint secondary to aseptic process.    This report was flagged in Epic as abnormal.      Electronically signed by: Fuentes Vincent  Date:    08/09/2020  Time:    20:54             Narrative:    EXAMINATION:  CT KNEE WITHOUT CONTRAST LEFT    CLINICAL HISTORY:  Knee instability;    TECHNIQUE:  Multiple axial images of the left knee were obtained without the aid of IV contrast.  The study was reformatted creating a 3D image was separate workstation under direct supervision.    COMPARISON:  None    FINDINGS:  The knee joint reveals significant damage and deformity which is consistent with a septic joint.  The tibial plateau is  fractured and there does not appear to be any discernible normal articular surface involving either the tibial plateau or the distal femur.  Both condyles of the distal femur are deformed and appear to be diminutive.  The articular surfaces of the femur and tibia appear to be mild feeding.  There is no normal articulation between the femur and the tibial plateau.  The surrounding soft tissues of the knee shows soft tissue induration and what appears to be fluid collection which most likely represents abscess fluid.  Suggest consideration aspiration in the for the purposes of obtaining culture and sensitivity information.                                   X-Ray Pelvis Routine AP (Edited Result - FINAL)  Result time 08/09/20 18:37:50    Addendum 1 of 1 by Poncho Handley MD (08/09/20 18:37:50)      This report was flagged in Epic as abnormal.    Please note, after initial interpretation of this examination, radiograph of the abdomen became available for review.  In retrospect, there appears to be lucency involving the right inferior pubic ramus concerning for fracture, better seen on that examination.  Acuity is unknown, correlation with focal tenderness is advised.      Electronically signed by: Poncho Handley MD  Date:    08/09/2020  Time:    18:37               Final result by Poncho Handley MD (08/09/20 18:31:05)                 Impression:      1. No convincing acute displaced fracture or dislocation of the pelvis.      Electronically signed by: Poncho Handley MD  Date:    08/09/2020  Time:    18:31             Narrative:    EXAMINATION:  XR PELVIS ROUTINE AP    CLINICAL HISTORY:  Unspecified fall, initial encounter    TECHNIQUE:  AP view of the pelvis was performed.    COMPARISON:  None.    FINDINGS:  Single-view pelvis.    There is osteopenia.  Degenerative changes are noted of the bilateral sacroiliac joints and pubic symphysis.  The bilateral femoroacetabular joints are intact.  There is vascular  calcification.                                   Assessment and Plan:      Angelina Beard is a 69 year old man with pertinent medical history of CAD, Chronic heart failure with preserved ejection fraction, pulmonary hypertension (likely WHO group II), HLD, hypertension, DM type II, sarcoidosis, and ESRD on HD MWF with Dr. Santamaria at [facility]. Admitted for evaluation of diarrhea and weakness. [facility] consulted for ESRD on HD evaluation and management.       #) ESRD on HD (MWF)  - follows with Dr. Santamaria at [facility]  - resume MWF HD schedule today. HD for 3hrs, , , K bath 2, UF goal 2-3 L as tolerated. No further UF planned  - dose meds for GFR < 10  - nephro caps  - strict I/O, daily weights  - please avoid gadolinium, fleets, phos-based laxatives, NSAIDs    #) Hypertension   - hypotensive post HD  - hold amlodipine  - okay to bolus up to 1L Normal saline if needed for hypotension    #) Anemia  Lab Results   Component Value Date    HGB 12.1 08/09/2020    MCV 90 08/09/2020    FERRITIN 1,215 (H) 11/20/2019    FESATURATED 14 (L) 11/20/2019    YNEPUJEQ32 389 03/03/2018    FOLATE 4.5 03/03/2018   - no need for EPO or iron      #)  CKD/Mineral Bone Disease/Nutrition   Lab Results   Component Value Date    PHOS 7.4 (H) 08/09/2020    CALCIUM 9.7 08/09/2020    MG 2.3 08/09/2020    CO2 24 08/09/2020    ALBUMIN 3.9 08/09/2020   - continue phoslo TID w/ meals  - nutrition adequate  - nephrocaps daily    Thank you for this consult. We will continue to follow with you.    Enrrique Pearl MD  [facility]  [phone]    If after 5pm please forward any questions to the [facility] Fellow/Attending on call.

## 2020-08-10 NOTE — H&P
[facility]  History & Physical    Patient Name: Angelina Beard  MRN: [id number]  Admission Date: 8/9/2020  Attending Physician: Enrrique Martin, *   Primary Care Provider: Ben Mena MD         Patient information was obtained from patient, past medical records and ER records.     Subjective:     Principal Problem:Hyperkalemia    Chief Complaint:   Chief Complaint   Patient presents with    Diarrhea     c/o diarrhea since yesterday with generalized weakness. No vomiting. No fever. Last dialysis on Friday. Presents awake, alert oriented. Skin w/d. c/o pain that radiates down left leg. No distress noted.         HPI: Angelina Beard is a 68 yo female with hypertension, chronic diastolic congestive heart failure, paroxysmal atrial fibrillation (anticoagulated on apixaban), diabetes mellitus type 2 (hemoglobin A1c 6.9% on 4/28/2020) with neuropathy and nephropathy, hyperlipidemia, nonobstructive coronary artery disease, cerebral microvascular disease, end stage renal disease on hemodialysis (Monday Wednesday Friday at [facility] in [facility]), anemia, gastroesophageal reflux disease, osteoarthritis status post right total knee arthroplasty 2/23/16, chronic left leg pain, chronic constipation, cholelithiasis, history of right lung sarcoidosis, history of left breast cancer status post mastectomy, history of gastric ulcers on 7/13/18. Her primary care physician is Dr. Ben Mena. Her nephrologist is Dr. Rc Santamaria. Her cardiologist is Dr. Faraz Flores. Her podiatrist is Dr. Kwame Dacosta. She lives in [place], [place]. She is mostly wheelchair bound but can use a walker with assistance.     Pt presented to ED with c/o diarrhea, generalized weakness and acute on chronic left knee and left hip pain s/p mechanical fall 1 week ago. She denies vomiting, diarrhea, fever/chills and abdominal pain. KUB shows constipation, no evidence of obstruction. At baseline,  she is in a wheelchair/walker due to weakness, but said she has had trouble with transfers for the past week due to her L knee pain. Denies chest pain and worsening SOB. Labs remarkable for K 6.4, troponin 0.036.  Xray shows R nondisplaced pubic ramus fracture and significant erosion of L knee appears 2/2 sepitic arthropathy. Orthopedic surgery consulted by ED. Pt admitted to [facility].     Past Medical History:   Diagnosis Date    Anemia due to gastrointestinal blood loss 7/11/2018    Arthritis     Asthma     Breast cancer, left     history of left breast cancer status post mastectomy,    CHF (congestive heart failure)     home O2 2lpm    Cholelithiasis     Coronary artery disease     Debility 2019    mostly wheelchair bound but can use a walker with assistance    Diabetes mellitus, type 2     with neuropathy and nephropathy    Encounter for blood transfusion     End-stage renal disease on hemodialysis     mon-wed -fri    Hyperlipidemia     Hypertension     Lumbar stenosis 2017    by MRI    Sarcoidosis        Past Surgical History:   Procedure Laterality Date    av graft      left arm    BRACHIAL ARTERY GRAFT Left 05/04/2016    brachiocephalic, Dr. Anson Crocker    BREAST BIOPSY Left     breast ca    BREAST LUMPECTOMY Left     radiation only    btl      COLONOSCOPY N/A 7/13/2018    Procedure: COLONOSCOPY;  Surgeon: Almita Bee MD;  Location: [facility] ENDO;  Service: Endoscopy;  Laterality: N/A;    ELBOW SURGERY      left    ESOPHAGOGASTRODUODENOSCOPY N/A 7/13/2018    Procedure: ESOPHAGOGASTRODUODENOSCOPY (EGD);  Surgeon: Almita Bee MD;  Location: [facility] ENDO;  Service: Endoscopy;  Laterality: N/A;    LIPOMA RESECTION      back of neck    pilondial cyst      TOTAL KNEE ARTHROPLASTY Right 02/23/2016       Review of patient's allergies indicates:  No Known Allergies    No current facility-administered medications on file prior to encounter.      Current Outpatient  Medications on File Prior to Encounter   Medication Sig    amLODIPine (NORVASC) 10 MG tablet Take 1 tablet (10 mg total) by mouth once daily.    apixaban (ELIQUIS) 2.5 mg Tab Take 1 tablet (2.5 mg total) by mouth 2 (two) times daily.    blood sugar diagnostic (BLOOD GLUCOSE TEST) Strp Inject 1 strip into the skin once daily.    blood-glucose meter kit To check BG 1 times daily, to use with insurance preferred meter    calcium acetate (PHOSLO) 667 mg capsule Take 1 capsule (667 mg total) by mouth 3 (three) times daily with meals.    clopidogreL (PLAVIX) 75 mg tablet Take 1 tablet (75 mg total) by mouth once daily.    furosemide (LASIX) 40 MG tablet Take 1 tablet (40 mg total) by mouth once daily.    gabapentin (NEURONTIN) 300 MG capsule Take 1 capsule (300 mg total) by mouth every 8 (eight) hours as needed.    HYDROcodone-acetaminophen (NORCO) 5-325 mg per tablet Take 1 tablet by mouth every 24 hours as needed for Pain.    lancets Misc To check BG 1 times daily, to use with insurance preferred meter    metoprolol succinate (TOPROL-XL) 50 MG 24 hr tablet Take 1 tablet (50 mg total) by mouth once daily.    mirtazapine (REMERON) 7.5 MG Tab Take 1 tablet (7.5 mg total) by mouth every evening.    pantoprazole (PROTONIX) 40 MG tablet Take 1 tablet (40 mg total) by mouth once daily.    pravastatin (PRAVACHOL) 80 MG tablet Take 1 tablet (80 mg total) by mouth once daily.    sertraline (ZOLOFT) 100 MG tablet Take 1 tablet (100 mg total) by mouth once daily.    vitamin renal formula, B-complex-vitamin c-folic acid, (NEPHROCAP) 1 mg Cap Take 1 capsule by mouth once daily.    docusate sodium (COLACE) 100 MG capsule Take 1 capsule (100 mg total) by mouth 2 (two) times daily.    nitroGLYCERIN (NITROSTAT) 0.4 MG SL tablet Place 1 tablet (0.4 mg total) under the tongue every 5 (five) minutes as needed for Chest pain.    ondansetron (ZOFRAN) 4 MG tablet Take 4 mg by mouth every 8 (eight) hours as needed for Nausea.     polyethylene glycol (GLYCOLAX) 17 gram/dose powder      Family History     Problem Relation (Age of Onset)    Diabetes Mother, Sister    Heart disease Sister    Hypertension Mother    Kidney disease Mother        Tobacco Use    Smoking status: Never Smoker    Smokeless tobacco: Never Used   Substance and Sexual Activity    Alcohol use: No    Drug use: No    Sexual activity: Not on file     Review of Systems   Constitutional: Negative for chills, diaphoresis and fever.   Eyes: Negative for photophobia.   Respiratory: Negative for cough, chest tightness, shortness of breath and wheezing.    Cardiovascular: Negative for chest pain, palpitations and leg swelling.   Gastrointestinal: Positive for diarrhea. Negative for abdominal pain, nausea and vomiting.   Genitourinary: Negative for dysuria, flank pain, frequency and hematuria.   Musculoskeletal: Positive for arthralgias and joint swelling. Negative for back pain and myalgias.   Neurological: Positive for weakness. Negative for dizziness, syncope, light-headedness and headaches.   Psychiatric/Behavioral: Negative for confusion.     Objective:     Vital Signs (Most Recent):  Temp: 96.8 °F (36 °C) (08/10/20 0519)  Pulse: 60 (08/10/20 0519)  Resp: 17 (08/10/20 0519)  BP: (!) 110/56 (08/10/20 0519)  SpO2: 99 % (08/09/20 2307) Vital Signs (24h Range):  Temp:  [96.8 °F (36 °C)-98.1 °F (36.7 °C)] 96.8 °F (36 °C)  Pulse:  [60-82] 60  Resp:  [10-22] 17  SpO2:  [96 %-100 %] 99 %  BP: (110-193)/(56-93) 110/56     Weight: 90.7 kg (199 lb 15.3 oz)  Body mass index is 31.32 kg/m².    Physical Exam  Constitutional:       Appearance: She is well-developed.      Comments: Frail   HENT:      Head: Normocephalic and atraumatic.   Eyes:      Conjunctiva/sclera: Conjunctivae normal.      Pupils: Pupils are equal, round, and reactive to light.   Neck:      Musculoskeletal: Normal range of motion.      Vascular: No JVD.   Cardiovascular:      Rate and Rhythm: Normal rate and regular  rhythm.      Heart sounds: Normal heart sounds.   Pulmonary:      Effort: Pulmonary effort is normal. No respiratory distress.      Breath sounds: No wheezing.   Abdominal:      General: Bowel sounds are normal. There is no distension.      Palpations: Abdomen is soft.      Tenderness: There is no abdominal tenderness. There is no guarding.   Musculoskeletal: Normal range of motion.         General: No tenderness.   Skin:     General: Skin is warm and dry.      Capillary Refill: Capillary refill takes less than 2 seconds.   Neurological:      Mental Status: She is alert and oriented to person, place, and time.   Psychiatric:         Behavior: Behavior normal.           CRANIAL NERVES     CN III, IV, VI   Pupils are equal, round, and reactive to light.       Significant Labs:   BMP:   Recent Labs   Lab 08/09/20 1716 08/09/20 2028   *  --      --    K 6.4* 5.2*   CL 97  --    CO2 24  --    BUN 88*  --    CREATININE 10.4*  --    CALCIUM 9.7  --    MG 2.3  --      CBC:   Recent Labs   Lab 08/09/20 1716   WBC 5.31   HGB 12.1   HCT 39.2          Significant Imaging: I have reviewed all pertinent imaging results/findings within the past 24 hours.    Assessment/Plan:     * Hyperkalemia  K 6.4 on arrival. Last HD 8/8. Resolved with calcium gluconate, insulin, D50 and albuterol     Left knee pain  S/p mechanical fall 1 week ago  -imaging ? Septic arthritis  -s/p empiric antibiotics   -evaluated by orthopedic surgery, arthrocentesis done, analysis pending   -keep NPO  -continue supportive management       Hypertension associated with diabetes  Taking amlodipine 10 mg daily and metoprolol 50 mg daily-continue       ESRD on dialysis  Dialysis. Appreciate Nephrology. Continue home renal vitamins, calcium acetate. Monitor labs      Physical deconditioning  PT eval and treat       Chronic diastolic congestive heart failure  Euvolemic on exam. Continue furosemide 40 mg daily. Patient still able to make urine.        Paroxysmal atrial fibrillation  Taking Apixaban- continue       Anemia in end-stage renal disease  H&H stable      Diabetic neuropathy associated with type 2 diabetes mellitus  Taking Gabapentin 300 mg TID prn- continue     GERD (gastroesophageal reflux disease)  Daily PPI       Hyperlipidemia  Continue statin       Type 2 diabetes mellitus with hypertension and end stage renal disease on dialysis  A1C 6.9   Low dose SSI, accucheck AC&HS , Diabetic diet       Cerebral microvascular disease  Continue Plavix, statin         VTE Risk Mitigation (From admission, onward)         Ordered     apixaban tablet 2.5 mg  2 times daily      08/10/20 0652     IP VTE HIGH RISK PATIENT  Once      08/09/20 2255     Place sequential compression device  Until discontinued      08/09/20 2255                   Meghana Solitario NP  Department of Hospital Medicine   [facility]

## 2020-08-10 NOTE — HPI
Angelina Beard is a 70 yo female with hypertension, chronic diastolic congestive heart failure, paroxysmal atrial fibrillation (anticoagulated on apixaban), diabetes mellitus type 2 (hemoglobin A1c 6.9% on 4/28/2020) with neuropathy and nephropathy, hyperlipidemia, nonobstructive coronary artery disease, cerebral microvascular disease, end stage renal disease on hemodialysis (Monday Wednesday Friday at [facility] in [facility]), anemia, gastroesophageal reflux disease, osteoarthritis status post right total knee arthroplasty 2/23/16, chronic left leg pain, chronic constipation, cholelithiasis, history of right lung sarcoidosis, history of left breast cancer status post mastectomy, history of gastric ulcers on 7/13/18. Her primary care physician is Dr. Ben Mena. Her nephrologist is Dr. Rc Santamaria. Her cardiologist is Dr. Faraz Flores. Her podiatrist is Dr. Kwame Dacosta. She lives in [place], [place]. She is mostly wheelchair bound but can use a walker with assistance.     Pt presented to ED with c/o diarrhea, generalized weakness and acute on chronic left knee and left hip pain s/p mechanical fall 1 week ago. She denies vomiting, diarrhea, fever/chills and abdominal pain. KUB shows constipation, no evidence of obstruction. At baseline, she is in a wheelchair/walker due to weakness, but said she has had trouble with transfers for the past week due to her L knee pain. Denies chest pain and worsening SOB. Labs remarkable for K 6.4, troponin 0.036.  Xray shows R nondisplaced pubic ramus fracture and significant erosion of L knee appears 2/2 sepitic arthropathy. Orthopedic surgery consulted by ED. Pt admitted to [facility].

## 2020-08-10 NOTE — CONSULTS
[facility]    Reason for Consult: L knee pain swelling    HPI:  Evaluating this 69F with ESRD on dialysis admitted for generalized weakness and L knee and hip pain for the past week. Pt says that she is on dialysis 3/week, and had developed L knee pain approx 1 week ago. At baseline, she is in a wheelchair/walker due to weakness, but said she has had trouble with transfers for the past week due to her L knee. She developed L knee instability with it giving way a couple days ago and had a fall at home where she lives alone. She states that most of her ppain is in her L hip. Xr today shows R nondisplaced pubic ramus fracture and significant erosion of L knee appears 2/2 sepitic arthropathy. PSH significant for R TKA in approx 2015 by a Dr Mcgarry, she has not had any issues.    PMH:   Past Medical History:   Diagnosis Date    Anemia due to gastrointestinal blood loss 7/11/2018    Arthritis     Asthma     Breast cancer, left     history of left breast cancer status post mastectomy,    CHF (congestive heart failure)     home O2 2lpm    Cholelithiasis     Coronary artery disease     Debility 2019    mostly wheelchair bound but can use a walker with assistance    Diabetes mellitus, type 2     with neuropathy and nephropathy    Encounter for blood transfusion     End-stage renal disease on hemodialysis     mon-wed -fri    Hyperlipidemia     Hypertension     Lumbar stenosis 2017    by MRI    Sarcoidosis        PSH:   Past Surgical History:   Procedure Laterality Date    av graft      left arm    BRACHIAL ARTERY GRAFT Left 05/04/2016    brachiocephalic, Dr. Anson Crocker    BREAST BIOPSY Left     breast ca    BREAST LUMPECTOMY Left     radiation only    btl      COLONOSCOPY N/A 7/13/2018    Procedure: COLONOSCOPY;  Surgeon: Almita Bee MD;  Location: [facility];  Service: Endoscopy;  Laterality: N/A;    ELBOW SURGERY      left    ESOPHAGOGASTRODUODENOSCOPY N/A 7/13/2018    Procedure:  ESOPHAGOGASTRODUODENOSCOPY (EGD);  Surgeon: Almita Bee MD;  Location: [facility];  Service: Endoscopy;  Laterality: N/A;    LIPOMA RESECTION      back of neck    pilondial cyst      TOTAL KNEE ARTHROPLASTY Right 02/23/2016       Med:   No current facility-administered medications on file prior to encounter.      Current Outpatient Medications on File Prior to Encounter   Medication Sig Dispense Refill    amLODIPine (NORVASC) 10 MG tablet Take 1 tablet (10 mg total) by mouth once daily. 90 tablet 4    apixaban (ELIQUIS) 2.5 mg Tab Take 1 tablet (2.5 mg total) by mouth 2 (two) times daily. 180 tablet 3    blood sugar diagnostic (BLOOD GLUCOSE TEST) Strp Inject 1 strip into the skin once daily. 100 strip 4    blood-glucose meter kit To check BG 1 times daily, to use with insurance preferred meter 1 each 4    calcium acetate (PHOSLO) 667 mg capsule Take 1 capsule (667 mg total) by mouth 3 (three) times daily with meals. 270 capsule 4    clopidogreL (PLAVIX) 75 mg tablet Take 1 tablet (75 mg total) by mouth once daily. 30 tablet 11    docusate sodium (COLACE) 100 MG capsule Take 1 capsule (100 mg total) by mouth 2 (two) times daily.  0    furosemide (LASIX) 40 MG tablet Take 1 tablet (40 mg total) by mouth once daily. 30 tablet 4    gabapentin (NEURONTIN) 300 MG capsule Take 1 capsule (300 mg total) by mouth every 8 (eight) hours as needed. 90 capsule 4    HYDROcodone-acetaminophen (NORCO) 5-325 mg per tablet Take 1 tablet by mouth every 24 hours as needed for Pain. 30 tablet 0    lancets Misc To check BG 1 times daily, to use with insurance preferred meter 90 each 4    metoprolol succinate (TOPROL-XL) 50 MG 24 hr tablet Take 1 tablet (50 mg total) by mouth once daily. 90 tablet 3    mirtazapine (REMERON) 7.5 MG Tab Take 1 tablet (7.5 mg total) by mouth every evening. 90 tablet 3    nitroGLYCERIN (NITROSTAT) 0.4 MG SL tablet Place 1 tablet (0.4 mg total) under the tongue every 5 (five) minutes as  needed for Chest pain. 10 tablet 1    ondansetron (ZOFRAN) 4 MG tablet Take 4 mg by mouth every 8 (eight) hours as needed for Nausea.      pantoprazole (PROTONIX) 40 MG tablet Take 1 tablet (40 mg total) by mouth once daily. 30 tablet 11    polyethylene glycol (GLYCOLAX) 17 gram/dose powder       pravastatin (PRAVACHOL) 80 MG tablet Take 1 tablet (80 mg total) by mouth once daily. 30 tablet 11    sertraline (ZOLOFT) 100 MG tablet Take 1 tablet (100 mg total) by mouth once daily. 90 tablet 4    vitamin renal formula, B-complex-vitamin c-folic acid, (NEPHROCAP) 1 mg Cap Take 1 capsule by mouth once daily. 30 capsule 11       Allergies: Review of patient's allergies indicates:  No Known Allergies    SH:   Social History     Socioeconomic History    Marital status:      Spouse name: Not on file    Number of children: Not on file    Years of education: Not on file    Highest education level: Not on file   Occupational History    Not on file   Social Needs    Financial resource strain: Not on file    Food insecurity     Worry: Not on file     Inability: Not on file    Transportation needs     Medical: Not on file     Non-medical: Not on file   Tobacco Use    Smoking status: Never Smoker    Smokeless tobacco: Never Used   Substance and Sexual Activity    Alcohol use: No    Drug use: No    Sexual activity: Not on file   Lifestyle    Physical activity     Days per week: Not on file     Minutes per session: Not on file    Stress: Not on file   Relationships    Social connections     Talks on phone: Not on file     Gets together: Not on file     Attends [employer] service: Not on file     Active member of club or organization: Not on file     Attends meetings of clubs or organizations: Not on file     Relationship status: Not on file   Other Topics Concern    Not on file   Social History Narrative    Not on file       FH:    ROS: otherwise negative except indicated in HPI     BP (!) 151/66    "Pulse 73   Temp 98.1 °F (36.7 °C) (Oral)   Resp 14   Ht 5' 7" (1.702 m)   Wt 84.4 kg (186 lb)   LMP  (LMP Unknown)   SpO2 96%   BMI 29.13 kg/m²   NAD, Alert, Awake, Ox4   HEENT: atraumatic, normal cephalic, neg ecchymosis, lacerations   CV: No increased work of breathing   Pulm: Normal work of breathing   Skin: No cutaneous rash, no open wounds   Vascular: 2+ RP, wwp, bcr   RLE  (-) log roll,   Well healed TKA anterior incision  Knee lig stable  ROM knee 0-110 degrees painless\  SILt distal  Foot warm well perfused  EHL/FHL 5/5  LLE  Pain over pubic symphysis  No groin pain  (-) log roll  TTP over knee diffusely  Gross coronal instability of knee  Pain with ROM 10-80  No open wounds or sinus tracts  Large palpable effusion  No pain distal to knee  Painless ankle dorsiflexion    Imaging:  Independent review of imaging shows R inf pubic ramus fractuire and significant L knee erosion with effusion on CT.      A/P:   69F on ESRD with nondisplaced R pubic ramus fracture and L knee effusion and pain    -evaluated at bedside  -given phys exam and degenerative changes likely due to septic etiology, a bedside knee aspiration was performed using a standard suprapatellar approach with a 18g needle. We aspirated 30cc of cloudy fluid and sent for analysis  -pending results, if it appears infected we will proceed with I&D in the OR  -we discussed in extent that this appears to be an infectious process that has been present for quite some time given the extent of arthritic degeneration in her knee showing complete destruction of both femoral and tibial joint surfaces  -in regards to her pubic ramus fracture, she is ok to WBAT with no indication for operative management of this  -will follow up closely for possible operative scheduling  -NpO  -hold antibiotics    Dr. Yeison Tipton, PGY3  [facility] Orthopaedic Surgery  Pager: [phone]          "

## 2020-08-10 NOTE — ASSESSMENT & PLAN NOTE
S/p mechanical fall 1 week ago  -imaging ? Septic arthritis  -s/p empiric antibiotics   -evaluated by orthopedic surgery, arthrocentesis done, analysis pending   -keep NPO  -continue supportive management

## 2020-08-10 NOTE — PLAN OF CARE
Pt AAOx3. Contact precautions continued. Medications administered per order. Pain to the left side of body, managed with prn medication. NPO after midnight; pt verbalized understanding. NC maintained. Cardiac monitor maintained. Blood glucose monitored. Held morning sliding scale due to patient being NPO. Encouraged to call with questions/concerns/assistance. Will continue to monitor. Safety maintained.

## 2020-08-11 PROBLEM — R19.7 DIARRHEA: Status: ACTIVE | Noted: 2020-08-11

## 2020-08-11 LAB
ALBUMIN SERPL BCP-MCNC: 3.4 G/DL (ref 3.5–5.2)
ALP SERPL-CCNC: 63 U/L (ref 55–135)
ALT SERPL W/O P-5'-P-CCNC: 16 U/L (ref 10–44)
ANION GAP SERPL CALC-SCNC: 14 MMOL/L (ref 8–16)
AST SERPL-CCNC: 26 U/L (ref 10–40)
BASOPHILS # BLD AUTO: 0.03 K/UL (ref 0–0.2)
BASOPHILS NFR BLD: 0.7 % (ref 0–1.9)
BILIRUB SERPL-MCNC: 0.5 MG/DL (ref 0.1–1)
BUN SERPL-MCNC: 60 MG/DL (ref 8–23)
CALCIUM SERPL-MCNC: 9.2 MG/DL (ref 8.7–10.5)
CHLORIDE SERPL-SCNC: 101 MMOL/L (ref 95–110)
CO2 SERPL-SCNC: 22 MMOL/L (ref 23–29)
CREAT SERPL-MCNC: 8.4 MG/DL (ref 0.5–1.4)
DIFFERENTIAL METHOD: ABNORMAL
EOSINOPHIL # BLD AUTO: 0.1 K/UL (ref 0–0.5)
EOSINOPHIL NFR BLD: 2.9 % (ref 0–8)
ERYTHROCYTE [DISTWIDTH] IN BLOOD BY AUTOMATED COUNT: 16.1 % (ref 11.5–14.5)
EST. GFR  (AFRICAN AMERICAN): 5 ML/MIN/1.73 M^2
EST. GFR  (NON AFRICAN AMERICAN): 4 ML/MIN/1.73 M^2
GLUCOSE SERPL-MCNC: 115 MG/DL (ref 70–110)
HBV SURFACE AG SERPL QL IA: NEGATIVE
HCT VFR BLD AUTO: 37.7 % (ref 37–48.5)
HGB BLD-MCNC: 11.8 G/DL (ref 12–16)
IMM GRANULOCYTES # BLD AUTO: 0.04 K/UL (ref 0–0.04)
IMM GRANULOCYTES NFR BLD AUTO: 0.9 % (ref 0–0.5)
LYMPHOCYTES # BLD AUTO: 0.9 K/UL (ref 1–4.8)
LYMPHOCYTES NFR BLD: 20.8 % (ref 18–48)
MCH RBC QN AUTO: 27.6 PG (ref 27–31)
MCHC RBC AUTO-ENTMCNC: 31.3 G/DL (ref 32–36)
MCV RBC AUTO: 88 FL (ref 82–98)
MONOCYTES # BLD AUTO: 0.5 K/UL (ref 0.3–1)
MONOCYTES NFR BLD: 11.2 % (ref 4–15)
NEUTROPHILS # BLD AUTO: 2.9 K/UL (ref 1.8–7.7)
NEUTROPHILS NFR BLD: 63.5 % (ref 38–73)
NRBC BLD-RTO: 0 /100 WBC
PLATELET # BLD AUTO: 201 K/UL (ref 150–350)
PMV BLD AUTO: 11 FL (ref 9.2–12.9)
POCT GLUCOSE: 121 MG/DL (ref 70–110)
POCT GLUCOSE: 132 MG/DL (ref 70–110)
POCT GLUCOSE: 163 MG/DL (ref 70–110)
POTASSIUM SERPL-SCNC: 6.3 MMOL/L (ref 3.5–5.1)
PROT SERPL-MCNC: 7.3 G/DL (ref 6–8.4)
RBC # BLD AUTO: 4.27 M/UL (ref 4–5.4)
SODIUM SERPL-SCNC: 137 MMOL/L (ref 136–145)
WBC # BLD AUTO: 4.48 K/UL (ref 3.9–12.7)

## 2020-08-11 PROCEDURE — 97535 SELF CARE MNGMENT TRAINING: CPT

## 2020-08-11 PROCEDURE — 94761 N-INVAS EAR/PLS OXIMETRY MLT: CPT

## 2020-08-11 PROCEDURE — 36415 COLL VENOUS BLD VENIPUNCTURE: CPT

## 2020-08-11 PROCEDURE — 90935 HEMODIALYSIS ONE EVALUATION: CPT

## 2020-08-11 PROCEDURE — 80053 COMPREHEN METABOLIC PANEL: CPT

## 2020-08-11 PROCEDURE — 25000003 PHARM REV CODE 250: Performed by: NURSE PRACTITIONER

## 2020-08-11 PROCEDURE — 85025 COMPLETE CBC W/AUTO DIFF WBC: CPT

## 2020-08-11 PROCEDURE — 97530 THERAPEUTIC ACTIVITIES: CPT

## 2020-08-11 PROCEDURE — 25000003 PHARM REV CODE 250: Performed by: INTERNAL MEDICINE

## 2020-08-11 PROCEDURE — 27000221 HC OXYGEN, UP TO 24 HOURS

## 2020-08-11 PROCEDURE — 99900035 HC TECH TIME PER 15 MIN (STAT)

## 2020-08-11 PROCEDURE — 11000001 HC ACUTE MED/SURG PRIVATE ROOM

## 2020-08-11 PROCEDURE — 25000003 PHARM REV CODE 250: Performed by: HOSPITALIST

## 2020-08-11 RX ORDER — SODIUM CHLORIDE 9 MG/ML
INJECTION, SOLUTION INTRAVENOUS
Status: DISCONTINUED | OUTPATIENT
Start: 2020-08-11 | End: 2020-08-19 | Stop reason: HOSPADM

## 2020-08-11 RX ORDER — SODIUM CHLORIDE 9 MG/ML
INJECTION, SOLUTION INTRAVENOUS ONCE
Status: DISCONTINUED | OUTPATIENT
Start: 2020-08-11 | End: 2020-08-15

## 2020-08-11 RX ORDER — AMLODIPINE BESYLATE 5 MG/1
10 TABLET ORAL DAILY
Status: DISCONTINUED | OUTPATIENT
Start: 2020-08-11 | End: 2020-08-19 | Stop reason: HOSPADM

## 2020-08-11 RX ADMIN — HYDROCODONE BITARTRATE AND ACETAMINOPHEN 1 TABLET: 5; 325 TABLET ORAL at 07:08

## 2020-08-11 RX ADMIN — MUPIROCIN: 20 OINTMENT TOPICAL at 08:08

## 2020-08-11 RX ADMIN — CALCIUM ACETATE 667 MG: 667 CAPSULE ORAL at 05:08

## 2020-08-11 RX ADMIN — CLOPIDOGREL 75 MG: 75 TABLET, FILM COATED ORAL at 03:08

## 2020-08-11 RX ADMIN — MIRTAZAPINE 7.5 MG: 7.5 TABLET ORAL at 08:08

## 2020-08-11 RX ADMIN — FUROSEMIDE 40 MG: 40 TABLET ORAL at 03:08

## 2020-08-11 RX ADMIN — PRAVASTATIN SODIUM 80 MG: 20 TABLET ORAL at 03:08

## 2020-08-11 RX ADMIN — CALCIUM ACETATE 667 MG: 667 CAPSULE ORAL at 08:08

## 2020-08-11 RX ADMIN — PANTOPRAZOLE SODIUM 40 MG: 40 TABLET, DELAYED RELEASE ORAL at 03:08

## 2020-08-11 RX ADMIN — AMLODIPINE BESYLATE 10 MG: 5 TABLET ORAL at 05:08

## 2020-08-11 RX ADMIN — METOPROLOL SUCCINATE 50 MG: 25 TABLET, FILM COATED, EXTENDED RELEASE ORAL at 03:08

## 2020-08-11 RX ADMIN — NEPHROCAP 1 CAPSULE: 1 CAP ORAL at 03:08

## 2020-08-11 RX ADMIN — SERTRALINE HYDROCHLORIDE 100 MG: 100 TABLET ORAL at 03:08

## 2020-08-11 NOTE — ASSESSMENT & PLAN NOTE
Taking amlodipine 10 mg daily and metoprolol 50 mg daily  - hold amlodipine for now due to post-HD hypotension

## 2020-08-11 NOTE — PLAN OF CARE
Pt AAOx4, VSS, NAD. No complaints of pain or N/V. Pt tolerated diet. Pt had hemodialysis during shift. Dressing to AV graft clean, jerrod and intact. No other needs at this time. Safety maintained. Will continue to monitor.

## 2020-08-11 NOTE — CONSULTS
[facility]ocrine [facility]/General Surgery  Consultation Note/H&P    SUBJECTIVE:     Reason for Consultation:   Bleeding dialysis access    History of Present Illness:  68 yo F with history of CAD, CHF, pulmonary HTN, DM2, sarcoidosis, and ESRD currently admitted for diarrhea and weakness found to be hyperkalemic. Patient dialyzes via right forearm loop AV graft. Was receiving dialysis today and had uncontrolled bleeding after needle was removed.    Allergies:  Review of patient's allergies indicates:  No Known Allergies    Home Medications:  No current facility-administered medications on file prior to encounter.      Current Outpatient Medications on File Prior to Encounter   Medication Sig    amLODIPine (NORVASC) 10 MG tablet Take 1 tablet (10 mg total) by mouth once daily.    apixaban (ELIQUIS) 2.5 mg Tab Take 1 tablet (2.5 mg total) by mouth 2 (two) times daily.    blood sugar diagnostic (BLOOD GLUCOSE TEST) Strp Inject 1 strip into the skin once daily.    blood-glucose meter kit To check BG 1 times daily, to use with insurance preferred meter    calcium acetate (PHOSLO) 667 mg capsule Take 1 capsule (667 mg total) by mouth 3 (three) times daily with meals.    clopidogreL (PLAVIX) 75 mg tablet Take 1 tablet (75 mg total) by mouth once daily.    furosemide (LASIX) 40 MG tablet Take 1 tablet (40 mg total) by mouth once daily.    gabapentin (NEURONTIN) 300 MG capsule Take 1 capsule (300 mg total) by mouth every 8 (eight) hours as needed.    HYDROcodone-acetaminophen (NORCO) 5-325 mg per tablet Take 1 tablet by mouth every 24 hours as needed for Pain.    lancets Misc To check BG 1 times daily, to use with insurance preferred meter    metoprolol succinate (TOPROL-XL) 50 MG 24 hr tablet Take 1 tablet (50 mg total) by mouth once daily.    mirtazapine (REMERON) 7.5 MG Tab Take 1 tablet (7.5 mg total) by mouth every evening.    pantoprazole (PROTONIX) 40 MG tablet Take 1 tablet (40 mg total) by mouth once  daily.    pravastatin (PRAVACHOL) 80 MG tablet Take 1 tablet (80 mg total) by mouth once daily.    sertraline (ZOLOFT) 100 MG tablet Take 1 tablet (100 mg total) by mouth once daily.    vitamin renal formula, B-complex-vitamin c-folic acid, (NEPHROCAP) 1 mg Cap Take 1 capsule by mouth once daily.    docusate sodium (COLACE) 100 MG capsule Take 1 capsule (100 mg total) by mouth 2 (two) times daily.    nitroGLYCERIN (NITROSTAT) 0.4 MG SL tablet Place 1 tablet (0.4 mg total) under the tongue every 5 (five) minutes as needed for Chest pain.    ondansetron (ZOFRAN) 4 MG tablet Take 4 mg by mouth every 8 (eight) hours as needed for Nausea.    polyethylene glycol (GLYCOLAX) 17 gram/dose powder        Past Medical History:   Diagnosis Date    Anemia due to gastrointestinal blood loss 7/11/2018    Arthritis     Asthma     Breast cancer, left     history of left breast cancer status post mastectomy,    CHF (congestive heart failure)     home O2 2lpm    Cholelithiasis     Coronary artery disease     Debility 2019    mostly wheelchair bound but can use a walker with assistance    Diabetes mellitus, type 2     with neuropathy and nephropathy    Encounter for blood transfusion     End-stage renal disease on hemodialysis     mon-wed -fri    Hyperlipidemia     Hypertension     Lumbar stenosis 2017    by MRI    Sarcoidosis      Past Surgical History:   Procedure Laterality Date    av graft      left arm    BRACHIAL ARTERY GRAFT Left 05/04/2016    brachiocephalic, Dr. Anson Crocker    BREAST BIOPSY Left     breast ca    BREAST LUMPECTOMY Left     radiation only    btl      COLONOSCOPY N/A 7/13/2018    Procedure: COLONOSCOPY;  Surgeon: Almita Bee MD;  Location: [facility];  Service: Endoscopy;  Laterality: N/A;    ELBOW SURGERY      left    ESOPHAGOGASTRODUODENOSCOPY N/A 7/13/2018    Procedure: ESOPHAGOGASTRODUODENOSCOPY (EGD);  Surgeon: Almita Bee MD;  Location: [facility];  Service:  Endoscopy;  Laterality: N/A;    LIPOMA RESECTION      back of neck    pilondial cyst      TOTAL KNEE ARTHROPLASTY Right 02/23/2016     Family History   Problem Relation Age of Onset    Diabetes Mother     Kidney disease Mother     Hypertension Mother     Diabetes Sister     Heart disease Sister      Social History     Tobacco Use    Smoking status: Never Smoker    Smokeless tobacco: Never Used   Substance Use Topics    Alcohol use: No    Drug use: No        Review of Systems:  Constitutional: no fever or chills  Eyes: no visual changes  ENT: no nasal congestion or sore throat  Gastrointestinal: Diarrhea, now improving, no nausea or vomiting, no abdominal pain  Genitourinary: no hematuria or dysuria  Integument/Breast: no rash or pruritis  Hematologic/Lymphatic: no easy bruising or lymphadenopathy  Musculoskeletal: no arthralgias or myalgias  Neurological: no seizures or tremors  Behavioral/Psych: no auditory or visual hallucinations  Endocrine: no heat or cold intolerance    OBJECTIVE:     Vital Signs:  Temp: 97.7 °F (36.5 °C) (08/11/20 1425)  Pulse: 76 (08/11/20 1549)  Resp: 18 (08/11/20 1425)  BP: (!) 179/89 (08/11/20 1540)  SpO2: 95 % (08/11/20 0805)    Physical Exam:  NAD, AAOx3, nontoxic appearing  Normal respiratory effort on room air  RRR  Abdomen soft, nontender, nondistended  Moving all extremities  Right forearm graft with pulsatile punctate bleeding area overlying graft    Laboratory:  Labs Reviewed   CBC W/ AUTO DIFFERENTIAL - Abnormal; Notable for the following components:       Result Value    Mean Corpuscular Hemoglobin Conc 30.9 (*)     RDW 16.4 (*)     Lymph # 0.9 (*)     Lymph% 17.7 (*)     All other components within normal limits   COMPREHENSIVE METABOLIC PANEL - Abnormal; Notable for the following components:    Potassium 6.4 (*)     Glucose 172 (*)     BUN, Bld 88 (*)     Creatinine 10.4 (*)     eGFR if  4 (*)     eGFR if non  3 (*)     All other  components within normal limits    Narrative:      K  critical result(s) called and verbal readback obtained from                   Muna VASQUEZ  by AM1 08/09/2020 17:58   APTT - Abnormal; Notable for the following components:    aPTT 35.6 (*)     All other components within normal limits   TROPONIN I - Abnormal; Notable for the following components:    Troponin I 0.036 (*)     All other components within normal limits   B-TYPE NATRIURETIC PEPTIDE - Abnormal; Notable for the following components:     (*)     All other components within normal limits   PHOSPHORUS - Abnormal; Notable for the following components:    Phosphorus 7.4 (*)     All other components within normal limits   POTASSIUM - Abnormal; Notable for the following components:    Potassium 5.2 (*)     All other components within normal limits   SEDIMENTATION RATE - Abnormal; Notable for the following components:    Sed Rate 48 (*)     All other components within normal limits   C-REACTIVE PROTEIN - Abnormal; Notable for the following components:    CRP 12.0 (*)     All other components within normal limits   POCT GLUCOSE - Abnormal; Notable for the following components:    POCT Glucose 182 (*)     All other components within normal limits   POCT GLUCOSE - Abnormal; Notable for the following components:    POCT Glucose 164 (*)     All other components within normal limits   POCT GLUCOSE - Abnormal; Notable for the following components:    POCT Glucose 188 (*)     All other components within normal limits   POCT GLUCOSE - Abnormal; Notable for the following components:    POCT Glucose 165 (*)     All other components within normal limits   SARS-COV-2 RNA AMPLIFICATION, QUAL   PROTIME-INR   MAGNESIUM   PHOSPHORUS   MAGNESIUM   C-REACTIVE PROTEIN   URINALYSIS, REFLEX TO URINE CULTURE       Diagnostic Results:  Imaging Results          CT Pelvis Without Contrast (Final result)  Result time 08/09/20 21:10:28   Procedure changed from CT Hip Without  Contrast Left     Final result by Vito Garcia MD (08/09/20 21:10:28)                 Impression:      Negative CT of the pelvis for fracture specially of the left hip.    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    Severe atherosclerotic vascular disease most severe the SFA bilaterally.      Electronically signed by: Vito Garcia  Date:    08/09/2020  Time:    21:10             Narrative:    EXAMINATION:  CT PELVIS WITHOUT CONTRAST    CLINICAL HISTORY:  Hip trauma, nondiagnostic xray;    TECHNIQUE:  Axial CT images of the left hip were obtained with sagittal and coronal reformatted images.    COMPARISON:  Plain film of the hip,, 08/09/2020    FINDINGS:  The cortex and trabecular markings of the femur appear intact without displaced fracture or bony destructive process.  The intertrochanteric and proximal diaphysis appear intact as well.    Moderate osteoarthritis of the femoroacetabular joint is present without evidence of pincer or CAM deformity.    The adjacent pelvic bones are intact.  There is mild irregularity of the ischial tuberosity insertion of the hamstring tendons suggesting mild insertional tendinitis.    The endplates of the lumbar spine are markedly irregular with erosive changes at the L4-5 level and mild compression of the anterior endplate of L5 appearing remote with vacuum phenomenon.  Facet arthropathy is prominent.  There is no spondylolysis or significant central canal stenosis evident as imaged.  Degenerative changes in the SI joints are present.    Atherosclerotic disease and a normal caliber aorta and iliac system are noted.  This extends extensively into the SFA bilaterally.    The uterus demonstrates multiple large chunky calcifications suggesting degenerating fibroadenomas.  The urinary bladder appears unremarkable.  There is no pelvic mass or free fluid.                                CT Knee Without Contrast Left  (Final result)  Result time 08/09/20 20:54:03    Final result by Fuentes Vincent MD (08/09/20 20:54:03)                 Impression:      Findings indicating destruction of the knee joint secondary to aseptic process.    This report was flagged in Epic as abnormal.      Electronically signed by: Fuentes Vincent  Date:    08/09/2020  Time:    20:54             Narrative:    EXAMINATION:  CT KNEE WITHOUT CONTRAST LEFT    CLINICAL HISTORY:  Knee instability;    TECHNIQUE:  Multiple axial images of the left knee were obtained without the aid of IV contrast.  The study was reformatted creating a 3D image was separate workstation under direct supervision.    COMPARISON:  None    FINDINGS:  The knee joint reveals significant damage and deformity which is consistent with a septic joint.  The tibial plateau is fractured and there does not appear to be any discernible normal articular surface involving either the tibial plateau or the distal femur.  Both condyles of the distal femur are deformed and appear to be diminutive.  The articular surfaces of the femur and tibia appear to be mild feeding.  There is no normal articulation between the femur and the tibial plateau.  The surrounding soft tissues of the knee shows soft tissue induration and what appears to be fluid collection which most likely represents abscess fluid.  Suggest consideration aspiration in the for the purposes of obtaining culture and sensitivity information.                               X-Ray Pelvis AP Inlet And Outlet (Final result)  Result time 08/09/20 19:42:20    Final result by Poncho Handley MD (08/09/20 19:42:20)                 Impression:      1. Cortical irregularity involving the inferior pubic ramus suggests fracture, possibly subacute though not confirmed.  Correlation with focal tenderness and physical exam advised.      Electronically signed by: Poncho Handley MD  Date:    08/09/2020  Time:    19:42             Narrative:     EXAMINATION:  XR PELVIS AP INLET AND OUTLET    CLINICAL HISTORY:  Inlet and Outlet Views;  Unspecified fall, initial encounter    COMPARISON:  Pelvis radiograph 08/09/2020, abdominal radiograph 08/09/2020    FINDINGS:  Two views pelvis.    Osteopenia and artifact from habitus limit evaluation.  The bilateral sacroiliac joints appear intact.  The pubic symphysis is intact noting degenerative changes.  The region however is obscured by stool within the rectum.  The bilateral femoroacetabular joints are intact.  There is subtle cortical irregularity involving the inferior pubic ramus concerning for fracture, possibly subacute however correlation is needed.  There is extensive vascular calcification..                               X-Ray Abdomen AP 1 View (KUB) (Final result)  Result time 08/09/20 18:36:34    Final result by Poncho Handley MD (08/09/20 18:36:34)                 Impression:      1. Moderate stool in the colon, nonobstructive bowel gas pattern.  2. Suspected fracture of the right inferior pubic ramus discussed in previous report.      Electronically signed by: Poncho Handley MD  Date:    08/09/2020  Time:    18:36             Narrative:    EXAMINATION:  XR ABDOMEN AP 1 VIEW    CLINICAL HISTORY:  Diarrhea, unspecified    TECHNIQUE:  AP View(s) of the abdomen was performed.    COMPARISON:  07/09/2018    FINDINGS:  Single-view abdomen supine.    Air and stool is seen within the large bowel and projected over the rectum.  There is moderate stool throughout the colon.  No focally dilated small bowel loops.  There are no calcifications to convincingly suggest nephrolithiasis or cholelithiasis.  Degenerative changes are noted of the osseous structures.  There is vascular calcification.                               X-Ray Chest 1 View (Final result)  Result time 08/09/20 18:35:08    Final result by Poncho Handley MD (08/09/20 18:35:08)                 Impression:      1. Interstitial findings could  reflect early congestive change or edema versus chronic change in this hypoventilatory exam.  Correlation is advised.  Other nonspecific infectious or inflammatory pneumonitis is a consideration      Electronically signed by: Poncho Handley MD  Date:    08/09/2020  Time:    18:35             Narrative:    EXAMINATION:  XR CHEST 1 VIEW    CLINICAL HISTORY:  Cough    TECHNIQUE:  Single frontal view of the chest was performed.    COMPARISON:  11/20/2019    FINDINGS:  The cardiomediastinal silhouette is prominent noting magnification by technique.  There is calcification of the aorta..  There is slight obscuration of the right costophrenic angle, may reflect small effusion versus atelectasis..  The trachea is midline.  The lungs are symmetrically expanded bilaterally with mildly coarse interstitial attenuation.  No large focal consolidation seen.  There is no pneumothorax.  The osseous structures are remarkable for degenerative changes.  There is a left subclavian vascular stent.  Surgical changes project over the left axilla..                               X-Ray Femur AP/LAT Left (Final result)  Result time 08/09/20 18:32:13    Final result by Poncho aHndley MD (08/09/20 18:32:13)                 Impression:      1. No convincing acute displaced fracture or dislocation of the proximal femur, please see separately dictated report for details of the distal femur at the knee.      Electronically signed by: Poncho Handley MD  Date:    08/09/2020  Time:    18:32             Narrative:    EXAMINATION:  XR FEMUR 2 VIEW LEFT    CLINICAL HISTORY:  Unspecified fall, initial encounter    TECHNIQUE:  AP and lateral views of the left femur were performed.    COMPARISON:  None}    FINDINGS:  Four views left femur.    The left femoral head maintains appropriate relationship with the acetabulum.  Please see separately dictated report for details of the distal femur at the knee as well as proximal tibia.  There is a moderate  suprapatellar effusion.  There is edema about the leg.  There is vascular calcification.                               X-Ray Knee 3 View Left (Final result)  Result time 08/09/20 18:28:46    Final result by Poncho Handley MD (08/09/20 18:28:46)                 Impression:      1. Severe degenerative changes of the knee, markedly limit evaluation for acute fracture.  No previous examinations are available for comparison.  The appearance of the knee may reflect that of degenerative/post infectious change.  No definite acute displaced fracture although clinical correlation is needed and comparison with previous examinations is highly recommended.  There is a moderate suprapatellar effusion and edema about the leg.      Electronically signed by: Poncho Handley MD  Date:    08/09/2020  Time:    18:28             Narrative:    EXAMINATION:  XR KNEE 3 VIEW LEFT    CLINICAL HISTORY:  Unspecified fall, initial encounter    TECHNIQUE:  AP, lateral, and Merchant views of the left knee were performed.    COMPARISON:  None    FINDINGS:  Three views left knee.    There is severe degenerative change of the left knee versus post infectious change.  Evaluation for fracture is severely limited given the appearance however multifocal dispersed osseous fragments appear well corticated.  There is a moderate suprapatellar effusion.  There is vascular calcification.  There is osteopenia.                                X-Ray Pelvis Routine AP (Edited Result - FINAL)  Result time 08/09/20 18:37:50    Addendum 1 of 1 by Poncho Handley MD (08/09/20 18:37:50)      This report was flagged in Epic as abnormal.    Please note, after initial interpretation of this examination, radiograph of the abdomen became available for review.  In retrospect, there appears to be lucency involving the right inferior pubic ramus concerning for fracture, better seen on that examination.  Acuity is unknown, correlation with focal tenderness is  advised.      Electronically signed by: Poncho Handley MD  Date:    08/09/2020  Time:    18:37               Final result by Poncho Handley MD (08/09/20 18:31:05)                 Impression:      1. No convincing acute displaced fracture or dislocation of the pelvis.      Electronically signed by: Poncho Handley MD  Date:    08/09/2020  Time:    18:31             Narrative:    EXAMINATION:  XR PELVIS ROUTINE AP    CLINICAL HISTORY:  Unspecified fall, initial encounter    TECHNIQUE:  AP view of the pelvis was performed.    COMPARISON:  None.    FINDINGS:  Single-view pelvis.    There is osteopenia.  Degenerative changes are noted of the bilateral sacroiliac joints and pubic symphysis.  The bilateral femoroacetabular joints are intact.  There is vascular calcification.                               X-Ray Tibia Fibula 2 View Left (Final result)  Result time 08/09/20 18:29:48    Final result by Poncho Handley MD (08/09/20 18:29:48)                 Impression:      1. Please see separately dictated report for details of the knee and proximal tibia in this patient with severe degenerative changes of the same.  No findings to suggest distal fracture.      Electronically signed by: Poncho Handley MD  Date:    08/09/2020  Time:    18:29             Narrative:    EXAMINATION:  XR TIBIA FIBULA 2 VIEW LEFT    CLINICAL HISTORY:  Unspecified fall, initial encounter    TECHNIQUE:  AP and lateral views of the left tibia and fibula were performed.    COMPARISON:  None.    FINDINGS:  Four views tibia fibula.    Severe degenerative change/post infectious change noted of the knee, evaluated on knee radiograph same date.  The remaining aspects of the tibia and fibula appear intact without acute displaced fracture or dislocation.  There is vascular calcification.  The ankle appears intact.  There is edema about the ankle and leg.                               CT Head Without Contrast (Final result)  Result time 08/09/20  17:54:28    Final result by Willie Singh MD (08/09/20 17:54:28)                 Impression:      No acute intracranial process.    Advanced age-related changes.      Electronically signed by: Willie Singh MD  Date:    08/09/2020  Time:    17:54             Narrative:    EXAMINATION:  CT HEAD WITHOUT CONTRAST    CLINICAL HISTORY:  Head trauma, minor (Age => 66y);    TECHNIQUE:  Low dose axial images were obtained through the head.  Coronal and sagittal reformations were also performed. Contrast was not administered.  There are some motion limitations to the exam.    COMPARISON:  CT dated 11/18/2019.    FINDINGS:  The subcutaneous tissues are unremarkable.  The bony calvarium is intact.  The paranasal sinuses are unremarkable.  The mastoid air cells are clear.  There are postoperative changes in the orbits.    The craniocervical junction is unremarkable.  There are no extra-axial fluid collections.  There is no evidence of intracranial hemorrhage.  The ventricles and sulci are prominent, consistent with cerebral volume loss.  There are extensive hypodensities within the periventricular and subcortical white matter.  The gray-white differentiation is maintained.  There is no evidence of mass effect.                                ASSESSMENT:     68 yo F with history of CAD, CHF, pulmonary HTN, DM2, sarcoidosis, and ESRD with dialysis graft issues.    PLAN:     -Two prolene sutures placed at bedside to control the bleeding  -Agree with US studies of fistula to assess possible causes of increased pressures  -Will likely need fistulagram this admission pending US results      Tahir Laws MD  PGY-2, [facility] General Surgery  Neuroendocrine Surgery  8/11/2020  4:53 PM

## 2020-08-11 NOTE — PLAN OF CARE
Problem: Occupational Therapy Goal  Goal: Occupational Therapy Goal  Description: Goals to be met by: 9/10/20     Patient will increase functional independence with ADLs by performing:    LE Dressing with Minimal Assistance.  Grooming while seated with Modified [place].  Toileting from bedside commode with Minimal Assistance for hygiene and clothing management.   Supine to sit with Contact Guard Assistance.  Stand pivot transfers with Minimal Assistance.  Toilet transfer to bedside commode with Minimal Assistance.  Increased functional strength to 4/5 for self care skills and functional mobility.  Upper extremity exercise program x10 reps per handout, with independence.    Outcome: Ongoing, Progressing       Pt progressing towards goals, however, limited session 2/2 pt with bowel incontinence. Will progress pt as able

## 2020-08-11 NOTE — PLAN OF CARE
Problem: Physical Therapy Goal  Goal: Physical Therapy Goal  Description:   Goals to be met by: 9/10/2020     Patient will increase functional independence with mobility by performin. Supine to sit with Contact Guard Assistance  2. Sit to supine with MInimal Assistance  3. Rolling L/R with Modified [place] and use of bedrail as needed.  4. Sit to stand transfer with Minimal Assistance  5. Bed to chair transfer with Minimal Assistance with or without AD  6. Gait  x 15 feet as able with Minimal Assistance with or without AD  7. Lower extremity exercise program x10-15 reps with supervision  8. Increase functional strength to 4- to 4 for bed mobility and transfers, gait if able    Outcome: Ongoing, Progress  Patient performed bed level activity 2/2 large consecutive BMs; will cont with POC.

## 2020-08-11 NOTE — ASSESSMENT & PLAN NOTE
S/p mechanical fall 1 week ago  -imaging ? septic arthritis  -s/p empiric antibiotics in ED  -evaluated by orthopedic surgery, arthrocentesis done and not c/w septic joint  - stop abx  -PT/OT consulted: recommend SNF

## 2020-08-11 NOTE — SUBJECTIVE & OBJECTIVE
Interval History: knee feels better after tap but still sore. Planning to work with PT/OT today, will likely need SNF.    Review of Systems   Constitutional: Negative for fever.   Musculoskeletal: Positive for arthralgias and joint swelling.   Neurological: Positive for weakness.     Objective:     Vital Signs (Most Recent):  Temp: 97 °F (36.1 °C) (08/10/20 1648)  Pulse: 72 (08/10/20 1648)  Resp: 16 (08/10/20 1648)  BP: 117/86 (08/10/20 1648)  SpO2: 95 % (08/10/20 1320) Vital Signs (24h Range):  Temp:  [96.8 °F (36 °C)-98.1 °F (36.7 °C)] 97 °F (36.1 °C)  Pulse:  [56-76] 72  Resp:  [12-22] 16  SpO2:  [95 %-99 %] 95 %  BP: ()/() 117/86     Weight: 90.7 kg (199 lb 15.3 oz)  Body mass index is 31.32 kg/m².    Intake/Output Summary (Last 24 hours) at 8/10/2020 1906  Last data filed at 8/10/2020 1215  Gross per 24 hour   Intake 600 ml   Output 3000 ml   Net -2400 ml      Physical Exam  Constitutional:       Appearance: She is well-developed.      Comments: Frail   HENT:      Head: Normocephalic and atraumatic.   Eyes:      Conjunctiva/sclera: Conjunctivae normal.      Pupils: Pupils are equal, round, and reactive to light.   Neck:      Musculoskeletal: Normal range of motion.      Vascular: No JVD.   Cardiovascular:      Rate and Rhythm: Normal rate and regular rhythm.      Heart sounds: Normal heart sounds.   Pulmonary:      Effort: Pulmonary effort is normal. No respiratory distress.      Breath sounds: No wheezing.   Abdominal:      General: Bowel sounds are normal. There is no distension.      Palpations: Abdomen is soft.      Tenderness: There is no abdominal tenderness. There is no guarding.   Musculoskeletal: Normal range of motion.         General: No tenderness.   Skin:     General: Skin is warm and dry.      Capillary Refill: Capillary refill takes less than 2 seconds.   Neurological:      Mental Status: She is alert and oriented to person, place, and time.   Psychiatric:         Behavior: Behavior  normal.         Significant Labs: All pertinent labs within the past 24 hours have been reviewed.    Significant Imaging: I have reviewed all pertinent imaging results/findings within the past 24 hours.

## 2020-08-11 NOTE — PT/OT/SLP PROGRESS
Physical Therapy Treatment    Patient Name:  Angelina Beard   MRN:  [id number]    Recommendations:     Discharge Recommendations:  nursing facility, skilled   Discharge Equipment Recommendations: (TBD)   Barriers to discharge: Decreased caregiver support    Assessment:     Angelina Beard is a 69 y.o. female admitted with a medical diagnosis of Hyperkalemia.  She presents with the following impairments/functional limitations:  weakness, impaired endurance, impaired self care skills, impaired functional mobilty, gait instability, impaired balance, impaired sensation, decreased coordination, impaired fine motor, decreased upper extremity function, decreased lower extremity function, decreased ROM, impaired skin Patient performed bed level activity 2/2 large consecutive BMs; will cont with POC..    Rehab Prognosis: Good; patient would benefit from acute skilled PT services to address these deficits and reach maximum level of function.    Recent Surgery: * No surgery found *      Plan:     During this hospitalization, patient to be seen 6 x/week to address the identified rehab impairments via gait training, therapeutic activities, therapeutic exercises, neuromuscular re-education and progress toward the following goals:    · Plan of Care Expires:  09/10/20    Subjective       Pain/Comfort:  · Pain Rating 1: (no c/o pain during of after session)      Objective:     Communicated with nurse prior to session.  Patient found with peripheral IV upon PT entry to room.     General Precautions: Standard, fall   Orthopedic Precautions:(WBAT BLEs)   Braces: N/A     Functional Mobility:  · Bed Mobility:     · Rolling Left:  stand by assistance and use of side rail  · Rolling Right: stand by assistance and use of side rail  · Scooting: minimum assistance, of 2 persons toward HOB with bed in trendelenburg  · Bridging: stand by assistance and contact guard assistance      AM-PAC 6 CLICK MOBILITY  Turning over in bed (including  adjusting bedclothes, sheets and blankets)?: 3  Sitting down on and standing up from a chair with arms (e.g., wheelchair, bedside commode, etc.): 2  Moving from lying on back to sitting on the side of the bed?: 2  Moving to and from a bed to a chair (including a wheelchair)?: 1  Need to walk in hospital room?: 1  Climbing 3-5 steps with a railing?: 1  Basic Mobility Total Score: 10       Therapeutic Activities and Exercises:   Patient found supine; started having a BM; pt rolled L/R multiple times as described above for cleansing, assisting in holding each LE up, bridging for arrangement of pads and scooted up to the HOB as described; pt started having another BM so pt turned to be placed on bedpan.    Patient left on bedpan with HOB elevated with all lines intact, call button in reach, bed alarm on and nurse notified..    GOALS:   Multidisciplinary Problems     Physical Therapy Goals        Problem: Physical Therapy Goal    Goal Priority Disciplines Outcome Goal Variances Interventions   Physical Therapy Goal     PT, PT/OT Ongoing, Progressing     Description:   Goals to be met by: 9/10/2020     Patient will increase functional independence with mobility by performin. Supine to sit with Contact Guard Assistance  2. Sit to supine with MInimal Assistance  3. Rolling L/R with Modified [place] and use of bedrail as needed.  4. Sit to stand transfer with Minimal Assistance  5. Bed to chair transfer with Minimal Assistance with or without AD  6. Gait  x 15 feet as able with Minimal Assistance with or without AD  7. Lower extremity exercise program x10-15 reps with supervision  8. Increase functional strength to 4- to 4 for bed mobility and transfers, gait if able                     Time Tracking:     PT Received On: 20  PT Start Time: 935     PT Stop Time: 958  PT Total Time (min): 23 min     Billable Minutes: Therapeutic Activity 11 minutes    Treatment Type: Treatment  PT/PTA: PT     PTA Visit  Number: 0     Nimco Castro, PT  08/11/2020

## 2020-08-11 NOTE — NURSING
Page to Dr Martin went to  ,left message to call 5a in order to notify him of 530's [id number].Pt in nad.

## 2020-08-11 NOTE — PLAN OF CARE
[facility] Plan of Care    Notified by HD Team that Ms. Beard had prolonged bleeding after HD this morning. Weak thrill with increase pulsatility on AVF exam. Venous HD pressures elevated today as compared to yesterday.     Appreciate surgery assistance with hemostasis. Patient currently in NAD and transported back to her room. Ultrasound AV access ordered. Likely will need fistulagram in AM.     Plan of care discussed with surgery service. Primary team notified. NPO at midnight.     Enrrique Pearl MD  [facility]

## 2020-08-11 NOTE — PROGRESS NOTES
[facility] Hospital Medicine  Progress Note    Patient Name: Angelina Beard  MRN: [id number]  Patient Class: OP- Observation   Admission Date: 8/9/2020  Length of Stay: 0 days  Attending Physician: Enrrique Martin, *  Primary Care Provider: Ben Mena MD        Subjective:     Principal Problem:Hyperkalemia        HPI:  Angelina Beard is a 68 yo female with hypertension, chronic diastolic congestive heart failure, paroxysmal atrial fibrillation (anticoagulated on apixaban), diabetes mellitus type 2 (hemoglobin A1c 6.9% on 4/28/2020) with neuropathy and nephropathy, hyperlipidemia, nonobstructive coronary artery disease, cerebral microvascular disease, end stage renal disease on hemodialysis (Monday Wednesday Friday at [facility] in [facility]), anemia, gastroesophageal reflux disease, osteoarthritis status post right total knee arthroplasty 2/23/16, chronic left leg pain, chronic constipation, cholelithiasis, history of right lung sarcoidosis, history of left breast cancer status post mastectomy, history of gastric ulcers on 7/13/18. Her primary care physician is Dr. Ben Mena. Her nephrologist is Dr. Rc Santamaria. Her cardiologist is Dr. Faraz Flores. Her podiatrist is Dr. Kwame Dacosta. She lives in [place], [place]. She is mostly wheelchair bound but can use a walker with assistance.     Pt presented to ED with c/o diarrhea, generalized weakness and acute on chronic left knee and left hip pain s/p mechanical fall 1 week ago. She denies vomiting, diarrhea, fever/chills and abdominal pain. KUB shows constipation, no evidence of obstruction. At baseline, she is in a wheelchair/walker due to weakness, but said she has had trouble with transfers for the past week due to her L knee pain. Denies chest pain and worsening SOB. Labs remarkable for K 6.4, troponin 0.036.  Xray shows R nondisplaced pubic ramus fracture and significant erosion of L knee appears 2/2 sepitic  arthropathy. Orthopedic surgery consulted by ED. Pt admitted to [facility] hospital medicine.     Overview/Hospital Course:  No notes on file    Interval History: knee feels better after tap but still sore. Planning to work with PT/OT today, will likely need SNF.    Review of Systems   Constitutional: Negative for fever.   Musculoskeletal: Positive for arthralgias and joint swelling.   Neurological: Positive for weakness.     Objective:     Vital Signs (Most Recent):  Temp: 97 °F (36.1 °C) (08/10/20 1648)  Pulse: 72 (08/10/20 1648)  Resp: 16 (08/10/20 1648)  BP: 117/86 (08/10/20 1648)  SpO2: 95 % (08/10/20 1320) Vital Signs (24h Range):  Temp:  [96.8 °F (36 °C)-98.1 °F (36.7 °C)] 97 °F (36.1 °C)  Pulse:  [56-76] 72  Resp:  [12-22] 16  SpO2:  [95 %-99 %] 95 %  BP: ()/() 117/86     Weight: 90.7 kg (199 lb 15.3 oz)  Body mass index is 31.32 kg/m².    Intake/Output Summary (Last 24 hours) at 8/10/2020 1906  Last data filed at 8/10/2020 1215  Gross per 24 hour   Intake 600 ml   Output 3000 ml   Net -2400 ml      Physical Exam  Constitutional:       Appearance: She is well-developed.      Comments: Frail   HENT:      Head: Normocephalic and atraumatic.   Eyes:      Conjunctiva/sclera: Conjunctivae normal.      Pupils: Pupils are equal, round, and reactive to light.   Neck:      Musculoskeletal: Normal range of motion.      Vascular: No JVD.   Cardiovascular:      Rate and Rhythm: Normal rate and regular rhythm.      Heart sounds: Normal heart sounds.   Pulmonary:      Effort: Pulmonary effort is normal. No respiratory distress.      Breath sounds: No wheezing.   Abdominal:      General: Bowel sounds are normal. There is no distension.      Palpations: Abdomen is soft.      Tenderness: There is no abdominal tenderness. There is no guarding.   Musculoskeletal: Normal range of motion.         General: No tenderness.   Skin:     General: Skin is warm and dry.      Capillary Refill: Capillary refill takes less than 2  seconds.   Neurological:      Mental Status: She is alert and oriented to person, place, and time.   Psychiatric:         Behavior: Behavior normal.         Significant Labs: All pertinent labs within the past 24 hours have been reviewed.    Significant Imaging: I have reviewed all pertinent imaging results/findings within the past 24 hours.      Assessment/Plan:      * Hyperkalemia  K 6.4 on arrival. Last HD 8/8. Resolved with calcium gluconate, insulin, D50 and albuterol     Left knee pain  S/p mechanical fall 1 week ago  -imaging ? septic arthritis  -s/p empiric antibiotics in ED  -evaluated by orthopedic surgery, arthrocentesis done and not c/w septic joint  - stop abx  -PT/OT consulted: recommend SNF      Hypertension associated with diabetes  Taking amlodipine 10 mg daily and metoprolol 50 mg daily  - hold amlodipine for now due to post-HD hypotension      ESRD on dialysis  Dialysis. Appreciate Nephrology. Continue home renal vitamins, calcium acetate. Monitor labs      Physical deconditioning  PT/OT: recommend SNF      Chronic diastolic congestive heart failure  Euvolemic on exam. Continue furosemide 40 mg daily. Patient still able to make urine.       Paroxysmal atrial fibrillation  Taking Apixaban- continue       Anemia in end-stage renal disease  H&H stable      Diabetic neuropathy associated with type 2 diabetes mellitus  Taking Gabapentin 300 mg TID prn- continue     GERD (gastroesophageal reflux disease)  Daily PPI       Hyperlipidemia  Continue statin       Type 2 diabetes mellitus with hypertension and end stage renal disease on dialysis  A1C 6.9   Low dose SSI, accucheck AC&HS , Diabetic diet       Cerebral microvascular disease  Continue Plavix, statin         VTE Risk Mitigation (From admission, onward)         Ordered     apixaban tablet 2.5 mg  2 times daily      08/10/20 1908     IP VTE HIGH RISK PATIENT  Once      08/09/20 225     Place sequential compression device  Until discontinued       08/09/20 3899                      Enrrique Martin MD  Department of Hospital Medicine   [facility]

## 2020-08-11 NOTE — PROGRESS NOTES
[facility] Progress Note    Date of Admit: 2020  Length of Stay: 0    Chief Complaint/Reason for Consult     [facility] following for ESRD on HD Evaluation and Management    Subjective:      Doing well this morning. Denies chest pain, shortness of breath, or subjective fever/chills overnight. No new complaints this morning.    Discussed AM lab draw. Noted K, BUN, and Cr increased with CO2 decreased despite HD run x3 hrs yesterday. Other parameters matched previous blood draw and thus low probability for lab error. Will plan for addition HD run today to make up for abbreviated treatment on Friday.      Objective:   Last 24 Hour Vital Signs:  BP  Min: 89/38  Max: 191/90  Temp  Av.6 °F (36.4 °C)  Min: 97 °F (36.1 °C)  Max: 98.3 °F (36.8 °C)  Pulse  Av.1  Min: 58  Max: 75  Resp  Av.7  Min: 16  Max: 20  SpO2  Av.6 %  Min: 94 %  Max: 100 %  Weight  Av.2 kg (187 lb 13.3 oz)  Min: 85.2 kg (187 lb 13.3 oz)  Max: 85.2 kg (187 lb 13.3 oz)  Body mass index is 29.42 kg/m².  I/O last 3 completed shifts:  In: 650 [P.O.:50; Other:500; IV Piggyback:100]  Out: 3000 [Other:3000]    Physical Examination:  General: No acute distress, resting comfortably on dialysis  HEENT: EOMI, PERRL, MMM, OP clear and without exudate or erythema  Cardiovascular: NRRR, no murmurs or rubs.  Chest/Pulmonary: CTABL, no IWB  Abdomen: soft, nontender, nondistended, BS+  MSKL: without gross deformity, active FROM  Lymphatic: no appreciated cervical or submandibular LAD  Skin: without cyanosis or clubbing, without trace edema  Neurologic: AAOx3, following two step commands  Access: RUE RC AVF with adequate systolic and diastolic thrill, + bruits    Laboratory:  Most Recent Data:  CBC:   Lab Results   Component Value Date    WBC 4.48 2020    HGB 11.8 (L) 2020    HCT 37.7 2020     2020    MCV 88 2020    RDW 16.1 (H) 2020     BMP:   Lab Results   Component Value Date      08/11/2020    K 6.3 (HH) 08/11/2020     08/11/2020    CO2 22 (L) 08/11/2020    BUN 60 (H) 08/11/2020    CREATININE 8.4 (H) 08/11/2020     (H) 08/11/2020    CALCIUM 9.2 08/11/2020    MG 2.3 08/09/2020    PHOS 7.4 (H) 08/09/2020     LFTs:   Lab Results   Component Value Date    PROT 7.3 08/11/2020    ALBUMIN 3.4 (L) 08/11/2020    BILITOT 0.5 08/11/2020    AST 26 08/11/2020    ALKPHOS 63 08/11/2020    ALT 16 08/11/2020     Coags:   Lab Results   Component Value Date    INR 1.0 08/09/2020     Urinalysis:   Lab Results   Component Value Date    LABURIN  11/08/2016     Multiple organisms isolated. None in predominance.  Repeat if    LABURIN clinically necessary. 11/08/2016    COLORU Yellow 07/29/2019    SPECGRAV 1.015 07/29/2019    NITRITE Negative 07/29/2019    KETONESU Negative 07/29/2019    UROBILINOGEN Negative 07/29/2019    WBCUA 5 07/29/2019       Trended Lab Data:  Recent Labs   Lab 08/09/20  1716 08/09/20  2028 08/10/20  1240 08/10/20  1518 08/11/20  0600   WBC 5.31  --  4.28  --  4.48   HGB 12.1  --  11.5*  --  11.8*   HCT 39.2  --  36.7*  --  37.7     --  211  --  201   MCV 90  --  89  --  88   RDW 16.4*  --  16.1*  --  16.1*     --   --  136 137   K 6.4* 5.2*  --  5.6* 6.3*   CL 97  --   --  98 101   CO2 24  --   --  24 22*   BUN 88*  --   --  51* 60*   CREATININE 10.4*  --   --  7.2* 8.4*   *  --   --  151* 115*   PROT 7.9  --   --  7.5 7.3   ALBUMIN 3.9  --   --  3.4* 3.4*   BILITOT 0.5  --   --  0.5 0.5   AST 26  --   --  28 26   ALKPHOS 75  --   --  62 63   ALT 19  --   --  16 16       Trended Cardiac Data:  Recent Labs   Lab 08/09/20  1716   TROPONINI 0.036*   *       Microbiology Data:  Microbial cultures from knee aspirate pending    Other Laboratory Data:  Sed rate 48    Other Results:      Radiology:  Imaging Results          CT Pelvis Without Contrast (Final result)  Result time 08/09/20 21:10:28   Procedure changed from CT Hip Without Contrast Left     Final  result by Vito Garcia MD (08/09/20 21:10:28)                 Impression:      Negative CT of the pelvis for fracture specially of the left hip.    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    Severe atherosclerotic vascular disease most severe the SFA bilaterally.      Electronically signed by: Vito Garcia  Date:    08/09/2020  Time:    21:10             Narrative:    EXAMINATION:  CT PELVIS WITHOUT CONTRAST    CLINICAL HISTORY:  Hip trauma, nondiagnostic xray;    TECHNIQUE:  Axial CT images of the left hip were obtained with sagittal and coronal reformatted images.    COMPARISON:  Plain film of the hip,, 08/09/2020    FINDINGS:  The cortex and trabecular markings of the femur appear intact without displaced fracture or bony destructive process.  The intertrochanteric and proximal diaphysis appear intact as well.    Moderate osteoarthritis of the femoroacetabular joint is present without evidence of pincer or CAM deformity.    The adjacent pelvic bones are intact.  There is mild irregularity of the ischial tuberosity insertion of the hamstring tendons suggesting mild insertional tendinitis.    The endplates of the lumbar spine are markedly irregular with erosive changes at the L4-5 level and mild compression of the anterior endplate of L5 appearing remote with vacuum phenomenon.  Facet arthropathy is prominent.  There is no spondylolysis or significant central canal stenosis evident as imaged.  Degenerative changes in the SI joints are present.    Atherosclerotic disease and a normal caliber aorta and iliac system are noted.  This extends extensively into the SFA bilaterally.    The uterus demonstrates multiple large chunky calcifications suggesting degenerating fibroadenomas.  The urinary bladder appears unremarkable.  There is no pelvic mass or free fluid.                                CT Knee Without Contrast Left (Final result)  Result  time 08/09/20 20:54:03    Final result by Fuentes Vincent MD (08/09/20 20:54:03)                 Impression:      Findings indicating destruction of the knee joint secondary to aseptic process.    This report was flagged in Epic as abnormal.      Electronically signed by: Fuentes Vincent  Date:    08/09/2020  Time:    20:54             Narrative:    EXAMINATION:  CT KNEE WITHOUT CONTRAST LEFT    CLINICAL HISTORY:  Knee instability;    TECHNIQUE:  Multiple axial images of the left knee were obtained without the aid of IV contrast.  The study was reformatted creating a 3D image was separate workstation under direct supervision.    COMPARISON:  None    FINDINGS:  The knee joint reveals significant damage and deformity which is consistent with a septic joint.  The tibial plateau is fractured and there does not appear to be any discernible normal articular surface involving either the tibial plateau or the distal femur.  Both condyles of the distal femur are deformed and appear to be diminutive.  The articular surfaces of the femur and tibia appear to be mild feeding.  There is no normal articulation between the femur and the tibial plateau.  The surrounding soft tissues of the knee shows soft tissue induration and what appears to be fluid collection which most likely represents abscess fluid.  Suggest consideration aspiration in the for the purposes of obtaining culture and sensitivity information.                                   X-Ray Pelvis Routine AP (Edited Result - FINAL)  Result time 08/09/20 18:37:50    Addendum 1 of 1 by Poncho Handley MD (08/09/20 18:37:50)      This report was flagged in Epic as abnormal.    Please note, after initial interpretation of this examination, radiograph of the abdomen became available for review.  In retrospect, there appears to be lucency involving the right inferior pubic ramus concerning for fracture, better seen on that examination.  Acuity is unknown, correlation with  focal tenderness is advised.      Electronically signed by: Poncho Handley MD  Date:    08/09/2020  Time:    18:37               Final result by Poncho Handley MD (08/09/20 18:31:05)                 Impression:      1. No convincing acute displaced fracture or dislocation of the pelvis.      Electronically signed by: Poncho Handley MD  Date:    08/09/2020  Time:    18:31             Narrative:    EXAMINATION:  XR PELVIS ROUTINE AP    CLINICAL HISTORY:  Unspecified fall, initial encounter    TECHNIQUE:  AP view of the pelvis was performed.    COMPARISON:  None.    FINDINGS:  Single-view pelvis.    There is osteopenia.  Degenerative changes are noted of the bilateral sacroiliac joints and pubic symphysis.  The bilateral femoroacetabular joints are intact.  There is vascular calcification.                                   Assessment and Plan:      Angelina Beard is a 69 year old man with pertinent medical history of CAD, Chronic heart failure with preserved ejection fraction, pulmonary hypertension (likely WHO group II), HLD, hypertension, DM type II, sarcoidosis, and ESRD on HD MWF with Dr. Santamaria at [facility]. Admitted for evaluation of diarrhea and weakness. [facility] consulted for ESRD on HD evaluation and management.       #) ESRD on HD (MWF)  - follows with Dr. Santamaria at [facility]  - hyperkalemia noted on lab draw today Will plan for additional. HD for 3hrs, , , K bath 2, UF goal 0-1000 L as tolerated.   - dose meds for GFR < 10  - nephro caps  - strict I/O, daily weights  - please avoid gadolinium, fleets, phos-based laxatives, NSAIDs  - elevated venous pressure on HD; will check AV ultrasound  - patient to follow up with her HD unit after discharge    #) Hypertension   - okay to resume amlodipine this AM    #) Anemia  Lab Results   Component Value Date    HGB 11.8 (L) 08/11/2020    MCV 88 08/11/2020    FERRITIN 1,215 (H) 11/20/2019    FESATURATED 14 (L) 11/20/2019     [id number] 389 03/03/2018    FOLATE 4.5 03/03/2018   - no need for EPO or iron    #)  CKD/Mineral Bone Disease/Nutrition   Lab Results   Component Value Date    PHOS 7.4 (H) 08/09/2020    CALCIUM 9.2 08/11/2020    MG 2.3 08/09/2020    CO2 22 (L) 08/11/2020    ALBUMIN 3.4 (L) 08/11/2020   - continue phoslo TID w/ meals  - nepro with meals  - nephrocaps daily  - standard bicarb bath with HD    Thank you for this consult. We will continue to follow with you.    Enrrique Pearl MD  [facility]  [phone]    If after 5pm please forward any questions to the [facility] Fellow/Attending on call.

## 2020-08-11 NOTE — NURSING
After completion of 3.5 hours HD,after needle removed, R FA AVG bleeding excessively from arterial site x 45 minutes. Firm manual pressure applied continuously with no hemostasis achieved. Dr. Pearl with [facility] notified by telephone. Surgery to room within 15 minutes. 2 stitches to site. Hemostasis achieved. Dr. Pearl assessed @ BS,states pt ok to go to floor.  No further orders @ this time.

## 2020-08-11 NOTE — SUBJECTIVE & OBJECTIVE
Interval History:  Seen on Dialysis  Had to be dialyzed semi-urgently this AM for K of 6.3  States that she feels OK  No CP or SOB  Does have some buttocks pain  Has breakdown  Continues with diarrhea    After dialysis, patient had prolonged bleeding. 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  Will have fistulogram tomorrow      Review of Systems   Constitutional: Negative for chills and fever.   Respiratory: Negative for cough, shortness of breath and wheezing.    Cardiovascular: Negative for chest pain, palpitations and leg swelling.   Gastrointestinal: Positive for diarrhea. Negative for abdominal pain, blood in stool, constipation, nausea and vomiting.   Genitourinary: Negative for dysuria and hematuria.   Musculoskeletal: Positive for back pain.   Skin: Negative for rash.   Neurological: Negative for dizziness, weakness and headaches.     Objective:     Vital Signs (Most Recent):  Temp: 97.6 °F (36.4 °C) (08/11/20 1029)  Pulse: 70 (08/11/20 1255)  Resp: 18 (08/11/20 1029)  BP: (!) 96/52 (08/11/20 1255)  SpO2: 95 % (08/11/20 0805) Vital Signs (24h Range):  Temp:  [97 °F (36.1 °C)-98.3 °F (36.8 °C)] 97.6 °F (36.4 °C)  Pulse:  [67-77] 70  Resp:  [16-20] 18  SpO2:  [94 %-100 %] 95 %  BP: ()/() 96/52     Weight: 85.2 kg (187 lb 13.3 oz)  Body mass index is 29.42 kg/m².    Intake/Output Summary (Last 24 hours) at 8/11/2020 1333  Last data filed at 8/11/2020 0600  Gross per 24 hour   Intake 50 ml   Output --   Net 50 ml      Physical Exam  Constitutional:       Appearance: She is well-developed.      Comments: Frail   HENT:      Head: Normocephalic and atraumatic.   Eyes:      Conjunctiva/sclera: Conjunctivae normal.      Pupils: Pupils are equal, round, and reactive to light.   Neck:      Musculoskeletal: Normal range of motion.      Vascular: No JVD.   Cardiovascular:      Rate and Rhythm: Normal rate and regular rhythm.      Heart sounds: Normal heart sounds.   Pulmonary:      Effort: Pulmonary effort is normal. No  respiratory distress.      Breath sounds: No wheezing.   Abdominal:      General: Bowel sounds are normal. There is no distension.      Palpations: Abdomen is soft.      Tenderness: There is no abdominal tenderness. There is no guarding.   Musculoskeletal: Normal range of motion.         General: No tenderness.   Skin:     General: Skin is warm and dry.      Capillary Refill: Capillary refill takes less than 2 seconds.   Neurological:      Mental Status: She is alert and oriented to person, place, and time.   Psychiatric:         Behavior: Behavior normal.         Significant Labs:   BMP:   Recent Labs   Lab 08/11/20  0600   *      K 6.3*      CO2 22*   BUN 60*   CREATININE 8.4*   CALCIUM 9.2     CBC:   Recent Labs   Lab 08/10/20  1240 08/11/20  0600   WBC 4.28 4.48   HGB 11.5* 11.8*   HCT 36.7* 37.7    201     CMP:   Recent Labs   Lab 08/10/20  1518 08/11/20  0600    137   K 5.6* 6.3*   CL 98 101   CO2 24 22*   * 115*   BUN 51* 60*   CREATININE 7.2* 8.4*   CALCIUM 8.7 9.2   PROT 7.5 7.3   ALBUMIN 3.4* 3.4*   BILITOT 0.5 0.5   ALKPHOS 62 63   AST 28 26   ALT 16 16   ANIONGAP 14 14   EGFRNONAA 5* 4*       Significant Imaging: I have reviewed all pertinent imaging results/findings within the past 24 hours.     CT Pelvis Without Contrast  Narrative: EXAMINATION:  CT PELVIS WITHOUT CONTRAST    CLINICAL HISTORY:  Hip trauma, nondiagnostic xray;    TECHNIQUE:  Axial CT images of the left hip were obtained with sagittal and coronal reformatted images.    COMPARISON:  Plain film of the hip,, 08/09/2020    FINDINGS:  The cortex and trabecular markings of the femur appear intact without displaced fracture or bony destructive process.  The intertrochanteric and proximal diaphysis appear intact as well.    Moderate osteoarthritis of the femoroacetabular joint is present without evidence of pincer or CAM deformity.    The adjacent pelvic bones are intact.  There is mild irregularity of the  ischial tuberosity insertion of the hamstring tendons suggesting mild insertional tendinitis.    The endplates of the lumbar spine are markedly irregular with erosive changes at the L4-5 level and mild compression of the anterior endplate of L5 appearing remote with vacuum phenomenon.  Facet arthropathy is prominent.  There is no spondylolysis or significant central canal stenosis evident as imaged.  Degenerative changes in the SI joints are present.    Atherosclerotic disease and a normal caliber aorta and iliac system are noted.  This extends extensively into the SFA bilaterally.    The uterus demonstrates multiple large chunky calcifications suggesting degenerating fibroadenomas.  The urinary bladder appears unremarkable.  There is no pelvic mass or free fluid.  Impression: Negative CT of the pelvis for fracture specially of the left hip.    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    Severe atherosclerotic vascular disease most severe the SFA bilaterally.    Electronically signed by: Vito Garcia  Date:    08/09/2020  Time:    21:10  CT Knee Without Contrast Left  Narrative: EXAMINATION:  CT KNEE WITHOUT CONTRAST LEFT    CLINICAL HISTORY:  Knee instability;    TECHNIQUE:  Multiple axial images of the left knee were obtained without the aid of IV contrast.  The study was reformatted creating a 3D image was separate workstation under direct supervision.    COMPARISON:  None    FINDINGS:  The knee joint reveals significant damage and deformity which is consistent with a septic joint.  The tibial plateau is fractured and there does not appear to be any discernible normal articular surface involving either the tibial plateau or the distal femur.  Both condyles of the distal femur are deformed and appear to be diminutive.  The articular surfaces of the femur and tibia appear to be mild feeding.  There is no normal articulation between the femur and the  tibial plateau.  The surrounding soft tissues of the knee shows soft tissue induration and what appears to be fluid collection which most likely represents abscess fluid.  Suggest consideration aspiration in the for the purposes of obtaining culture and sensitivity information.  Impression: Findings indicating destruction of the knee joint secondary to aseptic process.    This report was flagged in Epic as abnormal.    Electronically signed by: Fuentes Vincent  Date:    08/09/2020  Time:    20:54  X-Ray Pelvis AP Inlet And Outlet  Narrative: EXAMINATION:  XR PELVIS AP INLET AND OUTLET    CLINICAL HISTORY:  Inlet and Outlet Views;  Unspecified fall, initial encounter    COMPARISON:  Pelvis radiograph 08/09/2020, abdominal radiograph 08/09/2020    FINDINGS:  Two views pelvis.    Osteopenia and artifact from habitus limit evaluation.  The bilateral sacroiliac joints appear intact.  The pubic symphysis is intact noting degenerative changes.  The region however is obscured by stool within the rectum.  The bilateral femoroacetabular joints are intact.  There is subtle cortical irregularity involving the inferior pubic ramus concerning for fracture, possibly subacute however correlation is needed.  There is extensive vascular calcification..  Impression: 1. Cortical irregularity involving the inferior pubic ramus suggests fracture, possibly subacute though not confirmed.  Correlation with focal tenderness and physical exam advised.    Electronically signed by: Poncho Handley MD  Date:    08/09/2020  Time:    19:42  X-Ray Pelvis Routine AP  Addendum: This report was flagged in Epic as abnormal.     Please note, after initial interpretation of this examination, radiograph  of the abdomen became available for review.  In retrospect, there appears  to be lucency involving the right inferior pubic ramus concerning for  fracture, better seen on that examination.  Acuity is unknown, correlation  with focal tenderness is  advised.     Electronically signed by: Poncho Handley MD   Date:    08/09/2020   Time:    18:37  Narrative: EXAMINATION:  XR PELVIS ROUTINE AP    CLINICAL HISTORY:  Unspecified fall, initial encounter    TECHNIQUE:  AP view of the pelvis was performed.    COMPARISON:  None.    FINDINGS:  Single-view pelvis.    There is osteopenia.  Degenerative changes are noted of the bilateral sacroiliac joints and pubic symphysis.  The bilateral femoroacetabular joints are intact.  There is vascular calcification.  Impression: 1. No convincing acute displaced fracture or dislocation of the pelvis.    Electronically signed by: Poncho Handley MD  Date:    08/09/2020  Time:    18:31  X-Ray Abdomen AP 1 View (KUB)  Narrative: EXAMINATION:  XR ABDOMEN AP 1 VIEW    CLINICAL HISTORY:  Diarrhea, unspecified    TECHNIQUE:  AP View(s) of the abdomen was performed.    COMPARISON:  07/09/2018    FINDINGS:  Single-view abdomen supine.    Air and stool is seen within the large bowel and projected over the rectum.  There is moderate stool throughout the colon.  No focally dilated small bowel loops.  There are no calcifications to convincingly suggest nephrolithiasis or cholelithiasis.  Degenerative changes are noted of the osseous structures.  There is vascular calcification.  Impression: 1. Moderate stool in the colon, nonobstructive bowel gas pattern.  2. Suspected fracture of the right inferior pubic ramus discussed in previous report.    Electronically signed by: Poncho Handley MD  Date:    08/09/2020  Time:    18:36  X-Ray Chest 1 View  Narrative: EXAMINATION:  XR CHEST 1 VIEW    CLINICAL HISTORY:  Cough    TECHNIQUE:  Single frontal view of the chest was performed.    COMPARISON:  11/20/2019    FINDINGS:  The cardiomediastinal silhouette is prominent noting magnification by technique.  There is calcification of the aorta..  There is slight obscuration of the right costophrenic angle, may reflect small effusion versus atelectasis..  The  trachea is midline.  The lungs are symmetrically expanded bilaterally with mildly coarse interstitial attenuation.  No large focal consolidation seen.  There is no pneumothorax.  The osseous structures are remarkable for degenerative changes.  There is a left subclavian vascular stent.  Surgical changes project over the left axilla..  Impression: 1. Interstitial findings could reflect early congestive change or edema versus chronic change in this hypoventilatory exam.  Correlation is advised.  Other nonspecific infectious or inflammatory pneumonitis is a consideration    Electronically signed by: Poncho Handley MD  Date:    08/09/2020  Time:    18:35  X-Ray Femur AP/LAT Left  Narrative: EXAMINATION:  XR FEMUR 2 VIEW LEFT    CLINICAL HISTORY:  Unspecified fall, initial encounter    TECHNIQUE:  AP and lateral views of the left femur were performed.    COMPARISON:  None}    FINDINGS:  Four views left femur.    The left femoral head maintains appropriate relationship with the acetabulum.  Please see separately dictated report for details of the distal femur at the knee as well as proximal tibia.  There is a moderate suprapatellar effusion.  There is edema about the leg.  There is vascular calcification.  Impression: 1. No convincing acute displaced fracture or dislocation of the proximal femur, please see separately dictated report for details of the distal femur at the knee.    Electronically signed by: Poncho Handley MD  Date:    08/09/2020  Time:    18:32  X-Ray Tibia Fibula 2 View Left  Narrative: EXAMINATION:  XR TIBIA FIBULA 2 VIEW LEFT    CLINICAL HISTORY:  Unspecified fall, initial encounter    TECHNIQUE:  AP and lateral views of the left tibia and fibula were performed.    COMPARISON:  None.    FINDINGS:  Four views tibia fibula.    Severe degenerative change/post infectious change noted of the knee, evaluated on knee radiograph same date.  The remaining aspects of the tibia and fibula appear intact without  acute displaced fracture or dislocation.  There is vascular calcification.  The ankle appears intact.  There is edema about the ankle and leg.  Impression: 1. Please see separately dictated report for details of the knee and proximal tibia in this patient with severe degenerative changes of the same.  No findings to suggest distal fracture.    Electronically signed by: Poncho Handley MD  Date:    08/09/2020  Time:    18:29  X-Ray Knee 3 View Left  Narrative: EXAMINATION:  XR KNEE 3 VIEW LEFT    CLINICAL HISTORY:  Unspecified fall, initial encounter    TECHNIQUE:  AP, lateral, and Merchant views of the left knee were performed.    COMPARISON:  None    FINDINGS:  Three views left knee.    There is severe degenerative change of the left knee versus post infectious change.  Evaluation for fracture is severely limited given the appearance however multifocal dispersed osseous fragments appear well corticated.  There is a moderate suprapatellar effusion.  There is vascular calcification.  There is osteopenia.  Impression: 1. Severe degenerative changes of the knee, markedly limit evaluation for acute fracture.  No previous examinations are available for comparison.  The appearance of the knee may reflect that of degenerative/post infectious change.  No definite acute displaced fracture although clinical correlation is needed and comparison with previous examinations is highly recommended.  There is a moderate suprapatellar effusion and edema about the leg.    Electronically signed by: Poncho Handley MD  Date:    08/09/2020  Time:    18:28  CT Head Without Contrast  Narrative: EXAMINATION:  CT HEAD WITHOUT CONTRAST    CLINICAL HISTORY:  Head trauma, minor (Age => 66y);    TECHNIQUE:  Low dose axial images were obtained through the head.  Coronal and sagittal reformations were also performed. Contrast was not administered.  There are some motion limitations to the exam.    COMPARISON:  CT dated  11/18/2019.    FINDINGS:  The subcutaneous tissues are unremarkable.  The bony calvarium is intact.  The paranasal sinuses are unremarkable.  The mastoid air cells are clear.  There are postoperative changes in the orbits.    The craniocervical junction is unremarkable.  There are no extra-axial fluid collections.  There is no evidence of intracranial hemorrhage.  The ventricles and sulci are prominent, consistent with cerebral volume loss.  There are extensive hypodensities within the periventricular and subcortical white matter.  The gray-white differentiation is maintained.  There is no evidence of mass effect.  Impression: No acute intracranial process.    Advanced age-related changes.    Electronically signed by: Willie Singh MD  Date:    08/09/2020  Time:    17:54

## 2020-08-11 NOTE — PT/OT/SLP PROGRESS
Occupational Therapy   Treatment    Name: Angelina Beard  MRN: [id number]  Admitting Diagnosis:  Hyperkalemia       Recommendations:     Discharge Recommendations: nursing facility, skilled  Discharge Equipment Recommendations:  hospital bed  Barriers to discharge:  Decreased caregiver support    Assessment:     Angelina Beard is a 69 y.o. female with a medical diagnosis of Hyperkalemia.  She presents with deconditioning. Performance deficits affecting function are weakness, gait instability, impaired balance, impaired endurance, decreased upper extremity function, decreased lower extremity function, decreased ROM, impaired self care skills, impaired functional mobilty, impaired sensation, impaired coordination, decreased coordination, impaired fine motor.     Pt progressing towards goals, however, limited session 2/2 pt with bowel incontinence. Will progress pt as able    Rehab Prognosis:  Good; patient would benefit from acute skilled OT services to address these deficits and reach maximum level of function.       Plan:     Patient to be seen 5 x/week to address the above listed problems via self-care/home management, therapeutic activities, therapeutic exercises  · Plan of Care Expires: 09/10/20  · Plan of Care Reviewed with: patient    Subjective     Pain/Comfort:  · Pain Rating 1: 0/10    Objective:     Communicated with: nstanya prior to session.    General Precautions: Standard, fall   Orthopedic Precautions:(WBAT BLEs)   Braces: N/A     Occupational Performance:     Bed Mobility:    · Patient completed Rolling/Turning to Left with  stand by assistance  · Patient completed Rolling/Turning to Right with stand by assistance  · Patient completed Scooting/Bridging with minimum assistance     Functional Mobility/Transfers:  · N/A     Activities of Daily Living:  · Lower Body Dressing: total assistance    · Toileting: total assistance soiled in BM       [facility] 6 Click ADL: 15    Treatment & Education:  Pt soiled in BM    Rolling multiple trials throughout session for hygiene and changing of linens/brief   Pt able to assist with rolling with use of bed rails as well as supine scooting to HOB Min A of 2   Pt stating need ot have another BM; Placed on bed pan total A     Patient left supine on bed pan with all lines intact, call button in reach, bed alarm on and nsg and PCT  notifiedEducation:      GOALS:   Multidisciplinary Problems     Occupational Therapy Goals        Problem: Occupational Therapy Goal    Goal Priority Disciplines Outcome Interventions   Occupational Therapy Goal     OT, PT/OT Ongoing, Progressing    Description: Goals to be met by: 9/10/20     Patient will increase functional independence with ADLs by performing:    LE Dressing with Minimal Assistance.  Grooming while seated with Modified [place].  Toileting from bedside commode with Minimal Assistance for hygiene and clothing management.   Supine to sit with Contact Guard Assistance.  Stand pivot transfers with Minimal Assistance.  Toilet transfer to bedside commode with Minimal Assistance.  Increased functional strength to 4/5 for self care skills and functional mobility.  Upper extremity exercise program x10 reps per handout, with independence.                     Time Tracking:     OT Date of Treatment: 08/11/20  OT Start Time: 0935  OT Stop Time: 0959  OT Total Time (min): 24 min    Billable Minutes:Self Care/Home Management 13 co treatment with PT       Emily Lange OT  8/11/2020

## 2020-08-12 ENCOUNTER — ANESTHESIA EVENT (OUTPATIENT)
Dept: SURGERY | Facility: HOSPITAL | Age: 70
DRG: 252 | End: 2020-08-12
Payer: MEDICARE

## 2020-08-12 LAB
ALBUMIN SERPL BCP-MCNC: 3.2 G/DL (ref 3.5–5.2)
ALP SERPL-CCNC: 61 U/L (ref 55–135)
ALT SERPL W/O P-5'-P-CCNC: 15 U/L (ref 10–44)
ANION GAP SERPL CALC-SCNC: 12 MMOL/L (ref 8–16)
AST SERPL-CCNC: 20 U/L (ref 10–40)
BASOPHILS # BLD AUTO: 0.02 K/UL (ref 0–0.2)
BASOPHILS NFR BLD: 0.5 % (ref 0–1.9)
BILIRUB SERPL-MCNC: 0.5 MG/DL (ref 0.1–1)
BUN SERPL-MCNC: 37 MG/DL (ref 8–23)
CALCIUM SERPL-MCNC: 9 MG/DL (ref 8.7–10.5)
CHLORIDE SERPL-SCNC: 98 MMOL/L (ref 95–110)
CO2 SERPL-SCNC: 25 MMOL/L (ref 23–29)
CREAT SERPL-MCNC: 6.2 MG/DL (ref 0.5–1.4)
DIFFERENTIAL METHOD: ABNORMAL
EOSINOPHIL # BLD AUTO: 0.1 K/UL (ref 0–0.5)
EOSINOPHIL NFR BLD: 2.3 % (ref 0–8)
ERYTHROCYTE [DISTWIDTH] IN BLOOD BY AUTOMATED COUNT: 15.9 % (ref 11.5–14.5)
EST. GFR  (AFRICAN AMERICAN): 7 ML/MIN/1.73 M^2
EST. GFR  (NON AFRICAN AMERICAN): 6 ML/MIN/1.73 M^2
GLUCOSE SERPL-MCNC: 129 MG/DL (ref 70–110)
HCT VFR BLD AUTO: 36 % (ref 37–48.5)
HGB BLD-MCNC: 11 G/DL (ref 12–16)
IMM GRANULOCYTES # BLD AUTO: 0.01 K/UL (ref 0–0.04)
IMM GRANULOCYTES NFR BLD AUTO: 0.3 % (ref 0–0.5)
LYMPHOCYTES # BLD AUTO: 0.9 K/UL (ref 1–4.8)
LYMPHOCYTES NFR BLD: 23.3 % (ref 18–48)
MCH RBC QN AUTO: 27.6 PG (ref 27–31)
MCHC RBC AUTO-ENTMCNC: 30.6 G/DL (ref 32–36)
MCV RBC AUTO: 90 FL (ref 82–98)
MONOCYTES # BLD AUTO: 0.5 K/UL (ref 0.3–1)
MONOCYTES NFR BLD: 11.7 % (ref 4–15)
NEUTROPHILS # BLD AUTO: 2.4 K/UL (ref 1.8–7.7)
NEUTROPHILS NFR BLD: 61.9 % (ref 38–73)
NRBC BLD-RTO: 0 /100 WBC
PLATELET # BLD AUTO: 203 K/UL (ref 150–350)
PMV BLD AUTO: 10.4 FL (ref 9.2–12.9)
POCT GLUCOSE: 108 MG/DL (ref 70–110)
POCT GLUCOSE: 137 MG/DL (ref 70–110)
POCT GLUCOSE: 172 MG/DL (ref 70–110)
POCT GLUCOSE: 177 MG/DL (ref 70–110)
POTASSIUM SERPL-SCNC: 4.8 MMOL/L (ref 3.5–5.1)
PROT SERPL-MCNC: 6.9 G/DL (ref 6–8.4)
RBC # BLD AUTO: 3.99 M/UL (ref 4–5.4)
SODIUM SERPL-SCNC: 135 MMOL/L (ref 136–145)
WBC # BLD AUTO: 3.86 K/UL (ref 3.9–12.7)

## 2020-08-12 PROCEDURE — 80053 COMPREHEN METABOLIC PANEL: CPT

## 2020-08-12 PROCEDURE — 25000003 PHARM REV CODE 250: Performed by: NURSE PRACTITIONER

## 2020-08-12 PROCEDURE — 97110 THERAPEUTIC EXERCISES: CPT

## 2020-08-12 PROCEDURE — 11000001 HC ACUTE MED/SURG PRIVATE ROOM

## 2020-08-12 PROCEDURE — 85025 COMPLETE CBC W/AUTO DIFF WBC: CPT

## 2020-08-12 PROCEDURE — 36415 COLL VENOUS BLD VENIPUNCTURE: CPT

## 2020-08-12 PROCEDURE — 94761 N-INVAS EAR/PLS OXIMETRY MLT: CPT

## 2020-08-12 PROCEDURE — 25000003 PHARM REV CODE 250: Performed by: HOSPITALIST

## 2020-08-12 PROCEDURE — 97530 THERAPEUTIC ACTIVITIES: CPT

## 2020-08-12 PROCEDURE — 25000003 PHARM REV CODE 250: Performed by: STUDENT IN AN ORGANIZED HEALTH CARE EDUCATION/TRAINING PROGRAM

## 2020-08-12 PROCEDURE — 99900035 HC TECH TIME PER 15 MIN (STAT)

## 2020-08-12 PROCEDURE — 27000221 HC OXYGEN, UP TO 24 HOURS

## 2020-08-12 PROCEDURE — 63600175 PHARM REV CODE 636 W HCPCS: Performed by: STUDENT IN AN ORGANIZED HEALTH CARE EDUCATION/TRAINING PROGRAM

## 2020-08-12 RX ADMIN — MIRTAZAPINE 7.5 MG: 7.5 TABLET ORAL at 09:08

## 2020-08-12 RX ADMIN — HYDROCODONE BITARTRATE AND ACETAMINOPHEN 1 TABLET: 5; 325 TABLET ORAL at 11:08

## 2020-08-12 RX ADMIN — CALCIUM ACETATE 667 MG: 667 CAPSULE ORAL at 05:08

## 2020-08-12 RX ADMIN — DEXTROSE MONOHYDRATE 1750 MG: 50 INJECTION, SOLUTION INTRAVENOUS at 03:08

## 2020-08-12 RX ADMIN — APIXABAN 2.5 MG: 2.5 TABLET, FILM COATED ORAL at 09:08

## 2020-08-12 RX ADMIN — HYDROCODONE BITARTRATE AND ACETAMINOPHEN 1 TABLET: 5; 325 TABLET ORAL at 09:08

## 2020-08-12 RX ADMIN — DOCUSATE SODIUM 100 MG: 100 CAPSULE, LIQUID FILLED ORAL at 09:08

## 2020-08-12 RX ADMIN — MUPIROCIN: 20 OINTMENT TOPICAL at 08:08

## 2020-08-12 RX ADMIN — MUPIROCIN: 20 OINTMENT TOPICAL at 09:08

## 2020-08-12 NOTE — PROGRESS NOTES
Pharmacokinetic Initial Assessment: IV Vancomycin    Assessment/Plan:    Initiate intravenous vancomycin with loading dose (20 mg per kg) of 1750 mg once with subsequent doses when random concentrations are less than 20 mcg/mL  Desired empiric serum trough concentration is 10 to 20 mcg/mL  Draw vancomycin random level on 08/14/2020 at 0400.  Pharmacy will continue to follow and monitor vancomycin.      Please contact pharmacy at extension [phone] with any questions regarding this assessment.     Thank you for the consult,   Fe Claudio       Patient brief summary:  Angelina Beard is a 69 y.o. female initiated on antimicrobial therapy with IV Vancomycin for treatment of suspected skin & soft tissue infection    Drug Allergies:   Review of patient's allergies indicates:  No Known Allergies    Actual Body Weight:   87.2 kg    Renal Function:   Estimated Creatinine Clearance: 9.8 mL/min (A) (based on SCr of 6.2 mg/dL (H)).,     Dialysis Method (if applicable):  intermittent HD    CBC (last 72 hours):  Recent Labs   Lab Result Units 08/09/20  1716 08/10/20  1240 08/11/20  0600 08/12/20  0421   WBC K/uL 5.31 4.28 4.48 3.86*   Hemoglobin g/dL 12.1 11.5* 11.8* 11.0*   Hemoglobin A1C %  --  6.8*  --   --    Hematocrit % 39.2 36.7* 37.7 36.0*   Platelets K/uL 225 211 201 203   Gran% % 69.6 76.2* 63.5 61.9   Lymph% % 17.7* 10.3* 20.8 23.3   Mono% % 9.6 9.3 11.2 11.7   Eosinophil% % 2.3 2.6 2.9 2.3   Basophil% % 0.6 0.9 0.7 0.5   Differential Method  Automated Automated Automated Automated       Metabolic Panel (last 72 hours):  Recent Labs   Lab Result Units 08/09/20 1716 08/09/20 2028 08/10/20  1518 08/11/20  0600 08/12/20  0421   Sodium mmol/L 137  --  136 137 135*   Potassium mmol/L 6.4* 5.2* 5.6* 6.3* 4.8   Chloride mmol/L 97  --  98 101 98   CO2 mmol/L 24  --  24 22* 25   Glucose mg/dL 172*  --  151* 115* 129*   BUN, Bld mg/dL 88*  --  51* 60* 37*   Creatinine mg/dL 10.4*  --  7.2* 8.4* 6.2*   Albumin  g/dL 3.9  --  3.4* 3.4* 3.2*   Total Bilirubin mg/dL 0.5  --  0.5 0.5 0.5   Alkaline Phosphatase U/L 75  --  62 63 61   AST U/L 26  --  28 26 20   ALT U/L 19  --  16 16 15   Magnesium mg/dL 2.3  --   --   --   --    Phosphorus mg/dL 7.4*  --   --   --   --        Drug levels (last 3 results):  No results for input(s): VANCOMYCINRA, VANCOMYCINPE, VANCOMYCINTR in the last 72 hours.    Microbiologic Results:  Microbiology Results (last 7 days)       Procedure Component Value Units Date/Time    Fungus culture [[id number]] Collected: 08/09/20 2218    Order Status: Completed Specimen: Body Fluid from Knee, Left Updated: 08/12/20 1226     Fungus (Mycology) Culture Culture in progress    Culture, Anaerobe [[id number]] Collected: 08/09/20 2218    Order Status: Completed Specimen: Body Fluid from Knee, Left Updated: 08/12/20 0743     Anaerobic Culture Culture in progress    Aerobic culture [[id number]] Collected: 08/09/20 2218    Order Status: Completed Specimen: Body Fluid from Knee, Left Updated: 08/11/20 0715     Aerobic Bacterial Culture No growth    Direct AFB stain [[id number]] Collected: 08/09/20 2218    Order Status: Completed Specimen: Body Fluid from Knee, Left Updated: 08/10/20 0541     Direct Acid Fast No acid fast bacilli seen.    Clostridium difficile EIA [[id number]]     Order Status: Canceled Specimen: Stool

## 2020-08-12 NOTE — PLAN OF CARE
Problem: Occupational Therapy Goal  Goal: Occupational Therapy Goal  Description: Goals to be met by: 9/10/20     Patient will increase functional independence with ADLs by performing:    LE Dressing with Minimal Assistance.  Grooming while seated with Modified Diagonal.  Toileting from bedside commode with Minimal Assistance for hygiene and clothing management.   Supine to sit with Contact Guard Assistance.  Stand pivot transfers with Minimal Assistance.  Toilet transfer to bedside commode with Minimal Assistance.  Increased functional strength to 4/5 for self care skills and functional mobility.  Upper extremity exercise program x10 reps per handout, with independence.    Outcome: Ongoing, Progressing     Pt progress towards goals. Able to perform 4 stands today's date from EOB and participate in seated UE/LE therex. Cont to rec SNF

## 2020-08-12 NOTE — PROGRESS NOTES
[facility]  General Surgery  Progress Note    Subjective:     Interval History: NAEON. Denies any bleeding from fistula overnight. Denies any other complaints at this time.     Post-Op Info:  * No surgery found *          Medications:  Continuous Infusions:  Scheduled Meds:   sodium chloride 0.9%   Intravenous Once    sodium chloride 0.9%   Intravenous Once    amLODIPine  10 mg Oral Daily    apixaban  2.5 mg Oral BID    calcium acetate(phosphat bind)  667 mg Oral TID WM    clopidogreL  75 mg Oral Daily    docusate sodium  100 mg Oral BID    furosemide  40 mg Oral Daily    metoprolol succinate  50 mg Oral Daily    mirtazapine  7.5 mg Oral QHS    mupirocin   Nasal BID    pantoprazole  40 mg Oral Daily    pravastatin  80 mg Oral Daily    sertraline  100 mg Oral Daily    vitamin renal formula (B-complex-vitamin c-folic acid)  1 capsule Oral Daily     PRN Meds:sodium chloride 0.9%, sodium chloride 0.9%, acetaminophen, [COMPLETED] calcium gluconate IVPB **AND** calcium gluconate IVPB, dextrose 50%, dextrose 50%, gabapentin, glucagon (human recombinant), glucose, glucose, HYDROcodone-acetaminophen, insulin aspart U-100, ondansetron, sodium chloride 0.9%     Objective:     Vital Signs (Most Recent):  Temp: 98.6 °F (37 °C) (08/12/20 0342)  Pulse: 69 (08/12/20 0400)  Resp: 18 (08/12/20 0342)  BP: 115/66 (08/12/20 0342)  SpO2: 98 % (08/12/20 0342) Vital Signs (24h Range):  Temp:  [97.5 °F (36.4 °C)-98.6 °F (37 °C)] 98.6 °F (37 °C)  Pulse:  [62-78] 69  Resp:  [14-18] 18  SpO2:  [94 %-98 %] 98 %  BP: ()/() 115/66       Intake/Output Summary (Last 24 hours) at 8/12/2020 0643  Last data filed at 8/12/2020 0500  Gross per 24 hour   Intake 500 ml   Output 900 ml   Net -400 ml       Physical Exam  Constitutional:       General: She is not in acute distress.  HENT:      Head: Normocephalic and atraumatic.   Cardiovascular:      Rate and Rhythm: Normal rate and regular rhythm.   Pulmonary:       Effort: Pulmonary effort is normal.      Breath sounds: Normal breath sounds.   Abdominal:      General: Abdomen is flat.      Palpations: Abdomen is soft.   Skin:     Comments: Dressing applied over R AC fistula c/d/i without any signs of bleeding   Neurological:      Mental Status: She is alert.         Significant Labs:  CBC:   Recent Labs   Lab 08/12/20  0421   WBC 3.86*   RBC 3.99*   HGB 11.0*   HCT 36.0*      MCV 90   MCH 27.6   MCHC 30.6*       Significant Diagnostics:  US Fistula: pending read    Assessment/Plan:     Active Diagnoses:    Diagnosis Date Noted POA    PRINCIPAL PROBLEM:  Hyperkalemia [E87.5] 11/18/2019 Yes    Diarrhea [R19.7] 08/11/2020 Yes    Left knee pain [M25.562] 08/10/2020 Yes    Hypertension associated with diabetes [E11.59, I10] 02/04/2020 Yes    Chronic diastolic congestive heart failure [I50.32] 09/22/2018 Yes    Physical deconditioning [R53.81] 09/22/2018 Yes    ESRD on dialysis [N18.6, Z99.2]  Not Applicable    Paroxysmal atrial fibrillation [I48.0] 06/21/2018 Yes     Chronic    Anemia in end-stage renal disease [N18.6, D63.1] 03/06/2016 Yes     Chronic    Diabetic neuropathy associated with type 2 diabetes mellitus [E11.40] 02/19/2016 Yes     Chronic    GERD (gastroesophageal reflux disease) [K21.9] 07/02/2013 Yes     Chronic    Cerebral microvascular disease [I67.9] 07/01/2013 Yes     Chronic    Type 2 diabetes mellitus with hypertension and end stage renal disease on dialysis [E11.22, I12.0, Z99.2, N18.6] 07/01/2013 Not Applicable     Chronic    Hyperlipidemia [E78.5] 07/01/2013 Yes     Chronic      Problems Resolved During this Admission:    Diagnosis Date Noted Date Resolved POA    Pyogenic arthritis of left knee joint [M00.9] 08/10/2020 08/10/2020 Unknown     68 yo F with history of CAD, CHF, pulmonary HTN, DM2, sarcoidosis, and ESRD with dialysis graft issues.    - No bleeding from wound overnight  - US fistula pending official read  - Likely will  need fistulogram prior to continued use of fistula      Mariano Yu MD  General Surgery  [facility]

## 2020-08-12 NOTE — PLAN OF CARE
TN met with pt for continued d/c planning. PT/OT still recommending SNF at d/c. Pt refusing SNF at this time and prefers to d/c with home health. She states she will be able to manage her care at home with caregiver and son to help her home.

## 2020-08-12 NOTE — PT/OT/SLP PROGRESS
Physical Therapy Treatment    Patient Name:  Angelina Beard   MRN:  [id number]    Recommendations:     Discharge Recommendations:  nursing facility, skilled   Discharge Equipment Recommendations: hospital bed   Barriers to discharge: Decreased caregiver support    Assessment:     Angelina Beard is a 69 y.o. female admitted with a medical diagnosis of Hyperkalemia.  She presents with the following impairments/functional limitations:  weakness, impaired endurance, impaired self care skills, impaired functional mobilty, gait instability, impaired balance, impaired sensation, decreased upper extremity function, decreased lower extremity function, pain, decreased ROM Patient with improved bed mobility, tolerance to activity.    Rehab Prognosis: Good; patient would benefit from acute skilled PT services to address these deficits and reach maximum level of function.    Recent Surgery: Procedure(s) (LRB):  REVISION, AV FISTULA, Right forearm (Right)      Plan:     During this hospitalization, patient to be seen 6 x/week to address the identified rehab impairments via gait training, therapeutic activities, therapeutic exercises, neuromuscular re-education and progress toward the following goals:    · Plan of Care Expires:  09/10/20    Subjective     Pain/Comfort:  Pain Rating 1: 0/10  Pain Rating Post-Intervention 1: 0/10      Objective:     Communicated with nurse prior to session.  Patient found with peripheral IV upon PT entry to room.     General Precautions: Standard, fall   Orthopedic Precautions:(BLEs WBAT)   Braces: N/A     Functional Mobility:  · Bed Mobility:     · Scooting: contact guard assistance  · Supine to Sit: contact guard assistance  · Sit to Supine: moderate assistance  · Transfers:     · Sit to Stand:  moderate assistance and of 2 persons with rolling walker  · Gait: Patient took 2 lateral steps to HOB with maxA x 2 for 2 trials; assist required for wt shifing to move feel laterally; decreased foot  clearance R>L; used RW with assist for management      AM-PAC 6 CLICK MOBILITY  Turning over in bed (including adjusting bedclothes, sheets and blankets)?: 3  Sitting down on and standing up from a chair with arms (e.g., wheelchair, bedside commode, etc.): 2  Moving from lying on back to sitting on the side of the bed?: 3  Moving to and from a bed to a chair (including a wheelchair)?: 1  Need to walk in hospital room?: 1  Climbing 3-5 steps with a railing?: 1  Basic Mobility Total Score: 11       Therapeutic Activities and Exercises:   Pt performed skills as described above; pt sat at EOB and donned socks with CGA/SBA; pt performed BLE AA/PROM 2 x 10 reps FAQ with stretches to gastroc/hams; ace wrapped L knee for some increased support; sit to stand x 4 trials with modA x 2; 2 of the trials, pt was able to take 1-2 lateral steps to HOB with assist fro wt shifting and RW management    Patient left HOB elevated with all lines intact, call button in reach, bed alarm on and nurse notified..    GOALS:   Multidisciplinary Problems     Physical Therapy Goals        Problem: Physical Therapy Goal    Goal Priority Disciplines Outcome Goal Variances Interventions   Physical Therapy Goal     PT, PT/OT Ongoing, Progressing     Description:   Goals to be met by: 9/10/2020     Patient will increase functional independence with mobility by performin. Supine to sit with Contact Guard Assistance  2. Sit to supine with MInimal Assistance  3. Rolling L/R with Modified [place] and use of bedrail as needed.  4. Sit to stand transfer with Minimal Assistance  5. Bed to chair transfer with Minimal Assistance with or without AD  6. Gait  x 15 feet as able with Minimal Assistance with or without AD  7. Lower extremity exercise program x10-15 reps with supervision  8. Increase functional strength to 4- to 4 for bed mobility and transfers, gait if able                     Time Tracking:     PT Received On: 20  PT Start Time:  1050     PT Stop Time: 1116  PT Total Time (min): 26 min     Billable Minutes: Therapeutic Activity 13 minutes    Treatment Type: Treatment  PT/PTA: PT     PTA Visit Number: 0     Nimco Castro, PT  08/12/2020

## 2020-08-12 NOTE — PROGRESS NOTES
[facility] Hospital Medicine  Progress Note    Patient Name: Angelina Beard  MRN: [id number]  Patient Class: IP- Inpatient   Admission Date: 8/9/2020  Length of Stay: 0 days  Attending Physician: Rosalia Burnett MD  Primary Care Provider: Ben Mena MD        Subjective:     Principal Problem:Hyperkalemia        HPI:  Angelina Beard is a 70 yo female with hypertension, chronic diastolic congestive heart failure, paroxysmal atrial fibrillation (anticoagulated on apixaban), diabetes mellitus type 2 (hemoglobin A1c 6.9% on 4/28/2020) with neuropathy and nephropathy, hyperlipidemia, nonobstructive coronary artery disease, cerebral microvascular disease, end stage renal disease on hemodialysis (Monday Wednesday Friday at [facility] in [facility]), anemia, gastroesophageal reflux disease, osteoarthritis status post right total knee arthroplasty 2/23/16, chronic left leg pain, chronic constipation, cholelithiasis, history of right lung sarcoidosis, history of left breast cancer status post mastectomy, history of gastric ulcers on 7/13/18. Her primary care physician is Dr. Ben Mena. Her nephrologist is Dr. Rc Santamaria. Her cardiologist is Dr. Faraz Flores. Her podiatrist is Dr. Kwame Dacosta. She lives in [place], [place]. She is mostly wheelchair bound but can use a walker with assistance.     Pt presented to ED with c/o diarrhea, generalized weakness and acute on chronic left knee and left hip pain s/p mechanical fall 1 week ago. She denies vomiting, diarrhea, fever/chills and abdominal pain. KUB shows constipation, no evidence of obstruction. At baseline, she is in a wheelchair/walker due to weakness, but said she has had trouble with transfers for the past week due to her L knee pain. Denies chest pain and worsening SOB. Labs remarkable for K 6.4, troponin 0.036.  Xray shows R nondisplaced pubic ramus fracture and significant erosion of L knee appears 2/2 sepitic  arthropathy. Orthopedic surgery consulted by ED. Pt admitted to [facility] medicine.     Overview/Hospital Course:  No notes on file    Interval History:  Seen on Dialysis  Had to be dialyzed semi-urgently this AM for K of 6.3  States that she feels OK  No CP or SOB  Does have some buttocks pain  Has breakdown  Continues with diarrhea    After dialysis, patient had prolonged bleeding. 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  Will have fistulogram tomorrow      Review of Systems   Constitutional: Negative for chills and fever.   Respiratory: Negative for cough, shortness of breath and wheezing.    Cardiovascular: Negative for chest pain, palpitations and leg swelling.   Gastrointestinal: Positive for diarrhea. Negative for abdominal pain, blood in stool, constipation, nausea and vomiting.   Genitourinary: Negative for dysuria and hematuria.   Musculoskeletal: Positive for back pain.   Skin: Negative for rash.   Neurological: Negative for dizziness, weakness and headaches.     Objective:     Vital Signs (Most Recent):  Temp: 97.6 °F (36.4 °C) (08/11/20 1029)  Pulse: 70 (08/11/20 1255)  Resp: 18 (08/11/20 1029)  BP: (!) 96/52 (08/11/20 1255)  SpO2: 95 % (08/11/20 0805) Vital Signs (24h Range):  Temp:  [97 °F (36.1 °C)-98.3 °F (36.8 °C)] 97.6 °F (36.4 °C)  Pulse:  [67-77] 70  Resp:  [16-20] 18  SpO2:  [94 %-100 %] 95 %  BP: ()/() 96/52     Weight: 85.2 kg (187 lb 13.3 oz)  Body mass index is 29.42 kg/m².    Intake/Output Summary (Last 24 hours) at 8/11/2020 1333  Last data filed at 8/11/2020 0600  Gross per 24 hour   Intake 50 ml   Output --   Net 50 ml      Physical Exam  Constitutional:       Appearance: She is well-developed.      Comments: Frail   HENT:      Head: Normocephalic and atraumatic.   Eyes:      Conjunctiva/sclera: Conjunctivae normal.      Pupils: Pupils are equal, round, and reactive to light.   Neck:      Musculoskeletal: Normal range of motion.      Vascular: No JVD.   Cardiovascular:       Rate and Rhythm: Normal rate and regular rhythm.      Heart sounds: Normal heart sounds.   Pulmonary:      Effort: Pulmonary effort is normal. No respiratory distress.      Breath sounds: No wheezing.   Abdominal:      General: Bowel sounds are normal. There is no distension.      Palpations: Abdomen is soft.      Tenderness: There is no abdominal tenderness. There is no guarding.   Musculoskeletal: Normal range of motion.         General: No tenderness.   Skin:     General: Skin is warm and dry.      Capillary Refill: Capillary refill takes less than 2 seconds.   Neurological:      Mental Status: She is alert and oriented to person, place, and time.   Psychiatric:         Behavior: Behavior normal.         Significant Labs:   BMP:   Recent Labs   Lab 08/11/20  0600   *      K 6.3*      CO2 22*   BUN 60*   CREATININE 8.4*   CALCIUM 9.2     CBC:   Recent Labs   Lab 08/10/20  1240 08/11/20  0600   WBC 4.28 4.48   HGB 11.5* 11.8*   HCT 36.7* 37.7    201     CMP:   Recent Labs   Lab 08/10/20  1518 08/11/20  0600    137   K 5.6* 6.3*   CL 98 101   CO2 24 22*   * 115*   BUN 51* 60*   CREATININE 7.2* 8.4*   CALCIUM 8.7 9.2   PROT 7.5 7.3   ALBUMIN 3.4* 3.4*   BILITOT 0.5 0.5   ALKPHOS 62 63   AST 28 26   ALT 16 16   ANIONGAP 14 14   EGFRNONAA 5* 4*       Significant Imaging: I have reviewed all pertinent imaging results/findings within the past 24 hours.     CT Pelvis Without Contrast  Narrative: EXAMINATION:  CT PELVIS WITHOUT CONTRAST    CLINICAL HISTORY:  Hip trauma, nondiagnostic xray;    TECHNIQUE:  Axial CT images of the left hip were obtained with sagittal and coronal reformatted images.    COMPARISON:  Plain film of the hip,, 08/09/2020    FINDINGS:  The cortex and trabecular markings of the femur appear intact without displaced fracture or bony destructive process.  The intertrochanteric and proximal diaphysis appear intact as well.    Moderate osteoarthritis of the  femoroacetabular joint is present without evidence of pincer or CAM deformity.    The adjacent pelvic bones are intact.  There is mild irregularity of the ischial tuberosity insertion of the hamstring tendons suggesting mild insertional tendinitis.    The endplates of the lumbar spine are markedly irregular with erosive changes at the L4-5 level and mild compression of the anterior endplate of L5 appearing remote with vacuum phenomenon.  Facet arthropathy is prominent.  There is no spondylolysis or significant central canal stenosis evident as imaged.  Degenerative changes in the SI joints are present.    Atherosclerotic disease and a normal caliber aorta and iliac system are noted.  This extends extensively into the SFA bilaterally.    The uterus demonstrates multiple large chunky calcifications suggesting degenerating fibroadenomas.  The urinary bladder appears unremarkable.  There is no pelvic mass or free fluid.  Impression: Negative CT of the pelvis for fracture specially of the left hip.    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    Severe atherosclerotic vascular disease most severe the SFA bilaterally.    Electronically signed by: Vtio Garcia  Date:    08/09/2020  Time:    21:10  CT Knee Without Contrast Left  Narrative: EXAMINATION:  CT KNEE WITHOUT CONTRAST LEFT    CLINICAL HISTORY:  Knee instability;    TECHNIQUE:  Multiple axial images of the left knee were obtained without the aid of IV contrast.  The study was reformatted creating a 3D image was separate workstation under direct supervision.    COMPARISON:  None    FINDINGS:  The knee joint reveals significant damage and deformity which is consistent with a septic joint.  The tibial plateau is fractured and there does not appear to be any discernible normal articular surface involving either the tibial plateau or the distal femur.  Both condyles of the distal femur are deformed and  appear to be diminutive.  The articular surfaces of the femur and tibia appear to be mild feeding.  There is no normal articulation between the femur and the tibial plateau.  The surrounding soft tissues of the knee shows soft tissue induration and what appears to be fluid collection which most likely represents abscess fluid.  Suggest consideration aspiration in the for the purposes of obtaining culture and sensitivity information.  Impression: Findings indicating destruction of the knee joint secondary to aseptic process.    This report was flagged in Epic as abnormal.    Electronically signed by: Fuentes Vincent  Date:    08/09/2020  Time:    20:54  X-Ray Pelvis AP Inlet And Outlet  Narrative: EXAMINATION:  XR PELVIS AP INLET AND OUTLET    CLINICAL HISTORY:  Inlet and Outlet Views;  Unspecified fall, initial encounter    COMPARISON:  Pelvis radiograph 08/09/2020, abdominal radiograph 08/09/2020    FINDINGS:  Two views pelvis.    Osteopenia and artifact from habitus limit evaluation.  The bilateral sacroiliac joints appear intact.  The pubic symphysis is intact noting degenerative changes.  The region however is obscured by stool within the rectum.  The bilateral femoroacetabular joints are intact.  There is subtle cortical irregularity involving the inferior pubic ramus concerning for fracture, possibly subacute however correlation is needed.  There is extensive vascular calcification..  Impression: 1. Cortical irregularity involving the inferior pubic ramus suggests fracture, possibly subacute though not confirmed.  Correlation with focal tenderness and physical exam advised.    Electronically signed by: Poncho Handley MD  Date:    08/09/2020  Time:    19:42  X-Ray Pelvis Routine AP  Addendum: This report was flagged in Epic as abnormal.     Please note, after initial interpretation of this examination, radiograph  of the abdomen became available for review.  In retrospect, there appears  to be lucency involving  the right inferior pubic ramus concerning for  fracture, better seen on that examination.  Acuity is unknown, correlation  with focal tenderness is advised.     Electronically signed by: Poncho Handley MD   Date:    08/09/2020   Time:    18:37  Narrative: EXAMINATION:  XR PELVIS ROUTINE AP    CLINICAL HISTORY:  Unspecified fall, initial encounter    TECHNIQUE:  AP view of the pelvis was performed.    COMPARISON:  None.    FINDINGS:  Single-view pelvis.    There is osteopenia.  Degenerative changes are noted of the bilateral sacroiliac joints and pubic symphysis.  The bilateral femoroacetabular joints are intact.  There is vascular calcification.  Impression: 1. No convincing acute displaced fracture or dislocation of the pelvis.    Electronically signed by: Poncho Handley MD  Date:    08/09/2020  Time:    18:31  X-Ray Abdomen AP 1 View (KUB)  Narrative: EXAMINATION:  XR ABDOMEN AP 1 VIEW    CLINICAL HISTORY:  Diarrhea, unspecified    TECHNIQUE:  AP View(s) of the abdomen was performed.    COMPARISON:  07/09/2018    FINDINGS:  Single-view abdomen supine.    Air and stool is seen within the large bowel and projected over the rectum.  There is moderate stool throughout the colon.  No focally dilated small bowel loops.  There are no calcifications to convincingly suggest nephrolithiasis or cholelithiasis.  Degenerative changes are noted of the osseous structures.  There is vascular calcification.  Impression: 1. Moderate stool in the colon, nonobstructive bowel gas pattern.  2. Suspected fracture of the right inferior pubic ramus discussed in previous report.    Electronically signed by: Poncho Handley MD  Date:    08/09/2020  Time:    18:36  X-Ray Chest 1 View  Narrative: EXAMINATION:  XR CHEST 1 VIEW    CLINICAL HISTORY:  Cough    TECHNIQUE:  Single frontal view of the chest was performed.    COMPARISON:  11/20/2019    FINDINGS:  The cardiomediastinal silhouette is prominent noting magnification by technique.   There is calcification of the aorta..  There is slight obscuration of the right costophrenic angle, may reflect small effusion versus atelectasis..  The trachea is midline.  The lungs are symmetrically expanded bilaterally with mildly coarse interstitial attenuation.  No large focal consolidation seen.  There is no pneumothorax.  The osseous structures are remarkable for degenerative changes.  There is a left subclavian vascular stent.  Surgical changes project over the left axilla..  Impression: 1. Interstitial findings could reflect early congestive change or edema versus chronic change in this hypoventilatory exam.  Correlation is advised.  Other nonspecific infectious or inflammatory pneumonitis is a consideration    Electronically signed by: Poncoh Handley MD  Date:    08/09/2020  Time:    18:35  X-Ray Femur AP/LAT Left  Narrative: EXAMINATION:  XR FEMUR 2 VIEW LEFT    CLINICAL HISTORY:  Unspecified fall, initial encounter    TECHNIQUE:  AP and lateral views of the left femur were performed.    COMPARISON:  None}    FINDINGS:  Four views left femur.    The left femoral head maintains appropriate relationship with the acetabulum.  Please see separately dictated report for details of the distal femur at the knee as well as proximal tibia.  There is a moderate suprapatellar effusion.  There is edema about the leg.  There is vascular calcification.  Impression: 1. No convincing acute displaced fracture or dislocation of the proximal femur, please see separately dictated report for details of the distal femur at the knee.    Electronically signed by: Poncho Handley MD  Date:    08/09/2020  Time:    18:32  X-Ray Tibia Fibula 2 View Left  Narrative: EXAMINATION:  XR TIBIA FIBULA 2 VIEW LEFT    CLINICAL HISTORY:  Unspecified fall, initial encounter    TECHNIQUE:  AP and lateral views of the left tibia and fibula were performed.    COMPARISON:  None.    FINDINGS:  Four views tibia fibula.    Severe degenerative  change/post infectious change noted of the knee, evaluated on knee radiograph same date.  The remaining aspects of the tibia and fibula appear intact without acute displaced fracture or dislocation.  There is vascular calcification.  The ankle appears intact.  There is edema about the ankle and leg.  Impression: 1. Please see separately dictated report for details of the knee and proximal tibia in this patient with severe degenerative changes of the same.  No findings to suggest distal fracture.    Electronically signed by: Poncho Handley MD  Date:    08/09/2020  Time:    18:29  X-Ray Knee 3 View Left  Narrative: EXAMINATION:  XR KNEE 3 VIEW LEFT    CLINICAL HISTORY:  Unspecified fall, initial encounter    TECHNIQUE:  AP, lateral, and Merchant views of the left knee were performed.    COMPARISON:  None    FINDINGS:  Three views left knee.    There is severe degenerative change of the left knee versus post infectious change.  Evaluation for fracture is severely limited given the appearance however multifocal dispersed osseous fragments appear well corticated.  There is a moderate suprapatellar effusion.  There is vascular calcification.  There is osteopenia.  Impression: 1. Severe degenerative changes of the knee, markedly limit evaluation for acute fracture.  No previous examinations are available for comparison.  The appearance of the knee may reflect that of degenerative/post infectious change.  No definite acute displaced fracture although clinical correlation is needed and comparison with previous examinations is highly recommended.  There is a moderate suprapatellar effusion and edema about the leg.    Electronically signed by: Poncho Handley MD  Date:    08/09/2020  Time:    18:28  CT Head Without Contrast  Narrative: EXAMINATION:  CT HEAD WITHOUT CONTRAST    CLINICAL HISTORY:  Head trauma, minor (Age => 66y);    TECHNIQUE:  Low dose axial images were obtained through the head.  Coronal and sagittal  reformations were also performed. Contrast was not administered.  There are some motion limitations to the exam.    COMPARISON:  CT dated 11/18/2019.    FINDINGS:  The subcutaneous tissues are unremarkable.  The bony calvarium is intact.  The paranasal sinuses are unremarkable.  The mastoid air cells are clear.  There are postoperative changes in the orbits.    The craniocervical junction is unremarkable.  There are no extra-axial fluid collections.  There is no evidence of intracranial hemorrhage.  The ventricles and sulci are prominent, consistent with cerebral volume loss.  There are extensive hypodensities within the periventricular and subcortical white matter.  The gray-white differentiation is maintained.  There is no evidence of mass effect.  Impression: No acute intracranial process.    Advanced age-related changes.    Electronically signed by: Willie Singh MD  Date:    08/09/2020  Time:    17:54           Assessment/Plan:      * Hyperkalemia  Dialysis today    Left knee pain  S/p mechanical fall 1 week ago  -imaging ? septic arthritis  -s/p empiric antibiotics in ED  -evaluated by orthopedic surgery, arthrocentesis done and not c/w septic joint  - stop abx  -PT/OT consulted: recommend SNF      Hypertension associated with diabetes  Taking amlodipine 10 mg daily and metoprolol 50 mg daily  On metorpolol.  Will resume Amlodipine now due to B elevations      ESRD on dialysis  Dialysis. Appreciate Nephrology. Continue home renal vitamins, calcium acetate. Monitor labs  Hyperkalemic today  Will get dialysis.  Likely has fistula occlusion  Surgery to evaluate in AM      Physical deconditioning  PT/OT: recommend SNF      Chronic diastolic congestive heart failure  Euvolemic on exam. Continue furosemide 40 mg daily. Patient still able to make urine.       Paroxysmal atrial fibrillation  Taking Apixaban- continue       Anemia in end-stage renal disease  H&H stable      Diabetic neuropathy associated with type 2  diabetes mellitus  Taking Gabapentin 300 mg TID prn- continue     GERD (gastroesophageal reflux disease)  Daily PPI       Hyperlipidemia  Continue statin       Type 2 diabetes mellitus with hypertension and end stage renal disease on dialysis  A1C 6.9   Low dose SSI, accucheck AC&HS , Diabetic diet       Cerebral microvascular disease  Continue Plavix, statin         VTE Risk Mitigation (From admission, onward)         Ordered     apixaban tablet 2.5 mg  2 times daily      08/10/20 1908     IP VTE HIGH RISK PATIENT  Once      08/09/20 2255     Place sequential compression device  Until discontinued      08/09/20 2255                      Rosalia Burnett MD  Department of Hospital Medicine   [facility]

## 2020-08-12 NOTE — PLAN OF CARE
Problem: Physical Therapy Goal  Goal: Physical Therapy Goal  Description:   Goals to be met by: 9/10/2020     Patient will increase functional independence with mobility by performin. Supine to sit with Contact Guard Assistance  2. Sit to supine with MInimal Assistance  3. Rolling L/R with Modified [place] and use of bedrail as needed.  4. Sit to stand transfer with Minimal Assistance  5. Bed to chair transfer with Minimal Assistance with or without AD  6. Gait  x 15 feet as able with Minimal Assistance with or without AD  7. Lower extremity exercise program x10-15 reps with supervision  8. Increase functional strength to 4- to 4 for bed mobility and transfers, gait if able    Outcome: Ongoing, Progressing   Patient with improved bed mobility, tolerance to activity; will cont with POC.

## 2020-08-12 NOTE — PROGRESS NOTES
NET Team Rounds with Dr. LAURIE Herrera MD and Charis Purcell MD    Diagnosis this admission:   Cough [R05]  Diarrhea [R19.7]  Hyperkalemia [E87.5]  Fall [W19.XXXA]  Fatigue, unspecified type [R53.83]  Pyogenic arthritis of left knee joint, due to unspecified organism [M00.9]  Hyperkalemia [E87.5]    Admit Date: 8/9/2020   Discharge Date: TBD     Surgery/Procedure: ,  7/13/2018 - Esophagogastroduodenoscopy (egd) and Colonoscopy     NET Physician:  LAURIE Herrera MD  Local Treating Physician:Ben Mena MD      Assessment  Diet: NPO,    Oral Supplement: Novasource Renal  Nutrition Support - none   Appetite - fair  Nausea - No  Vomiting- No  BM- No bowel movements  Abdomen- nontender  Incision- none  Drain- none  Urine-  on dialysis no urine output  Activity-  up with assistance   Pain-none  Respiratory- encourage cough/deep breath/IS     IV Access- Dialysis Access - revision   Edema: 1+    Vitals:   Patient Vitals for the past 24 hrs:   BP Temp Temp src Pulse Resp SpO2 Weight   08/12/20 0842 129/65 97.9 °F (36.6 °C) Oral 68 16 -- --   08/12/20 0500 -- -- -- -- -- -- 87.2 kg (192 lb 3.9 oz)   08/12/20 0342 115/66 98.6 °F (37 °C) Oral 71 18 98 % --   08/12/20 0003 (!) 114/51 98.4 °F (36.9 °C) Oral 72 18 98 % --   08/12/20 0000 -- -- -- 62 -- -- --   08/11/20 2016 (!) 106/57 98.3 °F (36.8 °C) Oral 74 18 98 % --   08/11/20 1655 132/65 98.3 °F (36.8 °C) Oral 77 17 -- --   08/11/20 1540 (!) 179/89 -- -- 76 -- -- --   08/11/20 1425 (!) 167/99 97.7 °F (36.5 °C) Oral 73 18 -- --   08/11/20 1410 (!) 147/86 -- -- 71 -- -- --   08/11/20 1350 (!) 157/89 -- -- 74 -- -- --   08/11/20 1330 (!) 169/94 -- -- 71 -- -- --   08/11/20 1255 (!) 96/52 -- -- 70 -- -- --   08/11/20 1240 139/74 -- -- 72 -- -- --   08/11/20 1225 (!) 156/100 -- -- 68 -- -- --   08/11/20 1155 (!) 151/94 -- -- 68 -- -- --   08/11/20 1153 -- -- -- 68 -- -- --   08/11/20 1125 (!) 161/95 -- -- 72 -- -- --   08/11/20 1055 (!) 168/108 -- -- 70 --  -- --       Wt Readings from Last 1 Encounters:   08/12/20 0500 87.2 kg (192 lb 3.9 oz)   08/11/20 0508 85.2 kg (187 lb 13.3 oz)   08/10/20 0519 90.7 kg (199 lb 15.3 oz)   08/09/20 2307 86.6 kg (190 lb 14.7 oz)   08/09/20 1635 84.4 kg (186 lb)         Labs:   Recent Labs   Lab 08/10/20  1240 08/10/20  1518 08/11/20  0600 08/12/20  0421   WBC 4.28  --  4.48 3.86*   HGB 11.5*  --  11.8* 11.0*   HCT 36.7*  --  37.7 36.0*     --  201 203   MCV 89  --  88 90   RDW 16.1*  --  16.1* 15.9*   NA  --  136 137 135*   K  --  5.6* 6.3* 4.8   CL  --  98 101 98   CO2  --  24 22* 25   BUN  --  51* 60* 37*   CREATININE  --  7.2* 8.4* 6.2*   GLU  --  151* 115* 129*   PROT  --  7.5 7.3 6.9   ALBUMIN  --  3.4* 3.4* 3.2*   BILITOT  --  0.5 0.5 0.5   AST  --  28 26 20   ALKPHOS  --  62 63 61   ALT  --  16 16 15      Recent Labs   Lab 08/09/20 2028   CRP 12.0*        Scheduled Meds   sodium chloride 0.9%   Intravenous Once    sodium chloride 0.9%   Intravenous Once    amLODIPine  10 mg Oral Daily    apixaban  2.5 mg Oral BID    calcium acetate(phosphat bind)  667 mg Oral TID WM    clopidogreL  75 mg Oral Daily    docusate sodium  100 mg Oral BID    furosemide  40 mg Oral Daily    metoprolol succinate  50 mg Oral Daily    mirtazapine  7.5 mg Oral QHS    mupirocin   Nasal BID    pantoprazole  40 mg Oral Daily    pravastatin  80 mg Oral Daily    sertraline  100 mg Oral Daily    vitamin renal formula (B-complex-vitamin c-folic acid)  1 capsule Oral Daily       Continuous Infusion       PRN Meds  sodium chloride 0.9%, sodium chloride 0.9%, acetaminophen, [COMPLETED] calcium gluconate IVPB **AND** calcium gluconate IVPB, dextrose 50%, dextrose 50%, gabapentin, glucagon (human recombinant), glucose, glucose, HYDROcodone-acetaminophen, insulin aspart U-100, ondansetron, sodium chloride 0.9%     Assessment/Plan:  - Revision of AV Hemodialysis cath     Discharge Planning   Appointments- LAURIE Herrera MD TBD  Home  Health needs- TBD  Therapy needs- TBD  Nutritional needs - TBD  Home support system- TBD  Barriers- none    Reviewed follow up plan.  Patient verbalized understanding.    ___________________________________________  WOLFGANG CharlesN, RN, ONN-CG  Nurse Navigator [facility]    Tracie Weil Cavalier, ELIDAN, LDN, CNSC  Registered Dietitian [facility]      Face to Face Time: 30 minutes

## 2020-08-12 NOTE — ANESTHESIA PREPROCEDURE EVALUATION
08/12/2020  Angelina Beard is a 69 y.o., female hx CAD, CHF, HTN, pulmonary sarcoidosis on home O2, pulmonary HTN, DM2, ESRD scheduled for AV fistula revision.    Anesthesia Evaluation    I have reviewed the Patient Summary Reports.         Review of Systems  Anesthesia Hx:   Denies Personal Hx of Anesthesia complications.   Hematology/Oncology:         -- Anemia: --  Cancer in past history:  Breast   EENT/Dental:EENT/Dental Normal   Cardiovascular:   Exercise tolerance: poor Hypertension CAD   CHF    Pulmonary:   Pulmonary sarcoidosis on home O2 2L NC   Renal/:   Chronic Renal Disease, Dialysis    Hepatic/GI:   GERD    Musculoskeletal:   Arthritis     Neurological:  Neurology Normal    Endocrine:   Diabetes, type 2    Psych:   depression          Physical Exam  General:  Obesity    Airway/Jaw/Neck:  Airway Findings: Mouth Opening: Normal Tongue: Normal  General Airway Assessment: Adult  Mallampati: III  TM Distance: Normal, at least 6 cm      Dental:  Dental Findings: Periodontal disease, Severe    Chest/Lungs:  Chest/Lungs Findings: Normal Respiratory Rate     Heart/Vascular:  Heart Findings: Rate: Normal        Mental Status:  Mental Status Findings:  Cooperative, Alert and Oriented         Anesthesia Plan  Type of Anesthesia, risks & benefits discussed:  Anesthesia Type:  general, MAC, regional  Patient's Preference:   Intra-op Monitoring Plan: standard ASA monitors  Intra-op Monitoring Plan Comments:   Post Op Pain Control Plan: per primary service following discharge from PACU, multimodal analgesia and peripheral nerve block  Post Op Pain Control Plan Comments:   Induction:   IV  Beta Blocker:  Patient is on a Beta-Blocker and has received one dose within the past 24 hours (No further documentation required).       Informed Consent: Patient understands risks and agrees with Anesthesia plan.   Questions answered. Anesthesia consent signed with patient.  ASA Score: 4     Day of Surgery Review of History & Physical:  There are no significant changes.  H&P update referred to the surgeon.         Ready For Surgery From Anesthesia Perspective.     TTE 11/2019  · Concentric left ventricular hypertrophy.  · Normal left ventricular systolic function. The estimated ejection fraction is 60%  · Mild right ventricular enlargement.  · Normal right ventricular systolic function.  · Grade II (moderate) left ventricular diastolic dysfunction consistent with pseudonormalization.  · Biatrial enlargement.  · Mild tricuspid regurgitation.  · The estimated PA systolic pressure is 70 mm Hg  · Elevated central venous pressure (15 mm Hg)    Lab Results   Component Value Date    WBC 3.86 (L) 08/12/2020    HGB 11.0 (L) 08/12/2020    HCT 36.0 (L) 08/12/2020     08/12/2020    CHOL 173 04/28/2020    TRIG 179 04/28/2020    HDL 51 04/28/2020    ALT 15 08/12/2020    AST 20 08/12/2020     (L) 08/12/2020    K 4.8 08/12/2020    CL 98 08/12/2020    CREATININE 6.2 (H) 08/12/2020    BUN 37 (H) 08/12/2020    CO2 25 08/12/2020    TSH 1.470 02/12/2019    INR 1.0 08/09/2020    HGBA1C 6.8 (H) 08/10/2020

## 2020-08-12 NOTE — ASSESSMENT & PLAN NOTE
Taking amlodipine 10 mg daily and metoprolol 50 mg daily  On metorpolol.  Will resume Amlodipine now due to B elevations

## 2020-08-12 NOTE — PLAN OF CARE
AAO,NPO at MN,no bleeding currently from RUE HD graft,new gauze dressing applied after ultrasound performed at bedside earlier in shift,no c/o cp or sob,meds given as ordered,safety maintained.

## 2020-08-12 NOTE — PLAN OF CARE
Patient on oxygen with documented flow.  Patient states that she wears 2-2.5 liters home O2. No resp distress. Will continue to monitor.

## 2020-08-12 NOTE — SUBJECTIVE & OBJECTIVE
Interval History:  No complaints  Was supposed to go for revision but no OR time.   Will feed and make NPO after MN  No CP, SOB, NV  No diarrhea today  Right buttocks pain. No lesions seen or felt  Warm compresses and recheck      Review of Systems   Constitutional: Negative for chills and fever.   Respiratory: Negative for cough, shortness of breath and wheezing.    Cardiovascular: Negative for chest pain, palpitations and leg swelling.   Gastrointestinal: Negative for abdominal pain, blood in stool, constipation, diarrhea, nausea and vomiting.   Genitourinary: Negative for dysuria and hematuria.   Musculoskeletal: Negative for back pain.   Skin: Negative for rash.   Neurological: Negative for dizziness, weakness and headaches.     Objective:     Vital Signs (Most Recent):  Temp: 97.6 °F (36.4 °C) (08/12/20 1154)  Pulse: 65 (08/12/20 1154)  Resp: 14 (08/12/20 1154)  BP: (!) 116/55 (08/12/20 1154)  SpO2: 98 % (08/12/20 0800) Vital Signs (24h Range):  Temp:  [97.6 °F (36.4 °C)-98.6 °F (37 °C)] 97.6 °F (36.4 °C)  Pulse:  [62-78] 65  Resp:  [14-18] 14  SpO2:  [94 %-98 %] 98 %  BP: ()/() 116/55     Weight: 87.2 kg (192 lb 3.9 oz)  Body mass index is 30.11 kg/m².    Intake/Output Summary (Last 24 hours) at 8/12/2020 1157  Last data filed at 8/12/2020 0500  Gross per 24 hour   Intake 500 ml   Output 900 ml   Net -400 ml      Physical Exam  Vitals signs and nursing note reviewed.   Constitutional:       General: She is not in acute distress.     Appearance: She is well-developed.   Eyes:      Conjunctiva/sclera: Conjunctivae normal.   Neck:      Vascular: No JVD.   Cardiovascular:      Rate and Rhythm: Normal rate and regular rhythm.      Heart sounds: Murmur present.      Comments: AV fistula right arm +bruit -thrill  Pulmonary:      Effort: Pulmonary effort is normal.      Breath sounds: Normal breath sounds. No wheezing.   Abdominal:      General: Bowel sounds are normal.      Palpations: Abdomen is soft.     "  Tenderness: There is no abdominal tenderness.   Musculoskeletal:         General: No tenderness.   Skin:     General: Skin is warm and dry.      Capillary Refill: Capillary refill takes less than 2 seconds.      Comments: Exam of buttocks shows no abnormaalities where she is pointing to having pain ("boil")   Neurological:      Mental Status: She is alert and oriented to person, place, and time.         Significant Labs:   BMP:   Recent Labs   Lab 08/12/20  0421   *   *   K 4.8   CL 98   CO2 25   BUN 37*   CREATININE 6.2*   CALCIUM 9.0     CBC:   Recent Labs   Lab 08/11/20  0600 08/12/20  0421   WBC 4.48 3.86*   HGB 11.8* 11.0*   HCT 37.7 36.0*    203       Significant Imaging: I have reviewed all pertinent imaging results/findings within the past 24 hours.  "

## 2020-08-12 NOTE — ASSESSMENT & PLAN NOTE
Dialysis. Appreciate Nephrology. Continue home renal vitamins, calcium acetate. Monitor labs  Hyperkalemic today  Will get dialysis.  Likely has fistula occlusion  Surgery to evaluate in AM

## 2020-08-12 NOTE — PLAN OF CARE
VN rounds: VN cued into pt's room with pt's permission. Pt resting in bed. Fall risk protocol discussed with pt. VN instructed pt to call for assistance. Pt aware and agreeable. Pt c/i left hip pain and request pain medication. Bedside nurse notified. Pt instructed on POC. Verbalized understanding. Allowed time for questions.  Will cont to be available and intervene as needed.

## 2020-08-12 NOTE — CARE UPDATE
Patient evaluated by staff Dr. Herrera on rounds. Patient likely will need AVG revision. No OR openings today so will plan on revision tomorrow. Patient will be given Vancomycin now. Can eat now, NPO at midnight for procedure tomorrow.     Mariano Yu MD PGY-2  08/12/2020 1:55 PM

## 2020-08-12 NOTE — PT/OT/SLP PROGRESS
Occupational Therapy   Treatment    Name: Angelina Beard  MRN: [id number]  Admitting Diagnosis:  Hyperkalemia       Recommendations:     Discharge Recommendations: nursing facility, skilled  Discharge Equipment Recommendations:  hospital bed  Barriers to discharge:  Decreased caregiver support    Assessment:     Angelina Beard is a 69 y.o. female with a medical diagnosis of Hyperkalemia.  She presents with weakness; decreased ability to t/f; not at baseline . Performance deficits affecting function are weakness, gait instability, decreased upper extremity function, decreased lower extremity function, impaired balance, impaired endurance, decreased ROM, impaired self care skills, impaired functional mobilty, edema, impaired sensation.     Pt progress towards goals. Able to perform 4 stands today's date from EOB and participate in seated UE/LE therex. Cont to rec SNF     Rehab Prognosis:  Good; patient would benefit from acute skilled OT services to address these deficits and reach maximum level of function.       Plan:     Patient to be seen 5 x/week to address the above listed problems via self-care/home management, therapeutic activities, therapeutic exercises  · Plan of Care Expires: 09/10/20  · Plan of Care Reviewed with: patient    Subjective     Pain/Comfort:  · Pain Rating 1: 0/10    Objective:     Communicated with: jewel prior to session.   General Precautions: Standard, fall   Orthopedic Precautions:(BLEs WBAT)   Braces: N/A     Occupational Performance:     Bed Mobility:    · Patient completed Scooting/Bridging with contact guard assistance  · Patient completed Supine to Sit with contact guard assistance  · Patient completed Sit to Supine with moderate assistance     Functional Mobility/Transfers:  · Patient completed Sit <> Stand Transfer with moderate assistance and of 2 persons  with  rolling walker   · Functional Mobility: max A of 2 lateral steps to HOB; assist required for weight shifting to move feet  laterally; R poor foot clearance >L; 2 steps over 2 trials     Activities of Daily Living:  · Lower Body Dressing: contact guard assistance seated EOB       AMPAC 6 Click ADL: 16    Treatment & Education:  Pt performing bed mobility as above   Sat EOB and rebecca socks CGA/SBA   BUE/LE therex while seated EOB- chest press, shoulder flex/ext in available range, horizontal ab/ad   Stood x 4 trials with mod a of 2; 2 trials pt required max A of 2 for 1-2 lateral steps to HOB with assist for weight shifting and RW management    Patient left supine with all lines intact, call button in reach, bed alarm on and nsg notifiedEducation:      GOALS:   Multidisciplinary Problems     Occupational Therapy Goals        Problem: Occupational Therapy Goal    Goal Priority Disciplines Outcome Interventions   Occupational Therapy Goal     OT, PT/OT Ongoing, Progressing    Description: Goals to be met by: 9/10/20     Patient will increase functional independence with ADLs by performing:    LE Dressing with Minimal Assistance.  Grooming while seated with Modified Centre.  Toileting from bedside commode with Minimal Assistance for hygiene and clothing management.   Supine to sit with Contact Guard Assistance.  Stand pivot transfers with Minimal Assistance.  Toilet transfer to bedside commode with Minimal Assistance.  Increased functional strength to 4/5 for self care skills and functional mobility.  Upper extremity exercise program x10 reps per handout, with independence.                     Time Tracking:     OT Date of Treatment: 08/12/20  OT Start Time: 1050  OT Stop Time: 1116  OT Total Time (min): 26 min    Billable Minutes:Therapeutic Exercise 13 co treatment with PT     Emily Lange, OT  8/12/2020

## 2020-08-12 NOTE — PROGRESS NOTES
[facility] Progress Note    Date of Admit: 2020  Length of Stay: 1    Chief Complaint/Reason for Consult     [facility] following for ESRD on HD Evaluation and Management    Subjective:      Tolerated make up session of HD yesterday, though significant event- HD RN noted prolonged bleeding from AV fistula site after completion of HD and was unable to achieve hemostasis after 45 minutes of compression. Elevated AV venous pressures noted during HD. Surgery consulted to evaluate for fistulogram. US AV access notable for elevated velocities across the distal anastomosis.     Patient without current complaints this AM. Denies chest pain, shortness of breath, or subjective fever/chills overnight. No new complaints. NPO since midnight.     Objective:   Last 24 Hour Vital Signs:  BP  Min: 96/52  Max: 182/95  Temp  Av.1 °F (36.7 °C)  Min: 97.6 °F (36.4 °C)  Max: 98.6 °F (37 °C)  Pulse  Av.3  Min: 62  Max: 78  Resp  Av.1  Min: 14  Max: 18  SpO2  Av.2 %  Min: 94 %  Max: 98 %  Weight  Av.2 kg (192 lb 3.9 oz)  Min: 87.2 kg (192 lb 3.9 oz)  Max: 87.2 kg (192 lb 3.9 oz)  Body mass index is 30.11 kg/m².  I/O last 3 completed shifts:  In: 550 [P.O.:150; Other:400]  Out: 900 [Other:900]    Physical Examination:  General: No acute distress, resting comfortably on dialysis  HEENT: EOMI, PERRL, MMM, OP clear and without exudate or erythema  Cardiovascular: NRRR, no murmurs or rubs.  Chest/Pulmonary: CTABL, no IWB  Abdomen: soft, nontender, nondistended, BS+  MSKL: without gross deformity, active FROM  Lymphatic: no appreciated cervical or submandibular LAD  Skin: without cyanosis or clubbing, without trace edema  Neurologic: AAOx3, following two step commands  Access: RUE RC AVF with adequate systolic now absent diastolic thrill, + bruits, increased pulsatility on arterial inflow.     Laboratory:  Most Recent Data:  CBC:   Lab Results   Component Value Date    WBC 3.86 (L) 2020    HGB 11.0 (L)  08/12/2020    HCT 36.0 (L) 08/12/2020     08/12/2020    MCV 90 08/12/2020    RDW 15.9 (H) 08/12/2020     BMP:   Lab Results   Component Value Date     (L) 08/12/2020    K 4.8 08/12/2020    CL 98 08/12/2020    CO2 25 08/12/2020    BUN 37 (H) 08/12/2020    CREATININE 6.2 (H) 08/12/2020     (H) 08/12/2020    CALCIUM 9.0 08/12/2020    MG 2.3 08/09/2020    PHOS 7.4 (H) 08/09/2020     LFTs:   Lab Results   Component Value Date    PROT 6.9 08/12/2020    ALBUMIN 3.2 (L) 08/12/2020    BILITOT 0.5 08/12/2020    AST 20 08/12/2020    ALKPHOS 61 08/12/2020    ALT 15 08/12/2020     Coags:   Lab Results   Component Value Date    INR 1.0 08/09/2020     Urinalysis:   Lab Results   Component Value Date    LABURIN  11/08/2016     Multiple organisms isolated. None in predominance.  Repeat if    LABURIN clinically necessary. 11/08/2016    COLORU Yellow 07/29/2019    SPECGRAV 1.015 07/29/2019    NITRITE Negative 07/29/2019    KETONESU Negative 07/29/2019    UROBILINOGEN Negative 07/29/2019    WBCUA 5 07/29/2019       Trended Lab Data:  Recent Labs   Lab 08/10/20  1240 08/10/20  1518 08/11/20  0600 08/12/20  0421   WBC 4.28  --  4.48 3.86*   HGB 11.5*  --  11.8* 11.0*   HCT 36.7*  --  37.7 36.0*     --  201 203   MCV 89  --  88 90   RDW 16.1*  --  16.1* 15.9*   NA  --  136 137 135*   K  --  5.6* 6.3* 4.8   CL  --  98 101 98   CO2  --  24 22* 25   BUN  --  51* 60* 37*   CREATININE  --  7.2* 8.4* 6.2*   GLU  --  151* 115* 129*   PROT  --  7.5 7.3 6.9   ALBUMIN  --  3.4* 3.4* 3.2*   BILITOT  --  0.5 0.5 0.5   AST  --  28 26 20   ALKPHOS  --  62 63 61   ALT  --  16 16 15       Trended Cardiac Data:  Recent Labs   Lab 08/09/20  1716   TROPONINI 0.036*   *       Microbiology Data:  Microbial cultures from knee aspirate pending    Other Laboratory Data:  Sed rate 48    Other Results:      Radiology:  Imaging Results          CT Pelvis Without Contrast (Final result)  Result time 08/09/20 21:10:28   Procedure  changed from CT Hip Without Contrast Left     Final result by Vito Garcia MD (08/09/20 21:10:28)                 Impression:      Negative CT of the pelvis for fracture specially of the left hip.    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    Severe atherosclerotic vascular disease most severe the SFA bilaterally.      Electronically signed by: Vito Garcia  Date:    08/09/2020  Time:    21:10             Narrative:    EXAMINATION:  CT PELVIS WITHOUT CONTRAST    CLINICAL HISTORY:  Hip trauma, nondiagnostic xray;    TECHNIQUE:  Axial CT images of the left hip were obtained with sagittal and coronal reformatted images.    COMPARISON:  Plain film of the hip,, 08/09/2020    FINDINGS:  The cortex and trabecular markings of the femur appear intact without displaced fracture or bony destructive process.  The intertrochanteric and proximal diaphysis appear intact as well.    Moderate osteoarthritis of the femoroacetabular joint is present without evidence of pincer or CAM deformity.    The adjacent pelvic bones are intact.  There is mild irregularity of the ischial tuberosity insertion of the hamstring tendons suggesting mild insertional tendinitis.    The endplates of the lumbar spine are markedly irregular with erosive changes at the L4-5 level and mild compression of the anterior endplate of L5 appearing remote with vacuum phenomenon.  Facet arthropathy is prominent.  There is no spondylolysis or significant central canal stenosis evident as imaged.  Degenerative changes in the SI joints are present.    Atherosclerotic disease and a normal caliber aorta and iliac system are noted.  This extends extensively into the SFA bilaterally.    The uterus demonstrates multiple large chunky calcifications suggesting degenerating fibroadenomas.  The urinary bladder appears unremarkable.  There is no pelvic mass or free fluid.                                CT  Knee Without Contrast Left (Final result)  Result time 08/09/20 20:54:03    Final result by Fuentes Vincent MD (08/09/20 20:54:03)                 Impression:      Findings indicating destruction of the knee joint secondary to aseptic process.    This report was flagged in Epic as abnormal.      Electronically signed by: Fuentes Vincent  Date:    08/09/2020  Time:    20:54             Narrative:    EXAMINATION:  CT KNEE WITHOUT CONTRAST LEFT    CLINICAL HISTORY:  Knee instability;    TECHNIQUE:  Multiple axial images of the left knee were obtained without the aid of IV contrast.  The study was reformatted creating a 3D image was separate workstation under direct supervision.    COMPARISON:  None    FINDINGS:  The knee joint reveals significant damage and deformity which is consistent with a septic joint.  The tibial plateau is fractured and there does not appear to be any discernible normal articular surface involving either the tibial plateau or the distal femur.  Both condyles of the distal femur are deformed and appear to be diminutive.  The articular surfaces of the femur and tibia appear to be mild feeding.  There is no normal articulation between the femur and the tibial plateau.  The surrounding soft tissues of the knee shows soft tissue induration and what appears to be fluid collection which most likely represents abscess fluid.  Suggest consideration aspiration in the for the purposes of obtaining culture and sensitivity information.                                   X-Ray Pelvis Routine AP (Edited Result - FINAL)  Result time 08/09/20 18:37:50    Addendum 1 of 1 by Poncho Handley MD (08/09/20 18:37:50)      This report was flagged in Epic as abnormal.    Please note, after initial interpretation of this examination, radiograph of the abdomen became available for review.  In retrospect, there appears to be lucency involving the right inferior pubic ramus concerning for fracture, better seen on that  examination.  Acuity is unknown, correlation with focal tenderness is advised.      Electronically signed by: Poncho Handley MD  Date:    08/09/2020  Time:    18:37               Final result by Poncho Handley MD (08/09/20 18:31:05)                 Impression:      1. No convincing acute displaced fracture or dislocation of the pelvis.      Electronically signed by: Poncho Handley MD  Date:    08/09/2020  Time:    18:31             Narrative:    EXAMINATION:  XR PELVIS ROUTINE AP    CLINICAL HISTORY:  Unspecified fall, initial encounter    TECHNIQUE:  AP view of the pelvis was performed.    COMPARISON:  None.    FINDINGS:  Single-view pelvis.    There is osteopenia.  Degenerative changes are noted of the bilateral sacroiliac joints and pubic symphysis.  The bilateral femoroacetabular joints are intact.  There is vascular calcification.                                   Assessment and Plan:      Angelina Beard is a 69 year old man with pertinent medical history of CAD, Chronic heart failure with preserved ejection fraction, pulmonary hypertension (likely WHO group II), HLD, hypertension, DM type II, sarcoidosis, and ESRD on HD MWF with Dr. Sanatmaria at [facility]. Admitted for evaluation of diarrhea and weakness. [facility] consulted for ESRD on HD evaluation and management.       #) ESRD on HD (MWF)  - follows with Dr. Santamaria at [facility]  - HD completed on 8/10 and 8/11.   - elevated velocities on AV ultrasound. Surgery consulted to evaluate for fistulogram.   - dose meds for GFR < 10  - nephro caps  - strict I/O, daily weights  - please avoid gadolinium, fleets, phos-based laxatives, NSAIDs  - patient to follow up with her HD unit after discharge    #) Hypertension   - amlodipine 10mg  - furosemide 40mg    #) Anemia  Lab Results   Component Value Date    HGB 11.0 (L) 08/12/2020    MCV 90 08/12/2020    FERRITIN 1,215 (H) 11/20/2019    FESATURATED 14 (L) 11/20/2019    OXURFKHE42 389 03/03/2018     FOLATE 4.5 03/03/2018   - no need for EPO or iron at this time.    #)  CKD/Mineral Bone Disease/Nutrition   Lab Results   Component Value Date    PHOS 7.4 (H) 08/09/2020    CALCIUM 9.0 08/12/2020    MG 2.3 08/09/2020    CO2 25 08/12/2020    ALBUMIN 3.2 (L) 08/12/2020   - continue phoslo TID w/ meals; please check phos every other day  - nepro with meals  - nephrocaps daily  - standard bicarb bath with HD    Thank you for this consult. We will continue to follow with you.    Enrrique Pearl MD  [facility]  [phone]    If after 5pm please forward any questions to the [facility] Fellow/Attending on call.

## 2020-08-13 ENCOUNTER — ANESTHESIA (OUTPATIENT)
Dept: SURGERY | Facility: HOSPITAL | Age: 70
DRG: 252 | End: 2020-08-13
Payer: MEDICARE

## 2020-08-13 PROBLEM — T82.898A PROBLEM WITH DIALYSIS ACCESS: Status: ACTIVE | Noted: 2020-08-13

## 2020-08-13 LAB
ALBUMIN SERPL BCP-MCNC: 3.2 G/DL (ref 3.5–5.2)
ALP SERPL-CCNC: 61 U/L (ref 55–135)
ALT SERPL W/O P-5'-P-CCNC: 15 U/L (ref 10–44)
ANION GAP SERPL CALC-SCNC: 13 MMOL/L (ref 8–16)
AST SERPL-CCNC: 21 U/L (ref 10–40)
BACTERIA SPEC AEROBE CULT: NO GROWTH
BASOPHILS # BLD AUTO: 0.02 K/UL (ref 0–0.2)
BASOPHILS NFR BLD: 0.5 % (ref 0–1.9)
BILIRUB SERPL-MCNC: 0.4 MG/DL (ref 0.1–1)
BUN SERPL-MCNC: 50 MG/DL (ref 8–23)
CALCIUM SERPL-MCNC: 8.8 MG/DL (ref 8.7–10.5)
CHLORIDE SERPL-SCNC: 95 MMOL/L (ref 95–110)
CO2 SERPL-SCNC: 24 MMOL/L (ref 23–29)
CREAT SERPL-MCNC: 7.8 MG/DL (ref 0.5–1.4)
DIFFERENTIAL METHOD: ABNORMAL
EOSINOPHIL # BLD AUTO: 0.1 K/UL (ref 0–0.5)
EOSINOPHIL NFR BLD: 2.4 % (ref 0–8)
ERYTHROCYTE [DISTWIDTH] IN BLOOD BY AUTOMATED COUNT: 15.5 % (ref 11.5–14.5)
EST. GFR  (AFRICAN AMERICAN): 6 ML/MIN/1.73 M^2
EST. GFR  (NON AFRICAN AMERICAN): 5 ML/MIN/1.73 M^2
GLUCOSE SERPL-MCNC: 154 MG/DL (ref 70–110)
HBV SURFACE AB SER QL IA: POSITIVE
HBV SURFACE AB SERPL IA-ACNC: 26 MIU/ML
HCT VFR BLD AUTO: 34.3 % (ref 37–48.5)
HGB BLD-MCNC: 10.5 G/DL (ref 12–16)
IMM GRANULOCYTES # BLD AUTO: 0.02 K/UL (ref 0–0.04)
IMM GRANULOCYTES NFR BLD AUTO: 0.5 % (ref 0–0.5)
LYMPHOCYTES # BLD AUTO: 0.8 K/UL (ref 1–4.8)
LYMPHOCYTES NFR BLD: 20 % (ref 18–48)
MCH RBC QN AUTO: 27.2 PG (ref 27–31)
MCHC RBC AUTO-ENTMCNC: 30.6 G/DL (ref 32–36)
MCV RBC AUTO: 89 FL (ref 82–98)
MONOCYTES # BLD AUTO: 0.4 K/UL (ref 0.3–1)
MONOCYTES NFR BLD: 9.5 % (ref 4–15)
NEUTROPHILS # BLD AUTO: 2.8 K/UL (ref 1.8–7.7)
NEUTROPHILS NFR BLD: 67.1 % (ref 38–73)
NRBC BLD-RTO: 0 /100 WBC
PLATELET # BLD AUTO: 180 K/UL (ref 150–350)
PMV BLD AUTO: 9.7 FL (ref 9.2–12.9)
POCT GLUCOSE: 129 MG/DL (ref 70–110)
POCT GLUCOSE: 155 MG/DL (ref 70–110)
POCT GLUCOSE: 169 MG/DL (ref 70–110)
POCT GLUCOSE: 177 MG/DL (ref 70–110)
POTASSIUM SERPL-SCNC: 4.7 MMOL/L (ref 3.5–5.1)
PROT SERPL-MCNC: 6.9 G/DL (ref 6–8.4)
RBC # BLD AUTO: 3.86 M/UL (ref 4–5.4)
SARS-COV-2 RDRP RESP QL NAA+PROBE: NEGATIVE
SODIUM SERPL-SCNC: 132 MMOL/L (ref 136–145)
TROPONIN I SERPL DL<=0.01 NG/ML-MCNC: 0.03 NG/ML (ref 0–0.03)
WBC # BLD AUTO: 4.21 K/UL (ref 3.9–12.7)

## 2020-08-13 PROCEDURE — 71000033 HC RECOVERY, INTIAL HOUR: Performed by: SURGERY

## 2020-08-13 PROCEDURE — 63600175 PHARM REV CODE 636 W HCPCS: Performed by: NURSE PRACTITIONER

## 2020-08-13 PROCEDURE — 63600175 PHARM REV CODE 636 W HCPCS: Performed by: ANESTHESIOLOGY

## 2020-08-13 PROCEDURE — 93010 ELECTROCARDIOGRAM REPORT: CPT | Mod: ,,, | Performed by: INTERNAL MEDICINE

## 2020-08-13 PROCEDURE — 25000003 PHARM REV CODE 250: Performed by: NURSE ANESTHETIST, CERTIFIED REGISTERED

## 2020-08-13 PROCEDURE — 11000001 HC ACUTE MED/SURG PRIVATE ROOM

## 2020-08-13 PROCEDURE — 25000003 PHARM REV CODE 250: Performed by: SURGERY

## 2020-08-13 PROCEDURE — 94761 N-INVAS EAR/PLS OXIMETRY MLT: CPT

## 2020-08-13 PROCEDURE — 27000221 HC OXYGEN, UP TO 24 HOURS

## 2020-08-13 PROCEDURE — 63600175 PHARM REV CODE 636 W HCPCS: Performed by: NURSE ANESTHETIST, CERTIFIED REGISTERED

## 2020-08-13 PROCEDURE — 97530 THERAPEUTIC ACTIVITIES: CPT

## 2020-08-13 PROCEDURE — 36000706: Performed by: SURGERY

## 2020-08-13 PROCEDURE — 63600175 PHARM REV CODE 636 W HCPCS: Performed by: STUDENT IN AN ORGANIZED HEALTH CARE EDUCATION/TRAINING PROGRAM

## 2020-08-13 PROCEDURE — 27201423 OPTIME MED/SURG SUP & DEVICES STERILE SUPPLY: Performed by: SURGERY

## 2020-08-13 PROCEDURE — 37000009 HC ANESTHESIA EA ADD 15 MINS: Performed by: SURGERY

## 2020-08-13 PROCEDURE — 37000008 HC ANESTHESIA 1ST 15 MINUTES: Performed by: SURGERY

## 2020-08-13 PROCEDURE — 36415 COLL VENOUS BLD VENIPUNCTURE: CPT

## 2020-08-13 PROCEDURE — 63600175 PHARM REV CODE 636 W HCPCS

## 2020-08-13 PROCEDURE — 36000707: Performed by: SURGERY

## 2020-08-13 PROCEDURE — 85025 COMPLETE CBC W/AUTO DIFF WBC: CPT

## 2020-08-13 PROCEDURE — 84484 ASSAY OF TROPONIN QUANT: CPT

## 2020-08-13 PROCEDURE — C1750 CATH, HEMODIALYSIS,LONG-TERM: HCPCS | Performed by: SURGERY

## 2020-08-13 PROCEDURE — 93005 ELECTROCARDIOGRAM TRACING: CPT

## 2020-08-13 PROCEDURE — U0002 COVID-19 LAB TEST NON-CDC: HCPCS

## 2020-08-13 PROCEDURE — 93010 EKG 12-LEAD: ICD-10-PCS | Mod: ,,, | Performed by: INTERNAL MEDICINE

## 2020-08-13 PROCEDURE — 71000039 HC RECOVERY, EACH ADD'L HOUR: Performed by: SURGERY

## 2020-08-13 PROCEDURE — 25000003 PHARM REV CODE 250: Performed by: INTERNAL MEDICINE

## 2020-08-13 PROCEDURE — 80053 COMPREHEN METABOLIC PANEL: CPT

## 2020-08-13 PROCEDURE — 25000003 PHARM REV CODE 250: Performed by: NURSE PRACTITIONER

## 2020-08-13 DEVICE — CATH DIALYSIS HEMOSPLIT16FR 23: Type: IMPLANTABLE DEVICE | Site: CHEST  WALL | Status: FUNCTIONAL

## 2020-08-13 RX ORDER — HYDRALAZINE HYDROCHLORIDE 10 MG/1
10 TABLET, FILM COATED ORAL EVERY 8 HOURS PRN
Status: DISCONTINUED | OUTPATIENT
Start: 2020-08-13 | End: 2020-08-19 | Stop reason: HOSPADM

## 2020-08-13 RX ORDER — BACITRACIN 50000 [IU]/1
INJECTION, POWDER, FOR SOLUTION INTRAMUSCULAR
Status: DISCONTINUED | OUTPATIENT
Start: 2020-08-13 | End: 2020-08-13 | Stop reason: HOSPADM

## 2020-08-13 RX ORDER — LIDOCAINE HYDROCHLORIDE 10 MG/ML
INJECTION, SOLUTION EPIDURAL; INFILTRATION; INTRACAUDAL; PERINEURAL
Status: DISCONTINUED | OUTPATIENT
Start: 2020-08-13 | End: 2020-08-13 | Stop reason: HOSPADM

## 2020-08-13 RX ORDER — SODIUM CHLORIDE 0.9 % (FLUSH) 0.9 %
3 SYRINGE (ML) INJECTION
Status: DISCONTINUED | OUTPATIENT
Start: 2020-08-13 | End: 2020-08-18

## 2020-08-13 RX ORDER — FENTANYL CITRATE 50 UG/ML
INJECTION, SOLUTION INTRAMUSCULAR; INTRAVENOUS
Status: DISCONTINUED | OUTPATIENT
Start: 2020-08-13 | End: 2020-08-13

## 2020-08-13 RX ORDER — LIDOCAINE HCL/PF 100 MG/5ML
SYRINGE (ML) INTRAVENOUS
Status: DISCONTINUED | OUTPATIENT
Start: 2020-08-13 | End: 2020-08-13

## 2020-08-13 RX ORDER — VASOPRESSIN 20 [USP'U]/ML
INJECTION, SOLUTION INTRAMUSCULAR; SUBCUTANEOUS
Status: DISCONTINUED | OUTPATIENT
Start: 2020-08-13 | End: 2020-08-13

## 2020-08-13 RX ORDER — PHENYLEPHRINE HYDROCHLORIDE 10 MG/ML
INJECTION INTRAVENOUS
Status: DISCONTINUED | OUTPATIENT
Start: 2020-08-13 | End: 2020-08-13

## 2020-08-13 RX ORDER — HYDROMORPHONE HYDROCHLORIDE 2 MG/ML
0.5 INJECTION, SOLUTION INTRAMUSCULAR; INTRAVENOUS; SUBCUTANEOUS EVERY 5 MIN PRN
Status: DISCONTINUED | OUTPATIENT
Start: 2020-08-13 | End: 2020-08-18

## 2020-08-13 RX ORDER — GLYCOPYRROLATE 0.2 MG/ML
INJECTION INTRAMUSCULAR; INTRAVENOUS
Status: DISCONTINUED | OUTPATIENT
Start: 2020-08-13 | End: 2020-08-13

## 2020-08-13 RX ORDER — SODIUM CHLORIDE 9 MG/ML
INJECTION, SOLUTION INTRAVENOUS CONTINUOUS PRN
Status: DISCONTINUED | OUTPATIENT
Start: 2020-08-13 | End: 2020-08-13

## 2020-08-13 RX ORDER — DIPHENHYDRAMINE HYDROCHLORIDE 50 MG/ML
12.5 INJECTION INTRAMUSCULAR; INTRAVENOUS EVERY 6 HOURS PRN
Status: DISCONTINUED | OUTPATIENT
Start: 2020-08-13 | End: 2020-08-18

## 2020-08-13 RX ORDER — PROPOFOL 10 MG/ML
VIAL (ML) INTRAVENOUS CONTINUOUS PRN
Status: DISCONTINUED | OUTPATIENT
Start: 2020-08-13 | End: 2020-08-13

## 2020-08-13 RX ORDER — HYDROMORPHONE HYDROCHLORIDE 2 MG/ML
INJECTION, SOLUTION INTRAMUSCULAR; INTRAVENOUS; SUBCUTANEOUS
Status: COMPLETED
Start: 2020-08-13 | End: 2020-08-13

## 2020-08-13 RX ORDER — PROPOFOL 10 MG/ML
VIAL (ML) INTRAVENOUS
Status: DISCONTINUED | OUTPATIENT
Start: 2020-08-13 | End: 2020-08-13

## 2020-08-13 RX ORDER — ONDANSETRON 2 MG/ML
4 INJECTION INTRAMUSCULAR; INTRAVENOUS DAILY PRN
Status: DISCONTINUED | OUTPATIENT
Start: 2020-08-13 | End: 2020-08-18

## 2020-08-13 RX ADMIN — PHENYLEPHRINE HYDROCHLORIDE 200 MCG: 10 INJECTION INTRAVENOUS at 02:08

## 2020-08-13 RX ADMIN — LIDOCAINE HYDROCHLORIDE 80 MG: 20 INJECTION, SOLUTION INTRAVENOUS at 01:08

## 2020-08-13 RX ADMIN — ONDANSETRON 4 MG: 2 INJECTION INTRAMUSCULAR; INTRAVENOUS at 09:08

## 2020-08-13 RX ADMIN — PHENYLEPHRINE HYDROCHLORIDE 400 MCG: 10 INJECTION INTRAVENOUS at 02:08

## 2020-08-13 RX ADMIN — VASOPRESSIN 3 UNITS: 20 INJECTION, SOLUTION INTRAMUSCULAR; SUBCUTANEOUS at 02:08

## 2020-08-13 RX ADMIN — VASOPRESSIN 2 UNITS: 20 INJECTION, SOLUTION INTRAMUSCULAR; SUBCUTANEOUS at 02:08

## 2020-08-13 RX ADMIN — FENTANYL CITRATE 50 MCG: 50 INJECTION, SOLUTION INTRAMUSCULAR; INTRAVENOUS at 01:08

## 2020-08-13 RX ADMIN — METOPROLOL SUCCINATE 50 MG: 25 TABLET, FILM COATED, EXTENDED RELEASE ORAL at 08:08

## 2020-08-13 RX ADMIN — CALCIUM ACETATE 667 MG: 667 CAPSULE ORAL at 05:08

## 2020-08-13 RX ADMIN — HYDROCODONE BITARTRATE AND ACETAMINOPHEN 1 TABLET: 5; 325 TABLET ORAL at 12:08

## 2020-08-13 RX ADMIN — HYDROMORPHONE HYDROCHLORIDE 0.5 MG: 2 INJECTION, SOLUTION INTRAMUSCULAR; INTRAVENOUS; SUBCUTANEOUS at 03:08

## 2020-08-13 RX ADMIN — PROPOFOL 10 MG: 10 INJECTION, EMULSION INTRAVENOUS at 01:08

## 2020-08-13 RX ADMIN — PROPOFOL 20 MG: 10 INJECTION, EMULSION INTRAVENOUS at 02:08

## 2020-08-13 RX ADMIN — AMLODIPINE BESYLATE 10 MG: 5 TABLET ORAL at 08:08

## 2020-08-13 RX ADMIN — CEFTRIAXONE 1 G: 1 INJECTION, SOLUTION INTRAVENOUS at 05:08

## 2020-08-13 RX ADMIN — PHENYLEPHRINE HYDROCHLORIDE 200 MCG: 10 INJECTION INTRAVENOUS at 01:08

## 2020-08-13 RX ADMIN — PRAVASTATIN SODIUM 80 MG: 20 TABLET ORAL at 08:08

## 2020-08-13 RX ADMIN — GLYCOPYRROLATE 0.2 MG: 0.2 INJECTION, SOLUTION INTRAMUSCULAR; INTRAVENOUS at 01:08

## 2020-08-13 RX ADMIN — SERTRALINE HYDROCHLORIDE 100 MG: 100 TABLET ORAL at 08:08

## 2020-08-13 RX ADMIN — PANTOPRAZOLE SODIUM 40 MG: 40 TABLET, DELAYED RELEASE ORAL at 08:08

## 2020-08-13 RX ADMIN — FUROSEMIDE 40 MG: 40 TABLET ORAL at 08:08

## 2020-08-13 RX ADMIN — PROPOFOL 50 MCG/KG/MIN: 10 INJECTION, EMULSION INTRAVENOUS at 01:08

## 2020-08-13 RX ADMIN — MUPIROCIN: 20 OINTMENT TOPICAL at 08:08

## 2020-08-13 RX ADMIN — NEPHROCAP 1 CAPSULE: 1 CAP ORAL at 08:08

## 2020-08-13 RX ADMIN — MUPIROCIN: 20 OINTMENT TOPICAL at 09:08

## 2020-08-13 RX ADMIN — SODIUM CHLORIDE: 0.9 INJECTION, SOLUTION INTRAVENOUS at 01:08

## 2020-08-13 RX ADMIN — DOCUSATE SODIUM 100 MG: 100 CAPSULE, LIQUID FILLED ORAL at 08:08

## 2020-08-13 RX ADMIN — MIRTAZAPINE 7.5 MG: 7.5 TABLET ORAL at 09:08

## 2020-08-13 RX ADMIN — VASOPRESSIN 2 UNITS: 20 INJECTION, SOLUTION INTRAMUSCULAR; SUBCUTANEOUS at 01:08

## 2020-08-13 RX ADMIN — HYDRALAZINE HYDROCHLORIDE 10 MG: 10 TABLET, FILM COATED ORAL at 12:08

## 2020-08-13 RX ADMIN — ONDANSETRON 4 MG: 2 INJECTION INTRAMUSCULAR; INTRAVENOUS at 01:08

## 2020-08-13 NOTE — ASSESSMENT & PLAN NOTE
No dialysis today  She needs to have her fistula evaluated and de-clotted  Thus will be done in AM

## 2020-08-13 NOTE — PROGRESS NOTES
[facility] Hospital Medicine  Progress Note    Patient Name: Angelina Beard  MRN: [id number]  Patient Class: IP- Inpatient   Admission Date: 8/9/2020  Length of Stay: 2 days  Attending Physician: Rosalia Burnett MD  Primary Care Provider: Ben Mena MD        Subjective:     Principal Problem:Hyperkalemia        HPI:  Angelina Beard is a 70 yo female with hypertension, chronic diastolic congestive heart failure, paroxysmal atrial fibrillation (anticoagulated on apixaban), diabetes mellitus type 2 (hemoglobin A1c 6.9% on 4/28/2020) with neuropathy and nephropathy, hyperlipidemia, nonobstructive coronary artery disease, cerebral microvascular disease, end stage renal disease on hemodialysis (Monday Wednesday Friday at [facility] in [facility]), anemia, gastroesophageal reflux disease, osteoarthritis status post right total knee arthroplasty 2/23/16, chronic left leg pain, chronic constipation, cholelithiasis, history of right lung sarcoidosis, history of left breast cancer status post mastectomy, history of gastric ulcers on 7/13/18. Her primary care physician is Dr. Ben Mena. Her nephrologist is Dr. Rc Santamaria. Her cardiologist is Dr. Faraz Flores. Her podiatrist is Dr. Kwame Dacosta. She lives in [place], [place]. She is mostly wheelchair bound but can use a walker with assistance.     Pt presented to ED with c/o diarrhea, generalized weakness and acute on chronic left knee and left hip pain s/p mechanical fall 1 week ago. She denies vomiting, diarrhea, fever/chills and abdominal pain. KUB shows constipation, no evidence of obstruction. At baseline, she is in a wheelchair/walker due to weakness, but said she has had trouble with transfers for the past week due to her L knee pain. Denies chest pain and worsening SOB. Labs remarkable for K 6.4, troponin 0.036.  Xray shows R nondisplaced pubic ramus fracture and significant erosion of L knee appears 2/2 sepitic  arthropathy. Orthopedic surgery consulted by ED. Pt admitted to [facility].     Overview/Hospital Course:  No notes on file    Interval History:  Patient was seen in the recovery room.  The fistula revision had to be canceled because patient was still on Plavix and Eliquis and therefore a Trialysis catheter was placed.  She will get dialysis from here tomorrow.  She will have the procedure for the graft revision on Monday or Tuesday.  She states that she has a little bit of soreness on the left with a line was placed.  Other than that she feels okay.    I discussed with the patient the fact that she is very weak and suggested that she consider skilled nursing.  Family at this time is in agreement.      Review of Systems   Constitutional: Negative for chills and fever.   Respiratory: Negative for cough, shortness of breath and wheezing.    Cardiovascular: Negative for chest pain, palpitations and leg swelling.   Gastrointestinal: Negative for abdominal pain, blood in stool, constipation, diarrhea, nausea and vomiting.   Genitourinary: Negative for dysuria and hematuria.   Musculoskeletal: Negative for back pain.   Skin: Negative for rash.   Neurological: Negative for dizziness, weakness and headaches.     Objective:     Vital Signs (Most Recent):  Temp: 97.5 °F (36.4 °C) (08/13/20 1149)  Pulse: 63 (08/13/20 1311)  Resp: 15 (08/13/20 1205)  BP: (!) 140/77 (08/13/20 1311)  SpO2: 100 % (08/13/20 1149) Vital Signs (24h Range):  Temp:  [97.4 °F (36.3 °C)-98.1 °F (36.7 °C)] 97.5 °F (36.4 °C)  Pulse:  [63-73] 63  Resp:  [15-20] 15  SpO2:  [97 %-100 %] 100 %  BP: (130-205)/(53-99) 140/77     Weight: 89 kg (196 lb 3.4 oz)  Body mass index is 30.73 kg/m².    Intake/Output Summary (Last 24 hours) at 8/13/2020 1437  Last data filed at 8/13/2020 0600  Gross per 24 hour   Intake 0 ml   Output 0 ml   Net 0 ml      Physical Exam  Vitals signs and nursing note reviewed.   Constitutional:       General: She is not in  acute distress.     Appearance: She is well-developed.   Eyes:      Conjunctiva/sclera: Conjunctivae normal.   Neck:      Vascular: No JVD.      Comments: Dressing on left neck  Cardiovascular:      Rate and Rhythm: Normal rate and regular rhythm.      Heart sounds: Normal heart sounds.   Pulmonary:      Effort: Pulmonary effort is normal.      Breath sounds: Normal breath sounds. No wheezing.   Abdominal:      General: Bowel sounds are normal.      Palpations: Abdomen is soft.      Tenderness: There is no abdominal tenderness.   Musculoskeletal:         General: No tenderness.   Skin:     General: Skin is warm and dry.      Capillary Refill: Capillary refill takes less than 2 seconds.   Neurological:      Mental Status: She is alert and oriented to person, place, and time.   Psychiatric:         Mood and Affect: Mood normal.         Behavior: Behavior normal.         Significant Labs:   BMP:   Recent Labs   Lab 08/13/20  0638   *   *   K 4.7   CL 95   CO2 24   BUN 50*   CREATININE 7.8*   CALCIUM 8.8     CBC:   Recent Labs   Lab 08/12/20  0421 08/13/20  0638   WBC 3.86* 4.21   HGB 11.0* 10.5*   HCT 36.0* 34.3*    180       Significant Imaging: I have reviewed all pertinent imaging results/findings within the past 24 hours.      Assessment/Plan:      * Hyperkalemia  resolved    Problem with dialysis access  As above      Complication of vascular access for dialysis  Plan for declot in revision on Monday or Tuesday.  Had to be postponed due to patient being on Plavix and Eliquis.  These are presently placed on hold      Diarrhea  Better  DC Colace    She is also on Rocephin and Vanco. Need to clarify why she is on this   All cx are negative.        Left knee pain  S/p mechanical fall 1 week ago  -imaging ? septic arthritis  -s/p empiric antibiotics in ED  -evaluated by orthopedic surgery, arthrocentesis done and not c/w septic joint  - stop abx  -PT/OT consulted: recommend SNF      Hypertension  associated with diabetes  Taking amlodipine 10 mg daily and metoprolol 50 mg daily  BP better today after resuming amlodipine      ESRD on dialysis  Dialysis per Nephrology      Physical deconditioning  PT/OT: recommend SNF  I have had a discussion with the patient and she is willing to go.  Will placeSNFconsult      Chronic diastolic congestive heart failure  Euvolemic on exam. Continue furosemide 40 mg daily. Patient still able to make urine.       Paroxysmal atrial fibrillation  Taking Apixaban- will discontinue in preparation for procedure on Monday      Anemia in end-stage renal disease  H&H stable      Diabetic neuropathy associated with type 2 diabetes mellitus  Taking Gabapentin 300 mg TID prn- continue     GERD (gastroesophageal reflux disease)  Daily PPI       Hyperlipidemia  Continue statin       Type 2 diabetes mellitus with hypertension and end stage renal disease on dialysis  A1C 6.9   Low dose SSI, accucheck AC&HS , Diabetic diet       Cerebral microvascular disease  Continue Plavix, statin         VTE Risk Mitigation (From admission, onward)         Ordered     IP VTE HIGH RISK PATIENT  Once      08/09/20 2255     Place sequential compression device  Until discontinued      08/09/20 2255                Discharge Planning   EDWIN: 8/14/2020     Code Status: Full Code   Is the patient medically ready for discharge?:     Reason for patient still in hospital (select all that apply): Patient trending condition and Treatment  Discharge Plan A: Home Health                  Rosalia Burnett MD  Department of Hospital Medicine   [facility]

## 2020-08-13 NOTE — PLAN OF CARE
TN spoke with pt's son to follow up on d/c plan. Pt's son states he has not spoken with pt yet regarding SNF. TN informed him to contact TN ASAP once he talks with pt.

## 2020-08-13 NOTE — OP NOTE
[facility]  Surgery Department  Operative Note    SUMMARY     Date of Procedure: 8/13/2020     Procedure:   1.  Intraoperative ultrasound interpretation done by me  2.  Intraoperative fluoroscopy interpretation done by me than 1 hour  3.  Attempted left internal jugular, left subclavian catheter placement tunneled with fluoroscopy guidance    Surgeon(s) and Role:     * Charis Purcell MD - Primary    Assisting Surgeon: Arnulfo mukherjee    Pre-Operative Diagnosis: End-stage renal disease on hemodialysis [N18.6, Z99.2]  Arteriovenous fistula stenosis, initial encounter [T82.858A]    Post-Operative Diagnosis: Post-Op Diagnosis Codes:     * End-stage renal disease on hemodialysis [N18.6, Z99.2]     * Arteriovenous fistula stenosis, initial encounter [T82.858A]    anaesthesia: General  History and indication:  This is a 69-year-old female admitted to the hospital with bleeding from the dialysis access.  She had ulceration of the skin with bleeding from the access site.  She was admitted for revision of the dialysis graft.  Since patient was on Eliquis and Plavix the plan was to just go in and place a PermCath and then plan on revision later on as she has dialysis for more than 48 hr.  Earlier the anticoagulation was stopped this was discussed with her in informed consent was obtained.    Procedure:  Patient was brought to the operating room and was placed in supine position on after giving sedation.  The right and left side of the neck and chest wall was prepped and draped in the usual sterile manner.  Ultrasound of the right neck was done which did not show patent right internal jugular vein therefore attention was paid to the left side.  Time-out was done to verify the ID of the patient and procedure and everyone concurred.  1% xylocaine was infiltrated over the left neck area internal jugular vein access was accomplished through the needle a guidewire was inserted which would not progressed beyond  the junction of the internal jugular and subclavian.  After multiple attempts it was futile.  Infraclavicular area was given anesthesia and the subclavian access was accomplished.  Through the needle a guidewire was adanced and the needle was withdrawn.  The position was verified using fluoroscopy.    An exit site was chosen incision was made after giving local anesthesia.  In a tunnel was made between the entrance and exit in the proximal tip of the catheter was brought through the tunnel.  The  subclavian vein track was dilated to multiple dilators with fluoroscopic help.  The dilator with the sheath was advanced over the guidewire followed by the withdrawal of the dilator and the guidewire.  The proximal tip of the catheter was advanced to the sheath the sheath was peeled off.  The tip of the catheter was at the junction of SVC and right atrium.  Blood was withdrawn and was flushed with heparin saline followed by straight heparin.  The catheter was cut safely safely secured to the skin using 2 nylon stitch.  A fluoroscopy was done to confirm the contour of the catheter and the position of the tip. Proper dressings were applied. Patient was taken to recovery room in stable condition with no complications    Complications: no    Estimated Blood Loss (EBL):minimal           Implants:   Implant Name Type Inv. Item Serial No.  Lot No. LRB No. Used Action   CATH DIALYSIS [id number] - [id number]  CATH DIALYSIS [id number] 23  [employer] [id number] Left 1 Implanted       Specimens:   none            Condition: Stable    Disposition: PACU - hemodynamically stable.    Attestation: I was present for the entire procedure.

## 2020-08-13 NOTE — PLAN OF CARE
Problem: Physical Therapy Goal  Goal: Physical Therapy Goal  Description:   Goals to be met by: 9/10/2020     Patient will increase functional independence with mobility by performin. Supine to sit with Contact Guard Assistance  2. Sit to supine with MInimal Assistance  3. Rolling L/R with Modified [place] and use of bedrail as needed.  4. Sit to stand transfer with Minimal Assistance  5. Bed to chair transfer with Minimal Assistance with or without AD  6. Gait  x 15 feet as able with Minimal Assistance with or without AD  7. Lower extremity exercise program x10-15 reps with supervision  8. Increase functional strength to 4- to 4 for bed mobility and transfers, gait if able  9. Wheelchair mobility x 50ft x 2 with Supervision    Outcome: Ongoing, Progressing   Pt with improved sit to stand and able to take steps from bed to wheelchair with modA x 2; recommend SNF.

## 2020-08-13 NOTE — ASSESSMENT & PLAN NOTE
Taking amlodipine 10 mg daily and metoprolol 50 mg daily  BP better today after resuming amlodipine

## 2020-08-13 NOTE — PT/OT/SLP PROGRESS
Physical Therapy Treatment    Patient Name:  Angelina Beard   MRN:  [id number]    Recommendations:     Discharge Recommendations:  nursing facility, skilled   Discharge Equipment Recommendations: hospital bed   Barriers to discharge: Decreased caregiver support    Assessment:     Angelina Beard is a 69 y.o. female admitted with a medical diagnosis of Hyperkalemia.  She presents with the following impairments/functional limitations:  weakness, impaired endurance, impaired self care skills, impaired functional mobilty, gait instability, impaired balance, impaired sensation, decreased upper extremity function, decreased lower extremity function, pain, decreased ROM Pt with improved sit to stand and able to take steps from bed to wheelchair with modA x 2; recommend SNF..    Rehab Prognosis: Good; patient would benefit from acute skilled PT services to address these deficits and reach maximum level of function.    Recent Surgery: Procedure(s) (LRB):  REVISION, AV FISTULA, Right forearm (Right) Day of Surgery    Plan:     During this hospitalization, patient to be seen 6 x/week to address the identified rehab impairments via therapeutic activities, therapeutic exercises, neuromuscular re-education, wheelchair management/training and progress toward the following goals:    · Plan of Care Expires:  09/10/20    Subjective     Pain/Comfort:  Pain Rating 1: 0/10  Location - Side 1: Left  Location 1: knee(pt did not rate, but increased with WB)  Pain Addressed 1: Reposition, Distraction, Cessation of Activity, Nurse notified(warm packs)  Pain Rating Post-Intervention 1: (did not rate)      Objective:     Communicated with nurse prior to session.  Patient found with peripheral IV upon PT entry to room.     General Precautions: Standard, fall   Orthopedic Precautions:(BLEs WBAT)   Braces: N/A     Functional Mobility:  · Bed Mobility:     · Rolling Left:  stand by assistance and use of rail  · Scooting: stand by assistance and  toward EOB  · Supine to Sit: moderate assistance 2/2 weakness L knee and pain  · Sit to Supine: moderate assistance  · Transfers:     · Sit to Stand:  moderate assistance, of 1 persons and additional assist for safety with rolling walker  · Bed <> Chair: moderate assistance and of 2 persons with  rolling walker  using  Step Transfer  · Gait: Patient took 2-3 steps bed<>w/c with forward trunk flexion, L knee instability/weakness -mild buckling/snapping back into extension - ace wrap on for some support; decreased floor clearance    AM-PAC 6 CLICK MOBILITY  Turning over in bed (including adjusting bedclothes, sheets and blankets)?: 2  Sitting down on and standing up from a chair with arms (e.g., wheelchair, bedside commode, etc.): 2  Moving from lying on back to sitting on the side of the bed?: 2  Moving to and from a bed to a chair (including a wheelchair)?: 2  Need to walk in hospital room?: 2  Climbing 3-5 steps with a railing?: 1  Basic Mobility Total Score: 11       Therapeutic Activities and Exercises:   Unwrapped L knee 2/2 swelling below knee; re- wrapped from foot to above knee for support and reduction of swelling during session, then removed after in bed; pt transitioned to EOB; ace wrapped for support; pt scooted to EOB then sit to stand at RW; encouraged erect stance and use of UEs for support; pt able to take 2-3 steps toward L then sat; positioned w/c to pt's L and stood again taking steps laterally to w/c with assist for RW management as described above; pt then performed w/c mobility 25ft x 2 then wheeled to picture window to view outside; pt wheeled back pushed self another 25ft back into room; pt sit to stand from w/c and took steps using RW as described above; sit to supine with modA and repositioned L side with warm packs to L knee with pillow case as skin barrier.    Patient left left sidelying with all lines intact, call button in reach, bed alarm on, nurse notified and warm packs placed on L  knee..    GOALS:   Multidisciplinary Problems     Physical Therapy Goals        Problem: Physical Therapy Goal    Goal Priority Disciplines Outcome Goal Variances Interventions   Physical Therapy Goal     PT, PT/OT Ongoing, Progressing     Description:   Goals to be met by: 9/10/2020     Patient will increase functional independence with mobility by performin. Supine to sit with Contact Guard Assistance  2. Sit to supine with MInimal Assistance  3. Rolling L/R with Modified [place] and use of bedrail as needed.  4. Sit to stand transfer with Minimal Assistance  5. Bed to chair transfer with Minimal Assistance with or without AD  6. Gait  x 15 feet as able with Minimal Assistance with or without AD  7. Lower extremity exercise program x10-15 reps with supervision  8. Increase functional strength to 4- to 4 for bed mobility and transfers, gait if able    9. Wheelchair mobility x 50ft x 2 with Supervision                   Time Tracking:     PT Received On: 20  PT Start Time: 1103     PT Stop Time: 1145  PT Total Time (min): 42 min with OT    Billable Minutes: Therapeutic Activity 23 minutes    Treatment Type: Treatment  PT/PTA: PT     PTA Visit Number: 0     Nimco Castro, PT  2020

## 2020-08-13 NOTE — PLAN OF CARE
Patient IV antibiotics infusing per MD orders. Scheduled medications given per MAR. Patient c/o of knee pain PRN [place] given. Going for revision of AV fistula this afternoon. Patient NPO since midnight. Safety maintained.

## 2020-08-13 NOTE — ASSESSMENT & PLAN NOTE
Plan for declot in revision on Monday or Tuesday.  Had to be postponed due to patient being on Plavix and Eliquis.  These are presently placed on hold

## 2020-08-13 NOTE — PLAN OF CARE
TN spoke with pt's son Cristi [phone] regarding pt's anticipated d/c plan and PT/OT recs Pt's son and pt's sister Katelynn both want patient to go to SNF but pt is refusing. Pt son states his L rotator cuff is now torn from lifting pt at home. Pt's son states home health would not benefit patient because he states pt is not motivated to help her self at home to get stronger. Pt states he does not know how long he will be able take care of patient due having to leave his wife to take care her. Pt's son states he wants patient to be able to assist with ADLs at home.  Pt's son states he will speak to pt about SNF.    TN updated  on above.

## 2020-08-13 NOTE — TRANSFER OF CARE
"Anesthesia Transfer of Care Note    Patient: Angelina Beard    Procedure(s) Performed: Procedure(s) (LRB):  Insertion dialysis catheter (Right)    Patient location: PACU    Anesthesia Type: MAC    Transport from OR: Transported from OR on 2-3 L/min O2 by NC with adequate spontaneous ventilation    Post pain: adequate analgesia    Post assessment: no apparent anesthetic complications and tolerated procedure well    Post vital signs: stable    Level of consciousness: awake, oriented and alert    Nausea/Vomiting: no nausea/vomiting    Complications: none    Transfer of care protocol was followed      Last vitals:   Visit Vitals  /72   Pulse 63   Temp 36.4 °C (97.5 °F) (Oral)   Resp 16   Ht 5' 7" (1.702 m)   Wt 89 kg (196 lb 3.4 oz)   LMP  (LMP Unknown)   SpO2 94%%   Breastfeeding No   BMI 30.73 kg/m²     "

## 2020-08-13 NOTE — PT/OT/SLP PROGRESS
Occupational Therapy   Treatment    Name: Angelina Beard  MRN: [id number]  Admitting Diagnosis:  Hyperkalemia  Day of Surgery    Recommendations:     Discharge Recommendations: nursing facility, skilled  Discharge Equipment Recommendations:  hospital bed  Barriers to discharge:  Decreased caregiver support    Assessment:     Angelina Beard is a 69 y.o. female with a medical diagnosis of Hyperkalemia.  She presents with deconditioning. Performance deficits affecting function are weakness, impaired balance, impaired endurance, gait instability, impaired self care skills, impaired functional mobilty, edema, decreased lower extremity function, decreased upper extremity function, decreased ROM, impaired sensation, impaired fine motor.       Pt progressing towards goals. Able to stand multiple trials today's date and t/f to w/c. Cont OT POC     Rehab Prognosis:  Good; patient would benefit from acute skilled OT services to address these deficits and reach maximum level of function.       Plan:     Patient to be seen 5 x/week to address the above listed problems via self-care/home management, therapeutic exercises, therapeutic activities  · Plan of Care Expires: 09/10/20  · Plan of Care Reviewed with: patient    Subjective     Pain/Comfort:  · Pain Rating 1: 0/10    Objective:     Communicated with: nsg prior to session.      General Precautions: Standard, fall   Orthopedic Precautions:(BLEs WBAT)   Braces: N/A     Occupational Performance:     Bed Mobility:    · Patient completed Scooting/Bridging with stand by assistance  · Patient completed Supine to Sit with moderate assistance  · Patient completed Sit to Supine with moderate assistance     Functional Mobility/Transfers:  · Patient completed Sit <> Stand Transfer with moderate assistance  with  rolling walker   · Patient completed Bed <> Chair Transfer using Step Transfer technique with moderate assistance and of 2 persons with rolling walker and w/c  · Functional  Mobility: Mod A of 2 turning steps and laterally to HOB     Activities of Daily Living:  · Max A rebecca L sock today's date       [facility] 6 Click ADL: 17    Treatment & Education:  Pt requiring increased assist with bed mobility this date 2/2 L knee pain   Stood x 4 trials during session mod A with assist of another for safety   Mod A of 2 turning steps t/f to w/c   CGA to self propel w/c; minimal endurance for self propulsion of w/c     Patient left supine with all lines intact, call button in reach, bed alarm on and nsg notifiedEducation:      GOALS:   Multidisciplinary Problems     Occupational Therapy Goals        Problem: Occupational Therapy Goal    Goal Priority Disciplines Outcome Interventions   Occupational Therapy Goal     OT, PT/OT Ongoing, Progressing    Description: Goals to be met by: 9/10/20     Patient will increase functional independence with ADLs by performing:    LE Dressing with Minimal Assistance.  Grooming while seated with Modified [place].  Toileting from bedside commode with Minimal Assistance for hygiene and clothing management.   Supine to sit with Contact Guard Assistance.  Stand pivot transfers with Minimal Assistance.  Toilet transfer to bedside commode with Minimal Assistance.  Increased functional strength to 4/5 for self care skills and functional mobility.  Upper extremity exercise program x10 reps per handout, with independence.                     Time Tracking:     OT Date of Treatment: 08/13/20  OT Start Time: 1103  OT Stop Time: 1145  OT Total Time (min): 42 min    Billable Minutes:Therapeutic Activity 15 co treatment with PT     Emily Lange, OT  8/13/2020

## 2020-08-13 NOTE — PLAN OF CARE
Pt alert and oriented x4. Ambulated in [place] w/ wheelchair per PT/OT today. Went to procedure for dialysis catheter placement. On a renal diet. Pt had one episode c/o chest pain while eating dinner. Stated she felt like something was caught in her throat. Pt was talking and vitals remained stable. Dr. Burnett ordered STAT EKG and chest xray. Pt had one episode of emesis and stated pain was mostly relieved. Night shift nurse notified of normal test results. Pt going to dialysis tomorrow. Safety maintained, call light w/in reach.

## 2020-08-13 NOTE — PLAN OF CARE
SNF referral sent to [facility] per pt's preference.       08/13/20 1639   Post-Acute Status   Post-Acute Authorization Placement   Post-Acute Placement Status Referrals Sent

## 2020-08-13 NOTE — PLAN OF CARE
Problem: Occupational Therapy Goal  Goal: Occupational Therapy Goal  Description: Goals to be met by: 9/10/20     Patient will increase functional independence with ADLs by performing:    LE Dressing with Minimal Assistance.  Grooming while seated with Modified [place].  Toileting from bedside commode with Minimal Assistance for hygiene and clothing management.   Supine to sit with Contact Guard Assistance.  Stand pivot transfers with Minimal Assistance.  Toilet transfer to bedside commode with Minimal Assistance.  Increased functional strength to 4/5 for self care skills and functional mobility.  Upper extremity exercise program x10 reps per handout, with independence.    Outcome: Ongoing, Progressing       Pt progressing towards goals. Able to stand multiple trials today's date and t/f to w/c. Cont OT POC

## 2020-08-13 NOTE — CARE UPDATE
Called by the nurse.  Upon arrival back to the floor he was eating her dinner and started to feel some chest pain and like he could not breathe.  Oxygen saturation is 100% and vital signs are all stable.    Stat chest x-ray to rule out pneumothorax.  EKG stat      EKG with nonspecific changes in inferior leads  Will get troponin q 6 hours x 3  Repeat EKG in AM  Await CXR

## 2020-08-13 NOTE — ASSESSMENT & PLAN NOTE
Better  DC Colace    She is also on Rocephin and Vanco. Need to clarify why she is on this   All cx are negative.

## 2020-08-13 NOTE — PROGRESS NOTES
[facility] Progress Note    Date of Admit: 2020  Length of Stay: 2    Chief Complaint/Reason for Consult     [facility] following for ESRD on HD Evaluation and Management    Subjective:      OR time for this afternoon to revise AV access RUE. No new complaints this am. Denies chest pain, shortness of breath, or subjective fever/chills overnight. No new complaints.      Objective:   Last 24 Hour Vital Signs:  BP  Min: 130/64  Max: 205/99  Temp  Av.8 °F (36.6 °C)  Min: 97.4 °F (36.3 °C)  Max: 98.1 °F (36.7 °C)  Pulse  Av  Min: 63  Max: 73  Resp  Av.9  Min: 15  Max: 20  SpO2  Av.8 %  Min: 97 %  Max: 100 %  Weight  Av kg (196 lb 3.4 oz)  Min: 89 kg (196 lb 3.4 oz)  Max: 89 kg (196 lb 3.4 oz)  Body mass index is 30.73 kg/m².  I/O last 3 completed shifts:  In: 100 [P.O.:100]  Out: 0     Physical Examination:  General: No acute distress, resting comfortably  HEENT: EOMI, PERRL, MMM,  Cardiovascular: NRRR, no murmurs or rubs.  Chest/Pulmonary: CTABL, no IWB  Abdomen: soft, nontender,  MSKL: without gross deformity, active FROM  Lymphatic: no appreciated cervical or submandibular LAD  Skin: without cyanosis or clubbing, no appreciated edema  Neurologic: AAOx3, following two step commands  Access: RUE RC AVF with adequate systolic, now absent diastolic thrill, + bruits, increased pulsatility on arterial inflow.     Laboratory:  Most Recent Data:  CBC:   Lab Results   Component Value Date    WBC 4.21 2020    HGB 10.5 (L) 2020    HCT 34.3 (L) 2020     2020    MCV 89 2020    RDW 15.5 (H) 2020     BMP:   Lab Results   Component Value Date     (L) 2020    K 4.7 2020    CL 95 2020    CO2 24 2020    BUN 50 (H) 2020    CREATININE 7.8 (H) 2020     (H) 2020    CALCIUM 8.8 2020    MG 2.3 2020    PHOS 7.4 (H) 2020     LFTs:   Lab Results   Component Value Date    PROT 6.9 2020     ALBUMIN 3.2 (L) 08/13/2020    BILITOT 0.4 08/13/2020    AST 21 08/13/2020    ALKPHOS 61 08/13/2020    ALT 15 08/13/2020     Coags:   Lab Results   Component Value Date    INR 1.0 08/09/2020     Urinalysis:   Lab Results   Component Value Date    LABURIN  11/08/2016     Multiple organisms isolated. None in predominance.  Repeat if    LABURIN clinically necessary. 11/08/2016    COLORU Yellow 07/29/2019    SPECGRAV 1.015 07/29/2019    NITRITE Negative 07/29/2019    KETONESU Negative 07/29/2019    UROBILINOGEN Negative 07/29/2019    WBCUA 5 07/29/2019       Trended Lab Data:  Recent Labs   Lab 08/11/20  0600 08/12/20  0421 08/13/20  0638   WBC 4.48 3.86* 4.21   HGB 11.8* 11.0* 10.5*   HCT 37.7 36.0* 34.3*    203 180   MCV 88 90 89   RDW 16.1* 15.9* 15.5*    135* 132*   K 6.3* 4.8 4.7    98 95   CO2 22* 25 24   BUN 60* 37* 50*   CREATININE 8.4* 6.2* 7.8*   * 129* 154*   PROT 7.3 6.9 6.9   ALBUMIN 3.4* 3.2* 3.2*   BILITOT 0.5 0.5 0.4   AST 26 20 21   ALKPHOS 63 61 61   ALT 16 15 15       Trended Cardiac Data:  Recent Labs   Lab 08/09/20  1716   TROPONINI 0.036*   *       Microbiology Data:  Microbial cultures from knee aspirate no growth    Other Laboratory Data:  Sed rate 48    Other Results:      Radiology:  Imaging Results          CT Pelvis Without Contrast (Final result)  Result time 08/09/20 21:10:28   Procedure changed from CT Hip Without Contrast Left     Final result by Vito Garcia MD (08/09/20 21:10:28)                 Impression:      Negative CT of the pelvis for fracture specially of the left hip.    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    Severe atherosclerotic vascular disease most severe the SFA bilaterally.      Electronically signed by: Vito Garcia  Date:    08/09/2020  Time:    21:10             Narrative:    EXAMINATION:  CT PELVIS WITHOUT CONTRAST    CLINICAL HISTORY:  Hip trauma,  nondiagnostic xray;    TECHNIQUE:  Axial CT images of the left hip were obtained with sagittal and coronal reformatted images.    COMPARISON:  Plain film of the hip,, 08/09/2020    FINDINGS:  The cortex and trabecular markings of the femur appear intact without displaced fracture or bony destructive process.  The intertrochanteric and proximal diaphysis appear intact as well.    Moderate osteoarthritis of the femoroacetabular joint is present without evidence of pincer or CAM deformity.    The adjacent pelvic bones are intact.  There is mild irregularity of the ischial tuberosity insertion of the hamstring tendons suggesting mild insertional tendinitis.    The endplates of the lumbar spine are markedly irregular with erosive changes at the L4-5 level and mild compression of the anterior endplate of L5 appearing remote with vacuum phenomenon.  Facet arthropathy is prominent.  There is no spondylolysis or significant central canal stenosis evident as imaged.  Degenerative changes in the SI joints are present.    Atherosclerotic disease and a normal caliber aorta and iliac system are noted.  This extends extensively into the SFA bilaterally.    The uterus demonstrates multiple large chunky calcifications suggesting degenerating fibroadenomas.  The urinary bladder appears unremarkable.  There is no pelvic mass or free fluid.                                CT Knee Without Contrast Left (Final result)  Result time 08/09/20 20:54:03    Final result by Fuentes Vincent MD (08/09/20 20:54:03)                 Impression:      Findings indicating destruction of the knee joint secondary to aseptic process.    This report was flagged in Epic as abnormal.      Electronically signed by: Fuentes Vincent  Date:    08/09/2020  Time:    20:54             Narrative:    EXAMINATION:  CT KNEE WITHOUT CONTRAST LEFT    CLINICAL HISTORY:  Knee instability;    TECHNIQUE:  Multiple axial images of the left knee were obtained without the aid  of IV contrast.  The study was reformatted creating a 3D image was separate workstation under direct supervision.    COMPARISON:  None    FINDINGS:  The knee joint reveals significant damage and deformity which is consistent with a septic joint.  The tibial plateau is fractured and there does not appear to be any discernible normal articular surface involving either the tibial plateau or the distal femur.  Both condyles of the distal femur are deformed and appear to be diminutive.  The articular surfaces of the femur and tibia appear to be mild feeding.  There is no normal articulation between the femur and the tibial plateau.  The surrounding soft tissues of the knee shows soft tissue induration and what appears to be fluid collection which most likely represents abscess fluid.  Suggest consideration aspiration in the for the purposes of obtaining culture and sensitivity information.                                   X-Ray Pelvis Routine AP (Edited Result - FINAL)  Result time 08/09/20 18:37:50    Addendum 1 of 1 by Poncho Handley MD (08/09/20 18:37:50)      This report was flagged in Epic as abnormal.    Please note, after initial interpretation of this examination, radiograph of the abdomen became available for review.  In retrospect, there appears to be lucency involving the right inferior pubic ramus concerning for fracture, better seen on that examination.  Acuity is unknown, correlation with focal tenderness is advised.      Electronically signed by: Poncho Handley MD  Date:    08/09/2020  Time:    18:37               Final result by Poncho Handley MD (08/09/20 18:31:05)                 Impression:      1. No convincing acute displaced fracture or dislocation of the pelvis.      Electronically signed by: Poncho Handley MD  Date:    08/09/2020  Time:    18:31             Narrative:    EXAMINATION:  XR PELVIS ROUTINE AP    CLINICAL HISTORY:  Unspecified fall, initial encounter    TECHNIQUE:  AP view  of the pelvis was performed.    COMPARISON:  None.    FINDINGS:  Single-view pelvis.    There is osteopenia.  Degenerative changes are noted of the bilateral sacroiliac joints and pubic symphysis.  The bilateral femoroacetabular joints are intact.  There is vascular calcification.                                   Assessment and Plan:      Angelina Beard is a 69 year old man with pertinent medical history of CAD, Chronic heart failure with preserved ejection fraction, pulmonary hypertension (likely WHO group II), HLD, hypertension, DM type II, sarcoidosis, and ESRD on HD MWF with Dr. Santamaria at [facility]. Admitted for evaluation of diarrhea and weakness. [facility] consulted for ESRD on HD evaluation and management.       #) ESRD on HD (MWF)  - follows with Dr. Santamaria at [facility] [place], operated by [facility]  - HD completed on 8/10 and 8/11.   - to OR today to revise AV access  - dose meds for GFR < 10  - nephro caps  - strict I/O, daily weights  - please avoid gadolinium, fleets, phos-based laxatives, NSAIDs  - patient to follow up with her HD unit after discharge    #) Hypertension   - BP wildly fluctuates between 131/53 - 193/69 within 24 hours, possibly volume component remains  - amlodipine 10mg  - furosemide 40mg    #) Normocytic Anemia  Lab Results   Component Value Date    HGB 10.5 (L) 08/13/2020    MCV 89 08/13/2020    FERRITIN 1,215 (H) 11/20/2019    FESATURATED 14 (L) 11/20/2019    [id number] 389 03/03/2018    FOLATE 4.5 03/03/2018   - no need for EPO or iron at this time.    #)  CKD/Mineral Bone Disease/Nutrition   Lab Results   Component Value Date    PHOS 7.4 (H) 08/09/2020    CALCIUM 8.8 08/13/2020    MG 2.3 08/09/2020    CO2 24 08/13/2020    ALBUMIN 3.2 (L) 08/13/2020   - continue phoslo TID w/ meals; please check phos every other day  - nepro with meals  - nephrocaps daily  - standard bicarb bath with HD    Thank you for this consult. We will continue to follow with you.    Enrrique Pearl MD  [facility]  Nephrology -V  [phone]    If after 5pm please forward any questions to the [facility] Fellow/Attending on call.

## 2020-08-13 NOTE — PLAN OF CARE
TN updated Sofie with [facility] [phone] on pt's plan of care and anticipated d/c plan.

## 2020-08-13 NOTE — PLAN OF CARE
Patient with lace score of 70.   attempted to round on patient, but she is off the floor.   will make another visit.

## 2020-08-13 NOTE — NURSING
Pt up to floor from PACU. Catheter site placement clean dry and intact. Vital signs recorded. Pt AAOx4.

## 2020-08-13 NOTE — ASSESSMENT & PLAN NOTE
PT/OT: recommend SNF  I have had a discussion with the patient and she is willing to go.  Will placeSNFconsult

## 2020-08-13 NOTE — PROGRESS NOTES
[facility] Hospital Medicine  Progress Note    Patient Name: Angelina Beard  MRN: [id number]  Patient Class: IP- Inpatient   Admission Date: 8/9/2020  Length of Stay: 1 days  Attending Physician: Rosalia Burnett MD  Primary Care Provider: Ben Mena MD        Subjective:     Principal Problem:Hyperkalemia        HPI:  Angelina Beard is a 68 yo female with hypertension, chronic diastolic congestive heart failure, paroxysmal atrial fibrillation (anticoagulated on apixaban), diabetes mellitus type 2 (hemoglobin A1c 6.9% on 4/28/2020) with neuropathy and nephropathy, hyperlipidemia, nonobstructive coronary artery disease, cerebral microvascular disease, end stage renal disease on hemodialysis (Monday Wednesday Friday at [facility] in [facility]), anemia, gastroesophageal reflux disease, osteoarthritis status post right total knee arthroplasty 2/23/16, chronic left leg pain, chronic constipation, cholelithiasis, history of right lung sarcoidosis, history of left breast cancer status post mastectomy, history of gastric ulcers on 7/13/18. Her primary care physician is Dr. Ben Mena. Her nephrologist is Dr. Rc Santamaria. Her cardiologist is Dr. Faraz Flores. Her podiatrist is Dr. Kwame Dacosta. She lives in [place], [place]. She is mostly wheelchair bound but can use a walker with assistance.     Pt presented to ED with c/o diarrhea, generalized weakness and acute on chronic left knee and left hip pain s/p mechanical fall 1 week ago. She denies vomiting, diarrhea, fever/chills and abdominal pain. KUB shows constipation, no evidence of obstruction. At baseline, she is in a wheelchair/walker due to weakness, but said she has had trouble with transfers for the past week due to her L knee pain. Denies chest pain and worsening SOB. Labs remarkable for K 6.4, troponin 0.036.  Xray shows R nondisplaced pubic ramus fracture and significant erosion of L knee appears 2/2 sepitic  arthropathy. Orthopedic surgery consulted by ED. Pt admitted to [facility] hospital medicine.     Overview/Hospital Course:  No notes on file    Interval History:  No complaints  Was supposed to go for revision but no OR time.   Will feed and make NPO after MN  No CP, SOB, NV  No diarrhea today  Right buttocks pain. No lesions seen or felt  Warm compresses and recheck      Review of Systems   Constitutional: Negative for chills and fever.   Respiratory: Negative for cough, shortness of breath and wheezing.    Cardiovascular: Negative for chest pain, palpitations and leg swelling.   Gastrointestinal: Negative for abdominal pain, blood in stool, constipation, diarrhea, nausea and vomiting.   Genitourinary: Negative for dysuria and hematuria.   Musculoskeletal: Negative for back pain.   Skin: Negative for rash.   Neurological: Negative for dizziness, weakness and headaches.     Objective:     Vital Signs (Most Recent):  Temp: 97.6 °F (36.4 °C) (08/12/20 1154)  Pulse: 65 (08/12/20 1154)  Resp: 14 (08/12/20 1154)  BP: (!) 116/55 (08/12/20 1154)  SpO2: 98 % (08/12/20 0800) Vital Signs (24h Range):  Temp:  [97.6 °F (36.4 °C)-98.6 °F (37 °C)] 97.6 °F (36.4 °C)  Pulse:  [62-78] 65  Resp:  [14-18] 14  SpO2:  [94 %-98 %] 98 %  BP: ()/() 116/55     Weight: 87.2 kg (192 lb 3.9 oz)  Body mass index is 30.11 kg/m².    Intake/Output Summary (Last 24 hours) at 8/12/2020 1157  Last data filed at 8/12/2020 0500  Gross per 24 hour   Intake 500 ml   Output 900 ml   Net -400 ml      Physical Exam  Vitals signs and nursing note reviewed.   Constitutional:       General: She is not in acute distress.     Appearance: She is well-developed.   Eyes:      Conjunctiva/sclera: Conjunctivae normal.   Neck:      Vascular: No JVD.   Cardiovascular:      Rate and Rhythm: Normal rate and regular rhythm.      Heart sounds: Murmur present.      Comments: AV fistula right arm +bruit -thrill  Pulmonary:      Effort: Pulmonary effort is normal.  "     Breath sounds: Normal breath sounds. No wheezing.   Abdominal:      General: Bowel sounds are normal.      Palpations: Abdomen is soft.      Tenderness: There is no abdominal tenderness.   Musculoskeletal:         General: No tenderness.   Skin:     General: Skin is warm and dry.      Capillary Refill: Capillary refill takes less than 2 seconds.      Comments: Exam of buttocks shows no abnormaalities where she is pointing to having pain ("boil")   Neurological:      Mental Status: She is alert and oriented to person, place, and time.         Significant Labs:   BMP:   Recent Labs   Lab 08/12/20  0421   *   *   K 4.8   CL 98   CO2 25   BUN 37*   CREATININE 6.2*   CALCIUM 9.0     CBC:   Recent Labs   Lab 08/11/20  0600 08/12/20  0421   WBC 4.48 3.86*   HGB 11.8* 11.0*   HCT 37.7 36.0*    203       Significant Imaging: I have reviewed all pertinent imaging results/findings within the past 24 hours.      Assessment/Plan:      * Hyperkalemia  resolved    Diarrhea  Better  No rash or boil seen  Warm compresses to the area and will re-assess      Left knee pain  S/p mechanical fall 1 week ago  -imaging ? septic arthritis  -s/p empiric antibiotics in ED  -evaluated by orthopedic surgery, arthrocentesis done and not c/w septic joint  - stop abx  -PT/OT consulted: recommend SNF      Hypertension associated with diabetes  Taking amlodipine 10 mg daily and metoprolol 50 mg daily  BP better today after resuming amlodipine      ESRD on dialysis  No dialysis today  She needs to have her fistula evaluated and de-clotted  Thus will be done in AM      Physical deconditioning  PT/OT: recommend SNF      Chronic diastolic congestive heart failure  Euvolemic on exam. Continue furosemide 40 mg daily. Patient still able to make urine.       Paroxysmal atrial fibrillation  Taking Apixaban- continue       Anemia in end-stage renal disease  H&H stable      Diabetic neuropathy associated with type 2 diabetes " mellitus  Taking Gabapentin 300 mg TID prn- continue     GERD (gastroesophageal reflux disease)  Daily PPI       Hyperlipidemia  Continue statin       Type 2 diabetes mellitus with hypertension and end stage renal disease on dialysis  A1C 6.9   Low dose SSI, accucheck AC&HS , Diabetic diet       Cerebral microvascular disease  Continue Plavix, statin         VTE Risk Mitigation (From admission, onward)         Ordered     apixaban tablet 2.5 mg  2 times daily      08/10/20 1908     IP VTE HIGH RISK PATIENT  Once      08/09/20 2255     Place sequential compression device  Until discontinued      08/09/20 2255                      Rosalia Burnett MD  Department of Hospital Medicine   [facility]

## 2020-08-13 NOTE — PROGRESS NOTES
NET Team Rounds with Dr. LAURIE Herrera MD and Charis Purcell MD    Diagnosis this admission:   Cough [R05]  Diarrhea [R19.7]  Hyperkalemia [E87.5]  Fall [W19.XXXA]  Fatigue, unspecified type [R53.83]  Pyogenic arthritis of left knee joint, due to unspecified organism [M00.9]  Hyperkalemia [E87.5]    Admit Date: 8/9/2020   Discharge Date: TBD     Surgery/Procedure: ,  7/13/2018 - Esophagogastroduodenoscopy (egd) and Colonoscopy     NET Physician:  LAURIE Herrera MD  Local Treating Physician:Ben Mena MD      Assessment  Diet: NPO,    Oral Supplement: Novasource Renal  Nutrition Support - none   Appetite - fair  Nausea - No  Vomiting- No  BM- No bowel movements  Abdomen- nontender  Incision- none  Drain- none  Urine-  on dialysis no urine output  Activity-  up with assistance   Pain-none  Respiratory- encourage cough/deep breath/IS     IV Access- Dialysis Access - revision   Edema: 1+    Vitals:   Patient Vitals for the past 24 hrs:   BP Temp Temp src Pulse Resp SpO2 Weight   08/12/20 0842 129/65 97.9 °F (36.6 °C) Oral 68 16 -- --   08/12/20 0500 -- -- -- -- -- -- 87.2 kg (192 lb 3.9 oz)   08/12/20 0342 115/66 98.6 °F (37 °C) Oral 71 18 98 % --   08/12/20 0003 (!) 114/51 98.4 °F (36.9 °C) Oral 72 18 98 % --   08/12/20 0000 -- -- -- 62 -- -- --   08/11/20 2016 (!) 106/57 98.3 °F (36.8 °C) Oral 74 18 98 % --   08/11/20 1655 132/65 98.3 °F (36.8 °C) Oral 77 17 -- --   08/11/20 1540 (!) 179/89 -- -- 76 -- -- --   08/11/20 1425 (!) 167/99 97.7 °F (36.5 °C) Oral 73 18 -- --   08/11/20 1410 (!) 147/86 -- -- 71 -- -- --   08/11/20 1350 (!) 157/89 -- -- 74 -- -- --   08/11/20 1330 (!) 169/94 -- -- 71 -- -- --   08/11/20 1255 (!) 96/52 -- -- 70 -- -- --   08/11/20 1240 139/74 -- -- 72 -- -- --   08/11/20 1225 (!) 156/100 -- -- 68 -- -- --   08/11/20 1155 (!) 151/94 -- -- 68 -- -- --   08/11/20 1153 -- -- -- 68 -- -- --   08/11/20 1125 (!) 161/95 -- -- 72 -- -- --   08/11/20 1055 (!) 168/108 -- -- 70 --  -- --       Wt Readings from Last 1 Encounters:   08/13/20 0540 89 kg (196 lb 3.4 oz)   08/12/20 0500 87.2 kg (192 lb 3.9 oz)   08/11/20 0508 85.2 kg (187 lb 13.3 oz)   08/10/20 0519 90.7 kg (199 lb 15.3 oz)   08/09/20 2307 86.6 kg (190 lb 14.7 oz)   08/09/20 1635 84.4 kg (186 lb)         Labs:   Recent Labs   Lab 08/11/20  0600 08/12/20  0421 08/13/20  0638   WBC 4.48 3.86* 4.21   HGB 11.8* 11.0* 10.5*   HCT 37.7 36.0* 34.3*    203 180   MCV 88 90 89   RDW 16.1* 15.9* 15.5*    135* 132*   K 6.3* 4.8 4.7    98 95   CO2 22* 25 24   BUN 60* 37* 50*   CREATININE 8.4* 6.2* 7.8*   * 129* 154*   PROT 7.3 6.9 6.9   ALBUMIN 3.4* 3.2* 3.2*   BILITOT 0.5 0.5 0.4   AST 26 20 21   ALKPHOS 63 61 61   ALT 16 15 15      Recent Labs   Lab 08/09/20 2028   CRP 12.0*        Scheduled Meds   sodium chloride 0.9%   Intravenous Once    sodium chloride 0.9%   Intravenous Once    amLODIPine  10 mg Oral Daily    apixaban  2.5 mg Oral BID    calcium acetate(phosphat bind)  667 mg Oral TID WM    clopidogreL  75 mg Oral Daily    docusate sodium  100 mg Oral BID    furosemide  40 mg Oral Daily    metoprolol succinate  50 mg Oral Daily    mirtazapine  7.5 mg Oral QHS    mupirocin   Nasal BID    pantoprazole  40 mg Oral Daily    pravastatin  80 mg Oral Daily    sertraline  100 mg Oral Daily    vitamin renal formula (B-complex-vitamin c-folic acid)  1 capsule Oral Daily       Continuous Infusion       PRN Meds  sodium chloride 0.9%, sodium chloride 0.9%, acetaminophen, [COMPLETED] calcium gluconate IVPB **AND** calcium gluconate IVPB, dextrose 50%, dextrose 50%, gabapentin, glucagon (human recombinant), glucose, glucose, HYDROcodone-acetaminophen, insulin aspart U-100, ondansetron, sodium chloride 0.9%, Pharmacy to dose Vancomycin consult **AND** vancomycin - pharmacy to dose     Assessment/Plan:  - Revision of AVF - OR today     Discharge Planning   Appointments- LAURIE Herrera MD [facility]  needs- TBD  Therapy needs- TBD  Nutritional needs - TBD  Home support system- TBD  Barriers- none    Reviewed follow up plan.  Patient verbalized understanding.    ___________________________________________  WOLFGANG CharlesN, RN, ONN-CG  Nurse Navigator [facility]    Tracie Weil Cavalier, RDN, LDN, CNSC  Registered Dietitian [facility]      Face to Face Time: 30 minutes

## 2020-08-13 NOTE — PLAN OF CARE
TN received call from pt's son Cristi who states that pt's is now agreeable to SNF placement. Preference is [facility]. TN to send referral.

## 2020-08-13 NOTE — SUBJECTIVE & OBJECTIVE
Interval History:  Patient was seen in the recovery room.  The fistula revision had to be canceled because patient was still on Plavix and Eliquis and therefore a Trialysis catheter was placed.  She will get dialysis from here tomorrow.  She will have the procedure for the graft revision on Monday or Tuesday.  She states that she has a little bit of soreness on the left with a line was placed.  Other than that she feels okay.    I discussed with the patient the fact that she is very weak and suggested that she consider skilled nursing.  Family at this time is in agreement.      Review of Systems   Constitutional: Negative for chills and fever.   Respiratory: Negative for cough, shortness of breath and wheezing.    Cardiovascular: Negative for chest pain, palpitations and leg swelling.   Gastrointestinal: Negative for abdominal pain, blood in stool, constipation, diarrhea, nausea and vomiting.   Genitourinary: Negative for dysuria and hematuria.   Musculoskeletal: Negative for back pain.   Skin: Negative for rash.   Neurological: Negative for dizziness, weakness and headaches.     Objective:     Vital Signs (Most Recent):  Temp: 97.5 °F (36.4 °C) (08/13/20 1149)  Pulse: 63 (08/13/20 1311)  Resp: 15 (08/13/20 1205)  BP: (!) 140/77 (08/13/20 1311)  SpO2: 100 % (08/13/20 1149) Vital Signs (24h Range):  Temp:  [97.4 °F (36.3 °C)-98.1 °F (36.7 °C)] 97.5 °F (36.4 °C)  Pulse:  [63-73] 63  Resp:  [15-20] 15  SpO2:  [97 %-100 %] 100 %  BP: (130-205)/(53-99) 140/77     Weight: 89 kg (196 lb 3.4 oz)  Body mass index is 30.73 kg/m².    Intake/Output Summary (Last 24 hours) at 8/13/2020 1437  Last data filed at 8/13/2020 0600  Gross per 24 hour   Intake 0 ml   Output 0 ml   Net 0 ml      Physical Exam  Vitals signs and nursing note reviewed.   Constitutional:       General: She is not in acute distress.     Appearance: She is well-developed.   Eyes:      Conjunctiva/sclera: Conjunctivae normal.   Neck:      Vascular: No JVD.       Comments: Dressing on left neck  Cardiovascular:      Rate and Rhythm: Normal rate and regular rhythm.      Heart sounds: Normal heart sounds.   Pulmonary:      Effort: Pulmonary effort is normal.      Breath sounds: Normal breath sounds. No wheezing.   Abdominal:      General: Bowel sounds are normal.      Palpations: Abdomen is soft.      Tenderness: There is no abdominal tenderness.   Musculoskeletal:         General: No tenderness.   Skin:     General: Skin is warm and dry.      Capillary Refill: Capillary refill takes less than 2 seconds.   Neurological:      Mental Status: She is alert and oriented to person, place, and time.   Psychiatric:         Mood and Affect: Mood normal.         Behavior: Behavior normal.         Significant Labs:   BMP:   Recent Labs   Lab 08/13/20  0638   *   *   K 4.7   CL 95   CO2 24   BUN 50*   CREATININE 7.8*   CALCIUM 8.8     CBC:   Recent Labs   Lab 08/12/20  0421 08/13/20  0638   WBC 3.86* 4.21   HGB 11.0* 10.5*   HCT 36.0* 34.3*    180       Significant Imaging: I have reviewed all pertinent imaging results/findings within the past 24 hours.

## 2020-08-13 NOTE — PROGRESS NOTES
[facility]  General Surgery  Progress Note    Subjective:     Interval History: NAEON. Denies any bleeding from fistula overnight. Denies any other complaints at this time.     Post-Op Info:  Procedure(s) (LRB):  REVISION, AV FISTULA, Right forearm (Right)          Medications:  Continuous Infusions:  Scheduled Meds:   sodium chloride 0.9%   Intravenous Once    sodium chloride 0.9%   Intravenous Once    amLODIPine  10 mg Oral Daily    apixaban  2.5 mg Oral BID    calcium acetate(phosphat bind)  667 mg Oral TID WM    clopidogreL  75 mg Oral Daily    docusate sodium  100 mg Oral BID    furosemide  40 mg Oral Daily    metoprolol succinate  50 mg Oral Daily    mirtazapine  7.5 mg Oral QHS    mupirocin   Nasal BID    pantoprazole  40 mg Oral Daily    pravastatin  80 mg Oral Daily    sertraline  100 mg Oral Daily    vitamin renal formula (B-complex-vitamin c-folic acid)  1 capsule Oral Daily     PRN Meds:sodium chloride 0.9%, sodium chloride 0.9%, acetaminophen, [COMPLETED] calcium gluconate IVPB **AND** calcium gluconate IVPB, dextrose 50%, dextrose 50%, gabapentin, glucagon (human recombinant), glucose, glucose, HYDROcodone-acetaminophen, insulin aspart U-100, ondansetron, sodium chloride 0.9%, Pharmacy to dose Vancomycin consult **AND** vancomycin - pharmacy to dose     Objective:     Vital Signs (Most Recent):  Temp: 97.4 °F (36.3 °C) (08/13/20 0540)  Pulse: 72 (08/13/20 0540)  Resp: 16 (08/13/20 0540)  BP: 130/64 (08/13/20 0540)  SpO2: 97 % (08/12/20 2046) Vital Signs (24h Range):  Temp:  [97.4 °F (36.3 °C)-98.1 °F (36.7 °C)] 97.4 °F (36.3 °C)  Pulse:  [65-72] 72  Resp:  [14-18] 16  SpO2:  [97 %-98 %] 97 %  BP: (116-148)/(53-65) 130/64     No intake or output data in the 24 hours ending 08/13/20 0631    Physical Exam  Constitutional:       General: She is not in acute distress.  HENT:      Head: Normocephalic and atraumatic.   Cardiovascular:      Rate and Rhythm: Normal rate and  regular rhythm.   Pulmonary:      Effort: Pulmonary effort is normal.      Breath sounds: Normal breath sounds.   Abdominal:      General: Abdomen is flat.      Palpations: Abdomen is soft.   Skin:     Comments: R AC fistula with audible thrill   Neurological:      Mental Status: She is alert.         Significant Labs:  CBC:   Recent Labs   Lab 08/12/20  0421   WBC 3.86*   RBC 3.99*   HGB 11.0*   HCT 36.0*      MCV 90   MCH 27.6   MCHC 30.6*           Assessment/Plan:     Active Diagnoses:    Diagnosis Date Noted POA    PRINCIPAL PROBLEM:  Hyperkalemia [E87.5] 11/18/2019 Yes    Complication of vascular access for dialysis [T82.9XXA]  Yes    Diarrhea [R19.7] 08/11/2020 Yes    Left knee pain [M25.562] 08/10/2020 Yes    Hypertension associated with diabetes [E11.59, I10] 02/04/2020 Yes    Chronic diastolic congestive heart failure [I50.32] 09/22/2018 Yes    Physical deconditioning [R53.81] 09/22/2018 Yes    ESRD on dialysis [N18.6, Z99.2]  Not Applicable    Paroxysmal atrial fibrillation [I48.0] 06/21/2018 Yes     Chronic    Anemia in end-stage renal disease [N18.6, D63.1] 03/06/2016 Yes     Chronic    Diabetic neuropathy associated with type 2 diabetes mellitus [E11.40] 02/19/2016 Yes     Chronic    GERD (gastroesophageal reflux disease) [K21.9] 07/02/2013 Yes     Chronic    Cerebral microvascular disease [I67.9] 07/01/2013 Yes     Chronic    Type 2 diabetes mellitus with hypertension and end stage renal disease on dialysis [E11.22, I12.0, Z99.2, N18.6] 07/01/2013 Not Applicable     Chronic    Hyperlipidemia [E78.5] 07/01/2013 Yes     Chronic      Problems Resolved During this Admission:    Diagnosis Date Noted Date Resolved POA    Pyogenic arthritis of left knee joint [M00.9] 08/10/2020 08/10/2020 Unknown     70 yo F with history of CAD, CHF, pulmonary HTN, DM2, sarcoidosis, and ESRD with dialysis graft issues.    - No bleeding from wound overnight  - To OR for AV fistula revision  today      Mariano Yu MD  General Surgery  [facility]

## 2020-08-14 PROBLEM — T82.898A PROBLEM WITH DIALYSIS ACCESS: Status: RESOLVED | Noted: 2020-08-13 | Resolved: 2020-08-14

## 2020-08-14 PROBLEM — R94.31 EKG ABNORMALITIES: Status: ACTIVE | Noted: 2020-08-14

## 2020-08-14 LAB
ALBUMIN SERPL BCP-MCNC: 3.4 G/DL (ref 3.5–5.2)
ALP SERPL-CCNC: 66 U/L (ref 55–135)
ALT SERPL W/O P-5'-P-CCNC: 18 U/L (ref 10–44)
ANION GAP SERPL CALC-SCNC: 15 MMOL/L (ref 8–16)
AST SERPL-CCNC: 26 U/L (ref 10–40)
BASOPHILS # BLD AUTO: 0.01 K/UL (ref 0–0.2)
BASOPHILS NFR BLD: 0.1 % (ref 0–1.9)
BILIRUB SERPL-MCNC: 0.4 MG/DL (ref 0.1–1)
BUN SERPL-MCNC: 60 MG/DL (ref 8–23)
CALCIUM SERPL-MCNC: 9 MG/DL (ref 8.7–10.5)
CHLORIDE SERPL-SCNC: 93 MMOL/L (ref 95–110)
CO2 SERPL-SCNC: 22 MMOL/L (ref 23–29)
CREAT SERPL-MCNC: 8.3 MG/DL (ref 0.5–1.4)
DIFFERENTIAL METHOD: ABNORMAL
EOSINOPHIL # BLD AUTO: 0.1 K/UL (ref 0–0.5)
EOSINOPHIL NFR BLD: 1.1 % (ref 0–8)
ERYTHROCYTE [DISTWIDTH] IN BLOOD BY AUTOMATED COUNT: 15.5 % (ref 11.5–14.5)
EST. GFR  (AFRICAN AMERICAN): 5 ML/MIN/1.73 M^2
EST. GFR  (NON AFRICAN AMERICAN): 4 ML/MIN/1.73 M^2
GLUCOSE SERPL-MCNC: 175 MG/DL (ref 70–110)
HCT VFR BLD AUTO: 35.6 % (ref 37–48.5)
HGB BLD-MCNC: 11.3 G/DL (ref 12–16)
IMM GRANULOCYTES # BLD AUTO: 0.01 K/UL (ref 0–0.04)
IMM GRANULOCYTES NFR BLD AUTO: 0.1 % (ref 0–0.5)
LYMPHOCYTES # BLD AUTO: 0.5 K/UL (ref 1–4.8)
LYMPHOCYTES NFR BLD: 6.5 % (ref 18–48)
MCH RBC QN AUTO: 27.9 PG (ref 27–31)
MCHC RBC AUTO-ENTMCNC: 31.7 G/DL (ref 32–36)
MCV RBC AUTO: 88 FL (ref 82–98)
MONOCYTES # BLD AUTO: 0.4 K/UL (ref 0.3–1)
MONOCYTES NFR BLD: 5 % (ref 4–15)
NEUTROPHILS # BLD AUTO: 6.3 K/UL (ref 1.8–7.7)
NEUTROPHILS NFR BLD: 87.2 % (ref 38–73)
NRBC BLD-RTO: 0 /100 WBC
PLATELET # BLD AUTO: 207 K/UL (ref 150–350)
PMV BLD AUTO: 9.9 FL (ref 9.2–12.9)
POCT GLUCOSE: 181 MG/DL (ref 70–110)
POCT GLUCOSE: 227 MG/DL (ref 70–110)
POCT GLUCOSE: 242 MG/DL (ref 70–110)
POTASSIUM SERPL-SCNC: 5.6 MMOL/L (ref 3.5–5.1)
PROT SERPL-MCNC: 7 G/DL (ref 6–8.4)
RBC # BLD AUTO: 4.05 M/UL (ref 4–5.4)
SODIUM SERPL-SCNC: 130 MMOL/L (ref 136–145)
TROPONIN I SERPL DL<=0.01 NG/ML-MCNC: 0.02 NG/ML (ref 0–0.03)
TROPONIN I SERPL DL<=0.01 NG/ML-MCNC: 0.03 NG/ML (ref 0–0.03)
VANCOMYCIN SERPL-MCNC: 13.1 UG/ML
WBC # BLD AUTO: 7.18 K/UL (ref 3.9–12.7)

## 2020-08-14 PROCEDURE — 63600175 PHARM REV CODE 636 W HCPCS: Performed by: STUDENT IN AN ORGANIZED HEALTH CARE EDUCATION/TRAINING PROGRAM

## 2020-08-14 PROCEDURE — 25000003 PHARM REV CODE 250: Performed by: NURSE PRACTITIONER

## 2020-08-14 PROCEDURE — 36415 COLL VENOUS BLD VENIPUNCTURE: CPT

## 2020-08-14 PROCEDURE — 94761 N-INVAS EAR/PLS OXIMETRY MLT: CPT

## 2020-08-14 PROCEDURE — 11000001 HC ACUTE MED/SURG PRIVATE ROOM

## 2020-08-14 PROCEDURE — 85025 COMPLETE CBC W/AUTO DIFF WBC: CPT

## 2020-08-14 PROCEDURE — 93005 ELECTROCARDIOGRAM TRACING: CPT

## 2020-08-14 PROCEDURE — 25000003 PHARM REV CODE 250: Performed by: INTERNAL MEDICINE

## 2020-08-14 PROCEDURE — 93010 EKG 12-LEAD: ICD-10-PCS | Mod: ,,, | Performed by: INTERNAL MEDICINE

## 2020-08-14 PROCEDURE — 27000221 HC OXYGEN, UP TO 24 HOURS

## 2020-08-14 PROCEDURE — 63600175 PHARM REV CODE 636 W HCPCS: Performed by: NURSE PRACTITIONER

## 2020-08-14 PROCEDURE — 93010 ELECTROCARDIOGRAM REPORT: CPT | Mod: ,,, | Performed by: INTERNAL MEDICINE

## 2020-08-14 PROCEDURE — 84484 ASSAY OF TROPONIN QUANT: CPT | Mod: 91

## 2020-08-14 PROCEDURE — 80202 ASSAY OF VANCOMYCIN: CPT

## 2020-08-14 PROCEDURE — 80100016 HC MAINTENANCE HEMODIALYSIS

## 2020-08-14 PROCEDURE — 80053 COMPREHEN METABOLIC PANEL: CPT

## 2020-08-14 PROCEDURE — 99900035 HC TECH TIME PER 15 MIN (STAT)

## 2020-08-14 PROCEDURE — 63600175 PHARM REV CODE 636 W HCPCS: Performed by: INTERNAL MEDICINE

## 2020-08-14 RX ORDER — HEPARIN SODIUM 1000 [USP'U]/ML
4100 INJECTION, SOLUTION INTRAVENOUS; SUBCUTANEOUS
Status: DISCONTINUED | OUTPATIENT
Start: 2020-08-14 | End: 2020-08-19 | Stop reason: HOSPADM

## 2020-08-14 RX ADMIN — AMLODIPINE BESYLATE 10 MG: 5 TABLET ORAL at 01:08

## 2020-08-14 RX ADMIN — CEFTRIAXONE 1 G: 1 INJECTION, SOLUTION INTRAVENOUS at 04:08

## 2020-08-14 RX ADMIN — PANTOPRAZOLE SODIUM 40 MG: 40 TABLET, DELAYED RELEASE ORAL at 02:08

## 2020-08-14 RX ADMIN — NEPHROCAP 1 CAPSULE: 1 CAP ORAL at 01:08

## 2020-08-14 RX ADMIN — CALCIUM ACETATE 667 MG: 667 CAPSULE ORAL at 04:08

## 2020-08-14 RX ADMIN — METOPROLOL SUCCINATE 50 MG: 25 TABLET, FILM COATED, EXTENDED RELEASE ORAL at 02:08

## 2020-08-14 RX ADMIN — CALCIUM ACETATE 667 MG: 667 CAPSULE ORAL at 08:08

## 2020-08-14 RX ADMIN — MIRTAZAPINE 7.5 MG: 7.5 TABLET ORAL at 09:08

## 2020-08-14 RX ADMIN — SERTRALINE HYDROCHLORIDE 100 MG: 100 TABLET ORAL at 01:08

## 2020-08-14 RX ADMIN — MUPIROCIN: 20 OINTMENT TOPICAL at 08:08

## 2020-08-14 RX ADMIN — HEPARIN SODIUM 4100 UNITS: 1000 INJECTION, SOLUTION INTRAVENOUS; SUBCUTANEOUS at 01:08

## 2020-08-14 RX ADMIN — INSULIN ASPART 2 UNITS: 100 INJECTION, SOLUTION INTRAVENOUS; SUBCUTANEOUS at 04:08

## 2020-08-14 RX ADMIN — VANCOMYCIN HYDROCHLORIDE 500 MG: 500 INJECTION, POWDER, LYOPHILIZED, FOR SOLUTION INTRAVENOUS at 04:08

## 2020-08-14 RX ADMIN — PRAVASTATIN SODIUM 80 MG: 20 TABLET ORAL at 01:08

## 2020-08-14 RX ADMIN — CALCIUM ACETATE 667 MG: 667 CAPSULE ORAL at 02:08

## 2020-08-14 RX ADMIN — INSULIN ASPART 2 UNITS: 100 INJECTION, SOLUTION INTRAVENOUS; SUBCUTANEOUS at 09:08

## 2020-08-14 RX ADMIN — FUROSEMIDE 40 MG: 40 TABLET ORAL at 01:08

## 2020-08-14 NOTE — PLAN OF CARE
Per Leelee with [facility], patient cannot go to [facility] because they are not accepting dialysis patients at this time. [place] informed patient's son.

## 2020-08-14 NOTE — PLAN OF CARE
contacted Leelee with [facility] and confirmed receipt of referral. Leelee reported she will review referrals on next week. They will not have beds next week but there is a waiting list.    Fanta will return to work on Monday and can make contact if they can accommodate this patient's needs.

## 2020-08-14 NOTE — PROGRESS NOTES
[facility] Progress Note    Date of Admit: 2020  Length of Stay: 3    Chief Complaint/Reason for Consult     [facility] following for ESRD on HD Evaluation and Management    Subjective:      Patient s/p tunneled HD catheter L subclavian placement on 2020. AVG revision to be done early next week given presence of anticoagulation.    Doing well this morning. No new complaints. Denies chest pain, shortness of breath, or subjective fever/chills overnight.      Objective:   Last 24 Hour Vital Signs:  BP  Min: 100/49  Max: 179/76  Temp  Av.3 °F (36.8 °C)  Min: 97.4 °F (36.3 °C)  Max: 100.1 °F (37.8 °C)  Pulse  Av.8  Min: 58  Max: 82  Resp  Av.6  Min: 9  Max: 20  SpO2  Av.9 %  Min: 90 %  Max: 100 %  Weight  Av.7 kg (206 lb 9.1 oz)  Min: 93.7 kg (206 lb 9.1 oz)  Max: 93.7 kg (206 lb 9.1 oz)  Body mass index is 32.35 kg/m².  I/O last 3 completed shifts:  In: 400 [I.V.:400]  Out: 0     Physical Examination:  General: No acute distress, resting comfortably on dialysis  HEENT: EOMI, PERRL, MMM,   Cardiovascular: NRRR, no murmurs or rubs.  Chest/Pulmonary: CTABL, no IWB  Abdomen: soft, nontender, nondistended, BS+  MSKL: without gross deformity, active FROM  Lymphatic: no appreciated cervical or submandibular LAD  Skin: without cyanosis or clubbing, without trace edema  Neurologic: AAOx3, following two step commands  Access: RUE RC AVF with adequate systolic now absent diastolic thrill, + bruits, increased pulsatility on arterial inflow. New L subclavian tunneled HD catheter appears c/d/i.     Laboratory:  Most Recent Data:  CBC:   Lab Results   Component Value Date    WBC 7.18 2020    HGB 11.3 (L) 2020    HCT 35.6 (L) 2020     2020    MCV 88 2020    RDW 15.5 (H) 2020     BMP:   Lab Results   Component Value Date     (L) 2020    K 5.6 (H) 2020    CL 93 (L) 2020    CO2 22 (L) 2020    BUN 60 (H) 2020     CREATININE 8.3 (H) 08/14/2020     (H) 08/14/2020    CALCIUM 9.0 08/14/2020    MG 2.3 08/09/2020    PHOS 7.4 (H) 08/09/2020     LFTs:   Lab Results   Component Value Date    PROT 7.0 08/14/2020    ALBUMIN 3.4 (L) 08/14/2020    BILITOT 0.4 08/14/2020    AST 26 08/14/2020    ALKPHOS 66 08/14/2020    ALT 18 08/14/2020     Coags:   Lab Results   Component Value Date    INR 1.0 08/09/2020     Urinalysis:   Lab Results   Component Value Date    LABURIN  11/08/2016     Multiple organisms isolated. None in predominance.  Repeat if    LABURIN clinically necessary. 11/08/2016    COLORU Yellow 07/29/2019    SPECGRAV 1.015 07/29/2019    NITRITE Negative 07/29/2019    KETONESU Negative 07/29/2019    UROBILINOGEN Negative 07/29/2019    WBCUA 5 07/29/2019       Trended Lab Data:  Recent Labs   Lab 08/12/20  0421 08/13/20  0638 08/14/20  0121   WBC 3.86* 4.21 7.18   HGB 11.0* 10.5* 11.3*   HCT 36.0* 34.3* 35.6*    180 207   MCV 90 89 88   RDW 15.9* 15.5* 15.5*   * 132* 130*   K 4.8 4.7 5.6*   CL 98 95 93*   CO2 25 24 22*   BUN 37* 50* 60*   CREATININE 6.2* 7.8* 8.3*   * 154* 175*   PROT 6.9 6.9 7.0   ALBUMIN 3.2* 3.2* 3.4*   BILITOT 0.5 0.4 0.4   AST 20 21 26   ALKPHOS 61 61 66   ALT 15 15 18       Trended Cardiac Data:  Recent Labs   Lab 08/09/20  1716 08/13/20  1941 08/14/20  0121 08/14/20  0749   TROPONINI 0.036* 0.029* 0.026 0.019   *  --   --   --        Microbiology Data:  Microbial cultures from knee aspirate pending    Other Laboratory Data:  Sed rate 48    Other Results:      Radiology:  Imaging Results          CT Pelvis Without Contrast (Final result)  Result time 08/09/20 21:10:28   Procedure changed from CT Hip Without Contrast Left     Final result by Vito Garcia MD (08/09/20 21:10:28)                 Impression:      Negative CT of the pelvis for fracture specially of the left hip.    Degenerative changes most severe in the lumbar spine with erosive spondyloarthropathy at the  L4-5 level.    Severe atherosclerotic vascular disease most severe the SFA bilaterally.      Electronically signed by: Vito Garcia  Date:    08/09/2020  Time:    21:10             Narrative:    EXAMINATION:  CT PELVIS WITHOUT CONTRAST    CLINICAL HISTORY:  Hip trauma, nondiagnostic xray;    TECHNIQUE:  Axial CT images of the left hip were obtained with sagittal and coronal reformatted images.    COMPARISON:  Plain film of the hip,, 08/09/2020    FINDINGS:  The cortex and trabecular markings of the femur appear intact without displaced fracture or bony destructive process.  The intertrochanteric and proximal diaphysis appear intact as well.    Moderate osteoarthritis of the femoroacetabular joint is present without evidence of pincer or CAM deformity.    The adjacent pelvic bones are intact.  There is mild irregularity of the ischial tuberosity insertion of the hamstring tendons suggesting mild insertional tendinitis.    The endplates of the lumbar spine are markedly irregular with erosive changes at the L4-5 level and mild compression of the anterior endplate of L5 appearing remote with vacuum phenomenon.  Facet arthropathy is prominent.  There is no spondylolysis or significant central canal stenosis evident as imaged.  Degenerative changes in the SI joints are present.    Atherosclerotic disease and a normal caliber aorta and iliac system are noted.  This extends extensively into the SFA bilaterally.    The uterus demonstrates multiple large chunky calcifications suggesting degenerating fibroadenomas.  The urinary bladder appears unremarkable.  There is no pelvic mass or free fluid.                                CT Knee Without Contrast Left (Final result)  Result time 08/09/20 20:54:03    Final result by Fuentes Vincent MD (08/09/20 20:54:03)                 Impression:      Findings indicating destruction of the knee joint secondary to aseptic process.    This report was flagged in Epic as  abnormal.      Electronically signed by: Fuentes Vincent  Date:    08/09/2020  Time:    20:54             Narrative:    EXAMINATION:  CT KNEE WITHOUT CONTRAST LEFT    CLINICAL HISTORY:  Knee instability;    TECHNIQUE:  Multiple axial images of the left knee were obtained without the aid of IV contrast.  The study was reformatted creating a 3D image was separate workstation under direct supervision.    COMPARISON:  None    FINDINGS:  The knee joint reveals significant damage and deformity which is consistent with a septic joint.  The tibial plateau is fractured and there does not appear to be any discernible normal articular surface involving either the tibial plateau or the distal femur.  Both condyles of the distal femur are deformed and appear to be diminutive.  The articular surfaces of the femur and tibia appear to be mild feeding.  There is no normal articulation between the femur and the tibial plateau.  The surrounding soft tissues of the knee shows soft tissue induration and what appears to be fluid collection which most likely represents abscess fluid.  Suggest consideration aspiration in the for the purposes of obtaining culture and sensitivity information.                                   X-Ray Pelvis Routine AP (Edited Result - FINAL)  Result time 08/09/20 18:37:50    Addendum 1 of 1 by Poncho Handley MD (08/09/20 18:37:50)      This report was flagged in Epic as abnormal.    Please note, after initial interpretation of this examination, radiograph of the abdomen became available for review.  In retrospect, there appears to be lucency involving the right inferior pubic ramus concerning for fracture, better seen on that examination.  Acuity is unknown, correlation with focal tenderness is advised.      Electronically signed by: Poncho Handley MD  Date:    08/09/2020  Time:    18:37               Final result by Poncho Handley MD (08/09/20 18:31:05)                 Impression:      1. No  convincing acute displaced fracture or dislocation of the pelvis.      Electronically signed by: Poncho Handley MD  Date:    08/09/2020  Time:    18:31             Narrative:    EXAMINATION:  XR PELVIS ROUTINE AP    CLINICAL HISTORY:  Unspecified fall, initial encounter    TECHNIQUE:  AP view of the pelvis was performed.    COMPARISON:  None.    FINDINGS:  Single-view pelvis.    There is osteopenia.  Degenerative changes are noted of the bilateral sacroiliac joints and pubic symphysis.  The bilateral femoroacetabular joints are intact.  There is vascular calcification.                                   Assessment and Plan:      Angelina Beard is a 69 year old man with pertinent medical history of CAD, Chronic heart failure with preserved ejection fraction, pulmonary hypertension (likely WHO group II), HLD, hypertension, DM type II, sarcoidosis, and ESRD on HD MWF with Dr. Santamaria at [facility]. Admitted for evaluation of diarrhea and weakness. [facility] consulted for ESRD on HD evaluation and management.       #) ESRD on HD (MWF)  - follows with Dr. Santamaria at [facility]  - HD completed on 8/10 and 8/11.   - resume F HD schedule today  - will plan for HD in AM on 8/15/2020 to optimize for pre-op Monday  - dose meds for GFR < 10  - nephro caps  - strict I/O, daily weights  - please avoid gadolinium, fleets, phos-based laxatives, NSAIDs  - patient to follow up with her HD unit after discharge    #) Hypertension   - amlodipine 10mg  - furosemide 40mg    #) Anemia  Lab Results   Component Value Date    HGB 11.3 (L) 08/14/2020    MCV 88 08/14/2020    FERRITIN 1,215 (H) 11/20/2019    FESATURATED 14 (L) 11/20/2019    [id number] 389 03/03/2018    FOLATE 4.5 03/03/2018   - no need for EPO or iron at this time.    #)  CKD/Mineral Bone Disease/Nutrition   Lab Results   Component Value Date    PHOS 7.4 (H) 08/09/2020    CALCIUM 9.0 08/14/2020    MG 2.3 08/09/2020    CO2 22 (L) 08/14/2020    ALBUMIN 3.4 (L)  08/14/2020   - continue phoslo TID w/ meals; please check phos every other day  - nepro with meals  - nephrocaps daily  - standard bicarb bath with HD    Thank you for this consult. We will continue to follow with you.    Enrrique Pearl MD  [facility]  [phone]    If after 5pm please forward any questions to the [facility] Fellow/Attending on call.

## 2020-08-14 NOTE — SUBJECTIVE & OBJECTIVE
Interval History: Patient seen and examined this morning. No acute events overnight. Patient underwent placement of catheter for dialysis instead of AV graft revision 2/2 being on anticoagulation. Patient complaining of general discomfort this morning but no problems with dialysis catheter.     Medications:  Continuous Infusions:  Scheduled Meds:   sodium chloride 0.9%   Intravenous Once    sodium chloride 0.9%   Intravenous Once    amLODIPine  10 mg Oral Daily    calcium acetate(phosphat bind)  667 mg Oral TID WM    cefTRIAXone (ROCEPHIN) IVPB  1 g Intravenous Q24H    furosemide  40 mg Oral Daily    metoprolol succinate  50 mg Oral Daily    mirtazapine  7.5 mg Oral QHS    mupirocin   Nasal BID    pantoprazole  40 mg Oral Daily    pravastatin  80 mg Oral Daily    sertraline  100 mg Oral Daily    vitamin renal formula (B-complex-vitamin c-folic acid)  1 capsule Oral Daily     PRN Meds:sodium chloride 0.9%, sodium chloride 0.9%, acetaminophen, [COMPLETED] calcium gluconate IVPB **AND** calcium gluconate IVPB, dextrose 50%, dextrose 50%, diphenhydrAMINE, gabapentin, glucagon (human recombinant), glucose, glucose, hydrALAZINE, HYDROcodone-acetaminophen, HYDROmorphone, insulin aspart U-100, ondansetron, ondansetron, promethazine (PHENERGAN) IVPB, sodium chloride 0.9%, sodium chloride 0.9%, Pharmacy to dose Vancomycin consult **AND** vancomycin - pharmacy to dose     Review of patient's allergies indicates:  No Known Allergies  Objective:     Vital Signs (Most Recent):  Temp: 99.1 °F (37.3 °C) (08/14/20 0500)  Pulse: 82 (08/14/20 0413)  Resp: 16 (08/14/20 0413)  BP: 138/80 (08/14/20 0413)  SpO2: 98 % (08/14/20 0400) Vital Signs (24h Range):  Temp:  [97.4 °F (36.3 °C)-100.1 °F (37.8 °C)] 99.1 °F (37.3 °C)  Pulse:  [58-82] 82  Resp:  [9-20] 16  SpO2:  [92 %-100 %] 98 %  BP: (100-205)/(43-99) 138/80     Weight: 93.7 kg (206 lb 9.1 oz)  Body mass index is 32.35 kg/m².    Intake/Output - Last 3 Shifts       08/12  0700 - 08/13 0659 08/13 0700 - 08/14 0659    P.O. 0     I.V. (mL/kg)  400 (4.3)    Total Intake(mL/kg) 0 (0) 400 (4.3)    Urine (mL/kg/hr) 0 (0)     Total Output 0     Net 0 +400                Physical Exam  Constitutional:       General: She is not in acute distress.     Appearance: She is not toxic-appearing.   HENT:      Head: Normocephalic and atraumatic.   Cardiovascular:      Rate and Rhythm: Normal rate and regular rhythm.      Heart sounds: Normal heart sounds.   Pulmonary:      Effort: Pulmonary effort is normal. No respiratory distress.      Breath sounds: Normal breath sounds. No wheezing.   Abdominal:      General: Abdomen is flat. Bowel sounds are normal. There is no distension.      Palpations: Abdomen is soft.      Tenderness: There is no abdominal tenderness.   Skin:     Comments: Catheter to L upper chest wall with dressing c/d/i   Neurological:      Mental Status: She is alert and oriented to person, place, and time.         Significant Labs:  CBC:   Recent Labs   Lab 08/14/20  0121   WBC 7.18   RBC 4.05   HGB 11.3*   HCT 35.6*      MCV 88   MCH 27.9   MCHC 31.7*     CMP:   Recent Labs   Lab 08/14/20  0121   *   CALCIUM 9.0   ALBUMIN 3.4*   PROT 7.0   *   K 5.6*   CO2 22*   CL 93*   BUN 60*   CREATININE 8.3*   ALKPHOS 66   ALT 18   AST 26   BILITOT 0.4       Significant Diagnostics:  I have reviewed and interpreted all pertinent imaging results/findings within the past 24 hours.

## 2020-08-14 NOTE — SUBJECTIVE & OBJECTIVE
"Interval History:  Seen on hemodialysis   Denies CP or SOB  Says that she is" thinking about SNF"  She does not like the dialysis line in her neck  She had an episode of chest pain nausea and SOB yesterday. It went away and has not recurred  EKG, CXR. troponins done  Troponins were mildly elevated and flat.  CXR without PTX  EKGs are noted      Review of Systems   Constitutional: Negative for chills and fever.   Respiratory: Negative for cough, shortness of breath and wheezing.    Cardiovascular: Negative for chest pain, palpitations and leg swelling.   Gastrointestinal: Negative for abdominal pain, blood in stool, constipation, diarrhea, nausea and vomiting.   Genitourinary: Negative for hematuria.   Musculoskeletal: Negative for back pain.   Skin: Negative for rash.   Neurological: Negative for dizziness, weakness and headaches.     Objective:     Vital Signs (Most Recent):  Temp: 98.5 °F (36.9 °C) (08/14/20 0746)  Pulse: 80 (08/14/20 1205)  Resp: 20 (08/14/20 0955)  BP: (!) 153/86 (08/14/20 1205)  SpO2: (!) 92 % (08/14/20 0804) Vital Signs (24h Range):  Temp:  [97.4 °F (36.3 °C)-100.1 °F (37.8 °C)] 98.5 °F (36.9 °C)  Pulse:  [58-82] 80  Resp:  [9-20] 20  SpO2:  [90 %-100 %] 92 %  BP: (100-153)/(43-89) 153/86     Weight: 93.7 kg (206 lb 9.1 oz)  Body mass index is 32.35 kg/m².    Intake/Output Summary (Last 24 hours) at 8/14/2020 1312  Last data filed at 8/13/2020 1500  Gross per 24 hour   Intake 400 ml   Output --   Net 400 ml      Physical Exam  Vitals signs and nursing note reviewed.   Constitutional:       General: She is not in acute distress.     Appearance: She is well-developed.   Eyes:      Conjunctiva/sclera: Conjunctivae normal.   Neck:      Vascular: No JVD.      Comments: Dressing on left neck  Cardiovascular:      Rate and Rhythm: Normal rate and regular rhythm.      Heart sounds: Normal heart sounds.   Pulmonary:      Effort: Pulmonary effort is normal.      Breath sounds: Normal breath sounds. No " wheezing.   Abdominal:      General: Bowel sounds are normal.      Palpations: Abdomen is soft.      Tenderness: There is no abdominal tenderness.   Musculoskeletal:         General: No tenderness.   Skin:     General: Skin is warm and dry.      Capillary Refill: Capillary refill takes less than 2 seconds.   Neurological:      Mental Status: She is alert and oriented to person, place, and time.   Psychiatric:         Mood and Affect: Mood normal.         Behavior: Behavior normal.         Significant Labs:   BMP:   Recent Labs   Lab 08/14/20  0121   *   *   K 5.6*   CL 93*   CO2 22*   BUN 60*   CREATININE 8.3*   CALCIUM 9.0     CBC:   Recent Labs   Lab 08/13/20  0638 08/14/20  0121   WBC 4.21 7.18   HGB 10.5* 11.3*   HCT 34.3* 35.6*    207       Significant Imaging: I have reviewed all pertinent imaging results/findings within the past 24 hours.     ECG Results          EKG 12-lead (Final result)  Result time 08/10/20 11:48:22    Final result by Interface, Lab In [facility] (08/10/20 11:48:22)                 Narrative:    Test Reason : R53.83,    Vent. Rate : 070 BPM     Atrial Rate : 070 BPM     P-R Int : 162 ms          QRS Dur : 092 ms      QT Int : 414 ms       P-R-T Axes : 066 -42 065 degrees     QTc Int : 447 ms    Normal sinus rhythm  Left axis deviation  Nonspecific T wave abnormality  poor precordial R wave progression   Abnormal ECG  When compared with ECG of 19-NOV-2019 04:47,  The axis Shifted left  Nonspecific T wave abnormality no longer evident in Anterior leads  Confirmed by Parag Mariano MD ([id number]) on 8/10/2020 11:48:10 AM    Referred By: [email or web address]   SELF           Confirmed By:Parag Mariano MD

## 2020-08-14 NOTE — PLAN OF CARE
sent referral to [facility] via [facility].  This patient receives Community Choice Waiver in the home and does not need a locet called in.     faxed Level 1 Pre Admission Screening and Resident Review form to the state.       08/14/20 1164   Post-Acute Status   Post-Acute Authorization Placement   Post-Acute Placement Status Referrals Sent   Discharge Delays None known at this time   Discharge Plan   Discharge Plan A Skilled Nursing Facility

## 2020-08-14 NOTE — PROGRESS NOTES
Pharmacokinetic Assessment Follow Up: IV Vancomycin    Vancomycin serum concentration assessment(s):    The random level was drawn correctly and can be used to guide therapy at this time. The measurement is within the desired definitive target range of 10 to 15 mcg/mL.    Vancomycin Regimen Plan:     Vancomycin 500 mg IV once with next random concentration measured at 17:00  on 8-15    Drug levels (last 3 results):  Recent Labs   Lab Result Units 08/14/20  0436   Vancomycin, Random ug/mL 13.1       Pharmacy will continue to follow and monitor vancomycin.    Please contact pharmacy at extension [phone] for questions regarding this assessment.    Thank you for the consult,   Babatunde Gomez       Patient brief summary:  Angelina Beard is a 69 y.o. female initiated on antimicrobial therapy with IV Vancomycin for treatment of skin & soft tissue infection      Drug Allergies:   Review of patient's allergies indicates:  No Known Allergies    Actual Body Weight:   93.7 kg    Renal Function:   Estimated Creatinine Clearance: 7.6 mL/min (A) (based on SCr of 8.3 mg/dL (H)).,     Dialysis Method (if applicable):  intermittent HD    CBC (last 72 hours):  Recent Labs   Lab Result Units 08/12/20 0421 08/13/20  0638 08/14/20  0121   WBC K/uL 3.86* 4.21 7.18   Hemoglobin g/dL 11.0* 10.5* 11.3*   Hematocrit % 36.0* 34.3* 35.6*   Platelets K/uL 203 180 207   Gran% % 61.9 67.1 87.2*   Lymph% % 23.3 20.0 6.5*   Mono% % 11.7 9.5 5.0   Eosinophil% % 2.3 2.4 1.1   Basophil% % 0.5 0.5 0.1   Differential Method  Automated Automated Automated       Metabolic Panel (last 72 hours):  Recent Labs   Lab Result Units 08/12/20  0421 08/13/20  0638 08/14/20  0121   Sodium mmol/L 135* 132* 130*   Potassium mmol/L 4.8 4.7 5.6*   Chloride mmol/L 98 95 93*   CO2 mmol/L 25 24 22*   Glucose mg/dL 129* 154* 175*   BUN, Bld mg/dL 37* 50* 60*   Creatinine mg/dL 6.2* 7.8* 8.3*   Albumin g/dL 3.2* 3.2* 3.4*   Total Bilirubin mg/dL 0.5 0.4 0.4   Alkaline  Phosphatase U/L 61 61 66   AST U/L 20 21 26   ALT U/L 15 15 18       Vancomycin Administrations:  vancomycin given in the last 96 hours                     vancomycin (VANCOCIN) 1,750 mg in dextrose 5 % 500 mL IVPB (mg) 1,750 mg New Bag 08/12/20 1511                    Microbiologic Results:  Microbiology Results (last 7 days)       Procedure Component Value Units Date/Time    Aerobic culture [[id number]] Collected: 08/09/20 2218    Order Status: Completed Specimen: Body Fluid from Knee, Left Updated: 08/13/20 1017     Aerobic Bacterial Culture No growth    Fungus culture [[id number]] Collected: 08/09/20 2218    Order Status: Completed Specimen: Body Fluid from Knee, Left Updated: 08/12/20 1226     Fungus (Mycology) Culture Culture in progress    Culture, Anaerobe [[id number]] Collected: 08/09/20 2218    Order Status: Completed Specimen: Body Fluid from Knee, Left Updated: 08/12/20 0743     Anaerobic Culture Culture in progress    Direct AFB stain [[id number]] Collected: 08/09/20 2218    Order Status: Completed Specimen: Body Fluid from Knee, Left Updated: 08/10/20 0541     Direct Acid Fast No acid fast bacilli seen.    Clostridium difficile EIA [[id number]]     Order Status: Canceled Specimen: Stool

## 2020-08-14 NOTE — PLAN OF CARE
Per Leelee at [facility], they are not accepting HD patients at this time.    TN will contact pt's son Cristi for additional SNF preferences.     TN attempted to call patient's son Cristi, no answer noted, TN left VM requesting a call back.

## 2020-08-14 NOTE — NURSING
Pt back from dialysis. Vital signs recorded, am meds given. Eating lunch sitting up in bed. No signs of distress, on 2L 02 saturating 92%. SCDs reapplied.

## 2020-08-14 NOTE — PLAN OF CARE
TN informed pt's son that [facility] is not accepting HD patients at this time. TN requested additional SNF preferences from pt's son. TN emailed SNF list to patient's son. Pt's emailed back [facility] and [facility]. TN emailed back that [facility] is only accepting [facility] patients and not any [facility] patients.  TN/NEETA to send referral to [facility]

## 2020-08-14 NOTE — PT/OT/SLP PROGRESS
Occupational Therapy      Patient Name:  Angelina Beard   MRN:  [id number]    Patient not seen today secondary to Dialysis(AM 1054). Will follow-up at later time.    MARTHA Juarez  8/14/2020

## 2020-08-14 NOTE — PROGRESS NOTES
[facility]  General Surgery  Progress Note    Subjective:     Post-Op Info:  Procedure(s) (LRB):  Insertion dialysis catheter (Right)   1 Day Post-Op     Interval History: Patient seen and examined this morning. No acute events overnight. Patient underwent placement of catheter for dialysis instead of AV graft revision 2/2 being on anticoagulation. Patient complaining of general discomfort this morning but no problems with dialysis catheter.     Medications:  Continuous Infusions:  Scheduled Meds:   sodium chloride 0.9%   Intravenous Once    sodium chloride 0.9%   Intravenous Once    amLODIPine  10 mg Oral Daily    calcium acetate(phosphat bind)  667 mg Oral TID WM    cefTRIAXone (ROCEPHIN) IVPB  1 g Intravenous Q24H    furosemide  40 mg Oral Daily    metoprolol succinate  50 mg Oral Daily    mirtazapine  7.5 mg Oral QHS    mupirocin   Nasal BID    pantoprazole  40 mg Oral Daily    pravastatin  80 mg Oral Daily    sertraline  100 mg Oral Daily    vitamin renal formula (B-complex-vitamin c-folic acid)  1 capsule Oral Daily     PRN Meds:sodium chloride 0.9%, sodium chloride 0.9%, acetaminophen, [COMPLETED] calcium gluconate IVPB **AND** calcium gluconate IVPB, dextrose 50%, dextrose 50%, diphenhydrAMINE, gabapentin, glucagon (human recombinant), glucose, glucose, hydrALAZINE, HYDROcodone-acetaminophen, HYDROmorphone, insulin aspart U-100, ondansetron, ondansetron, promethazine (PHENERGAN) IVPB, sodium chloride 0.9%, sodium chloride 0.9%, Pharmacy to dose Vancomycin consult **AND** vancomycin - pharmacy to dose     Review of patient's allergies indicates:  No Known Allergies  Objective:     Vital Signs (Most Recent):  Temp: 99.1 °F (37.3 °C) (08/14/20 0500)  Pulse: 82 (08/14/20 0413)  Resp: 16 (08/14/20 0413)  BP: 138/80 (08/14/20 0413)  SpO2: 98 % (08/14/20 0400) Vital Signs (24h Range):  Temp:  [97.4 °F (36.3 °C)-100.1 °F (37.8 °C)] 99.1 °F (37.3 °C)  Pulse:  [58-82] 82  Resp:  [9-20]  16  SpO2:  [92 %-100 %] 98 %  BP: (100-205)/(43-99) 138/80     Weight: 93.7 kg (206 lb 9.1 oz)  Body mass index is 32.35 kg/m².    Intake/Output - Last 3 Shifts       08/12 0700 - 08/13 0659 08/13 0700 - 08/14 0659    P.O. 0     I.V. (mL/kg)  400 (4.3)    Total Intake(mL/kg) 0 (0) 400 (4.3)    Urine (mL/kg/hr) 0 (0)     Total Output 0     Net 0 +400                Physical Exam  Constitutional:       General: She is not in acute distress.     Appearance: She is not toxic-appearing.   HENT:      Head: Normocephalic and atraumatic.   Cardiovascular:      Rate and Rhythm: Normal rate and regular rhythm.      Heart sounds: Normal heart sounds.   Pulmonary:      Effort: Pulmonary effort is normal. No respiratory distress.      Breath sounds: Normal breath sounds. No wheezing.   Abdominal:      General: Abdomen is flat. Bowel sounds are normal. There is no distension.      Palpations: Abdomen is soft.      Tenderness: There is no abdominal tenderness.   Skin:     Comments: Catheter to L upper chest wall with dressing c/d/i   Neurological:      Mental Status: She is alert and oriented to person, place, and time.         Significant Labs:  CBC:   Recent Labs   Lab 08/14/20  0121   WBC 7.18   RBC 4.05   HGB 11.3*   HCT 35.6*      MCV 88   MCH 27.9   MCHC 31.7*     CMP:   Recent Labs   Lab 08/14/20  0121   *   CALCIUM 9.0   ALBUMIN 3.4*   PROT 7.0   *   K 5.6*   CO2 22*   CL 93*   BUN 60*   CREATININE 8.3*   ALKPHOS 66   ALT 18   AST 26   BILITOT 0.4       Significant Diagnostics:  I have reviewed and interpreted all pertinent imaging results/findings within the past 24 hours.    Assessment/Plan:     70 yo F with history of CAD, CHF, pulmonary HTN, DM2, sarcoidosis, and ESRD with dialysis graft issues.     - dialysis catheter placed yesterday without complication  - Catheter/dressing appears c/d/i  - Will plan for AV fistula revision on Monday after holding anticoagulation    Mariano Yu MD  General  Surgery  [facility]

## 2020-08-14 NOTE — PT/OT/SLP PROGRESS
Physical Therapy      Patient Name:  Angelina Beard   MRN:  [id number]    Patient not seen today secondary to pt in HD at 0950 attempt. Will follow-up as able.    Marie Loza, PT

## 2020-08-14 NOTE — PLAN OF CARE
Pt Aaox4. Went to dialysis today. Tolerating a diabetic diet w/ no n/v. No complaints of pain. IV antibiotics administered per MAR. On 2L NC, no c/o SOB, chest pain, dizziness. Safety maintained, call light w/in reach.

## 2020-08-14 NOTE — NURSING
Net 1.5 L fluid removed during HD. Tolerated well. VSS. NAD. CVC lines locked with heparin and lines capped.

## 2020-08-15 LAB
ALBUMIN SERPL BCP-MCNC: 2.5 G/DL (ref 3.5–5.2)
ANION GAP SERPL CALC-SCNC: 10 MMOL/L (ref 8–16)
BUN SERPL-MCNC: 46 MG/DL (ref 8–23)
CALCIUM SERPL-MCNC: 8.7 MG/DL (ref 8.7–10.5)
CHLORIDE SERPL-SCNC: 96 MMOL/L (ref 95–110)
CO2 SERPL-SCNC: 26 MMOL/L (ref 23–29)
CREAT SERPL-MCNC: 6.2 MG/DL (ref 0.5–1.4)
EST. GFR  (AFRICAN AMERICAN): 7 ML/MIN/1.73 M^2
EST. GFR  (NON AFRICAN AMERICAN): 6 ML/MIN/1.73 M^2
GLUCOSE SERPL-MCNC: 188 MG/DL (ref 70–110)
PHOSPHATE SERPL-MCNC: 4.3 MG/DL (ref 2.7–4.5)
POCT GLUCOSE: 199 MG/DL (ref 70–110)
POCT GLUCOSE: 199 MG/DL (ref 70–110)
POCT GLUCOSE: 214 MG/DL (ref 70–110)
POTASSIUM SERPL-SCNC: 4.8 MMOL/L (ref 3.5–5.1)
SODIUM SERPL-SCNC: 132 MMOL/L (ref 136–145)

## 2020-08-15 PROCEDURE — 63600175 PHARM REV CODE 636 W HCPCS: Performed by: STUDENT IN AN ORGANIZED HEALTH CARE EDUCATION/TRAINING PROGRAM

## 2020-08-15 PROCEDURE — 36415 COLL VENOUS BLD VENIPUNCTURE: CPT

## 2020-08-15 PROCEDURE — 93005 ELECTROCARDIOGRAM TRACING: CPT

## 2020-08-15 PROCEDURE — 27000221 HC OXYGEN, UP TO 24 HOURS

## 2020-08-15 PROCEDURE — 99900035 HC TECH TIME PER 15 MIN (STAT)

## 2020-08-15 PROCEDURE — 11000001 HC ACUTE MED/SURG PRIVATE ROOM

## 2020-08-15 PROCEDURE — 97530 THERAPEUTIC ACTIVITIES: CPT

## 2020-08-15 PROCEDURE — 93010 ELECTROCARDIOGRAM REPORT: CPT | Mod: ,,, | Performed by: INTERNAL MEDICINE

## 2020-08-15 PROCEDURE — 90935 HEMODIALYSIS ONE EVALUATION: CPT

## 2020-08-15 PROCEDURE — 94761 N-INVAS EAR/PLS OXIMETRY MLT: CPT

## 2020-08-15 PROCEDURE — 25000003 PHARM REV CODE 250: Performed by: NURSE PRACTITIONER

## 2020-08-15 PROCEDURE — 25000003 PHARM REV CODE 250: Performed by: INTERNAL MEDICINE

## 2020-08-15 PROCEDURE — 93010 EKG 12-LEAD: ICD-10-PCS | Mod: ,,, | Performed by: INTERNAL MEDICINE

## 2020-08-15 PROCEDURE — 80069 RENAL FUNCTION PANEL: CPT

## 2020-08-15 PROCEDURE — 97110 THERAPEUTIC EXERCISES: CPT

## 2020-08-15 RX ORDER — HYDRALAZINE HYDROCHLORIDE 10 MG/1
10 TABLET, FILM COATED ORAL 3 TIMES DAILY
Status: DISCONTINUED | OUTPATIENT
Start: 2020-08-15 | End: 2020-08-19

## 2020-08-15 RX ADMIN — CALCIUM ACETATE 667 MG: 667 CAPSULE ORAL at 09:08

## 2020-08-15 RX ADMIN — HYDROCODONE BITARTRATE AND ACETAMINOPHEN 1 TABLET: 5; 325 TABLET ORAL at 04:08

## 2020-08-15 RX ADMIN — HYDRALAZINE HYDROCHLORIDE 10 MG: 10 TABLET, FILM COATED ORAL at 04:08

## 2020-08-15 RX ADMIN — CALCIUM ACETATE 667 MG: 667 CAPSULE ORAL at 12:08

## 2020-08-15 RX ADMIN — PANTOPRAZOLE SODIUM 40 MG: 40 TABLET, DELAYED RELEASE ORAL at 09:08

## 2020-08-15 RX ADMIN — SERTRALINE HYDROCHLORIDE 100 MG: 100 TABLET ORAL at 09:08

## 2020-08-15 RX ADMIN — MIRTAZAPINE 7.5 MG: 7.5 TABLET ORAL at 09:08

## 2020-08-15 RX ADMIN — AMLODIPINE BESYLATE 10 MG: 5 TABLET ORAL at 12:08

## 2020-08-15 RX ADMIN — HYDRALAZINE HYDROCHLORIDE 10 MG: 10 TABLET, FILM COATED ORAL at 05:08

## 2020-08-15 RX ADMIN — PRAVASTATIN SODIUM 80 MG: 20 TABLET ORAL at 09:08

## 2020-08-15 RX ADMIN — NEPHROCAP 1 CAPSULE: 1 CAP ORAL at 09:08

## 2020-08-15 RX ADMIN — HEPARIN SODIUM 4100 UNITS: 1000 INJECTION, SOLUTION INTRAVENOUS; SUBCUTANEOUS at 11:08

## 2020-08-15 RX ADMIN — CALCIUM ACETATE 667 MG: 667 CAPSULE ORAL at 04:08

## 2020-08-15 RX ADMIN — METOPROLOL SUCCINATE 50 MG: 25 TABLET, FILM COATED, EXTENDED RELEASE ORAL at 12:08

## 2020-08-15 RX ADMIN — FUROSEMIDE 40 MG: 40 TABLET ORAL at 12:08

## 2020-08-15 NOTE — ASSESSMENT & PLAN NOTE
Plan for declot in revision on Monday or Tuesday.  Had to be postponed due to patient being on Plavix and Eliquis.  These are presently placed on hold  Case discussed with general surgery this morning

## 2020-08-15 NOTE — PLAN OF CARE
VN Note:  VN cued into room, pt off unit.  Labs, notes, orders reviewed.  Will continue to follow and be available as needed.

## 2020-08-15 NOTE — ASSESSMENT & PLAN NOTE
A1C 6.9   Low dose SSI, accucheck AC&HS , Diabetic diet   Her blood sugars are trending upward and she may require Januvia

## 2020-08-15 NOTE — PLAN OF CARE
Problem: Physical Therapy Goal  Goal: Physical Therapy Goal  Description:   Goals to be met by: 9/10/2020     Patient will increase functional independence with mobility by performin. Supine to sit with Contact Guard Assistance  2. Sit to supine with MInimal Assistance  3. Rolling L/R with Modified Jensen and use of bedrail as needed.  4. Sit to stand transfer with Minimal Assistance  5. Bed to chair transfer with Minimal Assistance with or without AD  6. Gait  x 15 feet as able with Minimal Assistance with or without AD  7. Lower extremity exercise program x10-15 reps with supervision  8. Increase functional strength to 4- to 4 for bed mobility and transfers, gait if able  9. Wheelchair mobility x 50ft x 2 with Supervision  Outcome: Ongoing, Progressing   Pt c/o fatigue due to recently returning from HD but willing to participate with PT.  Attempted to perform sit<>stand transfer to a rw but unable to perform with one assist despite height of bed increased.

## 2020-08-15 NOTE — ASSESSMENT & PLAN NOTE
Taking amlodipine 10 mg daily and metoprolol 50 mg daily  Blood pressures appear to be creeping back up  She may require addition of additional medication  Will add Hydralazine

## 2020-08-15 NOTE — PT/OT/SLP PROGRESS
Physical Therapy Treatment    Patient Name:  Angelina Beard   MRN:  [id number]    Recommendations:     Discharge Recommendations:  nursing facility, skilled   Discharge Equipment Recommendations: hospital bed   Barriers to discharge: Decreased caregiver support and decreased functional mobility    Assessment:     Angelina Beard is a 69 y.o. female admitted with a medical diagnosis of Hyperkalemia.  She presents with the following impairments/functional limitations:  weakness, impaired endurance, impaired balance, gait instability, impaired self care skills, impaired functional mobilty, edema, pain, decreased lower extremity function, decreased upper extremity function, decreased ROM, impaired sensation, impaired fine motor.  Pt c/o fatigue due to recently returning from HD but willing to participate with PT.  Attempted to perform sit<>stand transfer to a rw but unable to perform with one assist despite height of bed increased. Pt will require two assists for functional transfer training at this time.    Rehab Prognosis: Good; patient would benefit from acute skilled PT services to address these deficits and reach maximum level of function.    Recent Surgery: Procedure(s) (LRB):  Insertion dialysis catheter (Right) 2 Days Post-Op    Plan:     During this hospitalization, patient to be seen 6 x/week to address the identified rehab impairments via therapeutic activities, therapeutic exercises, neuromuscular re-education, wheelchair management/training and progress toward the following goals:    · Plan of Care Expires:  09/10/20    Subjective     Chief Complaint: fatigue and L shoulder pain with movement and functional tasks  Patient/Family Comments/goals: to get stronger  Pain/Comfort:  · Pain Rating 1: 9/10  · Location - Side 1: Left  · Location - Orientation 1: anterior  · Location 1: shoulder  · Pain Addressed 1: Reposition, Distraction, Cessation of Activity, Nurse notified  · Pain Rating Post-Intervention 1:  "9/10      Objective:     Communicated with RN prior to session.  Patient found HOB elevated with telemetry, oxygen upon PT entry to room.     General Precautions: Standard, fall   Orthopedic Precautions:(B LE WBAT)   Braces: N/A     Functional Mobility:  · Bed Mobility:     · Rolling Left:  minimum assistance and moderate assistance using bed rail  · Rolling Right: moderate assistance and maximal assistance using bed rail  · Supine to Sit: moderate assistance  · Sit to Supine: moderate assistance  · Transfers: did not perform  · Gait: did not perform  · Balance: Static sit: G      AM-PAC 6 CLICK MOBILITY  Turning over in bed (including adjusting bedclothes, sheets and blankets)?: 2  Sitting down on and standing up from a chair with arms (e.g., wheelchair, bedside commode, etc.): 2  Moving from lying on back to sitting on the side of the bed?: 2  Moving to and from a bed to a chair (including a wheelchair)?: 2  Need to walk in hospital room?: 2  Climbing 3-5 steps with a railing?: 1  Basic Mobility Total Score: 11       Therapeutic Activities and Exercises:   Rolling to L with KAYCEE x 1, supine to sit with MODAx  1 and sitting with G static sit balance  Performed B LE exs at EOB x 10 reps: AP, hip abd/add, hip flex /ext and LAQ needing assist with L LE ( hip flex and LAQ only)  Attempted sit<>stand transfer to a rw with height of bed increased but unable to clear bed despite 2 attempts Pt performed 6 reps of UE push ups from bed requiring MODAx  1 buttock  clearing 2-3 " and holding 5-10 sec. Pt scooted lat to HOB with MAXAx  1 and transferred sit to supine with MODAx  1. Performed rolling to R requirining MOD/MAXAx  1 with  c/o L shoulder pain and rolling to L with MOD/MINAx  1 x 2-3 reps for re-applying linen. RN was notified of c/o L shoulder after tx.    Patient left HOB elevated with call button in reach, bed alarm on and RN notified..    GOALS:   Multidisciplinary Problems     Physical Therapy Goals        " Problem: Physical Therapy Goal    Goal Priority Disciplines Outcome Goal Variances Interventions   Physical Therapy Goal     PT, PT/OT Ongoing, Progressing     Description:   Goals to be met by: 9/10/2020     Patient will increase functional independence with mobility by performin. Supine to sit with Contact Guard Assistance  2. Sit to supine with MInimal Assistance  3. Rolling L/R with Modified Swisher and use of bedrail as needed.  4. Sit to stand transfer with Minimal Assistance  5. Bed to chair transfer with Minimal Assistance with or without AD  6. Gait  x 15 feet as able with Minimal Assistance with or without AD  7. Lower extremity exercise program x10-15 reps with supervision  8. Increase functional strength to 4- to 4 for bed mobility and transfers, gait if able  9. Wheelchair mobility x 50ft x 2 with Supervision                   Time Tracking:     PT Received On: 08/15/20  PT Start Time: 1408     PT Stop Time: 1440  PT Total Time (min): 32 min     Billable Minutes: Therapeutic Activity 12 and Therapeutic Exercise 20    Treatment Type: Treatment  PT/PTA: PT     PTA Visit Number: 0     Dasha Lin, PT  08/15/2020

## 2020-08-15 NOTE — ASSESSMENT & PLAN NOTE
Plan for declot in revision on Monday or Tuesday.  Had to be postponed due to patient being on Plavix and Eliquis.  These are presently placed on hold  Case discussed with general surgery this morning  DC rocephin and Vanco. Will get Vanco pre-op

## 2020-08-15 NOTE — ASSESSMENT & PLAN NOTE
In PT/OT: recommend SNF  Today the patient states that she is thinking about it.  If they that it is necessary for the patient to go to skilled in order to get stronger.  Care management is presently speaking to the patient's son

## 2020-08-15 NOTE — ASSESSMENT & PLAN NOTE
Taking amlodipine 10 mg daily and metoprolol 50 mg daily  Blood pressures appear to be creeping back up  She may require addition of additional medication

## 2020-08-15 NOTE — PLAN OF CARE
Pt received on nasal cannula at  2  lpm.  SPO2   92%.  Pt in no apparent respiratory distress.  Will continue to monitor.

## 2020-08-15 NOTE — PLAN OF CARE
Patient came back from dialysis. No c/o pain or discomfort. With intact left sub-clavian catheter. On oxygen at 2L/NC. On diaper. All due meds given. Bed alarm on. Call light within reach. Will continue to monitor.

## 2020-08-15 NOTE — PROGRESS NOTES
[facility] Hospital Medicine  Progress Note    Patient Name: Angelina Beard  MRN: [id number]  Patient Class: IP- Inpatient   Admission Date: 8/9/2020  Length of Stay: 4 days  Attending Physician: Rosalia Burnett MD  Primary Care Provider: Ben Mena MD        Subjective:     Principal Problem:Hyperkalemia        HPI:  Angelina Beard is a 68 yo female with hypertension, chronic diastolic congestive heart failure, paroxysmal atrial fibrillation (anticoagulated on apixaban), diabetes mellitus type 2 (hemoglobin A1c 6.9% on 4/28/2020) with neuropathy and nephropathy, hyperlipidemia, nonobstructive coronary artery disease, cerebral microvascular disease, end stage renal disease on hemodialysis (Monday Wednesday Friday at [facility] in [facility]), anemia, gastroesophageal reflux disease, osteoarthritis status post right total knee arthroplasty 2/23/16, chronic left leg pain, chronic constipation, cholelithiasis, history of right lung sarcoidosis, history of left breast cancer status post mastectomy, history of gastric ulcers on 7/13/18. Her primary care physician is Dr. Ben Mena. Her nephrologist is Dr. Rc Santamaria. Her cardiologist is Dr. Faraz Folres. Her podiatrist is Dr. Kwame Dacosta. She lives in [place], [place]. She is mostly wheelchair bound but can use a walker with assistance.     Pt presented to ED with c/o diarrhea, generalized weakness and acute on chronic left knee and left hip pain s/p mechanical fall 1 week ago. She denies vomiting, diarrhea, fever/chills and abdominal pain. KUB shows constipation, no evidence of obstruction. At baseline, she is in a wheelchair/walker due to weakness, but said she has had trouble with transfers for the past week due to her L knee pain. Denies chest pain and worsening SOB. Labs remarkable for K 6.4, troponin 0.036.  Xray shows R nondisplaced pubic ramus fracture and significant erosion of L knee appears 2/2 sepitic  arthropathy. Orthopedic surgery consulted by ED. Pt admitted to [facility].     Overview/Hospital Course:  No notes on file    Interval History:   S/P hemodialysis yesterday  Eating breakfast. No complaints  Spoke to Dr Vizcaino who states that she does not need abx. Just Vanco prior to procedure    Review of Systems   Constitutional: Negative for chills and fever.   Respiratory: Negative for cough, shortness of breath and wheezing.    Cardiovascular: Negative for chest pain, palpitations and leg swelling.   Gastrointestinal: Negative for abdominal pain, blood in stool, constipation, diarrhea, nausea and vomiting.   Genitourinary: Negative for dysuria and hematuria.   Musculoskeletal: Negative for back pain.   Skin: Negative for rash.   Neurological: Negative for dizziness, weakness and headaches.     Objective:     Vital Signs (Most Recent):  Temp: 98.9 °F (37.2 °C) (08/15/20 0539)  Pulse: 71 (08/15/20 0751)  Resp: (!) 22 (08/15/20 0539)  BP: (!) 181/64 (08/15/20 0539)  SpO2: (!) 92 % (08/15/20 0735) Vital Signs (24h Range):  Temp:  [98 °F (36.7 °C)-99 °F (37.2 °C)] 98.9 °F (37.2 °C)  Pulse:  [71-87] 71  Resp:  [15-22] 22  SpO2:  [92 %-98 %] 92 %  BP: (101-188)/(47-86) 181/64     Weight: 90.6 kg (199 lb 11.8 oz)  Body mass index is 31.28 kg/m².    Intake/Output Summary (Last 24 hours) at 8/15/2020 0812  Last data filed at 8/14/2020 1800  Gross per 24 hour   Intake 750 ml   Output 2000 ml   Net -1250 ml      Physical Exam  Vitals signs and nursing note reviewed.   Constitutional:       General: She is not in acute distress.     Appearance: She is well-developed.   Eyes:      Conjunctiva/sclera: Conjunctivae normal.   Neck:      Vascular: No JVD.      Comments: Dressing on left neck  Cardiovascular:      Rate and Rhythm: Normal rate and regular rhythm.      Heart sounds: Normal heart sounds.   Pulmonary:      Effort: Pulmonary effort is normal.      Breath sounds: Normal breath sounds. No wheezing.    Abdominal:      General: Bowel sounds are normal.      Palpations: Abdomen is soft.      Tenderness: There is no abdominal tenderness.   Musculoskeletal:         General: No tenderness.   Skin:     General: Skin is warm and dry.      Capillary Refill: Capillary refill takes less than 2 seconds.   Neurological:      Mental Status: She is alert and oriented to person, place, and time.   Psychiatric:         Mood and Affect: Mood normal.         Behavior: Behavior normal.         Significant Labs:   BMP:   Recent Labs   Lab 08/14/20  0121   *   *   K 5.6*   CL 93*   CO2 22*   BUN 60*   CREATININE 8.3*   CALCIUM 9.0     CBC:   Recent Labs   Lab 08/14/20  0121   WBC 7.18   HGB 11.3*   HCT 35.6*          Significant Imaging: I have reviewed all pertinent imaging results/findings within the past 24 hours.     Sinus rhythm with Premature atrial complexes  Low voltage QRS  Borderline Abnormal ECG  When compared with ECG of 14-AUG-2020 05:57,  Premature atrial complexes are now Present  The axis Shifted right  Borderline criteria for Anterior infarct are no longer Present  Borderline criteria for Anterolateral infarct are no longer Present      Assessment/Plan:      * Hyperkalemia  This is being managed with dialysis    EKG abnormalities  Troponin elevation is flat.    EKG stable . No acute changes    Complication of vascular access for dialysis  Plan for declot in revision on Monday or Tuesday.  Had to be postponed due to patient being on Plavix and Eliquis.  These are presently placed on hold  Case discussed with general surgery this morning  DC rocephin and Vanco. Will get Vanco pre-op      Diarrhea  Better  DC Colace    DC abx.  ROCKY Sen        Left knee pain  S/p mechanical fall 1 week ago  -imaging ? septic arthritis  -s/p empiric antibiotics in ED  -evaluated by orthopedic surgery, arthrocentesis done and not c/w septic joint  - stop abx  -PT/OT consulted: recommend SNF      Hypertension  associated with diabetes  Taking amlodipine 10 mg daily and metoprolol 50 mg daily  Blood pressures appear to be creeping back up  She may require addition of additional medication  Will add Hydralazine      ESRD on dialysis  Dialysis per Nephrology      Physical deconditioning  In PT/OT: recommend SNF  Possibly [facility]      Chronic diastolic congestive heart failure  Euvolemic on exam. Continue furosemide 40 mg daily. Patient still able to make urine.       Paroxysmal atrial fibrillation  Taking Apixaban- will discontinue in preparation for procedure on Monday      Anemia in end-stage renal disease  H&H stable      Diabetic neuropathy associated with type 2 diabetes mellitus  Taking Gabapentin 300 mg TID prn- continue     GERD (gastroesophageal reflux disease)  Daily PPI       Hyperlipidemia  Continue statin       Type 2 diabetes mellitus with hypertension and end stage renal disease on dialysis  A1C 6.9   Low dose SSI, accucheck AC&HS , Diabetic diet   Her blood sugars are trending upward and she may require Januvia      Cerebral microvascular disease  Continue Plavix, statin         VTE Risk Mitigation (From admission, onward)         Ordered     heparin (porcine) injection 4,100 Units  As needed (PRN)      08/14/20 1307     IP VTE HIGH RISK PATIENT  Once      08/09/20 2255     Place sequential compression device  Until discontinued      08/09/20 2255                Discharge Planning   EDWIN: 8/14/2020     Code Status: Full Code   Is the patient medically ready for discharge?:     Reason for patient still in hospital (select all that apply): Treatment  Discharge Plan A: Skilled Nursing Facility   Discharge Delays: None known at this time              Rosalia Burnett MD  Department of Hospital Medicine   [facility]

## 2020-08-15 NOTE — ASSESSMENT & PLAN NOTE
Troponin elevation is flat.  Will reassess another EKG in the morning.  She is not having any chest pain

## 2020-08-15 NOTE — SUBJECTIVE & OBJECTIVE
Interval History:   S/P hemodialysis yesterday  Eating breakfast. No complaints  Spoke to Dr Vizcaino who states that she does not need abx. Just Vanco prior to procedure    Review of Systems   Constitutional: Negative for chills and fever.   Respiratory: Negative for cough, shortness of breath and wheezing.    Cardiovascular: Negative for chest pain, palpitations and leg swelling.   Gastrointestinal: Negative for abdominal pain, blood in stool, constipation, diarrhea, nausea and vomiting.   Genitourinary: Negative for dysuria and hematuria.   Musculoskeletal: Negative for back pain.   Skin: Negative for rash.   Neurological: Negative for dizziness, weakness and headaches.     Objective:     Vital Signs (Most Recent):  Temp: 98.9 °F (37.2 °C) (08/15/20 0539)  Pulse: 71 (08/15/20 0751)  Resp: (!) 22 (08/15/20 0539)  BP: (!) 181/64 (08/15/20 0539)  SpO2: (!) 92 % (08/15/20 0735) Vital Signs (24h Range):  Temp:  [98 °F (36.7 °C)-99 °F (37.2 °C)] 98.9 °F (37.2 °C)  Pulse:  [71-87] 71  Resp:  [15-22] 22  SpO2:  [92 %-98 %] 92 %  BP: (101-188)/(47-86) 181/64     Weight: 90.6 kg (199 lb 11.8 oz)  Body mass index is 31.28 kg/m².    Intake/Output Summary (Last 24 hours) at 8/15/2020 0812  Last data filed at 8/14/2020 1800  Gross per 24 hour   Intake 750 ml   Output 2000 ml   Net -1250 ml      Physical Exam  Vitals signs and nursing note reviewed.   Constitutional:       General: She is not in acute distress.     Appearance: She is well-developed.   Eyes:      Conjunctiva/sclera: Conjunctivae normal.   Neck:      Vascular: No JVD.      Comments: Dressing on left neck  Cardiovascular:      Rate and Rhythm: Normal rate and regular rhythm.      Heart sounds: Normal heart sounds.   Pulmonary:      Effort: Pulmonary effort is normal.      Breath sounds: Normal breath sounds. No wheezing.   Abdominal:      General: Bowel sounds are normal.      Palpations: Abdomen is soft.      Tenderness: There is no abdominal tenderness.    Musculoskeletal:         General: No tenderness.   Skin:     General: Skin is warm and dry.      Capillary Refill: Capillary refill takes less than 2 seconds.   Neurological:      Mental Status: She is alert and oriented to person, place, and time.   Psychiatric:         Mood and Affect: Mood normal.         Behavior: Behavior normal.         Significant Labs:   BMP:   Recent Labs   Lab 08/14/20 0121   *   *   K 5.6*   CL 93*   CO2 22*   BUN 60*   CREATININE 8.3*   CALCIUM 9.0     CBC:   Recent Labs   Lab 08/14/20 0121   WBC 7.18   HGB 11.3*   HCT 35.6*          Significant Imaging: I have reviewed all pertinent imaging results/findings within the past 24 hours.     Sinus rhythm with Premature atrial complexes  Low voltage QRS  Borderline Abnormal ECG  When compared with ECG of 14-AUG-2020 05:57,  Premature atrial complexes are now Present  The axis Shifted right  Borderline criteria for Anterior infarct are no longer Present  Borderline criteria for Anterolateral infarct are no longer Present

## 2020-08-15 NOTE — PROGRESS NOTES
[facility] Hospital Medicine  Progress Note    Patient Name: Angelina Beard  MRN: [id number]  Patient Class: IP- Inpatient   Admission Date: 8/9/2020  Length of Stay: 3 days  Attending Physician: Rosalia Burnett MD  Primary Care Provider: Ben Mena MD        Subjective:     Principal Problem:Hyperkalemia        HPI:  Angelina Beard is a 70 yo female with hypertension, chronic diastolic congestive heart failure, paroxysmal atrial fibrillation (anticoagulated on apixaban), diabetes mellitus type 2 (hemoglobin A1c 6.9% on 4/28/2020) with neuropathy and nephropathy, hyperlipidemia, nonobstructive coronary artery disease, cerebral microvascular disease, end stage renal disease on hemodialysis (Monday Wednesday Friday at [facility] in [facility]), anemia, gastroesophageal reflux disease, osteoarthritis status post right total knee arthroplasty 2/23/16, chronic left leg pain, chronic constipation, cholelithiasis, history of right lung sarcoidosis, history of left breast cancer status post mastectomy, history of gastric ulcers on 7/13/18. Her primary care physician is Dr. Ben Mena. Her nephrologist is Dr. Rc Santamaria. Her cardiologist is Dr. Faraz Flores. Her podiatrist is Dr. Kwame Dacosta. She lives in [place], [place]. She is mostly wheelchair bound but can use a walker with assistance.     Pt presented to ED with c/o diarrhea, generalized weakness and acute on chronic left knee and left hip pain s/p mechanical fall 1 week ago. She denies vomiting, diarrhea, fever/chills and abdominal pain. KUB shows constipation, no evidence of obstruction. At baseline, she is in a wheelchair/walker due to weakness, but said she has had trouble with transfers for the past week due to her L knee pain. Denies chest pain and worsening SOB. Labs remarkable for K 6.4, troponin 0.036.  Xray shows R nondisplaced pubic ramus fracture and significant erosion of L knee appears 2/2 sepitic  "arthropathy. Orthopedic surgery consulted by ED. Pt admitted to [facility].     Overview/Hospital Course:  No notes on file    Interval History:  Seen on hemodialysis   Denies CP or SOB  Says that she is" thinking about SNF"  She does not like the dialysis line in her neck  She had an episode of chest pain nausea and SOB yesterday. It went away and has not recurred  EKG, CXR. troponins done  Troponins were mildly elevated and flat.  CXR without PTX  EKGs are noted      Review of Systems   Constitutional: Negative for chills and fever.   Respiratory: Negative for cough, shortness of breath and wheezing.    Cardiovascular: Negative for chest pain, palpitations and leg swelling.   Gastrointestinal: Negative for abdominal pain, blood in stool, constipation, diarrhea, nausea and vomiting.   Genitourinary: Negative for hematuria.   Musculoskeletal: Negative for back pain.   Skin: Negative for rash.   Neurological: Negative for dizziness, weakness and headaches.     Objective:     Vital Signs (Most Recent):  Temp: 98.5 °F (36.9 °C) (08/14/20 0746)  Pulse: 80 (08/14/20 1205)  Resp: 20 (08/14/20 0955)  BP: (!) 153/86 (08/14/20 1205)  SpO2: (!) 92 % (08/14/20 0804) Vital Signs (24h Range):  Temp:  [97.4 °F (36.3 °C)-100.1 °F (37.8 °C)] 98.5 °F (36.9 °C)  Pulse:  [58-82] 80  Resp:  [9-20] 20  SpO2:  [90 %-100 %] 92 %  BP: (100-153)/(43-89) 153/86     Weight: 93.7 kg (206 lb 9.1 oz)  Body mass index is 32.35 kg/m².    Intake/Output Summary (Last 24 hours) at 8/14/2020 1312  Last data filed at 8/13/2020 1500  Gross per 24 hour   Intake 400 ml   Output --   Net 400 ml      Physical Exam  Vitals signs and nursing note reviewed.   Constitutional:       General: She is not in acute distress.     Appearance: She is well-developed.   Eyes:      Conjunctiva/sclera: Conjunctivae normal.   Neck:      Vascular: No JVD.      Comments: Dressing on left neck  Cardiovascular:      Rate and Rhythm: Normal rate and regular " rhythm.      Heart sounds: Normal heart sounds.   Pulmonary:      Effort: Pulmonary effort is normal.      Breath sounds: Normal breath sounds. No wheezing.   Abdominal:      General: Bowel sounds are normal.      Palpations: Abdomen is soft.      Tenderness: There is no abdominal tenderness.   Musculoskeletal:         General: No tenderness.   Skin:     General: Skin is warm and dry.      Capillary Refill: Capillary refill takes less than 2 seconds.   Neurological:      Mental Status: She is alert and oriented to person, place, and time.   Psychiatric:         Mood and Affect: Mood normal.         Behavior: Behavior normal.         Significant Labs:   BMP:   Recent Labs   Lab 08/14/20  0121   *   *   K 5.6*   CL 93*   CO2 22*   BUN 60*   CREATININE 8.3*   CALCIUM 9.0     CBC:   Recent Labs   Lab 08/13/20  0638 08/14/20  0121   WBC 4.21 7.18   HGB 10.5* 11.3*   HCT 34.3* 35.6*    207       Significant Imaging: I have reviewed all pertinent imaging results/findings within the past 24 hours.     ECG Results          EKG 12-lead (Final result)  Result time 08/10/20 11:48:22    Final result by Interface, Lab In [facility] (08/10/20 11:48:22)                 Narrative:    Test Reason : R53.83,    Vent. Rate : 070 BPM     Atrial Rate : 070 BPM     P-R Int : 162 ms          QRS Dur : 092 ms      QT Int : 414 ms       P-R-T Axes : 066 -42 065 degrees     QTc Int : 447 ms    Normal sinus rhythm  Left axis deviation  Nonspecific T wave abnormality  poor precordial R wave progression   Abnormal ECG  When compared with ECG of 19-NOV-2019 04:47,  The axis Shifted left  Nonspecific T wave abnormality no longer evident in Anterior leads  Confirmed by Parag Mariano MD ([id number]) on 8/10/2020 11:48:10 AM    Referred By: [email or web address]   SELF           Confirmed By:Parag Mariano MD                                     Assessment/Plan:      * Hyperkalemia  This is being managed with dialysis    EKG  abnormalities  Troponin elevation is flat.  Will reassess another EKG in the morning.  She is not having any chest pain    Complication of vascular access for dialysis  Plan for declot in revision on Monday or Tuesday.  Had to be postponed due to patient being on Plavix and Eliquis.  These are presently placed on hold  Case discussed with general surgery this morning      Diarrhea  Better  DC Colace    She is also on Rocephin and Vanco. Need to clarify why she is on this   All cx are negative.    Apparently she is on antibiotic for her graft per surgery        Left knee pain  S/p mechanical fall 1 week ago  -imaging ? septic arthritis  -s/p empiric antibiotics in ED  -evaluated by orthopedic surgery, arthrocentesis done and not c/w septic joint  - stop abx  -PT/OT consulted: recommend SNF      Hypertension associated with diabetes  Taking amlodipine 10 mg daily and metoprolol 50 mg daily  Blood pressures appear to be creeping back up  She may require addition of additional medication      ESRD on dialysis  Dialysis per Nephrology      Physical deconditioning  In PT/OT: recommend SNF  Today the patient states that she is thinking about it.  If they that it is necessary for the patient to go to skilled in order to get stronger.  Care management is presently speaking to the patient's son      Chronic diastolic congestive heart failure  Euvolemic on exam. Continue furosemide 40 mg daily. Patient still able to make urine.       Paroxysmal atrial fibrillation  Taking Apixaban- will discontinue in preparation for procedure on Monday      Anemia in end-stage renal disease  H&H stable      Diabetic neuropathy associated with type 2 diabetes mellitus  Taking Gabapentin 300 mg TID prn- continue     GERD (gastroesophageal reflux disease)  Daily PPI       Hyperlipidemia  Continue statin       Type 2 diabetes mellitus with hypertension and end stage renal disease on dialysis  A1C 6.9   Low dose SSI, accucheck AC&HS , Diabetic  diet   Her blood sugars are trending upward and she may require Januvia      Cerebral microvascular disease  Continue Plavix, statin         VTE Risk Mitigation (From admission, onward)         Ordered     heparin (porcine) injection 4,100 Units  As needed (PRN)      08/14/20 1307     IP VTE HIGH RISK PATIENT  Once      08/09/20 2255     Place sequential compression device  Until discontinued      08/09/20 2255                Discharge Planning   EDWIN: 8/14/2020     Code Status: Full Code   Is the patient medically ready for discharge?:     Reason for patient still in hospital (select all that apply): Patient trending condition and Treatment  Discharge Plan A: Skilled Nursing Facility   Discharge Delays: None known at this time              Rosalia Burnett MD  Department of Hospital Medicine   [facility]

## 2020-08-15 NOTE — PLAN OF CARE
Pt. AAO x4.  Denies pain.  Blood sugar monitored throughout shift.  Safety maintained. O2 per order.  Still waiting for urine sample.  Will cont. To monitor.

## 2020-08-15 NOTE — PROGRESS NOTES
[facility] Progress Note    Date of Admit: 2020  Length of Stay: 4    Chief Complaint/Reason for Consult     [facility] following for ESRD on HD Evaluation and Management    Subjective:      Patient s/p tunneled HD catheter L subclavian placement on 2020. AVG revision to be done early next week given presence of anticoagulation.    Doing well this morning. Tolerated HD session yesterday. Shy of UF goal but UF 1500mL obtained. Denies chest pain, shortness of breath, or subjective fever/chills. Denies abdominal pain. Agreeable to short HD session today in preparation for OR time on Monday.      Objective:   Last 24 Hour Vital Signs:  BP  Min: 101/63  Max: 188/81  Temp  Av.6 °F (37 °C)  Min: 98 °F (36.7 °C)  Max: 99 °F (37.2 °C)  Pulse  Av.7  Min: 70  Max: 87  Resp  Av.8  Min: 15  Max: 22  SpO2  Av.8 %  Min: 87 %  Max: 98 %  Weight  Av.6 kg (199 lb 11.8 oz)  Min: 90.6 kg (199 lb 11.8 oz)  Max: 90.6 kg (199 lb 11.8 oz)  Body mass index is 31.28 kg/m².  I/O last 3 completed shifts:  In: 750 [P.O.:100; Other:500; IV Piggyback:150]  Out:  [Other:]    Physical Examination:  General: No acute distress, resting comfortably on dialysis  HEENT: EOMI, PERRL, MMM,   Cardiovascular: NRRR, no murmurs or rubs.  Chest/Pulmonary: CTABL, no IWB, on nasal cannula  Abdomen: soft, nontender, nondistended, BS+  MSKL: without gross deformity, active FROM  Lymphatic: no appreciated cervical or submandibular LAD  Skin: without cyanosis or clubbing, without trace edema  Neurologic: AAOx3, following two step commands  Access: RUE RC AVF with adequate systolic now absent diastolic thrill, + bruits, increased pulsatility on arterial inflow. New L subclavian tunneled HD catheter appears c/d/i.     Laboratory:  Most Recent Data:  CBC:   Lab Results   Component Value Date    WBC 7.18 2020    HGB 11.3 (L) 2020    HCT 35.6 (L) 2020     2020    MCV 88 2020    RDW  15.5 (H) 08/14/2020     BMP:   Lab Results   Component Value Date     (L) 08/15/2020    K 4.8 08/15/2020    CL 96 08/15/2020    CO2 26 08/15/2020    BUN 46 (H) 08/15/2020    CREATININE 6.2 (H) 08/15/2020     (H) 08/15/2020    CALCIUM 8.7 08/15/2020    MG 2.3 08/09/2020    PHOS 4.3 08/15/2020     LFTs:   Lab Results   Component Value Date    PROT 7.0 08/14/2020    ALBUMIN 2.5 (L) 08/15/2020    BILITOT 0.4 08/14/2020    AST 26 08/14/2020    ALKPHOS 66 08/14/2020    ALT 18 08/14/2020     Coags:   Lab Results   Component Value Date    INR 1.0 08/09/2020     Urinalysis:   Lab Results   Component Value Date    LABURIN  11/08/2016     Multiple organisms isolated. None in predominance.  Repeat if    LABURIN clinically necessary. 11/08/2016    COLORU Yellow 07/29/2019    SPECGRAV 1.015 07/29/2019    NITRITE Negative 07/29/2019    KETONESU Negative 07/29/2019    UROBILINOGEN Negative 07/29/2019    WBCUA 5 07/29/2019       Trended Lab Data:  Recent Labs   Lab 08/12/20  0421 08/13/20  0638 08/14/20  0121 08/15/20  0805   WBC 3.86* 4.21 7.18  --    HGB 11.0* 10.5* 11.3*  --    HCT 36.0* 34.3* 35.6*  --     180 207  --    MCV 90 89 88  --    RDW 15.9* 15.5* 15.5*  --    * 132* 130* 132*   K 4.8 4.7 5.6* 4.8   CL 98 95 93* 96   CO2 25 24 22* 26   BUN 37* 50* 60* 46*   CREATININE 6.2* 7.8* 8.3* 6.2*   * 154* 175* 188*   PROT 6.9 6.9 7.0  --    ALBUMIN 3.2* 3.2* 3.4* 2.5*   BILITOT 0.5 0.4 0.4  --    AST 20 21 26  --    ALKPHOS 61 61 66  --    ALT 15 15 18  --        Trended Cardiac Data:  Recent Labs   Lab 08/09/20  1716 08/13/20  1941 08/14/20  0121 08/14/20  0749   TROPONINI 0.036* 0.029* 0.026 0.019   *  --   --   --        Microbiology Data:  Microbial cultures from knee aspirate no growth to date    Other Laboratory Data:  Sed rate 48    Other Results:      Radiology:  Imaging Results          CT Pelvis Without Contrast (Final result)  Result time 08/09/20 21:10:28   Procedure changed  from CT Hip Without Contrast Left     Final result by Vito Garcia MD (08/09/20 21:10:28)                 Impression:      Negative CT of the pelvis for fracture specially of the left hip.    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    Severe atherosclerotic vascular disease most severe the SFA bilaterally.      Electronically signed by: Vito Garcia  Date:    08/09/2020  Time:    21:10             Narrative:    EXAMINATION:  CT PELVIS WITHOUT CONTRAST    CLINICAL HISTORY:  Hip trauma, nondiagnostic xray;    TECHNIQUE:  Axial CT images of the left hip were obtained with sagittal and coronal reformatted images.    COMPARISON:  Plain film of the hip,, 08/09/2020    FINDINGS:  The cortex and trabecular markings of the femur appear intact without displaced fracture or bony destructive process.  The intertrochanteric and proximal diaphysis appear intact as well.    Moderate osteoarthritis of the femoroacetabular joint is present without evidence of pincer or CAM deformity.    The adjacent pelvic bones are intact.  There is mild irregularity of the ischial tuberosity insertion of the hamstring tendons suggesting mild insertional tendinitis.    The endplates of the lumbar spine are markedly irregular with erosive changes at the L4-5 level and mild compression of the anterior endplate of L5 appearing remote with vacuum phenomenon.  Facet arthropathy is prominent.  There is no spondylolysis or significant central canal stenosis evident as imaged.  Degenerative changes in the SI joints are present.    Atherosclerotic disease and a normal caliber aorta and iliac system are noted.  This extends extensively into the SFA bilaterally.    The uterus demonstrates multiple large chunky calcifications suggesting degenerating fibroadenomas.  The urinary bladder appears unremarkable.  There is no pelvic mass or free fluid.                                CT Knee  Without Contrast Left (Final result)  Result time 08/09/20 20:54:03    Final result by Fuentes Vincent MD (08/09/20 20:54:03)                 Impression:      Findings indicating destruction of the knee joint secondary to aseptic process.    This report was flagged in Epic as abnormal.      Electronically signed by: Fuentes Vincent  Date:    08/09/2020  Time:    20:54             Narrative:    EXAMINATION:  CT KNEE WITHOUT CONTRAST LEFT    CLINICAL HISTORY:  Knee instability;    TECHNIQUE:  Multiple axial images of the left knee were obtained without the aid of IV contrast.  The study was reformatted creating a 3D image was separate workstation under direct supervision.    COMPARISON:  None    FINDINGS:  The knee joint reveals significant damage and deformity which is consistent with a septic joint.  The tibial plateau is fractured and there does not appear to be any discernible normal articular surface involving either the tibial plateau or the distal femur.  Both condyles of the distal femur are deformed and appear to be diminutive.  The articular surfaces of the femur and tibia appear to be mild feeding.  There is no normal articulation between the femur and the tibial plateau.  The surrounding soft tissues of the knee shows soft tissue induration and what appears to be fluid collection which most likely represents abscess fluid.  Suggest consideration aspiration in the for the purposes of obtaining culture and sensitivity information.                                   X-Ray Pelvis Routine AP (Edited Result - FINAL)  Result time 08/09/20 18:37:50    Addendum 1 of 1 by Poncho Handley MD (08/09/20 18:37:50)      This report was flagged in Epic as abnormal.    Please note, after initial interpretation of this examination, radiograph of the abdomen became available for review.  In retrospect, there appears to be lucency involving the right inferior pubic ramus concerning for fracture, better seen on that  examination.  Acuity is unknown, correlation with focal tenderness is advised.      Electronically signed by: Poncho Handley MD  Date:    08/09/2020  Time:    18:37               Final result by Poncho Handley MD (08/09/20 18:31:05)                 Impression:      1. No convincing acute displaced fracture or dislocation of the pelvis.      Electronically signed by: Poncho Handley MD  Date:    08/09/2020  Time:    18:31             Narrative:    EXAMINATION:  XR PELVIS ROUTINE AP    CLINICAL HISTORY:  Unspecified fall, initial encounter    TECHNIQUE:  AP view of the pelvis was performed.    COMPARISON:  None.    FINDINGS:  Single-view pelvis.    There is osteopenia.  Degenerative changes are noted of the bilateral sacroiliac joints and pubic symphysis.  The bilateral femoroacetabular joints are intact.  There is vascular calcification.                                   Assessment and Plan:      Angelina Beard is a 69 year old man with pertinent medical history of CAD, Chronic heart failure with preserved ejection fraction, pulmonary hypertension (likely WHO group II), HLD, hypertension, DM type II, sarcoidosis, and ESRD on HD MWF with Dr. Santamaria at [facility]. Admitted for evaluation of diarrhea and weakness. [facility] consulted for ESRD on HD evaluation and management.       #) ESRD on HD (MWF)  - follows with Dr. Santamaria at [facility]  - HD today to optimize for pre-op Monday via tunneled HD catheter  - will continue on MWF schedule while inpatient  - on abx w/ ceftriaxone and Vancomycin prior to AVG revision on Monday  - dose meds for GFR < 10  - nephro caps  - strict I/O, daily weights  - please avoid gadolinium, fleets, phos-based laxatives, NSAIDs  - patient to follow up with her HD unit after discharge    #) Hypertension   - amlodipine 10mg  - furosemide 40mg    #) atrial fibrillation  -eliquis on hold given upcoming procedure  -consider heparin gtt with hold on midnight evening prior to  procedure    #) Anemia Evaluation  Lab Results   Component Value Date    HGB 11.3 (L) 08/14/2020    MCV 88 08/14/2020    FERRITIN 1,215 (H) 11/20/2019    FESATURATED 14 (L) 11/20/2019    [id number] 389 03/03/2018    FOLATE 4.5 03/03/2018   - no need for EPO or iron at this time.    #)  CKD/Mineral Bone Disease/Nutrition   Lab Results   Component Value Date    PHOS 4.3 08/15/2020    CALCIUM 8.7 08/15/2020    MG 2.3 08/09/2020    CO2 26 08/15/2020    ALBUMIN 2.5 (L) 08/15/2020   - continue phoslo TID w/ meals; please check phos every other day  - nepro with meals  - nephrocaps daily  - standard bicarb bath with HD    Thank you for this consult. We will continue to follow with you.    Enrrique Pearl MD  [facility]  [phone]    If after 5pm please forward any questions to the [facility] Fellow/Attending on call.

## 2020-08-15 NOTE — PLAN OF CARE
Problem: Adult Inpatient Plan of Care  Goal: Plan of Care Review  Description: Chart and care plan reviewed  Care plan updated and individualized     Outcome: Ongoing, Progressing

## 2020-08-15 NOTE — ASSESSMENT & PLAN NOTE
Better  DC Colace    She is also on Rocephin and Vanco. Need to clarify why she is on this   All cx are negative.    Apparently she is on antibiotic for her graft per surgery

## 2020-08-16 ENCOUNTER — ANESTHESIA EVENT (OUTPATIENT)
Dept: SURGERY | Facility: HOSPITAL | Age: 70
DRG: 252 | End: 2020-08-16
Payer: MEDICARE

## 2020-08-16 LAB
POCT GLUCOSE: 137 MG/DL (ref 70–110)
POCT GLUCOSE: 192 MG/DL (ref 70–110)
POCT GLUCOSE: 195 MG/DL (ref 70–110)
POCT GLUCOSE: 241 MG/DL (ref 70–110)

## 2020-08-16 PROCEDURE — 25000003 PHARM REV CODE 250: Performed by: NURSE PRACTITIONER

## 2020-08-16 PROCEDURE — 11000001 HC ACUTE MED/SURG PRIVATE ROOM

## 2020-08-16 PROCEDURE — 99900035 HC TECH TIME PER 15 MIN (STAT)

## 2020-08-16 PROCEDURE — 94761 N-INVAS EAR/PLS OXIMETRY MLT: CPT

## 2020-08-16 PROCEDURE — 25000003 PHARM REV CODE 250: Performed by: INTERNAL MEDICINE

## 2020-08-16 PROCEDURE — 27000221 HC OXYGEN, UP TO 24 HOURS

## 2020-08-16 RX ADMIN — METOPROLOL SUCCINATE 50 MG: 25 TABLET, FILM COATED, EXTENDED RELEASE ORAL at 08:08

## 2020-08-16 RX ADMIN — CALCIUM ACETATE 667 MG: 667 CAPSULE ORAL at 04:08

## 2020-08-16 RX ADMIN — AMLODIPINE BESYLATE 10 MG: 5 TABLET ORAL at 08:08

## 2020-08-16 RX ADMIN — HYDRALAZINE HYDROCHLORIDE 10 MG: 10 TABLET, FILM COATED ORAL at 02:08

## 2020-08-16 RX ADMIN — CALCIUM ACETATE 667 MG: 667 CAPSULE ORAL at 11:08

## 2020-08-16 RX ADMIN — HYDRALAZINE HYDROCHLORIDE 10 MG: 10 TABLET, FILM COATED ORAL at 08:08

## 2020-08-16 RX ADMIN — HYDROCODONE BITARTRATE AND ACETAMINOPHEN 1 TABLET: 5; 325 TABLET ORAL at 04:08

## 2020-08-16 RX ADMIN — FUROSEMIDE 40 MG: 40 TABLET ORAL at 08:08

## 2020-08-16 RX ADMIN — CALCIUM ACETATE 667 MG: 667 CAPSULE ORAL at 08:08

## 2020-08-16 RX ADMIN — PANTOPRAZOLE SODIUM 40 MG: 40 TABLET, DELAYED RELEASE ORAL at 08:08

## 2020-08-16 RX ADMIN — NEPHROCAP 1 CAPSULE: 1 CAP ORAL at 08:08

## 2020-08-16 RX ADMIN — SERTRALINE HYDROCHLORIDE 100 MG: 100 TABLET ORAL at 08:08

## 2020-08-16 RX ADMIN — MIRTAZAPINE 7.5 MG: 7.5 TABLET ORAL at 09:08

## 2020-08-16 RX ADMIN — INSULIN ASPART 2 UNITS: 100 INJECTION, SOLUTION INTRAVENOUS; SUBCUTANEOUS at 12:08

## 2020-08-16 RX ADMIN — PRAVASTATIN SODIUM 80 MG: 20 TABLET ORAL at 08:08

## 2020-08-16 NOTE — PLAN OF CARE
Patient is on home O2 of 2L.  SpO2 is documented and patient is not in any distress.  Will continue to monitor.

## 2020-08-16 NOTE — PLAN OF CARE
Patient alert and awake. On oxygen at 2L/NC, No SOB reported. Still for urine collection for u/a. Encouraged patient to call staff if she has the urge to urinate. All due meds given. Instructed patient to perform passive exercises while in the bed. Call light within reach. Bed alarm on.

## 2020-08-16 NOTE — PLAN OF CARE
[facility] Plan of Care    Patient doing well. No need for HD today. Completed additional HD yesterday UF tolerated 1.7L goal met. Optimized for procedure in AM. Will dialyze after procedure on 8/16/2020 or 8/17/2020 pending availability and patient condition.     Enrrique Pearl MD  [facility]  [phone]

## 2020-08-16 NOTE — SUBJECTIVE & OBJECTIVE
Interval History: no complaints  No dialysis today  Plan for graft revision in AM  NPO after midnight    Review of Systems   Constitutional: Negative for chills and fever.   Respiratory: Negative for cough, shortness of breath and wheezing.    Cardiovascular: Negative for chest pain, palpitations and leg swelling.   Gastrointestinal: Negative for abdominal pain, blood in stool, constipation, diarrhea, nausea and vomiting.   Genitourinary: Negative for dysuria and hematuria.   Musculoskeletal: Negative for back pain.   Skin: Negative for rash.   Neurological: Negative for dizziness, weakness and headaches.     Objective:     Vital Signs (Most Recent):  Temp: 98.8 °F (37.1 °C) (08/16/20 1152)  Pulse: 66 (08/16/20 1156)  Resp: 20 (08/16/20 0815)  BP: 125/60 (08/16/20 1152)  SpO2: (!) 94 % (08/16/20 1116) Vital Signs (24h Range):  Temp:  [98 °F (36.7 °C)-99.1 °F (37.3 °C)] 98.8 °F (37.1 °C)  Pulse:  [65-79] 66  Resp:  [17-20] 20  SpO2:  [93 %-98 %] 94 %  BP: (100-154)/(48-75) 125/60     Weight: 90.6 kg (199 lb 11.8 oz)  Body mass index is 31.28 kg/m².    Intake/Output Summary (Last 24 hours) at 8/16/2020 1311  Last data filed at 8/15/2020 1941  Gross per 24 hour   Intake --   Output 1 ml   Net -1 ml      Physical Exam  Vitals signs and nursing note reviewed.   Constitutional:       General: She is not in acute distress.     Appearance: She is well-developed.   Eyes:      Conjunctiva/sclera: Conjunctivae normal.   Neck:      Vascular: No JVD.   Cardiovascular:      Rate and Rhythm: Normal rate and regular rhythm.      Heart sounds: Normal heart sounds.   Pulmonary:      Effort: Pulmonary effort is normal.      Breath sounds: Normal breath sounds. No wheezing.   Abdominal:      General: Bowel sounds are normal.      Palpations: Abdomen is soft.      Tenderness: There is no abdominal tenderness.   Musculoskeletal:         General: No tenderness.   Skin:     General: Skin is warm and dry.      Capillary Refill: Capillary  refill takes less than 2 seconds.   Neurological:      Mental Status: She is alert and oriented to person, place, and time.         Significant Labs:   BMP:   Recent Labs   Lab 08/15/20  0805   *   *   K 4.8   CL 96   CO2 26   BUN 46*   CREATININE 6.2*   CALCIUM 8.7     CBC: No results for input(s): WBC, HGB, HCT, PLT in the last 48 hours.

## 2020-08-16 NOTE — PLAN OF CARE
Progress notes reviewed. Evening rounds completed. Introduced self as VN for this shift. Educated pt on VN's role in patient's care.  Plan of care reviewed with patient. Opportunity given for pt's questions. No questions or concerns expressed at this time. VN to continue to monitor.

## 2020-08-16 NOTE — ASSESSMENT & PLAN NOTE
Taking amlodipine 10 mg daily and metoprolol 50 mg daily  Blood pressures appear to be creeping back up  She may require addition of additional medication  Will add Hydralazine- better now

## 2020-08-16 NOTE — ANESTHESIA PREPROCEDURE EVALUATION
08/16/2020  Angelina Beard is a 69 y.o., female.    Anesthesia Evaluation    I have reviewed the Patient Summary Reports.    I have reviewed the Nursing Notes. I have reviewed the NPO Status.   I have reviewed the Medications.     Review of Systems  Anesthesia Hx:  Denies Hx of Anesthetic complications  Neg history of prior surgery. Denies Family Hx of Anesthesia complications.   Denies Personal Hx of Anesthesia complications.   Hematology/Oncology:     Oncology Normal    -- Anemia:   EENT/Dental:EENT/Dental Normal   Cardiovascular:   Exercise tolerance: poor Hypertension CAD  Dysrhythmias atrial fibrillation  Denies Angina. CHF PVD    Pulmonary:   Denies Pneumonia Denies COPD. Asthma    Renal/:   Chronic Renal Disease    Hepatic/GI:   GERD    Musculoskeletal:   Arthritis     Neurological:  Neurology Normal    Endocrine:   Diabetes    Dermatological:  Skin Normal    Psych:  Psychiatric Normal           Physical Exam  General:  Well nourished, Obesity    Airway/Jaw/Neck:  AIRWAY FINDINGS: Normal   Mallampati III  TM Distance > 3 finger breaths    Eyes/Ears/Nose:  EYES/EARS/NOSE FINDINGS: Normal   Dental:  DENTAL FINDINGS: Normal   Chest/Lungs:  Chest/Lungs Clear    Heart/Vascular:  Heart Findings: Normal Heart murmur: negative    Abdomen:  Abdomen Findings: Normal    Musculoskeletal:  Musculoskeletal Findings: Normal   Skin:  Skin Findings:  AV graft right    Mental Status:  Mental Status Findings: Normal        Anesthesia Plan  Type of Anesthesia, risks & benefits discussed:  Anesthesia Type:  general, regional, MAC  Patient's Preference:   Intra-op Monitoring Plan: standard ASA monitors  Intra-op Monitoring Plan Comments:   Post Op Pain Control Plan: multimodal analgesia and IV/PO Opioids PRN  Post Op Pain Control Plan Comments:   Induction:   IV  Beta Blocker:         Informed Consent: Patient  understands risks and agrees with Anesthesia plan.  Questions answered.   ASA Score: 4     Day of Surgery Review of History & Physical: I have interviewed and examined the patient. I have reviewed the patient's H&P dated:  There are no significant changes.  H&P update referred to the surgeon.         Ready For Surgery From Anesthesia Perspective.

## 2020-08-16 NOTE — PROGRESS NOTES
[facility] Medicine  Progress Note    Patient Name: Angelina Beard  MRN: [id number]  Patient Class: IP- Inpatient   Admission Date: 8/9/2020  Length of Stay: 5 days  Attending Physician: Rosalia Burnett MD  Primary Care Provider: Ben Mena MD        Subjective:     Principal Problem:Hyperkalemia        HPI:  Angelina Beard is a 70 yo female with hypertension, chronic diastolic congestive heart failure, paroxysmal atrial fibrillation (anticoagulated on apixaban), diabetes mellitus type 2 (hemoglobin A1c 6.9% on 4/28/2020) with neuropathy and nephropathy, hyperlipidemia, nonobstructive coronary artery disease, cerebral microvascular disease, end stage renal disease on hemodialysis (Monday Wednesday Friday at [facility] in [facility]), anemia, gastroesophageal reflux disease, osteoarthritis status post right total knee arthroplasty 2/23/16, chronic left leg pain, chronic constipation, cholelithiasis, history of right lung sarcoidosis, history of left breast cancer status post mastectomy, history of gastric ulcers on 7/13/18. Her primary care physician is Dr. Ben Mena. Her nephrologist is Dr. Rc Santamaria. Her cardiologist is Dr. Faraz Flores. Her podiatrist is Dr. Kwame Dacosta. She lives in [place], [place]. She is mostly wheelchair bound but can use a walker with assistance.     Pt presented to ED with c/o diarrhea, generalized weakness and acute on chronic left knee and left hip pain s/p mechanical fall 1 week ago. She denies vomiting, diarrhea, fever/chills and abdominal pain. KUB shows constipation, no evidence of obstruction. At baseline, she is in a wheelchair/walker due to weakness, but said she has had trouble with transfers for the past week due to her L knee pain. Denies chest pain and worsening SOB. Labs remarkable for K 6.4, troponin 0.036.  Xray shows R nondisplaced pubic ramus fracture and significant erosion of L knee appears 2/2 sepitic  arthropathy. Orthopedic surgery consulted by ED. Pt admitted to [facility] medicine.     Overview/Hospital Course:  No notes on file    Interval History: no complaints  No dialysis today  Plan for graft revision in AM  NPO after midnight    Review of Systems   Constitutional: Negative for chills and fever.   Respiratory: Negative for cough, shortness of breath and wheezing.    Cardiovascular: Negative for chest pain, palpitations and leg swelling.   Gastrointestinal: Negative for abdominal pain, blood in stool, constipation, diarrhea, nausea and vomiting.   Genitourinary: Negative for dysuria and hematuria.   Musculoskeletal: Negative for back pain.   Skin: Negative for rash.   Neurological: Negative for dizziness, weakness and headaches.     Objective:     Vital Signs (Most Recent):  Temp: 98.8 °F (37.1 °C) (08/16/20 1152)  Pulse: 66 (08/16/20 1156)  Resp: 20 (08/16/20 0815)  BP: 125/60 (08/16/20 1152)  SpO2: (!) 94 % (08/16/20 1116) Vital Signs (24h Range):  Temp:  [98 °F (36.7 °C)-99.1 °F (37.3 °C)] 98.8 °F (37.1 °C)  Pulse:  [65-79] 66  Resp:  [17-20] 20  SpO2:  [93 %-98 %] 94 %  BP: (100-154)/(48-75) 125/60     Weight: 90.6 kg (199 lb 11.8 oz)  Body mass index is 31.28 kg/m².    Intake/Output Summary (Last 24 hours) at 8/16/2020 1311  Last data filed at 8/15/2020 1941  Gross per 24 hour   Intake --   Output 1 ml   Net -1 ml      Physical Exam  Vitals signs and nursing note reviewed.   Constitutional:       General: She is not in acute distress.     Appearance: She is well-developed.   Eyes:      Conjunctiva/sclera: Conjunctivae normal.   Neck:      Vascular: No JVD.   Cardiovascular:      Rate and Rhythm: Normal rate and regular rhythm.      Heart sounds: Normal heart sounds.   Pulmonary:      Effort: Pulmonary effort is normal.      Breath sounds: Normal breath sounds. No wheezing.   Abdominal:      General: Bowel sounds are normal.      Palpations: Abdomen is soft.      Tenderness: There is no abdominal  tenderness.   Musculoskeletal:         General: No tenderness.   Skin:     General: Skin is warm and dry.      Capillary Refill: Capillary refill takes less than 2 seconds.   Neurological:      Mental Status: She is alert and oriented to person, place, and time.         Significant Labs:   BMP:   Recent Labs   Lab 08/15/20  0805   *   *   K 4.8   CL 96   CO2 26   BUN 46*   CREATININE 6.2*   CALCIUM 8.7     CBC: No results for input(s): WBC, HGB, HCT, PLT in the last 48 hours.          Assessment/Plan:      * Hyperkalemia  This is being managed with dialysis    EKG abnormalities  Troponin elevation is flat.    EKG stable . No acute changes    Complication of vascular access for dialysis  NPO after midnight for graft revision      Diarrhea  Better  DC Colace    DC abx.  DW Dr Sen        Left knee pain  S/p mechanical fall 1 week ago  -imaging ? septic arthritis  -s/p empiric antibiotics in ED  -evaluated by orthopedic surgery, arthrocentesis done and not c/w septic joint  - stop abx  -PT/OT consulted: recommend SNF      Hypertension associated with diabetes  Taking amlodipine 10 mg daily and metoprolol 50 mg daily  Blood pressures appear to be creeping back up  She may require addition of additional medication  Will add Hydralazine- better now      ESRD on dialysis  Dialysis per Nephrology      Physical deconditioning  In PT/OT: recommend SNF  Possibly [facility]      Chronic diastolic congestive heart failure  Euvolemic on exam. Continue furosemide 40 mg daily. Patient still able to make urine.       Paroxysmal atrial fibrillation  Taking Apixaban- will discontinue in preparation for procedure on Monday      Anemia in end-stage renal disease  H&H stable      Diabetic neuropathy associated with type 2 diabetes mellitus  Taking Gabapentin 300 mg TID prn- continue     GERD (gastroesophageal reflux disease)  Daily PPI       Hyperlipidemia  Continue statin       Type 2 diabetes mellitus with hypertension  and end stage renal disease on dialysis  A1C 6.9   Low dose SSI, accucheck AC&HS , Diabetic diet   Her blood sugars are trending upward and she may require Januvia      Cerebral microvascular disease  Continue Plavix, statin         VTE Risk Mitigation (From admission, onward)         Ordered     heparin (porcine) injection 4,100 Units  As needed (PRN)      08/14/20 1307     IP VTE HIGH RISK PATIENT  Once      08/09/20 2255     Place sequential compression device  Until discontinued      08/09/20 2255                Discharge Planning   EDWIN: 8/14/2020     Code Status: Full Code   Is the patient medically ready for discharge?:     Reason for patient still in hospital (select all that apply): Other (specify) Repair graft and SNF acceptance  Discharge Plan A: Skilled Nursing Facility   Discharge Delays: None known at this time              Rosalia Burnett MD  Department of Hospital Medicine   [facility]

## 2020-08-17 ENCOUNTER — ANESTHESIA (OUTPATIENT)
Dept: SURGERY | Facility: HOSPITAL | Age: 70
DRG: 252 | End: 2020-08-17
Payer: MEDICARE

## 2020-08-17 ENCOUNTER — TELEPHONE (OUTPATIENT)
Dept: INTERNAL MEDICINE | Facility: CLINIC | Age: 70
End: 2020-08-17

## 2020-08-17 LAB
BACTERIA SPEC ANAEROBE CULT: NORMAL
FERRITIN SERPL-MCNC: 1085 NG/ML (ref 20–300)
IRON SERPL-MCNC: 26 UG/DL (ref 30–160)
POCT GLUCOSE: 145 MG/DL (ref 70–110)
POCT GLUCOSE: 170 MG/DL (ref 70–110)
POCT GLUCOSE: 207 MG/DL (ref 70–110)
SARS-COV-2 RDRP RESP QL NAA+PROBE: NEGATIVE
SATURATED IRON: 14 % (ref 20–50)
TOTAL IRON BINDING CAPACITY: 182 UG/DL (ref 250–450)
TRANSFERRIN SERPL-MCNC: 123 MG/DL (ref 200–375)

## 2020-08-17 PROCEDURE — 80100016 HC MAINTENANCE HEMODIALYSIS

## 2020-08-17 PROCEDURE — 25000003 PHARM REV CODE 250: Performed by: INTERNAL MEDICINE

## 2020-08-17 PROCEDURE — 25000003 PHARM REV CODE 250: Performed by: NURSE PRACTITIONER

## 2020-08-17 PROCEDURE — 94761 N-INVAS EAR/PLS OXIMETRY MLT: CPT

## 2020-08-17 PROCEDURE — U0002 COVID-19 LAB TEST NON-CDC: HCPCS

## 2020-08-17 PROCEDURE — 63600175 PHARM REV CODE 636 W HCPCS: Performed by: STUDENT IN AN ORGANIZED HEALTH CARE EDUCATION/TRAINING PROGRAM

## 2020-08-17 PROCEDURE — 82728 ASSAY OF FERRITIN: CPT

## 2020-08-17 PROCEDURE — 99900035 HC TECH TIME PER 15 MIN (STAT)

## 2020-08-17 PROCEDURE — 11000001 HC ACUTE MED/SURG PRIVATE ROOM

## 2020-08-17 PROCEDURE — 25000003 PHARM REV CODE 250: Performed by: SURGERY

## 2020-08-17 PROCEDURE — 27000221 HC OXYGEN, UP TO 24 HOURS

## 2020-08-17 PROCEDURE — 97530 THERAPEUTIC ACTIVITIES: CPT

## 2020-08-17 PROCEDURE — 97535 SELF CARE MNGMENT TRAINING: CPT | Mod: CO

## 2020-08-17 PROCEDURE — 36415 COLL VENOUS BLD VENIPUNCTURE: CPT

## 2020-08-17 PROCEDURE — 83540 ASSAY OF IRON: CPT

## 2020-08-17 PROCEDURE — 25000003 PHARM REV CODE 250: Performed by: STUDENT IN AN ORGANIZED HEALTH CARE EDUCATION/TRAINING PROGRAM

## 2020-08-17 RX ORDER — GABAPENTIN 100 MG/1
100 CAPSULE ORAL DAILY PRN
Status: DISCONTINUED | OUTPATIENT
Start: 2020-08-17 | End: 2020-08-19 | Stop reason: HOSPADM

## 2020-08-17 RX ORDER — POLYETHYLENE GLYCOL 3350 17 G/17G
17 POWDER, FOR SOLUTION ORAL DAILY
Status: DISCONTINUED | OUTPATIENT
Start: 2020-08-17 | End: 2020-08-19 | Stop reason: HOSPADM

## 2020-08-17 RX ORDER — DOCUSATE SODIUM 100 MG/1
100 CAPSULE, LIQUID FILLED ORAL 2 TIMES DAILY
Status: DISCONTINUED | OUTPATIENT
Start: 2020-08-17 | End: 2020-08-19 | Stop reason: HOSPADM

## 2020-08-17 RX ORDER — SODIUM CHLORIDE 9 MG/ML
INJECTION, SOLUTION INTRAVENOUS ONCE
Status: COMPLETED | OUTPATIENT
Start: 2020-08-17 | End: 2020-08-17

## 2020-08-17 RX ORDER — SODIUM CHLORIDE 9 MG/ML
INJECTION, SOLUTION INTRAVENOUS ONCE
Status: DISCONTINUED | OUTPATIENT
Start: 2020-08-17 | End: 2020-08-17

## 2020-08-17 RX ADMIN — HYDRALAZINE HYDROCHLORIDE 10 MG: 10 TABLET, FILM COATED ORAL at 09:08

## 2020-08-17 RX ADMIN — NEPHROCAP 1 CAPSULE: 1 CAP ORAL at 01:08

## 2020-08-17 RX ADMIN — METOPROLOL SUCCINATE 50 MG: 25 TABLET, FILM COATED, EXTENDED RELEASE ORAL at 01:08

## 2020-08-17 RX ADMIN — SODIUM CHLORIDE 400 ML: 0.9 INJECTION, SOLUTION INTRAVENOUS at 01:08

## 2020-08-17 RX ADMIN — SODIUM CHLORIDE: 0.9 INJECTION, SOLUTION INTRAVENOUS at 12:08

## 2020-08-17 RX ADMIN — POLYETHYLENE GLYCOL (3350) 17 G: 17 POWDER, FOR SOLUTION ORAL at 05:08

## 2020-08-17 RX ADMIN — HYDRALAZINE HYDROCHLORIDE 10 MG: 10 TABLET, FILM COATED ORAL at 04:08

## 2020-08-17 RX ADMIN — CALCIUM ACETATE 667 MG: 667 CAPSULE ORAL at 04:08

## 2020-08-17 RX ADMIN — HEPARIN SODIUM 4100 UNITS: 1000 INJECTION, SOLUTION INTRAVENOUS; SUBCUTANEOUS at 01:08

## 2020-08-17 RX ADMIN — PANTOPRAZOLE SODIUM 40 MG: 40 TABLET, DELAYED RELEASE ORAL at 01:08

## 2020-08-17 RX ADMIN — DOCUSATE SODIUM 100 MG: 100 CAPSULE, LIQUID FILLED ORAL at 09:08

## 2020-08-17 RX ADMIN — MIRTAZAPINE 7.5 MG: 7.5 TABLET ORAL at 09:08

## 2020-08-17 NOTE — PLAN OF CARE
When entered room to complete assessment, patient did not have O2 on.  SpO2 was between 89-90.  Placed patient back on O2, and SpO2 increased to 93.  Patient stated she did not feel short of breath, but will continue to monitor.  Patient is on home O2 setting.

## 2020-08-17 NOTE — PROGRESS NOTES
Progress Note    Admit Date: 8/9/2020   LOS: 6 days   POD # 4 primary cath placement    SUBJECTIVE:     No acute events overnight.  No issues of fistula.  No further bleeding.  Has been dialyzing well through primary catheterization.  No nausea or vomiting.  COVID negative    Scheduled Meds:   amLODIPine  10 mg Oral Daily    calcium acetate(phosphat bind)  667 mg Oral TID WM    furosemide  40 mg Oral Daily    hydrALAZINE  10 mg Oral TID    metoprolol succinate  50 mg Oral Daily    mirtazapine  7.5 mg Oral QHS    pantoprazole  40 mg Oral Daily    pravastatin  80 mg Oral Daily    sertraline  100 mg Oral Daily    vitamin renal formula (B-complex-vitamin c-folic acid)  1 capsule Oral Daily     Continuous Infusions:  PRN Meds:sodium chloride 0.9%, sodium chloride 0.9%, acetaminophen, [COMPLETED] calcium gluconate IVPB **AND** calcium gluconate IVPB, dextrose 50%, dextrose 50%, diphenhydrAMINE, gabapentin, glucagon (human recombinant), glucose, glucose, heparin (porcine), hydrALAZINE, HYDROcodone-acetaminophen, HYDROmorphone, insulin aspart U-100, ondansetron, ondansetron, promethazine (PHENERGAN) IVPB, sodium chloride 0.9%, sodium chloride 0.9%    Review of patient's allergies indicates:  No Known Allergies      OBJECTIVE:     Vital Signs (Most Recent)  Temp: 98.5 °F (36.9 °C) (08/17/20 0520)  Pulse: 71 (08/17/20 0520)  Resp: 17 (08/17/20 0520)  BP: (!) 116/58 (08/17/20 0520)  SpO2: (!) 90 % (08/17/20 0353)    Vital Signs Range (Last 24H):  Temp:  [98 °F (36.7 °C)-98.8 °F (37.1 °C)]   Pulse:  [61-72]   Resp:  [17-20]   BP: ()/(43-60)   SpO2:  [90 %-96 %]     I & O (Last 24H):    Intake/Output Summary (Last 24 hours) at 8/17/2020 0655  Last data filed at 8/17/2020 0600  Gross per 24 hour   Intake --   Output 1 ml   Net -1 ml       Physical Exam:  Gen: Awake, alert, oriented. NAD  Resp: Respirations even and unlabored on room air  CV: Regular rate, 2+ radial pulses.  Palpable thrill in her right upper  extremity fistula.  Left chest PermCath in place with dressing overlying, CDI  GI: Soft, nontender, nondistended  MSK: Moving all four extremities spontaneously    LABS  CBC  Recent Labs   Lab 08/12/20 0421 08/13/20 0638 08/14/20  0121   WBC 3.86* 4.21 7.18   RBC 3.99* 3.86* 4.05   HGB 11.0* 10.5* 11.3*   HCT 36.0* 34.3* 35.6*    180 207   MCV 90 89 88   MCH 27.6 27.2 27.9   MCHC 30.6* 30.6* 31.7*     CMP  Recent Labs   Lab 08/12/20 0421 08/13/20  0638 08/14/20  0121 08/15/20  0805   * 154* 175* 188*   CALCIUM 9.0 8.8 9.0 8.7   ALBUMIN 3.2* 3.2* 3.4* 2.5*   PROT 6.9 6.9 7.0  --    * 132* 130* 132*   K 4.8 4.7 5.6* 4.8   CO2 25 24 22* 26   CL 98 95 93* 96   BUN 37* 50* 60* 46*   CREATININE 6.2* 7.8* 8.3* 6.2*   ALKPHOS 61 61 66  --    ALT 15 15 18  --    AST 20 21 26  --    BILITOT 0.5 0.4 0.4  --        Recent Labs   Lab 08/13/20 0638 08/14/20  0121 08/15/20  0805   CALCIUM 8.8 9.0 8.7   PHOS  --   --  4.3         POCT-Glucose  POCT Glucose   Date Value Ref Range Status   08/17/2020 145 (H) 70 - 110 mg/dL Final   08/16/2020 137 (H) 70 - 110 mg/dL Final   08/16/2020 192 (H) 70 - 110 mg/dL Final   08/16/2020 241 (H) 70 - 110 mg/dL Final   08/16/2020 195 (H) 70 - 110 mg/dL Final   08/15/2020 214 (H) 70 - 110 mg/dL Final   08/15/2020 199 (H) 70 - 110 mg/dL Final   08/15/2020 199 (H) 70 - 110 mg/dL Final   08/14/2020 242 (H) 70 - 110 mg/dL Final   08/14/2020 227 (H) 70 - 110 mg/dL Final       COAGS  No results for input(s): PT, INR, APTT in the last 168 hours.    CE  Recent Labs   Lab 08/13/20  1941 08/14/20  0121 08/14/20  0749   TROPONINI 0.029* 0.026 0.019     BNP  No results for input(s): BNP in the last 168 hours.    ABGs  No results for input(s): PH, PCO2, PO2, HCO3, POCSATURATED, BE in the last 24 hours.    UA  No results for input(s): COLORU, CLARITYU, SPECGRAV, PHUR, PROTEINUA, GLUCOSEU, BILIRUBINCON, BLOODU, WBCU, RBCU, BACTERIA, MUCUS, NITRITE, LEUKOCYTESUR, UROBILINOGEN, HYALINECASTS  in the last 168 hours.    LAST HbA1c  Lab Results   Component Value Date    HGBA1C 6.8 (H) 08/10/2020       ASSESSMENT/PLAN:   POD #4 primary cath placement  69 y.o.female with history of CAD, CHF, pulmonary HTN, DM2, sarcoidosis, and ESRD with dialysis graft stenosis.    -plan for OR today for graft revision.  Discussed with patient that if graft is unable to be salvaged, new graft may be placed.  -discussed risks, benefits, and alternatives of procedure with the patient.  She agrees to proceed.  Written consent obtained.  -further recommendations pending outcome of surgery  -remainder of management per primary team    Tahir Laws MD  PGY-2, [employer] General Surgery  Neuroendocrine Surgery  8/17/2020

## 2020-08-17 NOTE — PLAN OF CARE
Pt alert and oriented x4. Went to dialysis today -3L. On a renal diet, tolerating w/ some complaints of abd pain due to constipation. Miralax and docusate ordered by MD. On 4L NC. No complaints of pain. Up with PT/OT today and frequent wt shift assistance provided. Will be NPO at midnight tonight for revision tomorrow. Safety maintained, will continue to monitor

## 2020-08-17 NOTE — TELEPHONE ENCOUNTER
----- Message from Moises Nevarez sent at 8/17/2020  3:35 PM CDT -----  Contact: Jessica with [facility]  Jessica with [facility] called and needs an Oxygen CMN    Please fax it to [phone]

## 2020-08-17 NOTE — PLAN OF CARE
sent updated notes to [facility] via [facility]. [facility] still reviewing for medical acceptance/denial.     Per MD notes, patient is going for a graft revision today.  Patient not stable for discharge.

## 2020-08-17 NOTE — PLAN OF CARE
Problem: Occupational Therapy Goal  Goal: Occupational Therapy Goal  Description: Goals to be met by: 9/10/20     Patient will increase functional independence with ADLs by performing:    LE Dressing with Minimal Assistance.  Grooming while seated with Modified [place].  Toileting from bedside commode with Minimal Assistance for hygiene and clothing management.   Supine to sit with Contact Guard Assistance.  Stand pivot transfers with Minimal Assistance.  Toilet transfer to bedside commode with Minimal Assistance.  Increased functional strength to 4/5 for self care skills and functional mobility.  Upper extremity exercise program x10 reps per handout, with independence.    Outcome: Ongoing, Progressing       Angelina Beard is a 69 y.o. female with a medical diagnosis of Hyperkalemia.  Performance deficits affecting function are weakness, impaired endurance, impaired sensation, impaired self care skills, impaired functional mobilty, gait instability, impaired balance, decreased upper extremity function, decreased lower extremity function, decreased safety awareness, pain, decreased ROM, impaired skin. Pt found in bed, agreeable to therapy despite complaints of numbness in B feet. Pt with improved performance with therapy.  She was able to stand x 2 trials with Mod A x 2 using RW and flexed trunk.  Poor standing tolerance secondary to BLE weakness and numbness.  Tx interrupted by transport to dialysis. Continue OT services to address functional goals, progressing as able.      JOCY Juarez/KAVITA

## 2020-08-17 NOTE — PLAN OF CARE
sent SNF referral to [facility] via [facility].  Patient goes to [facility] MWF 10am.  spoke with charge RN Vito and confirm patient's chair time.    [facility] is willing to accept this patient if she changes her dialysis unit to [facility].       08/17/20 1325   Post-Acute Status   Post-Acute Authorization Placement   Post-Acute Placement Status Referrals Sent   Discharge Delays None known at this time   Discharge Plan   Discharge Plan A Skilled Nursing Facility

## 2020-08-17 NOTE — PROGRESS NOTES
NET Team Rounds with Dr. LAURIE Herrera MD    Diagnosis this admission: Cough [R05]  Diarrhea [R19.7]  Hyperkalemia [E87.5]  Fatigue [R53.83]  Fall [W19.XXXA]  Pyogenic arthritis of left knee joint, due to unspecified organism [M00.9]    Admit Date: 8/9/2020   Length of Stay Days: 6  Discharge Date: TBD     Surgery/Procedure: Procedure(s):  8/13/20- Insertion dialysis catheter,  7/13/2018 - Esophagogastroduodenoscopy (egd) and Colonoscopy     NET Physician:  Charis Purcell MD  Local Treating Physician:Ben Mena MD      Assessment  Diet: NPO   Oral Supplement: NPO  Nutrition Support - none   Appetite - none  Nausea - No  Vomiting- No  BM- No bowel movements  Abdomen- nontender  Incision- none  Drain- none   Urine-  voiding without difficulty  Activity-  up with assistance   Pain-none  Respiratory- encourage cough/deep breath/IS     IV Access- Peripheral, date placed-   Edema: none    Vitals:   Patient Vitals for the past 24 hrs:   BP Temp Temp src Pulse Resp SpO2   08/17/20 0742 -- -- -- 67 -- --   08/17/20 0734 (!) 113/59 98.5 °F (36.9 °C) Oral 66 18 (!) 94 %   08/17/20 0520 (!) 116/58 98.5 °F (36.9 °C) Oral 71 17 --       Wt Readings from Last 1 Encounters:   08/15/20 0539 90.6 kg (199 lb 11.8 oz)   08/14/20 0413 93.7 kg (206 lb 9.1 oz)   08/13/20 0540 89 kg (196 lb 3.4 oz)   08/12/20 0500 87.2 kg (192 lb 3.9 oz)   08/11/20 0508 85.2 kg (187 lb 13.3 oz)   08/10/20 0519 90.7 kg (199 lb 15.3 oz)   08/09/20 2307 86.6 kg (190 lb 14.7 oz)   08/09/20 1635 84.4 kg (186 lb)     Labs:   Recent Labs   Lab 08/12/20  0421 08/13/20  0638 08/14/20  0121 08/15/20  0805   WBC 3.86* 4.21 7.18  --    HGB 11.0* 10.5* 11.3*  --    HCT 36.0* 34.3* 35.6*  --     180 207  --    MCV 90 89 88  --    RDW 15.9* 15.5* 15.5*  --    * 132* 130* 132*   K 4.8 4.7 5.6* 4.8   CL 98 95 93* 96   CO2 25 24 22* 26   BUN 37* 50* 60* 46*   CREATININE 6.2* 7.8* 8.3* 6.2*   * 154* 175* 188*   PROT 6.9 6.9 7.0  --     ALBUMIN 3.2* 3.2* 3.4* 2.5*   BILITOT 0.5 0.4 0.4  --    AST 20 21 26  --    ALKPHOS 61 61 66  --    ALT 15 15 18  --          Scheduled Meds   amLODIPine  10 mg Oral Daily    calcium acetate(phosphat bind)  667 mg Oral TID WM    furosemide  40 mg Oral Daily    hydrALAZINE  10 mg Oral TID    metoprolol succinate  50 mg Oral Daily    mirtazapine  7.5 mg Oral QHS    pantoprazole  40 mg Oral Daily    pravastatin  80 mg Oral Daily    sertraline  100 mg Oral Daily    vitamin renal formula (B-complex-vitamin c-folic acid)  1 capsule Oral Daily       Continuous Infusion      PRN Meds  sodium chloride 0.9%, sodium chloride 0.9%, acetaminophen, [COMPLETED] calcium gluconate IVPB **AND** calcium gluconate IVPB, dextrose 50%, dextrose 50%, diphenhydrAMINE, gabapentin, glucagon (human recombinant), glucose, glucose, heparin (porcine), hydrALAZINE, HYDROcodone-acetaminophen, HYDROmorphone, insulin aspart U-100, ondansetron, ondansetron, promethazine (PHENERGAN) IVPB, sodium chloride 0.9%, sodium chloride 0.9%     Assessment/Plan:  -for graft revision today     Discharge Planning   Appointments- Charis Purcell MD TBD  Home Health needs- TBD  Therapy needs- TBD  Nutritional needs - TBD  Home support system- TBD  Barriers- TBD    Reviewed follow up plan.  Patient verbalized understanding.    ___________________________________________  WOLFGANG CharlesN, RN, ONN-CG  Nurse Navigator [facility]    Tracie Weil Cavalier, ELIDAN, LDN, CNSC  Registered Dietitian [facility]      Face to Face Time: 15 minutes

## 2020-08-17 NOTE — PT/OT/SLP PROGRESS
Physical Therapy Treatment    Patient Name:  Angelina Beard   MRN:  [id number]    Recommendations:     Discharge Recommendations:  nursing facility, skilled   Discharge Equipment Recommendations: hospital bed   Barriers to discharge: Decreased caregiver support and requires increased physical assistance at this time    Assessment:     Angelina Beard is a 69 y.o. female admitted with a medical diagnosis of Hyperkalemia.  She presents with the following impairments/functional limitations:  weakness, impaired endurance, impaired sensation, impaired self care skills, impaired functional mobilty, gait instability, impaired balance, decreased lower extremity function, decreased upper extremity function, decreased ROM, pain Patient with improved sup<>sit; requires modA x 2 sit to stand with decreased standing tolerance; will cont with POC..    Rehab Prognosis: Good; patient would benefit from acute skilled PT services to address these deficits and reach maximum level of function.    Recent Surgery: Procedure(s) (LRB):  Insertion dialysis catheter (Right) 4 Days Post-Op    Plan:     During this hospitalization, patient to be seen 6 x/week to address the identified rehab impairments via therapeutic activities, therapeutic exercises, neuromuscular re-education, wheelchair management/training and progress toward the following goals:    · Plan of Care Expires:  09/10/20    Subjective     Pain/Comfort:  · Pain Rating 1: (Pt c/o numbness and tingling 2/2 neuropathy; did not cl/o pain.)      Objective:     Communicated with nurse prior to session.  Patient found with telemetry, oxygen upon PT entry to room.     General Precautions: Standard, fall   Orthopedic Precautions:(BLE WBAT)   Braces: N/A     Functional Mobility:  · Bed Mobility:     · Rolling Left:  stand by assistance and use of side rail  · Scooting: stand by assistance and to scoot forward; maxA x 2 to scoot laterally at EOB  · Supine to Sit: contact guard  assistance  · Sit to Supine: contact guard assistance  · Transfers:     · Sit to Stand:  moderate assistance and of 2 persons with rolling walker; increased level of bed height      AM-PAC 6 CLICK MOBILITY  Turning over in bed (including adjusting bedclothes, sheets and blankets)?: 3  Sitting down on and standing up from a chair with arms (e.g., wheelchair, bedside commode, etc.): 2  Moving from lying on back to sitting on the side of the bed?: 3  Moving to and from a bed to a chair (including a wheelchair)?: 2  Need to walk in hospital room?: 1  Climbing 3-5 steps with a railing?: 1  Basic Mobility Total Score: 12       Therapeutic Activities and Exercises:   Patient agreeable to therapy; pt to EOB with CGA; pt sat at EOB with SBA-oral hygiene; scooted forward with SBA; elevated surface of bed; sit to stand x 2 trials of ~10 sec each with VCs/TCs and assist for working toward erect stance; pt stands with significant increased forward trunk flexion; unable to take steps 2/2 faitgue; pt attempted to scoot laterally toward HOB but needed maxA x 2 to complete 3-4 trials of seated scoots; returned to supine with CGA and transport present to take to dialysis.    Patient left HOB elevated with all lines intact, call button in reach, nurse notified and transporter present to take to dialysis present..    GOALS:   Multidisciplinary Problems     Physical Therapy Goals        Problem: Physical Therapy Goal    Goal Priority Disciplines Outcome Goal Variances Interventions   Physical Therapy Goal     PT, PT/OT Ongoing, Progressing     Description:   Goals to be met by: 9/10/2020     Patient will increase functional independence with mobility by performin. Supine to sit with Contact Guard Assistance-met 2020  REVISED: Supervision  2. Sit to supine with MInimal Assistance-met 2020  REVISED: Supervision  3. Rolling L/R with Modified [place] and use of bedrail as needed.  4. Sit to stand transfer with Minimal  Assistance  5. Bed to chair transfer with Minimal Assistance with or without AD  6. Gait  x 15 feet as able with Minimal Assistance with or without AD  7. Lower extremity exercise program x10-15 reps with supervision  8. Increase functional strength to 4- to 4 for bed mobility and transfers, gait if able  9. Wheelchair mobility x 50ft x 2 with Supervision                   Time Tracking:     PT Received On: 08/17/20  PT Start Time: 0924     PT Stop Time: 0948  PT Total Time (min): 24 min     Billable Minutes: Therapeutic Activity 14 minutes    Treatment Type: Treatment  PT/PTA: PT     PTA Visit Number: 0     Nimco Castro, PT  08/17/2020

## 2020-08-17 NOTE — PLAN OF CARE
TN/SW coordinating SNF placement for pt.       08/17/20 1533   Discharge Reassessment   Assessment Type Discharge Planning Reassessment   Provided patient/caregiver education on the expected discharge date and the discharge plan Yes   Do you have any problems affording any of your prescribed medications? No   Discharge Plan A Skilled Nursing Facility   Discharge Plan B Skilled Nursing Facility   DME Needed Upon Discharge    (TBD)   Patient choice form signed by patient/caregiver N/A   Anticipated Discharge Disposition SNF

## 2020-08-17 NOTE — SUBJECTIVE & OBJECTIVE
Interval History:  Has not had a BM x 5 days  For dialysis today.  Will do graft tomorrow    Review of Systems   Constitutional: Negative for chills and fever.   Respiratory: Negative for cough, shortness of breath and wheezing.    Cardiovascular: Negative for chest pain, palpitations and leg swelling.   Gastrointestinal: Negative for abdominal pain, blood in stool, constipation, diarrhea, nausea and vomiting.   Genitourinary: Negative for dysuria and hematuria.   Musculoskeletal: Negative for back pain.   Skin: Negative for rash.   Neurological: Negative for dizziness, weakness and headaches.     Objective:     Vital Signs (Most Recent):  Temp: 98.8 °F (37.1 °C) (08/17/20 1557)  Pulse: 75 (08/17/20 1557)  Resp: 20 (08/17/20 1557)  BP: (!) 166/77 (08/17/20 1557)  SpO2: 97 % (08/17/20 1557) Vital Signs (24h Range):  Temp:  [97.3 °F (36.3 °C)-98.8 °F (37.1 °C)] 98.8 °F (37.1 °C)  Pulse:  [60-83] 75  Resp:  [15-20] 20  SpO2:  [90 %-99 %] 97 %  BP: ()/(43-88) 166/77     Weight: 90.6 kg (199 lb 11.8 oz)  Body mass index is 31.28 kg/m².    Intake/Output Summary (Last 24 hours) at 8/17/2020 1654  Last data filed at 8/17/2020 1311  Gross per 24 hour   Intake 500 ml   Output 3401 ml   Net -2901 ml      Physical Exam  Vitals signs and nursing note reviewed.   Constitutional:       General: She is not in acute distress.     Appearance: She is well-developed.   Eyes:      Conjunctiva/sclera: Conjunctivae normal.   Neck:      Vascular: No JVD.   Cardiovascular:      Rate and Rhythm: Normal rate and regular rhythm.      Heart sounds: Normal heart sounds.   Pulmonary:      Effort: Pulmonary effort is normal.      Breath sounds: Normal breath sounds. No wheezing.   Abdominal:      General: Bowel sounds are normal.      Palpations: Abdomen is soft.      Tenderness: There is no abdominal tenderness.   Musculoskeletal:         General: No tenderness.   Skin:     General: Skin is warm and dry.      Capillary Refill: Capillary  refill takes less than 2 seconds.   Neurological:      Mental Status: She is alert and oriented to person, place, and time.         Significant Labs: BMP: No results for input(s): GLU, NA, K, CL, CO2, BUN, CREATININE, CALCIUM, MG in the last 48 hours.  CBC: No results for input(s): WBC, HGB, HCT, PLT in the last 48 hours.    Significant Imaging: I have reviewed all pertinent imaging results/findings within the past 24 hours.

## 2020-08-17 NOTE — PLAN OF CARE
TN discussed pt's d/c plan with pt's son Cristi. TN informed pt's son that [facility] unable to accommodate pt's dialysis unit/schedule due to [facility] and pt goes to [facility]. TN informed pt's son that we are limited on SNF choices due to location of pt's HD unit. [place] and [facility] would be able to accommodate patient's HD unit. Pt's son's SNF preference is [facility].

## 2020-08-17 NOTE — PLAN OF CARE
tried multiple attempts to reach [facility] to confirm receipt of referral.   unable to speak to admission staff.

## 2020-08-17 NOTE — PROGRESS NOTES
Pt b/p 80/50, Normal Saline bolus of 250 ml/hr given. Recheck pt b/p 108/57. Will continue to monitor.

## 2020-08-17 NOTE — PLAN OF CARE
Problem: Fall Injury Risk  Goal: Absence of Fall and Fall-Related Injury  Outcome: Ongoing, Progressing     Problem: Adult Inpatient Plan of Care  Goal: Plan of Care Review  Description: Chart and care plan reviewed  Care plan updated and individualized     Outcome: Ongoing, Progressing  Goal: Patient-Specific Goal (Individualization)  Outcome: Ongoing, Progressing  Goal: Absence of Hospital-Acquired Illness or Injury  Outcome: Ongoing, Progressing  Goal: Optimal Comfort and Wellbeing  Outcome: Ongoing, Progressing  Goal: Readiness for Transition of Care  Outcome: Ongoing, Progressing  Goal: Rounds/Family Conference  Outcome: Ongoing, Progressing   Pt AAO x 4. VSS. NAD. Glucose and blood pressure closely monitored. Pt NPO since midnight. COVID Rapid test hand delivered to lab, awaiting results. Will continue to monitor.

## 2020-08-17 NOTE — NURSING
Dr Vizcaino to bedside. MD requesting pt be dialyzed today, states give pt Renal diet today, will take for procedure tomorrow. Dialysis notified, surgery dept notified. Care handed over to CHRISTINA Liu.

## 2020-08-17 NOTE — PLAN OF CARE
Problem: Physical Therapy Goal  Goal: Physical Therapy Goal  Description:   Goals to be met by: 9/10/2020     Patient will increase functional independence with mobility by performin. Supine to sit with Contact Guard Assistance-met 2020  REVISED: Supervision  2. Sit to supine with MInimal Assistance-met 2020  REVISED: Supervision  3. Rolling L/R with Modified [place] and use of bedrail as needed.  4. Sit to stand transfer with Minimal Assistance  5. Bed to chair transfer with Minimal Assistance with or without AD  6. Gait  x 15 feet as able with Minimal Assistance with or without AD  7. Lower extremity exercise program x10-15 reps with supervision  8. Increase functional strength to 4- to 4 for bed mobility and transfers, gait if able  9. Wheelchair mobility x 50ft x 2 with Supervision  2020 1003 by Nimco Castro, PT  Outcome: Ongoing, Progressing  Patient with improved sup<>sit; requires modA x 2 sit to stand with decreased standing tolerance; will cont with POC.

## 2020-08-17 NOTE — PROGRESS NOTES
[facility] Progress Note    Date of Admit: 2020  Length of Stay: 6    Chief Complaint/Reason for Consult     [facility] following for ESRD on HD Evaluation and Management    Subjective:      Patient s/p tunneled HD catheter L subclavian placement on 2020. AVG revision to be tomorrow per surgery.    Doing well this morning. Tolerated HD session over the weekend. Agreeable to routine ESRD MWF today. Denies chest pain, shortness of breath, or subjective fever/chills overnight. No new complaints. Participating with PT/OT    Objective:   Last 24 Hour Vital Signs:  BP  Min: 80/50  Max: 129/60  Temp  Av.6 °F (37 °C)  Min: 98.5 °F (36.9 °C)  Max: 98.8 °F (37.1 °C)  Pulse  Av.4  Min: 61  Max: 72  Resp  Av  Min: 17  Max: 20  SpO2  Av.7 %  Min: 90 %  Max: 96 %  Body mass index is 31.28 kg/m².  I/O last 3 completed shifts:  In: -   Out: 2 [Urine:1; Stool:1]    Physical Examination:  General: No acute distress, resting comfortably on dialysis  HEENT: EOMI, PERRL, MMM,   Cardiovascular: NRRR, no murmurs or rubs.  Chest/Pulmonary: CTABL, no IWB, comfortable on nasal cannula  Abdomen: soft, nontender, nondistended, BS+  MSKL: without gross deformity, active FROM  Lymphatic: no appreciated cervical or submandibular LAD  Skin: without cyanosis or clubbing, without trace edema  Neurologic: AAOx3, following two step commands  Access: RUE RC AVF with adequate systolic now absent diastolic thrill, + bruits, increased pulsatility on arterial inflow. New L subclavian tunneled HD catheter appears c/d/i.     Laboratory:  Most Recent Data:  CBC:   Lab Results   Component Value Date    WBC 7.18 2020    HGB 11.3 (L) 2020    HCT 35.6 (L) 2020     2020    MCV 88 2020    RDW 15.5 (H) 2020     BMP:   Lab Results   Component Value Date     (L) 08/15/2020    K 4.8 08/15/2020    CL 96 08/15/2020    CO2 26 08/15/2020    BUN 46 (H) 08/15/2020    CREATININE 6.2 (H)  08/15/2020     (H) 08/15/2020    CALCIUM 8.7 08/15/2020    MG 2.3 08/09/2020    PHOS 4.3 08/15/2020     LFTs:   Lab Results   Component Value Date    PROT 7.0 08/14/2020    ALBUMIN 2.5 (L) 08/15/2020    BILITOT 0.4 08/14/2020    AST 26 08/14/2020    ALKPHOS 66 08/14/2020    ALT 18 08/14/2020     Coags:   Lab Results   Component Value Date    INR 1.0 08/09/2020     Urinalysis:   Lab Results   Component Value Date    LABURIN  11/08/2016     Multiple organisms isolated. None in predominance.  Repeat if    LABURIN clinically necessary. 11/08/2016    COLORU Yellow 07/29/2019    SPECGRAV 1.015 07/29/2019    NITRITE Negative 07/29/2019    KETONESU Negative 07/29/2019    UROBILINOGEN Negative 07/29/2019    WBCUA 5 07/29/2019       Trended Lab Data:  Recent Labs   Lab 08/12/20  0421 08/13/20  0638 08/14/20  0121 08/15/20  0805   WBC 3.86* 4.21 7.18  --    HGB 11.0* 10.5* 11.3*  --    HCT 36.0* 34.3* 35.6*  --     180 207  --    MCV 90 89 88  --    RDW 15.9* 15.5* 15.5*  --    * 132* 130* 132*   K 4.8 4.7 5.6* 4.8   CL 98 95 93* 96   CO2 25 24 22* 26   BUN 37* 50* 60* 46*   CREATININE 6.2* 7.8* 8.3* 6.2*   * 154* 175* 188*   PROT 6.9 6.9 7.0  --    ALBUMIN 3.2* 3.2* 3.4* 2.5*   BILITOT 0.5 0.4 0.4  --    AST 20 21 26  --    ALKPHOS 61 61 66  --    ALT 15 15 18  --        Trended Cardiac Data:  Recent Labs   Lab 08/13/20  1941 08/14/20  0121 08/14/20  0749   TROPONINI 0.029* 0.026 0.019       Microbiology Data:  Microbial cultures from knee aspirate no growth to date    Other Laboratory Data:  Sed rate 48    Other Results:      Radiology:  Imaging Results          CT Pelvis Without Contrast (Final result)  Result time 08/09/20 21:10:28   Procedure changed from CT Hip Without Contrast Left     Final result by Vito Garcia MD (08/09/20 21:10:28)                 Impression:      Negative CT of the pelvis for fracture specially of the left hip.    Degenerative changes most severe in the lumbar  spine with erosive spondyloarthropathy at the L4-5 level.    Severe atherosclerotic vascular disease most severe the SFA bilaterally.      Electronically signed by: Vito Bovestri  Date:    08/09/2020  Time:    21:10             Narrative:    EXAMINATION:  CT PELVIS WITHOUT CONTRAST    CLINICAL HISTORY:  Hip trauma, nondiagnostic xray;    TECHNIQUE:  Axial CT images of the left hip were obtained with sagittal and coronal reformatted images.    COMPARISON:  Plain film of the hip,, 08/09/2020    FINDINGS:  The cortex and trabecular markings of the femur appear intact without displaced fracture or bony destructive process.  The intertrochanteric and proximal diaphysis appear intact as well.    Moderate osteoarthritis of the femoroacetabular joint is present without evidence of pincer or CAM deformity.    The adjacent pelvic bones are intact.  There is mild irregularity of the ischial tuberosity insertion of the hamstring tendons suggesting mild insertional tendinitis.    The endplates of the lumbar spine are markedly irregular with erosive changes at the L4-5 level and mild compression of the anterior endplate of L5 appearing remote with vacuum phenomenon.  Facet arthropathy is prominent.  There is no spondylolysis or significant central canal stenosis evident as imaged.  Degenerative changes in the SI joints are present.    Atherosclerotic disease and a normal caliber aorta and iliac system are noted.  This extends extensively into the SFA bilaterally.    The uterus demonstrates multiple large chunky calcifications suggesting degenerating fibroadenomas.  The urinary bladder appears unremarkable.  There is no pelvic mass or free fluid.                                CT Knee Without Contrast Left (Final result)  Result time 08/09/20 20:54:03    Final result by Fuentes Vincent MD (08/09/20 20:54:03)                 Impression:      Findings indicating destruction of the knee joint secondary to aseptic  process.    This report was flagged in Epic as abnormal.      Electronically signed by: Fuentes Vincent  Date:    08/09/2020  Time:    20:54             Narrative:    EXAMINATION:  CT KNEE WITHOUT CONTRAST LEFT    CLINICAL HISTORY:  Knee instability;    TECHNIQUE:  Multiple axial images of the left knee were obtained without the aid of IV contrast.  The study was reformatted creating a 3D image was separate workstation under direct supervision.    COMPARISON:  None    FINDINGS:  The knee joint reveals significant damage and deformity which is consistent with a septic joint.  The tibial plateau is fractured and there does not appear to be any discernible normal articular surface involving either the tibial plateau or the distal femur.  Both condyles of the distal femur are deformed and appear to be diminutive.  The articular surfaces of the femur and tibia appear to be mild feeding.  There is no normal articulation between the femur and the tibial plateau.  The surrounding soft tissues of the knee shows soft tissue induration and what appears to be fluid collection which most likely represents abscess fluid.  Suggest consideration aspiration in the for the purposes of obtaining culture and sensitivity information.                                   X-Ray Pelvis Routine AP (Edited Result - FINAL)  Result time 08/09/20 18:37:50    Addendum 1 of 1 by Poncho Handley MD (08/09/20 18:37:50)      This report was flagged in Epic as abnormal.    Please note, after initial interpretation of this examination, radiograph of the abdomen became available for review.  In retrospect, there appears to be lucency involving the right inferior pubic ramus concerning for fracture, better seen on that examination.  Acuity is unknown, correlation with focal tenderness is advised.      Electronically signed by: Poncho Handley MD  Date:    08/09/2020  Time:    18:37               Final result by Poncho Handley MD (08/09/20 18:31:05)                  Impression:      1. No convincing acute displaced fracture or dislocation of the pelvis.      Electronically signed by: Poncho Handley MD  Date:    08/09/2020  Time:    18:31             Narrative:    EXAMINATION:  XR PELVIS ROUTINE AP    CLINICAL HISTORY:  Unspecified fall, initial encounter    TECHNIQUE:  AP view of the pelvis was performed.    COMPARISON:  None.    FINDINGS:  Single-view pelvis.    There is osteopenia.  Degenerative changes are noted of the bilateral sacroiliac joints and pubic symphysis.  The bilateral femoroacetabular joints are intact.  There is vascular calcification.                                   Assessment and Plan:      Angelina Beard is a 69 year old man with pertinent medical history of CAD, Chronic heart failure with preserved ejection fraction, pulmonary hypertension (likely WHO group II), HLD, hypertension, DM type II, sarcoidosis, and ESRD on HD MWF with Dr. Santamaria at [facility]. Admitted for evaluation of diarrhea and weakness. [facility] consulted for ESRD on HD evaluation and management.       #) ESRD on HD (MWF)  - follows with Dr. Santamaria at [facility]  - Routine HD today; will have AVG tomorrow.   - will continue on MWF schedule while inpatient  - on abx w/ ceftriaxone and Vancomycin prior to AVG revision on Monday  - dose meds for GFR < 10  - nephro caps  - strict I/O, daily weights  - please avoid gadolinium, fleets, phos-based laxatives, NSAIDs  - patient to follow up with her HD unit after discharge    #) Hypertension   - amlodipine 10mg  - furosemide 40mg -> can stop as patient not making urine and unlikely efficacious as this dose  - hydralazine 10mg TID starter per primary team; modified order to place hold parameters for morning of HD days    #) atrial fibrillation  -eliquis on hold given upcoming procedure  -consider heparin gtt with hold on midnight evening prior to procedure    #) Anemia Evaluation  Lab Results   Component Value  Date    HGB 11.3 (L) 08/14/2020    MCV 88 08/14/2020    FERRITIN 1,215 (H) 11/20/2019    FESATURATED 14 (L) 11/20/2019    [id number] 389 03/03/2018    FOLATE 4.5 03/03/2018   - no need for EPO or iron at this time.    #)  CKD/Mineral Bone Disease/Nutrition   Lab Results   Component Value Date    PHOS 4.3 08/15/2020    CALCIUM 8.7 08/15/2020    MG 2.3 08/09/2020    CO2 26 08/15/2020    ALBUMIN 2.5 (L) 08/15/2020   - continue phoslo TID w/ meals; please check phos every other day  - nepro with meals  - nephrocaps daily  - standard bicarb bath with HD    Thank you for this consult. We will continue to follow with you.    Enrrique Pearl MD  [facility]  [phone]    If after 5pm please forward any questions to the [facility] Fellow/Attending on call.

## 2020-08-18 LAB
ANION GAP SERPL CALC-SCNC: 14 MMOL/L (ref 8–16)
BUN SERPL-MCNC: 38 MG/DL (ref 8–23)
CALCIUM SERPL-MCNC: 9.7 MG/DL (ref 8.7–10.5)
CHLORIDE SERPL-SCNC: 100 MMOL/L (ref 95–110)
CO2 SERPL-SCNC: 23 MMOL/L (ref 23–29)
CREAT SERPL-MCNC: 5.2 MG/DL (ref 0.5–1.4)
EST. GFR  (AFRICAN AMERICAN): 9 ML/MIN/1.73 M^2
EST. GFR  (NON AFRICAN AMERICAN): 8 ML/MIN/1.73 M^2
GLUCOSE SERPL-MCNC: 148 MG/DL (ref 70–110)
POCT GLUCOSE: 111 MG/DL (ref 70–110)
POCT GLUCOSE: 136 MG/DL (ref 70–110)
POCT GLUCOSE: 159 MG/DL (ref 70–110)
POTASSIUM SERPL-SCNC: 4.1 MMOL/L (ref 3.5–5.1)
SODIUM SERPL-SCNC: 137 MMOL/L (ref 136–145)

## 2020-08-18 PROCEDURE — 63600175 PHARM REV CODE 636 W HCPCS: Performed by: SURGERY

## 2020-08-18 PROCEDURE — 21400001 HC TELEMETRY ROOM

## 2020-08-18 PROCEDURE — 36000707: Performed by: SURGERY

## 2020-08-18 PROCEDURE — 88300 PR  SURG PATH,GROSS,LEVEL I: ICD-10-PCS | Mod: 26,,, | Performed by: PATHOLOGY

## 2020-08-18 PROCEDURE — 25500020 PHARM REV CODE 255: Performed by: SURGERY

## 2020-08-18 PROCEDURE — 25000003 PHARM REV CODE 250: Performed by: INTERNAL MEDICINE

## 2020-08-18 PROCEDURE — 27201423 OPTIME MED/SURG SUP & DEVICES STERILE SUPPLY: Performed by: SURGERY

## 2020-08-18 PROCEDURE — 37000008 HC ANESTHESIA 1ST 15 MINUTES: Performed by: SURGERY

## 2020-08-18 PROCEDURE — 71000033 HC RECOVERY, INTIAL HOUR: Performed by: SURGERY

## 2020-08-18 PROCEDURE — 88300 SURGICAL PATH GROSS: CPT | Mod: 26,,, | Performed by: PATHOLOGY

## 2020-08-18 PROCEDURE — 25000003 PHARM REV CODE 250: Performed by: SURGERY

## 2020-08-18 PROCEDURE — 99900035 HC TECH TIME PER 15 MIN (STAT)

## 2020-08-18 PROCEDURE — 64415 NJX AA&/STRD BRCH PLXS IMG: CPT | Performed by: STUDENT IN AN ORGANIZED HEALTH CARE EDUCATION/TRAINING PROGRAM

## 2020-08-18 PROCEDURE — 63600175 PHARM REV CODE 636 W HCPCS: Performed by: STUDENT IN AN ORGANIZED HEALTH CARE EDUCATION/TRAINING PROGRAM

## 2020-08-18 PROCEDURE — 25000003 PHARM REV CODE 250: Performed by: HOSPITALIST

## 2020-08-18 PROCEDURE — 25000003 PHARM REV CODE 250: Performed by: STUDENT IN AN ORGANIZED HEALTH CARE EDUCATION/TRAINING PROGRAM

## 2020-08-18 PROCEDURE — 36000706: Performed by: SURGERY

## 2020-08-18 PROCEDURE — 76942 ECHO GUIDE FOR BIOPSY: CPT | Performed by: STUDENT IN AN ORGANIZED HEALTH CARE EDUCATION/TRAINING PROGRAM

## 2020-08-18 PROCEDURE — 27000221 HC OXYGEN, UP TO 24 HOURS

## 2020-08-18 PROCEDURE — 36415 COLL VENOUS BLD VENIPUNCTURE: CPT

## 2020-08-18 PROCEDURE — 37000009 HC ANESTHESIA EA ADD 15 MINS: Performed by: SURGERY

## 2020-08-18 PROCEDURE — C1768 GRAFT, VASCULAR: HCPCS | Performed by: SURGERY

## 2020-08-18 PROCEDURE — 88300 SURGICAL PATH GROSS: CPT | Performed by: PATHOLOGY

## 2020-08-18 PROCEDURE — 25000003 PHARM REV CODE 250: Performed by: NURSE PRACTITIONER

## 2020-08-18 PROCEDURE — 80048 BASIC METABOLIC PNL TOTAL CA: CPT

## 2020-08-18 PROCEDURE — 94761 N-INVAS EAR/PLS OXIMETRY MLT: CPT

## 2020-08-18 DEVICE — IMPLANTABLE DEVICE: Type: IMPLANTABLE DEVICE | Site: ARM | Status: FUNCTIONAL

## 2020-08-18 RX ORDER — CEFAZOLIN SODIUM 1 G/3ML
INJECTION, POWDER, FOR SOLUTION INTRAMUSCULAR; INTRAVENOUS
Status: DISCONTINUED | OUTPATIENT
Start: 2020-08-18 | End: 2020-08-18

## 2020-08-18 RX ORDER — AMOXICILLIN 250 MG
1 CAPSULE ORAL 2 TIMES DAILY
Status: DISCONTINUED | OUTPATIENT
Start: 2020-08-18 | End: 2020-08-19 | Stop reason: HOSPADM

## 2020-08-18 RX ORDER — ROPIVACAINE HYDROCHLORIDE 5 MG/ML
INJECTION, SOLUTION EPIDURAL; INFILTRATION; PERINEURAL
Status: DISCONTINUED | OUTPATIENT
Start: 2020-08-18 | End: 2020-08-18

## 2020-08-18 RX ORDER — HEPARIN SODIUM 1000 [USP'U]/ML
INJECTION INTRAVENOUS; SUBCUTANEOUS
Status: DISCONTINUED | OUTPATIENT
Start: 2020-08-18 | End: 2020-08-18

## 2020-08-18 RX ORDER — HYDROCODONE BITARTRATE AND ACETAMINOPHEN 5; 325 MG/1; MG/1
1 TABLET ORAL
Status: DISCONTINUED | OUTPATIENT
Start: 2020-08-18 | End: 2020-08-19 | Stop reason: HOSPADM

## 2020-08-18 RX ORDER — HYDROMORPHONE HYDROCHLORIDE 2 MG/ML
0.5 INJECTION, SOLUTION INTRAMUSCULAR; INTRAVENOUS; SUBCUTANEOUS EVERY 5 MIN PRN
Status: ACTIVE | OUTPATIENT
Start: 2020-08-18 | End: 2020-08-18

## 2020-08-18 RX ORDER — SODIUM CHLORIDE 0.9 % (FLUSH) 0.9 %
10 SYRINGE (ML) INJECTION
Status: DISCONTINUED | OUTPATIENT
Start: 2020-08-18 | End: 2020-08-19 | Stop reason: HOSPADM

## 2020-08-18 RX ORDER — BACITRACIN 50000 [IU]/1
INJECTION, POWDER, FOR SOLUTION INTRAMUSCULAR
Status: DISCONTINUED | OUTPATIENT
Start: 2020-08-18 | End: 2020-08-18 | Stop reason: HOSPADM

## 2020-08-18 RX ORDER — LIDOCAINE HCL/PF 100 MG/5ML
SYRINGE (ML) INTRAVENOUS
Status: DISCONTINUED | OUTPATIENT
Start: 2020-08-18 | End: 2020-08-18

## 2020-08-18 RX ORDER — HEPARIN SODIUM 10000 [USP'U]/ML
INJECTION, SOLUTION INTRAVENOUS; SUBCUTANEOUS
Status: DISCONTINUED | OUTPATIENT
Start: 2020-08-18 | End: 2020-08-18 | Stop reason: HOSPADM

## 2020-08-18 RX ORDER — PROPOFOL 10 MG/ML
VIAL (ML) INTRAVENOUS CONTINUOUS PRN
Status: DISCONTINUED | OUTPATIENT
Start: 2020-08-18 | End: 2020-08-18

## 2020-08-18 RX ORDER — CLOPIDOGREL BISULFATE 75 MG/1
75 TABLET ORAL DAILY
Status: DISCONTINUED | OUTPATIENT
Start: 2020-08-19 | End: 2020-08-18

## 2020-08-18 RX ADMIN — CEFAZOLIN 1 G: 330 INJECTION, POWDER, FOR SOLUTION INTRAMUSCULAR; INTRAVENOUS at 11:08

## 2020-08-18 RX ADMIN — AMLODIPINE BESYLATE 10 MG: 5 TABLET ORAL at 09:08

## 2020-08-18 RX ADMIN — HEPARIN SODIUM 1000 UNITS: 1000 INJECTION INTRAVENOUS; SUBCUTANEOUS at 12:08

## 2020-08-18 RX ADMIN — PHENYLEPHRINE HYDROCHLORIDE 20 MCG/MIN: 10 INJECTION INTRAVENOUS at 03:08

## 2020-08-18 RX ADMIN — DOCUSATE SODIUM 100 MG: 100 CAPSULE, LIQUID FILLED ORAL at 09:08

## 2020-08-18 RX ADMIN — PROPOFOL 10 MCG/KG/MIN: 10 INJECTION, EMULSION INTRAVENOUS at 11:08

## 2020-08-18 RX ADMIN — LIDOCAINE HYDROCHLORIDE 40 MG: 20 INJECTION, SOLUTION INTRAVENOUS at 11:08

## 2020-08-18 RX ADMIN — MIRTAZAPINE 7.5 MG: 7.5 TABLET ORAL at 09:08

## 2020-08-18 RX ADMIN — ROPIVACAINE HYDROCHLORIDE 20 ML: 5 INJECTION, SOLUTION EPIDURAL; INFILTRATION; PERINEURAL at 11:08

## 2020-08-18 RX ADMIN — METOPROLOL SUCCINATE 50 MG: 25 TABLET, FILM COATED, EXTENDED RELEASE ORAL at 09:08

## 2020-08-18 RX ADMIN — DOCUSATE SODIUM 50 MG AND SENNOSIDES 8.6 MG 1 TABLET: 8.6; 5 TABLET, FILM COATED ORAL at 09:08

## 2020-08-18 NOTE — TRANSFER OF CARE
"Anesthesia Transfer of Care Note    Patient: Angelina Beard    Procedure(s) Performed: Procedure(s) (LRB):  REVISION, PROCEDURE INVOLVING ARTERIOVENOUS GRAFT (Right)  ANGIOPLASTY, USING PATCH GRAFT (Right)    Patient location: PACU    Anesthesia Type: regional and MAC    Transport from OR: Transported from OR on 2-3 L/min O2 by NC with adequate spontaneous ventilation    Post pain: pain needs to be addressed    Post assessment: no apparent anesthetic complications and tolerated procedure well    Post vital signs: stable    Level of consciousness: awake and alert    Nausea/Vomiting: no nausea/vomiting    Complications: none    Transfer of care protocol was followed      Last vitals:   Visit Vitals  BP (!) 144/84 (BP Location: Left arm, Patient Position: Lying)   Pulse 70   Temp 36.8 °C (98.2 °F) (Oral)   Resp 18   Ht 5' 7" (1.702 m)   Wt 88.4 kg (194 lb 14.2 oz)   LMP  (LMP Unknown)   SpO2 96%   Breastfeeding No   BMI 30.52 kg/m²     "

## 2020-08-18 NOTE — ASSESSMENT & PLAN NOTE
Taking amlodipine 10 mg daily and metoprolol 50 mg daily  - added hydralazine 10mg tid on 8/15 with improvement

## 2020-08-18 NOTE — PROGRESS NOTES
Progress Note    Admit Date: 8/9/2020   LOS: 7 days   POD # 5 perma-cath placement    SUBJECTIVE:     No acute events overnight.  No issues of fistula.  No further bleeding. Dialyzed well through perma-cath yesterday.  No nausea or vomiting.  COVID negative yesterday    Scheduled Meds:   amLODIPine  10 mg Oral Daily    calcium acetate(phosphat bind)  667 mg Oral TID WM    docusate sodium  100 mg Oral BID    hydrALAZINE  10 mg Oral TID    metoprolol succinate  50 mg Oral Daily    mirtazapine  7.5 mg Oral QHS    pantoprazole  40 mg Oral Daily    polyethylene glycol  17 g Oral Daily    pravastatin  80 mg Oral Daily    sertraline  100 mg Oral Daily    vitamin renal formula (B-complex-vitamin c-folic acid)  1 capsule Oral Daily     Continuous Infusions:  PRN Meds:sodium chloride 0.9%, sodium chloride 0.9%, acetaminophen, [COMPLETED] calcium gluconate IVPB **AND** calcium gluconate IVPB, dextrose 50%, dextrose 50%, diphenhydrAMINE, gabapentin, glucagon (human recombinant), glucose, glucose, heparin (porcine), hydrALAZINE, HYDROcodone-acetaminophen, HYDROmorphone, insulin aspart U-100, ondansetron, ondansetron, promethazine (PHENERGAN) IVPB, sodium chloride 0.9%, sodium chloride 0.9%    Review of patient's allergies indicates:  No Known Allergies      OBJECTIVE:     Vital Signs (Most Recent)  Temp: 98.2 °F (36.8 °C) (08/18/20 0443)  Pulse: 65 (08/18/20 0459)  Resp: 18 (08/18/20 0443)  BP: (!) 169/68 (08/18/20 0443)  SpO2: (!) 94 % (08/18/20 0414)    Vital Signs Range (Last 24H):  Temp:  [97.3 °F (36.3 °C)-98.9 °F (37.2 °C)]   Pulse:  [60-97]   Resp:  [15-20]   BP: ()/(51-88)   SpO2:  [92 %-99 %]     I & O (Last 24H):    Intake/Output Summary (Last 24 hours) at 8/18/2020 0705  Last data filed at 8/17/2020 1311  Gross per 24 hour   Intake 500 ml   Output 3400 ml   Net -2900 ml       Physical Exam:  Gen: Awake, alert, oriented. NAD  Resp: Respirations even and unlabored on room air  CV: Regular rate, 2+  radial pulses.  Palpable thrill in her right upper extremity fistula.  Left chest PermCath in place with dressing overlying, CDI  GI: Soft, nontender, nondistended  MSK: Moving all four extremities spontaneously    LABS  CBC  Recent Labs   Lab 08/12/20 0421 08/13/20 0638 08/14/20  0121   WBC 3.86* 4.21 7.18   RBC 3.99* 3.86* 4.05   HGB 11.0* 10.5* 11.3*   HCT 36.0* 34.3* 35.6*    180 207   MCV 90 89 88   MCH 27.6 27.2 27.9   MCHC 30.6* 30.6* 31.7*     CMP  Recent Labs   Lab 08/12/20 0421 08/13/20 0638 08/14/20  0121 08/15/20  0805   * 154* 175* 188*   CALCIUM 9.0 8.8 9.0 8.7   ALBUMIN 3.2* 3.2* 3.4* 2.5*   PROT 6.9 6.9 7.0  --    * 132* 130* 132*   K 4.8 4.7 5.6* 4.8   CO2 25 24 22* 26   CL 98 95 93* 96   BUN 37* 50* 60* 46*   CREATININE 6.2* 7.8* 8.3* 6.2*   ALKPHOS 61 61 66  --    ALT 15 15 18  --    AST 20 21 26  --    BILITOT 0.5 0.4 0.4  --        Recent Labs   Lab 08/13/20 0638 08/14/20  0121 08/15/20  0805   CALCIUM 8.8 9.0 8.7   PHOS  --   --  4.3         POCT-Glucose  POCT Glucose   Date Value Ref Range Status   08/18/2020 136 (H) 70 - 110 mg/dL Final   08/17/2020 207 (H) 70 - 110 mg/dL Final   08/17/2020 170 (H) 70 - 110 mg/dL Final   08/17/2020 145 (H) 70 - 110 mg/dL Final   08/16/2020 137 (H) 70 - 110 mg/dL Final   08/16/2020 192 (H) 70 - 110 mg/dL Final   08/16/2020 241 (H) 70 - 110 mg/dL Final   08/16/2020 195 (H) 70 - 110 mg/dL Final   08/15/2020 214 (H) 70 - 110 mg/dL Final   08/15/2020 199 (H) 70 - 110 mg/dL Final       COAGS  No results for input(s): PT, INR, APTT in the last 168 hours.    CE  Recent Labs   Lab 08/13/20  1941 08/14/20  0121 08/14/20  0749   TROPONINI 0.029* 0.026 0.019     BNP  No results for input(s): BNP in the last 168 hours.    ABGs  No results for input(s): PH, PCO2, PO2, HCO3, POCSATURATED, BE in the last 24 hours.    UA  No results for input(s): COLORU, CLARITYU, SPECGRAV, PHUR, PROTEINUA, GLUCOSEU, BILIRUBINCON, BLOODU, WBCU, RBCU, BACTERIA, MUCUS,  NITRITE, LEUKOCYTESUR, UROBILINOGEN, HYALINECASTS in the last 168 hours.    LAST HbA1c  Lab Results   Component Value Date    HGBA1C 6.8 (H) 08/10/2020       ASSESSMENT/PLAN:   POD #5 primary cath placement  69 y.o.female with history of CAD, CHF, pulmonary HTN, DM2, sarcoidosis, and ESRD with dialysis graft stenosis.    -Case postponed to today due to scheduling issues yesterday  -plan for OR today for graft revision.  Discussed with patient that if graft is unable to be salvaged, new graft may be placed.  -further recommendations pending outcome of surgery  -remainder of management per primary team    Tahir Laws MD  PGY-2, [facility]  Neuroendocrine Surgery  8/18/2020

## 2020-08-18 NOTE — ASSESSMENT & PLAN NOTE
In PT/OT: recommend SNF  Possibly [facility]  Patient states that she would prefer to go home with C

## 2020-08-18 NOTE — SUBJECTIVE & OBJECTIVE
Interval History:  Out of the room for most of the day; had AV graft revision today. Tolerated well. Reports some continued mild knee pain but overall feels good. Discussed potential transfer to [facility] tomorrow am.    Review of Systems   Constitutional: Negative for chills and fever.   Respiratory: Negative for cough, shortness of breath and wheezing.    Cardiovascular: Negative for chest pain, palpitations and leg swelling.   Gastrointestinal: Negative for abdominal pain, blood in stool, constipation, diarrhea, nausea and vomiting.   Genitourinary: Negative for dysuria and hematuria.   Musculoskeletal: Negative for back pain.   Skin: Negative for rash.   Neurological: Negative for dizziness, weakness and headaches.     Objective:     Vital Signs (Most Recent):  Temp: 98.2 °F (36.8 °C) (08/18/20 1730)  Pulse: 62 (08/18/20 1730)  Resp: 15 (08/18/20 1730)  BP: 117/74 (08/18/20 1730)  SpO2: 98 % (08/18/20 1730) Vital Signs (24h Range):  Temp:  [97.6 °F (36.4 °C)-98.9 °F (37.2 °C)] 98.2 °F (36.8 °C)  Pulse:  [60-97] 62  Resp:  [13-20] 15  SpO2:  [85 %-100 %] 98 %  BP: ()/(45-85) 117/74     Weight: 88.4 kg (194 lb 14.2 oz)  Body mass index is 30.52 kg/m².  No intake or output data in the 24 hours ending 08/18/20 1803   Physical Exam  Vitals signs and nursing note reviewed.   Constitutional:       General: She is not in acute distress.     Appearance: She is well-developed.   Eyes:      Conjunctiva/sclera: Conjunctivae normal.   Neck:      Vascular: No JVD.   Cardiovascular:      Rate and Rhythm: Normal rate and regular rhythm.      Heart sounds: Normal heart sounds.   Pulmonary:      Effort: Pulmonary effort is normal.      Breath sounds: Normal breath sounds. No wheezing.   Abdominal:      General: Bowel sounds are normal.      Palpations: Abdomen is soft.      Tenderness: There is no abdominal tenderness.   Musculoskeletal:         General: No tenderness.   Skin:     General: Skin is warm and dry.      Capillary  Refill: Capillary refill takes less than 2 seconds.   Neurological:      Mental Status: She is alert and oriented to person, place, and time.         Significant Labs: BMP:   Recent Labs   Lab 08/18/20  0824   *      K 4.1      CO2 23   BUN 38*   CREATININE 5.2*   CALCIUM 9.7     CBC: No results for input(s): WBC, HGB, HCT, PLT in the last 48 hours.    Significant Imaging: I have reviewed all pertinent imaging results/findings within the past 24 hours.

## 2020-08-18 NOTE — PLAN OF CARE
TN sent secure chat to pt's nurse Connie and DAHLIA to pass on to nightshift during report to have AM nurse call dialysis in the morning to see if pt can go as early as possible due to possible d/c to SNF tomorrow.

## 2020-08-18 NOTE — OP NOTE
[facility]  Surgery Department  Operative Note    SUMMARY     Date of Procedure: 8/18/2020     Procedure:   1.   Intraoperative venogram  2.  Intraoperative fluoroscopy less than 1 our interpretation done by me  3.  Revision AV graft with patch angioplasty of the outflow  4.  Jump graft of the venous segment    Surgeo intraoperative curt Purcell MD - Primary  Assisting Surgeon: zachary Hart    Pre-Operative Diagnosis: ESRD on dialysis [N18.6, Z99.2]  Complication of vascular access for dialysis, initial encounter [T82.9XXA]    Post-Operative Diagnosis: Post-Op Diagnosis Codes:     * ESRD on dialysis [N18.6, Z99.2]     * Complication of vascular access for dialysis, initial encounter [T82.9XXA]    Anesthesia: Regional  History and indications:  This 69-year-old female was admitted to the hospital as she came to the ER with significant bleeding from the dialysis access site.  E mergency suturing was done to control the bleeding and patient was evaluated she was found to have outflow stenosis on duplex. she was on anticoagulation therefore she could not be taken to surgery immediately.  PermCath was placed for dialysis and she was scheduled for surgery today this was discussed with the patient and informed consent was obtained    Finding:   Outflow stenosis.  Intimal hyperplasia of the outflow.  After completing patch angioplasty of the outflow patient did not have a bruit. venogram showed to severe stenosis area as of the venous  Segment of the graft.  After the jump graft was done there was easily palpable thrill on the graft    Procedure:    Patient was brought to the operating room after regional block was given at the holding area.  She was given sedation.  Her right upper extremity was kept in outstretched fashion and was prepped and draped in the usual sterile manner.  Time-out was done to verify the ID of the patient and procedure and everyone concurred..  The deeper outflow    An  incision was made below the elbow crease in a transverse fashion over the previous incision.  Subcutaneous dissection was done and the arterial and the venous limb was completely dissected and isolated.  Venogram was done by placing a butterfly needle into the arterial and venous segment of the graft and fluoroscopy was done.  There was significant narrowing of the outflow segment.  The venous size end was anastomosed to the antecubital vein which was draining to the best iliac vein.  Patient had good deeper vein system and also a cephalic vein.  The deeper collaterals were well-developed and was seen on the venogram.  However the output was the basilic vein and the cephalic vein was restricted.  On injecting the dye through the arterial limb I was able to see the narrowing of the graft however since the outflow was more impressive I decided to address the outflow segment    The venous end of the Rodman-Kumar graft and the venous tributaries such as basilic vein cephalic vein and deeper venous system was carefully dissected out and isolated using vessel loops.  A venotomy was made over the cephalic vein and was extended on to the venous limb of the Rodman-Kumar graft.  Proximal and distal control was accomplished after giving 1000 units of IV heparin.  A patch from Rodman-Kumar was taken and was carefully trimmed in a phoebe-shaped fashion and it was secured to the venous outflow segment using 6 0 Prolene stitch all around.  The clamps were removed and patient had excellent pulse but not thrill.    I repeated the venogram again in a similar fashion.  There were 2 areas of severe stenosis of the venous limb of the AV Rodman-Kumar graft.  The arterial limb did not have stenosis however there was a small area of extravasation about 5 in beyond the arterial anastomosis which was contained.  Since there was no thrill I decided to perform a jump graft.  A 40 x 6 mm Rodman-Kumar graft was taken.  A counter incision was made on the distal  aspect of the forearm at the site of previous incision.  Using fluoroscopy the sites of narrowing were marked and the graft was isolated way proximal to the sites of narrowing.\    Another 1000 units of intravenous heparin was given.  A tunnel was made between the incision of the elbow on the counter incision at the distal forearm.  After making the tunnel there was significant venous wounds coming from the sides of the graft on the venous side.  I initially thought I must have cause bleeding from 1 of the venous collaterals.  Therefore a counter incision was made on the on the forearm over the graft area.  Deeper dissection was made.  The graft was isolated for about 5 cm.  And I found the bleeding from 1 of the needle sites of the graft.  A short segment of the graft was then excised meticulous hemostasis was accomplished.  A subcutaneous tunnel was made lateral to the existing location of the venous end of the graft.  The the new [place]-Kumar graft was brought through the tunnel.  Anastomosis was completed between the existing limb of the [place]-Kumar graft and to the [place]-Kumar graft close to the area where the patch angioplasty was done after the anastomosis clamps were removed patient had excellent palpable thrill.  Meticulous hemostasis was then accomplished all cipriano anastomotic sites were all hemostatic.  The skin incisions were all closed in 2 layers using 4 0. monocryl.  Dermabond was applied over the skin incision patient was stable was taken to the recovery room in stable condition with no complication          Complications: no  Estimated Blood Loss (EBL): 100ml           Implants:   Implant Name Type Inv. Item Serial No.  Lot No. LRB No. Used Action   PATCH CV FABRIC KNITTED 1X3 - [id number]  PATCH CV FABRIC KNITTED 1X3  MAQUET [id number] Right 1 Implanted   GRAFT STR STRETCH TW [id number] - [id number]  GRAFT STR STRETCH TW [id number]  W.LEvelin ESPINAL [id number] Right 1 Implanted       Specimens:   graft             Condition: Stable    Disposition: PACU - hemodynamically stable.    Attestation: I was present and scrubbed for the entire procedure.

## 2020-08-18 NOTE — ANESTHESIA POSTPROCEDURE EVALUATION
Anesthesia Post Evaluation    Patient: Angelina Beard    Procedure(s) Performed: Procedure(s) (LRB):  REVISION, PROCEDURE INVOLVING ARTERIOVENOUS GRAFT (Right)  ANGIOPLASTY, USING PATCH GRAFT (Right)    Final Anesthesia Type: general    Patient location during evaluation: PACU  Patient participation: Yes- Able to Participate  Level of consciousness: awake and alert  Post-procedure vital signs: reviewed and stable  Pain management: adequate  Airway patency: patent    PONV status at discharge: No PONV  Anesthetic complications: no      Cardiovascular status: blood pressure returned to baseline  Respiratory status: unassisted  Hydration status: euvolemic            Vitals Value Taken Time   /50 08/18/20 1711   Temp 36.5 °C (97.7 °F) 08/18/20 1650   Pulse 62 08/18/20 1713   Resp 6 08/18/20 1713   SpO2 100 % 08/18/20 1713   Vitals shown include unvalidated device data.      No case tracking events are documented in the log.      Pain/Kristofer Score: Pain Rating Prior to Med Admin: 0 (8/18/2020  5:20 AM)  Pain Rating Post Med Admin: 0 (8/18/2020  5:20 AM)  Kristofer Score: 10 (8/18/2020  4:50 PM)

## 2020-08-18 NOTE — PLAN OF CARE
Patient accepted by [facility] per Tara with [facility]. [place] spoke to pt's son Cristi regarding pt being accepted to [facility]. Pt's son aware due facility has made contact with him. Per pt's son, he will be going to sign admission's paperwork tomorrow at 7:30am.        08/18/20 0138   Post-Acute Status   Post-Acute Authorization Placement   Post-Acute Placement Status Awaiting Internal Medical Clearance

## 2020-08-18 NOTE — PROGRESS NOTES
[facility] Hospital Medicine  Progress Note    Patient Name: Angelina Beard  MRN: [id number]  Patient Class: IP- Inpatient   Admission Date: 8/9/2020  Length of Stay: 7 days  Attending Physician: Enrrique Maritn, *  Primary Care Provider: Ben Mena MD        Subjective:     Principal Problem:Complication of vascular access for dialysis        HPI:  Angelina Beard is a 70 yo female with hypertension, chronic diastolic congestive heart failure, paroxysmal atrial fibrillation (anticoagulated on apixaban), diabetes mellitus type 2 (hemoglobin A1c 6.9% on 4/28/2020) with neuropathy and nephropathy, hyperlipidemia, nonobstructive coronary artery disease, cerebral microvascular disease, end stage renal disease on hemodialysis (Monday Wednesday Friday at [facility] in [facility]), anemia, gastroesophageal reflux disease, osteoarthritis status post right total knee arthroplasty 2/23/16, chronic left leg pain, chronic constipation, cholelithiasis, history of right lung sarcoidosis, history of left breast cancer status post mastectomy, history of gastric ulcers on 7/13/18. Her primary care physician is Dr. Ben Mena. Her nephrologist is Dr. Rc Santamaria. Her cardiologist is Dr. Faraz Flores. Her podiatrist is Dr. Kwame Dacosta. She lives in [place], [place]. She is mostly wheelchair bound but can use a walker with assistance.     Pt presented to ED with c/o diarrhea, generalized weakness and acute on chronic left knee and left hip pain s/p mechanical fall 1 week ago. She denies vomiting, diarrhea, fever/chills and abdominal pain. KUB shows constipation, no evidence of obstruction. At baseline, she is in a wheelchair/walker due to weakness, but said she has had trouble with transfers for the past week due to her L knee pain. Denies chest pain and worsening SOB. Labs remarkable for K 6.4, troponin 0.036.  Xray shows R nondisplaced pubic ramus fracture and significant erosion of L  knee appears 2/2 sepitic arthropathy. Orthopedic surgery consulted by ED. Pt admitted to [facility].     Overview/Hospital Course:  No notes on file    Interval History:  Out of the room for most of the day; had AV graft revision today. Tolerated well. Reports some continued mild knee pain but overall feels good. Discussed potential transfer to SNF tomorrow am.    Review of Systems   Constitutional: Negative for chills and fever.   Respiratory: Negative for cough, shortness of breath and wheezing.    Cardiovascular: Negative for chest pain, palpitations and leg swelling.   Gastrointestinal: Negative for abdominal pain, blood in stool, constipation, diarrhea, nausea and vomiting.   Genitourinary: Negative for dysuria and hematuria.   Musculoskeletal: Negative for back pain.   Skin: Negative for rash.   Neurological: Negative for dizziness, weakness and headaches.     Objective:     Vital Signs (Most Recent):  Temp: 98.2 °F (36.8 °C) (08/18/20 1730)  Pulse: 62 (08/18/20 1730)  Resp: 15 (08/18/20 1730)  BP: 117/74 (08/18/20 1730)  SpO2: 98 % (08/18/20 1730) Vital Signs (24h Range):  Temp:  [97.6 °F (36.4 °C)-98.9 °F (37.2 °C)] 98.2 °F (36.8 °C)  Pulse:  [60-97] 62  Resp:  [13-20] 15  SpO2:  [85 %-100 %] 98 %  BP: ()/(45-85) 117/74     Weight: 88.4 kg (194 lb 14.2 oz)  Body mass index is 30.52 kg/m².  No intake or output data in the 24 hours ending 08/18/20 1803   Physical Exam  Vitals signs and nursing note reviewed.   Constitutional:       General: She is not in acute distress.     Appearance: She is well-developed.   Eyes:      Conjunctiva/sclera: Conjunctivae normal.   Neck:      Vascular: No JVD.   Cardiovascular:      Rate and Rhythm: Normal rate and regular rhythm.      Heart sounds: Normal heart sounds.   Pulmonary:      Effort: Pulmonary effort is normal.      Breath sounds: Normal breath sounds. No wheezing.   Abdominal:      General: Bowel sounds are normal.      Palpations: Abdomen is  soft.      Tenderness: There is no abdominal tenderness.   Musculoskeletal:         General: No tenderness.   Skin:     General: Skin is warm and dry.      Capillary Refill: Capillary refill takes less than 2 seconds.   Neurological:      Mental Status: She is alert and oriented to person, place, and time.         Significant Labs: BMP:   Recent Labs   Lab 08/18/20  0824   *      K 4.1      CO2 23   BUN 38*   CREATININE 5.2*   CALCIUM 9.7     CBC: No results for input(s): WBC, HGB, HCT, PLT in the last 48 hours.    Significant Imaging: I have reviewed all pertinent imaging results/findings within the past 24 hours.      Assessment/Plan:      * Complication of vascular access for dialysis  - s/p revision on 8/18      EKG abnormalities  Troponin elevation is flat.    EKG stable . No acute changes    Diarrhea  Better  Now she is constipated  - continue doc/senna and Miralax        Left knee pain  S/p mechanical fall 1 week ago  -imaging ? septic arthritis  -s/p empiric antibiotics in ED  - evaluated by orthopedic surgery, arthrocentesis done and not c/w septic joint  - stopped abx  -PT/OT consulted: recommend SNF      Hypertension associated with diabetes  Taking amlodipine 10 mg daily and metoprolol 50 mg daily  - added hydralazine 10mg tid on 8/15 with improvement      Hyperkalemia  - managed with HD  - s/p graft revision    ESRD on dialysis  Dialysis per Nephrology    Physical deconditioning  PT/OT: recommend SNF      Chronic diastolic congestive heart failure  Euvolemic on exam. Continue furosemide 40 mg daily. Patient still able to make urine.       Paroxysmal atrial fibrillation  Taking Apixaban- held in preparation for procedure today  - resume tomorrow      Anemia in end-stage renal disease  H&H stable      Debility  - SNF      Diabetic neuropathy associated with type 2 diabetes mellitus  Taking Gabapentin 300 mg TID prn- continue     GERD (gastroesophageal reflux disease)  Daily PPI        Hyperlipidemia  Continue statin       Type 2 diabetes mellitus with hypertension and end stage renal disease on dialysis  A1C 6.9   Low dose SSI, accucheck AC&HS , Diabetic diet   Her blood sugars are trending upward and she may require Januvia      Cerebral microvascular disease  Resume Plavix, statin         VTE Risk Mitigation (From admission, onward)         Ordered     heparin (porcine) injection 4,100 Units  As needed (PRN)      08/14/20 1307     IP VTE HIGH RISK PATIENT  Once      08/09/20 2255     Place sequential compression device  Until discontinued      08/09/20 2255                Discharge Planning   EDWIN: 8/14/2020     Code Status: Full Code   Is the patient medically ready for discharge?:     Reason for patient still in hospital (select all that apply): Other (specify) monitor following surgery; likely D/C tomorrow  Discharge Plan A: Skilled Nursing Facility   Discharge Delays: None known at this time              Enrrique Martin MD  Department of Hospital Medicine   [facility]

## 2020-08-18 NOTE — ANESTHESIA PROCEDURE NOTES
Peripheral Block    Patient location during procedure: pre-op   Block not for primary anesthetic.  Reason for block: at surgeon's request and post-op pain management   Post-op Pain Location: R arm  Start time: 8/18/2020 10:56 AM  Timeout: 8/18/2020 10:55 AM   End time: 8/18/2020 11:00 AM    Staffing  Authorizing Provider: Frederick Pina MD  Performing Provider: Emily Kauffman MD    Preanesthetic Checklist  Completed: patient identified, site marked, surgical consent, pre-op evaluation, timeout performed, IV checked, risks and benefits discussed and monitors and equipment checked  Peripheral Block  Patient position: supine  Prep: ChloraPrep  Patient monitoring: heart rate, cardiac monitor, continuous pulse ox, continuous capnometry and frequent blood pressure checks  Block type: supraclavicular  Laterality: right  Injection technique: single shot  Needle  Needle type: Stimuplex   Needle gauge: 22 G  Needle length: 2 in  Needle localization: anatomical landmarks and ultrasound guidance   -ultrasound image captured on disc.  Assessment  Injection assessment: negative aspiration, negative parasthesia and local visualized surrounding nerve  Paresthesia pain: none  Heart rate change: no  Slow fractionated injection: yes  Additional Notes  VSS.  [facility] RN monitoring vitals throughout procedure.  Patient tolerated procedure well.

## 2020-08-18 NOTE — PROGRESS NOTES
[facility] Hospital Medicine  Progress Note    Patient Name: Angelina Beard  MRN: [id number]  Patient Class: IP- Inpatient   Admission Date: 8/9/2020  Length of Stay: 6 days  Attending Physician: Rosalia Burnett MD  Primary Care Provider: Ben Mena MD        Subjective:     Principal Problem:Hyperkalemia        HPI:  Angelina Beard is a 70 yo female with hypertension, chronic diastolic congestive heart failure, paroxysmal atrial fibrillation (anticoagulated on apixaban), diabetes mellitus type 2 (hemoglobin A1c 6.9% on 4/28/2020) with neuropathy and nephropathy, hyperlipidemia, nonobstructive coronary artery disease, cerebral microvascular disease, end stage renal disease on hemodialysis (Monday Wednesday Friday at [facility] in [facility]), anemia, gastroesophageal reflux disease, osteoarthritis status post right total knee arthroplasty 2/23/16, chronic left leg pain, chronic constipation, cholelithiasis, history of right lung sarcoidosis, history of left breast cancer status post mastectomy, history of gastric ulcers on 7/13/18. Her primary care physician is Dr. Ben Mena. Her nephrologist is Dr. Rc Santamaria. Her cardiologist is Dr. Faraz Flores. Her podiatrist is Dr. Kwame Dacosta. She lives in [place], [place]. She is mostly wheelchair bound but can use a walker with assistance.     Pt presented to ED with c/o diarrhea, generalized weakness and acute on chronic left knee and left hip pain s/p mechanical fall 1 week ago. She denies vomiting, diarrhea, fever/chills and abdominal pain. KUB shows constipation, no evidence of obstruction. At baseline, she is in a wheelchair/walker due to weakness, but said she has had trouble with transfers for the past week due to her L knee pain. Denies chest pain and worsening SOB. Labs remarkable for K 6.4, troponin 0.036.  Xray shows R nondisplaced pubic ramus fracture and significant erosion of L knee appears 2/2 sepitic  arthropathy. Orthopedic surgery consulted by ED. Pt admitted to [facility].     Overview/Hospital Course:  No notes on file    Interval History:  Has not had a BM x 5 days  For dialysis today.  Will do graft tomorrow    Review of Systems   Constitutional: Negative for chills and fever.   Respiratory: Negative for cough, shortness of breath and wheezing.    Cardiovascular: Negative for chest pain, palpitations and leg swelling.   Gastrointestinal: Negative for abdominal pain, blood in stool, constipation, diarrhea, nausea and vomiting.   Genitourinary: Negative for dysuria and hematuria.   Musculoskeletal: Negative for back pain.   Skin: Negative for rash.   Neurological: Negative for dizziness, weakness and headaches.     Objective:     Vital Signs (Most Recent):  Temp: 98.8 °F (37.1 °C) (08/17/20 1557)  Pulse: 75 (08/17/20 1557)  Resp: 20 (08/17/20 1557)  BP: (!) 166/77 (08/17/20 1557)  SpO2: 97 % (08/17/20 1557) Vital Signs (24h Range):  Temp:  [97.3 °F (36.3 °C)-98.8 °F (37.1 °C)] 98.8 °F (37.1 °C)  Pulse:  [60-83] 75  Resp:  [15-20] 20  SpO2:  [90 %-99 %] 97 %  BP: ()/(43-88) 166/77     Weight: 90.6 kg (199 lb 11.8 oz)  Body mass index is 31.28 kg/m².    Intake/Output Summary (Last 24 hours) at 8/17/2020 1654  Last data filed at 8/17/2020 1311  Gross per 24 hour   Intake 500 ml   Output 3401 ml   Net -2901 ml      Physical Exam  Vitals signs and nursing note reviewed.   Constitutional:       General: She is not in acute distress.     Appearance: She is well-developed.   Eyes:      Conjunctiva/sclera: Conjunctivae normal.   Neck:      Vascular: No JVD.   Cardiovascular:      Rate and Rhythm: Normal rate and regular rhythm.      Heart sounds: Normal heart sounds.   Pulmonary:      Effort: Pulmonary effort is normal.      Breath sounds: Normal breath sounds. No wheezing.   Abdominal:      General: Bowel sounds are normal.      Palpations: Abdomen is soft.      Tenderness: There is no abdominal  tenderness.   Musculoskeletal:         General: No tenderness.   Skin:     General: Skin is warm and dry.      Capillary Refill: Capillary refill takes less than 2 seconds.   Neurological:      Mental Status: She is alert and oriented to person, place, and time.         Significant Labs: BMP: No results for input(s): GLU, NA, K, CL, CO2, BUN, CREATININE, CALCIUM, MG in the last 48 hours.  CBC: No results for input(s): WBC, HGB, HCT, PLT in the last 48 hours.    Significant Imaging: I have reviewed all pertinent imaging results/findings within the past 24 hours.      Assessment/Plan:      * Hyperkalemia  This is being managed with dialysis    EKG abnormalities  Troponin elevation is flat.    EKG stable . No acute changes    Complication of vascular access for dialysis  NPO after midnight for graft revision      Diarrhea  Better  Now she is constipated   Resume Colace and add Miralax        Left knee pain  S/p mechanical fall 1 week ago  -imaging ? septic arthritis  -s/p empiric antibiotics in ED  -evaluated by orthopedic surgery, arthrocentesis done and not c/w septic joint  - stop abx  -PT/OT consulted: recommend SNF      Hypertension associated with diabetes  Taking amlodipine 10 mg daily and metoprolol 50 mg daily  Blood pressures appear to be creeping back up  She may require addition of additional medication  Will add Hydralazine- better now      ESRD on dialysis  Dialysis per Nephrology   today    Physical deconditioning  In PT/OT: recommend SNF  Possibly [facility]  Patient states that she would prefer to go [facility] with [facility]      Chronic diastolic congestive heart failure  Euvolemic on exam. Continue furosemide 40 mg daily. Patient still able to make urine.       Paroxysmal atrial fibrillation  Taking Apixaban- will discontinue in preparation for procedure on Monday      Anemia in end-stage renal disease  H&H stable      Diabetic neuropathy associated with type 2 diabetes mellitus  Taking Gabapentin 300 mg TID prn-  continue     GERD (gastroesophageal reflux disease)  Daily PPI       Hyperlipidemia  Continue statin       Type 2 diabetes mellitus with hypertension and end stage renal disease on dialysis  A1C 6.9   Low dose SSI, accucheck AC&HS , Diabetic diet   Her blood sugars are trending upward and she may require Januvia      Cerebral microvascular disease  Continue Plavix, statin         VTE Risk Mitigation (From admission, onward)         Ordered     heparin (porcine) injection 4,100 Units  As needed (PRN)      08/14/20 1307     IP VTE HIGH RISK PATIENT  Once      08/09/20 2255     Place sequential compression device  Until discontinued      08/09/20 2255                Discharge Planning   EDWIN: 8/14/2020     Code Status: Full Code   Is the patient medically ready for discharge?:     Reason for patient still in hospital (select all that apply): Treatment  Discharge Plan A: Skilled Nursing Facility   Discharge Delays: None known at this time              Rosalia Burnett MD  Department of Hospital Medicine   [facility]

## 2020-08-18 NOTE — PLAN OF CARE
Pt AAOx4. Dialysis pt, on a renal diet. Abd pain r/t constipation. On 3L NC. No complaints of pain. NPO since midnight for fistula revision 8/18. Safety maintained, will continue to monitor

## 2020-08-18 NOTE — PLAN OF CARE
[facility]    TN updated pt's son Cristi.       08/18/20 1234   Post-Acute Status   Post-Acute Authorization Placement   Post-Acute Placement Status Pending Post-Acute Clinical Review

## 2020-08-19 ENCOUNTER — TELEPHONE (OUTPATIENT)
Dept: NEUROLOGY | Facility: HOSPITAL | Age: 70
End: 2020-08-19

## 2020-08-19 VITALS
TEMPERATURE: 98 F | WEIGHT: 194.88 LBS | DIASTOLIC BLOOD PRESSURE: 63 MMHG | HEIGHT: 67 IN | BODY MASS INDEX: 30.59 KG/M2 | SYSTOLIC BLOOD PRESSURE: 139 MMHG | RESPIRATION RATE: 18 BRPM | HEART RATE: 68 BPM | OXYGEN SATURATION: 91 %

## 2020-08-19 LAB
ALBUMIN SERPL BCP-MCNC: 2.4 G/DL (ref 3.5–5.2)
ANION GAP SERPL CALC-SCNC: 11 MMOL/L (ref 8–16)
BASOPHILS # BLD AUTO: 0.03 K/UL (ref 0–0.2)
BASOPHILS NFR BLD: 0.5 % (ref 0–1.9)
BUN SERPL-MCNC: 51 MG/DL (ref 8–23)
CALCIUM SERPL-MCNC: 9 MG/DL (ref 8.7–10.5)
CHLORIDE SERPL-SCNC: 101 MMOL/L (ref 95–110)
CO2 SERPL-SCNC: 26 MMOL/L (ref 23–29)
CREAT SERPL-MCNC: 6.4 MG/DL (ref 0.5–1.4)
DIFFERENTIAL METHOD: ABNORMAL
EOSINOPHIL # BLD AUTO: 0.1 K/UL (ref 0–0.5)
EOSINOPHIL NFR BLD: 2.1 % (ref 0–8)
ERYTHROCYTE [DISTWIDTH] IN BLOOD BY AUTOMATED COUNT: 15.4 % (ref 11.5–14.5)
EST. GFR  (AFRICAN AMERICAN): 7 ML/MIN/1.73 M^2
EST. GFR  (NON AFRICAN AMERICAN): 6 ML/MIN/1.73 M^2
GLUCOSE SERPL-MCNC: 200 MG/DL (ref 70–110)
HCT VFR BLD AUTO: 29.1 % (ref 37–48.5)
HGB BLD-MCNC: 9.1 G/DL (ref 12–16)
IMM GRANULOCYTES # BLD AUTO: 0.06 K/UL (ref 0–0.04)
IMM GRANULOCYTES NFR BLD AUTO: 1 % (ref 0–0.5)
LYMPHOCYTES # BLD AUTO: 0.7 K/UL (ref 1–4.8)
LYMPHOCYTES NFR BLD: 11.9 % (ref 18–48)
MCH RBC QN AUTO: 27.6 PG (ref 27–31)
MCHC RBC AUTO-ENTMCNC: 31.3 G/DL (ref 32–36)
MCV RBC AUTO: 88 FL (ref 82–98)
MONOCYTES # BLD AUTO: 0.6 K/UL (ref 0.3–1)
MONOCYTES NFR BLD: 10.2 % (ref 4–15)
NEUTROPHILS # BLD AUTO: 4.5 K/UL (ref 1.8–7.7)
NEUTROPHILS NFR BLD: 74.3 % (ref 38–73)
NRBC BLD-RTO: 0 /100 WBC
PHOSPHATE SERPL-MCNC: 4.4 MG/DL (ref 2.7–4.5)
PLATELET # BLD AUTO: 209 K/UL (ref 150–350)
PMV BLD AUTO: 10.4 FL (ref 9.2–12.9)
POCT GLUCOSE: 193 MG/DL (ref 70–110)
POTASSIUM SERPL-SCNC: 4.4 MMOL/L (ref 3.5–5.1)
RBC # BLD AUTO: 3.3 M/UL (ref 4–5.4)
SODIUM SERPL-SCNC: 138 MMOL/L (ref 136–145)
WBC # BLD AUTO: 6.06 K/UL (ref 3.9–12.7)

## 2020-08-19 PROCEDURE — 25000003 PHARM REV CODE 250: Performed by: SURGERY

## 2020-08-19 PROCEDURE — 85025 COMPLETE CBC W/AUTO DIFF WBC: CPT

## 2020-08-19 PROCEDURE — 25000003 PHARM REV CODE 250: Performed by: HOSPITALIST

## 2020-08-19 PROCEDURE — 36415 COLL VENOUS BLD VENIPUNCTURE: CPT

## 2020-08-19 PROCEDURE — 80069 RENAL FUNCTION PANEL: CPT

## 2020-08-19 PROCEDURE — 80100016 HC MAINTENANCE HEMODIALYSIS

## 2020-08-19 RX ORDER — SERTRALINE HYDROCHLORIDE 100 MG/1
100 TABLET, FILM COATED ORAL DAILY
Qty: 90 TABLET | Refills: 4 | Status: SHIPPED | OUTPATIENT
Start: 2020-08-19 | End: 2021-11-12

## 2020-08-19 RX ORDER — HYDROCODONE BITARTRATE AND ACETAMINOPHEN 5; 325 MG/1; MG/1
1 TABLET ORAL
Qty: 30 TABLET | Refills: 0 | Status: SHIPPED | OUTPATIENT
Start: 2020-08-19

## 2020-08-19 RX ORDER — CLOPIDOGREL BISULFATE 75 MG/1
75 TABLET ORAL DAILY
Qty: 30 TABLET | Refills: 11
Start: 2020-08-19 | End: 2021-08-19

## 2020-08-19 RX ADMIN — PANTOPRAZOLE SODIUM 40 MG: 40 TABLET, DELAYED RELEASE ORAL at 08:08

## 2020-08-19 RX ADMIN — SERTRALINE HYDROCHLORIDE 100 MG: 100 TABLET ORAL at 08:08

## 2020-08-19 RX ADMIN — AMLODIPINE BESYLATE 10 MG: 5 TABLET ORAL at 08:08

## 2020-08-19 RX ADMIN — METOPROLOL SUCCINATE 50 MG: 25 TABLET, FILM COATED, EXTENDED RELEASE ORAL at 08:08

## 2020-08-19 RX ADMIN — APIXABAN 5 MG: 5 TABLET, FILM COATED ORAL at 08:08

## 2020-08-19 RX ADMIN — PRAVASTATIN SODIUM 80 MG: 20 TABLET ORAL at 08:08

## 2020-08-19 RX ADMIN — CALCIUM ACETATE 667 MG: 667 CAPSULE ORAL at 08:08

## 2020-08-19 RX ADMIN — POLYETHYLENE GLYCOL (3350) 17 G: 17 POWDER, FOR SOLUTION ORAL at 08:08

## 2020-08-19 RX ADMIN — DOCUSATE SODIUM 50 MG AND SENNOSIDES 8.6 MG 1 TABLET: 8.6; 5 TABLET, FILM COATED ORAL at 08:08

## 2020-08-19 RX ADMIN — NEPHROCAP 1 CAPSULE: 1 CAP ORAL at 08:08

## 2020-08-19 RX ADMIN — DOCUSATE SODIUM 100 MG: 100 CAPSULE, LIQUID FILLED ORAL at 08:08

## 2020-08-19 NOTE — TELEPHONE ENCOUNTER
Notified  of 2 week hospital f/u 9/3 at 11:40. CM to call SNF to let them know of appt date/time.

## 2020-08-19 NOTE — PROGRESS NOTES
[facility] Progress Note    Date of Admit: 2020  Length of Stay: 8    Chief Complaint/Reason for Consult     [facility] following for ESRD on HD Evaluation and Management    Subjective:      Patient s/p tunneled HD catheter L subclavian placement on 2020. AVG revision completed yesterday.    Doing well this morning. Denies chest pain, shortness of breath, or nausea/vomiting. Agreeable to routine HD today. Understands AVG not to be used until vascular follow up.         Objective:   Last 24 Hour Vital Signs:  BP  Min: 80/45  Max: 146/72  Temp  Av °F (36.7 °C)  Min: 97.6 °F (36.4 °C)  Max: 98.4 °F (36.9 °C)  Pulse  Av.9  Min: 60  Max: 73  Resp  Avg: 15.6  Min: 13  Max: 18  SpO2  Av.8 %  Min: 85 %  Max: 100 %  Body mass index is 30.52 kg/m².  No intake/output data recorded.    Physical Examination:  General: No acute distress, resting comfortably on dialysis  HEENT: EOMI, PERRL, MMM,   Cardiovascular: NRRR, no murmurs or rubs.  Chest/Pulmonary: CTABL, no IWB, comfortable on nasal cannula  Abdomen: soft, nontender, nondistended, BS+  MSKL: without gross deformity, active FROM  Lymphatic: no appreciated cervical or submandibular LAD  Skin: without cyanosis or clubbing, without trace edema  Neurologic: AAOx3, following two step commands  Access: RUE RC AVG post surgical; nonerythematous swelling at surgical bed site, appears c/d/i.   New L subclavian tunneled HD catheter appears c/d/i.     Laboratory:  Most Recent Data:  CBC:   Lab Results   Component Value Date    WBC 6.06 2020    HGB 9.1 (L) 2020    HCT 29.1 (L) 2020     2020    MCV 88 2020    RDW 15.4 (H) 2020     BMP:   Lab Results   Component Value Date     2020    K 4.4 2020     2020    CO2 26 2020    BUN 51 (H) 2020    CREATININE 6.4 (H) 2020     (H) 2020    CALCIUM 9.0 2020    MG 2.3 2020    PHOS 4.4 2020      LFTs:   Lab Results   Component Value Date    PROT 7.0 08/14/2020    ALBUMIN 2.4 (L) 08/19/2020    BILITOT 0.4 08/14/2020    AST 26 08/14/2020    ALKPHOS 66 08/14/2020    ALT 18 08/14/2020     Coags:   Lab Results   Component Value Date    INR 1.0 08/09/2020     Urinalysis:   Lab Results   Component Value Date    LABURIN  11/08/2016     Multiple organisms isolated. None in predominance.  Repeat if    LABURIN clinically necessary. 11/08/2016    COLORU Yellow 07/29/2019    SPECGRAV 1.015 07/29/2019    NITRITE Negative 07/29/2019    KETONESU Negative 07/29/2019    UROBILINOGEN Negative 07/29/2019    WBCUA 5 07/29/2019       Trended Lab Data:  Recent Labs   Lab 08/13/20  0638 08/14/20  0121 08/15/20  0805 08/18/20  0824 08/19/20  0456 08/19/20  0729   WBC 4.21 7.18  --   --   --  6.06   HGB 10.5* 11.3*  --   --   --  9.1*   HCT 34.3* 35.6*  --   --   --  29.1*    207  --   --   --  209   MCV 89 88  --   --   --  88   RDW 15.5* 15.5*  --   --   --  15.4*   * 130* 132* 137 138  --    K 4.7 5.6* 4.8 4.1 4.4  --    CL 95 93* 96 100 101  --    CO2 24 22* 26 23 26  --    BUN 50* 60* 46* 38* 51*  --    CREATININE 7.8* 8.3* 6.2* 5.2* 6.4*  --    * 175* 188* 148* 200*  --    PROT 6.9 7.0  --   --   --   --    ALBUMIN 3.2* 3.4* 2.5*  --  2.4*  --    BILITOT 0.4 0.4  --   --   --   --    AST 21 26  --   --   --   --    ALKPHOS 61 66  --   --   --   --    ALT 15 18  --   --   --   --        Trended Cardiac Data:  Recent Labs   Lab 08/13/20  1941 08/14/20  0121 08/14/20  0749   TROPONINI 0.029* 0.026 0.019       Microbiology Data:  Microbial cultures from knee aspirate no growth to date    Other Laboratory Data:  Sed rate 48    Other Results:      Radiology:  Imaging Results          CT Pelvis Without Contrast (Final result)  Result time 08/09/20 21:10:28   Procedure changed from CT Hip Without Contrast Left     Final result by Vito Garcia MD (08/09/20 21:10:28)                 Impression:       Negative CT of the pelvis for fracture specially of the left hip.    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    Severe atherosclerotic vascular disease most severe the SFA bilaterally.      Electronically signed by: Vito Garcia  Date:    08/09/2020  Time:    21:10             Narrative:    EXAMINATION:  CT PELVIS WITHOUT CONTRAST    CLINICAL HISTORY:  Hip trauma, nondiagnostic xray;    TECHNIQUE:  Axial CT images of the left hip were obtained with sagittal and coronal reformatted images.    COMPARISON:  Plain film of the hip,, 08/09/2020    FINDINGS:  The cortex and trabecular markings of the femur appear intact without displaced fracture or bony destructive process.  The intertrochanteric and proximal diaphysis appear intact as well.    Moderate osteoarthritis of the femoroacetabular joint is present without evidence of pincer or CAM deformity.    The adjacent pelvic bones are intact.  There is mild irregularity of the ischial tuberosity insertion of the hamstring tendons suggesting mild insertional tendinitis.    The endplates of the lumbar spine are markedly irregular with erosive changes at the L4-5 level and mild compression of the anterior endplate of L5 appearing remote with vacuum phenomenon.  Facet arthropathy is prominent.  There is no spondylolysis or significant central canal stenosis evident as imaged.  Degenerative changes in the SI joints are present.    Atherosclerotic disease and a normal caliber aorta and iliac system are noted.  This extends extensively into the SFA bilaterally.    The uterus demonstrates multiple large chunky calcifications suggesting degenerating fibroadenomas.  The urinary bladder appears unremarkable.  There is no pelvic mass or free fluid.                                CT Knee Without Contrast Left (Final result)  Result time 08/09/20 20:54:03    Final result by Fuentes Vincent MD (08/09/20 20:54:03)                  Impression:      Findings indicating destruction of the knee joint secondary to aseptic process.    This report was flagged in Epic as abnormal.      Electronically signed by: Fuentes Vincent  Date:    08/09/2020  Time:    20:54             Narrative:    EXAMINATION:  CT KNEE WITHOUT CONTRAST LEFT    CLINICAL HISTORY:  Knee instability;    TECHNIQUE:  Multiple axial images of the left knee were obtained without the aid of IV contrast.  The study was reformatted creating a 3D image was separate workstation under direct supervision.    COMPARISON:  None    FINDINGS:  The knee joint reveals significant damage and deformity which is consistent with a septic joint.  The tibial plateau is fractured and there does not appear to be any discernible normal articular surface involving either the tibial plateau or the distal femur.  Both condyles of the distal femur are deformed and appear to be diminutive.  The articular surfaces of the femur and tibia appear to be mild feeding.  There is no normal articulation between the femur and the tibial plateau.  The surrounding soft tissues of the knee shows soft tissue induration and what appears to be fluid collection which most likely represents abscess fluid.  Suggest consideration aspiration in the for the purposes of obtaining culture and sensitivity information.                                   X-Ray Pelvis Routine AP (Edited Result - FINAL)  Result time 08/09/20 18:37:50    Addendum 1 of 1 by Poncho Handley MD (08/09/20 18:37:50)      This report was flagged in Epic as abnormal.    Please note, after initial interpretation of this examination, radiograph of the abdomen became available for review.  In retrospect, there appears to be lucency involving the right inferior pubic ramus concerning for fracture, better seen on that examination.  Acuity is unknown, correlation with focal tenderness is advised.      Electronically signed by: Poncho Handley  MD  Date:    08/09/2020  Time:    18:37               Final result by Poncho Handley MD (08/09/20 18:31:05)                 Impression:      1. No convincing acute displaced fracture or dislocation of the pelvis.      Electronically signed by: Poncho Handley MD  Date:    08/09/2020  Time:    18:31             Narrative:    EXAMINATION:  XR PELVIS ROUTINE AP    CLINICAL HISTORY:  Unspecified fall, initial encounter    TECHNIQUE:  AP view of the pelvis was performed.    COMPARISON:  None.    FINDINGS:  Single-view pelvis.    There is osteopenia.  Degenerative changes are noted of the bilateral sacroiliac joints and pubic symphysis.  The bilateral femoroacetabular joints are intact.  There is vascular calcification.                                   Assessment and Plan:      Angelina Beard is a 69 year old man with pertinent medical history of CAD, Chronic heart failure with preserved ejection fraction, pulmonary hypertension (likely WHO group II), HLD, hypertension, DM type II, sarcoidosis, and ESRD on HD MWF with Dr. Santamaria at [facility]. Admitted for evaluation of diarrhea and weakness. [facility] consulted for ESRD on HD evaluation and management.       #) ESRD on HD (MWF)  - follows with Dr. Santamaria at [facility]  - Routine HD today  - will continue on MWF schedule while inpatient  - on abx w/ ceftriaxone and Vancomycin prior to AVG revision on Monday  - dose meds for GFR < 10  - nephro caps  - strict I/O, daily weights  - please avoid gadolinium, fleets, phos-based laxatives, NSAIDs  - patient to follow up with her HD unit after discharge  - follow up with vascular surgery after discharge to evaluate AVG and remove tunneled HD catheter.    #) Hypertension   - amlodipine 10mg  - hydralazine 10mg TID starter per primary team; modified order to place hold parameters for morning of HD days    #) atrial fibrillation  -eliquis okay to restart per surgery    #) Anemia Evaluation  Lab Results    Component Value Date    HGB 9.1 (L) 08/19/2020    MCV 88 08/19/2020    FERRITIN 1,085 (H) 08/17/2020    FESATURATED 14 (L) 08/17/2020    [id number] 389 03/03/2018    FOLATE 4.5 03/03/2018   - no need for EPO or iron at this time.    #)  CKD/Mineral Bone Disease/Nutrition   Lab Results   Component Value Date    PHOS 4.4 08/19/2020    CALCIUM 9.0 08/19/2020    MG 2.3 08/09/2020    CO2 26 08/19/2020    ALBUMIN 2.4 (L) 08/19/2020   - continue phoslo TID w/ meals; please check phos every other day  - nepro with meals  - nephrocaps daily  - standard bicarb bath with HD    Thank you for this consult. We will continue to follow with you. Patient can follow up with HD unit after discharge.     Enrrique Pearl MD  [facility]  [phone]    If after 5pm please forward any questions to the [facility] Fellow/Attending on call.

## 2020-08-19 NOTE — TELEPHONE ENCOUNTER
----- Message from Kera Roman RN sent at 8/19/2020  2:25 PM CDT -----  Regarding: follow up appointment  Good afternoon. Patient needs surgery follow up appointment either on a Tuesday or Thursday. Pt is discharging to [facility]. Please give me a call with the appointment so I can let the facility know.    Thank you,  Kera Roman RN-[facility] Case Management  [phone]

## 2020-08-19 NOTE — HOSPITAL COURSE
"Ms. Beard presents initially with generalized weakness, diarrhea, and left knee and hip pain following a mechanical fall. Found to have nondisplaced pubic ramus fracture. Initially with concern for septic arthropathy as well, although this was ruled out by joint aspiration. She was managed medically and PT/OT recommended SNF.    Course was complicated by persistent hyperkalemia despite HD. She was found to have AV graft dysfunction, requiring revision on 8/18. Will give HD through permacath until able to be reassessed and cleared by surgery in 2 weeks. Problem based discussion below.    /63 (Patient Position: Lying)   Pulse 68   Temp 98.3 °F (36.8 °C) (Oral)   Resp 18   Ht 5' 7" (1.702 m)   Wt 88.4 kg (194 lb 14.2 oz)   LMP  (LMP Unknown)   SpO2 (!) 91%   Breastfeeding No   BMI 30.52 kg/m²   Physical Exam  Vitals signs and nursing note reviewed.   Constitutional:       General: She is not in acute distress.     Appearance: She is well-developed.   Eyes:      Conjunctiva/sclera: Conjunctivae normal.   Neck:      Vascular: No JVD.   Cardiovascular:      Rate and Rhythm: Normal rate and regular rhythm.      Heart sounds: Normal heart sounds.   Pulmonary:      Effort: Pulmonary effort is normal.      Breath sounds: Normal breath sounds. No wheezing.   Abdominal:      General: Bowel sounds are normal.      Palpations: Abdomen is soft.      Tenderness: There is no abdominal tenderness.   Musculoskeletal:         General: No tenderness.   Skin:     General: Skin is warm and dry.      Capillary Refill: Capillary refill takes less than 2 seconds.   Neurological:      Mental Status: She is alert and oriented to person, place, and time.   "

## 2020-08-19 NOTE — PLAN OF CARE
[place] sent pt's follow up information with  to [facility] via [employer].    Future Appointments   Date Time Provider Department Center   9/3/2020 11:40 AM Charis Purcell MD [facility] TUMOR [facility]   1/7/2021  9:10 AM LAB, RIVER [place] [facility]   1/7/2021 10:00 AM [facility] [id number] [facility] MAMMO [facility]   1/14/2021  9:45 AM Ben Mena MD [facility]

## 2020-08-19 NOTE — ASSESSMENT & PLAN NOTE
S/p mechanical fall 1 week ago  -imaging ? septic arthritis  -s/p empiric antibiotics in ED  - evaluated by orthopedic surgery, arthrocentesis done and not c/w septic joint  - stopped abx  -PT/OT consulted: recommend SNF

## 2020-08-19 NOTE — PLAN OF CARE
sent updated orders and [facility]co and [facility] via [facility].

## 2020-08-19 NOTE — DISCHARGE SUMMARY
[facility] Hospital Medicine  Discharge Summary      Patient Name: Angelina Beard  MRN: [id number]  Admission Date: 8/9/2020  Hospital Length of Stay: 8 days  Discharge Date and Time: 8/19/2020  2:17 PM  Attending Physician: No att. providers found   Discharging Provider: Enrrique Martin MD  Primary Care Provider: Ben Mena MD      HPI:   Angelina Beard is a 70 yo female with hypertension, chronic diastolic congestive heart failure, paroxysmal atrial fibrillation (anticoagulated on apixaban), diabetes mellitus type 2 (hemoglobin A1c 6.9% on 4/28/2020) with neuropathy and nephropathy, hyperlipidemia, nonobstructive coronary artery disease, cerebral microvascular disease, end stage renal disease on hemodialysis (Monday Wednesday Friday at [facility] in [facility]), anemia, gastroesophageal reflux disease, osteoarthritis status post right total knee arthroplasty 2/23/16, chronic left leg pain, chronic constipation, cholelithiasis, history of right lung sarcoidosis, history of left breast cancer status post mastectomy, history of gastric ulcers on 7/13/18. Her primary care physician is Dr. Ben Mena. Her nephrologist is Dr. Rc Santamaria. Her cardiologist is Dr. Faraz Flores. Her podiatrist is Dr. Kwame Dacosta. She lives in [place], [place]. She is mostly wheelchair bound but can use a walker with assistance.     Pt presented to ED with c/o diarrhea, generalized weakness and acute on chronic left knee and left hip pain s/p mechanical fall 1 week ago. She denies vomiting, diarrhea, fever/chills and abdominal pain. KUB shows constipation, no evidence of obstruction. At baseline, she is in a wheelchair/walker due to weakness, but said she has had trouble with transfers for the past week due to her L knee pain. Denies chest pain and worsening SOB. Labs remarkable for K 6.4, troponin 0.036.  Xray shows R nondisplaced pubic ramus fracture and significant erosion of L knee  "appears 2/2 sepitic arthropathy. Orthopedic surgery consulted by ED. Pt admitted to [facility] hospital medicine.     Procedure(s) (LRB):  REVISION, PROCEDURE INVOLVING ARTERIOVENOUS GRAFT (Right)  ANGIOPLASTY, USING PATCH GRAFT (Right)      Hospital Course:   Ms. Beard presents initially with generalized weakness, diarrhea, and left knee and hip pain following a mechanical fall. Found to have nondisplaced pubic ramus fracture. Initially with concern for septic arthropathy as well, although this was ruled out by joint aspiration. She was managed medically and PT/OT recommended SNF.    Course was complicated by persistent hyperkalemia despite HD. She was found to have AV graft dysfunction, requiring revision on 8/18. Will give HD through permacath until able to be reassessed and cleared by surgery in 2 weeks. Problem based discussion below.    /63 (Patient Position: Lying)   Pulse 68   Temp 98.3 °F (36.8 °C) (Oral)   Resp 18   Ht 5' 7" (1.702 m)   Wt 88.4 kg (194 lb 14.2 oz)   LMP  (LMP Unknown)   SpO2 (!) 91%   Breastfeeding No   BMI 30.52 kg/m²   Physical Exam  Vitals signs and nursing note reviewed.   Constitutional:       General: She is not in acute distress.     Appearance: She is well-developed.   Eyes:      Conjunctiva/sclera: Conjunctivae normal.   Neck:      Vascular: No JVD.   Cardiovascular:      Rate and Rhythm: Normal rate and regular rhythm.      Heart sounds: Normal heart sounds.   Pulmonary:      Effort: Pulmonary effort is normal.      Breath sounds: Normal breath sounds. No wheezing.   Abdominal:      General: Bowel sounds are normal.      Palpations: Abdomen is soft.      Tenderness: There is no abdominal tenderness.   Musculoskeletal:         General: No tenderness.   Skin:     General: Skin is warm and dry.      Capillary Refill: Capillary refill takes less than 2 seconds.   Neurological:      Mental Status: She is alert and oriented to person, place, and time.      Consults: "   Consults (From admission, onward)        Status Ordering Provider     Inpatient consult to General Surgery  Once     Provider:  Charis Purcell MD    Completed DANTE MOONEY     Inpatient consult to Nephrology-[facility]  Once     Provider:  (Not yet assigned)    SAMY Barbosa     Inpatient consult to Orthopedic Surgery  Once     Provider:  Yeison Tipton DO    Completed SAMY FARFAN          * Complication of vascular access for dialysis  - s/p revision on 8/18  - will need 2 week f/u with surgery  - use permacath for HD until graft cleared by surgery    EKG abnormalities  Troponin elevation is flat.    EKG stable . No acute changes    Diarrhea  Better  Now she is constipated  - continue doc/senna and Miralax        Left knee pain  S/p mechanical fall 1 week ago  -imaging ? septic arthritis  -s/p empiric antibiotics in ED  - evaluated by orthopedic surgery, arthrocentesis done and not c/w septic joint  - stopped abx  -PT/OT consulted: recommend SNF      Hypertension associated with diabetes  Taking amlodipine 10 mg daily and metoprolol 50 mg daily  - added hydralazine 10mg tid on 8/15 with improvement but actually became hypotensive; will stop      Hyperkalemia  - managed with HD  - s/p graft revision    ESRD on dialysis  Dialysis per Nephrology    Physical deconditioning  PT/OT: recommend SNF      Chronic diastolic congestive heart failure  Euvolemic on exam  - volume control via HD    Paroxysmal atrial fibrillation  Taking Apixaban; ok to resume per General Surgery  - will increase to therapeutic dose 5mg bid      Anemia in end-stage renal disease  H&H stable      Debility  - SNF      Diabetic neuropathy associated with type 2 diabetes mellitus  Taking Gabapentin 300 mg TID prn; will decrease to renal dose    GERD (gastroesophageal reflux disease)  Daily PPI       Hyperlipidemia  Continue statin       Type 2 diabetes mellitus with hypertension and end stage renal disease on  dialysis  A1C 6.9   Low dose SSI, accucheck AC&HS , Diabetic diet       Cerebral microvascular disease  Continue statin   - resume plavix in 5 days after procedure as per Surgery        Final Active Diagnoses:    Diagnosis Date Noted POA    PRINCIPAL PROBLEM:  Complication of vascular access for dialysis [T82.9XXA]  Yes    EKG abnormalities [R94.31] 08/14/2020 Yes    Diarrhea [R19.7] 08/11/2020 Yes    Left knee pain [M25.562] 08/10/2020 Yes    Hypertension associated with diabetes [E11.59, I10] 02/04/2020 Yes    Hyperkalemia [E87.5] 11/18/2019 Yes    Chronic diastolic congestive heart failure [I50.32] 09/22/2018 Yes    Physical deconditioning [R53.81] 09/22/2018 Yes    ESRD on dialysis [N18.6, Z99.2]  Not Applicable    Paroxysmal atrial fibrillation [I48.0] 06/21/2018 Yes     Chronic    Anemia in end-stage renal disease [N18.6, D63.1] 03/06/2016 Yes     Chronic    Debility [R53.81] 02/25/2016 Yes    Diabetic neuropathy associated with type 2 diabetes mellitus [E11.40] 02/19/2016 Yes     Chronic    GERD (gastroesophageal reflux disease) [K21.9] 07/02/2013 Yes     Chronic    Cerebral microvascular disease [I67.9] 07/01/2013 Yes     Chronic    Type 2 diabetes mellitus with hypertension and end stage renal disease on dialysis [E11.22, I12.0, Z99.2, N18.6] 07/01/2013 Not Applicable     Chronic    Hyperlipidemia [E78.5] 07/01/2013 Yes     Chronic      Problems Resolved During this Admission:    Diagnosis Date Noted Date Resolved POA    Problem with dialysis access [T82.898A] 08/13/2020 08/14/2020 Yes    Pyogenic arthritis of left knee joint [M00.9] 08/10/2020 08/10/2020 Unknown       Discharged Condition: good    Disposition: Skilled Nursing Facility    Follow Up:  Follow-up Information     Go to [facility].    Specialty: Rehabilitation  Why: SNF  Contact information:  [address] [id number]  [phone]                 Patient Instructions:      Ambulatory  referral/consult to General Surgery   Standing Status: Future   Referral Priority: Routine Referral Type: Consultation   Referral Reason: Specialty Services Required   Requested Specialty: General Surgery   Number of Visits Requested: 1       Significant Diagnostic Studies: Labs:   CMP   Recent Labs   Lab 08/18/20  0824 08/19/20  0456    138   K 4.1 4.4    101   CO2 23 26   * 200*   BUN 38* 51*   CREATININE 5.2* 6.4*   CALCIUM 9.7 9.0   ALBUMIN  --  2.4*   ANIONGAP 14 11   ESTGFRAFRICA 9* 7*   EGFRNONAA 8* 6*   , CBC   Recent Labs   Lab 08/19/20  0729   WBC 6.06   HGB 9.1*   HCT 29.1*      , INR   Lab Results   Component Value Date    INR 1.0 08/09/2020    INR 1.3 (H) 07/29/2019    INR 1.0 03/03/2018   , Lipid Panel   Lab Results   Component Value Date    CHOL 173 04/28/2020    HDL 51 04/28/2020    LDLCALC 94 04/28/2020    TRIG 179 04/28/2020    CHOLHDL 33.3 05/07/2019   , Troponin   Recent Labs   Lab 08/14/20  0749   TROPONINI 0.019   , A1C:   Recent Labs   Lab 04/28/20 08/10/20  1240   HGBA1C 6.9 6.8*    and All labs within the past 24 hours have been reviewed  Microbiology:   Blood Culture   Lab Results   Component Value Date    LABBLOO No growth after 5 days. 08/19/2016    and Urine Culture    Lab Results   Component Value Date    LABURIN  11/08/2016     Multiple organisms isolated. None in predominance.  Repeat if    LABURIN clinically necessary. 11/08/2016     Radiology: X-Ray: CXR: X-Ray Chest 1 View (CXR):   Results for orders placed or performed during the hospital encounter of 08/09/20   X-Ray Chest 1 View    Narrative    EXAMINATION:  XR CHEST 1 VIEW    CLINICAL HISTORY:  line placement; End stage renal disease    TECHNIQUE:  Single frontal view of the chest was performed.    COMPARISON:  Chest radiograph 08/09/2020    FINDINGS:  Monitoring leads overlie the chest.  Interval placement of left subclavian approach double lumen CVC with distal tip overlying the distal SVC.   Cardiomediastinal silhouette is midline and stable without evidence of failure.  There is improved aeration of both lungs which are otherwise symmetrically well expanded without large consolidation, pleural effusion or pneumothorax.  No acute osseous process seen.  Otherwise, no change.      Impression    As above.      Electronically signed by: Tahir Dooley MD  Date:    08/13/2020  Time:    16:48    and X-Ray Chest PA and Lateral (CXR): No results found for this visit on 08/09/20.  Cardiac Graphics: Echocardiogram:   2D echo with color flow doppler:   Results for orders placed or performed during the hospital encounter of 09/21/18   2D echo with color flow doppler   Result Value Ref Range    QEF 65 55 - 65    Diastolic Dysfunction Yes (A)     Est. PA Systolic Pressure 33.69     Narrative    Date of Procedure: 09/24/2018        TEST DESCRIPTION       General: The patient was in an irregularly irregular rhythm throughout the study.     Aorta: The aortic root is normal in size, measuring 2.9 cm at sinotubular junction.     Left Atrium: The left atrial volume index is normal, measuring 32.20 cc/m2.     Left Ventricle: The left ventricle is normal in size, with an end-diastolic diameter of 4.6 cm, and an end-systolic diameter of 3.4 cm. LV wall thickness is normal, with the septum and the posterior wall each measuring 1.2 cm across. Relative wall   thickness was increased at 0.52, and the LV mass index was increased at 112.5 g/m2 consistent with concentric left ventricular hypertrophy. There are no regional wall motion abnormalities. Left ventricular systolic function appears normal. Visually   estimated ejection fraction is 60-65%. The LV Doppler derived stroke volume equals 58.0 ccs.     Diastolic indices: E wave velocity 0.7 m/s, E/A ratio 0.8,  msec., E/e' ratio(lat) 18. Pulmonary vein systolic/diastolic ratio is blunted. There is diastolic dysfunction secondary to relaxation abnormality.     Right Atrium: The  right atrium is normal in size, measuring 4.9 cm in length and 3.6 cm in width in the apical view.     Right Ventricle: The right ventricle is normal in size measuring 2.7 cm at the base, 1.9 cm in the mid-cavity, and 5.5 cm in length in the apical right ventricle-focused view. Global right ventricular systolic function appears normal. The estimated PA   systolic pressure is 34 mmHg.     Aortic Valve:  Aortic valve is normal in structure with normal leaflet mobility.     Mitral Valve:  Mitral valve is normal in structure with normal leaflet mobility.     Tricuspid Valve:  Tricuspid valve is normal in structure with normal leaflet mobility.     Pulmonary Valve:  Pulmonary valve is normal in structure with normal leaflet mobility.     IVC: IVC is normal in size and collapses > 50% with a sniff, suggesting normal right atrial pressure of 3 mmHg.     Intracavitary: There is no evidence of pericardial effusion, intracavity mass, thrombi, or vegetation.         CONCLUSIONS     1 - Concentric hypertrophy.     2 - Normal left ventricular systolic function (EF 60-65%).     3 - Impaired LV relaxation, normal LAP (grade 1 diastolic dysfunction).     4 - Normal right ventricular systolic function .     5 - The estimated PA systolic pressure is 34 mmHg.             This document has been electronically    SIGNED BY: Jeff Valdovinos MD On: 09/24/2018 14:45    and Transthoracic echo (TTE) complete (Cupid Only):   Results for orders placed or performed during the hospital encounter of 11/18/19   Echo Color Flow Doppler? Yes   Result Value Ref Range    Ascending aorta 3.11 cm    STJ 2.39 cm    IVRT 0.06 msec    IVS 1.14 (A) 0.6 - 1.1 cm    LA size 3.90 cm    Left Atrium Major Axis 7.95 cm    Left Atrium Minor Axis 8.19 cm    LVIDD 5.02 3.5 - 6.0 cm    LVIDS 2.34 2.1 - 4.0 cm    LVOT diameter 2.00 cm    LVOT peak VTI 22.09 cm    PW 1.07 0.6 - 1.1 cm    MV Peak A Brandin 0.91 m/s    E wave decelartion time 202.62 msec    MV Peak E Brandin 1.37  m/s    RA Major Axis 6.75 cm    RA Width 3.49 cm    RVDD 4.53 cm    Sinus 3.00 cm    TAPSE 3.22 cm    TR Max Brandin 3.72 m/s    TDI LATERAL 0.05 m/s    TDI SEPTAL 0.06 m/s    LA WIDTH 4.33 cm    LV Diastolic Volume 119.31 mL    LV Systolic Volume 18.86 mL    RV S' 11.98 cm/s    LVOT peak brandin 0.98 m/s    LV LATERAL E/E' RATIO 27.40 m/s    LV SEPTAL E/E' RATIO 22.83 m/s    FS 53 %    LA volume 115.81 cm3    LV mass 209.79 g    Left Ventricle Relative Wall Thickness 0.43 cm    E/A ratio 1.51     Mean e' 0.06 m/s    LVOT area 3.1 cm2    LVOT stroke volume 69.36 cm3    E/E' ratio 24.91 m/s    LV Systolic Volume Index 8.7 mL/m2    LV Diastolic Volume Index 55.10 mL/m2    LA Volume Index 53.5 mL/m2    LV Mass Index 97 g/m2    Triscuspid Valve Regurgitation Peak Gradient 55 mmHg    BSA 2.24 m2    Right Atrial Pressure (from IVC) 15 mmHg    TV rest pulmonary artery pressure 70 mmHg    Narrative    · Concentric left ventricular hypertrophy.  · Normal left ventricular systolic function. The estimated ejection   fraction is 60%  · Mild right ventricular enlargement.  · Normal right ventricular systolic function.  · Grade II (moderate) left ventricular diastolic dysfunction consistent   with pseudonormalization.  · Biatrial enlargement.  · Mild tricuspid regurgitation.  · The estimated PA systolic pressure is 70 mm Hg  · Elevated central venous pressure (15 mm Hg).          Pending Diagnostic Studies:     Procedure Component Value Units Date/Time    SURG FL Surgery Fluoro Usage [[id number]]     Order Status: Sent Lab Status: No result     Specimen to Pathology, Surgery General Surgery [[id number]] Collected: 08/18/20 1610    Order Status: Sent Lab Status: In process Updated: 08/19/20 1036         Medications:  Reconciled Home Medications:      Medication List      CHANGE how you take these medications    apixaban 5 mg Tab  Commonly known as: ELIQUIS  Take 1 tablet (5 mg total) by mouth 2 (two) times daily.  What changed:    · medication strength  · how much to take     clopidogreL 75 mg tablet  Commonly known as: PLAVIX  Take 1 tablet (75 mg total) by mouth once daily. Hold until 8/24.  What changed: additional instructions        CONTINUE taking these medications    amLODIPine 10 MG tablet  Commonly known as: NORVASC  Take 1 tablet (10 mg total) by mouth once daily.     calcium acetate(phosphat bind) 667 mg capsule  Commonly known as: PHOSLO  Take 1 capsule (667 mg total) by mouth 3 (three) times daily with meals.     docusate sodium 100 MG capsule  Commonly known as: COLACE  Take 1 capsule (100 mg total) by mouth 2 (two) times daily.     gabapentin 300 MG capsule  Commonly known as: NEURONTIN  Take 1 capsule (300 mg total) by mouth every 8 (eight) hours as needed.     HYDROcodone-acetaminophen 5-325 mg per tablet  Commonly known as: NORCO  Take 1 tablet by mouth every 24 hours as needed for Pain.     metoprolol succinate 50 MG 24 hr tablet  Commonly known as: TOPROL-XL  Take 1 tablet (50 mg total) by mouth once daily.     mirtazapine 7.5 MG Tab  Commonly known as: REMERON  Take 1 tablet (7.5 mg total) by mouth every evening.     nitroGLYCERIN 0.4 MG SL tablet  Commonly known as: NITROSTAT  Place 1 tablet (0.4 mg total) under the tongue every 5 (five) minutes as needed for Chest pain.     ondansetron 4 MG tablet  Commonly known as: ZOFRAN  Take 4 mg by mouth every 8 (eight) hours as needed for Nausea.     pantoprazole 40 MG tablet  Commonly known as: PROTONIX  Take 1 tablet (40 mg total) by mouth once daily.     polyethylene glycol 17 gram/dose powder  Commonly known as: GLYCOLAX     pravastatin 80 MG tablet  Commonly known as: PRAVACHOL  Take 1 tablet (80 mg total) by mouth once daily.     sertraline 100 MG tablet  Commonly known as: ZOLOFT  Take 1 tablet (100 mg total) by mouth once daily.     vitamin renal formula (B-complex-vitamin c-folic acid) 1 mg Cap  Commonly known as: NEPHROCAP  Take 1 capsule by mouth once daily.         STOP taking these medications    BLOOD GLUCOSE TEST Strp  Generic drug: blood sugar diagnostic     blood-glucose meter kit     furosemide 40 MG tablet  Commonly known as: LASIX     lancets Misc            Indwelling Lines/Drains at time of discharge:   Lines/Drains/Airways     Central Venous Catheter Line                 Hemodialysis Catheter 08/13/20 left subclavian 6 days          Drain                 Hemodialysis AV Graft -- days         Hemodialysis AV Graft Right forearm -- days         Hemodialysis AV Graft Right upper arm -- days          Airway                 Airway - Non-Surgical 08/13/20 1243 Nasal Cannula 6 days                Time spent on the discharge of patient: 45 minutes  Patient was seen and examined on the date of discharge and determined to be suitable for discharge.         Enrrique Martin MD  Department of Hospital Medicine  [facility]

## 2020-08-19 NOTE — NURSING
Net 2 L fluid removed during HD. No meds ordered to administer. CVC lines heparin locked and capped. CVC dressing changed.

## 2020-08-19 NOTE — PROGRESS NOTES
Progress Note    Admit Date: 8/9/2020   LOS: 8 days   POD # 6 perma-cath placement  POD #1 AV graft revision/jump graft    SUBJECTIVE:     No acute events overnight. Underwent AV graft revision/jump graft placement yesterday without major complication.  No nausea or vomiting.  Denies any pain or discomfort. Patient denied any other complaints at this time.       Scheduled Meds:   amLODIPine  10 mg Oral Daily    calcium acetate(phosphat bind)  667 mg Oral TID WM    docusate sodium  100 mg Oral BID    metoprolol succinate  50 mg Oral Daily    mirtazapine  7.5 mg Oral QHS    pantoprazole  40 mg Oral Daily    polyethylene glycol  17 g Oral Daily    pravastatin  80 mg Oral Daily    senna-docusate 8.6-50 mg  1 tablet Oral BID    sertraline  100 mg Oral Daily    vitamin renal formula (B-complex-vitamin c-folic acid)  1 capsule Oral Daily     Continuous Infusions:  PRN Meds:sodium chloride 0.9%, sodium chloride 0.9%, acetaminophen, [COMPLETED] calcium gluconate IVPB **AND** calcium gluconate IVPB, dextrose 50%, dextrose 50%, gabapentin, glucagon (human recombinant), glucose, glucose, heparin (porcine), hydrALAZINE, HYDROcodone-acetaminophen, HYDROcodone-acetaminophen, insulin aspart U-100, ondansetron, sodium chloride 0.9%, sodium chloride 0.9%, sodium chloride 0.9%    Review of patient's allergies indicates:  No Known Allergies      OBJECTIVE:     Vital Signs (Most Recent)  Temp: 97.9 °F (36.6 °C) (08/19/20 0502)  Pulse: 71 (08/19/20 0502)  Resp: 18 (08/19/20 0502)  BP: (!) 86/58 (08/19/20 0502)  SpO2: (!) 94 % (08/19/20 0502)    Vital Signs Range (Last 24H):  Temp:  [97.6 °F (36.4 °C)-98.2 °F (36.8 °C)]   Pulse:  [60-73]   Resp:  [13-18]   BP: ()/(42-85)   SpO2:  [85 %-100 %]     I & O (Last 24H):  No intake or output data in the 24 hours ending 08/19/20 0659    Physical Exam:  Gen: Awake, alert, oriented. NAD  Resp: Respirations even and unlabored on room air  CV: Regular rate, 2+ radial pulses..  Left  chest PermCath in place with dressing overlying, CDI. Over AV graft no palpable thrill but audible bruit.   GI: Soft, nontender, nondistended  MSK: Moving all four extremities spontaneously    LABS  CBC  Recent Labs   Lab 08/13/20 0638 08/14/20  0121   WBC 4.21 7.18   RBC 3.86* 4.05   HGB 10.5* 11.3*   HCT 34.3* 35.6*    207   MCV 89 88   MCH 27.2 27.9   MCHC 30.6* 31.7*     CMP  Recent Labs   Lab 08/13/20  0638 08/14/20  0121 08/15/20  0805 08/18/20  0824 08/19/20  0456   * 175* 188* 148* 200*   CALCIUM 8.8 9.0 8.7 9.7 9.0   ALBUMIN 3.2* 3.4* 2.5*  --  2.4*   PROT 6.9 7.0  --   --   --    * 130* 132* 137 138   K 4.7 5.6* 4.8 4.1 4.4   CO2 24 22* 26 23 26   CL 95 93* 96 100 101   BUN 50* 60* 46* 38* 51*   CREATININE 7.8* 8.3* 6.2* 5.2* 6.4*   ALKPHOS 61 66  --   --   --    ALT 15 18  --   --   --    AST 21 26  --   --   --    BILITOT 0.4 0.4  --   --   --        Recent Labs   Lab 08/15/20  0805 08/18/20  0824 08/19/20  0456   CALCIUM 8.7 9.7 9.0   PHOS 4.3  --  4.4         POCT-Glucose  POCT Glucose   Date Value Ref Range Status   08/19/2020 193 (H) 70 - 110 mg/dL Final   08/18/2020 159 (H) 70 - 110 mg/dL Final   08/18/2020 111 (H) 70 - 110 mg/dL Final   08/18/2020 136 (H) 70 - 110 mg/dL Final   08/17/2020 207 (H) 70 - 110 mg/dL Final   08/17/2020 170 (H) 70 - 110 mg/dL Final   08/17/2020 145 (H) 70 - 110 mg/dL Final   08/16/2020 137 (H) 70 - 110 mg/dL Final   08/16/2020 192 (H) 70 - 110 mg/dL Final   08/16/2020 241 (H) 70 - 110 mg/dL Final       CE  Recent Labs   Lab 08/13/20  1941 08/14/20  0121 08/14/20  0749   TROPONINI 0.029* 0.026 0.019     LAST HbA1c  Lab Results   Component Value Date    HGBA1C 6.8 (H) 08/10/2020       ASSESSMENT/PLAN:   POD #6 perma-cath placement  POD #1 AV graft revision/jump graft  69 y.o.female with history of CAD, CHF, pulmonary HTN, DM2, sarcoidosis, and ESRD with dialysis graft stenosis.    -Underwent successful revision yesterday; no major complications  -  Patient is not to use AV graft until being seen in clinic with Dr. Vizcaino  - Followup with Dr. Vizcaino in 2 weeks  - Can resume Eliquis, would hold plavix for 5 days  -remainder of management per primary team    Mariano Yu MD PGY-2  08/19/2020 7:05 AM

## 2020-08-19 NOTE — PT/OT/SLP PROGRESS
Occupational Therapy      Patient Name:  Angelina Beard   MRN:  [id number]    Patient not seen today secondary to Dialysis(AM: Pt in dialysis and expected discharge to SNF after dialysis per CM.). Will follow-up at later time.    MARTHA Juarez  8/19/2020

## 2020-08-19 NOTE — PLAN OF CARE
spoke with Heather with [facility] and confirmed receipt of orders and prescriptions. Heather reported all documents received and the patient was clear to arrive for skilled level of care.    Heather reported the patient will admit to Room 142 A Bed and bedside nurse to call report to [phone].    [facility]anta bedside nurse will call report.    Transportation set for 2:30pm.       08/19/20 1402   Post-Acute Status   Post-Acute Authorization Placement   Post-Acute Placement Status Set-up Complete   Discharge Delays None known at this time   Discharge Plan   Discharge Plan A Skilled Nursing Facility

## 2020-08-19 NOTE — PLAN OF CARE
TN spoke with pt's son Cristi regarding pt's anticipated d/c plan. Pt's son stated he completed admission's paper work this morning at [facility]. TN informed pt's son that pt is anticipated to d/c today to [facility] this afternoon after HD. Pt's son in agreement with d/c plan.

## 2020-08-19 NOTE — PLAN OF CARE
Pt remains A+Ox4. NAVI for majority of shift for graft revision. Pt had nerve block with anesthesia, no c/o pain, R arm in sling. Safety precautions maintained.

## 2020-08-19 NOTE — PLAN OF CARE
08/19/20 1423   Final Note   Assessment Type Final Discharge Note   Anticipated Discharge Disposition SNF  ([facility])   What phone number can be called within the next 1-3 days to see how you are doing after discharge? [phone]   Hospital Follow Up  Appt(s) scheduled? Yes   Discharge plans and expectations educations in teach back method with documentation complete? Yes   Right Care Referral Info   Post Acute Recommendation SNF / Sub-Acute Rehab   Facility Name [facility]

## 2020-08-19 NOTE — PT/OT/SLP PROGRESS
Physical Therapy      Patient Name:  Angelina Beard   MRN:  [id number]    Patient not seen today secondary to Dialysis. Will follow-up.    Daya Tejada, PT   8/19/2020

## 2020-08-19 NOTE — ASSESSMENT & PLAN NOTE
Taking amlodipine 10 mg daily and metoprolol 50 mg daily  - added hydralazine 10mg tid on 8/15 with improvement but actually became hypotensive; will stop

## 2020-08-19 NOTE — PLAN OF CARE
spoke with Heather with [facility]- this patient is medically accepted and her son completed admission paperwork this morning. Heather reported her facility's transportation van are able to accommodate and take her to her dialysis on MWF to [facility] ([place]).    Heather is requesting prescriptions for the patient's norco and zoloft.  MD notified.     08/19/20 6423   Post-Acute Status   Post-Acute Authorization Placement   Post-Acute Placement Status Additional Clinical Requested   Discharge Delays None known at this time   Discharge Plan   Discharge Plan A Skilled Nursing Facility

## 2020-08-19 NOTE — PLAN OF CARE
[facility]     Department of Hospital Medicine     [address] [place] [address]     [phone] after hours  [phone] fax       NURSING HOME ORDERS    08/19/2020    Admit to Nursing Home:  Skilled Bed                                Active Hospital Problems    Diagnosis  POA    *Complication of vascular access for dialysis [T82.9XXA]  Yes    EKG abnormalities [R94.31]  Yes    Diarrhea [R19.7]  Yes    Left knee pain [M25.562]  Yes    Hypertension associated with diabetes [E11.59, I10]  Yes    Hyperkalemia [E87.5]  Yes    Chronic diastolic congestive heart failure [I50.32]  Yes    Physical deconditioning [R53.81]  Yes    ESRD on dialysis [N18.6, Z99.2]  Not Applicable    Paroxysmal atrial fibrillation [I48.0]  Yes     Chronic    Anemia in end-stage renal disease [N18.6, D63.1]  Yes     Chronic    Debility [R53.81]  Yes    Diabetic neuropathy associated with type 2 diabetes mellitus [E11.40]  Yes     Chronic    GERD (gastroesophageal reflux disease) [K21.9]  Yes     Chronic    Cerebral microvascular disease [I67.9]  Yes     Chronic    Type 2 diabetes mellitus with hypertension and end stage renal disease on dialysis [E11.22, I12.0, Z99.2, N18.6]  Not Applicable     Chronic    Hyperlipidemia [E78.5]  Yes     Chronic      Resolved Hospital Problems    Diagnosis Date Resolved POA    Problem with dialysis access [T82.898A] 08/14/2020 Yes    Pyogenic arthritis of left knee joint [M00.9] 08/10/2020 Unknown       Patient is homebound due to:  Complication of vascular access for dialysis    Allergies:Review of patient's allergies indicates:  No Known Allergies    Vitals:      Every shift (Skilled Nursing patients)    Diet: renal     Supplement:  1 can every three times a day with meals                         Type:   Nepro  With meals      Acitivities:    - Up in a chair each morning as tolerated   - Ambulate with assistance to bathroom   - Scheduled  walks once each shift (every 8 hours)    LABS:  Per facility protocol    Nursing Precautions:   - Aspiration precautions:             - Total assistance with meals            -  Upright 90 degrees befor during and after meals             -  Suction at bedside          - Fall precautions per nursing home protocol   - Seizure precaution per residential protocol   - Decubitus precautions:        -  for positioning   - Pressure reducing foam mattress   - Turn patient every two hours. Use wedge pillows to anchor patient    CONSULTS:    Physical Therapy to evaluate and treat     Occupational Therapy to evaluate and treat     Nutrition to evaluate and recommend diet      MISCELLANEOUS CARE:        Oxygen Therapy: Use 2L NC continuously and as needed for shortness of breath.           Routine Skin for Bedridden Patients:  Apply moisture barrier cream to all    skin folds and wet areas in perineal area daily and after baths and                           all bowel movements.    Do not use AV graft for dialysis until seen in clinic by surgical team (use permacath)                      DIABETES CARE:        Check blood sugar:     Fingerstick blood sugar AC and HS   Fingerstick blood sugar every 6 hours if unable to eat      Report CBG < 60 or > 400 to physician.                                          Insulin Sliding Scale          Glucose  Novolog Insulin Subcutaneous        0 - 60   Orange juice or glucose tablet, hold insulin      No insulin   201-250  2 units   251-300  4 units   301-350  6 units   351-400  8 units   >400   10 units then call physician      Medications: Discontinue all previous medication orders, if any. See new list below.     Angelina Beard   Home Medication Instructions DANYEL:[id number]    Printed on:08/19/20 6485   Medication Information                      amLODIPine (NORVASC) 10 MG tablet  Take 1 tablet (10 mg total) by mouth once daily.             apixaban (ELIQUIS) 5 mg Tab  Take  1 tablet (5 mg total) by mouth 2 (two) times daily.             calcium acetate (PHOSLO) 667 mg capsule  Take 1 capsule (667 mg total) by mouth 3 (three) times daily with meals.             clopidogreL (PLAVIX) 75 mg tablet  Take 1 tablet (75 mg total) by mouth once daily. Hold until 8/24.             docusate sodium (COLACE) 100 MG capsule  Take 1 capsule (100 mg total) by mouth 2 (two) times daily.             gabapentin (NEURONTIN) 300 MG capsule  Take 1 capsule (300 mg total) by mouth every 8 (eight) hours as needed.             HYDROcodone-acetaminophen (NORCO) 5-325 mg per tablet  Take 1 tablet by mouth every 24 hours as needed for Pain.             metoprolol succinate (TOPROL-XL) 50 MG 24 hr tablet  Take 1 tablet (50 mg total) by mouth once daily.             mirtazapine (REMERON) 7.5 MG Tab  Take 1 tablet (7.5 mg total) by mouth every evening.             nitroGLYCERIN (NITROSTAT) 0.4 MG SL tablet  Place 1 tablet (0.4 mg total) under the tongue every 5 (five) minutes as needed for Chest pain.             ondansetron (ZOFRAN) 4 MG tablet  Take 4 mg by mouth every 8 (eight) hours as needed for Nausea.             pantoprazole (PROTONIX) 40 MG tablet  Take 1 tablet (40 mg total) by mouth once daily.             polyethylene glycol (GLYCOLAX) 17 gram/dose powder               pravastatin (PRAVACHOL) 80 MG tablet  Take 1 tablet (80 mg total) by mouth once daily.             sertraline (ZOLOFT) 100 MG tablet  Take 1 tablet (100 mg total) by mouth once daily.             vitamin renal formula, B-complex-vitamin c-folic acid, (NEPHROCAP) 1 mg Cap  Take 1 capsule by mouth once daily.                       _________________________________  Enrrique Martin MD  08/19/2020

## 2020-08-19 NOTE — NURSING
Pt being discharged to [facility] in stable condition. IV catheters removed. Pt tolerated well. Packet given to transporter. Report given to Fawn.

## 2020-08-19 NOTE — PLAN OF CARE
08/19/20 1227   Post-Acute Status   Post-Acute Authorization Placement   Post-Acute Placement Status Awaiting Clarification Orders   Discharge Delays None known at this time   Discharge Plan   Discharge Plan A Skilled Nursing Facility

## 2020-08-19 NOTE — ASSESSMENT & PLAN NOTE
- s/p revision on 8/18  - will need 2 week f/u with surgery  - use permacath for HD until graft cleared by surgery

## 2020-08-19 NOTE — PLAN OF CARE
placed call to [facility] [phone] and spoke with dialysis tech Tatyana and informed her this patient discharged to [facility] today, but they will transport her for dialysis on Friday.    Tatyana reported she will informed head nurse Vito of the above mentioned.  asked if she would like discharge summary faxed to the facility. Tatyana reported she did not need it, they will look forward to seeing the patient on Friday.

## 2020-08-19 NOTE — PLAN OF CARE
TN spoke to pt's son jass to inform him that transportation has picked up patient to transfer her to [facility].    TN send in basket C3DNA message to Dr. Vizcaino's staff to call TN back with Surgery follow up appointment. TN to notify facility of follow up appointment.

## 2020-08-20 ENCOUNTER — TELEPHONE (OUTPATIENT)
Dept: INTERNAL MEDICINE | Facility: CLINIC | Age: 70
End: 2020-08-20

## 2020-08-20 NOTE — TELEPHONE ENCOUNTER
----- Message from Dorothy Tejada sent at 8/20/2020  1:08 PM CDT -----  Who Called: Jessica ruiz/ [facility]  Regarding: she received the cnn back and the answers are incorrect  Best Call Back Number: [phone]  Additional Information: n/a

## 2020-08-26 ENCOUNTER — NURSE TRIAGE (OUTPATIENT)
Dept: ADMINISTRATIVE | Facility: CLINIC | Age: 70
End: 2020-08-26

## 2020-08-26 NOTE — TELEPHONE ENCOUNTER
3233 Post-procedure call to [phone]; No answer; phone number verified.    Reason for Disposition   Caller can't be reached by phone    Protocols used: INFORMATION ONLY CALL-A-AH

## 2020-08-27 LAB
FINAL PATHOLOGIC DIAGNOSIS: NORMAL
GROSS: NORMAL

## 2020-08-31 ENCOUNTER — NURSE TRIAGE (OUTPATIENT)
Dept: ADMINISTRATIVE | Facility: CLINIC | Age: 70
End: 2020-08-31

## 2020-08-31 NOTE — TELEPHONE ENCOUNTER
Pt did not answer.  Per PP guidelines, no voicemail left and no further PP tracking warranted.  The pt's procedure was on 8/18/20.    Reason for Disposition   No answer.  First attempt to contact caller.  Follow-up call scheduled within 15 minutes.    Protocols used: NO CONTACT OR DUPLICATE CONTACT CALL-A-AH

## 2020-09-03 ENCOUNTER — TELEPHONE (OUTPATIENT)
Dept: NEUROLOGY | Facility: HOSPITAL | Age: 70
End: 2020-09-03

## 2020-09-03 NOTE — TELEPHONE ENCOUNTER
----- Message from Magdalena Curry sent at 9/3/2020 10:24 AM CDT -----  Contact: [phone]  Patient is calling to reschedule her appointment. Please call

## 2020-09-09 LAB — FUNGUS SPEC CULT: NORMAL

## 2020-09-21 ENCOUNTER — PATIENT OUTREACH (OUTPATIENT)
Dept: ADMINISTRATIVE | Facility: HOSPITAL | Age: 70
End: 2020-09-21

## 2020-10-01 ENCOUNTER — OFFICE VISIT (OUTPATIENT)
Dept: NEUROLOGY | Facility: HOSPITAL | Age: 70
End: 2020-10-01
Attending: SURGERY
Payer: MEDICARE

## 2020-10-01 VITALS
SYSTOLIC BLOOD PRESSURE: 152 MMHG | DIASTOLIC BLOOD PRESSURE: 78 MMHG | TEMPERATURE: 99 F | HEIGHT: 67 IN | HEART RATE: 76 BPM

## 2020-10-01 DIAGNOSIS — T82.898D PROBLEM WITH DIALYSIS ACCESS, SUBSEQUENT ENCOUNTER: Primary | ICD-10-CM

## 2020-10-01 DIAGNOSIS — Z09 POSTOP CHECK: Primary | ICD-10-CM

## 2020-10-01 DIAGNOSIS — N18.6 ESRD ON DIALYSIS: ICD-10-CM

## 2020-10-01 DIAGNOSIS — Z99.2 ESRD ON DIALYSIS: ICD-10-CM

## 2020-10-01 PROCEDURE — 99212 OFFICE O/P EST SF 10 MIN: CPT | Performed by: SURGERY

## 2020-10-01 RX ORDER — MIRTAZAPINE 7.5 MG/1
TABLET, FILM COATED ORAL
COMMUNITY
Start: 2020-08-28

## 2020-10-01 RX ORDER — PRAVASTATIN SODIUM 80 MG/1
TABLET ORAL
COMMUNITY
Start: 2020-08-28

## 2020-10-01 RX ORDER — APIXABAN 2.5 MG/1
TABLET, FILM COATED ORAL
COMMUNITY
Start: 2020-08-28

## 2020-10-01 RX ORDER — AMLODIPINE BESYLATE 10 MG/1
TABLET ORAL
COMMUNITY
Start: 2020-08-28

## 2020-10-01 RX ORDER — GABAPENTIN 300 MG/1
CAPSULE ORAL
COMMUNITY
Start: 2020-08-28

## 2020-10-01 RX ORDER — METOPROLOL SUCCINATE 50 MG/1
TABLET, EXTENDED RELEASE ORAL
COMMUNITY
Start: 2020-08-28

## 2020-10-01 RX ORDER — CALCIUM ACETATE 667 MG/1
CAPSULE ORAL
COMMUNITY
Start: 2020-08-28

## 2020-10-01 RX ORDER — SERTRALINE HYDROCHLORIDE 100 MG/1
TABLET, FILM COATED ORAL
COMMUNITY
Start: 2020-08-28

## 2020-10-01 NOTE — PROGRESS NOTES
[facility]:  [facility]  A collaboration between [facility] and [facility]        Chief Complaint:  post op follow up    S/p:   8/18/2020 - Revision, Procedure Involving Arteriovenous Graft - Right and Angioplasty, Using Patch Graft - Right     Pathology:     Vital Sign: There were no vitals filed for this visit.      Incision: healing well    Appetite: good    Restrictions: NO Restrictions    Return to clinic: 3 weeks      Will need venogram before using the graft. If anatomy ok, will start using the graft    Incision looks good  othertwise doing ok

## 2020-10-01 NOTE — PATIENT INSTRUCTIONS
Ordered for a Venogram right arm the dialysis graft.    Will call for an appointment with Dr. Stauffer after the test

## 2020-10-04 NOTE — PROGRESS NOTES
" [facility]OLAN[facility]:  [facility]  A collaboration between [facility] and [facility]        Chief Complaint:  post op follow up    S/p:   No surgery found - No surgery found     Pathology:     Vital Sign:   Vitals:    10/01/20 0938   BP: (!) 152/78   Pulse: 76   Temp: 99.3 °F (37.4 °C)   TempSrc: Oral   Height: 5' 7" (1.702 m)       Incision: healing well    Appetite: good    Restrictions: NO Restrictions    Return to clinic: 3 weeks     Attempt was made to use the access for dialysis.  She had bleeding she needs to have a fistulogram done to access the venous outflow.  Will consult Radiology for the above procedure and once the access this taken care of the catheter to be taken out                "

## 2020-10-12 ENCOUNTER — TELEPHONE (OUTPATIENT)
Dept: INTERVENTIONAL RADIOLOGY/VASCULAR | Facility: HOSPITAL | Age: 70
End: 2020-10-12

## 2020-10-22 ENCOUNTER — TELEPHONE (OUTPATIENT)
Dept: NEUROLOGY | Facility: HOSPITAL | Age: 70
End: 2020-10-22

## 2020-10-22 NOTE — TELEPHONE ENCOUNTER
Called [facility] [phone] to inform them that there is a change in this pt appt with Dr. Stauffer 11/12/20 at 1168

## 2020-10-22 NOTE — TELEPHONE ENCOUNTER
----- Message from Magdalena Curry sent at 10/21/2020 11:17 AM CDT -----  Contact: Reina [phone]  KEITH - Reina with [facility] is calling to get patient scheduled for a appointment. Please call

## 2020-10-22 NOTE — TELEPHONE ENCOUNTER
Called  Reina Aguirre [facility] [phone] to inform them that I made the appt for this pt for 11/2/20 at 1140 am

## 2020-11-12 ENCOUNTER — OFFICE VISIT (OUTPATIENT)
Dept: NEUROLOGY | Facility: HOSPITAL | Age: 70
End: 2020-11-12
Attending: SURGERY
Payer: MEDICARE

## 2020-11-12 VITALS
BODY MASS INDEX: 30.52 KG/M2 | SYSTOLIC BLOOD PRESSURE: 124 MMHG | DIASTOLIC BLOOD PRESSURE: 64 MMHG | TEMPERATURE: 98 F | HEART RATE: 69 BPM | HEIGHT: 67 IN

## 2020-11-12 DIAGNOSIS — T82.9XXD COMPLICATIONS DUE TO RENAL DIALYSIS DEVICE, IMPLANT, AND GRAFT, SUBSEQUENT ENCOUNTER: ICD-10-CM

## 2020-11-12 DIAGNOSIS — Z99.2 ESRD ON DIALYSIS: Primary | ICD-10-CM

## 2020-11-12 DIAGNOSIS — N18.6 ESRD ON DIALYSIS: Primary | ICD-10-CM

## 2020-11-12 PROCEDURE — 99215 OFFICE O/P EST HI 40 MIN: CPT | Performed by: SURGERY

## 2020-11-12 RX ORDER — INSULIN ASPART 100 [IU]/ML
INJECTION, SOLUTION INTRAVENOUS; SUBCUTANEOUS
COMMUNITY

## 2022-07-27 NOTE — ASSESSMENT & PLAN NOTE
Taking Apixaban- will discontinue in preparation for procedure on Monday     Perilesional Excision Additional Text (Leave Blank If You Do Not Want): The margin was drawn around the clinically apparent lesion. Incisions were then made along these lines to the appropriate tissue plane and the lesion was extirpated.

## 2022-12-08 NOTE — PROGRESS NOTES
[facility] NOTE     Patient currently on hemodialysis for removal of uremic toxins and volume.     Patient seen and evaluated on hemodialysis, tolerating treatment, see HD flowsheet for vitals and assessments.      Ultrafiltration goal is 2L as tolerated      Labs have been reviewed and the dialysate bath has been adjusted.     Assessment/Plan:    -Patient seen on HD, tolerating treatment well, w/o complaints   -Unable to weigh pt  -Keep map >65 while on HD   -Renal diet  -Continue calcium acetate   -Strict I/O's and daily weights  -Daily renal function panels  -Will continue to follow while inpatient       NNAMDI Quinn, AGNP-C  Nephrology  Pager:  [phone]          MD Lily Gongora MD Fransisca Bautista

## 2023-02-13 NOTE — PROCEDURES
"Wound Debridement  Date/Time: 12/13/2018 5:00 PM  Performed by: Kwame Dacosta DPM  Authorized by: Kwame Dacosta DPM     Time out: Immediately prior to procedure a "time out" was called to verify the correct patient, procedure, equipment, support staff and site/side marked as required.    Consent Done?:  Yes (Verbal)    Preparation: Patient was prepped and draped in usual sterile fashion    Local anesthesia used?: Yes    Local anesthetic:  Lidocaine 2% topical gel    Wound Details:    Location:  Right foot    Location:  Right 5th Toe    Type of Debridement:  Excisional       Length (cm):  0.5       Area (sq cm):  0.15       Width (cm):  0.3       Percent Debrided (%):  100       Depth (cm):  0.1       Total Area Debrided (sq cm):  0.15    Depth of debridement:  Epidermis/Dermis    Tissue debrided:  Epidermis and Dermis    Devitalized tissue debrided:  Callus and Sough    Instruments:  Blade    2nd Wound Details:     Location:  Right foot    Location:  Right 5th Metatarsal Head    Location:  Right 5th Metatarsal Head    Type of Debridement:  Excisional       Length (cm):  0.6       Area (sq cm):  0.3       Width (cm):  0.5       Percent Debrided (%):  100       Depth (cm):  0.1       Total Area Debrided (sq cm):  0.3    Depth of debridement:  Epidermis/Dermis    Tissue debrided:  Epidermis and Dermis    Devitalized tissue debrided:  Callus and Sough    Instruments:  Blade    3rd Wound Details:     Location:  Right foot    Location:  Right 5th Metatarsal Head (distal)    Location:  Right 5th Metatarsal Head (distal)    Type of Debridement:  Excisional       Length (cm):  0.3       Area (sq cm):  0.09       Width (cm):  0.3       Percent Debrided (%):  100       Total Area Debrided (sq cm):  0.09    Depth of debridement:  Epidermis/Dermis    Tissue debrided:  Epidermis and Dermis    Devitalized tissue debrided:  Callus and Sough    Instruments:  Blade    Bleeding:  Minimal  Hemostasis Achieved: Yes    Method " From: Kateryna Caballero  To: Demetria Isbell  Sent: 2/13/2023 10:08 AM CST  Subject: Excerise    I wanted to ask if you think that it's a good idea to try to exercise to increase my energy level or is it not recommended because of my medication?   Thanks   Kateryna Caballero   [phone]   Used:  Pressure  Patient tolerance:  Patient tolerated the procedure well with no immediate complications     Saline moistened hydrothera blue, aquacel foam, cast padding x 2 secured with coban.

## 2024-05-24 NOTE — HPI
Ms. Beard 69 year old lady initially admitted on 11/18/19 for weakness and hyperkalemia. Patient has a hsitory of CVA, HTN, HLD, ESRD on HD (MWF), A.fib, HFpEF, and A.fib. Patient presented with leg cramping and weakness, she missed her scheduled dialysis that day (11/18/19) due to weakness. Patient's potassium was elevated and she was shifted in the ED. The patient was scheduled for dialysis on 11/19/19 however overnight she became acutely hypoxic. She was given a dose of lasix but had minimal urine output. CXR was notable for significant cardiomegaly and pulmonary edema. A rapid response was called because patient was requiring NRB to keep O2 sat over 90% and critical care medicine was consulted for further evaluation. On questioning patient was on bipap but answering questions appropriately. She states she has not been as complaint with dialysis and her diet as she could be. She denies any chest pain, nausea, vomiting, chest pain, or diarrhea. She denies any other symptoms at this time.  
Ms. Lozada is a 69 year-old AA female with CVA, ESRD (on HD, MWF), HLD, AFib, HFpEF, HTN, and T2DM who was admitted on 11/18 with complaints of weakness and LLE cramping. She missed her HD treatment on 11/18 due to the aforementioned complaints. In the ED, she was found to be hyperkalemic (5.6). She was shifted in the ED and her repeat potassium was 5.0 after medical management. Overnight, the patient became acutely hypoxic despite NC at 3 L. Her oxygen saturations were in the 50s despite supplemental oxygen. Her CXR was notable for significant cardiomegaly and pulmonary edema. She was given a dose of lasix but had minimal urine output. ABGs notable for hypercapnic respiratory acidosis with hypoxemia. A rapid response was called on 11/19/19 and the patient was stepped up to the MICU for acute hypercapnic and hypoxic respiratory failure secondary to volume overload. The patient was placed on BiPAP and emergent HD was performed at bedside with 3 L being removed. The patient remains on BiPAP this AM of 11/20/19 but states that her shortness of breath is much improved. She is a MWF dialysis patient of Dr. Madden at [facility]. She states that she dialyzes for 3.5 hrs via a RFA AVG. Her dry weight is about ~ 102 kg. She states that she last dialyze on Friday, 11/15. She missed her dialysis on Monday 11/18/19 because of generalize weakness and lower extremity cramps.  
The patient is a 69 year-old AA female who has a history of CVA, ESRD (on HD, MWF), HLD, AFib, HFpEF, HTN, and T2DM who was admitted on 11/18 with complaints of weakness and LLE cramping. She missed her HD treatment on 11/18 due to the aforementioned complaints. In the ED, she was found to be hyperkalemic (5.6). She was shifted in the ED and her repeat potassium was 5.0 after medical management. Overnight, the patient became acutely hypoxic despite NC at 3 L. Her oxygen saturations were in the 50s despite supplemental oxygen. Her CXR was notable for significant cardiomegaly and pulmonary edema. She was given a dose of lasix but had minimal urine output. ABGs notable for hypercapnic respiratory acidosis with hypoxemia. A rapid response was called and the patient was stepped up to the MICU for acute hypercapnic and hypoxic respiratory failure secondary to volume overload. The patient was placed on BiPAP and emergent HD was performed at bedside with 3 L being removed. The patient remains on BiPAP this AM but states that her shortness of breath is much improved. She currently denies any chest pain, nausea, vomiting, chest pain, or diarrhea. She is a MWF dialysis patient of Dr. Madden at [facility]. She states that she dialyzes for 3.5 hrs via a RFA AVG. Her dry weight is about ~ 102 kg. She states that she last dialyze on Friday, 11/15. Nephrology consulted for management of ESRD and HD treatment.  
false leg/Cane

## (undated) DEVICE — SUT PROLENE 6-0 24 BV-1

## (undated) DEVICE — ELECTRODE PENCIL W/ROCKER NDL

## (undated) DEVICE — ELECTRODE NEEDLE 2.8IN

## (undated) DEVICE — STOCKINET TUBULAR 1 PLY 6X60IN

## (undated) DEVICE — BOOT SUTURE AID

## (undated) DEVICE — HEMOSTAT SURGICEL 4X8IN

## (undated) DEVICE — GLOVE SURG BIOGEL LATEX SZ 7.5

## (undated) DEVICE — SUT PROLENE 5-0 36IN C-1

## (undated) DEVICE — TAPE UMBILICAL 1/8X36IN WHITE

## (undated) DEVICE — SUT VICRYL 3-0 27 SH

## (undated) DEVICE — SOL NACL 0.9% INJ 500ML BG

## (undated) DEVICE — INSTRUMENT SUCTION FRAZIER 12F

## (undated) DEVICE — ADHESIVE DERMABOND ADVANCED

## (undated) DEVICE — DRAIN PENROSE XRAY 18 X 1/4 ST

## (undated) DEVICE — SEE MEDLINE ITEM 157117

## (undated) DEVICE — SUT PROLENE 6-0 BV-1

## (undated) DEVICE — COVER PROBE 6X48

## (undated) DEVICE — SUT MCRYL PLUS 4-0 PS2 27IN

## (undated) DEVICE — SUT 3-0 12-18IN SILK

## (undated) DEVICE — BLADE SURG CARBON STEEL SZ11

## (undated) DEVICE — ADAPTER CATH SYRINGE

## (undated) DEVICE — SEE MEDLINE ITEM 157131

## (undated) DEVICE — STOPCOCK DISCOFIX 3 WAY

## (undated) DEVICE — SHEET THYROID W/ISO-BAC

## (undated) DEVICE — APPLICATOR CHLORAPREP ORN 26ML

## (undated) DEVICE — MANIFOLD 4 PORT

## (undated) DEVICE — PENCIL ROCKER SWITCH 10FT CORD

## (undated) DEVICE — SEE MEDLINE ITEM 156955

## (undated) DEVICE — DRESSING ANTIMICROBIAL 1 INCH

## (undated) DEVICE — COVER OVERHEAD SURG LT BLUE

## (undated) DEVICE — SUT 4-0 12-18IN SILK BLACK

## (undated) DEVICE — GLOVE BIOGEL PI MICRO INDIC 8

## (undated) DEVICE — SEE MEDLINE ITEM 157116

## (undated) DEVICE — SYR 10CC LUER LOCK

## (undated) DEVICE — PACK BASIC

## (undated) DEVICE — DEV-O-LOOPS MINI BLUE

## (undated) DEVICE — DRAPE C-ARM/MOBILE XRAY 44X80

## (undated) DEVICE — TIE VAS SIL RUBBER MAXI BLU ST

## (undated) DEVICE — SET DECANTER MEDICHOICE

## (undated) DEVICE — TIE VAS SIL RUBBER MINI WHT ST

## (undated) DEVICE — TOWEL OR XRAY WHITE 17X26IN

## (undated) DEVICE — SEE MEDLINE ITEM 152622

## (undated) DEVICE — ELECTRODE REM PLYHSV RETURN 9

## (undated) DEVICE — SYR 30CC LUER LOCK

## (undated) DEVICE — APPLIER LIGACLIP SM 9.38IN

## (undated) DEVICE — DRAPE EXTREMITY FULL FABRIC

## (undated) DEVICE — DRESSING SORBAVIEW 2X2.5

## (undated) DEVICE — SUT 2/0 30IN SILK BLK BRAI

## (undated) DEVICE — NDL HYPDRM 23X15

## (undated) DEVICE — KIT POWDER ABSORBABLE GELATIN

## (undated) DEVICE — CANNULA VSL W/DUCKBILL

## (undated) DEVICE — DEVICE PICC SECURE SORBA VIEW